# Patient Record
Sex: MALE | Race: WHITE | Employment: OTHER | URBAN - METROPOLITAN AREA
[De-identification: names, ages, dates, MRNs, and addresses within clinical notes are randomized per-mention and may not be internally consistent; named-entity substitution may affect disease eponyms.]

---

## 2017-01-07 ENCOUNTER — HOSPITAL ENCOUNTER (EMERGENCY)
Age: 82
Discharge: HOME | End: 2017-01-07
Attending: EMERGENCY MEDICINE
Payer: MEDICARE

## 2017-01-07 ENCOUNTER — APPOINTMENT (OUTPATIENT)
Dept: CT IMAGING | Age: 82
End: 2017-01-07
Attending: EMERGENCY MEDICINE
Payer: MEDICARE

## 2017-01-07 VITALS
RESPIRATION RATE: 19 BRPM | HEART RATE: 60 BPM | SYSTOLIC BLOOD PRESSURE: 143 MMHG | WEIGHT: 180 LBS | HEIGHT: 69 IN | TEMPERATURE: 97.5 F | OXYGEN SATURATION: 94 % | DIASTOLIC BLOOD PRESSURE: 91 MMHG | BODY MASS INDEX: 26.66 KG/M2

## 2017-01-07 DIAGNOSIS — S00.93XA TRAUMATIC HEMATOMA OF HEAD, INITIAL ENCOUNTER: ICD-10-CM
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
## 2017-01-07 LAB
ATRIAL RATE: 67 BPM
CALCULATED P AXIS, ECG09: 83 DEGREES
CALCULATED R AXIS, ECG10: 45 DEGREES
CALCULATED T AXIS, ECG11: 11 DEGREES
DIAGNOSIS, 93000: NORMAL
P-R INTERVAL, ECG05: 196 MS
Q-T INTERVAL, ECG07: 386 MS
QRS DURATION, ECG06: 88 MS
QTC CALCULATION (BEZET), ECG08: 407 MS
VENTRICULAR RATE, ECG03: 67 BPM

## 2017-01-07 PROCEDURE — 93005 ELECTROCARDIOGRAM TRACING: CPT

## 2017-01-07 PROCEDURE — 77030018836 HC SOL IRR NACL ICUM -A

## 2017-01-07 PROCEDURE — 75810000293 HC SIMP/SUPERF WND  RPR

## 2017-01-07 PROCEDURE — 77030010507 HC ADH SKN DERMBND J&J -B

## 2017-01-07 PROCEDURE — 99285 EMERGENCY DEPT VISIT HI MDM: CPT

## 2017-01-07 PROCEDURE — 70450 CT HEAD/BRAIN W/O DYE: CPT

## 2017-01-07 NOTE — ED PROVIDER NOTES
HPI Comments: Heron Liu. is a 80 y.o. male with PMhx significant for HNP, COPD, DM, GERD, CAD, TIA, HTN, asthma, DJD, prostate cancer, and hypercholesterolemia who presents via EMS in a C-spine collar to the ED for further evaluation s/p GLF this evening. Per EMS the pt walked onto his front porch where he slipped and fell hitting his head and sustained a bleeding laceration to the back of his head. EMS reports that upon arrival his blood pressure was elevated at 190/110 mmHg which has come down since. Pt states that he got up s/p GLF with assistance from his wife and was experiencing a headache leading him to the ED for further evaluation. He discloses that he has gait issues at baseline secondary to a double hip replacement. Pt endorses that he has been able to put weight on BL lower extremities without pain. He states that he is on ASA but no other blood thinners. He specifically denies any neck pain, back pain, LOC, or BL hip pain at this time. PCP: Wali Snow MD      There are no other complaints, changes or physical findings at this time. Written by Omid Severino ED Scribe, as dictated by Omid Parra MD     The history is provided by the patient and the EMS personnel. No  was used.         Past Medical History:   Diagnosis Date    Arrhythmia      irregular heart beat    Arthritis      DJD    Asthma     CAD (coronary artery disease)     Cancer ([address] 2003     prostate    Chronic pain      hip     COPD (chronic obstructive pulmonary disease) ([facility] 9/10/2010    Diabetes (HCC)     GERD (gastroesophageal reflux disease)     HNP (herniated nucleus pulposus) 6/2006     lumbar     Hypercholesterolemia     Hypertension     Nausea & vomiting     Reversible ischemic neurologic deficit ([address] 1990     suspected with no residual effects and no recurrance    Stroke (HCC)      TIA- no deficiets       Past Surgical History:   Procedure Laterality Date    Colonoscopy  12/15/2004     Diverticulosis Dr. Alec Briceño repeat 10 years    Hx prostatectomy  2003    Pr total hip arthroplasty Left 2/9/11     Dr. Subha Stein at [facility] Hx hernia repair  9/2008     left inguinal hernia repair by Dr. Jj Cuevas Hx hernia repair Right      inguinal hernai repair    Hx hernia repair       umbilical hernia repair    Hx tonsillectomy      Hx heart catheterization       2  stents     Hx colonoscopy  2005    Hx hip replacement Right 07/06/2016         Family History:   Problem Relation Age of Onset    Cancer Mother      Breast cancer    Cancer Father      Bladder cancer    Lung Disease Father      Emphysema    Hypertension Sister      1       Social History     Social History    Marital status:      Spouse name: N/A    Number of children: N/A    Years of education: N/A     Occupational History    Not on file. Social History Main Topics    Smoking status: Former Smoker     Packs/day: 3.00     Years: 10.00     Types: Cigarettes     Quit date: 1/15/1970    Smokeless tobacco: Never Used    Alcohol use No    Drug use: Yes     Special: Prescription, OTC    Sexual activity: Yes     Partners: Female     Birth control/ protection: None     Other Topics Concern    Not on file     Social History Narrative         ALLERGIES: Review of patient's allergies indicates no known allergies. Review of Systems   Constitutional: Negative for activity change, appetite change, chills, fever and unexpected weight change. HENT: Negative for congestion. Eyes: Negative for pain and visual disturbance. Respiratory: Negative for cough and shortness of breath. Cardiovascular: Negative for chest pain. Gastrointestinal: Negative for abdominal pain, diarrhea, nausea and vomiting. Genitourinary: Negative for dysuria. Musculoskeletal: Positive for gait problem (secondary to hip replacement). Negative for arthralgias (BL hips), back pain and neck pain.    Skin: Positive for wound (laceration to back of head). Negative for rash. Neurological: Positive for headaches. (-) LOC  (+) head trauma     Patient Vitals for the past 12 hrs:   Temp Pulse Resp BP SpO2   01/07/17 0045 - - 19 (!) 143/91 94 %   01/07/17 0023 97.5 °F (36.4 °C) 60 22 152/85 95 %         Physical Exam   Constitutional: He is oriented to person, place, and time. He appears well-developed and well-nourished. Elderly male in mild distress   HENT:   Head: Normocephalic. Mouth/Throat: Oropharynx is clear and moist.   Post occiput hematoma with 3 cm central laceration    Eyes: Conjunctivae and EOM are normal. Pupils are equal, round, and reactive to light. Right eye exhibits no discharge. Left eye exhibits no discharge. Neck: Normal range of motion. Neck supple. No spinous process tenderness and no muscular tenderness present. No rigidity. Normal range of motion present. C-collar removed as Alert and oriented x4 and without neck pain or tenderness. After removal denies neurologic complaints. Cardiovascular: Normal rate and normal heart sounds. No murmur heard. Pulmonary/Chest: Effort normal and breath sounds normal. No respiratory distress. He has no wheezes. He has no rales. Abdominal: Soft. Bowel sounds are normal. He exhibits no distension. There is no tenderness. Musculoskeletal: Normal range of motion. He exhibits no edema or deformity. All joints rangable and without pain   Neurological: He is alert and oriented to person, place, and time. No cranial nerve deficit. He exhibits normal muscle tone. Skin: Skin is warm and dry. No rash noted. He is not diaphoretic. Psychiatric: He has a normal mood and affect. His behavior is normal. Thought content normal.   Nursing note and vitals reviewed. MDM  Number of Diagnoses or Management Options  Fall, initial encounter:   Traumatic hematoma of head, initial encounter:   Diagnosis management comments: Rule out intracranial injury.  No evidence of bony trauma. Amount and/or Complexity of Data Reviewed  Tests in the radiology section of CPT®: ordered and reviewed  Tests in the medicine section of CPT®: ordered and reviewed  Obtain history from someone other than the patient: yes (EMS)  Review and summarize past medical records: yes  Independent visualization of images, tracings, or specimens: yes    Patient Progress  Patient progress: stable    ED Course       Procedures    Procedure Note - C-collar removed:   0016  Performed by: Juan Pizano MD  C-spine cleared clinically with exam.  C-collar removed. Written by Bobby Le ED Scribe, as dictated by Juan Pizano MD.     EKG interpretation: (Preliminary)  7896  Rhythm: normal sinus rhythm; and regular . Rate (approx.): 67; Axis: normal; OH interval: normal; QRS interval: normal ; ST/T wave: normal; Other findings: normal.  Written by Lea Severino ED Scribe as dictated by Juan Pizano MD    Procedure Note - Laceration Repair:  7035  Procedure by Juan Pizano MD.  Complexity: complex  3.5cm irregular laceration to scalp  was irrigated copiously with NS under jet lavage. The wound was explored with the following results: No foreign bodies found. The wound was repaired with Dermabond. The wound was closed with good hemostasis and approximation. Sterile dressing applied. Estimated blood loss: 5 mL   The procedure took 1-15 minutes, and pt tolerated well. Written by Bobby Le ED Scribe, as dictated by Juan Pizano MD.       After repair, gait was tested and patient was able to stand. Noted sx of lightheadedness (slight) immediately upon standing which resolved after 2-3 seconds. Discussed with patient and spouse moving slowly until BP equilibrates before standing. She states he has already been told this in the past but she will continue to remind him.      LABORATORY TESTS:  Recent Results (from the past 12 hour(s))   EKG, 12 LEAD, INITIAL Collection Time: 01/07/17 12:27 AM   Result Value Ref Range    Ventricular Rate 67 BPM    Atrial Rate 67 BPM    P-R Interval 196 ms    QRS Duration 88 ms    Q-T Interval 386 ms    QTC Calculation (Bezet) 407 ms    Calculated P Axis 83 degrees    Calculated R Axis 45 degrees    Calculated T Axis 11 degrees    Diagnosis       Normal sinus rhythm  Normal ECG  When compared with ECG of 08-AUG-2014 02:14,  No significant change was found         IMAGING RESULTS:  CT HEAD WO CONT   Final Result   EXAM: CT HEAD WO CONT     INDICATION: Status post fall with posterior head laceration     COMPARISON: 3/3/2015.     TECHNIQUE: Unenhanced CT of the head was performed using 5 mm images. Brain and  bone windows were generated. CT dose reduction was achieved through use of a  standardized protocol tailored for this examination and automatic exposure  control for dose modulation.      FINDINGS:  The ventricles and sulci are normal in size, shape and configuration and  midline. Focal chronic ischemia in the left parietal deep white matter is stable  compared to the prior exam. There is no intracranial hemorrhage, extra-axial  collection, mass, mass effect or midline shift. The basilar cisterns are open. No acute infarct is identified. Right posterior parietal soft tissue hematoma is  noted. The bone windows demonstrate no abnormalities. The visualized portions of  the paranasal sinuses and mastoid air cells are clear. Vascular calcification is  noted.     IMPRESSION  IMPRESSION: Right posterior parietal soft tissue hematoma. No acute intracranial  abnormality. IMPRESSION:  1. Fall, initial encounter    2. Traumatic hematoma of head, initial encounter        PLAN:  1. Discharge Medication List as of 1/7/2017  1:31 AM      CONTINUE these medications which have NOT CHANGED    Details   metFORMIN ER (GLUCOPHAGE XR) 500 mg tablet Take 3 Tabs by mouth daily (with dinner). , Normal, Disp-270 Tab, R-3      albuterol (PROVENTIL HFA, VENTOLIN HFA, PROAIR HFA) 90 mcg/actuation inhaler Take 2 Puffs by inhalation every four (4) hours as needed for Wheezing., Normal, Disp-1 Inhaler, R-5      lisinopril (PRINIVIL, ZESTRIL) 5 mg tablet Take 1 Tab by mouth daily. Hold dose until blood pressure is greater than 120/70., Historical Med, Disp-90 Tab, R-1      metoprolol succinate (TOPROL-XL) 25 mg XL tablet TAKE 1/2 TABLET BY MOUTH EVERY DAY, Normal, Disp-30 Tab, R-12      glipiZIDE (GLUCOTROL) 5 mg tablet TAKE 1 TABLET BY MOUTH EVERY DAY, Normal, Disp-90 Tab, R-3      MAGNESIUM PO Take  by mouth daily. , Historical Med      simvastatin (ZOCOR) 20 mg tablet TAKE 1 TABLET BY MOUTH NIGHTLY, Normal, Disp-90 Tab, R-3      glucose blood VI test strips (ONE TOUCH ULTRA TEST) strip Check blood sugar 6 - 7 times a week as directed. , Print, Disp-100 strip, R-3      acetaminophen (TYLENOL) 500 mg tablet Take 1 tablet by mouth every eight (8) hours as needed for Pain., Normal, Disp-30 tablet, R-0      CHOLECALCIFEROL, VITAMIN D3, (VITAMIN D3 PO) Take 1,000 Units by mouth daily. , Historical Med      multivitamins-minerals-lutein (CENTRUM SILVER) Tab Take 1 Tab by mouth daily. , Historical Med      fexofenadine (ALLEGRA) 180 mg tablet Take 1 Tab by mouth daily. , Print, Disp-30 Tab, R-11      famotidine (PEPCID AC) 20 mg tablet Take 20 mg by mouth daily. , Historical Med      aspirin (ASPIRIN) 325 mg tablet Take 325 mg by mouth daily. , Historical Med      VITAMIN B COMPLEX (B COMPLEX PO) Take 1 Tab by mouth daily. , Historical Med      tiotropium (SPIRIVA WITH HANDIHALER) 18 mcg inhalation capsule Take 1 Cap by inhalation daily. , Historical Med      fluticasone-salmeterol (ADVAIR DISKUS) 250-50 mcg/Dose diskus inhaler Take 1 Puff by inhalation every twelve (12) hours. , Historical Med      ASCORBIC ACID (VITAMIN C PO) Take 1,000 mg by mouth daily. , Historical Med           2.    Follow-up Information     Follow up With Details Comments Contact Info    Patti Randhawa MD Schedule an appointment as soon as possible for a visit in 2 days exam recheck [address]  [phone]          Return to ED if worse     Discharge Note:  0134  The patient is ready for discharge. The patient's signs, symptoms, diagnosis, and discharge instruction have been discussed and the patient has conveyed their understanding. The patient is to follow up as recommended or return to the ER should their symptoms worsen. Plan has been discussed and the patient is in agreement. Written by Tati Nunes ED Scribe, as dictated by Al Torres MD    Attestation: This note is prepared by Izaiah Severino, acting as Scribe for Al Torres MD.      Al Torres MD: The scribe's documentation has been prepared under my direction and personally reviewed by me in its entirety. I confirm that the note above accurately reflects all work, treatment, procedures, and medical decision making performed by me.

## 2017-01-07 NOTE — DISCHARGE INSTRUCTIONS
Head Injury: Care Instructions  Your Care Instructions  Most injuries to the head are minor. Bumps, cuts, and scrapes on the head and face usually heal well and can be treated the same as injuries to other parts of the body. Although it's rare, once in a while a more serious problem shows up after you are home. So it's good to be on the lookout for symptoms for a day or two. Follow-up care is a key part of your treatment and safety. Be sure to make and go to all appointments, and call your doctor if you are having problems. It's also a good idea to know your test results and keep a list of the medicines you take. How can you care for yourself at home? · Follow your doctor's instructions. He or she will tell you if you need someone to watch you closely for the next 24 hours or longer. · Take it easy for the next few days or more if you are not feeling well. · Ask your doctor when it's okay for you to go back to activities like driving a car, riding a bike, or operating machinery. When should you call for help? Call 911 anytime you think you may need emergency care. For example, call if:  · You have a seizure. · You passed out (lost consciousness). · You are confused or can't stay awake. Call your doctor now or seek immediate medical care if:  · You have new or worse vomiting. · You feel less alert. · You have new weakness or numbness in any part of your body. Watch closely for changes in your health, and be sure to contact your doctor if:  · You do not get better as expected. · You have new symptoms, such as headaches, trouble concentrating, or changes in mood. Where can you learn more? Go to http://jasvir-yaron.info/. Enter T592 in the search box to learn more about \"Head Injury: Care Instructions. \"  Current as of: September 7, 2016  Content Version: 11.1  © 8576-9730 [employer].  Care instructions adapted under license by [employer] (which disclaims liability or warranty for this information). If you have questions about a medical condition or this instruction, always ask your healthcare professional. [address] any warranty or liability for your use of this information. Preventing Falls: Care Instructions  Your Care Instructions  Getting around your home safely can be a challenge if you have injuries or health problems that make it easy for you to fall. Loose rugs and furniture in walkways are among the dangers for many older people who have problems walking or who have poor eyesight. People who have conditions such as arthritis, osteoporosis, or dementia also have to be careful not to fall. You can make your home safer with a few simple measures. Follow-up care is a key part of your treatment and safety. Be sure to make and go to all appointments, and call your doctor if you are having problems. It's also a good idea to know your test results and keep a list of the medicines you take. How can you care for yourself at home? Taking care of yourself  · You may get dizzy if you do not drink enough water. To prevent dehydration, drink plenty of fluids, enough so that your urine is light yellow or clear like water. Choose water and other caffeine-free clear liquids. If you have kidney, heart, or liver disease and have to limit fluids, talk with your doctor before you increase the amount of fluids you drink. · Exercise regularly to improve your strength, muscle tone, and balance. Walk if you can. Swimming may be a good choice if you cannot walk easily. · Have your vision and hearing checked each year or any time you notice a change. If you have trouble seeing and hearing, you might not be able to avoid objects and could lose your balance. · Know the side effects of the medicines you take. Ask your doctor or pharmacist whether the medicines you take can affect your balance.  Sleeping pills or sedatives can affect your balance. · Limit the amount of alcohol you drink. Alcohol can impair your balance and other senses. · Ask your doctor whether calluses or corns on your feet need to be removed. If you wear loose-fitting shoes because of calluses or corns, you can lose your balance and fall. · Talk to your doctor if you have numbness in your feet. Preventing falls at home  · Remove raised doorway thresholds, throw rugs, and clutter. Repair loose carpet or raised areas in the floor. · Move furniture and electrical cords to keep them out of walking paths. · Use nonskid floor wax, and wipe up spills right away, especially on ceramic tile floors. · If you use a walker or cane, put rubber tips on it. If you use crutches, clean the bottoms of them regularly with an abrasive pad, such as steel wool. · Keep your house well lit, especially [address], and outside walkways. Use night-lights in areas such as hallways and bathrooms. Add extra light switches or use remote switches (such as switches that go on or off when you clap your hands) to make it easier to turn lights on if you have to get up during the night. · Install sturdy handrails on stairways. · Move items in your cabinets so that the things you use a lot are on the lower shelves (about waist level). · Keep a cordless phone and a flashlight with new batteries by your bed. If possible, put a phone in each of the main rooms of your house, or carry a cell phone in case you fall and cannot reach a phone. Or, you can wear a device around your neck or wrist. You push a button that sends a signal for help. · Wear low-heeled shoes that fit well and give your feet good support. Use footwear with nonskid soles. Check the heels and soles of your shoes for wear. Repair or replace worn heels or soles. · Do not wear socks without shoes on wood floors. · Walk on the grass when the sidewalks are slippery.  If you live in an area that gets snow and ice in the winter, sprinkle salt on slippery steps and sidewalks. Preventing falls in the bath  · Install grab bars and nonskid mats inside and outside your shower or tub and near the toilet and sinks. · Use shower chairs and bath benches. · Use a hand-held shower head that will allow you to sit while showering. · Get into a tub or shower by putting the weaker leg in first. Get out of a tub or shower with your strong side first.  · Repair loose toilet seats and consider installing a raised toilet seat to make getting on and off the toilet easier. · Keep your bathroom door unlocked while you are in the shower. Where can you learn more? Go to http://jasvir-yaron.info/. Enter 0476 [id number] in the search box to learn more about \"Preventing Falls: Care Instructions. \"  Current as of: August 4, 2016  Content Version: 11.1  © 4904-0551 [employer]. Care instructions adapted under license by [employer] (which disclaims liability or warranty for this information). If you have questions about a medical condition or this instruction, always ask your healthcare professional. [address] any warranty or liability for your use of this information.

## 2017-01-07 NOTE — ED NOTES
Wound irrigation performed. Dr. Diego Sanchez has reviewed discharge instructions with the patient. The patient verbalized understanding. Patient taken to car in wheelchair.

## 2017-01-09 ENCOUNTER — PATIENT OUTREACH (OUTPATIENT)
Dept: INTERNAL MEDICINE CLINIC | Age: 82
End: 2017-01-09

## 2017-01-26 RX ORDER — SIMVASTATIN 20 MG/1
TABLET, FILM COATED ORAL
Qty: 90 TAB | Refills: 2 | Status: SHIPPED | OUTPATIENT
Start: 2017-01-26 | End: 2017-10-22 | Stop reason: SDUPTHER

## 2017-02-08 DIAGNOSIS — E11.9 DIABETES MELLITUS TYPE 2, CONTROLLED, WITHOUT COMPLICATIONS (HCC): ICD-10-CM

## 2017-02-08 DIAGNOSIS — I25.10 CORONARY ARTERY DISEASE INVOLVING NATIVE CORONARY ARTERY OF NATIVE HEART WITHOUT ANGINA PECTORIS: Chronic | ICD-10-CM

## 2017-02-08 DIAGNOSIS — E11.9 CONTROLLED TYPE 2 DIABETES MELLITUS WITHOUT COMPLICATION, UNSPECIFIED LONG TERM INSULIN USE STATUS: ICD-10-CM

## 2017-02-09 DIAGNOSIS — E11.9 DIABETES MELLITUS TYPE 2, CONTROLLED, WITHOUT COMPLICATIONS (HCC): ICD-10-CM

## 2017-02-09 DIAGNOSIS — I25.10 CORONARY ARTERY DISEASE INVOLVING NATIVE CORONARY ARTERY OF NATIVE HEART WITHOUT ANGINA PECTORIS: Chronic | ICD-10-CM

## 2017-02-09 RX ORDER — LISINOPRIL 5 MG/1
TABLET ORAL
Qty: 90 TAB | Refills: 1 | OUTPATIENT
Start: 2017-02-09

## 2017-02-09 RX ORDER — LISINOPRIL 5 MG/1
TABLET ORAL
Qty: 90 TAB | Refills: 1 | Status: SHIPPED | OUTPATIENT
Start: 2017-02-09 | End: 2017-12-13 | Stop reason: SDUPTHER

## 2017-02-09 RX ORDER — LISINOPRIL 5 MG/1
TABLET ORAL
Qty: 90 TAB | Refills: 1 | Status: SHIPPED | OUTPATIENT
Start: 2017-02-09 | End: 2017-02-09 | Stop reason: SDUPTHER

## 2017-02-24 RX ORDER — GLIPIZIDE 5 MG/1
TABLET ORAL
Qty: 90 TAB | Refills: 3 | Status: SHIPPED | OUTPATIENT
Start: 2017-02-24 | End: 2018-02-13 | Stop reason: SDUPTHER

## 2017-05-15 NOTE — TELEPHONE ENCOUNTER
Patient has gone on vacation and forgot his metFORMIN ER (GLUCOPHAGE XR) 500 mg tablets. His local [facility] asked if you would send a rx for 8 tablets to last him for 4 days. Asked if this could be done today.

## 2017-05-16 RX ORDER — METFORMIN HYDROCHLORIDE 500 MG/1
1500 TABLET, EXTENDED RELEASE ORAL
Qty: 8 TAB | Refills: 0 | Status: SHIPPED | OUTPATIENT
Start: 2017-05-16 | End: 2017-07-28 | Stop reason: SDUPTHER

## 2017-05-16 NOTE — TELEPHONE ENCOUNTER
Refill processed as requested. Orders Placed This Encounter    metFORMIN ER (GLUCOPHAGE XR) 500 mg tablet     Sig: Take 3 Tabs by mouth daily (with dinner). Dispense:  8 Tab     Refill:  0     The above medication refills were approved via verbal order by Dr. Angelika Reynolds III.

## 2017-05-31 ENCOUNTER — OFFICE VISIT (OUTPATIENT)
Dept: CARDIOLOGY CLINIC | Age: 82
End: 2017-05-31

## 2017-05-31 VITALS
WEIGHT: 178.7 LBS | DIASTOLIC BLOOD PRESSURE: 70 MMHG | OXYGEN SATURATION: 92 % | SYSTOLIC BLOOD PRESSURE: 130 MMHG | HEART RATE: 64 BPM | RESPIRATION RATE: 18 BRPM | HEIGHT: 69 IN | BODY MASS INDEX: 26.47 KG/M2

## 2017-05-31 DIAGNOSIS — R06.02 SOBOE (SHORTNESS OF BREATH ON EXERTION): ICD-10-CM

## 2017-05-31 DIAGNOSIS — I25.10 CORONARY ARTERY DISEASE INVOLVING NATIVE CORONARY ARTERY OF NATIVE HEART WITHOUT ANGINA PECTORIS: Chronic | ICD-10-CM

## 2017-05-31 DIAGNOSIS — E78.00 PURE HYPERCHOLESTEROLEMIA: Primary | Chronic | ICD-10-CM

## 2017-05-31 NOTE — MR AVS SNAPSHOT
Visit Information Date & Time Provider Department Dept. Phone Encounter #  
 5/31/2017  9:45 AM David Verde, [address] [facility] [phone] Your Appointments 7/31/2017  9:30 AM  
Medicare Physical with Gerhardt Churchman, MD  
[address] [facility] [address] [facility]) Appt Note: [address] [facility] [address]  
[address] [facility] Upcoming Health Maintenance Date Due ZOSTER VACCINE AGE 60> 12/18/1991 MEDICARE YEARLY EXAM 6/10/2016 FOOT EXAM Q1 2/15/2017 HEMOGLOBIN A1C Q6M 5/14/2017 INFLUENZA AGE 9 TO ADULT 8/1/2017 EYE EXAM RETINAL OR DILATED Q1 10/5/2017 MICROALBUMIN Q1 11/14/2017 LIPID PANEL Q1 11/14/2017 GLAUCOMA SCREENING Q2Y 10/5/2018 DTaP/Tdap/Td series (2 - Td) 10/15/2026 Allergies as of 5/31/2017  Review Complete On: 5/31/2017 By: Martine Ulloa LPN No Known Allergies Current Immunizations  Reviewed on 6/28/2016 Name Date Influenza High Dose Vaccine PF 11/2/2016 Influenza Vaccine 10/27/2014, 10/1/2013 Influenza Vaccine (Quad) PF 10/12/2015 Influenza Vaccine Split 10/31/2011, 10/15/2010 Pneumococcal Conjugate (PCV-13) 4/1/2015 Pneumococcal Vaccine (Unspecified Type) 4/1/2004, 10/1/1998 Rabies Immune Globulin 10/15/2016 11:44 PM  
 Rabies Vaccine IM 10/29/2016  3:14 PM, 10/22/2016  3:02 PM, 10/18/2016  1:41 PM, 10/15/2016 11:00 PM  
 TD Vaccine 10/30/2012 Tdap 10/15/2016 10:48 PM  
  
 Not reviewed this visit You Were Diagnosed With   
  
 Codes Comments Pure hypercholesterolemia    -  Primary ICD-10-CM: E78.00 ICD-9-CM: 272.0 Coronary artery disease involving native coronary artery of native heart without angina pectoris     ICD-10-CM: I25.10 ICD-9-CM: 414.01   
 SOBOE (shortness of breath on exertion)     ICD-10-CM: R06.02 
ICD-9-CM: 786.05 Vitals BP Pulse Resp Height(growth percentile) Weight(growth percentile) SpO2  
 130/70 (BP 1 Location: Right arm, BP Patient Position: Sitting) 64 18 5' 9\" (1.753 m) 178 lb 11.2 oz (81.1 kg) 92% BMI Smoking Status 26.39 kg/m2 Former Smoker Vitals History BMI and BSA Data Body Mass Index Body Surface Area  
 26.39 kg/m 2 1.99 m 2 Preferred Pharmacy Pharmacy Name Phone [id number] [address] [facility] AT [address] [place], [place] [phone] Your Updated Medication List  
  
   
This list is accurate as of: 5/31/17 10:42 AM.  Always use your most recent med list.  
  
  
  
  
 acetaminophen 500 mg tablet Commonly known as:  TYLENOL Take 1 tablet by mouth every eight (8) hours as needed for Pain. ADVAIR DISKUS 250-50 mcg/dose diskus inhaler Generic drug:  fluticasone-salmeterol Take 1 Puff by inhalation every twelve (12) hours. albuterol 90 mcg/actuation inhaler Commonly known as:  PROVENTIL HFA, VENTOLIN HFA, PROAIR HFA Take 2 Puffs by inhalation every four (4) hours as needed for Wheezing. aspirin 325 mg tablet Commonly known as:  ASPIRIN Take 325 mg by mouth daily. B COMPLEX PO Take 1 Tab by mouth daily. CENTRUM SILVER Tab tablet Generic drug:  multivitamins-minerals-lutein Take 1 Tab by mouth daily. fexofenadine 180 mg tablet Commonly known as:  Bernestine [place] Take 1 Tab by mouth daily. glipiZIDE 5 mg tablet Commonly known as:  GLUCOTROL  
TAKE 1 TABLET BY MOUTH EVERY DAY  
  
 glucose blood VI test strips strip Commonly known as:  ONETOUCH ULTRA TEST Check blood sugar 6 - 7 times a week as directed. lisinopril 5 mg tablet Commonly known as:  PRINIVIL, ZESTRIL  
TAKE 1 TABLET BY MOUTH EVERY DAY  
  
 MAGNESIUM PO Take  by mouth daily. metFORMIN  mg tablet Commonly known as:  GLUCOPHAGE XR Take 3 Tabs by mouth daily (with dinner). metoprolol succinate 25 mg XL tablet Commonly known as:  TOPROL-XL  
TAKE 1/2 TABLET BY MOUTH EVERY DAY  
  
 PEPCID AC 20 mg tablet Generic drug:  famotidine Take 20 mg by mouth daily. simvastatin 20 mg tablet Commonly known as:  ZOCOR  
TAKE 1 TABLET BY MOUTH NIGHTLY SPIRIVA WITH HANDIHALER 18 mcg inhalation capsule Generic drug:  tiotropium Take 1 Cap by inhalation daily. VITAMIN C PO Take 1,000 mg by mouth daily. VITAMIN D3 PO Take 1,000 Units by mouth daily. We Performed the Following AMB POC EKG ROUTINE W/ 12 LEADS, INTER & REP [84187 CPT(R)] Introducing [facility] [place] [facility] SERVICES! Dear Enrrique Damon: Thank you for requesting a [employer] account. Our records indicate that you already have an active [employer] account. You can access your account anytime at [email or web address] Did you know that you can access your hospital and ER discharge instructions at any time in [employer]? You can also review all of your test results from your hospital stay or ER visit. Additional Information If you have questions, please visit the Frequently Asked Questions section of the [employer] website at [email or web address]/. Remember, [employer] is NOT to be used for urgent needs. For medical emergencies, dial 911. Now available from your iPhone and Android! Please provide this summary of care documentation to your next provider. Your primary care clinician is listed as Luis [phone] If you have any questions after today's visit, please call [phone].

## 2017-05-31 NOTE — PROGRESS NOTES
NAME:  Elinor Tellez. :   1931   MRN:   [id number]   PCP:  Art Acuña MD           Subjective: The patient is a 80y.o. year old male  who returns for a routine follow-up. Since the last visit, patient reports no change in exercise tolerance, chest pain, edema, medication intolerance, palpitations, PND/orthopnea wheezing, sputum, syncope, dizziness or light headedness. Continues to have shortness of breath. Recently completed a course of steroids. Says he feels as well as he did 6 mo ago. Wife feels he is more short of breath. Pt denies feeling much better post PCI to  of RCA in . Wife disagrees. Past Medical History:   Diagnosis Date    Arrhythmia     irregular heart beat    Arthritis     DJD    Asthma     CAD (coronary artery disease)     Cancer ([facility] [address] [address]     prostate    Chronic pain     hip     COPD (chronic obstructive pulmonary disease) ([facility] [address] [facility] 9/10/2010    Diabetes (HCC)     GERD (gastroesophageal reflux disease)     HNP (herniated nucleus pulposus) 2006    lumbar     Hypercholesterolemia     Hypertension     Nausea & vomiting     Reversible ischemic neurologic deficit ([address]     suspected with no residual effects and no recurrance    Stroke (HCC)     TIA- no deficiets       Social History   Substance Use Topics    Smoking status: Former Smoker     Packs/day: 3.00     Years: 10.00     Types: Cigarettes     Quit date: 1/15/1970    Smokeless tobacco: Never Used    Alcohol use No      Family History   Problem Relation Age of Onset    Cancer Mother      Breast cancer    Cancer Father      Bladder cancer    Lung Disease Father      Emphysema    Hypertension Sister      2015        Review of Systems  Constitutional: Negative for fever, chills, and diaphoresis. Respiratory: Negative for cough, hemoptysis, sputum production, reports shortness of breath and wheezing.    Cardiovascular: Negative for chest pain, palpitations, orthopnea, claudication, leg swelling and PND. Gastrointestinal: Negative for heartburn, nausea, vomiting, blood in stool and melena. Genitourinary: Negative for dysuria and flank pain. Musculoskeletal: Negative for joint pain and back pain. Skin: Negative for rash. Neurological: Negative for focal weakness, seizures, loss of consciousness, weakness and headaches. Endo/Heme/Allergies: Does not bruise/bleed easily. Psychiatric/Behavioral: Negative for memory loss. The patient does not have insomnia. Objective:       Vitals:    05/31/17 0945 05/31/17 0956   BP: 124/64 130/70   Pulse: 64    Resp: 18    SpO2: 92%    Weight: 178 lb 11.2 oz (81.1 kg)    Height: 5' 9\" (1.753 m)     Body mass index is 26.39 kg/(m^2). General PE    Gen: NAD     Mental Status - Alert. General Appearance - Not in acute distress. Neck - no JVD     Chest and Lung Exam     Inspection: Accessory muscles - No use of accessory muscles in breathing. Auscultation:   Breath sounds: - diffuse expiratory wheezing. Cardiovascular   Inspection: Jugular vein - Bilateral - Inspection Normal.   Palpation/Percussion:   Apical Impulse: - Normal.   Auscultation: Rhythm - Regular. Heart Sounds - S1 WNL and S2 WNL. No S3 or S4. Murmurs & Other Heart Sounds: Auscultation of the heart reveals - No Murmurs. Peripheral Vascular   Upper Extremity: Inspection - Bilateral - No Cyanotic nailbeds or Digital clubbing. Lower Extremity:   Palpation: Edema - Bilateral - No edema. Abdomen: Soft, non-tender, bowel sounds are active. Neuro: A&O times 3, CN and motor grossly WNL      Data Review:     EKG -  Sinus rhythm. Allergies reviewed  No Known Allergies    Medications reviewed  Current Outpatient Prescriptions   Medication Sig    metFORMIN ER (GLUCOPHAGE XR) 500 mg tablet Take 3 Tabs by mouth daily (with dinner).     glipiZIDE (GLUCOTROL) 5 mg tablet TAKE 1 TABLET BY MOUTH EVERY DAY    simvastatin (ZOCOR) 20 mg tablet TAKE 1 TABLET BY MOUTH NIGHTLY    albuterol (PROVENTIL HFA, VENTOLIN HFA, PROAIR HFA) 90 mcg/actuation inhaler Take 2 Puffs by inhalation every four (4) hours as needed for Wheezing.  metoprolol succinate (TOPROL-XL) 25 mg XL tablet TAKE 1/2 TABLET BY MOUTH EVERY DAY    MAGNESIUM PO Take  by mouth daily.  glucose blood VI test strips (ONE TOUCH ULTRA TEST) strip Check blood sugar 6 - 7 times a week as directed.  acetaminophen (TYLENOL) 500 mg tablet Take 1 tablet by mouth every eight (8) hours as needed for Pain.  CHOLECALCIFEROL, VITAMIN D3, (VITAMIN D3 PO) Take 1,000 Units by mouth daily.  multivitamins-minerals-lutein (CENTRUM SILVER) Tab Take 1 Tab by mouth daily.  fexofenadine (ALLEGRA) 180 mg tablet Take 1 Tab by mouth daily.  aspirin (ASPIRIN) 325 mg tablet Take 325 mg by mouth daily.  VITAMIN B COMPLEX (B COMPLEX PO) Take 1 Tab by mouth daily.  tiotropium (SPIRIVA WITH HANDIHALER) 18 mcg inhalation capsule Take 1 Cap by inhalation daily.  fluticasone-salmeterol (ADVAIR DISKUS) 250-50 mcg/Dose diskus inhaler Take 1 Puff by inhalation every twelve (12) hours.  ASCORBIC ACID (VITAMIN C PO) Take 1,000 mg by mouth daily.  lisinopril (PRINIVIL, ZESTRIL) 5 mg tablet TAKE 1 TABLET BY MOUTH EVERY DAY    famotidine (PEPCID AC) 20 mg tablet Take 20 mg by mouth daily. No current facility-administered medications for this visit. Assessment:       ICD-10-CM ICD-9-CM    1. Pure hypercholesterolemia E78.00 272.0 AMB POC EKG ROUTINE W/ 12 LEADS, INTER & REP   2. Coronary artery disease involving native coronary artery of native heart without angina pectoris I25.10 414.01    3.  SOBOE (shortness of breath on exertion) R06.02 786.05         Orders Placed This Encounter    AMB POC EKG ROUTINE W/ 12 LEADS, INTER & REP     Order Specific Question:   Reason for Exam:     Answer:   routine       Patient Active Problem List   Diagnosis Code    GERD (gastroesophageal reflux disease) K21.9    Low back pain M54.5    Pure hypercholesterolemia E78.00    COPD (chronic obstructive pulmonary disease) ([facility]) J44.9    Osteoarthritis of hip M16.9    Status post hip replacement Z96.649    Personal history of prostate cancer Z85.46    S/P drug eluting coronary stent placement Z95.5    Incidental pulmonary nodule, greater than or equal to 8mm R91.1    Coronary artery disease involving native coronary artery without angina pectoris I25.10    Diabetes mellitus type 2, controlled, without complications ([facility]) U69.2    Retinal hemorrhage H35.60    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 1. ASHD - s/p PCI to  of RCA 8/14. Stable at this time. EKG sinus rhythm. 2. hyperlipidemia - on statin. 3. Limiting MCGUIRE - ? COPD vs ASHD. Will continue to follow. If he becomes more symptomatic, will evaluate for ischemia with lexiscan.      Dennis Goltz, MD

## 2017-05-31 NOTE — PROGRESS NOTES
Chief Complaint   Patient presents with    Other     6 month- soboe, some swelling in ankles-has not been taking for about a month

## 2017-06-14 NOTE — TELEPHONE ENCOUNTER
Orders Placed This Encounter    glucose blood VI test strips (ONETOUCH ULTRA TEST) strip     Sig: by Does Not Apply route Daily (before breakfast). E11.9     Dispense:  100 Strip     Refill:  5     The above medication refills were approved via verbal order by Dr. Gucci Tai III.

## 2017-07-28 RX ORDER — METFORMIN HYDROCHLORIDE 500 MG/1
TABLET, EXTENDED RELEASE ORAL
Qty: 180 TAB | Refills: 3 | OUTPATIENT
Start: 2017-07-28

## 2017-07-28 RX ORDER — METFORMIN HYDROCHLORIDE 500 MG/1
1500 TABLET, EXTENDED RELEASE ORAL
Qty: 270 TAB | Refills: 1 | Status: SHIPPED | OUTPATIENT
Start: 2017-07-28 | End: 2018-01-09 | Stop reason: SDUPTHER

## 2017-07-31 ENCOUNTER — OFFICE VISIT (OUTPATIENT)
Dept: INTERNAL MEDICINE CLINIC | Age: 82
End: 2017-07-31

## 2017-07-31 VITALS
OXYGEN SATURATION: 94 % | RESPIRATION RATE: 16 BRPM | SYSTOLIC BLOOD PRESSURE: 138 MMHG | HEIGHT: 69 IN | WEIGHT: 178 LBS | HEART RATE: 62 BPM | BODY MASS INDEX: 26.36 KG/M2 | TEMPERATURE: 98.2 F | DIASTOLIC BLOOD PRESSURE: 82 MMHG

## 2017-07-31 DIAGNOSIS — Z23 NEED FOR ZOSTAVAX ADMINISTRATION: Primary | ICD-10-CM

## 2017-07-31 DIAGNOSIS — Z85.46 PERSONAL HISTORY OF PROSTATE CANCER: ICD-10-CM

## 2017-07-31 DIAGNOSIS — Z00.00 ROUTINE GENERAL MEDICAL EXAMINATION AT A HEALTH CARE FACILITY: ICD-10-CM

## 2017-07-31 DIAGNOSIS — E11.9 CONTROLLED TYPE 2 DIABETES MELLITUS WITHOUT COMPLICATION, WITHOUT LONG-TERM CURRENT USE OF INSULIN (HCC): Chronic | ICD-10-CM

## 2017-07-31 DIAGNOSIS — J44.9 CHRONIC OBSTRUCTIVE PULMONARY DISEASE, UNSPECIFIED COPD TYPE (HCC): Chronic | ICD-10-CM

## 2017-07-31 DIAGNOSIS — I25.10 CORONARY ARTERY DISEASE INVOLVING NATIVE CORONARY ARTERY OF NATIVE HEART WITHOUT ANGINA PECTORIS: Chronic | ICD-10-CM

## 2017-07-31 DIAGNOSIS — R29.6 FALLS FREQUENTLY: ICD-10-CM

## 2017-07-31 DIAGNOSIS — E78.00 PURE HYPERCHOLESTEROLEMIA: Chronic | ICD-10-CM

## 2017-07-31 DIAGNOSIS — Z13.39 SCREENING FOR ALCOHOLISM: ICD-10-CM

## 2017-07-31 RX ORDER — AA/PROT/LYSINE/METHIO/VIT C/B6 50-12.5 MG
TABLET ORAL DAILY
COMMUNITY
End: 2018-10-23 | Stop reason: SDUPTHER

## 2017-07-31 RX ORDER — MULTIVITAMIN
TABLET ORAL
Status: ON HOLD | COMMUNITY
End: 2018-10-04

## 2017-07-31 NOTE — PATIENT INSTRUCTIONS
Preventing Falls: Care Instructions  Your Care Instructions  Getting around your home safely can be a challenge if you have injuries or health problems that make it easy for you to fall. Loose rugs and furniture in walkways are among the dangers for many older people who have problems walking or who have poor eyesight. People who have conditions such as arthritis, osteoporosis, or dementia also have to be careful not to fall. You can make your home safer with a few simple measures. Follow-up care is a key part of your treatment and safety. Be sure to make and go to all appointments, and call your doctor if you are having problems. It's also a good idea to know your test results and keep a list of the medicines you take. How can you care for yourself at home? Taking care of yourself  · You may get dizzy if you do not drink enough water. To prevent dehydration, drink plenty of fluids, enough so that your urine is light yellow or clear like water. Choose water and other caffeine-free clear liquids. If you have kidney, heart, or liver disease and have to limit fluids, talk with your doctor before you increase the amount of fluids you drink. · Exercise regularly to improve your strength, muscle tone, and balance. Walk if you can. Swimming may be a good choice if you cannot walk easily. · Have your vision and hearing checked each year or any time you notice a change. If you have trouble seeing and hearing, you might not be able to avoid objects and could lose your balance. · Know the side effects of the medicines you take. Ask your doctor or pharmacist whether the medicines you take can affect your balance. Sleeping pills or sedatives can affect your balance. · Limit the amount of alcohol you drink. Alcohol can impair your balance and other senses. · Ask your doctor whether calluses or corns on your feet need to be removed.  If you wear loose-fitting shoes because of calluses or corns, you can lose your balance and fall. · Talk to your doctor if you have numbness in your feet. Preventing falls at home  · Remove raised doorway thresholds, throw rugs, and clutter. Repair loose carpet or raised areas in the floor. · Move furniture and electrical cords to keep them out of walking paths. · Use nonskid floor wax, and wipe up spills right away, especially on ceramic tile floors. · If you use a walker or cane, put rubber tips on it. If you use crutches, clean the bottoms of them regularly with an abrasive pad, such as steel wool. · Keep your house well lit, especially Nora Simpers, and outside walkways. Use night-lights in areas such as hallways and bathrooms. Add extra light switches or use remote switches (such as switches that go on or off when you clap your hands) to make it easier to turn lights on if you have to get up during the night. · Install sturdy handrails on stairways. · Move items in your cabinets so that the things you use a lot are on the lower shelves (about waist level). · Keep a cordless phone and a flashlight with new batteries by your bed. If possible, put a phone in each of the main rooms of your house, or carry a cell phone in case you fall and cannot reach a phone. Or, you can wear a device around your neck or wrist. You push a button that sends a signal for help. · Wear low-heeled shoes that fit well and give your feet good support. Use footwear with nonskid soles. Check the heels and soles of your shoes for wear. Repair or replace worn heels or soles. · Do not wear socks without shoes on wood floors. · Walk on the grass when the sidewalks are slippery. If you live in an area that gets snow and ice in the winter, sprinkle salt on slippery steps and sidewalks. Preventing falls in the bath  · Install grab bars and nonskid mats inside and outside your shower or tub and near the toilet and sinks. · Use shower chairs and bath benches.   · Use a hand-held shower head that will allow you to sit while showering. · Get into a tub or shower by putting the weaker leg in first. Get out of a tub or shower with your strong side first.  · Repair loose toilet seats and consider installing a raised toilet seat to make getting on and off the toilet easier. · Keep your bathroom door unlocked while you are in the shower. Where can you learn more? Go to http://jasvir-yaron.info/. Enter [phone] in the search box to learn more about \"Preventing Falls: Care Instructions. \"  Current as of: August 4, 2016  Content Version: 11.2  © 6278-3741 [employer]. Care instructions adapted under license by [employer] (which disclaims liability or warranty for this information). If you have questions about a medical condition or this instruction, always ask your healthcare professional. [place] [address] any warranty or liability for your use of this information. Medicare Wellness Visit, Male    The best way to live healthy is to have a healthy lifestyle by eating a well-balanced diet, exercising regularly, limiting alcohol and stopping smoking. Regular physical exams and screening tests are another way to keep healthy. Preventive exams provided by your health care provider can find health problems before they become diseases or illnesses. Preventive services including immunizations, screening tests, monitoring and exams can help you take care of your own health. All people over age 72 should have a pneumovax  and and a prevnar shot to prevent pneumonia. These are once in a lifetime unless you and your provider decide differently. All people over 65 should have a yearly flu shot and a tetanus vaccine every 10 years. Screening for diabetes mellitus with a blood sugar test should be done every year.     Glaucoma is a disease of the eye due to increased ocular pressure that can lead to blindness and it should be done every year by an eye professional.    Cardiovascular screening tests that check for elevated lipids (fatty part of blood) which can lead to heart disease and strokes should be done every 5 years. Colorectal screening that evaluates for blood or polyps in your colon should be done yearly as a stool test or every five years as a flexible sigmoidoscope or every 10 years as a colonoscopy up to age 76. Men up to age 76 may need a screening blood test for prostate cancer at certain intervals, depending on their personal and family history. This decision is between the patient and his provider. If you have been a smoker or had family history of abdominal aortic aneurysms, you and your provider may decide to schedule an ultrasound test of your aorta. Hepatitis C screening is also recommended for anyone born between [birth year] through [address]. A shingles vaccine is also recommended once in a lifetime after age 61. Your Medicare Wellness Exam is recommended annually.     Here is a list of your current Health Maintenance items with a due date:  Health Maintenance Due   Topic Date Due    Shingles Vaccine  10/18/1991    Annual Well Visit  06/10/2016    Diabetic Foot Care  02/15/2017    Hemoglobin A1C    05/14/2017

## 2017-07-31 NOTE — PROGRESS NOTES
Chief Complaint   Patient presents with   Paty Cedeno Annual Wellness Visit     Goals that were addressed/or need to be completed after this visit:  Health Maintenance Due   Topic Date Due    ZOSTER VACCINE AGE 60>  10/18/1991    MEDICARE YEARLY EXAM  06/10/2016    FOOT EXAM Q1  02/15/2017    HEMOGLOBIN A1C Q6M  05/14/2017

## 2017-07-31 NOTE — MR AVS SNAPSHOT
Visit Information Date & Time Provider Department Dept. Phone Encounter #  
 7/31/2017  9:30 AM Flaquita Foster MD [facility] Internal Medicine [phone] [id number] Your Appointments 12/13/2017 11:30 AM  
6 MONTH with Tyler Hernandez MD  
[facility] Carlito Kennedy) Appt Note: $CP 5/31/17ksr [address]  
[phone] [address] Upcoming Health Maintenance Date Due ZOSTER VACCINE AGE 60> 10/18/1991 MEDICARE YEARLY EXAM 6/10/2016 FOOT EXAM Q1 2/15/2017 HEMOGLOBIN A1C Q6M 5/14/2017 INFLUENZA AGE 9 TO ADULT 8/1/2017 EYE EXAM RETINAL OR DILATED Q1 10/5/2017 MICROALBUMIN Q1 11/14/2017 LIPID PANEL Q1 11/14/2017 GLAUCOMA SCREENING Q2Y 10/5/2018 DTaP/Tdap/Td series (2 - Td) 10/15/2026 Allergies as of 7/31/2017  Review Complete On: 7/31/2017 By: Justyn Pack LPN No Known Allergies Current Immunizations  Reviewed on 6/28/2016 Name Date Influenza High Dose Vaccine PF 11/2/2016 Influenza Vaccine 10/27/2014, 10/1/2013 Influenza Vaccine (Quad) PF 10/12/2015 Influenza Vaccine Split 10/31/2011, 10/15/2010 Pneumococcal Conjugate (PCV-13) 4/1/2015 Rabies Immune Globulin 10/15/2016 11:44 PM  
 Rabies Vaccine IM 10/29/2016  3:14 PM, 10/22/2016  3:02 PM, 10/18/2016  1:41 PM, 10/15/2016 11:00 PM  
 TD Vaccine 10/30/2012 Tdap 10/15/2016 10:48 PM  
 ZZZ-RETIRED (DO NOT USE) Pneumococcal Vaccine (Unspecified Type) 4/1/2004, 10/1/1998 Not reviewed this visit You Were Diagnosed With   
  
 Codes Comments Need for Zostavax administration    -  Primary ICD-10-CM: P10 ICD-9-CM: V04.89 Routine general medical examination at a health care facility     ICD-10-CM: Z00.00 ICD-9-CM: V70.0 Screening for alcoholism     ICD-10-CM: Z13.89 ICD-9-CM: V79.1 Controlled type 2 diabetes mellitus without complication, without long-term current use of insulin ([facility]     ICD-10-CM: E11.9 ICD-9-CM: 250.00 Chronic obstructive pulmonary disease, unspecified COPD type ([facility]     ICD-10-CM: J44.9 ICD-9-CM: 978 Coronary artery disease involving native coronary artery of native heart without angina pectoris     ICD-10-CM: I25.10 ICD-9-CM: 414.01   
 Pure hypercholesterolemia     ICD-10-CM: E78.00 ICD-9-CM: 272.0 Falls frequently     ICD-10-CM: R29.6 ICD-9-CM: V15.88 Personal history of prostate cancer     ICD-10-CM: Z85.46 
ICD-9-CM: V10.46 Vitals BP Pulse Temp Resp Height(growth percentile) Weight(growth percentile) 138/82 62 98.2 °F (36.8 °C) (Oral) 16 5' 8.5\" (1.74 m) 178 lb (80.7 kg) SpO2 BMI Smoking Status 94% 26.67 kg/m2 Former Smoker Vitals History BMI and BSA Data Body Mass Index Body Surface Area  
 26.67 kg/m 2 1.97 m 2 Preferred Pharmacy Pharmacy Name Phone [id number] [address] [phone] Your Updated Medication List  
  
   
This list is accurate as of: 7/31/17 10:16 AM.  Always use your most recent med list.  
  
  
  
  
 acetaminophen 500 mg tablet Commonly known as:  TYLENOL Take 1 tablet by mouth every eight (8) hours as needed for Pain. ADVAIR DISKUS 250-50 mcg/dose diskus inhaler Generic drug:  fluticasone-salmeterol Take 1 Puff by inhalation every twelve (12) hours. albuterol 90 mcg/actuation inhaler Commonly known as:  PROVENTIL HFA, VENTOLIN HFA, PROAIR HFA Take 2 Puffs by inhalation every four (4) hours as needed for Wheezing. aspirin 325 mg tablet Commonly known as:  ASPIRIN Take 325 mg by mouth daily. B COMPLEX PO Take 1 Tab by mouth daily. CENTRUM SILVER Tab tablet Generic drug:  multivitamins-minerals-lutein Take 1 Tab by mouth daily. CINNAMON 500 mg Cap Generic drug:  cinnamon bark Take  by mouth. CO Q-10 10 mg Cap Generic drug:  coenzyme q10 Take  by mouth. fexofenadine 180 mg tablet Commonly known as:  Bertis Knights Take 1 Tab by mouth daily. glipiZIDE 5 mg tablet Commonly known as:  GLUCOTROL  
TAKE 1 TABLET BY MOUTH EVERY DAY  
  
 glucose blood VI test strips strip Commonly known as:  ONETOUCH ULTRA TEST  
by Does Not Apply route Daily (before breakfast). E11.9  
  
 lisinopril 5 mg tablet Commonly known as:  PRINIVIL, ZESTRIL  
TAKE 1 TABLET BY MOUTH EVERY DAY  
  
 MAGNESIUM PO Take  by mouth daily. metFORMIN  mg tablet Commonly known as:  GLUCOPHAGE XR Take 3 Tabs by mouth daily (with dinner). metoprolol succinate 25 mg XL tablet Commonly known as:  TOPROL-XL  
TAKE 1/2 TABLET BY MOUTH EVERY DAY  
  
 PEPCID AC 20 mg tablet Generic drug:  famotidine Take 20 mg by mouth daily. simvastatin 20 mg tablet Commonly known as:  ZOCOR  
TAKE 1 TABLET BY MOUTH NIGHTLY SPIRIVA RESPIMAT 2.5 mcg/actuation inhaler Generic drug:  tiotropium bromide Take 2 Puffs by inhalation daily. varicella zoster vacine live 19,400 unit/0.65 mL Susr injection Commonly known as:  ZOSTAVAX  
1 Vial by SubCUTAneous route once for 1 dose. Indications: PREVENTION OF HERPES ZOSTER  
  
 VITAMIN C PO Take 1,000 mg by mouth daily. VITAMIN D3 PO Take 1,000 Units by mouth daily. Prescriptions Printed Refills  
 varicella zoster vacine live (ZOSTAVAX) 19,400 unit/0.65 mL susr injection 0 Si Vial by SubCUTAneous route once for 1 dose. Indications: PREVENTION OF HERPES ZOSTER Class: Print Route: SubCUTAneous We Performed the Following CBC WITH AUTOMATED DIFF [39772 CPT(R)] HEMOGLOBIN A1C WITH EAG [33479 CPT(R)]  DIABETES FOOT EXAM [7 Custom] LIPID PANEL [70461 CPT(R)] METABOLIC PANEL, COMPREHENSIVE [58399 CPT(R)] PROSTATE SPECIFIC AG (PSA) [id number] CPT(R)] REFERRAL TO PHYSICAL THERAPY [NHQ10 Custom] Comments:  
 Please evaluate patient for falls and gait issues. . 
 
Please schedule and authorize patient for services as needed TSH RFX ON ABNORMAL TO FREE T4 [[id number] Custom] UA/M W/RFLX CULTURE, ROUTINE [[id number] Custom] Referral Information Referral ID Referred By Referred To  
  
 [id number] Rylee Klein, [address] [place] [address] [address] [place] [address] Phone: [phone] Fax: [phone] Visits Status Start Date End Date 1 New Request 7/31/17 7/31/18 If your referral has a status of pending review or denied, additional information will be sent to support the outcome of this decision. Patient Instructions Preventing Falls: Care Instructions Your Care Instructions Getting around your home safely can be a challenge if you have injuries or health problems that make it easy for you to fall. Loose rugs and furniture in walkways are among the dangers for many older people who have problems walking or who have poor eyesight. People who have conditions such as arthritis, osteoporosis, or dementia also have to be careful not to fall. You can make your home safer with a few simple measures. Follow-up care is a key part of your treatment and safety. Be sure to make and go to all appointments, and call your doctor if you are having problems. It's also a good idea to know your test results and keep a list of the medicines you take. How can you care for yourself at home? Taking care of yourself · You may get dizzy if you do not drink enough water. To prevent dehydration, drink plenty of fluids, enough so that your urine is light yellow or clear like water. Choose water and other caffeine-free clear liquids.  If you have kidney, heart, or liver disease and have to limit fluids, talk with your doctor before you increase the amount of fluids you drink. · Exercise regularly to improve your strength, muscle tone, and balance. Walk if you can. Swimming may be a good choice if you cannot walk easily. · Have your vision and hearing checked each year or any time you notice a change. If you have trouble seeing and hearing, you might not be able to avoid objects and could lose your balance. · Know the side effects of the medicines you take. Ask your doctor or pharmacist whether the medicines you take can affect your balance. Sleeping pills or sedatives can affect your balance. · Limit the amount of alcohol you drink. Alcohol can impair your balance and other senses. · Ask your doctor whether calluses or corns on your feet need to be removed. If you wear loose-fitting shoes because of calluses or corns, you can lose your balance and fall. · Talk to your doctor if you have numbness in your feet. Preventing falls at home · Remove raised doorway thresholds, throw rugs, and clutter. Repair loose carpet or raised areas in the floor. · Move furniture and electrical cords to keep them out of walking paths. · Use nonskid floor wax, and wipe up spills right away, especially on ceramic tile floors. · If you use a walker or cane, put rubber tips on it. If you use crutches, clean the bottoms of them regularly with an abrasive pad, such as steel wool. · Keep your house well lit, especially [place], and outside walkways. Use night-lights in areas such as hallways and bathrooms. Add extra light switches or use remote switches (such as switches that go on or off when you clap your hands) to make it easier to turn lights on if you have to get up during the night. · Install sturdy handrails on stairways. · Move items in your cabinets so that the things you use a lot are on the lower shelves (about waist level). · Keep a cordless phone and a flashlight with new batteries by your bed. If possible, put a phone in each of the main rooms of your house, or carry a cell phone in case you fall and cannot reach a phone. Or, you can wear a device around your neck or wrist. You push a button that sends a signal for help. · Wear low-heeled shoes that fit well and give your feet good support. Use footwear with nonskid soles. Check the heels and soles of your shoes for wear. Repair or replace worn heels or soles. · Do not wear socks without shoes on wood floors. · Walk on the grass when the sidewalks are slippery. If you live in an area that gets snow and ice in the winter, sprinkle salt on slippery steps and sidewalks. Preventing falls in the bath · Install grab bars and nonskid mats inside and outside your shower or tub and near the toilet and sinks. · Use shower chairs and bath benches. · Use a hand-held shower head that will allow you to sit while showering. · Get into a tub or shower by putting the weaker leg in first. Get out of a tub or shower with your strong side first. 
· Repair loose toilet seats and consider installing a raised toilet seat to make getting on and off the toilet easier. · Keep your bathroom door unlocked while you are in the shower. Where can you learn more? Go to http://jasvir-yaron.info/. Enter [phone] in the search box to learn more about \"Preventing Falls: Care Instructions. \" Current as of: August 4, 2016 Content Version: 11.2 © 5726-7337 Healthwise, Incorporated. Care instructions adapted under license by [employer] (which disclaims liability or warranty for this information). If you have questions about a medical condition or this instruction, always ask your healthcare professional. [place] [address] any warranty or liability for your use of this information. Medicare Wellness Visit, Male The best way to live healthy is to have a healthy lifestyle by eating a well-balanced diet, exercising regularly, limiting alcohol and stopping smoking. Regular physical exams and screening tests are another way to keep healthy. Preventive exams provided by your health care provider can find health problems before they become diseases or illnesses. Preventive services including immunizations, screening tests, monitoring and exams can help you take care of your own health. All people over age 72 should have a pneumovax  and and a prevnar shot to prevent pneumonia. These are once in a lifetime unless you and your provider decide differently. All people over 65 should have a yearly flu shot and a tetanus vaccine every 10 years. Screening for diabetes mellitus with a blood sugar test should be done every year. Glaucoma is a disease of the eye due to increased ocular pressure that can lead to blindness and it should be done every year by an eye professional. 
 
Cardiovascular screening tests that check for elevated lipids (fatty part of blood) which can lead to heart disease and strokes should be done every 5 years. Colorectal screening that evaluates for blood or polyps in your colon should be done yearly as a stool test or every five years as a flexible sigmoidoscope or every 10 years as a colonoscopy up to age 76. Men up to age 76 may need a screening blood test for prostate cancer at certain intervals, depending on their personal and family history. This decision is between the patient and his provider. If you have been a smoker or had family history of abdominal aortic aneurysms, you and your provider may decide to schedule an ultrasound test of your aorta. Hepatitis C screening is also recommended for anyone born between [birth year] through [address]. A shingles vaccine is also recommended once in a lifetime after age 61. Your Medicare Wellness Exam is recommended annually. Here is a list of your current Health Maintenance items with a due date: Health Maintenance Due Topic Date Due  Shingles Vaccine  10[birth year]18/1991 Elinaantony Hendrickson Annual Well Visit  06/10/2016 Elina Hendrickson Diabetic Foot Care  02/15/2017  Hemoglobin A1C    05/14/2017 [facility] SERVICES! Dear Kori Amador: Thank you for requesting a [employer] account. Our records indicate that you already have an active [email or web address] account. You can access your account anytime at [email or web address] Did you know that you can access your hospital and ER discharge instructions at any time in [email or web address]? You can also review all of your test results from your hospital stay or ER visit. Additional Information If you have questions, please visit the Frequently Asked Questions section of the [email or web address] website at [email or web address] Remember, [email or web address] is NOT to be used for urgent needs. For medical emergencies, dial 911. Now available from your iPhone and Android! Please provide this summary of care documentation to your next provider. Your primary care clinician is listed as Luis [phone] If you have any questions after today's visit, please call [phone].

## 2017-07-31 NOTE — PROGRESS NOTES
This is a Subsequent Medicare Annual Wellness Visit providing Personalized Prevention Plan Services (PPPS) (Performed 12 months after initial AWV and PPPS )  Also for followup of DM, HTN., COPD, and CAD>   I have reviewed the patient's medical history in detail and updated the computerized patient record. 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 Tushar Hamilton repeat 10 years    HX COLONOSCOPY  2005    HX HEART CATHETERIZATION      2  stents     HX HERNIA REPAIR  9/2008    left inguinal hernia repair by Dr. Monico Echevarria Right     inguinal hernai repair    HX HERNIA REPAIR      umbilical hernia repair    HX HIP REPLACEMENT Right 07/06/2016    HX PROSTATECTOMY  2003    HX TONSILLECTOMY      TOTAL HIP ARTHROPLASTY Left 2/9/11    [facility]     Current Outpatient Prescriptions   Medication Sig Dispense Refill    tiotropium bromide (SPIRIVA RESPIMAT) 2.5 mcg/actuation inhaler Take 2 Puffs by inhalation daily.  coenzyme q10 (CO Q-10) 10 mg cap Take  by mouth.  cinnamon bark (CINNAMON) 500 mg cap Take  by mouth.  metFORMIN ER (GLUCOPHAGE XR) 500 mg tablet Take 3 Tabs by mouth daily (with dinner).  270 Tab 1    glucose blood VI test strips (ONETOUCH ULTRA TEST) strip by Does Not Apply route Daily (before breakfast). E11.9 100 Strip 5    glipiZIDE (GLUCOTROL) 5 mg tablet TAKE 1 TABLET BY MOUTH EVERY DAY 90 Tab 3    simvastatin (ZOCOR) 20 mg tablet TAKE 1 TABLET BY MOUTH NIGHTLY 90 Tab 2    albuterol (PROVENTIL HFA, VENTOLIN HFA, PROAIR HFA) 90 mcg/actuation inhaler Take 2 Puffs by inhalation every four (4) hours as needed for Wheezing. 1 Inhaler 5    metoprolol succinate (TOPROL-XL) 25 mg XL tablet TAKE 1/2 TABLET BY MOUTH EVERY DAY 30 Tab 12    MAGNESIUM PO Take  by mouth daily.  acetaminophen (TYLENOL) 500 mg tablet Take 1 tablet by mouth every eight (8) hours as needed for Pain. 30 tablet 0    CHOLECALCIFEROL, VITAMIN D3, (VITAMIN D3 PO) Take 1,000 Units by mouth daily.  multivitamins-minerals-lutein (CENTRUM SILVER) Tab Take 1 Tab by mouth daily.  fexofenadine (ALLEGRA) 180 mg tablet Take 1 Tab by mouth daily. 30 Tab 11    famotidine (PEPCID AC) 20 mg tablet Take 20 mg by mouth daily.  aspirin (ASPIRIN) 325 mg tablet Take 325 mg by mouth daily.  VITAMIN B COMPLEX (B COMPLEX PO) Take 1 Tab by mouth daily.  fluticasone-salmeterol (ADVAIR DISKUS) 250-50 mcg/Dose diskus inhaler Take 1 Puff by inhalation every twelve (12) hours.  ASCORBIC ACID (VITAMIN C PO) Take 1,000 mg by mouth daily.       lisinopril (PRINIVIL, ZESTRIL) 5 mg tablet TAKE 1 TABLET BY MOUTH EVERY DAY 90 Tab 1     No Known Allergies  Family History   Problem Relation Age of Onset    Cancer Mother      Breast cancer    Cancer Father      Bladder cancer    Lung Disease Father      Emphysema    Hypertension Sister      1     Social History   Substance Use Topics    Smoking status: Former Smoker     Packs/day: 3.00     Years: 10.00     Types: Cigarettes     Quit date: 1/15/1970    Smokeless tobacco: Never Used    Alcohol use No     Patient Active Problem List   Diagnosis Code    GERD (gastroesophageal reflux disease) K21.9    Low back pain M54.5    Pure hypercholesterolemia E78.00    COPD (chronic obstructive pulmonary disease) ([facility]) J44.9    Osteoarthritis of hip M16.9    Status post hip replacement Z96.649    Personal history of prostate cancer Z85.46    S/P drug eluting coronary stent placement Z95.5    Incidental pulmonary nodule, greater than or equal to 8mm R91.1    Coronary artery disease involving native coronary artery without angina pectoris I25.10    Diabetes mellitus type 2, controlled, without complications ([facility]) K31.0    Retinal hemorrhage H35.60    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 No    Do you average more than 14 drinks per week? No        Functional Ability and Level of Safety:     Hearing Loss   normal-to-mild    Activities of Daily Living   Self-care. Requires assistance with: no ADLs    Fall Risk   Fall Risk Assessment, last 12 mths 7/31/2017   Able to walk? Yes   Fall in past 12 months? Yes   Fall with injury? Yes   Number of falls in past 12 months 4   Fall Risk Score 5     Abuse Screen   Patient is not abused    Review of Systems   A comprehensive review of systems was negative except for: Constitutional: positive for weight stable and no low sugars. Respiratory: positive for ongoing issues with cough and some clear sputum. Musculoskeletal: positive for some left hand swelling since a recent fall. unsteadiness and feels vision is not good for focus.      Physical Examination     Evaluation of Cognitive Function:  Mood/affect:  happy  Appearance: age appropriate  Family member/caregiver input: None    Visit Vitals    /82    Pulse 62    Temp 98.2 °F (36.8 °C) (Oral)    Resp 16    Ht 5' 8.5\" (1.74 m)    Wt 178 lb (80.7 kg)    SpO2 94%    BMI 26.67 kg/m2     General appearance: alert, cooperative, no distress, appears stated age  Head: Normocephalic, without obvious abnormality, atraumatic  Eyes: negative  Ears: normal TM's and external ear canals AU  Nose: Nares normal. Septum midline. Mucosa normal. No drainage or sinus tenderness. Throat: Lips, mucosa, and tongue normal. Teeth and gums normal  Neck: supple, symmetrical, trachea midline, no adenopathy, thyroid: not enlarged, symmetric, no tenderness/mass/nodules, no carotid bruit and no JVD  Back: symmetric, no curvature. ROM normal. No CVA tenderness. Lungs: scattered rhonchi. Chest wall: no tenderness  Heart: regular rate and rhythm, S1, S2 normal, no murmur, click, rub or gallop  Abdomen: soft, non-tender.  Bowel sounds normal. No masses,  no organomegaly  Extremities: extremities normal, atraumatic, no cyanosis or edema  Pulses: 2+ and symmetric  Lymph nodes: Cervical, supraclavicular, and axillary nodes normal.  Neurologic: Grossly normal       Diabetic foot exam performed by Joe Duvall MD     Measurement  Response Nurse Comment Physician Comment   Monofilament  R - 6/6  L - 6/6     Pulse DP R - present  L - present     Pulse TP R - present  L - present     Structural deformity R - None  L - None     Skin Integrity / Deformity R - None  L - None        Reviewed by:               Patient Care Team:  Joe Duvall MD as PCP - General (Internal Medicine)  Italo Lion MD (Ophthalmology)  Bhanu Rodgers MD (Pulmonary Disease)  Torres Dean MD (Dermatology)  Pedro Pablo Alston MD (Urology)  Raghu Merino MD (Orthopedic Surgery)  Sunil Kirby RN as [facility] (Internal Medicine)  Giles Mortensen Natanael Alarcon MD (Cardiology)    Advice/Referrals/Counseling   Education and counseling provided:  Unsteadiness and risk for falls. - suggested PT for strengthening. Assessment/Plan   Diagnoses and all orders for this visit:    1. Need for Zostavax administration  -     varicella zoster vacine live (ZOSTAVAX) 19,400 unit/0.65 mL susr injection; 1 Vial by SubCUTAneous route once for 1 dose. Indications: PREVENTION OF HERPES ZOSTER    2. Routine general medical examination at a health care facility    3. Screening for alcoholism    4. Controlled type 2 diabetes mellitus without complication, without long-term current use of insulin (HCC) - Check labs and adjust meds. -     CBC WITH AUTOMATED DIFF  -     METABOLIC PANEL, COMPREHENSIVE  -     LIPID PANEL  -     TSH RFX ON ABNORMAL TO FREE T4  -     UA/M W/RFLX CULTURE, ROUTINE  -     HEMOGLOBIN A1C WITH EAG  -      DIABETES FOOT EXAM  -     REFERRAL TO PHYSICAL THERAPY    5. Chronic obstructive pulmonary disease, unspecified COPD type ([facility] - will use albuterol as needed. -     REFERRAL TO PHYSICAL THERAPY    6. Coronary artery disease involving native coronary artery of native heart without angina pectoris - stable  -     REFERRAL TO PHYSICAL THERAPY    7. Pure hypercholesterolemia - check LDL and goal of 100.     8. Falls frequently  -     REFERRAL TO PHYSICAL THERAPY    9. Personal history of prostate cancer  -     PROSTATE SPECIFIC AG       Follow-up Disposition: Not on File   Advised him to call back or return to office if symptoms worsen/change/persist.  Discussed expected course/resolution/complications of diagnosis in detail with patient. Medication risks/benefits/costs/interactions/alternatives discussed with patient. He was given an after visit summary which includes diagnoses, current medications, & vitals. He expressed understanding with the diagnosis and plan. Reji Chavarria

## 2017-08-07 ENCOUNTER — HOSPITAL ENCOUNTER (OUTPATIENT)
Dept: LAB | Age: 82
Discharge: HOME | End: 2017-08-07
Payer: MEDICARE

## 2017-08-07 PROCEDURE — 84153 ASSAY OF PSA TOTAL: CPT

## 2017-08-07 PROCEDURE — 83036 HEMOGLOBIN GLYCOSYLATED A1C: CPT

## 2017-08-07 PROCEDURE — 80053 COMPREHEN METABOLIC PANEL: CPT

## 2017-08-07 PROCEDURE — 80061 LIPID PANEL: CPT

## 2017-08-07 PROCEDURE — 36415 COLL VENOUS BLD VENIPUNCTURE: CPT

## 2017-08-07 PROCEDURE — 85025 COMPLETE CBC W/AUTO DIFF WBC: CPT

## 2017-08-07 PROCEDURE — 84443 ASSAY THYROID STIM HORMONE: CPT

## 2017-08-07 PROCEDURE — 81001 URINALYSIS AUTO W/SCOPE: CPT

## 2017-08-08 LAB
ALBUMIN SERPL-MCNC: 3.9 G/DL (ref 3.5–4.7)
ALBUMIN/GLOB SERPL: 1.6 {RATIO} (ref 1.2–2.2)
ALP SERPL-CCNC: 71 IU/L (ref 39–117)
ALT SERPL-CCNC: 16 IU/L (ref 0–44)
APPEARANCE UR: CLEAR
AST SERPL-CCNC: 19 IU/L (ref 0–40)
BACTERIA #/AREA URNS HPF: NORMAL /[HPF]
BASOPHILS # BLD AUTO: 0.1 X10E3/UL (ref 0–0.2)
BASOPHILS NFR BLD AUTO: 1 %
BILIRUB SERPL-MCNC: 0.3 MG/DL (ref 0–1.2)
BILIRUB UR QL STRIP: NEGATIVE
BUN SERPL-MCNC: 20 MG/DL (ref 8–27)
BUN/CREAT SERPL: 18 (ref 10–24)
CALCIUM SERPL-MCNC: 9.3 MG/DL (ref 8.6–10.2)
CASTS URNS QL MICRO: NORMAL /LPF
CHLORIDE SERPL-SCNC: 103 MMOL/L (ref 96–106)
CHOLEST SERPL-MCNC: 134 MG/DL (ref 100–199)
CO2 SERPL-SCNC: 23 MMOL/L (ref 18–29)
COLOR UR: YELLOW
CREAT SERPL-MCNC: 1.13 MG/DL (ref 0.76–1.27)
EOSINOPHIL # BLD AUTO: 0.6 X10E3/UL (ref 0–0.4)
EOSINOPHIL NFR BLD AUTO: 9 %
EPI CELLS #/AREA URNS HPF: NORMAL /HPF
ERYTHROCYTE [DISTWIDTH] IN BLOOD BY AUTOMATED COUNT: 14.6 % (ref 12.3–15.4)
EST. AVERAGE GLUCOSE BLD GHB EST-MCNC: 166 MG/DL
GLOBULIN SER CALC-MCNC: 2.4 G/DL (ref 1.5–4.5)
GLUCOSE SERPL-MCNC: 108 MG/DL (ref 65–99)
GLUCOSE UR QL: NEGATIVE
HBA1C MFR BLD: 7.4 % (ref 4.8–5.6)
HCT VFR BLD AUTO: 43.2 % (ref 37.5–51)
HDLC SERPL-MCNC: 46 MG/DL
HGB BLD-MCNC: 14.3 G/DL (ref 12.6–17.7)
HGB UR QL STRIP: NEGATIVE
IMM GRANULOCYTES # BLD: 0 X10E3/UL (ref 0–0.1)
IMM GRANULOCYTES NFR BLD: 0 %
KETONES UR QL STRIP: NEGATIVE
LDLC SERPL CALC-MCNC: 71 MG/DL (ref 0–99)
LEUKOCYTE ESTERASE UR QL STRIP: NEGATIVE
LYMPHOCYTES # BLD AUTO: 2.2 X10E3/UL (ref 0.7–3.1)
LYMPHOCYTES NFR BLD AUTO: 30 %
MCH RBC QN AUTO: 31 PG (ref 26.6–33)
MCHC RBC AUTO-ENTMCNC: 33.1 G/DL (ref 31.5–35.7)
MCV RBC AUTO: 94 FL (ref 79–97)
MICRO URNS: NORMAL
MICRO URNS: NORMAL
MONOCYTES # BLD AUTO: 0.7 X10E3/UL (ref 0.1–0.9)
MONOCYTES NFR BLD AUTO: 10 %
MUCOUS THREADS URNS QL MICRO: PRESENT
NEUTROPHILS # BLD AUTO: 3.7 X10E3/UL (ref 1.4–7)
NEUTROPHILS NFR BLD AUTO: 50 %
NITRITE UR QL STRIP: NEGATIVE
PH UR STRIP: 6 [PH] (ref 5–7.5)
PLATELET # BLD AUTO: 265 X10E3/UL (ref 150–379)
POTASSIUM SERPL-SCNC: 4.8 MMOL/L (ref 3.5–5.2)
PROT SERPL-MCNC: 6.3 G/DL (ref 6–8.5)
PROT UR QL STRIP: NEGATIVE
PSA SERPL-MCNC: <0.1 NG/ML (ref 0–4)
RBC # BLD AUTO: 4.61 X10E6/UL (ref 4.14–5.8)
RBC #/AREA URNS HPF: NORMAL /HPF
SODIUM SERPL-SCNC: 142 MMOL/L (ref 134–144)
SP GR UR: 1.01 (ref 1–1.03)
TRIGL SERPL-MCNC: 83 MG/DL (ref 0–149)
TSH SERPL DL<=0.005 MIU/L-ACNC: 3.79 UIU/ML (ref 0.45–4.5)
URINALYSIS REFLEX, 377202: NORMAL
UROBILINOGEN UR STRIP-MCNC: 0.2 MG/DL (ref 0.2–1)
VLDLC SERPL CALC-MCNC: 17 MG/DL (ref 5–40)
WBC # BLD AUTO: 7.3 X10E3/UL (ref 3.4–10.8)
WBC #/AREA URNS HPF: NORMAL /HPF

## 2017-08-15 ENCOUNTER — TELEPHONE (OUTPATIENT)
Dept: INTERNAL MEDICINE CLINIC | Age: 82
End: 2017-08-15

## 2017-08-21 RX ORDER — METOPROLOL SUCCINATE 25 MG/1
TABLET, EXTENDED RELEASE ORAL
Qty: 30 TAB | Refills: 6 | Status: SHIPPED | OUTPATIENT
Start: 2017-08-21 | End: 2017-12-13 | Stop reason: SDUPTHER

## 2017-10-22 RX ORDER — SIMVASTATIN 20 MG/1
TABLET, FILM COATED ORAL
Qty: 90 TAB | Refills: 2 | Status: SHIPPED | OUTPATIENT
Start: 2017-10-22 | End: 2018-08-06 | Stop reason: SDUPTHER

## 2017-10-24 ENCOUNTER — OFFICE VISIT (OUTPATIENT)
Dept: NEUROLOGY | Age: 82
End: 2017-10-24

## 2017-10-24 VITALS
WEIGHT: 185 LBS | HEIGHT: 69 IN | OXYGEN SATURATION: 96 % | BODY MASS INDEX: 27.4 KG/M2 | SYSTOLIC BLOOD PRESSURE: 120 MMHG | HEART RATE: 65 BPM | DIASTOLIC BLOOD PRESSURE: 68 MMHG

## 2017-10-24 DIAGNOSIS — I65.23 BILATERAL CAROTID ARTERY STENOSIS: ICD-10-CM

## 2017-10-24 DIAGNOSIS — E11.42 DIABETIC PERIPHERAL NEUROPATHY ASSOCIATED WITH TYPE 2 DIABETES MELLITUS (HCC): ICD-10-CM

## 2017-10-24 DIAGNOSIS — G60.8 IDIOPATHIC SMALL AND LARGE FIBER SENSORY NEUROPATHY: ICD-10-CM

## 2017-10-24 DIAGNOSIS — M48.02 DEGENERATIVE CERVICAL SPINAL STENOSIS: ICD-10-CM

## 2017-10-24 DIAGNOSIS — R26.0 SENSORY ATAXIC GAIT: ICD-10-CM

## 2017-10-24 DIAGNOSIS — H49.11 FOURTH NERVE PALSY OF RIGHT EYE: ICD-10-CM

## 2017-10-24 DIAGNOSIS — R27.0 ATAXIA: ICD-10-CM

## 2017-10-24 DIAGNOSIS — E53.8 B12 DEFICIENCY: ICD-10-CM

## 2017-10-24 DIAGNOSIS — M47.12 CERVICAL ARTHRITIS WITH MYELOPATHY: ICD-10-CM

## 2017-10-24 DIAGNOSIS — E55.9 VITAMIN D DEFICIENCY: ICD-10-CM

## 2017-10-24 DIAGNOSIS — I67.89 CEREBRAL MICROVASCULAR DISEASE: Primary | ICD-10-CM

## 2017-10-24 DIAGNOSIS — I67.89 CEREBRAL MICROVASCULAR DISEASE: ICD-10-CM

## 2017-10-24 DIAGNOSIS — E53.8 B12 DEFICIENCY: Primary | ICD-10-CM

## 2017-10-24 NOTE — PATIENT INSTRUCTIONS

## 2017-10-24 NOTE — LETTER
10/24/2017 9:40 PM 
 
Patient:  Mariusz Watson YOB: 1931 Date of Visit: 10/24/2017 Dear No Recipients: Thank you for referring Mr. Sherrell Stuart to me for evaluation/treatment. Below are the relevant portions of my assessment and plan of care. Consult REFERRED BY: 
Patti Randhawa MD 
 
CHIEF COMPLAINT: Frequent falls and unsteady gait Subjective:  
 
Mariusz Watson is a 80 y.o. right handed  male seen as a new patient today for evaluation of a new problem over the last 6 months, of progressive gait difficulty, at the request of Dr. Sony Balbuena. The patient had a CT scan of the head January 7, 2017 after he had a fall, that was normal, except for a small scalp hematoma but nothing intracranially was found. He has gone to physical therapy with some improvement in his gait according to his wife. He wants to come here to be evaluated first and does not like the physical therapy. He just seems to start falling and is not able to stop, and frequently will trip or try to lift something and then lose his balance. He is being a little more careful now and the falls are quite as frequent. He just feels more unsteady and off balance in addition. He does not really complain of that much neck pain or back pain or major increase in headaches. He has no family history of similar disease. He does have some urinary urgency but is able to control his bowels and bladder. He is diabetic for 20 years, but does not complain of much burning or numbness in his feet. He denies any major increase in headache, fever, meningismus, toxin exposure, and other than the fall as mentioned above no unusual head injury. He had a previous cervical MRI scan done years ago that showed moderate spondylosis, but no significant cord compression in 2010.   He had a MRI of his lumbar spine that does show multilevel degenerative changes with moderate spinal stenosis at several levels, but was not thought to be a neurosurgical problem in 2009 when he had that done by his neurosurgeon. He complains that his legs feel a little weak, but no focal weakness, but may be his right leg is a little worse. He seems to have fairly good strength and feeling in his arms. We are asked to evaluate. He also gives an unusual history of his eyes jumping some after his second cataract surgery, and was told by Dr. Blake Campbell that it may well be partly hereditary. But it did not occur until after his second cataract operation, and seems to be worse at nighttime, and the images are more [id number] but sometimes with a skew deviation to. Past Medical History:  
Diagnosis Date  Arrhythmia   
 irregular heart beat  Arthritis DJD  Asthma  CAD (coronary artery disease)  Cancer [facility]) 2003  
 prostate  Chronic pain   
 hip  COPD (chronic obstructive pulmonary disease) ([address] 9/10/2010  Diabetes ([address]  GERD (gastroesophageal reflux disease)  HNP (herniated nucleus pulposus) 6/2006  
 lumbar  Hypercholesterolemia  Hypertension  Nausea & vomiting  Reversible ischemic neurologic deficit ([address] 1990  
 suspected with no residual effects and no recurrance  Stroke ([address] TIA- no deficiets Past Surgical History:  
Procedure Laterality Date  COLONOSCOPY  12/15/2004 Diverticulosis Dr. Azucena Walter repeat 10 years  HX COLONOSCOPY  2005  HX HEART CATHETERIZATION    
 2  stents  HX HERNIA REPAIR  9/2008  
 left inguinal hernia repair by Dr. Yonatan Paniagua  HX HERNIA REPAIR Right   
 inguinal hernai repair  HX HERNIA REPAIR    
 umbilical hernia repair  HX HIP REPLACEMENT Right 07/06/2016  HX PROSTATECTOMY  2003  HX TONSILLECTOMY  TOTAL HIP ARTHROPLASTY Left 2/9/11 Dr. Chandler Zuniga at [facility] Family History Problem Relation Age of Onset  Cancer Mother Breast cancer  Cancer Father Bladder cancer  Lung Disease Father Emphysema  Hypertension Sister 2015 Social History Substance Use Topics  Smoking status: Former Smoker Packs/day: 3.00 Years: 10.00 Types: Cigarettes Quit date: 1/15/1970  Smokeless tobacco: Never Used  Alcohol use No  
   
 
Current Outpatient Prescriptions:  
  simvastatin (ZOCOR) 20 mg tablet, TAKE 1 TABLET BY MOUTH NIGHTLY, Disp: 90 Tab, Rfl: 2 
  metoprolol succinate (TOPROL-XL) 25 mg XL tablet, TAKE 1/2 TABLET BY MOUTH EVERY DAY, Disp: 30 Tab, Rfl: 6 
  tiotropium bromide (SPIRIVA RESPIMAT) 2.5 mcg/actuation inhaler, Take 2 Puffs by inhalation daily. , Disp: , Rfl:  
  coenzyme q10 (CO Q-10) 10 mg cap, Take  by mouth., Disp: , Rfl:  
  cinnamon bark (CINNAMON) 500 mg cap, Take  by mouth., Disp: , Rfl:  
  metFORMIN ER (GLUCOPHAGE XR) 500 mg tablet, Take 3 Tabs by mouth daily (with dinner). (Patient taking differently: Take 500 mg by mouth two (2) times a day.), Disp: 270 Tab, Rfl: 1 
  glucose blood VI test strips (ONETOUCH ULTRA TEST) strip, by Does Not Apply route Daily (before breakfast). E11.9, Disp: 100 Strip, Rfl: 5 
  glipiZIDE (GLUCOTROL) 5 mg tablet, TAKE 1 TABLET BY MOUTH EVERY DAY, Disp: 90 Tab, Rfl: 3 
  lisinopril (PRINIVIL, ZESTRIL) 5 mg tablet, TAKE 1 TABLET BY MOUTH EVERY DAY, Disp: 90 Tab, Rfl: 1 
  albuterol (PROVENTIL HFA, VENTOLIN HFA, PROAIR HFA) 90 mcg/actuation inhaler, Take 2 Puffs by inhalation every four (4) hours as needed for Wheezing., Disp: 1 Inhaler, Rfl: 5 
  MAGNESIUM PO, Take  by mouth daily. , Disp: , Rfl:  
  acetaminophen (TYLENOL) 500 mg tablet, Take 1 tablet by mouth every eight (8) hours as needed for Pain., Disp: 30 tablet, Rfl: 0 
  CHOLECALCIFEROL, VITAMIN D3, (VITAMIN D3 PO), Take 1,000 Units by mouth daily. , Disp: , Rfl:  
  multivitamins-minerals-lutein (CENTRUM SILVER) Tab, Take 1 Tab by mouth daily. , Disp: , Rfl:  
  fexofenadine (ALLEGRA) 180 mg tablet, Take 1 Tab by mouth daily. , Disp: 30 Tab, Rfl: 11 
   famotidine (PEPCID AC) 20 mg tablet, Take 20 mg by mouth daily. , Disp: , Rfl:  
  aspirin (ASPIRIN) 325 mg tablet, Take 325 mg by mouth daily. , Disp: , Rfl:  
  VITAMIN B COMPLEX (B COMPLEX PO), Take 1 Tab by mouth daily. , Disp: , Rfl:  
  fluticasone-salmeterol (ADVAIR DISKUS) 250-50 mcg/Dose diskus inhaler, Take 1 Puff by inhalation every twelve (12) hours. , Disp: , Rfl:   ASCORBIC ACID (VITAMIN C PO), Take 1,000 mg by mouth daily. , Disp: , Rfl:  
 
 
 
No Known Allergies Review of Systems: A comprehensive review of systems was negative except for: Constitutional: positive for fatigue and malaise Ears, nose, mouth, throat, and face: positive for hearing loss Musculoskeletal: positive for myalgias, arthralgias, stiff joints, neck pain, back pain and muscle weakness Neurological: positive for paresthesia, coordination problems, gait problems and weakness Behvioral/Psych: positive for anxiety and depression Vitals:  
 10/24/17 1111 BP: 120/68 Pulse: 65 SpO2: 96% Weight: 185 lb (83.9 kg) Height: 5' 9\" (1.753 m) Objective: I 
 
 
NEUROLOGICAL EXAM: 
 
Appearance: The patient is well developed, well nourished, provides a coherent history and is in no acute distress. Mental Status: Oriented to time, place and person, and the president, cognitive function is relatively slow but normal and speech is fluent and no aphasia or dysarthria. Mood and affect appropriate. Cranial Nerves:   Intact visual fields. Fundi are benign. VINNY, both eyes are status post cataract surgery, EOM's full, but patient has lateral gaze nystagmus in both directions, but may be a little worse to the right side that seems to be some vertical and horizontal component, but no clear diplopia was noted though his right eye does not always track quite as well. Patient has nystagmus as described above, no ptosis. Facial sensation is normal. Corneal reflexes are not tested.  Facial movement is symmetric. Hearing is abnormal bilaterally. Palate is midline with normal sternocleidomastoid and trapezius muscles are normal. Tongue is midline. Neck without meningismus or bruits Temporal arteries are not tender or enlarged Neck has moderate restriction of range of motion in all directions with some palpable muscle spasms noted. Motor:  5/5 strength in upper and lower proximal and distal muscles. Normal bulk and tone. No fasciculations. Reflexes:   Deep tendon reflexes 2+/4 and symmetrical in both upper extremities, and ankle jerks are mildly reduced bilaterally but still present, and knee jerks are about 3+/4 bilaterally, may be right side greater than left. No babinski or clonus present Sensory:   Abnormal to touch, pinprick and vibration and temperature in both feet to ankle level. DSS is intact Gait:  Abnormal gait as patient walks with a slightly stiff legged and myelopathic gait is wide-based and spastic. Tremor:   No tremor noted. Cerebellar:   Abnormal Romberg and tandem cerebellar signs present. There are no sphincter examination is unremarkable Neurovascular:  Normal heart sounds and regular rhythm, peripheral pulses decreased, and no carotid bruits. Assessment: ICD-10-CM ICD-9-CM 1. B12 deficiency E53.8 266.2 MRI CERV SPINE WO CONT  
   MRI BRAIN W WO CONT  
   MARIZOL COMPREHENSIVE PLUS PANEL  
   ANTINEURONAL CELL AB  
   VITAMIN D, 25 HYROXY PANEL  
   VITAMIN B12 & FOLATE  
   SED RATE (ESR) IMMUNOELECTROPHORESIS (IMMUNOFIX.)  
   GM1 ANTIBODY IGG, IGM  
   MYELIN ASSOC GLYCOPROTEIN AB, IGM  
   HEMOGLOBIN A1C WITH EAG  
   CK REFERRAL TO PHYSICAL THERAPY  
   EMG LIMITED  
   EMG NCV MOTOR WO F/WAVE PER NERVE  
   EMG NCV SENSORY PER NERVE DUPLEX CAROTID BILATERAL AMB NEURO 2. Diabetic peripheral neuropathy associated with type 2 diabetes mellitus (HCC) E11.42 250.60 MRI CERV SPINE WO CONT  
  357.2 MRI BRAIN W WO CONT MARIZOL COMPREHENSIVE PLUS PANEL  
   ANTINEURONAL CELL AB  
   VITAMIN D, 25 HYROXY PANEL  
   VITAMIN B12 & FOLATE  
   SED RATE (ESR) IMMUNOELECTROPHORESIS (IMMUNOFIX.)  
   GM1 ANTIBODY IGG, IGM  
   MYELIN ASSOC GLYCOPROTEIN AB, IGM  
   HEMOGLOBIN A1C WITH EAG  
   CK REFERRAL TO PHYSICAL THERAPY  
   EMG LIMITED  
   EMG NCV MOTOR WO F/WAVE PER NERVE  
   EMG NCV SENSORY PER NERVE DUPLEX CAROTID BILATERAL AMB NEURO 3. 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  
   MRI BRAIN W WO CONT  
   MARIZOL COMPREHENSIVE PLUS PANEL  
   ANTINEURONAL CELL AB  
   VITAMIN D, 25 HYROXY PANEL  
   VITAMIN B12 & FOLATE  
   SED RATE (ESR) IMMUNOELECTROPHORESIS (IMMUNOFIX.)  
   GM1 ANTIBODY IGG, IGM  
   MYELIN ASSOC GLYCOPROTEIN AB, IGM  
   HEMOGLOBIN A1C WITH EAG  
   CK REFERRAL TO PHYSICAL THERAPY  
   EMG LIMITED  
   EMG NCV MOTOR WO F/WAVE PER NERVE  
   EMG NCV SENSORY PER NERVE DUPLEX CAROTID BILATERAL AMB NEURO 4. 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  
   MRI BRAIN W WO CONT  
   MARIZOL COMPREHENSIVE PLUS PANEL  
   ANTINEURONAL CELL AB  
   VITAMIN D, 25 HYROXY PANEL  
   VITAMIN B12 & FOLATE  
   SED RATE (ESR) IMMUNOELECTROPHORESIS (IMMUNOFIX.)  
   GM1 ANTIBODY IGG, IGM  
   MYELIN ASSOC GLYCOPROTEIN AB, IGM  
   HEMOGLOBIN A1C WITH EAG  
   CK REFERRAL TO PHYSICAL THERAPY  
   EMG LIMITED  
   EMG NCV MOTOR WO F/WAVE PER NERVE  
   EMG NCV SENSORY PER NERVE DUPLEX CAROTID BILATERAL AMB NEURO 5. Sensory ataxic gait R26.0 781.2 MRI CERV SPINE WO CONT  
   MRI BRAIN W WO CONT  
   MARIZOL COMPREHENSIVE PLUS PANEL  
   ANTINEURONAL CELL AB  
   VITAMIN D, 25 HYROXY PANEL  
   VITAMIN B12 & FOLATE  
   SED RATE (ESR) IMMUNOELECTROPHORESIS (IMMUNOFIX.)  
   GM1 ANTIBODY IGG, IGM  
   MYELIN ASSOC GLYCOPROTEIN AB, IGM  
   HEMOGLOBIN A1C WITH EAG  
   CK REFERRAL TO PHYSICAL THERAPY EMG LIMITED  
   EMG NCV MOTOR WO F/WAVE PER NERVE  
   EMG NCV SENSORY PER NERVE DUPLEX CAROTID BILATERAL AMB NEURO 6. Ataxia R27.0 781.3 MRI CERV SPINE WO CONT  
   MRI BRAIN W WO CONT  
   MARIZOL COMPREHENSIVE PLUS PANEL  
   ANTINEURONAL CELL AB  
   VITAMIN D, 25 HYROXY PANEL  
   VITAMIN B12 & FOLATE  
   SED RATE (ESR) IMMUNOELECTROPHORESIS (IMMUNOFIX.)  
   GM1 ANTIBODY IGG, IGM  
   MYELIN ASSOC GLYCOPROTEIN AB, IGM  
   HEMOGLOBIN A1C WITH EAG  
   CK REFERRAL TO PHYSICAL THERAPY  
   EMG LIMITED  
   EMG NCV MOTOR WO F/WAVE PER NERVE  
   EMG NCV SENSORY PER NERVE DUPLEX CAROTID BILATERAL AMB NEURO 7. Degenerative cervical spinal stenosis M48.02 723.0 MRI CERV SPINE WO CONT  
   MRI BRAIN W WO CONT  
   MARIZOL COMPREHENSIVE PLUS PANEL  
   ANTINEURONAL CELL AB  
   VITAMIN D, 25 HYROXY PANEL  
   VITAMIN B12 & FOLATE  
   SED RATE (ESR) IMMUNOELECTROPHORESIS (IMMUNOFIX.)  
   GM1 ANTIBODY IGG, IGM  
   MYELIN ASSOC GLYCOPROTEIN AB, IGM  
   HEMOGLOBIN A1C WITH EAG  
   CK REFERRAL TO PHYSICAL THERAPY  
   EMG LIMITED  
   EMG NCV MOTOR WO F/WAVE PER NERVE  
   EMG NCV SENSORY PER NERVE DUPLEX CAROTID BILATERAL AMB NEURO 8. Cervical arthritis with myelopathy M47.12 721.1 MRI CERV SPINE WO CONT  
   MRI BRAIN W WO CONT  
   MARIZOL COMPREHENSIVE PLUS PANEL  
   ANTINEURONAL CELL AB  
   VITAMIN D, 25 HYROXY PANEL  
   VITAMIN B12 & FOLATE  
   SED RATE (ESR) IMMUNOELECTROPHORESIS (IMMUNOFIX.)  
   GM1 ANTIBODY IGG, IGM  
   MYELIN ASSOC GLYCOPROTEIN AB, IGM  
   HEMOGLOBIN A1C WITH EAG  
   CK REFERRAL TO PHYSICAL THERAPY  
   EMG LIMITED  
   EMG NCV MOTOR WO F/WAVE PER NERVE  
   EMG NCV SENSORY PER NERVE DUPLEX CAROTID BILATERAL AMB NEURO 9. 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  
   MRI BRAIN W WO CONT  
   MARIZOL COMPREHENSIVE PLUS PANEL  
   ANTINEURONAL CELL AB  
   VITAMIN D, 25 HYROXY PANEL  
   VITAMIN B12 & FOLATE SED RATE (ESR) IMMUNOELECTROPHORESIS (IMMUNOFIX.)  
   GM1 ANTIBODY IGG, IGM  
   MYELIN ASSOC GLYCOPROTEIN AB, IGM  
   HEMOGLOBIN A1C WITH EAG  
   CK REFERRAL TO PHYSICAL THERAPY  
   EMG LIMITED  
   EMG NCV MOTOR WO F/WAVE PER NERVE  
   EMG NCV SENSORY PER NERVE DUPLEX CAROTID BILATERAL AMB NEURO 10. Bilateral carotid artery stenosis I65.23 433.10 MRI CERV SPINE WO CONT  
  433.30 MRI BRAIN W WO CONT  
   MARIZOL COMPREHENSIVE PLUS PANEL  
   ANTINEURONAL CELL AB  
   VITAMIN D, 25 HYROXY PANEL  
   VITAMIN B12 & FOLATE  
   SED RATE (ESR) IMMUNOELECTROPHORESIS (IMMUNOFIX.)  
   GM1 ANTIBODY IGG, IGM  
   MYELIN ASSOC GLYCOPROTEIN AB, IGM  
   HEMOGLOBIN A1C WITH EAG  
   CK REFERRAL TO PHYSICAL THERAPY  
   EMG LIMITED  
   EMG NCV MOTOR WO F/WAVE PER NERVE  
   EMG NCV SENSORY PER NERVE DUPLEX CAROTID BILATERAL AMB NEURO 11. Vitamin D deficiency E55.9 268.9 MRI CERV SPINE WO CONT  
   MRI BRAIN W WO CONT  
   MARIZOL COMPREHENSIVE PLUS PANEL  
   ANTINEURONAL CELL AB  
   VITAMIN D, 25 HYROXY PANEL  
   VITAMIN B12 & FOLATE  
   SED RATE (ESR) IMMUNOELECTROPHORESIS (IMMUNOFIX.)  
   GM1 ANTIBODY IGG, IGM  
   MYELIN ASSOC GLYCOPROTEIN AB, IGM  
   HEMOGLOBIN A1C WITH EAG  
   CK REFERRAL TO PHYSICAL THERAPY  
   EMG LIMITED  
   EMG NCV MOTOR WO F/WAVE PER NERVE  
   EMG NCV SENSORY PER NERVE DUPLEX CAROTID BILATERAL AMB NEURO Active Problems: * No active hospital problems. * 
 
 
Plan:  
 
Patient has a multifactor because of his gait instability including a sensory ataxia from his diabetic neuropathy, probably degenerative arthritis and deconditioning, and probably a component of senile gait apraxia, but the more concerning issue in view of his hyperreflexia would be a myelopathy from his cervical spine disease causing cord compression and myelopathic gait.  
Less likely would be a spinal cerebellar type degeneration in view of his nystagmus and ocular motility problems We also need to rule out an intracranial structural disease or posterior fossa disease like Arnold-Chiari malformation, posterior fossa tumor, hydrocephalus, particular normal pressure hydrocephalus, or other structural brain lesions or multi-vascular infarct lesions. He had a carotid Doppler study done today that showed mild disease only and no significant stenosis. MRI scan of the brain and cervical spine were ordered today to rule out the above-mentioned diseases. He is to continue his physical therapy since this already improving him we encouraged him to get adequate with his exercise. He will also get multiple metabolic parameters checked today, including B12, vitamin D, and other treatable causes for his neuropathy. We may need to x-ray his lower spine if the above workup is negative. He may need an EMG and nerve conduction study in addition They will call for results of tests, and further treatment evaluation will be in 3 months time or earlier if needed. Discussed his condition with the patient and his wife in detail, reviewed your x-rays personally on the PACS system, reviewed the old lab test, and discussed his condition with his wife and the patient in detail, including further diagnostic testing needed, therapy, and other treatment modalities. Signed By: Mary Herrera MD   
 October 24, 2017 CC: Tasneem Kraus MD 
FAX: [phone] This note will not be viewable in [address] If you have questions, please do not hesitate to call me. I look forward to following Mr. Maki Calabrese along with you. Sincerely, Mary Herrera MD

## 2017-10-25 ENCOUNTER — TELEPHONE (OUTPATIENT)
Dept: NEUROLOGY | Age: 82
End: 2017-10-25

## 2017-10-25 NOTE — PROCEDURES
This study consisted of pulsed wave Doppler examination, Color-flow imaging, and Duplex imaging of both the right and left carotid systems, and both vertebral arteries.        Imaging of both right and left carotid systems showed mild mixed plaquing at the bifurcations and proximal and distal internal and external carotid arteries bilaterally, with stenosis in the range of 16-49% only and with no flow abnormalities identified.        Both vertebral arteries showed normal antegrade flow.         Clinical correlation recommended

## 2017-10-25 NOTE — LETTER
10/25/2017 9:56 AM 
 
RE:    [address] [place] [id number] Thank you for agreeing to see [facility] I am referring my patient to you for evaluation of PT for gait training and strengthening and balance therapy. To see Mando Bonilla if possible. Please see his pertinent patient information below. Progress Notes Encounter Date: 10/24/2017 Shashank Herron MD  
Neurology Hide copied text Hover for attribution information Consult REFERRED BY: 
Angela Manuel MD 
  
CHIEF COMPLAINT: Frequent falls and unsteady gait 
  
  
Subjective:  
  
Adrian Aguilar is a 80 y.o. right handed  male seen as a new patient today for evaluation of a new problem over the last 6 months, of progressive gait difficulty, at the request of Dr. Chrissie Hernandes. The patient had a CT scan of the head January 7, 2017 after he had a fall, that was normal, except for a small scalp hematoma but nothing intracranially was found. He has gone to physical therapy with some improvement in his gait according to his wife. He wants to come here to be evaluated first and does not like the physical therapy. He just seems to start falling and is not able to stop, and frequently will trip or try to lift something and then lose his balance. He is being a little more careful now and the falls are quite as frequent. He just feels more unsteady and off balance in addition. He does not really complain of that much neck pain or back pain or major increase in headaches. He has no family history of similar disease. He does have some urinary urgency but is able to control his bowels and bladder. He is diabetic for 20 years, but does not complain of much burning or numbness in his feet. He denies any major increase in headache, fever, meningismus, toxin exposure, and other than the fall as mentioned above no unusual head injury.   He had a previous cervical MRI scan done years ago that showed moderate spondylosis, but no significant cord compression in 2010. He had a MRI of his lumbar spine that does show multilevel degenerative changes with moderate spinal stenosis at several levels, but was not thought to be a neurosurgical problem in 2009 when he had that done by his neurosurgeon. He complains that his legs feel a little weak, but no focal weakness, but may be his right leg is a little worse. He seems to have fairly good strength and feeling in his arms. We are asked to evaluate. He also gives an unusual history of his eyes jumping some after his second cataract surgery, and was told by Dr. Ling Mckeon that it may well be partly hereditary. But it did not occur until after his second cataract operation, and seems to be worse at nighttime, and the images are more [id number] but sometimes with a skew deviation to. 
  
    
Past Medical History:  
Diagnosis Date  Arrhythmia    
  irregular heart beat  Arthritis    
  DJD  Asthma    
 CAD (coronary artery disease)    
 Cancer ([address] 2003  
  prostate  Chronic pain    
  hip  COPD (chronic obstructive pulmonary disease) ([address] 9/10/2010  Diabetes ([address]    
 GERD (gastroesophageal reflux disease)    
 HNP (herniated nucleus pulposus) 6/2006  
  lumbar  Hypercholesterolemia    
 Hypertension    
 Nausea & vomiting    
 Reversible ischemic neurologic deficit ([address] 1990  
  suspected with no residual effects and no recurrance  Stroke [facility])    
  TIA- no deficiets Past Surgical History:  
Procedure Laterality Date  COLONOSCOPY   12/15/2004  
  Diverticulosis Dr. Gaines Pretty repeat 10 years  HX COLONOSCOPY   2005  HX HEART CATHETERIZATION      
  2  stents  HX HERNIA REPAIR   9/2008  
  left inguinal hernia repair by Dr. Nato Blackwood  HX HERNIA REPAIR Right    
  inguinal hernai repair  HX HERNIA REPAIR      
  umbilical hernia repair  HX HIP REPLACEMENT Right 07/06/2016  HX PROSTATECTOMY   2003  HX TONSILLECTOMY      
 TOTAL HIP ARTHROPLASTY Left 2/9/11  
  Dr. Luz Marina Newman at [address] [place] [address] Problem Relation Age of Onset  Cancer Mother    
    Breast cancer  Cancer Father    
    Bladder cancer  Lung Disease Father    
    Emphysema  Hypertension Sister    
    2015 Social History Substance Use Topics  Smoking status: Former Smoker  
    Packs/day: 3.00  
    Years: 10.00  
    Types: Cigarettes  
    Quit date: 1/15/1970  Smokeless tobacco: Never Used  Alcohol use No  
   
  
Current Outpatient Prescriptions:  
  simvastatin (ZOCOR) 20 mg tablet, TAKE 1 TABLET BY MOUTH NIGHTLY, Disp: 90 Tab, Rfl: 2 
  metoprolol succinate (TOPROL-XL) 25 mg XL tablet, TAKE 1/2 TABLET BY MOUTH EVERY DAY, Disp: 30 Tab, Rfl: 6 
  tiotropium bromide (SPIRIVA RESPIMAT) 2.5 mcg/actuation inhaler, Take 2 Puffs by inhalation daily. , Disp: , Rfl:  
  coenzyme q10 (CO Q-10) 10 mg cap, Take  by mouth., Disp: , Rfl:  
  cinnamon bark (CINNAMON) 500 mg cap, Take  by mouth., Disp: , Rfl:  
  metFORMIN ER (GLUCOPHAGE XR) 500 mg tablet, Take 3 Tabs by mouth daily (with dinner). (Patient taking differently: Take 500 mg by mouth two (2) times a day.), Disp: 270 Tab, Rfl: 1 
  glucose blood VI test strips (ONETOUCH ULTRA TEST) strip, by Does Not Apply route Daily (before breakfast). E11.9, Disp: 100 Strip, Rfl: 5 
  glipiZIDE (GLUCOTROL) 5 mg tablet, TAKE 1 TABLET BY MOUTH EVERY DAY, Disp: 90 Tab, Rfl: 3 
  lisinopril (PRINIVIL, ZESTRIL) 5 mg tablet, TAKE 1 TABLET BY MOUTH EVERY DAY, Disp: 90 Tab, Rfl: 1 
  albuterol (PROVENTIL HFA, VENTOLIN HFA, PROAIR HFA) 90 mcg/actuation inhaler, Take 2 Puffs by inhalation every four (4) hours as needed for Wheezing., Disp: 1 Inhaler, Rfl: 5 
  MAGNESIUM PO, Take  by mouth daily. , Disp: , Rfl:  
  acetaminophen (TYLENOL) 500 mg tablet, Take 1 tablet by mouth every eight (8) hours as needed for Pain., Disp: 30 tablet, Rfl: 0 
  CHOLECALCIFEROL, VITAMIN D3, (VITAMIN D3 PO), Take 1,000 Units by mouth daily. , Disp: , Rfl:  
  multivitamins-minerals-lutein (CENTRUM SILVER) Tab, Take 1 Tab by mouth daily. , Disp: , Rfl:  
  fexofenadine (ALLEGRA) 180 mg tablet, Take 1 Tab by mouth daily. , Disp: 30 Tab, Rfl: 11 
  famotidine (PEPCID AC) 20 mg tablet, Take 20 mg by mouth daily. , Disp: , Rfl:  
  aspirin (ASPIRIN) 325 mg tablet, Take 325 mg by mouth daily. , Disp: , Rfl:  
  VITAMIN B COMPLEX (B COMPLEX PO), Take 1 Tab by mouth daily. , Disp: , Rfl:  
  fluticasone-salmeterol (ADVAIR DISKUS) 250-50 mcg/Dose diskus inhaler, Take 1 Puff by inhalation every twelve (12) hours. , Disp: , Rfl:   ASCORBIC ACID (VITAMIN C PO), Take 1,000 mg by mouth daily. , Disp: , Rfl:  
  
  
  
No Known Allergies  
  
Review of Systems: A comprehensive review of systems was negative except for: Constitutional: positive for fatigue and malaise Ears, nose, mouth, throat, and face: positive for hearing loss Musculoskeletal: positive for myalgias, arthralgias, stiff joints, neck pain, back pain and muscle weakness Neurological: positive for paresthesia, coordination problems, gait problems and weakness Behvioral/Psych: positive for anxiety and depression Vitals:  
  10/24/17 1111 BP: 120/68 Pulse: 65 SpO2: 96% Weight: 185 lb (83.9 kg) Height: 5' 9\" (1.753 m)  
  
Objective:  
  
I 
  
  
NEUROLOGICAL EXAM: 
  
Appearance: The patient is well developed, well nourished, provides a coherent history and is in no acute distress. Mental Status: Oriented to time, place and person, and the president, cognitive function is relatively slow but normal and speech is fluent and no aphasia or dysarthria. Mood and affect appropriate. Cranial Nerves:   Intact visual fields. Fundi are benign.  VINNY, both eyes are status post cataract surgery, EOM's full, but patient has lateral gaze nystagmus in both directions, but may be a little worse to the right side that seems to be some vertical and horizontal component, but no clear diplopia was noted though his right eye does not always track quite as well. Patient has nystagmus as described above, no ptosis. Facial sensation is normal. Corneal reflexes are not tested. Facial movement is symmetric. Hearing is abnormal bilaterally. Palate is midline with normal sternocleidomastoid and trapezius muscles are normal. Tongue is midline. Neck without meningismus or bruits Temporal arteries are not tender or enlarged Neck has moderate restriction of range of motion in all directions with some palpable muscle spasms noted. Motor:  5/5 strength in upper and lower proximal and distal muscles. Normal bulk and tone. No fasciculations. Reflexes:   Deep tendon reflexes 2+/4 and symmetrical in both upper extremities, and ankle jerks are mildly reduced bilaterally but still present, and knee jerks are about 3+/4 bilaterally, may be right side greater than left. No babinski or clonus present Sensory:   Abnormal to touch, pinprick and vibration and temperature in both feet to ankle level. DSS is intact Gait:  Abnormal gait as patient walks with a slightly stiff legged and myelopathic gait is wide-based and spastic. Tremor:   No tremor noted. Cerebellar:   Abnormal Romberg and tandem cerebellar signs present. There are no sphincter examination is unremarkable Neurovascular:  Normal heart sounds and regular rhythm, peripheral pulses decreased, and no carotid bruits.  
  
  
  
  
Assessment:  
  
    ICD-10-CM ICD-9-CM    
1. B12 deficiency E53.8 266.2 MRI CERV SPINE WO CONT  
      MRI BRAIN W WO CONT  
      MARIZOL COMPREHENSIVE PLUS PANEL  
      ANTINEURONAL CELL AB  
      VITAMIN D, 25 HYROXY PANEL  
      VITAMIN B12 & FOLATE  
      SED RATE (ESR)  
      IMMUNOELECTROPHORESIS (IMMUNOFIX.)       GM1 ANTIBODY IGG, IGM  
       MYELIN ASSOC GLYCOPROTEIN AB, IGM  
      HEMOGLOBIN A1C WITH EAG  
      CK       REFERRAL TO PHYSICAL THERAPY  
      EMG LIMITED  
      EMG NCV MOTOR WO F/WAVE PER NERVE  
      EMG NCV SENSORY PER NERVE  
      DUPLEX CAROTID BILATERAL AMB NEURO 2. Diabetic peripheral neuropathy associated with type 2 diabetes mellitus (HCC) E11.42 250.60 MRI CERV SPINE WO CONT  
    357.2 MRI BRAIN W WO CONT  
      MARIZOL COMPREHENSIVE PLUS PANEL  
      ANTINEURONAL CELL AB  
      VITAMIN D, 25 HYROXY PANEL  
      VITAMIN B12 & FOLATE  
      SED RATE (ESR)  
      IMMUNOELECTROPHORESIS (IMMUNOFIX.)       GM1 ANTIBODY IGG, IGM  
      MYELIN ASSOC GLYCOPROTEIN AB, IGM  
      HEMOGLOBIN A1C WITH EAG  
      CK       REFERRAL TO PHYSICAL THERAPY  
      EMG LIMITED  
      EMG NCV MOTOR WO F/WAVE PER NERVE  
      EMG NCV SENSORY PER NERVE  
      DUPLEX CAROTID BILATERAL AMB NEURO 3. 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  
      MRI BRAIN W WO CONT  
      MARIZOL COMPREHENSIVE PLUS PANEL  
      ANTINEURONAL CELL AB  
      VITAMIN D, 25 HYROXY PANEL  
      VITAMIN B12 & FOLATE  
      SED RATE (ESR)  
      IMMUNOELECTROPHORESIS (IMMUNOFIX.)       GM1 ANTIBODY IGG, IGM  
      MYELIN ASSOC GLYCOPROTEIN AB, IGM  
      HEMOGLOBIN A1C WITH EAG  
      CK       REFERRAL TO PHYSICAL THERAPY  
      EMG LIMITED  
      EMG NCV MOTOR WO F/WAVE PER NERVE  
      EMG NCV SENSORY PER NERVE  
      DUPLEX CAROTID BILATERAL AMB NEURO 4. 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  
      MRI BRAIN W WO CONT  
      MARIZOL COMPREHENSIVE PLUS PANEL  
      ANTINEURONAL CELL AB  
      VITAMIN D, 25 HYROXY PANEL  
      VITAMIN B12 & FOLATE  
      SED RATE (ESR)  
      IMMUNOELECTROPHORESIS (IMMUNOFIX.)       GM1 ANTIBODY IGG, IGM  
      MYELIN ASSOC GLYCOPROTEIN AB, IGM  
      HEMOGLOBIN A1C WITH EAG  
      CK  
       REFERRAL TO PHYSICAL THERAPY  
      EMG LIMITED  
      EMG NCV MOTOR WO F/WAVE PER NERVE  
      EMG NCV SENSORY PER NERVE  
      DUPLEX CAROTID BILATERAL AMB NEURO 5. Sensory ataxic gait R26.0 781.2 MRI CERV SPINE WO CONT  
      MRI BRAIN W WO CONT  
      MARIZOL COMPREHENSIVE PLUS PANEL  
      ANTINEURONAL CELL AB  
      VITAMIN D, 25 HYROXY PANEL  
      VITAMIN B12 & FOLATE  
      SED RATE (ESR)  
      IMMUNOELECTROPHORESIS (IMMUNOFIX.)       GM1 ANTIBODY IGG, IGM  
      MYELIN ASSOC GLYCOPROTEIN AB, IGM  
      HEMOGLOBIN A1C WITH EAG  
      CK       REFERRAL TO PHYSICAL THERAPY  
      EMG LIMITED  
      EMG NCV MOTOR WO F/WAVE PER NERVE  
      EMG NCV SENSORY PER NERVE  
      DUPLEX CAROTID BILATERAL AMB NEURO 6. Ataxia R27.0 781.3 MRI CERV SPINE WO CONT  
      MRI BRAIN W WO CONT  
      MARIZOL COMPREHENSIVE PLUS PANEL  
      ANTINEURONAL CELL AB  
      VITAMIN D, 25 HYROXY PANEL  
      VITAMIN B12 & FOLATE  
      SED RATE (ESR)  
      IMMUNOELECTROPHORESIS (IMMUNOFIX.)       GM1 ANTIBODY IGG, IGM  
      MYELIN ASSOC GLYCOPROTEIN AB, IGM  
      HEMOGLOBIN A1C WITH EAG  
      CK       REFERRAL TO PHYSICAL THERAPY  
      EMG LIMITED  
      EMG NCV MOTOR WO F/WAVE PER NERVE  
      EMG NCV SENSORY PER NERVE  
      DUPLEX CAROTID BILATERAL AMB NEURO 7. Degenerative cervical spinal stenosis M48.02 723.0 MRI CERV SPINE WO CONT  
      MRI BRAIN W WO CONT  
      MARIZOL COMPREHENSIVE PLUS PANEL  
      ANTINEURONAL CELL AB  
      VITAMIN D, 25 HYROXY PANEL  
      VITAMIN B12 & FOLATE  
      SED RATE (ESR)  
      IMMUNOELECTROPHORESIS (IMMUNOFIX.)       GM1 ANTIBODY IGG, IGM  
      MYELIN ASSOC GLYCOPROTEIN AB, IGM  
      HEMOGLOBIN A1C WITH EAG  
      CK       REFERRAL TO PHYSICAL THERAPY  
      EMG LIMITED  
      EMG NCV MOTOR WO F/WAVE PER NERVE  
      EMG NCV SENSORY PER NERVE  
      DUPLEX CAROTID BILATERAL AMB NEURO  
 8. Cervical arthritis with myelopathy M47.12 721.1 MRI CERV SPINE WO CONT  
      MRI BRAIN W WO CONT  
      MARIZOL COMPREHENSIVE PLUS PANEL  
      ANTINEURONAL CELL AB  
      VITAMIN D, 25 HYROXY PANEL  
      VITAMIN B12 & FOLATE  
      SED RATE (ESR)  
      IMMUNOELECTROPHORESIS (IMMUNOFIX.)       GM1 ANTIBODY IGG, IGM  
      MYELIN ASSOC GLYCOPROTEIN AB, IGM  
      HEMOGLOBIN A1C WITH EAG  
      CK       REFERRAL TO PHYSICAL THERAPY  
      EMG LIMITED  
      EMG NCV MOTOR WO F/WAVE PER NERVE  
      EMG NCV SENSORY PER NERVE  
      DUPLEX CAROTID BILATERAL AMB NEURO 9. 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  
      MRI BRAIN W WO CONT  
      MARIZOL COMPREHENSIVE PLUS PANEL  
      ANTINEURONAL CELL AB  
      VITAMIN D, 25 HYROXY PANEL  
      VITAMIN B12 & FOLATE  
      SED RATE (ESR)  
      IMMUNOELECTROPHORESIS (IMMUNOFIX.)       GM1 ANTIBODY IGG, IGM  
      MYELIN ASSOC GLYCOPROTEIN AB, IGM  
      HEMOGLOBIN A1C WITH EAG  
      CK       REFERRAL TO PHYSICAL THERAPY  
      EMG LIMITED  
      EMG NCV MOTOR WO F/WAVE PER NERVE  
      EMG NCV SENSORY PER NERVE  
      DUPLEX CAROTID BILATERAL AMB NEURO 10. Bilateral carotid artery stenosis I65.23 433.10 MRI CERV SPINE WO CONT  
    433.30 MRI BRAIN W WO CONT  
      MARIZOL COMPREHENSIVE PLUS PANEL  
      ANTINEURONAL CELL AB  
      VITAMIN D, 25 HYROXY PANEL  
      VITAMIN B12 & FOLATE  
      SED RATE (ESR)  
      IMMUNOELECTROPHORESIS (IMMUNOFIX.)       GM1 ANTIBODY IGG, IGM  
      MYELIN ASSOC GLYCOPROTEIN AB, IGM  
      HEMOGLOBIN A1C WITH EAG  
      CK       REFERRAL TO PHYSICAL THERAPY  
      EMG LIMITED  
      EMG NCV MOTOR WO F/WAVE PER NERVE  
      EMG NCV SENSORY PER NERVE  
      DUPLEX CAROTID BILATERAL AMB NEURO 11.  Vitamin D deficiency E55.9 268.9 MRI CERV SPINE WO CONT  
      MRI BRAIN W WO CONT  
      MARIZOL COMPREHENSIVE PLUS PANEL  
      ANTINEURONAL CELL AB  
       VITAMIN D, 25 HYROXY PANEL  
      VITAMIN B12 & FOLATE  
      SED RATE (ESR)  
      IMMUNOELECTROPHORESIS (IMMUNOFIX.)       GM1 ANTIBODY IGG, IGM  
      MYELIN ASSOC GLYCOPROTEIN AB, IGM  
      HEMOGLOBIN A1C WITH EAG  
      CK       REFERRAL TO PHYSICAL THERAPY  
      EMG LIMITED  
      EMG NCV MOTOR WO F/WAVE PER NERVE  
      EMG NCV SENSORY PER NERVE  
      DUPLEX CAROTID BILATERAL AMB NEURO  
  
Active Problems: * No active hospital problems. * 
  
  
Plan:  
  
Patient has a multifactor because of his gait instability including a sensory ataxia from his diabetic neuropathy, probably degenerative arthritis and deconditioning, and probably a component of senile gait apraxia, but the more concerning issue in view of his hyperreflexia would be a myelopathy from his cervical spine disease causing cord compression and myelopathic gait. Less likely would be a spinal cerebellar type degeneration in view of his nystagmus and ocular motility problems We also need to rule out an intracranial structural disease or posterior fossa disease like Arnold-Chiari malformation, posterior fossa tumor, hydrocephalus, particular normal pressure hydrocephalus, or other structural brain lesions or multi-vascular infarct lesions. He had a carotid Doppler study done today that showed mild disease only and no significant stenosis. MRI scan of the brain and cervical spine were ordered today to rule out the above-mentioned diseases. He is to continue his physical therapy since this already improving him we encouraged him to get adequate with his exercise. He will also get multiple metabolic parameters checked today, including B12, vitamin D, and other treatable causes for his neuropathy. We may need to x-ray his lower spine if the above workup is negative. He may need an EMG and nerve conduction study in addition They will call for results of tests, and further treatment evaluation will be in 3 months time or earlier if needed. Discussed his condition with the patient and his wife in detail, reviewed your x-rays personally on the PACS system, reviewed the old lab test, and discussed his condition with his wife and the patient in detail, including further diagnostic testing needed, therapy, and other treatment modalities. 
  
Signed By: Milena Jeronimo MD   
  October 24, 2017 Print             Continue Patient Registration [address] [facility] Contact Information Patient ID: [id number] Last Name: Ra Treviño Name: Bud Felder First Name: Ignacia Sharmin Phone: [phone] Middle Name: E Employer Information Sex: M YOB: 1931 Name: 
Social Security No.: [id number] Phone: 
Address: [address] [place] [address] (to whom statements are sent) Zip: [address] Name: Sophie Medina City: [address] [place] [address] Street: [place] Address: [address] [place] [address] Phone: [phone] Phone: ( ) _______ - ______________ Work Phone: Other: 
Mobile Phone: [phone] Patient Referred by: ______________________ Marital Status:  Patient PCP: ________________________ Primary Insurance Information Insurance Plan Name: Ady ([address] [place] [address],[address]) [facility] Phone Number: [phone] [address] Policy Information Policy Hunt Patient's relationship to policy hunt: Self Last Ray Dominick 
ID/Certification No.: [id number] First Name:FARAZ Policy/Group No.: Middle Name: PARISH Issue Date: 12/01/1996 Address:[address] [place] [address] Exp Date: City:[place] State:[place] ZYK:[id number] Copay Amount: ___________________ Social Sec Number: [id number] Co-insurance Percent:__________________________________ YOB: 1931 Sex: ______________ Employer: 
 
Secondary Insurance Information Insurance Plan Name: [place] Address to Send Claims: [address] [place] [address], [place] [id number] Insurance Phone Number: [phone] [address] [facility] [address] Policy Information Policy Hunt Patient's relationship to policy hunt: Self Oren Sevilla 
ID/Certification No.: [id number] First Name:FARAZ Policy/Group CD.:[id number] Middle Name: PARISH Issue Date: 01/01/2007 Address:[address] [place] [address] Exp Date: 12/31/9999 City: [facility] [address] State:[facility]:[id number] Copay Amount: ______________________ Social Sec Number: [id number] Co-insurance Percent:__________________________________ YOB: 1931 Sex: ____________ Employer: 
ASSIGNMENT AND RELEASE: 
I hereby assign my insurance benefits to be paid directly to the physician. I understand that I am financially responsible for all non-covered services. I authorize the physician to release any information required to process this claim. Signed_______________________________________________________________________________________________ Date:_________________________ I appreciate your assistance in Mr. Anju Seymour care  and look forward to your findings and recommendations. Sincerely, Jules Aj MD

## 2017-10-25 NOTE — PROGRESS NOTES
Consult  REFERRED BY:  Tasneem Kraus MD    CHIEF COMPLAINT: Frequent falls and unsteady gait      Subjective:     Emily Beebe is a 80 y.o. right handed  male seen as a new patient today for evaluation of a new problem over the last 6 months, of progressive gait difficulty, at the request of Dr. Rodger Rai. The patient had a CT scan of the head January 7, 2017 after he had a fall, that was normal, except for a small scalp hematoma but nothing intracranially was found. He has gone to physical therapy with some improvement in his gait according to his wife. He wants to come here to be evaluated first and does not like the physical therapy. He just seems to start falling and is not able to stop, and frequently will trip or try to lift something and then lose his balance. He is being a little more careful now and the falls are quite as frequent. He just feels more unsteady and off balance in addition. He does not really complain of that much neck pain or back pain or major increase in headaches. He has no family history of similar disease. He does have some urinary urgency but is able to control his bowels and bladder. He is diabetic for 20 years, but does not complain of much burning or numbness in his feet. He denies any major increase in headache, fever, meningismus, toxin exposure, and other than the fall as mentioned above no unusual head injury. He had a previous cervical MRI scan done years ago that showed moderate spondylosis, but no significant cord compression in 2010. He had a MRI of his lumbar spine that does show multilevel degenerative changes with moderate spinal stenosis at several levels, but was not thought to be a neurosurgical problem in 2009 when he had that done by his neurosurgeon. He complains that his legs feel a little weak, but no focal weakness, but may be his right leg is a little worse. He seems to have fairly good strength and feeling in his arms.   We are asked to evaluate. He also gives an unusual history of his eyes jumping some after his second cataract surgery, and was told by Dr. Ross Juan that it may well be partly hereditary. But it did not occur until after his second cataract operation, and seems to be worse at nighttime, and the images are more [id number] but sometimes with a skew deviation to.     Past Medical History:   Diagnosis Date    Arrhythmia     irregular heart beat    Arthritis     DJD    Asthma     CAD (coronary artery disease)     Cancer ([facility] [address] 2003    prostate    Chronic pain     hip     COPD (chronic obstructive pulmonary disease) ([address] 9/10/2010    Diabetes (HCC)     GERD (gastroesophageal reflux disease)     HNP (herniated nucleus pulposus) 6/2006    lumbar     Hypercholesterolemia     Hypertension     Nausea & vomiting     Reversible ischemic neurologic deficit ([facility] [address] 1990    suspected with no residual effects and no recurrance    Stroke (HCC)     TIA- no deficiets      Past Surgical History:   Procedure Laterality Date    COLONOSCOPY  12/15/2004    Diverticulosis Dr. Sybil Garcia repeat 10 years    HX COLONOSCOPY  2005    HX HEART CATHETERIZATION      2  stents     HX HERNIA REPAIR  9/2008    left inguinal hernia repair by Dr. Cristobal Fonseca Right     inguinal hernai repair    HX HERNIA REPAIR      umbilical hernia repair    HX HIP REPLACEMENT Right 07/06/2016    HX PROSTATECTOMY  2003    HX TONSILLECTOMY      TOTAL HIP ARTHROPLASTY Left 2/9/11    Dr. Enma Gomez at [address] [facility]     Family History   Problem Relation Age of Onset    Cancer Mother      Breast cancer    Cancer Father      Bladder cancer    Lung Disease Father      Emphysema    Hypertension Sister      1      Social History   Substance Use Topics    Smoking status: Former Smoker     Packs/day: 3.00     Years: 10.00     Types: Cigarettes     Quit date: 1/15/1970    Smokeless tobacco: Never Used    Alcohol use No         Current Outpatient Prescriptions:     simvastatin (ZOCOR) 20 mg tablet, TAKE 1 TABLET BY MOUTH NIGHTLY, Disp: 90 Tab, Rfl: 2    metoprolol succinate (TOPROL-XL) 25 mg XL tablet, TAKE 1/2 TABLET BY MOUTH EVERY DAY, Disp: 30 Tab, Rfl: 6    tiotropium bromide (SPIRIVA RESPIMAT) 2.5 mcg/actuation inhaler, Take 2 Puffs by inhalation daily. , Disp: , Rfl:     coenzyme q10 (CO Q-10) 10 mg cap, Take  by mouth., Disp: , Rfl:     cinnamon bark (CINNAMON) 500 mg cap, Take  by mouth., Disp: , Rfl:     metFORMIN ER (GLUCOPHAGE XR) 500 mg tablet, Take 3 Tabs by mouth daily (with dinner). (Patient taking differently: Take 500 mg by mouth two (2) times a day.), Disp: 270 Tab, Rfl: 1    glucose blood VI test strips (ONETOUCH ULTRA TEST) strip, by Does Not Apply route Daily (before breakfast). E11.9, Disp: 100 Strip, Rfl: 5    glipiZIDE (GLUCOTROL) 5 mg tablet, TAKE 1 TABLET BY MOUTH EVERY DAY, Disp: 90 Tab, Rfl: 3    lisinopril (PRINIVIL, ZESTRIL) 5 mg tablet, TAKE 1 TABLET BY MOUTH EVERY DAY, Disp: 90 Tab, Rfl: 1    albuterol (PROVENTIL HFA, VENTOLIN HFA, PROAIR HFA) 90 mcg/actuation inhaler, Take 2 Puffs by inhalation every four (4) hours as needed for Wheezing., Disp: 1 Inhaler, Rfl: 5    MAGNESIUM PO, Take  by mouth daily. , Disp: , Rfl:     acetaminophen (TYLENOL) 500 mg tablet, Take 1 tablet by mouth every eight (8) hours as needed for Pain., Disp: 30 tablet, Rfl: 0    CHOLECALCIFEROL, VITAMIN D3, (VITAMIN D3 PO), Take 1,000 Units by mouth daily. , Disp: , Rfl:     multivitamins-minerals-lutein (CENTRUM SILVER) Tab, Take 1 Tab by mouth daily. , Disp: , Rfl:     fexofenadine (ALLEGRA) 180 mg tablet, Take 1 Tab by mouth daily. , Disp: 30 Tab, Rfl: 11    famotidine (PEPCID AC) 20 mg tablet, Take 20 mg by mouth daily. , Disp: , Rfl:     aspirin (ASPIRIN) 325 mg tablet, Take 325 mg by mouth daily. , Disp: , Rfl:     VITAMIN B COMPLEX (B COMPLEX PO), Take 1 Tab by mouth daily. , Disp: , Rfl:     fluticasone-salmeterol (ADVAIR DISKUS) 250-50 mcg/Dose diskus inhaler, Take 1 Puff by inhalation every twelve (12) hours. , Disp: , Rfl:     ASCORBIC ACID (VITAMIN C PO), Take 1,000 mg by mouth daily. , Disp: , Rfl:         No Known Allergies     Review of Systems:  A comprehensive review of systems was negative except for: Constitutional: positive for fatigue and malaise  Ears, nose, mouth, throat, and face: positive for hearing loss  Musculoskeletal: positive for myalgias, arthralgias, stiff joints, neck pain, back pain and muscle weakness  Neurological: positive for paresthesia, coordination problems, gait problems and weakness  Behvioral/Psych: positive for anxiety and depression   Vitals:    10/24/17 1111   BP: 120/68   Pulse: 65   SpO2: 96%   Weight: 185 lb (83.9 kg)   Height: 5' 9\" (1.753 m)     Objective:     I      NEUROLOGICAL EXAM:    Appearance: The patient is well developed, well nourished, provides a coherent history and is in no acute distress. Mental Status: Oriented to time, place and person, and the president, cognitive function is relatively slow but normal and speech is fluent and no aphasia or dysarthria. Mood and affect appropriate. Cranial Nerves:   Intact visual fields. Fundi are benign. VINNY, both eyes are status post cataract surgery, EOM's full, but patient has lateral gaze nystagmus in both directions, but may be a little worse to the right side that seems to be some vertical and horizontal component, but no clear diplopia was noted though his right eye does not always track quite as well. Patient has nystagmus as described above, no ptosis. Facial sensation is normal. Corneal reflexes are not tested. Facial movement is symmetric. Hearing is abnormal bilaterally. Palate is midline with normal sternocleidomastoid and trapezius muscles are normal. Tongue is midline.   Neck without meningismus or bruits  Temporal arteries are not tender or enlarged  Neck has moderate restriction of range of motion in all directions with some palpable muscle spasms noted. Motor:  5/5 strength in upper and lower proximal and distal muscles. Normal bulk and tone. No fasciculations. Reflexes:   Deep tendon reflexes 2+/4 and symmetrical in both upper extremities, and ankle jerks are mildly reduced bilaterally but still present, and knee jerks are about 3+/4 bilaterally, may be right side greater than left. No babinski or clonus present   Sensory:   Abnormal to touch, pinprick and vibration and temperature in both feet to ankle level. DSS is intact   Gait:  Abnormal gait as patient walks with a slightly stiff legged and myelopathic gait is wide-based and spastic. Tremor:   No tremor noted. Cerebellar:   Abnormal Romberg and tandem cerebellar signs present. There are no sphincter examination is unremarkable   Neurovascular:  Normal heart sounds and regular rhythm, peripheral pulses decreased, and no carotid bruits. Assessment:       ICD-10-CM ICD-9-CM    1. B12 deficiency E53.8 266.2 MRI CERV SPINE WO CONT      MRI BRAIN W WO CONT      MARIZOL COMPREHENSIVE PLUS PANEL      ANTINEURONAL CELL AB      VITAMIN D, 25 HYROXY PANEL      VITAMIN B12 & FOLATE      SED RATE (ESR)      IMMUNOELECTROPHORESIS (IMMUNOFIX.)      GM1 ANTIBODY IGG, IGM      MYELIN ASSOC GLYCOPROTEIN AB, IGM      HEMOGLOBIN A1C WITH EAG      CK      REFERRAL TO PHYSICAL THERAPY      EMG LIMITED      EMG NCV MOTOR WO F/WAVE PER NERVE      EMG NCV SENSORY PER NERVE      DUPLEX CAROTID BILATERAL AMB NEURO   2.  Diabetic peripheral neuropathy associated with type 2 diabetes mellitus (HCC) E11.42 250.60 MRI CERV SPINE WO CONT     357.2 MRI BRAIN W WO CONT      MARIZOL COMPREHENSIVE PLUS PANEL      ANTINEURONAL CELL AB      VITAMIN D, 25 HYROXY PANEL      VITAMIN B12 & FOLATE      SED RATE (ESR)      IMMUNOELECTROPHORESIS (IMMUNOFIX.)      GM1 ANTIBODY IGG, IGM      MYELIN ASSOC GLYCOPROTEIN AB, IGM      HEMOGLOBIN A1C WITH EAG      CK      REFERRAL TO PHYSICAL THERAPY      EMG LIMITED EMG NCV MOTOR WO F/WAVE PER NERVE      EMG NCV SENSORY PER NERVE      DUPLEX CAROTID BILATERAL AMB NEURO   3. 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 356.4 MRI CERV SPINE WO CONT      MRI BRAIN W WO CONT      MARIZOL COMPREHENSIVE PLUS PANEL      ANTINEURONAL CELL AB      VITAMIN D, 25 HYROXY PANEL      VITAMIN B12 & FOLATE      SED RATE (ESR)      IMMUNOELECTROPHORESIS (IMMUNOFIX.)      GM1 ANTIBODY IGG, IGM      MYELIN ASSOC GLYCOPROTEIN AB, IGM      HEMOGLOBIN A1C WITH EAG      CK      REFERRAL TO PHYSICAL THERAPY      EMG LIMITED      EMG NCV MOTOR WO F/WAVE PER NERVE      EMG NCV SENSORY PER NERVE      DUPLEX CAROTID BILATERAL AMB NEURO   4. 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 Sensory ataxic gait R26.0 781.2 MRI CERV SPINE WO CONT      MRI BRAIN W WO CONT      MARIZOL COMPREHENSIVE PLUS PANEL      ANTINEURONAL CELL AB      VITAMIN D, 25 HYROXY PANEL      VITAMIN B12 & FOLATE      SED RATE (ESR)      IMMUNOELECTROPHORESIS (IMMUNOFIX.)      GM1 ANTIBODY IGG, IGM      MYELIN ASSOC GLYCOPROTEIN AB, IGM      HEMOGLOBIN A1C WITH EAG      CK      REFERRAL TO PHYSICAL THERAPY      EMG LIMITED      EMG NCV MOTOR WO F/WAVE PER NERVE      EMG NCV SENSORY PER NERVE      DUPLEX CAROTID BILATERAL AMB NEURO   6.  Ataxia R27.0 781.3 MRI CERV SPINE WO CONT      MRI BRAIN W WO CONT      MARIZOL COMPREHENSIVE PLUS PANEL      ANTINEURONAL CELL AB      VITAMIN D, 25 HYROXY PANEL      VITAMIN B12 & FOLATE      SED RATE (ESR) IMMUNOELECTROPHORESIS (IMMUNOFIX.)      GM1 ANTIBODY IGG, IGM      MYELIN ASSOC GLYCOPROTEIN AB, IGM      HEMOGLOBIN A1C WITH EAG      CK      REFERRAL TO PHYSICAL THERAPY      EMG LIMITED      EMG NCV MOTOR WO F/WAVE PER NERVE      EMG NCV SENSORY PER NERVE      DUPLEX CAROTID BILATERAL AMB NEURO   7. Degenerative cervical spinal stenosis M48.02 723.0 MRI CERV SPINE WO CONT      MRI BRAIN W WO CONT      MARIZOL COMPREHENSIVE PLUS PANEL      ANTINEURONAL CELL AB      VITAMIN D, 25 HYROXY PANEL      VITAMIN B12 & FOLATE      SED RATE (ESR)      IMMUNOELECTROPHORESIS (IMMUNOFIX.)      GM1 ANTIBODY IGG, IGM      MYELIN ASSOC GLYCOPROTEIN AB, IGM      HEMOGLOBIN A1C WITH EAG      CK      REFERRAL TO PHYSICAL THERAPY      EMG LIMITED      EMG NCV MOTOR WO F/WAVE PER NERVE      EMG NCV SENSORY PER NERVE      DUPLEX CAROTID BILATERAL AMB NEURO   8. Cervical arthritis with myelopathy M47.12 721.1 MRI CERV SPINE WO CONT      MRI BRAIN W WO CONT      MARIZOL COMPREHENSIVE PLUS PANEL      ANTINEURONAL CELL AB      VITAMIN D, 25 HYROXY PANEL      VITAMIN B12 & FOLATE      SED RATE (ESR)      IMMUNOELECTROPHORESIS (IMMUNOFIX.)      GM1 ANTIBODY IGG, IGM      MYELIN ASSOC GLYCOPROTEIN AB, IGM      HEMOGLOBIN A1C WITH EAG      CK      REFERRAL TO PHYSICAL THERAPY      EMG LIMITED      EMG NCV MOTOR WO F/WAVE PER NERVE      EMG NCV SENSORY PER NERVE      DUPLEX CAROTID BILATERAL AMB NEURO   9. 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 437.9 MRI CERV SPINE WO CONT      MRI BRAIN W WO CONT      MARIZOL COMPREHENSIVE PLUS PANEL      ANTINEURONAL CELL AB      VITAMIN D, 25 HYROXY PANEL      VITAMIN B12 & FOLATE      SED RATE (ESR)      IMMUNOELECTROPHORESIS (IMMUNOFIX.)      GM1 ANTIBODY IGG, IGM      MYELIN ASSOC GLYCOPROTEIN AB, IGM      HEMOGLOBIN A1C WITH EAG      CK      REFERRAL TO PHYSICAL THERAPY      EMG LIMITED      EMG NCV MOTOR WO F/WAVE PER NERVE      EMG NCV SENSORY PER NERVE      DUPLEX CAROTID BILATERAL AMB NEURO   10.  Bilateral carotid artery stenosis I65.23 433.10 MRI CERV SPINE WO CONT     433.30 MRI BRAIN W WO CONT      MARIZOL COMPREHENSIVE PLUS PANEL      ANTINEURONAL CELL AB      VITAMIN D, 25 HYROXY PANEL      VITAMIN B12 & FOLATE      SED RATE (ESR)      IMMUNOELECTROPHORESIS (IMMUNOFIX.)      GM1 ANTIBODY IGG, IGM      MYELIN ASSOC GLYCOPROTEIN AB, IGM      HEMOGLOBIN A1C WITH EAG      CK      REFERRAL TO PHYSICAL THERAPY      EMG LIMITED      EMG NCV MOTOR WO F/WAVE PER NERVE      EMG NCV SENSORY PER NERVE      DUPLEX CAROTID BILATERAL AMB NEURO   11. Vitamin D deficiency E55.9 268.9 MRI CERV SPINE WO CONT      MRI BRAIN W WO CONT      MARIZOL COMPREHENSIVE PLUS PANEL      ANTINEURONAL CELL AB      VITAMIN D, 25 HYROXY PANEL      VITAMIN B12 & FOLATE      SED RATE (ESR)      IMMUNOELECTROPHORESIS (IMMUNOFIX.)      GM1 ANTIBODY IGG, IGM      MYELIN ASSOC GLYCOPROTEIN AB, IGM      HEMOGLOBIN A1C WITH EAG      CK      REFERRAL TO PHYSICAL THERAPY      EMG LIMITED      EMG NCV MOTOR WO F/WAVE PER NERVE      EMG NCV SENSORY PER NERVE      DUPLEX CAROTID BILATERAL AMB NEURO     Active Problems:    * No active hospital problems. *      Plan:     Patient has a multifactor because of his gait instability including a sensory ataxia from his diabetic neuropathy, probably degenerative arthritis and deconditioning, and probably a component of senile gait apraxia, but the more concerning issue in view of his hyperreflexia would be a myelopathy from his cervical spine disease causing cord compression and myelopathic gait. Less likely would be a spinal cerebellar type degeneration in view of his nystagmus and ocular motility problems  We also need to rule out an intracranial structural disease or posterior fossa disease like Arnold-Chiari malformation, posterior fossa tumor, hydrocephalus, particular normal pressure hydrocephalus, or other structural brain lesions or multi-vascular infarct lesions.   He had a carotid Doppler study done today that showed mild disease only and no significant stenosis. MRI scan of the brain and cervical spine were ordered today to rule out the above-mentioned diseases. He is to continue his physical therapy since this already improving him we encouraged him to get adequate with his exercise. He will also get multiple metabolic parameters checked today, including B12, vitamin D, and other treatable causes for his neuropathy. We may need to x-ray his lower spine if the above workup is negative. He may need an EMG and nerve conduction study in addition  They will call for results of tests, and further treatment evaluation will be in 3 months time or earlier if needed. Discussed his condition with the patient and his wife in detail, reviewed your x-rays personally on the PACS system, reviewed the old lab test, and discussed his condition with his wife and the patient in detail, including further diagnostic testing needed, therapy, and other treatment modalities. Signed By: Latasha Haskins MD     October 24, 2017       CC: Tricia Ellington MD  FAX: [phone]    This note will not be viewable in [address]

## 2017-11-01 LAB
25(OH)D2 SERPL-MCNC: <1 NG/ML
25(OH)D3 SERPL-MCNC: 40 NG/ML
25(OH)D3+25(OH)D2 SERPL-MCNC: 41 NG/ML
ANTI NEURONAL CELL AB, 811986: 52 UNITS (ref 0–54)
CENTROMERE B AB SER-ACNC: <0.2 AI (ref 0–0.9)
CHROMATIN AB SERPL-ACNC: <0.2 AI (ref 0–0.9)
CK SERPL-CCNC: 99 U/L (ref 24–204)
DSDNA AB SER-ACNC: 3 IU/ML (ref 0–9)
ENA JO1 AB SER-ACNC: <0.2 AI (ref 0–0.9)
ENA RNP AB SER-ACNC: <0.2 AI (ref 0–0.9)
ENA SCL70 AB SER-ACNC: <0.2 AI (ref 0–0.9)
ENA SM AB SER-ACNC: <0.2 AI (ref 0–0.9)
ENA SM+RNP AB SER-ACNC: <0.2 AI (ref 0–0.9)
ENA SS-A AB SER-ACNC: <0.2 AI (ref 0–0.9)
ENA SS-B AB SER-ACNC: <0.2 AI (ref 0–0.9)
ERYTHROCYTE [SEDIMENTATION RATE] IN BLOOD BY WESTERGREN METHOD: 2 MM/HR (ref 0–30)
EST. AVERAGE GLUCOSE BLD GHB EST-MCNC: 146 MG/DL
FOLATE SERPL-MCNC: >20 NG/ML
GM1 GANGL IGG TITR SER IA: NORMAL TITER
GM1 GANGL IGM TITR SER IA: NORMAL TITER
HBA1C MFR BLD: 6.7 % (ref 4.8–5.6)
IGA SERPL-MCNC: 123 MG/DL (ref 61–437)
IGG SERPL-MCNC: 889 MG/DL (ref 700–1600)
IGM SERPL-MCNC: 65 MG/DL (ref 15–143)
INTERPRETATION, 811987: NORMAL
MAG IGM TITR SER: NORMAL TITER
PROT PATTERN SERPL IFE-IMP: NORMAL
RIBOSOMAL P AB SER-ACNC: <0.2 AI (ref 0–0.9)
SEE BELOW:, 164879: NORMAL
VIT B12 SERPL-MCNC: >2000 PG/ML (ref 211–946)

## 2017-11-04 ENCOUNTER — HOSPITAL ENCOUNTER (OUTPATIENT)
Dept: MRI IMAGING | Age: 82
Discharge: HOME | End: 2017-11-04
Attending: PSYCHIATRY & NEUROLOGY
Payer: MEDICARE

## 2017-11-04 DIAGNOSIS — M48.02 DEGENERATIVE CERVICAL SPINAL STENOSIS: ICD-10-CM

## 2017-11-04 DIAGNOSIS — E53.8 B12 DEFICIENCY: ICD-10-CM

## 2017-11-04 DIAGNOSIS — E11.42 DIABETIC PERIPHERAL NEUROPATHY ASSOCIATED WITH TYPE 2 DIABETES MELLITUS (HCC): ICD-10-CM

## 2017-11-04 DIAGNOSIS — R27.0 ATAXIA: ICD-10-CM

## 2017-11-04 DIAGNOSIS — I65.23 BILATERAL CAROTID ARTERY STENOSIS: ICD-10-CM

## 2017-11-04 DIAGNOSIS — I67.89 CEREBRAL MICROVASCULAR DISEASE: ICD-10-CM

## 2017-11-04 DIAGNOSIS — E55.9 VITAMIN D DEFICIENCY: ICD-10-CM

## 2017-11-04 DIAGNOSIS — G60.8 IDIOPATHIC SMALL AND LARGE FIBER SENSORY NEUROPATHY: ICD-10-CM

## 2017-11-04 DIAGNOSIS — R26.0 SENSORY ATAXIC GAIT: ICD-10-CM

## 2017-11-04 DIAGNOSIS — M47.12 CERVICAL ARTHRITIS WITH MYELOPATHY: ICD-10-CM

## 2017-11-04 DIAGNOSIS — H49.11 FOURTH NERVE PALSY OF RIGHT EYE: ICD-10-CM

## 2017-11-04 PROCEDURE — 74011250636 HC RX REV CODE- 250/636: Performed by: PSYCHIATRY & NEUROLOGY

## 2017-11-04 PROCEDURE — A9577 INJ MULTIHANCE: HCPCS | Performed by: PSYCHIATRY & NEUROLOGY

## 2017-11-04 PROCEDURE — 72141 MRI NECK SPINE W/O DYE: CPT

## 2017-11-04 PROCEDURE — 70553 MRI BRAIN STEM W/O & W/DYE: CPT

## 2017-11-04 RX ORDER — SODIUM CHLORIDE 0.9 % (FLUSH) 0.9 %
10 SYRINGE (ML) INJECTION
Status: COMPLETED | OUTPATIENT
Start: 2017-11-04 | End: 2017-11-04

## 2017-11-04 RX ADMIN — Medication 10 ML: at 14:00

## 2017-11-04 RX ADMIN — GADOBENATE DIMEGLUMINE 18 ML: 529 INJECTION, SOLUTION INTRAVENOUS at 13:06

## 2017-12-06 ENCOUNTER — OFFICE VISIT (OUTPATIENT)
Dept: INTERNAL MEDICINE CLINIC | Age: 82
End: 2017-12-06

## 2017-12-06 VITALS
BODY MASS INDEX: 27.4 KG/M2 | SYSTOLIC BLOOD PRESSURE: 110 MMHG | HEIGHT: 69 IN | RESPIRATION RATE: 16 BRPM | DIASTOLIC BLOOD PRESSURE: 80 MMHG | OXYGEN SATURATION: 95 % | WEIGHT: 185 LBS | TEMPERATURE: 97.5 F | HEART RATE: 84 BPM

## 2017-12-06 DIAGNOSIS — E11.9 CONTROLLED TYPE 2 DIABETES MELLITUS WITHOUT COMPLICATION, WITHOUT LONG-TERM CURRENT USE OF INSULIN (HCC): Chronic | ICD-10-CM

## 2017-12-06 DIAGNOSIS — I25.10 CORONARY ARTERY DISEASE INVOLVING NATIVE CORONARY ARTERY OF NATIVE HEART WITHOUT ANGINA PECTORIS: Chronic | ICD-10-CM

## 2017-12-06 DIAGNOSIS — E78.00 PURE HYPERCHOLESTEROLEMIA: Chronic | ICD-10-CM

## 2017-12-06 DIAGNOSIS — J44.9 CHRONIC OBSTRUCTIVE PULMONARY DISEASE, UNSPECIFIED COPD TYPE (HCC): Chronic | ICD-10-CM

## 2017-12-06 DIAGNOSIS — S00.93XD TRAUMATIC HEMATOMA OF HEAD, SUBSEQUENT ENCOUNTER: Primary | ICD-10-CM

## 2017-12-06 NOTE — MR AVS SNAPSHOT
Visit Information Date & Time Provider Department Dept. Phone Encounter #  
 12/6/2017 11:30 AM Brian Fields MD [facility] Internal Medicine [phone] [id number] Your Appointments 12/13/2017  9:00 AM  
PROCEDURE with Milena Jeronimo MD  
Neurology [facility] at [address]) Appt Note: EMG: both legs, jrb 10/24/17; EMG: both legs, jrb 10/24/17-tlw 10/24/17; EMG: both legs, jrb 10/24/17-tlw 10/24/17  
 [address] [facility], 
[id number], [address] [address]  
[address], [address] [place] [address], [address] [address]  
  
    
 12/13/2017 11:30 AM  
6 MONTH with Amadeo Saucedo MD  
[facility] [address] [place] [address]) Appt Note: $CP 5/31/17ksr [address]  
[phone] [address]  
  
    
 1/23/2018 11:30 AM  
Follow Up with Albino Ayala NP Neurology Clinic at [address]) Appt Note: f/u Ataxia, jrb 10/24/17  
 [facility], 
[address] [place] [address], [address] [address]  
[address] [facility], [address] [place] [address], [address] [address]  
  
    
 1/31/2018 10:00 AM  
ROUTINE CARE with Brian Fields MD  
[facility] Internal Medicine [address] [place] [address]) Appt Note: 6mo f/u  
 [address] [facility] [address]  
Divina Mayorga [address]74 [id number] [address] [address]  
  
    
 8/1/2018 10:30 AM  
Medicare Physical with Brian Fields MD  
[facility] Internal Medicine [address] [place] [address]) Appt Note: medicare wellness [address] [facility] [address]  
Divina Mayorga [address]74 [address] [facility] [address] Upcoming Health Maintenance Date Due ZOSTER VACCINE AGE 60> 10/18/1991 MICROALBUMIN Q1 11/14/2017 HEMOGLOBIN A1C Q6M 4/24/2018 FOOT EXAM Q1 7/31/2018 MEDICARE YEARLY EXAM 8/1/2018 LIPID PANEL Q1 8/7/2018 EYE EXAM RETINAL OR DILATED Q1 10/11/2018 GLAUCOMA SCREENING Q2Y 10/11/2019 DTaP/Tdap/Td series (2 - Td) 10/15/2026 Allergies as of 12/6/2017  Review Complete On: 12/6/2017 By: Carolina Mary LPN No Known Allergies Current Immunizations  Reviewed on 10/9/2017 Name Date Influenza High Dose Vaccine PF 10/6/2017, 11/2/2016 Influenza Vaccine 10/27/2014, 10/1/2013 Influenza Vaccine (Quad) PF 10/12/2015 Influenza Vaccine Split 10/31/2011, 10/15/2010 Pneumococcal Conjugate (PCV-13) 4/1/2015 Rabies Immune Globulin 10/15/2016 11:44 PM  
 Rabies Vaccine IM 10/29/2016  3:14 PM, 10/22/2016  3:02 PM, 10/18/2016  1:41 PM, 10/15/2016 11:00 PM  
 TD Vaccine 10/30/2012 Tdap 10/15/2016 10:48 PM  
 ZZZ-RETIRED (DO NOT USE) Pneumococcal Vaccine (Unspecified Type) 4/1/2004, 10/1/1998 Not reviewed this visit You Were Diagnosed With   
  
 Codes Comments Traumatic hematoma of head, subsequent encounter    -  Primary ICD-10-CM: S00.93XD ICD-9-CM: V58.89, 984 Controlled type 2 diabetes mellitus without complication, without long-term current use of insulin [facility]     ICD-10-CM: E11.9 ICD-9-CM: 250.00 Chronic obstructive pulmonary disease, unspecified COPD type ([facility]     ICD-10-CM: J44.9 ICD-9-CM: 786 Coronary artery disease involving native coronary artery of native heart without angina pectoris     ICD-10-CM: I25.10 ICD-9-CM: 414.01   
 Pure hypercholesterolemia     ICD-10-CM: E78.00 ICD-9-CM: 272.0 Vitals BP Pulse Temp Resp Height(growth percentile) Weight(growth percentile) 110/80 84 97.5 °F (36.4 °C) (Oral) 16 5' 9\" (1.753 m) 185 lb (83.9 kg) SpO2 BMI Smoking Status 95% 27.32 kg/m2 Former Smoker Vitals History BMI and BSA Data Body Mass Index Body Surface Area  
 27.32 kg/m 2 2.02 m 2 Preferred Pharmacy Pharmacy Name Phone [facility]PH[facility]ACY [facility][id number] [facility] KARLY LENTZ [facility] [address] [facility] [phone] Your Updated Medication List  
  
   
This list is accurate as of: 12/6/17 12:06 PM.  Always use your most recent med list.  
  
  
  
  
 acetaminophen 500 mg tablet Commonly known as:  TYLENOL Take 1 tablet by mouth every eight (8) hours as needed for Pain. ADVAIR DISKUS 250-50 mcg/dose diskus inhaler Generic drug:  fluticasone-salmeterol Take 1 Puff by inhalation every twelve (12) hours. albuterol 90 mcg/actuation inhaler Commonly known as:  PROVENTIL HFA, VENTOLIN HFA, PROAIR HFA Take 2 Puffs by inhalation every four (4) hours as needed for Wheezing. aspirin 325 mg tablet Commonly known as:  ASPIRIN Take 325 mg by mouth daily. B COMPLEX PO Take 1 Tab by mouth daily. CENTRUM SILVER Tab tablet Generic drug:  multivitamins-minerals-lutein Take 1 Tab by mouth daily. CINNAMON 500 mg Cap Generic drug:  cinnamon bark Take  by mouth. CO Q-10 10 mg Cap Generic drug:  coenzyme q10 Take  by mouth. fexofenadine 180 mg tablet Commonly known as:  Candance Ny Take 1 Tab by mouth daily. glipiZIDE 5 mg tablet Commonly known as:  GLUCOTROL  
TAKE 1 TABLET BY MOUTH EVERY DAY  
  
 glucose blood VI test strips strip Commonly known as:  ONETOUCH ULTRA TEST  
by Does Not Apply route Daily (before breakfast). E11.9  
  
 lisinopril 5 mg tablet Commonly known as:  PRINIVIL, ZESTRIL  
TAKE 1 TABLET BY MOUTH EVERY DAY  
  
 MAGNESIUM PO Take  by mouth daily. metFORMIN  mg tablet Commonly known as:  GLUCOPHAGE XR Take 3 Tabs by mouth daily (with dinner). metoprolol succinate 25 mg XL tablet Commonly known as:  TOPROL-XL  
TAKE 1/2 TABLET BY MOUTH EVERY DAY  
  
 PEPCID AC 20 mg tablet Generic drug:  famotidine Take 20 mg by mouth daily. simvastatin 20 mg tablet Commonly known as:  ZOCOR  
 TAKE 1 TABLET BY MOUTH NIGHTLY SPIRIVA RESPIMAT 2.5 mcg/actuation inhaler Generic drug:  tiotropium bromide Take 2 Puffs by inhalation daily. VITAMIN C PO Take 1,000 mg by mouth daily. VITAMIN D3 PO Take 1,000 Units by mouth daily. Patient Instructions Preventing Falls: Care Instructions Your Care Instructions Getting around your home safely can be a challenge if you have injuries or health problems that make it easy for you to fall. Loose rugs and furniture in walkways are among the dangers for many older people who have problems walking or who have poor eyesight. People who have conditions such as arthritis, osteoporosis, or dementia also have to be careful not to fall. You can make your home safer with a few simple measures. Follow-up care is a key part of your treatment and safety. Be sure to make and go to all appointments, and call your doctor if you are having problems. It's also a good idea to know your test results and keep a list of the medicines you take. How can you care for yourself at home? Taking care of yourself · You may get dizzy if you do not drink enough water. To prevent dehydration, drink plenty of fluids, enough so that your urine is light yellow or clear like water. Choose water and other caffeine-free clear liquids. If you have kidney, heart, or liver disease and have to limit fluids, talk with your doctor before you increase the amount of fluids you drink. · Exercise regularly to improve your strength, muscle tone, and balance. Walk if you can. Swimming may be a good choice if you cannot walk easily. · Have your vision and hearing checked each year or any time you notice a change. If you have trouble seeing and hearing, you might not be able to avoid objects and could lose your balance. · Know the side effects of the medicines you take. Ask your doctor or pharmacist whether the medicines you take can affect your balance. Sleeping pills or sedatives can affect your balance. · Limit the amount of alcohol you drink. Alcohol can impair your balance and other senses. · Ask your doctor whether calluses or corns on your feet need to be removed. If you wear loose-fitting shoes because of calluses or corns, you can lose your balance and fall. · Talk to your doctor if you have numbness in your feet. Preventing falls at home · Remove raised doorway thresholds, throw rugs, and clutter. Repair loose carpet or raised areas in the floor. · Move furniture and electrical cords to keep them out of walking paths. · Use nonskid floor wax, and wipe up spills right away, especially on ceramic tile floors. · If you use a walker or cane, put rubber tips on it. If you use crutches, clean the bottoms of them regularly with an abrasive pad, such as steel wool. · Keep your house well lit, especially Justin Decant, and outside walkways. Use night-lights in areas such as hallways and bathrooms. Add extra light switches or use remote switches (such as switches that go on or off when you clap your hands) to make it easier to turn lights on if you have to get up during the night. · Install sturdy handrails on stairways. · Move items in your cabinets so that the things you use a lot are on the lower shelves (about waist level). · Keep a cordless phone and a flashlight with new batteries by your bed. If possible, put a phone in each of the main rooms of your house, or carry a cell phone in case you fall and cannot reach a phone. Or, you can wear a device around your neck or wrist. You push a button that sends a signal for help. · Wear low-heeled shoes that fit well and give your feet good support. Use footwear with nonskid soles. Check the heels and soles of your shoes for wear. Repair or replace worn heels or soles. · Do not wear socks without shoes on wood floors. · Walk on the grass when the sidewalks are slippery.  If you live in an area that gets snow and ice in the winter, sprinkle salt on slippery steps and sidewalks. Preventing falls in the bath · Install grab bars and nonskid mats inside and outside your shower or tub and near the toilet and sinks. · Use shower chairs and bath benches. · Use a hand-held shower head that will allow you to sit while showering. · Get into a tub or shower by putting the weaker leg in first. Get out of a tub or shower with your strong side first. 
· Repair loose toilet seats and consider installing a raised toilet seat to make getting on and off the toilet easier. · Keep your bathroom door unlocked while you are in the shower. Where can you learn more? Go to http://jasvir-yaron.info/. Enter [phone] in the search box to learn more about \"Preventing Falls: Care Instructions. \" Current as of: August 4, 2016 Content Version: 11.2 © 9288-5648 [employer]. Care instructions adapted under license by [employer] (which disclaims liability or warranty for this information). If you have questions about a medical condition or this instruction, always ask your healthcare professional. [place] [address] any warranty or liability for your use of this information. Please clean wound with peroxide daily and use small amount of polysporin daily. Use Warm compresses three times per day until resolved. Introducing [facility]! Dear Munir Romero: Thank you for requesting a [facility] account. Our records indicate that you already have an active [facility] account. You can access your account anytime at [email or web address]. ValveXchange/[employer] Did you know that you can access your hospital and ER discharge instructions at any time in [facility]? You can also review all of your test results from your hospital stay or ER visit. Additional Information If you have questions, please visit the Frequently Asked Questions section of the [employer] website at [email or web address] Remember, [facility] is NOT to be used for urgent needs. For medical emergencies, dial 911. Now available from your iPhone and Android! Please provide this summary of care documentation to your next provider. Your primary care clinician is listed as Luis [phone] If you have any questions after today's visit, please call [phone].

## 2017-12-06 NOTE — PROGRESS NOTES
HPI:  Brianna Quiles is a 80y.o. year old male who is here for a follow-up of an urgent care visit from 2 days ago. He apparently was walking up a set of steps and lost his balance 2 days ago and fell backwards on a concrete driveway. He struck the back of his head and was seen in an urgent care center. He was diagnosed with a hematoma and instructed in wound care. He has been doing this for the last couple days. He has had some small amount of bloody drainage from it. He has been otherwise feeling well. Denies headaches or dizziness. Denies any chest pains or shortness of breath. Denies fevers or chills. His blood sugars been in the 80s over the last couple days but he denies a low blood sugar that precipitated this event. He has had a long-standing history of difficulty with his vision and balance. He recently saw neurology and had an MRI of his head and neck done in the last 2 weeks. He has a neurology follow-up in the next 2 weeks. He is scheduled to start physical therapy in January but wanted to postpone it due to the holidays. His shortness of breath is at baseline. He denies any increased cough or sputum.       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 Marliss Siemens repeat 10 years    HX COLONOSCOPY  2005   [facility] CATHETERIZATION      2  stents     HX HERNIA REPAIR  9/2008    left inguinal hernia repair by Dr. Rolanda Ley Right     inguinal hernai repair    HX HERNIA REPAIR      umbilical hernia repair    HX HIP REPLACEMENT Right 07/06/2016    HX PROSTATECTOMY  2003    HX TONSILLECTOMY      TOTAL HIP ARTHROPLASTY Left 2/9/11    Dr. Maribeth Peña at [address]       Prior to Admission medications    Medication Sig Start Date End Date Taking? Authorizing Provider   simvastatin (ZOCOR) 20 mg tablet TAKE 1 TABLET BY MOUTH NIGHTLY 10/22/17  Yes Robi Brown III, MD   metoprolol succinate (TOPROL-XL) 25 mg XL tablet TAKE 1/2 TABLET BY MOUTH EVERY DAY 8/21/17  Yes ALEXSANDER Dumont   tiotropium bromide (SPIRIVA RESPIMAT) 2.5 mcg/actuation inhaler Take 2 Puffs by inhalation daily. Yes Historical Provider   coenzyme q10 (CO Q-10) 10 mg cap Take  by mouth. Yes Historical Provider   cinnamon bark (CINNAMON) 500 mg cap Take  by mouth. Yes Historical Provider   metFORMIN ER (GLUCOPHAGE XR) 500 mg tablet Take 3 Tabs by mouth daily (with dinner). Patient taking differently: Take 500 mg by mouth three (3) times daily. 7/28/17  Yes Robi Brown III, MD   glucose blood VI test strips (ONETOUCH ULTRA TEST) strip by Does Not Apply route Daily (before breakfast). E11.9 6/14/17  Yes Robi Brown III, MD   glipiZIDE (GLUCOTROL) 5 mg tablet TAKE 1 TABLET BY MOUTH EVERY DAY 2/24/17  Yes Robi Brown III, MD   lisinopril (PRINIVIL, ZESTRIL) 5 mg tablet TAKE 1 TABLET BY MOUTH EVERY DAY  Patient taking differently: 2.5 mg. TAKE 1 TABLET BY MOUTH EVERY DAY 2/9/17  Yes Kerry Stoddard MD   albuterol (PROVENTIL HFA, VENTOLIN HFA, PROAIR HFA) 90 mcg/actuation inhaler Take 2 Puffs by inhalation every four (4) hours as needed for Wheezing. 11/2/16  Yes Robi Brown III, MD   MAGNESIUM PO Take  by mouth daily.    Yes Historical Provider   acetaminophen (TYLENOL) 500 mg tablet Take 1 tablet by mouth every eight (8) hours as needed for Pain. 8/28/14  Yes Angel Dickey MD   CHOLECALCIFEROL, VITAMIN D3, (VITAMIN D3 PO) Take 1,000 Units by mouth daily. Yes Historical Provider   multivitamins-minerals-lutein (CENTRUM SILVER) Tab Take 1 Tab by mouth daily. 5/14/10  Yes Historical Provider   fexofenadine (ALLEGRA) 180 mg tablet Take 1 Tab by mouth daily. 1/25/10  Yes Nieves Mcdowell MD   famotidine (PEPCID AC) 20 mg tablet Take 20 mg by mouth daily. 6/27/11  Yes Historical Provider   aspirin (ASPIRIN) 325 mg tablet Take 325 mg by mouth daily. Yes Historical Provider   VITAMIN B COMPLEX (B COMPLEX PO) Take 1 Tab by mouth daily. Yes Historical Provider   fluticasone-salmeterol (ADVAIR DISKUS) 250-50 mcg/Dose diskus inhaler Take 1 Puff by inhalation every twelve (12) hours. Yes Historical Provider   ASCORBIC ACID (VITAMIN C PO) Take 1,000 mg by mouth daily. Yes Historical Provider       Social History     Social History    Marital status:      Spouse name: N/A    Number of children: N/A    Years of education: N/A     Occupational History    Not on file. Social History Main Topics    Smoking status: Former Smoker     Packs/day: 3.00     Years: 10.00     Types: Cigarettes     Quit date: 1/15/1970    Smokeless tobacco: Never Used    Alcohol use No    Drug use: Yes     Special: Prescription, OTC    Sexual activity: Yes     Partners: Female     Birth control/ protection: None     Other Topics Concern    Not on file     Social History Narrative          ROS  Per HPI    Visit Vitals    /80    Pulse 84    Temp 97.5 °F (36.4 °C) (Oral)    Resp 16    Ht 5' 9\" (1.753 m)    Wt 185 lb (83.9 kg)    SpO2 95%    BMI 27.32 kg/m2         Physical Exam   Physical Examination: General appearance - alert, well appearing, and in no distress  Neck - supple, no significant adenopathy  Lymphatics - no palpable lymphadenopathy, no hepatosplenomegaly  Chest -Seferino scant wheeze.   Heart - normal rate and regular rhythm  Neurological - alert, oriented, normal speech, no focal findings or movement disorder noted  Does have a small hematoma on his occiput as per the below photographed. A small amount of bloody drainage. No tenderness and no purulence. Assessment/Plan:  Diagnoses and all orders for this visit:    1. Traumatic hematoma of head, subsequent encounter -appears to be resolving. Will continue wound care at home and gave him instructions. It was cleaned and dressed in the office today. 2. Controlled type 2 diabetes mellitus without complication, without long-term current use of insulin ([address] -appears well controlled. His most recent A1c was in August and excellent. 3. Chronic obstructive pulmonary disease, unspecified COPD type ([address] -stable. 4. Coronary artery disease involving native coronary artery of native heart without angina pectoris    5. Pure hypercholesterolemia -stable with last labs in August.  He will be due in February for repeat. 6.  Unstable gait strongly recommended ongoing physical therapy to improve this. Follow-up Disposition: 2-3 months and as needed   Advised him to call back or return to office if symptoms worsen/change/persist.  Discussed expected course/resolution/complications of diagnosis in detail with patient. Medication risks/benefits/costs/interactions/alternatives discussed with patient. He was given an after visit summary which includes diagnoses, current medications, & vitals. He expressed understanding with the diagnosis and plan. Follow-up Disposition: Not on File       Advised him to call back or return to office if symptoms worsen/change/persist.  Discussed expected course/resolution/complications of diagnosis in detail with patient. Medication risks/benefits/costs/interactions/alternatives discussed with patient. He was given an after visit summary which includes diagnoses, current medications, & vitals.   He expressed understanding with the diagnosis and plan.

## 2017-12-06 NOTE — PATIENT INSTRUCTIONS

## 2017-12-08 ENCOUNTER — TELEPHONE (OUTPATIENT)
Dept: INTERNAL MEDICINE CLINIC | Age: 82
End: 2017-12-08

## 2017-12-08 ENCOUNTER — HOSPITAL ENCOUNTER (OUTPATIENT)
Dept: GENERAL RADIOLOGY | Age: 82
Discharge: HOME | End: 2017-12-08
Attending: INTERNAL MEDICINE
Payer: MEDICARE

## 2017-12-08 ENCOUNTER — OFFICE VISIT (OUTPATIENT)
Dept: INTERNAL MEDICINE CLINIC | Age: 82
End: 2017-12-08

## 2017-12-08 VITALS
BODY MASS INDEX: 27.55 KG/M2 | TEMPERATURE: 98.1 F | HEIGHT: 69 IN | HEART RATE: 78 BPM | OXYGEN SATURATION: 94 % | DIASTOLIC BLOOD PRESSURE: 70 MMHG | WEIGHT: 186 LBS | RESPIRATION RATE: 14 BRPM | SYSTOLIC BLOOD PRESSURE: 130 MMHG

## 2017-12-08 DIAGNOSIS — E11.9 CONTROLLED TYPE 2 DIABETES MELLITUS WITHOUT COMPLICATION, WITHOUT LONG-TERM CURRENT USE OF INSULIN (HCC): Chronic | ICD-10-CM

## 2017-12-08 DIAGNOSIS — R04.2 HEMOPTYSIS: ICD-10-CM

## 2017-12-08 DIAGNOSIS — R04.2 HEMOPTYSIS: Primary | ICD-10-CM

## 2017-12-08 PROCEDURE — 71020 XR CHEST PA LAT: CPT

## 2017-12-08 RX ORDER — GUAIFENESIN 400 MG/1
400 TABLET ORAL
COMMUNITY
Start: 2017-12-08 | End: 2018-01-31 | Stop reason: ALTCHOICE

## 2017-12-08 RX ORDER — AZITHROMYCIN 250 MG/1
250 TABLET, FILM COATED ORAL SEE ADMIN INSTRUCTIONS
Qty: 6 TAB | Refills: 0 | Status: SHIPPED | OUTPATIENT
Start: 2017-12-08 | End: 2017-12-13

## 2017-12-08 RX ORDER — ALBUTEROL SULFATE 90 UG/1
2 AEROSOL, METERED RESPIRATORY (INHALATION)
Qty: 1 INHALER | Refills: 5 | Status: SHIPPED | OUTPATIENT
Start: 2017-12-08 | End: 2019-06-23 | Stop reason: SDUPTHER

## 2017-12-08 NOTE — PROGRESS NOTES
HPI:  David Hodge is a 80y.o. year old male who is here for a episode of hemoptysis. He has had brown sputum production for several weeks now. This morning had some brighter red sputum about the size of a nickel. He denies any increase in her shortness of breath. Denies PND orthopnea. Denies nausea or vomiting. He also did have another fall in the last 24 hours where he lost his balance getting out of a chair and struck his right side. He has had some ache there but no other acute injury. The wound on the top of his head is improving and draining less. 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 Fer Alvarado repeat 10 years    HX COLONOSCOPY  2005    HX HEART CATHETERIZATION      2  stents     HX HERNIA REPAIR  9/2008    left inguinal hernia repair by Dr. Wilber Cameron Right     inguinal hernai repair    HX HERNIA REPAIR      umbilical hernia repair    HX HIP REPLACEMENT Right 07/06/2016    HX PROSTATECTOMY  2003    HX TONSILLECTOMY      TOTAL HIP ARTHROPLASTY Left 2/9/11    Dr. Shakila Beauchamp at [facility]       Prior to Admission medications    Medication Sig Start Date End Date Taking?  Authorizing Provider   guaiFENesin (ORGANIDIN) 400 mg tablet Take 1 Tab by mouth every four (4) hours as needed for Congestion. 12/8/17  Yes Myron Ramos III, MD   albuterol (PROVENTIL HFA, VENTOLIN HFA, PROAIR HFA) 90 mcg/actuation inhaler Take 2 Puffs by inhalation every four (4) hours as needed for Wheezing. 12/8/17  Yes Myron Ramos III, MD   simvastatin (ZOCOR) 20 mg tablet TAKE 1 TABLET BY MOUTH NIGHTLY 10/22/17  Yes Myron Ramos III, MD   metoprolol succinate (TOPROL-XL) 25 mg XL tablet TAKE 1/2 TABLET BY MOUTH EVERY DAY 8/21/17  Yes ALEXSANDER Dumont   tiotropium bromide (SPIRIVA RESPIMAT) 2.5 mcg/actuation inhaler Take 2 Puffs by inhalation daily. Yes Historical Provider   coenzyme q10 (CO Q-10) 10 mg cap Take  by mouth. Yes Historical Provider   cinnamon bark (CINNAMON) 500 mg cap Take  by mouth. Yes Historical Provider   metFORMIN ER (GLUCOPHAGE XR) 500 mg tablet Take 3 Tabs by mouth daily (with dinner). Patient taking differently: Take 500 mg by mouth three (3) times daily. 7/28/17  Yes Myron Ramos III, MD   glucose blood VI test strips (ONETOUCH ULTRA TEST) strip by Does Not Apply route Daily (before breakfast). E11.9 6/14/17  Yes Myron Ramos III, MD   glipiZIDE (GLUCOTROL) 5 mg tablet TAKE 1 TABLET BY MOUTH EVERY DAY 2/24/17  Yes Myron Ramos III, MD   lisinopril (PRINIVIL, ZESTRIL) 5 mg tablet TAKE 1 TABLET BY MOUTH EVERY DAY  Patient taking differently: 2.5 mg. TAKE 1 TABLET BY MOUTH EVERY DAY 2/9/17  Yes Erna Gordillo MD   MAGNESIUM PO Take  by mouth daily. Yes Historical Provider   acetaminophen (TYLENOL) 500 mg tablet Take 1 tablet by mouth every eight (8) hours as needed for Pain. 8/28/14  Yes Angel Dickey MD   CHOLECALCIFEROL, VITAMIN D3, (VITAMIN D3 PO) Take 1,000 Units by mouth daily. Yes Historical Provider   multivitamins-minerals-lutein (CENTRUM SILVER) Tab Take 1 Tab by mouth daily. 5/14/10  Yes Historical Provider   fexofenadine (ALLEGRA) 180 mg tablet Take 1 Tab by mouth daily.  1/25/10  Yes Erna Gordillo MD   famotidine (PEPCID AC) 20 mg tablet Take 20 mg by mouth daily. 6/27/11  Yes Historical Provider   aspirin (ASPIRIN) 325 mg tablet Take 325 mg by mouth daily. Yes Historical Provider   VITAMIN B COMPLEX (B COMPLEX PO) Take 1 Tab by mouth daily. Yes Historical Provider   fluticasone-salmeterol (ADVAIR DISKUS) 250-50 mcg/Dose diskus inhaler Take 1 Puff by inhalation every twelve (12) hours. Yes Historical Provider   ASCORBIC ACID (VITAMIN C PO) Take 1,000 mg by mouth daily. Yes Historical Provider       Social History     Social History    Marital status:      Spouse name: N/A    Number of children: N/A    Years of education: N/A     Occupational History    Not on file. Social History Main Topics    Smoking status: Former Smoker     Packs/day: 3.00     Years: 10.00     Types: Cigarettes     Quit date: 1/15/1970    Smokeless tobacco: Never Used    Alcohol use No    Drug use: Yes     Special: Prescription, OTC    Sexual activity: Yes     Partners: Female     Birth control/ protection: None     Other Topics Concern    Not on file     Social History Narrative          ROS  Per HPI    Visit Vitals    /70    Pulse 78    Temp 98.1 °F (36.7 °C) (Oral)    Resp 14    Ht 5' 9\" (1.753 m)    Wt 186 lb (84.4 kg)    SpO2 94%    BMI 27.47 kg/m2         Physical Exam   Physical Examination: General appearance - alert, well appearing, and in no distress  Mouth - mucous membranes moist, pharynx normal without lesions  Neck - supple, no significant adenopathy  Lymphatics - no palpable lymphadenopathy, no hepatosplenomegaly  Chest -scattered rhonchi and expiratory wheeze. No focal findings. Heart - normal rate, regular rhythm, normal S1, S2, no murmurs, rubs, clicks or gallops  Back exam - full range of motion, no tenderness, palpable spasm or pain on motion  Extremities - peripheral pulses normal, no pedal edema, no clubbing or cyanosis  No point tenderness in the back. No bruising.   The wound on the top of his head is significantly smaller with minimal amount of eschar present. No bloody drainage. Assessment/Plan:  Diagnoses and all orders for this visit:    1. Hemoptysis -? Bronchitis versus pneumonia. Will get a chest x-ray and treat appropriately with antibiotics pending those results. We will also have him use his albuterol inhaler and Mucinex for now. He will let me know if he has not improving next week. -     XR CHEST PA LAT; Future  -     albuterol (PROVENTIL HFA, VENTOLIN HFA, PROAIR HFA) 90 mcg/actuation inhaler; Take 2 Puffs by inhalation every four (4) hours as needed for Wheezing. 2. Controlled type 2 diabetes mellitus without complication, without long-term current use of insulin (HCC)  -     METABOLIC PANEL, COMPREHENSIVE  -     LIPID PANEL  -     MICROALBUMIN, UR, RAND W/ MICROALBUMIN/CREA RATIO  3. Wound on the top of the head continues to improve and they will continue wound care. 4.  Recent fall he has plans for physical therapy coming up in the next few weeks. No apparent injury. Follow-up Disposition as needed       Advised him to call back or return to office if symptoms worsen/change/persist.  Discussed expected course/resolution/complications of diagnosis in detail with patient. Medication risks/benefits/costs/interactions/alternatives discussed with patient. He was given an after visit summary which includes diagnoses, current medications, & vitals. He expressed understanding with the diagnosis and plan.

## 2017-12-08 NOTE — MR AVS SNAPSHOT
Visit Information Date & Time Provider Department Dept. Phone Encounter #  
 12/8/2017  9:30 AM Lisa Jordan MD [facility] Medicine [phone] [id number] Your Appointments 12/13/2017  9:00 AM  
PROCEDURE with Deana Gonzales MD  
[facility] [place]) Appt Note: EMG: both legs, jrb 10/24/17; EMG: both legs, jrb 10/24/17-tlw 10/24/17; EMG: both legs, jrb 10/24/17-tlw 10/24/17  
 [address] [facility], 
[id number], [address] [address]  
[address], [address], [address] [address]  
  
    
 12/13/2017 11:30 AM  
6 MONTH with Halley Saucedo MD  
[facility]) Appt Note: $CP 5/31/17ksr [address]  
[phone] [address]  
  
    
 1/23/2018 11:30 AM  
Follow Up with Dorothy Pan NP [facility] [place]) Appt Note: f/u Ataxia, jrb 10/24/17  
 [address] [place] [address], 
[address] [facility], [address] [address]  
[address] [facility], [address] [place] [address] Avenue, [address] [address]  
  
    
 1/31/2018 10:00 AM  
ROUTINE CARE with Lisa Jordan MD  
[facility]) Appt Note: 6mo f/u  
 [address] [facility] [address]  
[address] [address]  
  
    
 8/1/2018 10:30 AM  
Medicare Physical with Lisa Jordan MD  
[facility]) Appt Note: medicare wellness [address] [facility] [address]  
[address] Upcoming Health Maintenance Date Due ZOSTER VACCINE AGE 60> 10/18/1991 MICROALBUMIN Q1 11/14/2017 HEMOGLOBIN A1C Q6M 4/24/2018 FOOT EXAM Q1 7/31/2018 MEDICARE YEARLY EXAM 8/1/2018 LIPID PANEL Q1 8/7/2018 EYE EXAM RETINAL OR DILATED Q1 10/11/2018 GLAUCOMA SCREENING Q2Y 10/11/2019 DTaP/Tdap/Td series (2 - Td) 10/15/2026 Allergies as of 12/8/2017  Review Complete On: 12/8/2017 By: Porsche Kwan LPN No Known Allergies Current Immunizations  Reviewed on 10/9/2017 Name Date Influenza High Dose Vaccine PF 10/6/2017, 11/2/2016 Influenza Vaccine 10/27/2014, 10/1/2013 Influenza Vaccine (Quad) PF 10/12/2015 Influenza Vaccine Split 10/31/2011, 10/15/2010 Pneumococcal Conjugate (PCV-13) 4/1/2015 Rabies Immune Globulin 10/15/2016 11:44 PM  
 Rabies Vaccine IM 10/29/2016  3:14 PM, 10/22/2016  3:02 PM, 10/18/2016  1:41 PM, 10/15/2016 11:00 PM  
 TD Vaccine 10/30/2012 Tdap 10/15/2016 10:48 PM  
 ZZZ-RETIRED (DO NOT USE) Pneumococcal Vaccine (Unspecified Type) 4/1/2004, 10/1/1998 Not reviewed this visit You Were Diagnosed With   
  
 Codes Comments Hemoptysis    -  Primary ICD-10-CM: R04.2 ICD-9-CM: 786.30 Controlled type 2 diabetes mellitus without complication, without long-term current use of insulin ([facility]     ICD-10-CM: E11.9 ICD-9-CM: 250.00 Vitals BP Pulse Temp Resp Height(growth percentile) Weight(growth percentile) 130/70 78 98.1 °F (36.7 °C) (Oral) 14 5' 9\" (1.753 m) 186 lb (84.4 kg) SpO2 BMI Smoking Status 94% 27.47 kg/m2 Former Smoker Vitals History BMI and BSA Data Body Mass Index Body Surface Area  
 27.47 kg/m 2 2.03 m 2 Preferred Pharmacy Pharmacy Name Phone [id number] [address] [facility] AT [address] [place] [address] [phone] Your Updated Medication List  
  
   
This list is accurate as of: 12/8/17 10:12 AM.  Always use your most recent med list.  
  
  
  
  
 acetaminophen 500 mg tablet Commonly known as:  TYLENOL Take 1 tablet by mouth every eight (8) hours as needed for Pain. ADVAIR DISKUS 250-50 mcg/dose diskus inhaler Generic drug:  fluticasone-salmeterol Take 1 Puff by inhalation every twelve (12) hours. albuterol 90 mcg/actuation inhaler Commonly known as:  PROVENTIL HFA, VENTOLIN HFA, PROAIR HFA Take 2 Puffs by inhalation every four (4) hours as needed for Wheezing. aspirin 325 mg tablet Commonly known as:  ASPIRIN Take 325 mg by mouth daily. B COMPLEX PO Take 1 Tab by mouth daily. CENTRUM SILVER Tab tablet Generic drug:  multivitamins-minerals-lutein Take 1 Tab by mouth daily. CINNAMON 500 mg Cap Generic drug:  cinnamon bark Take  by mouth. CO Q-10 10 mg Cap Generic drug:  coenzyme q10 Take  by mouth. fexofenadine 180 mg tablet Commonly known as:  Andreina Mix Take 1 Tab by mouth daily. glipiZIDE 5 mg tablet Commonly known as:  GLUCOTROL  
TAKE 1 TABLET BY MOUTH EVERY DAY  
  
 glucose blood VI test strips strip Commonly known as:  ONETOUCH ULTRA TEST  
by Does Not Apply route Daily (before breakfast). E11.9  
  
 guaiFENesin 400 mg tablet Commonly known as:  Katherine [place] Take 1 Tab by mouth every four (4) hours as needed for Congestion. lisinopril 5 mg tablet Commonly known as:  PRINIVIL, ZESTRIL  
TAKE 1 TABLET BY MOUTH EVERY DAY  
  
 MAGNESIUM PO Take  by mouth daily. metFORMIN  mg tablet Commonly known as:  GLUCOPHAGE XR Take 3 Tabs by mouth daily (with dinner). metoprolol succinate 25 mg XL tablet Commonly known as:  TOPROL-XL  
TAKE 1/2 TABLET BY MOUTH EVERY DAY  
  
 PEPCID AC 20 mg tablet Generic drug:  famotidine Take 20 mg by mouth daily. simvastatin 20 mg tablet Commonly known as:  ZOCOR  
TAKE 1 TABLET BY MOUTH NIGHTLY SPIRIVA RESPIMAT 2.5 mcg/actuation inhaler Generic drug:  tiotropium bromide Take 2 Puffs by inhalation daily. VITAMIN C PO Take 1,000 mg by mouth daily. VITAMIN D3 PO Take 1,000 Units by mouth daily. Prescriptions Sent to Pharmacy Refills  
 albuterol (PROVENTIL HFA, VENTOLIN HFA, PROAIR HFA) 90 mcg/actuation inhaler 5 Sig: Take 2 Puffs by inhalation every four (4) hours as needed for Wheezing. Class: Normal  
 Pharmacy: [id number] [address] [facility] AT [address] [place] [address] #: [phone] Route: Inhalation We Performed the Following LIPID PANEL [26151 CPT(R)] METABOLIC PANEL, COMPREHENSIVE [77015 CPT(R)] MICROALBUMIN, UR, RAND W/ MICROALBUMIN/CREA RATIO [id number] CPT(R)] To-Do List   
 12/08/2017 Imaging:  XR CHEST PA LAT Patient Instructions Preventing Falls: Care Instructions Your Care Instructions Getting around your home safely can be a challenge if you have injuries or health problems that make it easy for you to fall. Loose rugs and furniture in walkways are among the dangers for many older people who have problems walking or who have poor eyesight. People who have conditions such as arthritis, osteoporosis, or dementia also have to be careful not to fall. You can make your home safer with a few simple measures. Follow-up care is a key part of your treatment and safety. Be sure to make and go to all appointments, and call your doctor if you are having problems. It's also a good idea to know your test results and keep a list of the medicines you take. How can you care for yourself at home? Taking care of yourself · You may get dizzy if you do not drink enough water. To prevent dehydration, drink plenty of fluids, enough so that your urine is light yellow or clear like water. Choose water and other caffeine-free clear liquids. If you have kidney, heart, or liver disease and have to limit fluids, talk with your doctor before you increase the amount of fluids you drink. · Exercise regularly to improve your strength, muscle tone, and balance. Walk if you can. Swimming may be a good choice if you cannot walk easily. · Have your vision and hearing checked each year or any time you notice a change. If you have trouble seeing and hearing, you might not be able to avoid objects and could lose your balance. · Know the side effects of the medicines you take. Ask your doctor or pharmacist whether the medicines you take can affect your balance. Sleeping pills or sedatives can affect your balance. · Limit the amount of alcohol you drink. Alcohol can impair your balance and other senses. · Ask your doctor whether calluses or corns on your feet need to be removed. If you wear loose-fitting shoes because of calluses or corns, you can lose your balance and fall. · Talk to your doctor if you have numbness in your feet. Preventing falls at home · Remove raised doorway thresholds, throw rugs, and clutter. Repair loose carpet or raised areas in the floor. · Move furniture and electrical cords to keep them out of walking paths. · Use nonskid floor wax, and wipe up spills right away, especially on ceramic tile floors. · If you use a walker or cane, put rubber tips on it. If you use crutches, clean the bottoms of them regularly with an abrasive pad, such as steel wool. · Keep your house well lit, especially [address], and outside walkways. Use night-lights in areas such as hallways and bathrooms. Add extra light switches or use remote switches (such as switches that go on or off when you clap your hands) to make it easier to turn lights on if you have to get up during the night. · Install sturdy handrails on stairways. · Move items in your cabinets so that the things you use a lot are on the lower shelves (about waist level). · Keep a cordless phone and a flashlight with new batteries by your bed. If possible, put a phone in each of the main rooms of your house, or carry a cell phone in case you fall and cannot reach a phone. Or, you can wear a device around your neck or wrist. You push a button that sends a signal for help. · Wear low-heeled shoes that fit well and give your feet good support. Use footwear with nonskid soles. Check the heels and soles of your shoes for wear. Repair or replace worn heels or soles. · Do not wear socks without shoes on wood floors. · Walk on the grass when the sidewalks are slippery. If you live in an area that gets snow and ice in the winter, sprinkle salt on slippery steps and sidewalks. Preventing falls in the bath · Install grab bars and nonskid mats inside and outside your shower or tub and near the toilet and sinks. · Use shower chairs and bath benches. · Use a hand-held shower head that will allow you to sit while showering. · Get into a tub or shower by putting the weaker leg in first. Get out of a tub or shower with your strong side first. 
· Repair loose toilet seats and consider installing a raised toilet seat to make getting on and off the toilet easier. · Keep your bathroom door unlocked while you are in the shower. Where can you learn more? Go to http://jasvirallGreenupyaron.info/. Enter [phone] in the search box to learn more about \"Preventing Falls: Care Instructions. \" Current as of: August 4, 2016 Content Version: 11.2 © 1693-0958 [employer]. Care instructions adapted under license by [employer] (which disclaims liability or warranty for this information). If you have questions about a medical condition or this instruction, always ask your healthcare professional. [place] [address] any warranty or liability for your use of this information. Please use the rescue inhaler at least 3 times per day until the cough is resolved. Also use the generic mucinex at 2400 mg TOTAL per day until the cough is resolved. Complete all of the antibiotics. Introducing [facility] HE[facility]! Dear June Connelly: Thank you for requesting a P[employer] account. Our records indicate that you already have an active [employer] account.   You can access your account anytime at [email or web address] Did you know that you can access your hospital and ER discharge instructions at any time in Brainjuicer? You can also review all of your test results from your hospital stay or ER visit. Additional Information If you have questions, please visit the Frequently Asked Questions section of the [employer] website at [email or web address] Remember, Brainjuicer is NOT to be used for urgent needs. For medical emergencies, dial 911. Now available from your iPhone and Android! Please provide this summary of care documentation to your next provider. Your primary care clinician is listed as Luis [phone] If you have any questions after today's visit, please call [phone].

## 2017-12-08 NOTE — PATIENT INSTRUCTIONS

## 2017-12-08 NOTE — TELEPHONE ENCOUNTER
Spoke with pt and spouse in ref to cxr. Per SRJ, look okay. Will still start [address] and other meds as directed at appt today. Red flags reviewed. Orders Placed This Encounter    azithromycin (ZITHROMAX) 250 mg tablet     Sig: Take 1 Tab by mouth See Admin Instructions for 5 days.      Dispense:  6 Tab     Refill:  0

## 2017-12-13 ENCOUNTER — HOSPITAL ENCOUNTER (OUTPATIENT)
Dept: LAB | Age: 82
Discharge: HOME | End: 2017-12-13
Payer: MEDICARE

## 2017-12-13 ENCOUNTER — OFFICE VISIT (OUTPATIENT)
Dept: NEUROLOGY | Age: 82
End: 2017-12-13

## 2017-12-13 ENCOUNTER — OFFICE VISIT (OUTPATIENT)
Dept: CARDIOLOGY CLINIC | Age: 82
End: 2017-12-13

## 2017-12-13 VITALS — OXYGEN SATURATION: 95 % | HEART RATE: 69 BPM | DIASTOLIC BLOOD PRESSURE: 63 MMHG | SYSTOLIC BLOOD PRESSURE: 127 MMHG

## 2017-12-13 VITALS
HEIGHT: 69 IN | BODY MASS INDEX: 27.25 KG/M2 | SYSTOLIC BLOOD PRESSURE: 130 MMHG | HEART RATE: 68 BPM | RESPIRATION RATE: 18 BRPM | DIASTOLIC BLOOD PRESSURE: 70 MMHG | OXYGEN SATURATION: 92 % | WEIGHT: 184 LBS

## 2017-12-13 DIAGNOSIS — M48.02 DEGENERATIVE CERVICAL SPINAL STENOSIS: ICD-10-CM

## 2017-12-13 DIAGNOSIS — R27.0 ATAXIA: ICD-10-CM

## 2017-12-13 DIAGNOSIS — E78.00 PURE HYPERCHOLESTEROLEMIA: Primary | Chronic | ICD-10-CM

## 2017-12-13 DIAGNOSIS — E11.42 DIABETIC PERIPHERAL NEUROPATHY ASSOCIATED WITH TYPE 2 DIABETES MELLITUS (HCC): ICD-10-CM

## 2017-12-13 DIAGNOSIS — M54.16 LUMBAR BACK PAIN WITH RADICULOPATHY AFFECTING LEFT LOWER EXTREMITY: ICD-10-CM

## 2017-12-13 DIAGNOSIS — I67.89 CEREBRAL MICROVASCULAR DISEASE: ICD-10-CM

## 2017-12-13 DIAGNOSIS — G60.8 IDIOPATHIC SMALL AND LARGE FIBER SENSORY NEUROPATHY: ICD-10-CM

## 2017-12-13 DIAGNOSIS — E11.9 CONTROLLED TYPE 2 DIABETES MELLITUS WITHOUT COMPLICATION, UNSPECIFIED LONG TERM INSULIN USE STATUS: ICD-10-CM

## 2017-12-13 DIAGNOSIS — H81.393 PERIPHERAL VERTIGO OF BOTH EARS: Primary | ICD-10-CM

## 2017-12-13 DIAGNOSIS — M54.16 LUMBAR BACK PAIN WITH RADICULOPATHY AFFECTING RIGHT LOWER EXTREMITY: ICD-10-CM

## 2017-12-13 DIAGNOSIS — M47.12 CERVICAL ARTHRITIS WITH MYELOPATHY: ICD-10-CM

## 2017-12-13 DIAGNOSIS — I25.10 CORONARY ARTERY DISEASE INVOLVING NATIVE CORONARY ARTERY OF NATIVE HEART WITHOUT ANGINA PECTORIS: Chronic | ICD-10-CM

## 2017-12-13 DIAGNOSIS — R26.0 SENSORY ATAXIC GAIT: ICD-10-CM

## 2017-12-13 PROBLEM — H81.399 VESTIBULAR VERTIGO: Status: ACTIVE | Noted: 2017-12-13

## 2017-12-13 PROCEDURE — 82043 UR ALBUMIN QUANTITATIVE: CPT

## 2017-12-13 PROCEDURE — 36415 COLL VENOUS BLD VENIPUNCTURE: CPT

## 2017-12-13 PROCEDURE — 80053 COMPREHEN METABOLIC PANEL: CPT

## 2017-12-13 PROCEDURE — 80061 LIPID PANEL: CPT

## 2017-12-13 RX ORDER — METOPROLOL SUCCINATE 25 MG/1
12.5 TABLET, EXTENDED RELEASE ORAL
COMMUNITY
Start: 2017-06-23 | End: 2017-12-13 | Stop reason: SDUPTHER

## 2017-12-13 RX ORDER — MECLIZINE HYDROCHLORIDE 25 MG/1
25 TABLET ORAL
Qty: 100 TAB | Refills: 11 | Status: SHIPPED | OUTPATIENT
Start: 2017-12-13 | End: 2017-12-23

## 2017-12-13 RX ORDER — SIMVASTATIN 20 MG/1
20 TABLET, FILM COATED ORAL
COMMUNITY
Start: 2017-04-26 | End: 2017-12-13 | Stop reason: SDUPTHER

## 2017-12-13 RX ORDER — GLIPIZIDE 5 MG/1
5 TABLET ORAL
COMMUNITY
Start: 2017-05-23 | End: 2017-12-13 | Stop reason: SDUPTHER

## 2017-12-13 RX ORDER — CEPHALEXIN 500 MG/1
CAPSULE ORAL
COMMUNITY
Start: 2017-10-02 | End: 2018-07-20 | Stop reason: ALTCHOICE

## 2017-12-13 RX ORDER — METFORMIN HYDROCHLORIDE 500 MG/1
TABLET, EXTENDED RELEASE ORAL
COMMUNITY
Start: 2017-05-19 | End: 2017-12-13 | Stop reason: SDUPTHER

## 2017-12-13 RX ORDER — PREDNISONE 10 MG/1
TABLET ORAL
COMMUNITY
Start: 2017-05-18 | End: 2018-01-31 | Stop reason: ALTCHOICE

## 2017-12-13 RX ORDER — LISINOPRIL 2.5 MG/1
2.5 TABLET ORAL DAILY
Qty: 90 TAB | Refills: 3 | Status: SHIPPED | OUTPATIENT
Start: 2017-12-13 | End: 2018-08-16 | Stop reason: SDUPTHER

## 2017-12-13 RX ORDER — METOPROLOL SUCCINATE 25 MG/1
TABLET, EXTENDED RELEASE ORAL
Qty: 45 TAB | Refills: 3 | Status: SHIPPED | OUTPATIENT
Start: 2017-12-13 | End: 2018-10-12 | Stop reason: SDUPTHER

## 2017-12-13 NOTE — PROCEDURES
Demetriojean mariene [facility] at [address] [facility] [address] [facility], [address] [facility]  Tel [phone]     Fax [phone]  Electrodiagnostic Study Report  Test Date:  2017    Patient: Areli Arteaga : 1931 Physician: Linnette Benjamin MD   Sex: Male  < Ref Phys: Tiffany Arteaga M.D. Technician: Nataly Joel:    Clinical Indication: Patient is an 80-year-old  male with a history of weakness in his legs, loss of balance, history of diabetes and unsteady gait with probable diabetic neuropathy, with back pain and lumbar radiculopathy for EMG study to rule out radiculopathy, neuropathy, or other neuromuscular disease. Patient on exam has fairly good strength in his legs and arms, hypoactive reflexes, no Babinski, and decreased sensation to midcalf level bilaterally. Cranial nerve exam unremarkable. 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 As manifest by the slow nerve conduction velocities in both lower extremities and the unobtainable sensory conductions in both arms and legs. There was no clear evidence of motor radiculopathy, or other entrapment neuropathy, or myopathy or neuromuscular junction disease. Clinical correlation recommended, and follow-up studies may be of value following this patient in 6-12 months time if clinically indicated. EMG & NCV Findings:  Evaluation of the Left Fibular motor nerve showed reduced amplitude (1.0 mV), decreased conduction velocity (B Fib-Ankle, 37 m/s), and decreased conduction velocity (Poplt-B Fib, 37 m/s). The Right Fibular motor nerve showed decreased conduction velocity (B Fib-Ankle, 33 m/s) and decreased conduction velocity (Poplt-B Fib, 34 m/s).   The Right median motor nerve showed prolonged distal onset latency (4.7 ms) and decreased conduction velocity (Elbow-Wrist, 45 m/s). The Left tibial motor and the Right tibial motor nerves showed decreased conduction velocity (Knee-Ankle, L38, R36 m/s). The Right ulnar motor nerve showed prolonged distal onset latency (3.3 ms), reduced amplitude (6.4 mV), decreased conduction velocity (B Elbow-Wrist, 49 m/s), and decreased conduction velocity (A Elbow-B Elbow, 40 m/s). The Right median sensory and the Right ulnar sensory nerves showed no response (Wrist). The Right radial sensory nerve showed prolonged distal peak latency (3.1 ms). The Left Sup Fibular sensory and the Right Sup Fibular sensory nerves showed no response (Lower leg). The Left sural sensory and the Right sural sensory nerves showed no response (Calf).         Nerve Conduction Studies  Anti Sensory Summary Table     Site NR Peak (ms) Norm Peak (ms) P-T Amp (µV) Norm O-P Amp Site1 Site2 Dist (cm)   Right Median Anti Sensory (2nd Digit)  31.2°C   Wrist NR  <4  >13 Wrist 2nd Digit 14.0   Right Radial Anti Sensory (Base 1st Digit)  31.4°C   Wrist    3.1 <2.8 34.1 >11 Wrist Base 1st Digit 10.0   Left Sup Fibular Anti Sensory (Lat ankle)  29.5°C   Lower leg NR  <4.6  >4 Lower leg Lat ankle 10.0   Right Sup Fibular Anti Sensory (Lat ankle)  29.2°C   Lower leg NR  <4.6  >4 Lower leg Lat ankle 10.0   Left Sural Anti Sensory (Lat Mall)  28.7°C   Calf NR  <4.5  >4.0 Calf Lat Mall 14.0   Right Sural Anti Sensory (Lat Mall)  28.9°C   Calf NR  <4.5  >4.0 Calf Lat Mall 14.0   Right Ulnar Anti Sensory (5th Digit)  31°C   Wrist NR  <4.0  >9 Wrist 5th Digit 14.0     Motor Summary Table     Site NR Onset (ms) Norm Onset (ms) O-P Amp (mV) Norm O-P Amp P-T Amp (mV) Site1 Site2 Dist (cm) Larry (m/s)   Left Fibular Motor (Ext Dig Brev)  29.4°C   Ankle    6.4 <6.5 1.0 >1.1 1.9 Ankle Ext Dig Brev 8.0    B Fib    14.6  0.9  1.5 B Fib Ankle 30.0 37   Poplt    17.3  0.8  1.6 Poplt B Fib 10.0 37   Right Fibular Motor (Ext Dig Brev)  27.2°C   Ankle    6.1 <6.5 1.5 >1.1 2.3 Ankle Ext Dig Brev 8.0    B Fib    15.3  1.3  2.3 B Fib Ankle 30.5 33   Poplt    18.2  1.1  1.9 Poplt B Fib 10.0 34   Right Median Motor (Abd Poll Brev)  31.1°C   Wrist    4.7 <4.5 7.3 >4.1 11.1 Wrist Abd Poll Brev 8.0    Elbow    10.2  6.6  9.9 Elbow Wrist 25.0 45   Left Tibial Motor (Abd Elder Brev)  31.4°C   Ankle    5.3 <6.1 1.7 >1.1 2.7 Ankle Abd Elder Brev 8.0    Knee    16.5  1.6  2.5 Knee Ankle 42.5 38   Right Tibial Motor (Abd Elder Brev)  29.7°C   Ankle    5.1 <6.1 1.7 >1.1 2.5 Ankle Abd Elder Brev 8.0    Knee    16.0  1.4  2.1 Knee Ankle 39.5 36   Right Ulnar Motor (Abd Dig Minimi)  31.9°C   Wrist    3.3 <3.1 6.4 >7.0 11.6 Wrist Abd Dig Minimi 8.0    B Elbow    7.6  4.6  9.6 B Elbow Wrist 21.0 49   A Elbow    10.1  4.9  9.2 A Elbow B Elbow 10.0 40     EMG     Side Muscle Nerve Root Ins Act Fibs Psw Recrt Duration Amp Poly Comment   Right AntTibialis Dp Br Peron L4-5 Nml Nml Nml Nml Nml Nml Nml    Right Ext Dig Brev Dp Br Peron L5, S1 Nml Nml Nml Reduced Incr Incr 1+    Right AbdHallucis MedPlantar S1-2 Nml Nml Nml Nml Incr Incr 1+    Right MedGastroc Tibial S1-2 Nml Nml Nml Nml Nml Nml Nml    Right VastusLat Femoral L2-4 Nml Nml Nml Nml Nml Nml Nml    Right Lower Lumb Parasp Rami L5,S1 Nml Nml Nml Nml Nml Nml Nml    Left Ext Dig Brev Dp Br Peron L5, S1 Nml Nml Nml Reduced Incr Incr 1+    Left AbdHallucis MedPlantar S1-2 Nml Nml Nml Nml Incr Incr 1+    Left AntTibialis Dp Br Peron L4-5 Nml Nml Nml Nml Nml Nml Nml    Left MedGastroc Tibial S1-2 Nml Nml Nml Nml Nml Nml Nml    Left VastusLat Femoral L2-4 Nml Nml Nml Nml Nml Nml Nml    Left Lower Lumb Parasp Rami L5,S1 Nml Nml Nml Nml Nml Nml Nml        Waveforms:                                       __________________  Leonides Goldberg, M.D.

## 2017-12-13 NOTE — PROGRESS NOTES
Chief Complaint   Patient presents with    Cholesterol Problem     6 month. pt c/o sob with activity. 1. Have you been to the ER, urgent care clinic since your last visit? Hospitalized since your last visit? No    2. Have you seen or consulted any other health care providers outside of the [address] [place] [address] since your last visit? Include any pap smears or colon screening.  No

## 2017-12-13 NOTE — PROGRESS NOTES
Craven Cogan DNP, ANP-BC  Subjective/HPI:     Khoa Sanders is a 80 y.o. male is here for routine f/u. The patient denies chest pain/ resting  shortness of breath, orthopnea, PND, LE edema, palpitations, syncope, presyncope or fatigue. Pt reports episodes of mild MCGUIRE when taking trash to the curb walking up a incline, this is unchanged for > 2 years.         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 Beny Simmons repeat 10 years    HX COLONOSCOPY  2005    HX HEART CATHETERIZATION      2  stents     HX HERNIA REPAIR  9/2008    left inguinal hernia repair by Dr. Danielito Del Valle Right     inguinal hernai repair    HX HERNIA REPAIR      umbilical hernia repair    HX HIP REPLACEMENT Right 07/06/2016    HX PROSTATECTOMY  2003    HX TONSILLECTOMY      TOTAL HIP ARTHROPLASTY Left 2/9/11    Dr. Cathy Villatoro at [address] [place] [address]     No Known Allergies   Family History   Problem Relation Age of Onset    Cancer Mother      Breast cancer    Cancer Father      Bladder cancer    Lung Disease Father      Emphysema    Hypertension Sister      2015      Current Outpatient Prescriptions   Medication Sig    meclizine (ANTIVERT) 25 mg tablet Take 1 Tab by mouth three (3) times daily as needed for up to 10 days.  FLUZONE HIGH-DOSE 2017-18, PF, syrg injection TO BE ADMINISTERED BY PHARMACIST FOR IMMUNIZATION    metoprolol succinate (TOPROL-XL) 25 mg XL tablet TAKE 1/2 TABLET BY MOUTH EVERY DAY    lisinopril (PRINIVIL, ZESTRIL) 2.5 mg tablet Take 1 Tab by mouth daily. TAKE 1 TABLET BY MOUTH EVERY DAY    albuterol (PROVENTIL HFA, VENTOLIN HFA, PROAIR HFA) 90 mcg/actuation inhaler Take 2 Puffs by inhalation every four (4) hours as needed for Wheezing.  simvastatin (ZOCOR) 20 mg tablet TAKE 1 TABLET BY MOUTH NIGHTLY    tiotropium bromide (SPIRIVA RESPIMAT) 2.5 mcg/actuation inhaler Take 2 Puffs by inhalation daily.  coenzyme q10 (CO Q-10) 10 mg cap Take  by mouth.  cinnamon bark (CINNAMON) 500 mg cap Take  by mouth.  metFORMIN ER (GLUCOPHAGE XR) 500 mg tablet Take 3 Tabs by mouth daily (with dinner). (Patient taking differently: Take 500 mg by mouth three (3) times daily.)    glucose blood VI test strips (ONETOUCH ULTRA TEST) strip by Does Not Apply route Daily (before breakfast). E11.9    glipiZIDE (GLUCOTROL) 5 mg tablet TAKE 1 TABLET BY MOUTH EVERY DAY    MAGNESIUM PO Take  by mouth daily.  acetaminophen (TYLENOL) 500 mg tablet Take 1 tablet by mouth every eight (8) hours as needed for Pain.  CHOLECALCIFEROL, VITAMIN D3, (VITAMIN D3 PO) Take 1,000 Units by mouth daily.  multivitamins-minerals-lutein (CENTRUM SILVER) Tab Take 1 Tab by mouth daily.  fexofenadine (ALLEGRA) 180 mg tablet Take 1 Tab by mouth daily.  famotidine (PEPCID AC) 20 mg tablet Take 20 mg by mouth daily.  aspirin (ASPIRIN) 325 mg tablet Take 325 mg by mouth daily.  VITAMIN B COMPLEX (B COMPLEX PO) Take 1 Tab by mouth daily.  fluticasone-salmeterol (ADVAIR DISKUS) 250-50 mcg/Dose diskus inhaler Take 1 Puff by inhalation every twelve (12) hours.  ASCORBIC ACID (VITAMIN C PO) Take 1,000 mg by mouth daily.     cephALEXin (KEFLEX) 500 mg capsule     predniSONE (DELTASONE) 10 mg tablet SEE ATTACHMENT    guaiFENesin (ORGANIDIN) 400 mg tablet Take 1 Tab by mouth every four (4) hours as needed for Congestion. No current facility-administered medications for this visit. Vitals:    12/13/17 1142 12/13/17 1143   BP: 122/70 130/70   Pulse: 68    Resp: 18    SpO2: 92%    Weight: 184 lb (83.5 kg)    Height: 5' 9\" (1.753 m)      Social History     Social History    Marital status:      Spouse name: N/A    Number of children: N/A    Years of education: N/A     Occupational History    Not on file. Social History Main Topics    Smoking status: Former Smoker     Packs/day: 3.00     Years: 10.00     Types: Cigarettes     Quit date: 1/15/1970    Smokeless tobacco: Never Used    Alcohol use No    Drug use: Yes     Special: Prescription, OTC    Sexual activity: Yes     Partners: Female     Birth control/ protection: None     Other Topics Concern    Not on file     Social History Narrative       I have reviewed the nurses notes, vitals, problem list, allergy list, medical history, family, social history and medications. Review of Symptoms:    General: Pt denies excessive weight gain or loss. Pt is able to conduct ADL's  HEENT: Denies blurred vision, headaches, epistaxis and difficulty swallowing. Respiratory: Denies shortness of breath, + mild  MCGUIRE, no  wheezing or stridor. Cardiovascular: Denies precordial pain, palpitations, edema or PND  Gastrointestinal: Denies poor appetite, indigestion, abdominal pain or blood in stool  Musculoskeletal: Denies pain or swelling from muscles or joints  Neurologic: Denies tremor, paresthesias, or sensory motor disturbance  Skin: Denies rash, itching or texture change. Physical Exam:      General: Well developed, in no acute distress, cooperative and alert  HEENT: No carotid bruits, no JVD, trach is midline. Neck Supple, PEERL, EOM intact. Heart:  Normal S1/S2 negative S3 or S4.  Regular, no murmur, gallop or rub.   Respiratory: Diminished bilaterally at the bases   Abdomen:   Soft, non-tender, no masses, bowel sounds are active.   Extremities:  No edema, normal cap refill, no cyanosis, atraumatic. Neuro: A&Ox3, speech clear, gait stable. Skin: Skin color is normal. No rashes or lesions. Non diaphoretic  Vascular: 2+ pulses symmetric in all extremities    Cardiographics    ECG: Sinus   Results for orders placed or performed during the hospital encounter of 01/07/17   EKG, 12 LEAD, INITIAL   Result Value Ref Range    Ventricular Rate 67 BPM    Atrial Rate 67 BPM    P-R Interval 196 ms    QRS Duration 88 ms    Q-T Interval 386 ms    QTC Calculation (Bezet) 407 ms    Calculated P Axis 83 degrees    Calculated R Axis 45 degrees    Calculated T Axis 11 degrees    Diagnosis       Normal sinus rhythm  Normal ECG  When compared with ECG of 08-AUG-2014 02:14,  No significant change was found  Confirmed by Brandon Barroso ([id number]) on 1/7/2017 3:49:25 PM           Cardiology Labs:  Lab Results   Component Value Date/Time    Cholesterol, total 141 12/13/2017 08:22 AM    HDL Cholesterol 44 12/13/2017 08:22 AM    LDL, calculated 72 12/13/2017 08:22 AM    Triglyceride 126 12/13/2017 08:22 AM    CHOL/HDL Ratio 3.0 05/05/2010 09:01 AM       Lab Results   Component Value Date/Time    Sodium 141 12/13/2017 08:22 AM    Potassium 4.5 12/13/2017 08:22 AM    Chloride 100 12/13/2017 08:22 AM    CO2 24 12/13/2017 08:22 AM    Anion gap 10 07/07/2016 05:11 AM    Glucose 124 12/13/2017 08:22 AM    BUN 21 12/13/2017 08:22 AM    Creatinine 1.14 12/13/2017 08:22 AM    BUN/Creatinine ratio 18 12/13/2017 08:22 AM    GFR est AA 67 12/13/2017 08:22 AM    GFR est non-AA 58 12/13/2017 08:22 AM    Calcium 9.3 12/13/2017 08:22 AM    Bilirubin, total 0.4 12/13/2017 08:22 AM    AST (SGOT) 24 12/13/2017 08:22 AM    Alk.  phosphatase 70 12/13/2017 08:22 AM    Protein, total 6.4 12/13/2017 08:22 AM    Albumin 4.3 12/13/2017 08:22 AM    Globulin 3.7 06/21/2016 09:18 AM    A-G Ratio 2.0 12/13/2017 08:22 AM    ALT (SGPT) 23 12/13/2017 08:22 AM           Assessment:     Assessment:     Diagnoses and all orders for this visit:    1. Pure hypercholesterolemia  -     AMB POC EKG ROUTINE W/ 12 LEADS, INTER & REP    2. Controlled type 2 diabetes mellitus without complication, unspecified long term insulin use status (HCC)  -     lisinopril (PRINIVIL, ZESTRIL) 2.5 mg tablet; Take 1 Tab by mouth daily. TAKE 1 TABLET BY MOUTH EVERY DAY    3. Coronary artery disease involving native coronary artery of native heart without angina pectoris  -     lisinopril (PRINIVIL, ZESTRIL) 2.5 mg tablet; Take 1 Tab by mouth daily. TAKE 1 TABLET BY MOUTH EVERY DAY    Other orders  -     metoprolol succinate (TOPROL-XL) 25 mg XL tablet; TAKE 1/2 TABLET BY MOUTH EVERY DAY        ICD-10-CM ICD-9-CM    1. Pure hypercholesterolemia E78.00 272.0 AMB POC EKG ROUTINE W/ 12 LEADS, INTER & REP   2. Controlled type 2 diabetes mellitus without complication, unspecified long term insulin use status (HCC) E11.9 250.00 lisinopril (PRINIVIL, ZESTRIL) 2.5 mg tablet   3. Coronary artery disease involving native coronary artery of native heart without angina pectoris I25.10 414.01 lisinopril (PRINIVIL, ZESTRIL) 2.5 mg tablet     Orders Placed This Encounter    AMB POC EKG ROUTINE W/ 12 LEADS, INTER & REP     Order Specific Question:   Reason for Exam:     Answer:   routine    cephALEXin (KEFLEX) 500 mg capsule    FLUZONE HIGH-DOSE 2017-18, PF, syrg injection     Sig: TO BE ADMINISTERED BY PHARMACIST FOR IMMUNIZATION     Refill:  0    predniSONE (DELTASONE) 10 mg tablet     Sig: SEE ATTACHMENT    DISCONTD: glipiZIDE (GLUCOTROL) 5 mg tablet     Sig: Take 5 mg by mouth.  DISCONTD: metFORMIN ER (GLUCOPHAGE XR) 500 mg tablet     Sig: TAKE 3 TABLETS BY MOUTH EVERY DAY WITH DINNER    DISCONTD: metoprolol succinate (TOPROL-XL) 25 mg XL tablet     Sig: Take 12.5 mg by mouth.  DISCONTD: simvastatin (ZOCOR) 20 mg tablet     Sig: Take 20 mg by mouth.     metoprolol succinate (TOPROL-XL) 25 mg XL tablet     Sig: TAKE 1/2 TABLET BY MOUTH EVERY DAY     Dispense:  45 Tab     Refill:  3    lisinopril (PRINIVIL, ZESTRIL) 2.5 mg tablet     Sig: Take 1 Tab by mouth daily. TAKE 1 TABLET BY MOUTH EVERY DAY     Dispense:  90 Tab     Refill:  3        Plan:     Patient presents with mild dyspnea on exertion unchanged from previous tracings. Hypertension controlled continue current medications hyperlipidemia, lipids per primary care. Follow-up in 6 months    Ozzie Sparks MD    This note was created using voice recognition software. Despite editing, there may be syntax errors.

## 2017-12-13 NOTE — MR AVS SNAPSHOT
Visit Information Date & Time Provider Department Dept. Phone Encounter #  
 12/13/2017 11:30 AM Logan Severino, [address] [facility] [phone] [id number] Your Appointments 1/23/2018 11:30 AM  
Follow Up with Slim Anne NP Neurology Clinic at [facility]) Appt Note: f/u Ataxia, jrb 10/24/17  
 [address] [place] [address], 
[address], [address] [phone]  
[address], [address], [address] [address]  
  
    
 1/31/2018 10:00 AM  
ROUTINE CARE with Ean Dee MD  
[facility]) Appt Note: 6mo f/u  
 [address] [facility] [address]  
[address] [address] [address] [address]  
  
    
 8/1/2018 10:30 AM  
Medicare Physical with Ean Dee MD  
[facility]) Appt Note: medicare wellness [address] [facility] [address]  
[address] [address] [address] Upcoming Health Maintenance Date Due ZOSTER VACCINE AGE 60> 10/18/1991 MICROALBUMIN Q1 11/14/2017 HEMOGLOBIN A1C Q6M 4/24/2018 FOOT EXAM Q1 7/31/2018 MEDICARE YEARLY EXAM 8/1/2018 LIPID PANEL Q1 8/7/2018 EYE EXAM RETINAL OR DILATED Q1 10/11/2018 GLAUCOMA SCREENING Q2Y 10/11/2019 DTaP/Tdap/Td series (2 - Td) 10/15/2026 Allergies as of 12/13/2017  Review Complete On: 12/13/2017 By: Chelsea Couch NP No Known Allergies Current Immunizations  Reviewed on 10/9/2017 Name Date Influenza High Dose Vaccine PF 10/6/2017, 11/2/2016 Influenza Vaccine 10/27/2014, 10/1/2013 Influenza Vaccine (Quad) PF 10/12/2015 Influenza Vaccine Split 10/31/2011, 10/15/2010 Pneumococcal Conjugate (PCV-13) 4/1/2015  Rabies Immune Globulin 10/15/2016 11:44 PM  
 Rabies Vaccine IM 10/29/2016  3:14 PM, 10/22/2016  3:02 PM, 10/18/2016  1:41 PM, 10/15/2016 11:00 PM  
 TD Vaccine 10/30/2012 Tdap 10/15/2016 10:48 PM  
 ZZZ-RETIRED (DO NOT USE) Pneumococcal Vaccine (Unspecified Type) 4/1/2004, 10/1/1998 Not reviewed this visit You Were Diagnosed With   
  
 Codes Comments Pure hypercholesterolemia    -  Primary ICD-10-CM: E78.00 ICD-9-CM: 272.0 Controlled type 2 diabetes mellitus without complication, unspecified long term insulin use status ([facility]     ICD-10-CM: E11.9 ICD-9-CM: 250.00 Coronary artery disease involving native coronary artery of native heart without angina pectoris     ICD-10-CM: I25.10 ICD-9-CM: 414.01 Vitals BP Pulse Resp Height(growth percentile) Weight(growth percentile) SpO2  
 130/70 (BP 1 Location: Right arm, BP Patient Position: Sitting) 68 18 5' 9\" (1.753 m) 184 lb (83.5 kg) 92% BMI Smoking Status 27.17 kg/m2 Former Smoker Vitals History BMI and BSA Data Body Mass Index Body Surface Area  
 27.17 kg/m 2 2.02 m 2 Preferred Pharmacy Pharmacy Name Phone [id number] [address] [facility] AT [address] [place] [phone] Your Updated Medication List  
  
   
This list is accurate as of: 12/13/17 12:21 PM.  Always use your most recent med list.  
  
  
  
  
 acetaminophen 500 mg tablet Commonly known as:  TYLENOL Take 1 tablet by mouth every eight (8) hours as needed for Pain. ADVAIR DISKUS 250-50 mcg/dose diskus inhaler Generic drug:  fluticasone-salmeterol Take 1 Puff by inhalation every twelve (12) hours. albuterol 90 mcg/actuation inhaler Commonly known as:  PROVENTIL HFA, VENTOLIN HFA, PROAIR HFA Take 2 Puffs by inhalation every four (4) hours as needed for Wheezing. aspirin 325 mg tablet Commonly known as:  ASPIRIN Take 325 mg by mouth daily. azithromycin 250 mg tablet Commonly known as:  Bennetta Plum Take 1 Tab by mouth See Admin Instructions for 5 days. B COMPLEX PO Take 1 Tab by mouth daily. CENTRUM SILVER Tab tablet Generic drug:  multivitamins-minerals-lutein Take 1 Tab by mouth daily. cephALEXin 500 mg capsule Commonly known as:  KEFLEX  
  
 CINNAMON 500 mg Cap Generic drug:  cinnamon bark Take  by mouth. CO Q-10 10 mg Cap Generic drug:  coenzyme q10 Take  by mouth. fexofenadine 180 mg tablet Commonly known as:  Worley Holstein Take 1 Tab by mouth daily. FLUZONE HIGH-DOSE 2017-18 (PF) Syrg injection Generic drug:  influenza vaccine 2017-18 (65 yrs+)(PF)  
TO BE ADMINISTERED BY PHARMACIST FOR IMMUNIZATION  
  
 glipiZIDE 5 mg tablet Commonly known as:  GLUCOTROL  
TAKE 1 TABLET BY MOUTH EVERY DAY  
  
 glucose blood VI test strips strip Commonly known as:  ONETOUCH ULTRA TEST  
by Does Not Apply route Daily (before breakfast). E11.9  
  
 guaiFENesin 400 mg tablet Commonly known as:  Silvana Force Take 1 Tab by mouth every four (4) hours as needed for Congestion. lisinopril 2.5 mg tablet Commonly known as:  Alexandra Shames Take 1 Tab by mouth daily. TAKE 1 TABLET BY MOUTH EVERY DAY  
  
 MAGNESIUM PO Take  by mouth daily. meclizine 25 mg tablet Commonly known as:  ANTIVERT Take 1 Tab by mouth three (3) times daily as needed for up to 10 days. metFORMIN  mg tablet Commonly known as:  GLUCOPHAGE XR Take 3 Tabs by mouth daily (with dinner). metoprolol succinate 25 mg XL tablet Commonly known as:  TOPROL-XL  
TAKE 1/2 TABLET BY MOUTH EVERY DAY  
  
 PEPCID AC 20 mg tablet Generic drug:  famotidine Take 20 mg by mouth daily. predniSONE 10 mg tablet Commonly known as:  DELTASONE  
SEE ATTACHMENT  
  
 simvastatin 20 mg tablet Commonly known as:  ZOCOR  
TAKE 1 TABLET BY MOUTH NIGHTLY SPIRIVA RESPIMAT 2.5 mcg/actuation inhaler Generic drug:  tiotropium bromide Take 2 Puffs by inhalation daily. VITAMIN C PO Take 1,000 mg by mouth daily. VITAMIN D3 PO Take 1,000 Units by mouth daily. Prescriptions Printed Refills  
 metoprolol succinate (TOPROL-XL) 25 mg XL tablet 3 Sig: TAKE 1/2 TABLET BY MOUTH EVERY DAY Class: Print  
 lisinopril (PRINIVIL, ZESTRIL) 2.5 mg tablet 3 Sig: Take 1 Tab by mouth daily. TAKE 1 TABLET BY MOUTH EVERY DAY Class: Print Route: Oral  
  
We Performed the Following AMB POC EKG ROUTINE W/ 12 LEADS, INTER & REP [42356 CPT(R)] Introducing hospitals & [facility] SERVICES! Dear Heide Real: Thank you for requesting a [employer] account. Our records indicate that you already have an active [employer] account. You can access your account anytime at [email or web address] Did you know that you can access your hospital and ER discharge instructions at any time in [employer]? You can also review all of your test results from your hospital stay or ER visit. Additional Information If you have questions, please visit the Frequently Asked Questions section of the [employer] website at [email or web address] Remember, [employer] is NOT to be used for urgent needs. For medical emergencies, dial 911. Now available from your iPhone and Android! Please provide this summary of care documentation to your next provider. Your primary care clinician is listed as Luis [phone] If you have any questions after today's visit, please call [phone].

## 2017-12-14 LAB
ALBUMIN SERPL-MCNC: 4.3 G/DL (ref 3.5–4.7)
ALBUMIN/CREAT UR: <3.6 MG/G CREAT (ref 0–30)
ALBUMIN/GLOB SERPL: 2 {RATIO} (ref 1.2–2.2)
ALP SERPL-CCNC: 70 IU/L (ref 39–117)
ALT SERPL-CCNC: 23 IU/L (ref 0–44)
AST SERPL-CCNC: 24 IU/L (ref 0–40)
BILIRUB SERPL-MCNC: 0.4 MG/DL (ref 0–1.2)
BUN SERPL-MCNC: 21 MG/DL (ref 8–27)
BUN/CREAT SERPL: 18 (ref 10–24)
CALCIUM SERPL-MCNC: 9.3 MG/DL (ref 8.6–10.2)
CHLORIDE SERPL-SCNC: 100 MMOL/L (ref 96–106)
CHOLEST SERPL-MCNC: 141 MG/DL (ref 100–199)
CO2 SERPL-SCNC: 24 MMOL/L (ref 18–29)
CREAT SERPL-MCNC: 1.14 MG/DL (ref 0.76–1.27)
CREAT UR-MCNC: 83.8 MG/DL
GFR SERPLBLD CREATININE-BSD FMLA CKD-EPI: 58 ML/MIN/1.73
GFR SERPLBLD CREATININE-BSD FMLA CKD-EPI: 67 ML/MIN/1.73
GLOBULIN SER CALC-MCNC: 2.1 G/DL (ref 1.5–4.5)
GLUCOSE SERPL-MCNC: 124 MG/DL (ref 65–99)
HDLC SERPL-MCNC: 44 MG/DL
LDLC SERPL CALC-MCNC: 72 MG/DL (ref 0–99)
MICROALBUMIN UR-MCNC: <3 UG/ML
POTASSIUM SERPL-SCNC: 4.5 MMOL/L (ref 3.5–5.2)
PROT SERPL-MCNC: 6.4 G/DL (ref 6–8.5)
SODIUM SERPL-SCNC: 141 MMOL/L (ref 134–144)
TRIGL SERPL-MCNC: 126 MG/DL (ref 0–149)
VLDLC SERPL CALC-MCNC: 25 MG/DL (ref 5–40)

## 2017-12-22 PROBLEM — E11.21 TYPE 2 DIABETES MELLITUS WITH NEPHROPATHY (HCC): Status: ACTIVE | Noted: 2017-12-22

## 2018-01-09 RX ORDER — METFORMIN HYDROCHLORIDE 500 MG/1
1500 TABLET, EXTENDED RELEASE ORAL
Qty: 270 TAB | Refills: 1 | Status: SHIPPED | OUTPATIENT
Start: 2018-01-09 | End: 2018-07-03 | Stop reason: SDUPTHER

## 2018-01-09 NOTE — TELEPHONE ENCOUNTER
Orders Placed This Encounter    metFORMIN ER (GLUCOPHAGE XR) 500 mg tablet     Sig: Take 3 Tabs by mouth daily (with dinner).      Dispense:  270 Tab     Refill:  1

## 2018-01-09 NOTE — TELEPHONE ENCOUNTER
From: Vanessa Plummer. To: Vahe Pederson MD  Sent: 1/9/2018 9:31 AM EST  Subject: Medication Renewal Request    Original authorizing provider: MD Vanessa Kathleen. would like a refill of the following medications:  metFORMIN ER (GLUCOPHAGE XR) 500 mg tablet Vahe Pederson MD]    Preferred pharmacy: [facility]/PHARMACY #[id number]  Cindy Smalls, [address] [place] [address] AT CORNER OF [facility] [address]    Comment:

## 2018-01-23 ENCOUNTER — OFFICE VISIT (OUTPATIENT)
Dept: NEUROLOGY | Age: 83
End: 2018-01-23

## 2018-01-23 ENCOUNTER — TELEPHONE (OUTPATIENT)
Dept: NEUROLOGY | Age: 83
End: 2018-01-23

## 2018-01-23 VITALS
BODY MASS INDEX: 27.99 KG/M2 | OXYGEN SATURATION: 94 % | HEART RATE: 57 BPM | SYSTOLIC BLOOD PRESSURE: 102 MMHG | DIASTOLIC BLOOD PRESSURE: 78 MMHG | HEIGHT: 69 IN | WEIGHT: 189 LBS

## 2018-01-23 DIAGNOSIS — E11.42 DIABETIC PERIPHERAL NEUROPATHY ASSOCIATED WITH TYPE 2 DIABETES MELLITUS (HCC): Primary | ICD-10-CM

## 2018-01-23 DIAGNOSIS — R27.8 SENSORY ATAXIA: ICD-10-CM
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
## 2018-01-23 DIAGNOSIS — H49.11 FOURTH NERVE PALSY OF RIGHT EYE: ICD-10-CM

## 2018-01-23 DIAGNOSIS — G25.0 ESSENTIAL TREMOR: ICD-10-CM

## 2018-01-23 DIAGNOSIS — H53.2 DIPLOPIA: ICD-10-CM

## 2018-01-23 DIAGNOSIS — R26.9 GAIT DIFFICULTY: ICD-10-CM

## 2018-01-23 NOTE — TELEPHONE ENCOUNTER
Jose D Pacheco, Ms. Mika Jerome stopped on her way out and said to tell you to fax a copy of todays note to her pcp and also sheltering arms therapist. Thanks.

## 2018-01-23 NOTE — PROGRESS NOTES
Date:             2018    Name:  Dante Chao. :  1931  MRN:  [id number]     PCP:  Jacki uDncan MD    Chief Complaint   Patient presents with    Follow-up    Neurologic Problem         HISTORY OF PRESENT ILLNESS:   Richard Wright is a 80 y.o., male who presents today for follow up for gait difficulty and falls. MRI of cervical spine showed mild to moderate C2-C3 stenosis, no other significant structural issue. MRI of brain showed remote left parietal posterior infarction, he is on aspirin. A1c 6.7 and he has a long history of diabetes, first diagnosed in  after TIA, all other labs normal.  EMG/NCS suggestive of mild to moderate length dependent neuropathy consistent with known diabetes, he denies pain in his feet and does not think that he has numbness. His wife thinks that he is walking a little better with therapy, he does not. He has not fallen since November. He started to fall backwards going up the steps, self corrected and fell backwards hitting his head on the concrete. Another fall was because he bent over to pick something up and fell backwards when he stood up. He cannot focus, when he looks at something it will take him awhile to see it. He has exercises from PT for that. He has seen an opthalmologist and testing was normal, but  may have nerve palsy of oculomotor nerve causing diplopia and nystagmus. He thinks that vision is worse on the left side, but has double vision in both eyes. He wears prisms since cataract surgery, vision is a little better with those. He just started PT again earlier this year. He sleeps well, he does not snore. He notices tremor in his hands when he is using them, never at rest. The room spins when he is changing directions, it takes awhile for his eyes to focus when he moves them horizontally. After a few seconds, his eyes will adjust, but this might cause his to feel unsteady.  He saw an ENT who does not think that he has vertigo. 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 As manifest by the slow nerve conduction velocities in both lower extremities and the unobtainable sensory conductions in both arms and legs. There was no clear evidence of motor radiculopathy, or other entrapment neuropathy, or myopathy or neuromuscular junction disease. Clinical correlation recommended, and follow-up studies may be of value following this patient in 6-12 months time if clinically indicated    10.24.2017 tasha Delgado is a 80 y.o. right handed  male seen as a new patient today for evaluation of a new problem over the last 6 months, of progressive gait difficulty, at the request of Dr. Elsie Leblanc. The patient had a CT scan of the head January 7, 2017 after he had a fall, that was normal, except for a small scalp hematoma but nothing intracranially was found. He has gone to physical therapy with some improvement in his gait according to his wife. He wants to come here to be evaluated first and does not like the physical therapy. He just seems to start falling and is not able to stop, and frequently will trip or try to lift something and then lose his balance. He is being a little more careful now and the falls are quite as frequent. He just feels more unsteady and off balance in addition. He does not really complain of that much neck pain or back pain or major increase in headaches. He has no family history of similar disease. He does have some urinary urgency but is able to control his bowels and bladder. He is diabetic for 20 years, but does not complain of much burning or numbness in his feet.   He denies any major increase in headache, fever, meningismus, toxin exposure, and other than the fall as mentioned above no unusual head injury. He had a previous cervical MRI scan done years ago that showed moderate spondylosis, but no significant cord compression in 2010. He had a MRI of his lumbar spine that does show multilevel degenerative changes with moderate spinal stenosis at several levels, but was not thought to be a neurosurgical problem in 2009 when he had that done by his neurosurgeon. He complains that his legs feel a little weak, but no focal weakness, but may be his right leg is a little worse. He seems to have fairly good strength and feeling in his arms. We are asked to evaluate. He also gives an unusual history of his eyes jumping some after his second cataract surgery, and was told by Dr. Ramon Blair that it may well be partly hereditary. But it did not occur until after his second cataract operation, and seems to be worse at nighttime, and the images are more [id number] but sometimes with a skew deviation to. Current Outpatient Prescriptions   Medication Sig    metFORMIN ER (GLUCOPHAGE XR) 500 mg tablet Take 3 Tabs by mouth daily (with dinner).  cephALEXin (KEFLEX) 500 mg capsule     FLUZONE HIGH-DOSE 2017-18, PF, syrg injection TO BE ADMINISTERED BY PHARMACIST FOR IMMUNIZATION    predniSONE (DELTASONE) 10 mg tablet SEE ATTACHMENT    metoprolol succinate (TOPROL-XL) 25 mg XL tablet TAKE 1/2 TABLET BY MOUTH EVERY DAY    lisinopril (PRINIVIL, ZESTRIL) 2.5 mg tablet Take 1 Tab by mouth daily. TAKE 1 TABLET BY MOUTH EVERY DAY    guaiFENesin (ORGANIDIN) 400 mg tablet Take 1 Tab by mouth every four (4) hours as needed for Congestion.  albuterol (PROVENTIL HFA, VENTOLIN HFA, PROAIR HFA) 90 mcg/actuation inhaler Take 2 Puffs by inhalation every four (4) hours as needed for Wheezing.  simvastatin (ZOCOR) 20 mg tablet TAKE 1 TABLET BY MOUTH NIGHTLY    tiotropium bromide (SPIRIVA RESPIMAT) 2.5 mcg/actuation inhaler Take 2 Puffs by inhalation daily.  coenzyme q10 (CO Q-10) 10 mg cap Take  by mouth.  cinnamon bark (CINNAMON) 500 mg cap Take  by mouth.  glucose blood VI test strips (ONETOUCH ULTRA TEST) strip by Does Not Apply route Daily (before breakfast). E11.9    glipiZIDE (GLUCOTROL) 5 mg tablet TAKE 1 TABLET BY MOUTH EVERY DAY    MAGNESIUM PO Take  by mouth daily.  acetaminophen (TYLENOL) 500 mg tablet Take 1 tablet by mouth every eight (8) hours as needed for Pain.  CHOLECALCIFEROL, VITAMIN D3, (VITAMIN D3 PO) Take 1,000 Units by mouth daily.  multivitamins-minerals-lutein (CENTRUM SILVER) Tab Take 1 Tab by mouth daily.  fexofenadine (ALLEGRA) 180 mg tablet Take 1 Tab by mouth daily.  famotidine (PEPCID AC) 20 mg tablet Take 20 mg by mouth daily.  aspirin (ASPIRIN) 325 mg tablet Take 325 mg by mouth daily.  VITAMIN B COMPLEX (B COMPLEX PO) Take 1 Tab by mouth daily.  fluticasone-salmeterol (ADVAIR DISKUS) 250-50 mcg/Dose diskus inhaler Take 1 Puff by inhalation every twelve (12) hours.  ASCORBIC ACID (VITAMIN C PO) Take 1,000 mg by mouth daily. No current facility-administered medications for this visit.       No Known Allergies  Past Medical History:   Diagnosis Date    Arrhythmia     irregular heart beat    Asthma     CAD (coronary artery disease)     Chronic hip pain     COPD (chronic obstructive pulmonary disease) ([address] 9/10/2010    Diabetes (HCC)     DJD (degenerative joint disease)     GERD (gastroesophageal reflux disease)     HNP (herniated nucleus pulposus) 6/2006    lumbar     Hypercholesterolemia     Hypertension     Nausea & vomiting     Prostate cancer ([address] 2003    Reversible ischemic neurologic deficit ([address] 1990    suspected with no residual effects and no recurrance    TIA (transient ischemic attack)     TIA- no deficiets     Past Surgical History:   Procedure Laterality Date    COLONOSCOPY  12/15/2004    Diverticulosis Dr. Mauro Bran repeat 10 years    HX COLONOSCOPY  2005   [facility] CATHETERIZATION      2  stents     HX HERNIA REPAIR  2008    left inguinal hernia repair by Dr. Geri Dwyer Right     inguinal hernai repair    HX HERNIA REPAIR      umbilical hernia repair    HX HIP REPLACEMENT Right 2016    HX PROSTATECTOMY  2003    HX TONSILLECTOMY      TOTAL HIP ARTHROPLASTY Left 11    Dr. Aguilar  at [facility] Marital status:      Spouse name: N/A    Number of children: N/A    Years of education: N/A     Occupational History    Not on file. Social History Main Topics    Smoking status: Former Smoker     Packs/day: 3.00     Years: 10.00     Types: Cigarettes     Quit date: 1/15/1970    Smokeless tobacco: Never Used    Alcohol use No    Drug use: Yes     Special: Prescription, OTC    Sexual activity: Yes     Partners: Female     Birth control/ protection: None     Other Topics Concern    Not on file     Social History Narrative     Family History   Problem Relation Age of Onset   [facility] Cancer Mother      Breast cancer    Cancer Father      Bladder cancer    Lung Disease Father      Emphysema    Hypertension Sister               PHYSICAL EXAMINATION:    Visit Vitals    /78    Pulse (!) 57    Ht 5' 9\" (1.753 m)    Wt 189 lb (85.7 kg)    SpO2 94%    BMI 27.91 kg/m2     General:  Well defined, nourished, and groomed individual in no acute distress. Neck: Supple, nontender, no bruits, no pain with resistance to active range of motion. Heart: Regular rate and rhythm, no murmurs, rub, or gallop. Normal S1S2. Lungs:  Clear to auscultation bilaterally with equal chest expansion, no cough, no wheeze  Musculoskeletal:  Extremities revealed no edema and had full range of motion of joints. Psych:  Good mood and bright affect    NEUROLOGICAL EXAMINATION:     Mental Status:   Alert and oriented to person, place, and time with recent and remote memory intact.   Attention span and concentration are normal. Speech is fluent with a full fund of knowledge. Cranial Nerves:    II, III, IV, VI:  Visual acuity grossly intact. Pupils are equal, round, and reactive to light. Extra-ocular movements are full and fluid. No ptosis, horizontal nystagmus worse in the right eye  V-XII: Hearing is grossly intact. Facial features are symmetric, with normal sensation and strength. The palate rises symmetrically and the tongue protrudes midline. Sternocleidomastoids 5/5. Motor Examination: Normal tone, bulk, and strength, 5/5 muscle strength throughout. No cogwheel rigidity. Sensory: Diminished to temp in bilateral stocking glove pattern, absent proprioception vibration in both feet  Coordination:  Finger to nose testing was normal.   No resting or intention tremor. No bradykinesia  Gait and Station:  Steady while walking, narrow based gait. Normal arm swing. +Rhomberg. No pronator drift. No muscle wasting or fasciculations noted. ASSESSMENT AND PLAN    ICD-10-CM ICD-9-CM    1. Diabetic peripheral neuropathy associated with type 2 diabetes mellitus (HCC) E11.42 250.60      357.2    2. Sensory ataxia R27.8 781.3    3. 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 Essential tremor G25.0 333.1    5. Diplopia H53.2 368.2    6. Fall, subsequent encounter W19. XXXD V58.89      E888.9    7. Gait difficulty R26.9 80.4      80year-old male seen in follow-up for gait difficulty, falls. This has improved with physical therapy. EMG was suggestive of positive Romberg and absent proprioception in his feet. Diabetic neuropathy, as was his exam today with he also has nystagmus and diplopia consistent with cranial nerve palsy that is likely contributing to gait difficulty. MRI of brain unremarkable aside from remote stroke, MRI cervical spine showed mild to moderate cord stenosis. Also has essential tremor, mild and not terribly bothersome.   No evidence of Parkinson's or PSP on exam.    1.  Continue tight control blood sugar to prevent worsening of neuropathy  2. Will not add medication for tremor at this time  3. Lengthy discussion of multiple factors affecting his gait, but there is really not any additional treatment required. He should continue exercises provided by PT for fall prevention, and may want to use a cane when he is on uneven ground due to sensory ataxia to prevent falls      Follow-up in 12 months at his request, call sooner with concerns  Kacy Listen NP    This note was created using voice recognition software. Despite editing, there may be syntax errors.

## 2018-01-23 NOTE — MR AVS SNAPSHOT
[address] [facility], 
[address], [address] [facility] 
[phone] Patient: Nat Jensen. MRN:  [id number] Visit Information Date & Time Provider Department Dept. Phone Encounter #  
 1/23/2018 11:30 AM Ginette Guerrier NP [facility] [phone] [id number] Your Appointments 1/31/2018 10:00 AM  
ROUTINE CARE with Shannon Coreas MD  
[facility]) Appt Note: 6mo f/u  
 [address] [facility] [address]  
[address] [id number] [facility] [address] [place]  
  
    
 6/26/2018 10:45 AM  
6 MONTH with Haris Benitez MD  
[facility]) Appt Note: Dr. [address] [facility]  
[phone] [address] [facility] [address] [place] [address]  
  
    
 8/1/2018 10:30 AM  
Medicare Physical with Shannon Coreas MD  
[facility]) Appt Note: medicare wellness [address] [facility] [address]  
[phone] [facility] [address] [facility] [place] Upcoming Health Maintenance Date Due ZOSTER VACCINE AGE 60> 10/18/1991 HEMOGLOBIN A1C Q6M 4/24/2018 FOOT EXAM Q1 7/31/2018 MEDICARE YEARLY EXAM 8/1/2018 EYE EXAM RETINAL OR DILATED Q1 10/11/2018 MICROALBUMIN Q1 12/13/2018 LIPID PANEL Q1 12/13/2018 GLAUCOMA SCREENING Q2Y 10/11/2019 DTaP/Tdap/Td series (2 - Td) 10/15/2026 Allergies as of 1/23/2018  Review Complete On: 1/23/2018 By: Vida Bashir LPN No Known Allergies Current Immunizations  Reviewed on 10/9/2017 Name Date Influenza High Dose Vaccine PF 10/6/2017, 11/2/2016 Influenza Vaccine 10/27/2014, 10/1/2013 Influenza Vaccine (Quad) PF 10/12/2015 Influenza Vaccine Split 10/31/2011, 10/15/2010 Pneumococcal Conjugate (PCV-13) 4/1/2015 Rabies Immune Globulin 10/15/2016 11:44 PM  
 Rabies Vaccine IM 10/29/2016  3:14 PM, 10/22/2016  3:02 PM, 10/18/2016  1:41 PM, 10/15/2016 11:00 PM  
 TD Vaccine 10/30/2012 Tdap 10/15/2016 10:48 PM  
 ZZZ-RETIRED (DO NOT USE) Pneumococcal Vaccine (Unspecified Type) 4/1/2004, 10/1/1998 Not reviewed this visit You Were Diagnosed With   
  
 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 
ICD-9-CM: 250.60, 357.2 Sensory ataxia     ICD-10-CM: R27.8 ICD-9-CM: 781.3 Fourth nerve palsy of right eye     ICD-10-CM: H49.11 ICD-9-CM: 378.53 Vitals BP Pulse Height(growth percentile) Weight(growth percentile) SpO2 BMI  
 102/78 (!) 57 5' 9\" (1.753 m) 189 lb (85.7 kg) 94% 27.91 kg/m2 Smoking Status Former Smoker Vitals History BMI and BSA Data Body Mass Index Body Surface Area  
 27.91 kg/m 2 2.04 m 2 Preferred Pharmacy Pharmacy Name Phone [facility]/PHARMACY #[id number] [id number] [facility] AT [address] [place] [phone] Your Updated Medication List  
  
   
This list is accurate as of: 1/23/18 11:38 AM.  Always use your most recent med list.  
  
  
  
  
 acetaminophen 500 mg tablet Commonly known as:  TYLENOL Take 1 tablet by mouth every eight (8) hours as needed for Pain. ADVAIR DISKUS 250-50 mcg/dose diskus inhaler Generic drug:  fluticasone-salmeterol Take 1 Puff by inhalation every twelve (12) hours. albuterol 90 mcg/actuation inhaler Commonly known as:  PROVENTIL HFA, VENTOLIN HFA, PROAIR HFA Take 2 Puffs by inhalation every four (4) hours as needed for Wheezing. aspirin 325 mg tablet Commonly known as:  ASPIRIN Take 325 mg by mouth daily. B COMPLEX PO Take 1 Tab by mouth daily. CENTRUM SILVER Tab tablet Generic drug:  multivitamins-minerals-lutein Take 1 Tab by mouth daily. cephALEXin 500 mg capsule Commonly known as:  KEFLEX  
  
 CINNAMON 500 mg Cap Generic drug:  cinnamon bark Take  by mouth. CO Q-10 10 mg Cap Generic drug:  coenzyme q10 Take  by mouth. fexofenadine 180 mg tablet Commonly known as:  Afbio [place] Take 1 Tab by mouth daily. FLUZONE HIGH-DOSE 2017-18 (PF) Syrg injection Generic drug:  influenza vaccine 2017-18 (65 yrs+)(PF)  
TO BE ADMINISTERED BY PHARMACIST FOR IMMUNIZATION  
  
 glipiZIDE 5 mg tablet Commonly known as:  GLUCOTROL  
TAKE 1 TABLET BY MOUTH EVERY DAY  
  
 glucose blood VI test strips strip Commonly known as:  ONETOUCH ULTRA TEST  
by Does Not Apply route Daily (before breakfast). E11.9  
  
 guaiFENesin 400 mg tablet Commonly known as:  Burlene Honorio Take 1 Tab by mouth every four (4) hours as needed for Congestion. lisinopril 2.5 mg tablet Commonly known as:  Mina Misty Take 1 Tab by mouth daily. TAKE 1 TABLET BY MOUTH EVERY DAY  
  
 MAGNESIUM PO Take  by mouth daily. metFORMIN  mg tablet Commonly known as:  GLUCOPHAGE XR Take 3 Tabs by mouth daily (with dinner). metoprolol succinate 25 mg XL tablet Commonly known as:  TOPROL-XL  
TAKE 1/2 TABLET BY MOUTH EVERY DAY  
  
 PEPCID AC 20 mg tablet Generic drug:  famotidine Take 20 mg by mouth daily. predniSONE 10 mg tablet Commonly known as:  DELTASONE  
SEE ATTACHMENT  
  
 simvastatin 20 mg tablet Commonly known as:  ZOCOR  
TAKE 1 TABLET BY MOUTH NIGHTLY SPIRIVA RESPIMAT 2.5 mcg/actuation inhaler Generic drug:  tiotropium bromide Take 2 Puffs by inhalation daily. VITAMIN C PO Take 1,000 mg by mouth daily. VITAMIN D3 PO Take 1,000 Units by mouth daily. Patient Instructions PRESCRIPTION REFILL POLICY [facility] Statement to Patients April 1, 2014 In an effort to ensure the large volume of patient prescription refills is processed in the most efficient and expeditious manner, we are asking our patients to assist us by calling your Pharmacy for all prescription refills, this will include also your  Mail Order Pharmacy. The pharmacy will contact our office electronically to continue the refill process. Please do not wait until the last minute to call your pharmacy. We need at least 48 hours (2days) to fill prescriptions. We also encourage you to call your pharmacy before going to  your prescription to make sure it is ready. With regard to controlled substance prescription refill requests (narcotic refills) that need to be picked up at our office, we ask your cooperation by providing us with at least 72 hours (3days) notice that you will need a refill. We will not refill narcotic prescription refill requests after 4:00pm on any weekday, Monday through Thursday, or after 2:00pm on Fridays, or on the weekends. We encourage everyone to explore another way of getting your prescription refill request processed using evocatal, our patient web portal through our electronic medical record system. evocatal is an efficient and effective way to communicate your medication request directly to the office and  downloadable as an kae on your smart phone . evocatal also features a review functionality that allows you to view your medication list as well as leave messages for your physician. Are you ready to get connected? If so please review the attatched instructions or speak to any of our staff to get you set up right away! Thank you so much for your cooperation. Should you have any questions please contact our Practice Administrator. The Physicians and Staff,  [facility] Introducing [facility] SERVICES! Dear Zaida Anne: Thank you for requesting a evocatal account.   Our records indicate that you already have an active [employer] account. You can access your account anytime at [email or web address] Did you know that you can access your hospital and ER discharge instructions at any time in Jigs[employer] Meeting? You can also review all of your test results from your hospital stay or ER visit. Additional Information If you have questions, please visit the Frequently Asked Questions section of the J[employer] website at [email or web address] Remember, J[employer] Meeting is NOT to be used for urgent needs. For medical emergencies, dial 911. Now available from your iPhone and Android! Please provide this summary of care documentation to your next provider. Your primary care clinician is listed as Luis [phone] If you have any questions after today's visit, please call [phone].

## 2018-01-23 NOTE — PATIENT INSTRUCTIONS
[facility]   Statement to Patients  April 1, 2014      In an effort to ensure the large volume of patient prescription refills is processed in the most efficient and expeditious manner, we are asking our patients to assist us by calling your Pharmacy for all prescription refills, this will include also your  Mail Order Pharmacy. The pharmacy will contact our office electronically to continue the refill process. Please do not wait until the last minute to call your pharmacy. We need at least 48 hours (2days) to fill prescriptions. We also encourage you to call your pharmacy before going to  your prescription to make sure it is ready. With regard to controlled substance prescription refill requests (narcotic refills) that need to be picked up at our office, we ask your cooperation by providing us with at least 72 hours (3days) notice that you will need a refill. We will not refill narcotic prescription refill requests after 4:00pm on any weekday, Monday through Thursday, or after 2:00pm on Fridays, or on the weekends. We encourage everyone to explore another way of getting your prescription refill request processed using [employer], our patient web portal through our electronic medical record system. [employer] is an efficient and effective way to communicate your medication request directly to the office and  downloadable as an kae on your smart phone . [employer] also features a review functionality that allows you to view your medication list as well as leave messages for your physician. Are you ready to get connected? If so please review the attatched instructions or speak to any of our staff to get you set up right away! Thank you so much for your cooperation. Should you have any questions please contact our Practice Administrator.     The Physicians and Staff,  [facility]

## 2018-01-30 ENCOUNTER — APPOINTMENT (OUTPATIENT)
Dept: GENERAL RADIOLOGY | Age: 83
End: 2018-01-30
Attending: PHYSICIAN ASSISTANT
Payer: MEDICARE

## 2018-01-30 ENCOUNTER — HOSPITAL ENCOUNTER (EMERGENCY)
Age: 83
Discharge: HOME | End: 2018-01-30
Attending: EMERGENCY MEDICINE
Payer: MEDICARE

## 2018-01-30 ENCOUNTER — APPOINTMENT (OUTPATIENT)
Dept: CT IMAGING | Age: 83
End: 2018-01-30
Attending: PHYSICIAN ASSISTANT
Payer: MEDICARE

## 2018-01-30 VITALS
HEART RATE: 68 BPM | HEIGHT: 69 IN | DIASTOLIC BLOOD PRESSURE: 80 MMHG | RESPIRATION RATE: 16 BRPM | TEMPERATURE: 97.5 F | WEIGHT: 190.48 LBS | OXYGEN SATURATION: 95 % | SYSTOLIC BLOOD PRESSURE: 159 MMHG | BODY MASS INDEX: 28.21 KG/M2

## 2018-01-30 DIAGNOSIS — S01.01XA LACERATION OF SCALP WITHOUT FOREIGN BODY, INITIAL ENCOUNTER: Primary | ICD-10-CM

## 2018-01-30 DIAGNOSIS — S60.222A TRAUMATIC HEMATOMA OF LEFT HAND, INITIAL ENCOUNTER: ICD-10-CM

## 2018-01-30 DIAGNOSIS — M79.89 SWELLING OF LEFT HAND: ICD-10-CM

## 2018-01-30 PROCEDURE — 77030018836 HC SOL IRR NACL ICUM -A

## 2018-01-30 PROCEDURE — 75810000293 HC SIMP/SUPERF WND  RPR

## 2018-01-30 PROCEDURE — 73130 X-RAY EXAM OF HAND: CPT

## 2018-01-30 PROCEDURE — 74011000250 HC RX REV CODE- 250: Performed by: PHYSICIAN ASSISTANT

## 2018-01-30 PROCEDURE — 99283 EMERGENCY DEPT VISIT LOW MDM: CPT

## 2018-01-30 PROCEDURE — 70450 CT HEAD/BRAIN W/O DYE: CPT

## 2018-01-30 PROCEDURE — 77030008460 HC STPLR SKN PRECIS 3M -A

## 2018-01-30 RX ORDER — LIDOCAINE HYDROCHLORIDE 20 MG/ML
10 INJECTION, SOLUTION EPIDURAL; INFILTRATION; INTRACAUDAL; PERINEURAL
Status: COMPLETED | OUTPATIENT
Start: 2018-01-30 | End: 2018-01-30

## 2018-01-30 RX ADMIN — LIDOCAINE HYDROCHLORIDE 200 MG: 20 INJECTION, SOLUTION EPIDURAL; INFILTRATION; INTRACAUDAL; PERINEURAL at 15:59

## 2018-01-30 NOTE — ED NOTES
Head wound cleaned of dried blood. Noted to have several small lacerations around hematoma and large abrasion on top of hematoma. Pt tolerated well. No additional bleeding noted after cleaning.

## 2018-01-30 NOTE — ED TRIAGE NOTES
Pt states balance has been off for over a year. States has walker and cane, but does not use. Denies loc with fall today. Hematoma and laceration to back of head noted, with no active bleeding. NS dressing applied.

## 2018-01-30 NOTE — ED NOTES
Dc instructions per PALMA Meraz  All questions and concerns addressed  Ambulated out of ER without any difficulty with wife.

## 2018-01-30 NOTE — DISCHARGE INSTRUCTIONS
Hand Bruises: Care Instructions  Your Care Instructions  Bruises, or contusions, can happen as a result of an impact or fall. Most people think of a bruise as a black-and-blue spot. This happens when small blood vessels get torn and leak blood under the skin. The bruise may turn purplish black, reddish blue, or yellowish green as it heals. But bones and muscles can also get bruised. This may damage the hand but not cause a bruise that you can see. Most bruises aren't serious and will go away on their own in 2 to 4 weeks. But sometimes a more serious hand injury might not heal on its own. Tell your doctor if you have new symptoms or your injury is not getting better over time. You may have tests to see if you have bone or nerve damage. These tests may include X-rays, a CT scan, or an MRI. If you damaged bones or muscles, you may need more treatment. The doctor has checked you carefully, but problems can develop later. If you notice any problems or new symptoms, get medical treatment right away. Follow-up care is a key part of your treatment and safety. Be sure to make and go to all appointments, and call your doctor if you are having problems. It's also a good idea to know your test results and keep a list of the medicines you take. How can you care for yourself at home? · Put ice or a cold pack on the hand for 10 to 20 minutes at a time. Put a thin cloth between the ice and your skin. · Prop up your hand on a pillow when you ice it or anytime you sit or lie down during the next 3 days. Try to keep your hand above the level of your heart. This will help reduce swelling. · Be safe with medicines. Read and follow all instructions on the label. ¨ If the doctor gave you a prescription medicine for pain, take it as prescribed. ¨ If you are not taking a prescription pain medicine, ask your doctor if you can take an over-the-counter medicine.   · Be sure to follow your doctor's advice about moving and exercising your injured hand. When should you call for help? Call your doctor now or seek immediate medical care if:  ? · Your pain gets worse. ? · You have new or worse swelling. ? · You have tingling, weakness, or numbness in the area near the bruise. ? · The area near the bruise is cold or pale. ? · You have symptoms of infection, such as:  ¨ Increased pain, swelling, warmth, or redness. ¨ Red streaks leading from the area. ¨ Pus draining from the area. ¨ A fever. ? Watch closely for changes in your health, and be sure to contact your doctor if:  ? · You do not get better as expected. Where can you learn more? Go to http://jasvir-yaron.info/. Enter D045 in the search box to learn more about \"Hand Bruises: Care Instructions. \"  Current as of: March 20, 2017  Content Version: 11.4  © 2378-9512 [employer]. Care instructions adapted under license by [employer] (which disclaims liability or warranty for this information). If you have questions about a medical condition or this instruction, always ask your healthcare professional. [place] [address] any warranty or liability for your use of this information. Hand Bruises: Care Instructions  Your Care Instructions  Bruises, or contusions, can happen as a result of an impact or fall. Most people think of a bruise as a black-and-blue spot. This happens when small blood vessels get torn and leak blood under the skin. The bruise may turn purplish black, reddish blue, or yellowish green as it heals. But bones and muscles can also get bruised. This may damage the hand but not cause a bruise that you can see. Most bruises aren't serious and will go away on their own in 2 to 4 weeks. But sometimes a more serious hand injury might not heal on its own. Tell your doctor if you have new symptoms or your injury is not getting better over time.  You may have tests to see if you have bone or nerve damage. These tests may include X-rays, a CT scan, or an MRI. If you damaged bones or muscles, you may need more treatment. The doctor has checked you carefully, but problems can develop later. If you notice any problems or new symptoms, get medical treatment right away. Follow-up care is a key part of your treatment and safety. Be sure to make and go to all appointments, and call your doctor if you are having problems. It's also a good idea to know your test results and keep a list of the medicines you take. How can you care for yourself at home? · Put ice or a cold pack on the hand for 10 to 20 minutes at a time. Put a thin cloth between the ice and your skin. · Prop up your hand on a pillow when you ice it or anytime you sit or lie down during the next 3 days. Try to keep your hand above the level of your heart. This will help reduce swelling. · Be safe with medicines. Read and follow all instructions on the label. ¨ If the doctor gave you a prescription medicine for pain, take it as prescribed. ¨ If you are not taking a prescription pain medicine, ask your doctor if you can take an over-the-counter medicine. · Be sure to follow your doctor's advice about moving and exercising your injured hand. When should you call for help? Call your doctor now or seek immediate medical care if:  ? · Your pain gets worse. ? · You have new or worse swelling. ? · You have tingling, weakness, or numbness in the area near the bruise. ? · The area near the bruise is cold or pale. ? · You have symptoms of infection, such as:  ¨ Increased pain, swelling, warmth, or redness. ¨ Red streaks leading from the area. ¨ Pus draining from the area. ¨ A fever. ? Watch closely for changes in your health, and be sure to contact your doctor if:  ? · You do not get better as expected. Where can you learn more? Go to http://jasvir-yaron.info/.   Enter X303 in the search box to learn more about \"Hand Bruises: Care Instructions. \"  Current as of: March 20, 2017  Content Version: 11.4  © 7853-8203 [employer]. Care instructions adapted under license by [employer] (which disclaims liability or warranty for this information). If you have questions about a medical condition or this instruction, always ask your healthcare professional. [address] any warranty or liability for your use of this information. Cuts: Care Instructions  Your Care Instructions  A cut can happen anywhere on your body. Stitches, staples, skin adhesives, or pieces of tape called Steri-Strips are sometimes used to keep the edges of a cut together and help it heal. Steri-Strips can be used by themselves or with stitches or staples. Sometimes cuts are left open. If the cut went deep and through the skin, the doctor may have closed the cut in two layers. A deeper layer of stitches brings the deep part of the cut together. These stitches will dissolve and don't need to be removed. The upper layer closure, which could be stitches, staples, Steri-Strips, or adhesive, is what you see on the cut. A cut is often covered by a bandage. The doctor has checked you carefully, but problems can develop later. If you notice any problems or new symptoms, get medical treatment right away. Follow-up care is a key part of your treatment and safety. Be sure to make and go to all appointments, and call your doctor if you are having problems. It's also a good idea to know your test results and keep a list of the medicines you take. How can you care for yourself at home? If a cut is open or closed  · Prop up the sore area on a pillow anytime you sit or lie down during the next 3 days. Try to keep it above the level of your heart. This will help reduce swelling. · Keep the cut dry for the first 24 to 48 hours. After this, you can shower if your doctor okays it. Pat the cut dry.   · Don't soak the cut, such as in a bathtub. Your doctor will tell you when it's safe to get the cut wet. · After the first 24 to 48 hours, clean the cut with soap and water 2 times a day unless your doctor gives you different instructions. ¨ Don't use hydrogen peroxide or alcohol, which can slow healing. ¨ You may cover the cut with a thin layer of petroleum jelly and a nonstick bandage. ¨ If the doctor put a bandage over the cut, put on a new bandage after cleaning the cut or if the bandage gets wet or dirty. · Avoid any activity that could cause your cut to reopen. · Be safe with medicines. Read and follow all instructions on the label. ¨ If the doctor gave you a prescription medicine for pain, take it as prescribed. ¨ If you are not taking a prescription pain medicine, ask your doctor if you can take an over-the-counter medicine. If the cut is closed with stitches, staples, or Steri-Strips  · Follow the above instructions for open or closed cuts. · Do not remove the stitches or staples on your own. Your doctor will tell you when to come back to have the stitches or staples removed. · Leave Steri-Strips on until they fall off. If the cut is closed with a skin adhesive  · Follow the above instructions for open or closed cuts. · Leave the skin adhesive on your skin until it falls off on its own. This may take 5 to 10 days. · Do not scratch, rub, or pick at the adhesive. · Do not put the sticky part of a bandage directly on the adhesive. · Do not put any kind of ointment, cream, or lotion over the area. This can make the adhesive fall off too soon. Do not use hydrogen peroxide or alcohol, which can slow healing. When should you call for help? Call your doctor now or seek immediate medical care if:  ? · You have new pain, or your pain gets worse. ? · The skin near the cut is cold or pale or changes color.    ? · You have tingling, weakness, or numbness near the cut.   ? · The cut starts to bleed, and blood soaks through the bandage. Oozing small amounts of blood is normal.   ? · You have trouble moving the area near the cut.   ? · You have symptoms of infection, such as:  ¨ Increased pain, swelling, warmth, or redness around the cut. ¨ Red streaks leading from the cut. ¨ Pus draining from the cut. ¨ A fever. ? Watch closely for changes in your health, and be sure to contact your doctor if:  ? · The cut reopens. ? · You do not get better as expected. Where can you learn more? Go to http://jasivr-yaron.info/. Enter M735 in the search box to learn more about \"Cuts: Care Instructions. \"  Current as of: March 20, 2017  Content Version: 11.4  © 3304-3817 [employer]. Care instructions adapted under license by [employer] (which disclaims liability or warranty for this information). If you have questions about a medical condition or this instruction, always ask your healthcare professional. [place] [address] any warranty or liability for your use of this information.

## 2018-01-30 NOTE — ED PROVIDER NOTES
EMERGENCY DEPARTMENT HISTORY AND PHYSICAL EXAM      Date: 1/30/2018  Patient Name: David Seals. History of Presenting Illness     Chief Complaint   Patient presents with   Rose Perez     pt reports he fell carrying groceries up steps, laceration to scalp, pain and swelling to left hand    Laceration    Hand Pain       History Provided By: Patient and Patient's Wife    HPI: David Soliman, 80 y.o. male with PMHx significant for HTN, HLD, DM, CAD, and arrhythmia, presents ambulatory to the ED with cc of a laceration to the posterior aspect of his head and left hand swelling s/p a mechanical fall that occurred PTA. The patient states that he was ascending the stairs with groceries when he lost his balance and fell backwards and hit his head on the ground. Per wife, the patient has generalized weakness in his BL legs at baseline, and he is currently undergoing physical therapy at [address] [place] [address]. He states that he is prescribed  mg daily, but he denies taking any anticoagulants. He specifically denies dizziness, LOC, chest pain, SOB, neck pain, vision changes, headaches, or other complaints at this time. There are no other complaints, changes, or physical findings at this time. PCP: Reagan Means MD    Current Outpatient Prescriptions   Medication Sig Dispense Refill    metFORMIN ER (GLUCOPHAGE XR) 500 mg tablet Take 3 Tabs by mouth daily (with dinner). 270 Tab 1    cephALEXin (KEFLEX) 500 mg capsule       FLUZONE HIGH-DOSE 2017-18, PF, syrg injection TO BE ADMINISTERED BY PHARMACIST FOR IMMUNIZATION  0    predniSONE (DELTASONE) 10 mg tablet SEE ATTACHMENT      metoprolol succinate (TOPROL-XL) 25 mg XL tablet TAKE 1/2 TABLET BY MOUTH EVERY DAY 45 Tab 3    lisinopril (PRINIVIL, ZESTRIL) 2.5 mg tablet Take 1 Tab by mouth daily.  TAKE 1 TABLET BY MOUTH EVERY DAY 90 Tab 3    guaiFENesin (ORGANIDIN) 400 mg tablet Take 1 Tab by mouth every four (4) hours as needed for Congestion.  albuterol (PROVENTIL HFA, VENTOLIN HFA, PROAIR HFA) 90 mcg/actuation inhaler Take 2 Puffs by inhalation every four (4) hours as needed for Wheezing. 1 Inhaler 5    simvastatin (ZOCOR) 20 mg tablet TAKE 1 TABLET BY MOUTH NIGHTLY 90 Tab 2    tiotropium bromide (SPIRIVA RESPIMAT) 2.5 mcg/actuation inhaler Take 2 Puffs by inhalation daily.  coenzyme q10 (CO Q-10) 10 mg cap Take  by mouth.  cinnamon bark (CINNAMON) 500 mg cap Take  by mouth.  glucose blood VI test strips (ONETOUCH ULTRA TEST) strip by Does Not Apply route Daily (before breakfast). E11.9 100 Strip 5    glipiZIDE (GLUCOTROL) 5 mg tablet TAKE 1 TABLET BY MOUTH EVERY DAY 90 Tab 3    MAGNESIUM PO Take  by mouth daily.  acetaminophen (TYLENOL) 500 mg tablet Take 1 tablet by mouth every eight (8) hours as needed for Pain. 30 tablet 0    CHOLECALCIFEROL, VITAMIN D3, (VITAMIN D3 PO) Take 1,000 Units by mouth daily.  multivitamins-minerals-lutein (CENTRUM SILVER) Tab Take 1 Tab by mouth daily.  fexofenadine (ALLEGRA) 180 mg tablet Take 1 Tab by mouth daily. 30 Tab 11    famotidine (PEPCID AC) 20 mg tablet Take 20 mg by mouth daily.  aspirin (ASPIRIN) 325 mg tablet Take 325 mg by mouth daily.  VITAMIN B COMPLEX (B COMPLEX PO) Take 1 Tab by mouth daily.  fluticasone-salmeterol (ADVAIR DISKUS) 250-50 mcg/Dose diskus inhaler Take 1 Puff by inhalation every twelve (12) hours.  ASCORBIC ACID (VITAMIN C PO) Take 1,000 mg by mouth daily.          Past History     Past Medical History:  Past Medical History:   Diagnosis Date    Arrhythmia     irregular heart beat    Asthma     CAD (coronary artery disease)     Chronic hip pain     COPD [facility]) ([facility] 9/10/2010    Diabetes ([facility])     DJD (degenerative joint disease)     GERD (gastroesophageal reflux disease)     HNP (herniated nucleus pulposus) 6/2006    lumbar     Hypercholesterolemia     Hypertension     Nausea & vomiting     Prostate cancer ([facility] 2003    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 Oral Barton repeat 10 years    HX COLONOSCOPY  2005    HX HEART CATHETERIZATION      2  stents     HX HERNIA REPAIR  9/2008    left inguinal hernia repair by Dr. Cherri Penn Right     inguinal hernai repair    HX HERNIA REPAIR      umbilical hernia repair    HX HIP REPLACEMENT Right 07/06/2016    HX PROSTATECTOMY  2003    HX TONSILLECTOMY      TOTAL HIP ARTHROPLASTY Left 2/9/11    Dr. Nanette Hernandez at [facility] History:  Family History   Problem Relation Age of Onset    Cancer Mother      Breast cancer    Cancer Father      Bladder cancer    Lung Disease Father      Emphysema    Hypertension Sister      1       Social History:  Social History   Substance Use Topics    Smoking status: Former Smoker     Packs/day: 3.00     Years: 10.00     Types: Cigarettes     Quit date: 1/15/1970    Smokeless tobacco: Never Used    Alcohol use No       Allergies:  No Known Allergies    Review of Systems   Review of Systems   Constitutional: Negative. Negative for activity change, appetite change, chills, diaphoresis, fever and unexpected weight change. HENT: Negative for congestion, hearing loss, rhinorrhea, sinus pressure, sneezing, sore throat and trouble swallowing. Eyes: Negative for pain, redness, itching and visual disturbance. Respiratory: Negative for cough, shortness of breath and wheezing. Cardiovascular: Negative for chest pain, palpitations and leg swelling. Gastrointestinal: Negative for abdominal pain, constipation, diarrhea, nausea and vomiting. Genitourinary: Negative for dysuria.    Musculoskeletal: Positive for joint swelling (Left hand). Negative for arthralgias, gait problem, myalgias and neck pain. Skin: Positive for wound (Laceration). Negative for color change, pallor and rash. Neurological: Negative for dizziness, tremors, syncope, weakness, light-headedness, numbness and headaches. All other systems reviewed and are negative. Physical Exam   Physical Exam   Constitutional: He is oriented to person, place, and time. Vital signs are normal. He appears well-developed and well-nourished. No distress. 80 y.o.  male in NAD  Communicates appropriately and in full sentences  Normal vital signs   HENT:   Head: Normocephalic. Head: 1 cm superficial flap, no active bleeding, no foreign body. Eyes: Conjunctivae are normal. Pupils are equal, round, and reactive to light. Right eye exhibits no discharge. Left eye exhibits no discharge. Neck: Normal range of motion. Neck supple. No nuchal rigidity   Cardiovascular: Normal rate, regular rhythm and intact distal pulses. Pulses:       Radial pulses are 2+ on the right side, and 2+ on the left side. Pulmonary/Chest: Effort normal and breath sounds normal. No respiratory distress. He has no wheezes. Abdominal: Soft. Bowel sounds are normal. He exhibits no distension. There is no tenderness. Musculoskeletal: Normal range of motion. He exhibits no edema, tenderness or deformity. No neurologic, motor, vascular, or compartment embarrassment observed on exam. No focal neurologic deficits. BACK: Normal spinal curvatures. No step off or deformity. NT to palpation along midline. Negative seated SLR bilaterally. Flexion/extension movement's at pt's baseline. Ambulatory without difficulty. Left hand: There is a hematoma overlying the dorsal aspect with associated swelling. No tenderness to the wrist of MCP joints. No tenderness elicited over the hematoma. Neurological: He is alert and oriented to person, place, and time.  Coordination normal.   No focal neuro deficits. NVI. Neurologically intact of UE and LE B/L  Sensation intact and symmetrical of UE and LE B/L. Strength 5/5 of UE B/L, Strength 5/5 of LE B/L. Symmetric bulk and tone of LE muscle groups. Skin: Skin is warm and dry. No rash noted. He is not diaphoretic. No erythema. No pallor. Capillary refill < 2 seconds. Psychiatric: He has a normal mood and affect. Nursing note and vitals reviewed. Diagnostic Study Results     Radiologic Studies -    EXAM:  XR HAND LT MIN 3 V     INDICATION:  swelling s/p GLF.     COMPARISON: None.     FINDINGS: Three views of the left hand demonstrate no fracture, dislocation or  other acute osseous or articular abnormality. There is proximal soft tissue  swelling. Extensive vascular calcification is noted. There are degenerative  changes predominantly at the PIP joints as well as at the second and third DIP  joints and the third MCP joint. .     IMPRESSION  IMPRESSION:  No fracture or dislocation. .               CT Results  (Last 48 hours)               01/30/18 1632  CT HEAD WO CONT Final result    Impression:  IMPRESSION:  No acute findings and no change since the previous study of   1/7/2017. Narrative:  CT HEAD:       CLINICAL INFORMATION:  Head trauma, closed, mild, GCS >= 13, no risk factors,   neuro exam normal   COMPARISON:  CT of 1/7/2017    TECHNIQUE: Routine noncontrast axial head CT was performed. Sagittal and   coronal reconstructions were generated. CT dose reduction was achieved through use of a standardized protocol tailored   for this examination and automatic exposure control for dose modulation. FINDINGS:   EXTRA-AXIAL SPACES:  Normal.       INTRACRANIAL HEMORRHAGE:  None. VENTRICULAR SYSTEM:  Normal for age. BASAL CISTERNS:  Normal.   CEREBRAL PARENCHYMA:     1. Chronic left parietal infarct. No evidence of acute infarction. MIDLINE SHIFT:  None.    CEREBELLUM:  Normal.   BRAINSTEM: Normal.   CALVARIUM: Normal. VASCULAR SYSTEM:  Cavernous carotid calcification. PARANASAL SINUSES AND MASTOID AIR CELLS: No significant paranasal sinus disease. VISUALIZED ORBITS: Normal.   VISUALIZED UPPER CERVICAL SPINE:  Normal.   SELLA: Normal.   SKULL BASE: Normal.                 Medical Decision Making   I am the first provider for this patient. I reviewed the vital signs, available nursing notes, past medical history, past surgical history, family history and social history. Vital Signs-Reviewed the patient's vital signs. Patient Vitals for the past 12 hrs:   Temp Pulse Resp BP SpO2   01/30/18 1535 97.5 °F (36.4 °C) 68 16 159/80 95 %       Records Reviewed: Nursing Notes, Old Medical Records and Previous Radiology Studies    Provider Notes (Medical Decision Making):   DDx: CHI, ICH, laceration, fracture, contusion, soft tissue swelling. ED Course:   Initial assessment performed. The patients presenting problems have been discussed, and they are in agreement with the care plan formulated and outlined with them. I have encouraged them to ask questions as they arise throughout their visit. Progress Note:  3:51 PM  The patient was offered pain medication, but he declines at this time. Written by Keyonna Couch ED Scribe, as dictated by Grant Interiano PA-C. Progress Note:  5:30 PM  The patient's head wound was re-assessed after nursing staff cleansed the area. Pt has a 1 cm flap that can be repaired with staples. Pt was offered local anesthesia, but he declines at this time. Will proceed with laceration repair. Written by KAVEH Luuibe, as dictated by Grant Interiano PA-C. Procedure Note - Laceration Repair:  5:31 PM  Procedure by Grant Interiano PA-C. Complexity: Simple  1 cm flap laceration to posterior scalp was irrigated copiously with NS under jet lavage, prepped with normal saline, soap, and H2O2, and draped in a sterile fashion. The area was not anesthetized.   The wound was explored with the following results: No foreign bodies found. The wound was repaired with 3 staples. The wound was closed with good hemostasis and approximation. Sterile dressing applied. Estimated blood loss: < 5 cc  The procedure took 1-15 minutes, and pt tolerated well. Progress Note:  5:48 PM  Received confirmation from Radiologist, Dr. Matt Bell, that the patient's head CT is negative for any acute findings or changes from prior head CT. The patient was informed of his negative xray and CT results, and he conveys his understanding of these results. Pt was given care instructions for his staples, and he is in understanding. Pt is stable for discharge. Written by KAVEH Patterson, as dictated by Gianna Cordova PA-C. Critical Care Time: 0 minutes    Discharge Note:  5:50 PM  The pt is ready for discharge. The pt's signs, symptoms, diagnosis, and discharge instructions have been discussed and pt has conveyed their understanding. The pt is to follow up as recommended or return to ER should their symptoms worsen. Plan has been discussed and pt is in agreement. PLAN:  1. Current Discharge Medication List        2. Follow-up Information     Follow up With Details Comments Contact Info    Shannon Coreas MD Schedule an appointment as soon as possible for a visit in 2 days As needed, If symptoms worsen, Possible further evaluation and treatment [address]   [address] [facility] [address]  [phone]      [facility] EMERGENCY DEPT Go to As needed, If symptoms worsen [address] [facility] [address] III, MD Go in 5 days For suture removal [address]   [address] [place] [address]  [phone]          Return to ED if worse     Diagnosis     Clinical Impression:   1. Laceration of scalp without foreign body, initial encounter    2. Swelling of left hand    3. Traumatic hematoma of left hand, initial encounter        Attestations:   This note is prepared by Nelly Yo, acting as a Scribe for Yanique Hudson PA-C. Yanique Hudson PA-C: The scribe's documentation has been prepared under my direction and personally reviewed by me in its entirety. I confirm that the notes above accurately reflects all work, treatment, procedures, and medical decision making performed by me. This note will not be viewable in [address]

## 2018-01-31 ENCOUNTER — OFFICE VISIT (OUTPATIENT)
Dept: INTERNAL MEDICINE CLINIC | Age: 83
End: 2018-01-31

## 2018-01-31 VITALS
WEIGHT: 189 LBS | SYSTOLIC BLOOD PRESSURE: 122 MMHG | DIASTOLIC BLOOD PRESSURE: 80 MMHG | HEIGHT: 69 IN | TEMPERATURE: 98.4 F | OXYGEN SATURATION: 94 % | BODY MASS INDEX: 27.99 KG/M2 | RESPIRATION RATE: 12 BRPM | HEART RATE: 62 BPM

## 2018-01-31 DIAGNOSIS — Z23 NEED FOR SHINGLES VACCINE: Primary | ICD-10-CM

## 2018-01-31 DIAGNOSIS — J44.9 CHRONIC OBSTRUCTIVE PULMONARY DISEASE, UNSPECIFIED COPD TYPE (HCC): Chronic | ICD-10-CM

## 2018-01-31 DIAGNOSIS — E11.9 CONTROLLED TYPE 2 DIABETES MELLITUS WITHOUT COMPLICATION, WITHOUT LONG-TERM CURRENT USE OF INSULIN (HCC): Chronic | ICD-10-CM

## 2018-01-31 NOTE — PATIENT INSTRUCTIONS

## 2018-01-31 NOTE — PROGRESS NOTES
HPI:  Jaun Fang is a 80y.o. year old male who is here for a routine visit:    Here for a six-month follow-up visit. Unfortunately he did have a fall yesterday when he was trying to carry 2 bags of groceries up the steps and lost his balance falling backwards and striking his head. He had some pain in his left wrist as well as a laceration on the back of his head. He was seen in the emergency room and had 3 staples placed in the back of his head last evening. His x-ray of the wrist was normal without fracture. He continues to do physical therapy and does feel that it may be helping somewhat with balance. His blood sugars have been under good control. He has a rare low blood sugar of less than 1 every 6 months. He feels that he continues to have some cough and some dyspnea on exertion but it stable. Denies any fevers or chills. Denies PND orthopnea. Denies any chest pains.       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 Shera Mcardle repeat 10 years    HX COLONOSCOPY  2005    HX HEART CATHETERIZATION      2  stents     HX HERNIA REPAIR  9/2008    left inguinal hernia repair by Dr. Whit Mak Right     inguinal hernai repair    HX HERNIA REPAIR      umbilical hernia repair    HX HIP REPLACEMENT Right 07/06/2016    HX PROSTATECTOMY  2003    HX TONSILLECTOMY      TOTAL HIP ARTHROPLASTY Left 2/9/11    Dr. Ky Hernandez at [facility]       Prior to Admission medications    Medication Sig Start Date End Date Taking? Authorizing Provider   varicella-zoster recombinant, PF, (SHINGRIX, PF,) 50 mcg/0.5 mL susr injection 0.5 mL by IntraMUSCular route once for 1 dose. 1/31/18 1/31/18 Yes Negin Sharif III, MD   metFORMIN ER (GLUCOPHAGE XR) 500 mg tablet Take 3 Tabs by mouth daily (with dinner). 1/9/18  Yes Negin Sharif III, MD   metoprolol succinate (TOPROL-XL) 25 mg XL tablet TAKE 1/2 TABLET BY MOUTH EVERY DAY 12/13/17  Yes Robi Ma NP   lisinopril (PRINIVIL, ZESTRIL) 2.5 mg tablet Take 1 Tab by mouth daily. TAKE 1 TABLET BY MOUTH EVERY DAY 12/13/17  Yes Robi Ma NP   albuterol (PROVENTIL HFA, VENTOLIN HFA, PROAIR HFA) 90 mcg/actuation inhaler Take 2 Puffs by inhalation every four (4) hours as needed for Wheezing. 12/8/17  Yes Negin Sharif III, MD   simvastatin (ZOCOR) 20 mg tablet TAKE 1 TABLET BY MOUTH NIGHTLY 10/22/17  Yes Negin Sharif III, MD   tiotropium bromide (SPIRIVA RESPIMAT) 2.5 mcg/actuation inhaler Take 2 Puffs by inhalation daily. Yes Historical Provider   coenzyme q10 (CO Q-10) 10 mg cap Take  by mouth. Yes Historical Provider   cinnamon bark (CINNAMON) 500 mg cap Take  by mouth. Yes Historical Provider   glucose blood VI test strips (ONETOUCH ULTRA TEST) strip by Does Not Apply route Daily (before breakfast). E11.9 6/14/17  Yes Negin Sharif III, MD   glipiZIDE (GLUCOTROL) 5 mg tablet TAKE 1 TABLET BY MOUTH EVERY DAY 2/24/17  Yes Negin Sharif III, MD   MAGNESIUM PO Take  by mouth daily. Yes Historical Provider   CHOLECALCIFEROL, VITAMIN D3, (VITAMIN D3 PO) Take 1,000 Units by mouth daily. Yes Historical Provider   multivitamins-minerals-lutein (CENTRUM SILVER) Tab Take 1 Tab by mouth daily.  5/14/10  Yes Historical Provider   fexofenadine (ALLEGRA) 180 mg tablet Take 1 Tab by mouth daily. 1/25/10  Yes Yolanda Morgan MD   famotidine (PEPCID AC) 20 mg tablet Take 20 mg by mouth daily. 6/27/11  Yes Historical Provider   aspirin (ASPIRIN) 325 mg tablet Take 325 mg by mouth daily. Yes Historical Provider   VITAMIN B COMPLEX (B COMPLEX PO) Take 1 Tab by mouth daily. Yes Historical Provider   fluticasone-salmeterol (ADVAIR DISKUS) 250-50 mcg/Dose diskus inhaler Take 1 Puff by inhalation every twelve (12) hours. Yes Historical Provider   ASCORBIC ACID (VITAMIN C PO) Take 1,000 mg by mouth daily. Yes Historical Provider   cephALEXin (KEFLEX) 500 mg capsule  10/2/17   Historical Provider   acetaminophen (TYLENOL) 500 mg tablet Take 1 tablet by mouth every eight (8) hours as needed for Pain. 8/28/14   Brandee Cam MD       Social History     Social History    Marital status:      Spouse name: N/A    Number of children: N/A    Years of education: N/A     Occupational History    Not on file. Social History Main Topics    Smoking status: Former Smoker     Packs/day: 3.00     Years: 10.00     Types: Cigarettes     Quit date: 1/15/1970    Smokeless tobacco: Never Used    Alcohol use No    Drug use: Yes     Special: Prescription, OTC    Sexual activity: Yes     Partners: Female     Birth control/ protection: None     Other Topics Concern    Not on file     Social History Narrative          ROS  No LOC on the fall.      Visit Vitals    /80    Pulse 62    Temp 98.4 °F (36.9 °C) (Oral)    Resp 12    Ht 5' 9\" (1.753 m)    Wt 189 lb (85.7 kg)    SpO2 94%    BMI 27.91 kg/m2         Physical Exam   Physical Examination: General appearance - alert, well appearing, and in no distress  Mouth - mucous membranes moist, pharynx normal without lesions  Neck - supple, no significant adenopathy  Lymphatics - no palpable lymphadenopathy, no hepatosplenomegaly  Chest - clear to auscultation, no wheezes, rales or rhonchi, symmetric air entry  Heart - normal rate and regular rhythm  Abdomen - soft, nontender, nondistended, no masses or organomegaly  Musculoskeletal - abnormal exam of left hand with some mild swelling over the dorsal aspect. No tenderness. Normal range of motion without any joint effusions or bony abnormality. Extremities - peripheral pulses normal, no pedal edema, no clubbing or cyanosis  Does have a laceration on the posterior aspect of his scalp with 3 staples in place and some surrounding bruising and scab. Assessment/Plan:  Diagnoses and all orders for this visit:    1. Need for shingles vaccine    2. Chronic obstructive pulmonary disease, unspecified COPD type ([facility] -appears stable. He will continue to use his Advair and albuterol as a supplement. 3. Controlled type 2 diabetes mellitus without complication, without long-term current use of insulin ([facility] -well-controlled. Continue current meds for now. 4.  Recurrent fallswe had a long discussion today about doing things in a safe way. He will also continue physical therapy to avoid additional falls  5. Soft tissue injury to the handwe will treat with ice, elevation, and compression. He will follow-up here on Monday to have his sutures removed. Other orders  -     varicella-zoster recombinant, PF, (SHINGRIX, PF,) 50 mcg/0.5 mL susr injection; 0.5 mL by IntraMUSCular route once for 1 dose. Follow-up Disposition:  Return in about 6 months (around 7/31/2018). Advised him to call back or return to office if symptoms worsen/change/persist.  Discussed expected course/resolution/complications of diagnosis in detail with patient. Medication risks/benefits/costs/interactions/alternatives discussed with patient. He was given an after visit summary which includes diagnoses, current medications, & vitals. He expressed understanding with the diagnosis and plan.

## 2018-02-05 ENCOUNTER — OFFICE VISIT (OUTPATIENT)
Dept: INTERNAL MEDICINE CLINIC | Age: 83
End: 2018-02-05

## 2018-02-05 VITALS
HEIGHT: 69 IN | HEART RATE: 87 BPM | RESPIRATION RATE: 12 BRPM | TEMPERATURE: 97.2 F | BODY MASS INDEX: 27.99 KG/M2 | OXYGEN SATURATION: 92 % | DIASTOLIC BLOOD PRESSURE: 78 MMHG | SYSTOLIC BLOOD PRESSURE: 148 MMHG | WEIGHT: 189 LBS
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
## 2018-02-05 DIAGNOSIS — S01.01XD LACERATION OF SCALP, SUBSEQUENT ENCOUNTER: ICD-10-CM

## 2018-02-05 NOTE — PROGRESS NOTES
HPI:  Jing Doan is a 80y.o. year old male who is here for a routine visit:    Presents for follow-up of last week's visit. He had fallen last week and had a laceration to his scalp. Staples were placed and he is here to have those removed. He has not had any more falls. He is trying to follow the rules and not put himself in jeopardy. He denies headaches. No dizziness. No nosebleeds. His wife is caring for this with peroxide and Neosporin. He is to have some dyspnea on exertion but unchanged. He has not been using much of his as needed inhaler. Past Medical History:   Diagnosis Date    Arrhythmia     irregular heart beat    Asthma     CAD (coronary artery disease)     Chronic hip pain     COPD (chronic obstructive pulmonary disease) ([address] 9/10/2010    Diabetes (HCC)     DJD (degenerative joint disease)     GERD (gastroesophageal reflux disease)     HNP (herniated nucleus pulposus) 2006    lumbar     Hypercholesterolemia     Hypertension     Nausea & vomiting     Prostate cancer ([address]     Reversible ischemic neurologic deficit ([address]     suspected with no residual effects and no recurrance    TIA (transient ischemic attack)     TIA- no deficiets       Past Surgical History:   Procedure Laterality Date    COLONOSCOPY  12/15/2004    Diverticulosis Dr. Bayron Valera repeat 10 years    HX COLONOSCOPY      HX HEART CATHETERIZATION      2  stents     HX HERNIA REPAIR  2008    left inguinal hernia repair by Dr. Geri Dwyer Right     inguinal hernai repair    HX HERNIA REPAIR      umbilical hernia repair    HX HIP REPLACEMENT Right 2016    HX PROSTATECTOMY      HX TONSILLECTOMY      TOTAL HIP ARTHROPLASTY Left 11    Dr. Aguilar [place] at [address] [place] [address]       Prior to Admission medications    Medication Sig Start Date End Date Taking? Authorizing Provider   metFORMIN ER (GLUCOPHAGE XR) 500 mg tablet Take 3 Tabs by mouth daily (with dinner).  18 Hui Lopez III, MD   cephALEXin (KEFLEX) 500 mg capsule  10/2/17   Historical Provider   metoprolol succinate (TOPROL-XL) 25 mg XL tablet TAKE 1/2 TABLET BY MOUTH EVERY DAY 12/13/17   Monserrat Hardwick NP   lisinopril (PRINIVIL, ZESTRIL) 2.5 mg tablet Take 1 Tab by mouth daily. TAKE 1 TABLET BY MOUTH EVERY DAY 12/13/17   Monserrat Hardwick NP   albuterol (PROVENTIL HFA, VENTOLIN HFA, PROAIR HFA) 90 mcg/actuation inhaler Take 2 Puffs by inhalation every four (4) hours as needed for Wheezing. 12/8/17   Hui Lopez III, MD   simvastatin (ZOCOR) 20 mg tablet TAKE 1 TABLET BY MOUTH NIGHTLY 10/22/17   Hui Lopez III, MD   tiotropium bromide (SPIRIVA RESPIMAT) 2.5 mcg/actuation inhaler Take 2 Puffs by inhalation daily. Historical Provider   coenzyme q10 (CO Q-10) 10 mg cap Take  by mouth. Historical Provider   cinnamon bark (CINNAMON) 500 mg cap Take  by mouth. Historical Provider   glucose blood VI test strips (ONETOUCH ULTRA TEST) strip by Does Not Apply route Daily (before breakfast). E11.9 6/14/17   Hui Lopez III, MD   glipiZIDE (GLUCOTROL) 5 mg tablet TAKE 1 TABLET BY MOUTH EVERY DAY 2/24/17   Hui Lopez III, MD   MAGNESIUM PO Take  by mouth daily. Historical Provider   acetaminophen (TYLENOL) 500 mg tablet Take 1 tablet by mouth every eight (8) hours as needed for Pain. 8/28/14   Angel Dickey MD   CHOLECALCIFEROL, VITAMIN D3, (VITAMIN D3 PO) Take 1,000 Units by mouth daily. Historical Provider   multivitamins-minerals-lutein (CENTRUM SILVER) Tab Take 1 Tab by mouth daily. 5/14/10   Historical Provider   fexofenadine (ALLEGRA) 180 mg tablet Take 1 Tab by mouth daily. 1/25/10   Reyna Ortega MD   famotidine (PEPCID AC) 20 mg tablet Take 20 mg by mouth daily. 6/27/11   Historical Provider   aspirin (ASPIRIN) 325 mg tablet Take 325 mg by mouth daily. Historical Provider   VITAMIN B COMPLEX (B COMPLEX PO) Take 1 Tab by mouth daily.     Historical Provider   fluticasone-salmeterol (ADVAIR DISKUS) 250-50 mcg/Dose diskus inhaler Take 1 Puff by inhalation every twelve (12) hours. Historical Provider   ASCORBIC ACID (VITAMIN C PO) Take 1,000 mg by mouth daily. Historical Provider       Social History     Social History    Marital status:      Spouse name: N/A    Number of children: N/A    Years of education: N/A     Occupational History    Not on file. Social History Main Topics    Smoking status: Former Smoker     Packs/day: 3.00     Years: 10.00     Types: Cigarettes     Quit date: 1/15/1970    Smokeless tobacco: Never Used    Alcohol use No    Drug use: Yes     Special: Prescription, OTC    Sexual activity: Yes     Partners: Female     Birth control/ protection: None     Other Topics Concern    Not on file     Social History Narrative          ROS  Per HPI    Visit Vitals    /78    Pulse 87    Temp 97.2 °F (36.2 °C) (Oral)    Resp 12    Ht 5' 9\" (1.753 m)    Wt 189 lb (85.7 kg)    SpO2 92%    BMI 27.91 kg/m2         Physical Exam   Physical Examination: General appearance - alert, well appearing, and in no distress  Chest - wheezing noted in the bases  Heart - normal rate and regular rhythm  A large scabbed area on the occiput. 3 staples were easily removed without any difficulty. No active bleeding. Assessment/Plan:  Diagnoses and all orders for this visit:    1. Fall (on) (from) other stairs and steps, subsequent encounter    2. Laceration of scalp, subsequent encounter -will do wound care with peroxide and Neosporin for now. His wife let me know if he has any complications. We had a nice alert discussion with him today about maintaining safety to prevent recurrent falls. Follow-up Disposition: as needed       Advised him to call back or return to office if symptoms worsen/change/persist.  Discussed expected course/resolution/complications of diagnosis in detail with patient.     Medication risks/benefits/costs/interactions/alternatives discussed with patient. He was given an after visit summary which includes diagnoses, current medications, & vitals. He expressed understanding with the diagnosis and plan.

## 2018-02-13 RX ORDER — GLIPIZIDE 5 MG/1
TABLET ORAL
Qty: 90 TAB | Refills: 3 | Status: SHIPPED | OUTPATIENT
Start: 2018-02-13 | End: 2018-10-31 | Stop reason: SDUPTHER

## 2018-07-03 RX ORDER — METFORMIN HYDROCHLORIDE 500 MG/1
TABLET, EXTENDED RELEASE ORAL
Qty: 270 TAB | Refills: 1 | Status: ON HOLD | OUTPATIENT
Start: 2018-07-03 | End: 2018-10-05

## 2018-07-05 NOTE — TELEPHONE ENCOUNTER
Orders Placed This Encounter    glucose blood VI test strips (ONETOUCH ULTRA BLUE TEST STRIP) strip     Sig: by Does Not Apply route Daily (before breakfast). E11.9     Dispense:  100 Strip     Refill:  3     The above medication refills were approved via verbal order by Dr. Cony Moralez III.

## 2018-07-20 ENCOUNTER — OFFICE VISIT (OUTPATIENT)
Dept: CARDIOLOGY CLINIC | Age: 83
End: 2018-07-20

## 2018-07-20 VITALS
DIASTOLIC BLOOD PRESSURE: 68 MMHG | SYSTOLIC BLOOD PRESSURE: 122 MMHG | HEIGHT: 69 IN | WEIGHT: 185.9 LBS | OXYGEN SATURATION: 92 % | HEART RATE: 65 BPM | BODY MASS INDEX: 27.53 KG/M2 | RESPIRATION RATE: 16 BRPM

## 2018-07-20 DIAGNOSIS — I25.118 CORONARY ARTERY DISEASE OF NATIVE ARTERY OF NATIVE HEART WITH STABLE ANGINA PECTORIS (HCC): Primary | ICD-10-CM

## 2018-07-20 DIAGNOSIS — R06.09 DOE (DYSPNEA ON EXERTION): ICD-10-CM

## 2018-07-20 DIAGNOSIS — Z95.5 S/P CORONARY ARTERY STENT PLACEMENT: ICD-10-CM

## 2018-07-20 DIAGNOSIS — E78.00 PURE HYPERCHOLESTEROLEMIA: Chronic | ICD-10-CM

## 2018-07-20 NOTE — MR AVS SNAPSHOT
[address] [facility] 
[phone] Patient: Ortiz Oropeza MRN:  [id number] Visit Information Date & Time Provider Department Dept. Phone Encounter #  
 7/20/2018 11:00 AM Cele Parker, [address] [facility] [id number] Follow-up Instructions Routing History Follow-up and Disposition History Your Appointments 8/1/2018 10:30 AM  
Medicare Physical with Zackery Galeano MD  
[facility] [address] [place] [address]) Appt Note: medicare wellness [address] [facility] [address] [facility] [address]  
[address] [facility] [address]  
  
    
 8/2/2018  8:40 AM  
Follow Up with Warren Boast, MD  
Neurology Clinic at [address]) Appt Note: follow up neuropathy. ..tlw 1/23/18  
 [address], [address] [address]  
[address], [address] [place] [address], [address] [facility] [address] [address]  
  
    
 8/2/2018  9:30 AM  
NUCLEAR MEDICINE with NUCLEAR, [facility] [address]) Appt Note: STRESS TEST LEXISCAN/CARDIOLITE [[id number]] (Order [id number]) 5'9 185lbs [address] [facility]  
[phone] [facility]  
  
    
 1/28/2019 11:40 AM  
Follow Up with Warren Boast, MD  
Neurology Clinic at [address]) Appt Note: Follow up $0CP tdb 1/23/18  
 [address], [address] [facility]  
[phone] Upcoming Health Maintenance Date Due ZOSTER VACCINE AGE 60> 10/18/1991 HEMOGLOBIN A1C Q6M 4/24/2018 FOOT EXAM Q1 7/31/2018 MEDICARE YEARLY EXAM 8/1/2018 Influenza Age 5 to Adult 8/1/2018 EYE EXAM RETINAL OR DILATED Q1 10/11/2018 MICROALBUMIN Q1 12/13/2018 LIPID PANEL Q1 12/13/2018 GLAUCOMA SCREENING Q2Y 10/11/2019 DTaP/Tdap/Td series (2 - Td) 10/15/2026 Allergies as of 7/20/2018  Review Complete On: 7/20/2018 By: Juliet Dill MD  
 No Known Allergies Current Immunizations  Reviewed on 10/9/2017 Name Date Influenza High Dose Vaccine PF 10/6/2017, 11/2/2016 Influenza Vaccine 10/27/2014, 10/1/2013 Influenza Vaccine (Quad) PF 10/12/2015 Influenza Vaccine Split 10/31/2011, 10/15/2010 Pneumococcal Conjugate (PCV-13) 4/1/2015 Rabies Immune Globulin 10/15/2016 11:44 PM  
 Rabies Vaccine IM 10/29/2016  3:14 PM, 10/22/2016  3:02 PM, 10/18/2016  1:41 PM, 10/15/2016 11:00 PM  
 TD Vaccine 10/30/2012 Tdap 10/15/2016 10:48 PM  
 ZZZ-RETIRED (DO NOT USE) Pneumococcal Vaccine (Unspecified Type) 4/1/2004, 10/1/1998 Not reviewed this visit You Were Diagnosed With   
  
 Codes Comments Coronary artery disease of native artery of native heart with stable angina pectoris ([facility]    -  Primary ICD-10-CM: I25.118 
ICD-9-CM: 414.01, 413.9 Pure hypercholesterolemia     ICD-10-CM: E78.00 ICD-9-CM: 272.0   
 MCGUIRE (dyspnea on exertion)     ICD-10-CM: R06.09 
ICD-9-CM: 786.09 S/P coronary artery stent placement     ICD-10-CM: Z95.5 ICD-9-CM: V45.82 Vitals BP Pulse Resp Height(growth percentile) Weight(growth percentile) SpO2  
 122/68 (BP 1 Location: Right arm, BP Patient Position: Sitting) 65 16 5' 9\" (1.753 m) 185 lb 14.4 oz (84.3 kg) 92% BMI Smoking Status 27.45 kg/m2 Former Smoker Vitals History BMI and BSA Data Body Mass Index Body Surface Area  
 27.45 kg/m 2 2.03 m 2 Preferred Pharmacy Pharmacy Name Phone [id number]- [place], [place] - [address] [phone] Your Updated Medication List  
  
   
This list is accurate as of 7/20/18 11:52 AM.  Always use your most recent med list.  
  
  
  
  
 acetaminophen 500 mg tablet Commonly known as:  TYLENOL Take 1 tablet by mouth every eight (8) hours as needed for Pain. ADVAIR DISKUS 250-50 mcg/dose diskus inhaler Generic drug:  fluticasone-salmeterol Take 1 Puff by inhalation every twelve (12) hours. albuterol 90 mcg/actuation inhaler Commonly known as:  PROVENTIL HFA, VENTOLIN HFA, PROAIR HFA Take 2 Puffs by inhalation every four (4) hours as needed for Wheezing. aspirin 325 mg tablet Commonly known as:  ASPIRIN Take 325 mg by mouth daily. B COMPLEX PO Take 1 Tab by mouth daily. CENTRUM SILVER Tab tablet Generic drug:  multivitamins-minerals-lutein Take 1 Tab by mouth daily. CINNAMON 500 mg Cap Generic drug:  cinnamon bark Take  by mouth. CO Q-10 10 mg Cap Generic drug:  coenzyme q10 Take  by mouth. fexofenadine 180 mg tablet Commonly known as:  Rella Soulier Take 1 Tab by mouth daily. glipiZIDE 5 mg tablet Commonly known as:  GLUCOTROL  
TAKE 1 TABLET BY MOUTH EVERY DAY  
  
 glucose blood VI test strips strip Commonly known as:  ONETOUCH ULTRA BLUE TEST STRIP  
by Does Not Apply route Daily (before breakfast). E11.9  
  
 lisinopril 2.5 mg tablet Commonly known as:  Zhane [place]en Take 1 Tab by mouth daily. TAKE 1 TABLET BY MOUTH EVERY DAY  
  
 MAGNESIUM PO Take  by mouth daily. metFORMIN  mg tablet Commonly known as:  GLUCOPHAGE XR  
TAKE 3 TABLETS BY MOUTH EVERY DAY WITH DINNER  
  
 metoprolol succinate 25 mg XL tablet Commonly known as:  TOPROL-XL  
TAKE 1/2 TABLET BY MOUTH EVERY DAY  
  
 PEPCID AC 20 mg tablet Generic drug:  famotidine Take 20 mg by mouth daily. simvastatin 20 mg tablet Commonly known as:  ZOCOR  
TAKE 1 TABLET BY MOUTH NIGHTLY SPIRIVA RESPIMAT 2.5 mcg/actuation inhaler Generic drug:  tiotropium bromide Take 2 Puffs by inhalation daily. VITAMIN C PO Take 1,000 mg by mouth daily. VITAMIN D3 PO Take 1,000 Units by mouth daily. We Performed the Following AMB POC EKG ROUTINE W/ 12 LEADS, INTER & REP [81720 CPT(R)] To-Do List   
 07/20/2018 ECG:  STRESS TEST LEXISCAN/CARDIOLITE Introducing [facility]! Dear Urszula Mcdonald: Thank you for requesting a Sunfire account. Our records indicate that you already have an active Sunfire account. You can access your account anytime at [email or web address] Did you know that you can access your hospital and ER discharge instructions at any time in Sunfire? You can also review all of your test results from your hospital stay or ER visit. Additional Information If you have questions, please visit the Frequently Asked Questions section of the Sunfire website at [email or web address] Remember, Sunfire is NOT to be used for urgent needs. For medical emergencies, dial 911. Now available from your iPhone and Android! Please provide this summary of care documentation to your next provider. Your primary care clinician is listed as Luis [phone] If you have any questions after today's visit, please call [phone].

## 2018-07-20 NOTE — PROGRESS NOTES
Chief Complaint   Patient presents with    Cholesterol Problem     6 month follow up     1. Have you been to the ER, urgent care clinic since your last visit? Hospitalized since your last visit? No    2. Have you seen or consulted any other health care providers outside of the [address] [place] [address] since your last visit? Include any pap smears or colon screening.  No

## 2018-07-20 NOTE — PROGRESS NOTES
Aleida Reyes MD          NAME:  Ibis Muñoz   :   1931   MRN:   [id number]   PCP:  Shawna Christiansen MD           Subjective: The patient is a 80y.o. year old male  who returns for a routine follow-up. Since the last visit, patient reports no change in exercise tolerance, chest pain, edema, medication intolerance, palpitations, shortness of breath, PND/orthopnea wheezing, sputum, syncope, dizziness or light headedness. Doing well. Past Medical History:   Diagnosis Date    Arrhythmia     irregular heart beat    Asthma     CAD (coronary artery disease)     Chronic hip pain     COPD (chronic obstructive pulmonary disease) ([facility] 9/10/2010    Diabetes ([facility]     DJD (degenerative joint disease)     GERD (gastroesophageal reflux disease)     HNP (herniated nucleus pulposus) 2006    lumbar     Hypercholesterolemia     Hypertension     Nausea & vomiting     Prostate cancer ([facility]     Reversible ischemic neurologic deficit ([facility]     suspected with no residual effects and no recurrance    TIA (transient ischemic attack)     TIA- no deficiets        ICD-10-CM ICD-9-CM    1. Coronary artery disease of native artery of native heart with stable angina pectoris ([facility] I25.118 414.01 STRESS TEST LEXISCAN/CARDIOLITE     413.9    2. Pure hypercholesterolemia E78.00 272.0 AMB POC EKG ROUTINE W/ 12 LEADS, INTER & REP   3. MCGUIRE (dyspnea on exertion) R06.09 786.09 STRESS TEST LEXISCAN/CARDIOLITE   4.  S/P coronary artery stent placement Z95.5 V45.82       Social History   Substance Use Topics    Smoking status: Former Smoker     Packs/day: 3.00     Years: 10.00     Types: Cigarettes     Quit date: 1/15/1970    Smokeless tobacco: Never Used    Alcohol use No      Family History   Problem Relation Age of Onset    Cancer Mother      Breast cancer    Lung Disease Mother      Emphysema    Cancer Father      Bladder cancer    Lung Disease Father      Emphysema    Hypertension Sister 2015        Review of Systems  Cardiovascular: Negative except as noted in HPI      Objective:       Vitals:    07/20/18 1100 07/20/18 1108   BP: 118/68 122/68   Pulse: 65    Resp: 16    SpO2: 92%    Weight: 185 lb 14.4 oz (84.3 kg)    Height: 5' 9\" (1.753 m)     Body mass index is 27.45 kg/(m^2). General PE  Mental Status - Alert. General Appearance - Not in acute distress. Chest and Lung Exam   Inspection: Accessory muscles - No use of accessory muscles in breathing. Auscultation:   Breath sounds: - Normal.    Cardiovascular   Inspection: Jugular vein - Bilateral - Inspection Normal.  Palpation/Percussion:   Apical Impulse: - Normal.  Auscultation: Rhythm - Regular. Heart Sounds - S1 WNL and S2 WNL. No S3 or S4. Murmurs & Other Heart Sounds: Auscultation of the heart reveals - No Murmurs. Peripheral Vascular   Upper Extremity: Inspection - Bilateral - No Cyanotic nailbeds or Digital clubbing. Lower Extremity:   Palpation: Edema - Bilateral - No edema. Data Review:     EKG -  EKG: normal EKG, normal sinus rhythm, unchanged from previous tracings, PAC's noted. LABS- @brieflabs@      Allergies reviewed  No Known Allergies    Medications reviewed  Current Outpatient Prescriptions   Medication Sig    glucose blood VI test strips (ONETOUCH ULTRA BLUE TEST STRIP) strip by Does Not Apply route Daily (before breakfast). E11.9    metFORMIN ER (GLUCOPHAGE XR) 500 mg tablet TAKE 3 TABLETS BY MOUTH EVERY DAY WITH DINNER    glipiZIDE (GLUCOTROL) 5 mg tablet TAKE 1 TABLET BY MOUTH EVERY DAY    metoprolol succinate (TOPROL-XL) 25 mg XL tablet TAKE 1/2 TABLET BY MOUTH EVERY DAY    lisinopril (PRINIVIL, ZESTRIL) 2.5 mg tablet Take 1 Tab by mouth daily. TAKE 1 TABLET BY MOUTH EVERY DAY    albuterol (PROVENTIL HFA, VENTOLIN HFA, PROAIR HFA) 90 mcg/actuation inhaler Take 2 Puffs by inhalation every four (4) hours as needed for Wheezing.     simvastatin (ZOCOR) 20 mg tablet TAKE 1 TABLET BY MOUTH NIGHTLY  tiotropium bromide (SPIRIVA RESPIMAT) 2.5 mcg/actuation inhaler Take 2 Puffs by inhalation daily.  coenzyme q10 (CO Q-10) 10 mg cap Take  by mouth.  cinnamon bark (CINNAMON) 500 mg cap Take  by mouth.  MAGNESIUM PO Take  by mouth daily.  acetaminophen (TYLENOL) 500 mg tablet Take 1 tablet by mouth every eight (8) hours as needed for Pain.  CHOLECALCIFEROL, VITAMIN D3, (VITAMIN D3 PO) Take 1,000 Units by mouth daily.  multivitamins-minerals-lutein (CENTRUM SILVER) Tab Take 1 Tab by mouth daily.  fexofenadine (ALLEGRA) 180 mg tablet Take 1 Tab by mouth daily.  famotidine (PEPCID AC) 20 mg tablet Take 20 mg by mouth daily.  aspirin (ASPIRIN) 325 mg tablet Take 325 mg by mouth daily.  VITAMIN B COMPLEX (B COMPLEX PO) Take 1 Tab by mouth daily.  fluticasone-salmeterol (ADVAIR DISKUS) 250-50 mcg/Dose diskus inhaler Take 1 Puff by inhalation every twelve (12) hours.  ASCORBIC ACID (VITAMIN C PO) Take 1,000 mg by mouth daily. No current facility-administered medications for this visit. Assessment:       ICD-10-CM ICD-9-CM    1. Coronary artery disease of native artery of native heart with stable angina pectoris ([facility] [address] [facility] I25.118 414.01 STRESS TEST LEXISCAN/CARDIOLITE     413.9    2. Pure hypercholesterolemia E78.00 272.0 AMB POC EKG ROUTINE W/ 12 LEADS, INTER & REP   3. MCGUIRE (dyspnea on exertion) R06.09 786.09 STRESS TEST LEXISCAN/CARDIOLITE   4. S/P coronary artery stent placement Z95.5 V45.82         Orders Placed This Encounter    AMB POC EKG ROUTINE W/ 12 LEADS, INTER & REP     Order Specific Question:   Reason for Exam:     Answer:   routine    LEXISCAN/CARDIOLITE, Clinic Performed     Standing Status:   Future     Standing Expiration Date:   1/20/2019     Order Specific Question:   Reason for Exam:     Answer:   mcguire, cad       Plan:     Worsening MCGUIRE as suspected anginal equivalent. Had  RCA opened 4 yr ago.   Evaluate with Evan Fields MD

## 2018-07-23 ENCOUNTER — TELEPHONE (OUTPATIENT)
Dept: NEUROLOGY | Age: 83
End: 2018-07-23

## 2018-07-23 NOTE — TELEPHONE ENCOUNTER
Received call from patient's wife, she called to reschedule the patient's appt with Dr. Dorothy Britton, as the patient has a stress test scheduled for that day as well and cannot be rescheduled. I informed her that Dr. Dorothy Britton does not have any available appts until March 2019.  Please add patient to cancellation list.      [phone]

## 2018-08-01 ENCOUNTER — OFFICE VISIT (OUTPATIENT)
Dept: INTERNAL MEDICINE CLINIC | Age: 83
End: 2018-08-01

## 2018-08-01 VITALS
WEIGHT: 187.6 LBS | SYSTOLIC BLOOD PRESSURE: 150 MMHG | BODY MASS INDEX: 27.78 KG/M2 | HEART RATE: 67 BPM | DIASTOLIC BLOOD PRESSURE: 72 MMHG | RESPIRATION RATE: 10 BRPM | HEIGHT: 69 IN | TEMPERATURE: 98.2 F | OXYGEN SATURATION: 92 %

## 2018-08-01 DIAGNOSIS — E53.8 B12 DEFICIENCY: ICD-10-CM

## 2018-08-01 DIAGNOSIS — J44.9 CHRONIC OBSTRUCTIVE PULMONARY DISEASE, UNSPECIFIED COPD TYPE (HCC): Chronic | ICD-10-CM

## 2018-08-01 DIAGNOSIS — E11.42 DIABETIC PERIPHERAL NEUROPATHY ASSOCIATED WITH TYPE 2 DIABETES MELLITUS (HCC): ICD-10-CM

## 2018-08-01 DIAGNOSIS — Z00.00 MEDICARE ANNUAL WELLNESS VISIT, SUBSEQUENT: Primary | ICD-10-CM

## 2018-08-01 DIAGNOSIS — R27.0 ATAXIA: ICD-10-CM

## 2018-08-01 DIAGNOSIS — I25.10 CORONARY ARTERY DISEASE INVOLVING NATIVE CORONARY ARTERY OF NATIVE HEART WITHOUT ANGINA PECTORIS: Chronic | ICD-10-CM

## 2018-08-01 DIAGNOSIS — Z85.46 PERSONAL HISTORY OF PROSTATE CANCER: ICD-10-CM

## 2018-08-01 DIAGNOSIS — E11.21 TYPE 2 DIABETES MELLITUS WITH NEPHROPATHY (HCC): ICD-10-CM

## 2018-08-01 DIAGNOSIS — E78.00 PURE HYPERCHOLESTEROLEMIA: Chronic | ICD-10-CM

## 2018-08-01 NOTE — PATIENT INSTRUCTIONS
Preventing Falls: Care Instructions Your Care Instructions Getting around your home safely can be a challenge if you have injuries or health problems that make it easy for you to fall. Loose rugs and furniture in walkways are among the dangers for many older people who have problems walking or who have poor eyesight. People who have conditions such as arthritis, osteoporosis, or dementia also have to be careful not to fall. You can make your home safer with a few simple measures. Follow-up care is a key part of your treatment and safety. Be sure to make and go to all appointments, and call your doctor if you are having problems. It's also a good idea to know your test results and keep a list of the medicines you take. How can you care for yourself at home? Taking care of yourself · You may get dizzy if you do not drink enough water. To prevent dehydration, drink plenty of fluids, enough so that your urine is light yellow or clear like water. Choose water and other caffeine-free clear liquids. If you have kidney, heart, or liver disease and have to limit fluids, talk with your doctor before you increase the amount of fluids you drink. · Exercise regularly to improve your strength, muscle tone, and balance. Walk if you can. Swimming may be a good choice if you cannot walk easily. · Have your vision and hearing checked each year or any time you notice a change. If you have trouble seeing and hearing, you might not be able to avoid objects and could lose your balance. · Know the side effects of the medicines you take. Ask your doctor or pharmacist whether the medicines you take can affect your balance. Sleeping pills or sedatives can affect your balance. · Limit the amount of alcohol you drink. Alcohol can impair your balance and other senses. · Ask your doctor whether calluses or corns on your feet need to be removed.  If you wear loose-fitting shoes because of calluses or corns, you can lose your balance and fall. · Talk to your doctor if you have numbness in your feet. Preventing falls at home · Remove raised doorway thresholds, throw rugs, and clutter. Repair loose carpet or raised areas in the floor. · Move furniture and electrical cords to keep them out of walking paths. · Use nonskid floor wax, and wipe up spills right away, especially on ceramic tile floors. · If you use a walker or cane, put rubber tips on it. If you use crutches, clean the bottoms of them regularly with an abrasive pad, such as steel wool. · Keep your house well lit, especially Cloretta [place], and outside walkways. Use night-lights in areas such as hallways and bathrooms. Add extra light switches or use remote switches (such as switches that go on or off when you clap your hands) to make it easier to turn lights on if you have to get up during the night. · Install sturdy handrails on stairways. · Move items in your cabinets so that the things you use a lot are on the lower shelves (about waist level). · Keep a cordless phone and a flashlight with new batteries by your bed. If possible, put a phone in each of the main rooms of your house, or carry a cell phone in case you fall and cannot reach a phone. Or, you can wear a device around your neck or wrist. You push a button that sends a signal for help. · Wear low-heeled shoes that fit well and give your feet good support. Use footwear with nonskid soles. Check the heels and soles of your shoes for wear. Repair or replace worn heels or soles. · Do not wear socks without shoes on wood floors. · Walk on the grass when the sidewalks are slippery. If you live in an area that gets snow and ice in the winter, sprinkle salt on slippery steps and sidewalks. Preventing falls in the bath · Install grab bars and nonskid mats inside and outside your shower or tub and near the toilet and sinks. · Use shower chairs and bath benches.  
· Use a hand-held shower head that will allow you to sit while showering. · Get into a tub or shower by putting the weaker leg in first. Get out of a tub or shower with your strong side first. 
· Repair loose toilet seats and consider installing a raised toilet seat to make getting on and off the toilet easier. · Keep your bathroom door unlocked while you are in the shower. Where can you learn more? Go to http://jasvir-yaron.info/. Enter [phone] [id number] [phone] in the search box to learn more about \"Preventing Falls: Care Instructions. \" Current as of: August 4, 2016 Content Version: 11.2 © 4729-0638 [employer]. Care instructions adapted under license by [employer] (which disclaims liability or warranty for this information). If you have questions about a medical condition or this instruction, always ask your healthcare professional. [place] [address] any warranty or liability for your use of this information. Medicare Wellness Visit, Male The best way to live healthy is to have a lifestyle where you eat a well-balanced diet, exercise regularly, limit alcohol use, and quit all forms of tobacco/nicotine, if applicable. Regular preventive services are another way to keep healthy. Preventive services (vaccines, screening tests, monitoring & exams) can help personalize your care plan, which helps you manage your own care. Screening tests can find health problems at the earliest stages, when they are easiest to treat. [facility] follows the current, evidence-based guidelines published by the [facility] [place] (USPSTF) when recommending preventive services for our patients. Because we follow these guidelines, sometimes recommendations change over time as research supports it. (For example, a prostate screening blood test is no longer routinely recommended for men with no symptoms.) Of course, you and your provider may decide to screen more often for some diseases, based on your risk and co-morbidities (chronic disease you are already diagnosed with). Preventive services for you include: - Medicare offers their members a free annual wellness visit, which is time for you and your primary care provider to discuss and plan for your preventive service needs. Take advantage of this benefit every year! 
 
-All people over age 72 should receive the recommended pneumonia vaccines. Current USPSTF guidelines recommend a series of two vaccines for the best pneumonia protection.  
 
-All adults should have a yearly flu vaccine and a tetanus vaccine every 10 years. All adults age 61 years should receive a shingles vaccine once in their lifetime.   
 
-All adults age 38-68 years who are overweight should have a diabetes screening test once every three years.  
 
-Other screening tests & preventive services for persons with diabetes include: an eye exam to screen for diabetic retinopathy, a kidney function test, a foot exam, and stricter control over your cholesterol.  
 
-Cardiovascular screening for adults with routine risk involves an electrocardiogram (ECG) at intervals determined by the provider.  
 
-Colorectal cancer screenings should be done for adults age 54-65 years with normal risk. There are a number of acceptable methods of screening for this type of cancer. Each test has its own benefits and drawbacks. Discuss with your provider what is most appropriate for you during your annual wellness visit. The different tests include: colonoscopy (considered the best screening method), a fecal occult blood test, a fecal DNA test, and sigmoidoscopy. 
 
-All adults born [facility] should be screened once for Hepatitis C. 
 
-An Abdominal Aortic Aneurysm (AAA) Screening is recommended for men age 73-68 who has ever smoked in their lifetime.   
 
Here is a list of your current Health Maintenance items (your personalized list of preventive services) with a due date: 
Health Maintenance Due Topic Date Due  Shingles Vaccine  10/18/1991  Hemoglobin A1C    04/24/2018 [facility] Diabetic Foot Care  07/31/2018 [facility] Annual Well Visit  08/01/2018  Flu Vaccine  08/01/2018

## 2018-08-01 NOTE — PROGRESS NOTES
This is the Subsequent Medicare Annual Wellness Exam, performed 12 months or more after the Initial AWV or the last Subsequent AWV As well as for hot follow-up of his health issues. He has been doing reasonably well. He continues to have dyspnea on exertion as well as cough associated with sputum that is thick and tan to gray in color. Denies any hemoptysis. Denies fevers or chills. Denies any nausea vomiting. Denies any change in bowel or bladder habits. He is up-to-date seen the cardiologist and will have a stress test this week. Denies any PND or orthopnea. Blood sugars have been running between 90 and 215. I have reviewed the patient's medical history in detail and updated the computerized patient record. History Past Medical History:  
Diagnosis Date  Arrhythmia   
 irregular heart beat  Asthma  CAD (coronary artery disease)  Chronic hip pain  COPD (chronic obstructive pulmonary disease) ([facility] 9/10/2010  Diabetes ([facility]  DJD (degenerative joint disease)  GERD (gastroesophageal reflux disease)  HNP (herniated nucleus pulposus) 6/2006  
 lumbar  Hypercholesterolemia  Hypertension  Nausea & vomiting  Prostate cancer [facility]) 2003  Reversible ischemic neurologic deficit ([facility] [address] 1990  
 suspected with no residual effects and no recurrance  TIA (transient ischemic attack) TIA- no deficiets Past Surgical History:  
Procedure Laterality Date  COLONOSCOPY  12/15/2004 Diverticulosis Dr. Liana Cummings repeat 10 years  HX COLONOSCOPY  2005  HX HEART CATHETERIZATION    
 2  stents  HX HERNIA REPAIR  9/2008  
 left inguinal hernia repair by Dr. Nato Blackwood  HX HERNIA REPAIR Right   
 inguinal hernai repair  HX HERNIA REPAIR    
 umbilical hernia repair  HX HIP REPLACEMENT Right 07/06/2016  HX PROSTATECTOMY  2003  HX TONSILLECTOMY  TOTAL HIP ARTHROPLASTY Left 2/9/11 Dr. Mireya Holly at [facility] Current Outpatient Prescriptions Medication Sig Dispense Refill  glucose blood VI test strips (ONETOUCH ULTRA BLUE TEST STRIP) strip by Does Not Apply route Daily (before breakfast). E11.9 100 Strip 3  
 metFORMIN ER (GLUCOPHAGE XR) 500 mg tablet TAKE 3 TABLETS BY MOUTH EVERY DAY WITH DINNER 270 Tab 1  
 glipiZIDE (GLUCOTROL) 5 mg tablet TAKE 1 TABLET BY MOUTH EVERY DAY 90 Tab 3  
 metoprolol succinate (TOPROL-XL) 25 mg XL tablet TAKE 1/2 TABLET BY MOUTH EVERY DAY 45 Tab 3  
 lisinopril (PRINIVIL, ZESTRIL) 2.5 mg tablet Take 1 Tab by mouth daily. TAKE 1 TABLET BY MOUTH EVERY DAY 90 Tab 3  
 albuterol (PROVENTIL HFA, VENTOLIN HFA, PROAIR HFA) 90 mcg/actuation inhaler Take 2 Puffs by inhalation every four (4) hours as needed for Wheezing. 1 Inhaler 5  
 simvastatin (ZOCOR) 20 mg tablet TAKE 1 TABLET BY MOUTH NIGHTLY 90 Tab 2  
 tiotropium bromide (SPIRIVA RESPIMAT) 2.5 mcg/actuation inhaler Take 2 Puffs by inhalation daily.  coenzyme q10 (CO Q-10) 10 mg cap Take  by mouth.  cinnamon bark (CINNAMON) 500 mg cap Take  by mouth.  MAGNESIUM PO Take  by mouth daily.  acetaminophen (TYLENOL) 500 mg tablet Take 1 tablet by mouth every eight (8) hours as needed for Pain. 30 tablet 0  
 CHOLECALCIFEROL, VITAMIN D3, (VITAMIN D3 PO) Take 1,000 Units by mouth daily.  multivitamins-minerals-lutein (CENTRUM SILVER) Tab Take 1 Tab by mouth daily.  fexofenadine (ALLEGRA) 180 mg tablet Take 1 Tab by mouth daily. 30 Tab 11  
 famotidine (PEPCID AC) 20 mg tablet Take 20 mg by mouth daily.  aspirin (ASPIRIN) 325 mg tablet Take 325 mg by mouth daily.  VITAMIN B COMPLEX (B COMPLEX PO) Take 1 Tab by mouth daily.  fluticasone-salmeterol (ADVAIR DISKUS) 250-50 mcg/Dose diskus inhaler Take 1 Puff by inhalation every twelve (12) hours.  ASCORBIC ACID (VITAMIN C PO) Take 1,000 mg by mouth daily. No Known Allergies Family History Problem Relation Age of Onset  Cancer Mother Breast cancer  Lung Disease Mother Emphysema  Cancer Father Bladder cancer  Lung Disease Father Emphysema  Hypertension Sister 2015 Social History Substance Use Topics  Smoking status: Former Smoker Packs/day: 3.00 Years: 10.00 Types: Cigarettes Quit date: 1/15/1970  Smokeless tobacco: Never Used  Alcohol use No  
 
Patient Active Problem List  
Diagnosis Code  GERD (gastroesophageal reflux disease) K21.9  Low back pain M54.5  Pure hypercholesterolemia E78.00  
 COPD (chronic obstructive pulmonary disease) ([facility] [address] J44.9  Osteoarthritis of hip M16.9  Status post hip replacement F43.983  Personal history of prostate cancer Z85.46  
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  
 SOBOE (shortness of breath on exertion) R06.02  
 Advance directive discussed with patient Z71.89  Degenerative joint disease of right hip M16.11  
 Primary localized osteoarthrosis of right hip M16.11  
 Diabetic peripheral neuropathy associated with type 2 diabetes mellitus (HCC) E11.42  
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  
 Bilateral carotid artery stenosis I65.23  
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  
 Lumbar back pain with radiculopathy affecting right lower extremity M54.17  Type 2 diabetes mellitus with nephropathy ([address] E11.21  
ROS - Per HPI Physical Examination: General appearance - alert, well appearing, and in no distress Eyes - pupils equal and reactive, extraocular eye movements intact Ears - bilateral TM's and external ear canals normal 
Nose - normal and patent, no erythema, discharge or polyps Mouth - mucous membranes moist, pharynx normal without lesions Neck - supple, no significant adenopathy Lymphatics - no palpable lymphadenopathy, no hepatosplenomegaly Chest -use rhonchi. No obvious wheeze. No rales. Heart - normal rate, regular rhythm, normal S1, S2, no murmurs, rubs, clicks or gallops Abdomen - soft, nontender, nondistended, no masses or organomegaly Back exam - full range of motion, no tenderness, palpable spasm or pain on motion Neurological - alert, oriented, normal speech, no focal findings or movement disorder noted Musculoskeletal - no joint tenderness, deformity or swelling Extremities - peripheral pulses normal, no pedal edema, no clubbing or cyanosis Diabetic foot exam performed by Polo Randhawa MD  
 
Measurement  Response Nurse Comment Physician Comment Monofilament  R - 6/6 L - 6/6 Pulse DP R - present L - present Pulse TP R - present L - present Structural deformity R - None L - None Skin Integrity / Deformity R - None L - None Reviewed by:   
 
  
 
 
 
 
Depression Risk Factor Screening: PHQ over the last two weeks 8/1/2018 Little interest or pleasure in doing things Not at all Feeling down, depressed, irritable, or hopeless Not at all Total Score PHQ 2 0 Alcohol Risk Factor Screening: You do not drink alcohol or very rarely. Functional Ability and Level of Safety:  
Hearing Loss Hearing is good. Activities of Daily Living The home contains: no safety equipment. Patient does total self care Fall Risk Fall Risk Assessment, last 12 mths 8/1/2018 Able to walk? Yes Fall in past 12 months?  Yes Fall with injury? Yes  
Number of falls in past 12 months 4 Fall Risk Score 5 Abuse Screen Patient is not abused Cognitive Screening Evaluation of Cognitive Function: 
Has your family/caregiver stated any concerns about your memory: no 
 
 
Patient Care Team  
Patient Care Team: 
Patti Randhawa MD as PCP - General (Internal Medicine) Gerardo Watson MD (Ophthalmology) Tamy Conteh MD (Pulmonary Disease) Camilo Loja MD (Dermatology) Junior Machado MD (Urology) Joan Dillard MD (Orthopedic Surgery) Delvin Kruger, KARI as Ambulatory Care Navigator (Internal Medicine) Ester Khan MD (Cardiology) Assessment/Plan Education and counseling provided: 
Are appropriate based on today's review and evaluation End-of-Life planning (with patient's consent) Diagnoses and all orders for this visit: 
 
1. Medicare annual wellness visit, subsequent 
-     varicella-zoster recombinant, PF, (SHINGRIX, PF,) 50 mcg/0.5 mL susr injection; 0.5 mL by IntraMUSCular route once for 1 dose. 2. Chronic obstructive pulmonary disease, unspecified COPD type ([facility] -? With chronic bronchitis. At this point we will culture his sputum and treated as needed. -     CULTURE, FUNGUS 
-     ACID FAST SMEAR+CULTURE 
-     AEROBIC BACTERIAL CULTURE 3. Coronary artery disease involving native coronary artery of native heart without angina pectoris -per cardiology. 4. Diabetic peripheral neuropathy associated with type 2 diabetes mellitus ([facility] 
-     CBC WITH AUTOMATED DIFF 
-     METABOLIC PANEL, COMPREHENSIVE 
-     TSH RFX ON ABNORMAL TO FREE T4 
-     UA/M W/RFLX CULTURE, ROUTINE 
-     HEMOGLOBIN A1C WITH EAG 
-      DIABETES FOOT EXAM 
-     REFERRAL TO PODIATRY 5. Type 2 diabetes mellitus with nephropathy ([facility] -appears controlled. Will check labs and adjust meds as needed. -     REFERRAL TO PODIATRY 6. Pure hypercholesterolemia -LDL goal of 70.   Will check to be sure this is met. Tolerating statins well. -     LIPID PANEL 7. B12 deficiency -repleted on previous labs. 8. Ataxia 9. Personal history of prostate cancer -     PSA TOTAL (REFLEX TO FREE) Follow-up Disposition: Not on File Advised him to call back or return to office if symptoms worsen/change/persist. 
Discussed expected course/resolution/complications of diagnosis in detail with patient. Medication risks/benefits/costs/interactions/alternatives discussed with patient. He was given an after visit summary which includes diagnoses, current medications, & vitals. He expressed understanding with the diagnosis and plan. Health Maintenance Due Topic Date Due  
 ZOSTER VACCINE AGE 60>  10/18/1991  
 HEMOGLOBIN A1C Q6M  04/24/2018  
 FOOT EXAM Q1  07/31/2018  MEDICARE YEARLY EXAM  08/01/2018  Influenza Age 5 to Adult  08/01/2018

## 2018-08-02 ENCOUNTER — CLINICAL SUPPORT (OUTPATIENT)
Dept: CARDIOLOGY CLINIC | Age: 83
End: 2018-08-02

## 2018-08-02 DIAGNOSIS — R93.1 ABNORMAL NUCLEAR CARDIAC IMAGING TEST: Primary | ICD-10-CM

## 2018-08-02 DIAGNOSIS — R06.09 DOE (DYSPNEA ON EXERTION): ICD-10-CM

## 2018-08-02 DIAGNOSIS — I25.118 CORONARY ARTERY DISEASE OF NATIVE ARTERY OF NATIVE HEART WITH STABLE ANGINA PECTORIS (HCC): ICD-10-CM

## 2018-08-03 NOTE — PROGRESS NOTES
[facility] abnormal; f/u to discuss;;  Ignore previous message, it looks similar to how it did prior to fixing

## 2018-08-03 NOTE — PROGRESS NOTES
Verified patient with two identifiers. Spoke with Polo Hunt on HIPAA regarding test results. Adilia, please call patient to schedule a f/u with Dr. Joseph Treadwell in the next week or so.   Thanks

## 2018-08-06 RX ORDER — SIMVASTATIN 20 MG/1
TABLET, FILM COATED ORAL
Qty: 90 TAB | Refills: 1 | Status: SHIPPED | OUTPATIENT
Start: 2018-08-06 | End: 2018-11-07 | Stop reason: SDUPTHER

## 2018-08-06 NOTE — TELEPHONE ENCOUNTER
Orders Placed This Encounter    simvastatin (ZOCOR) 20 mg tablet     Sig: TAKE 1 TABLET BY MOUTH NIGHTLY     Dispense:  90 Tab     Refill:  1     The above medication refills were approved via verbal order by Dr. Laya Gilbert III.

## 2018-08-07 ENCOUNTER — HOSPITAL ENCOUNTER (OUTPATIENT)
Dept: LAB | Age: 83
Discharge: HOME | End: 2018-08-07
Payer: MEDICARE

## 2018-08-07 PROCEDURE — 84443 ASSAY THYROID STIM HORMONE: CPT

## 2018-08-07 PROCEDURE — 36415 COLL VENOUS BLD VENIPUNCTURE: CPT

## 2018-08-07 PROCEDURE — 81001 URINALYSIS AUTO W/SCOPE: CPT

## 2018-08-07 PROCEDURE — 83036 HEMOGLOBIN GLYCOSYLATED A1C: CPT

## 2018-08-07 PROCEDURE — 80053 COMPREHEN METABOLIC PANEL: CPT

## 2018-08-07 PROCEDURE — 84154 ASSAY OF PSA FREE: CPT

## 2018-08-07 PROCEDURE — 80061 LIPID PANEL: CPT

## 2018-08-07 PROCEDURE — 85025 COMPLETE CBC W/AUTO DIFF WBC: CPT

## 2018-08-09 LAB
ALBUMIN SERPL-MCNC: 4.1 G/DL (ref 3.5–4.7)
ALBUMIN/GLOB SERPL: 2 {RATIO} (ref 1.2–2.2)
ALP SERPL-CCNC: 63 IU/L (ref 39–117)
ALT SERPL-CCNC: 14 IU/L (ref 0–44)
APPEARANCE UR: CLEAR
AST SERPL-CCNC: 22 IU/L (ref 0–40)
BACTERIA #/AREA URNS HPF: NORMAL /[HPF]
BASOPHILS # BLD AUTO: 0.1 X10E3/UL (ref 0–0.2)
BASOPHILS NFR BLD AUTO: 1 %
BILIRUB SERPL-MCNC: 0.3 MG/DL (ref 0–1.2)
BILIRUB UR QL STRIP: NEGATIVE
BUN SERPL-MCNC: 19 MG/DL (ref 8–27)
BUN/CREAT SERPL: 17 (ref 10–24)
CALCIUM SERPL-MCNC: 9.2 MG/DL (ref 8.6–10.2)
CASTS URNS QL MICRO: NORMAL /LPF
CHLORIDE SERPL-SCNC: 105 MMOL/L (ref 96–106)
CHOLEST SERPL-MCNC: 127 MG/DL (ref 100–199)
CO2 SERPL-SCNC: 21 MMOL/L (ref 20–29)
COLOR UR: YELLOW
CREAT SERPL-MCNC: 1.14 MG/DL (ref 0.76–1.27)
EOSINOPHIL # BLD AUTO: 1.1 X10E3/UL (ref 0–0.4)
EOSINOPHIL NFR BLD AUTO: 15 %
EPI CELLS #/AREA URNS HPF: NORMAL /HPF
ERYTHROCYTE [DISTWIDTH] IN BLOOD BY AUTOMATED COUNT: 14.5 % (ref 12.3–15.4)
EST. AVERAGE GLUCOSE BLD GHB EST-MCNC: 143 MG/DL
GLOBULIN SER CALC-MCNC: 2.1 G/DL (ref 1.5–4.5)
GLUCOSE SERPL-MCNC: 123 MG/DL (ref 65–99)
GLUCOSE UR QL: NEGATIVE
HBA1C MFR BLD: 6.6 % (ref 4.8–5.6)
HCT VFR BLD AUTO: 43.3 % (ref 37.5–51)
HDLC SERPL-MCNC: 40 MG/DL
HGB BLD-MCNC: 14.3 G/DL (ref 13–17.7)
HGB UR QL STRIP: NEGATIVE
IMM GRANULOCYTES # BLD: 0 X10E3/UL (ref 0–0.1)
IMM GRANULOCYTES NFR BLD: 0 %
KETONES UR QL STRIP: NEGATIVE
LDLC SERPL CALC-MCNC: 70 MG/DL (ref 0–99)
LEUKOCYTE ESTERASE UR QL STRIP: NEGATIVE
LYMPHOCYTES # BLD AUTO: 1.9 X10E3/UL (ref 0.7–3.1)
LYMPHOCYTES NFR BLD AUTO: 28 %
MCH RBC QN AUTO: 31.2 PG (ref 26.6–33)
MCHC RBC AUTO-ENTMCNC: 33 G/DL (ref 31.5–35.7)
MCV RBC AUTO: 94 FL (ref 79–97)
MICRO URNS: NORMAL
MICRO URNS: NORMAL
MONOCYTES # BLD AUTO: 0.8 X10E3/UL (ref 0.1–0.9)
MONOCYTES NFR BLD AUTO: 11 %
NEUTROPHILS # BLD AUTO: 3 X10E3/UL (ref 1.4–7)
NEUTROPHILS NFR BLD AUTO: 45 %
NITRITE UR QL STRIP: NEGATIVE
PH UR STRIP: 5.5 [PH] (ref 5–7.5)
PLATELET # BLD AUTO: 262 X10E3/UL (ref 150–379)
POTASSIUM SERPL-SCNC: 5.1 MMOL/L (ref 3.5–5.2)
PROT SERPL-MCNC: 6.2 G/DL (ref 6–8.5)
PROT UR QL STRIP: NEGATIVE
PSA SERPL-MCNC: <0.1 NG/ML (ref 0–4)
RBC # BLD AUTO: 4.59 X10E6/UL (ref 4.14–5.8)
RBC #/AREA URNS HPF: NORMAL /HPF
REFLEX CRITERIA: NORMAL
SODIUM SERPL-SCNC: 140 MMOL/L (ref 134–144)
SP GR UR: 1.01 (ref 1–1.03)
TRIGL SERPL-MCNC: 84 MG/DL (ref 0–149)
TSH SERPL DL<=0.005 MIU/L-ACNC: 3.23 UIU/ML (ref 0.45–4.5)
URINALYSIS REFLEX, 377202: NORMAL
UROBILINOGEN UR STRIP-MCNC: 0.2 MG/DL (ref 0.2–1)
VLDLC SERPL CALC-MCNC: 17 MG/DL (ref 5–40)
WBC # BLD AUTO: 6.8 X10E3/UL (ref 3.4–10.8)
WBC #/AREA URNS HPF: NORMAL /HPF

## 2018-08-10 ENCOUNTER — OFFICE VISIT (OUTPATIENT)
Dept: CARDIOLOGY CLINIC | Age: 83
End: 2018-08-10

## 2018-08-10 VITALS
HEART RATE: 75 BPM | HEIGHT: 69 IN | DIASTOLIC BLOOD PRESSURE: 62 MMHG | BODY MASS INDEX: 28.11 KG/M2 | OXYGEN SATURATION: 93 % | WEIGHT: 189.8 LBS | RESPIRATION RATE: 16 BRPM | SYSTOLIC BLOOD PRESSURE: 114 MMHG

## 2018-08-10 DIAGNOSIS — I25.10 ASHD (ARTERIOSCLEROTIC HEART DISEASE): ICD-10-CM

## 2018-08-10 DIAGNOSIS — Z95.5 S/P DRUG ELUTING CORONARY STENT PLACEMENT: ICD-10-CM

## 2018-08-10 DIAGNOSIS — E11.42 DIABETIC PERIPHERAL NEUROPATHY ASSOCIATED WITH TYPE 2 DIABETES MELLITUS (HCC): ICD-10-CM

## 2018-08-10 DIAGNOSIS — R06.02 SOBOE (SHORTNESS OF BREATH ON EXERTION): Primary | ICD-10-CM

## 2018-08-10 RX ORDER — ISOSORBIDE MONONITRATE 30 MG/1
15 TABLET, EXTENDED RELEASE ORAL DAILY
Qty: 30 TAB | Refills: 0 | Status: SHIPPED | OUTPATIENT
Start: 2018-08-10 | End: 2018-10-02 | Stop reason: SDUPTHER

## 2018-08-10 RX ORDER — NITROGLYCERIN 0.4 MG/1
0.4 TABLET SUBLINGUAL
Qty: 1 BOTTLE | Refills: 0 | Status: SHIPPED | OUTPATIENT
Start: 2018-08-10

## 2018-08-10 RX ORDER — LANOLIN ALCOHOL/MO/W.PET/CERES
400 CREAM (GRAM) TOPICAL DAILY
COMMUNITY

## 2018-08-10 NOTE — PROGRESS NOTES
8/10/2018: Reviewed nuclear images with Dr Brayan Brown on study date in 2014 as well as most recent NST. Comparison study date on NST report corrected to 7/2014. The images look identical and there is a small degree of inferior reversibility on the most recent scan, pt required PTCA stenting of the RCA in 2014, stress images look identical, proceed with cath as planned.

## 2018-08-10 NOTE — PROGRESS NOTES
1. Have you been to the ER, urgent care clinic since your last visit? Hospitalized since your last visit? NO    2. Have you seen or consulted any other health care providers outside of the [address] [place] [address] since your last visit? Include any pap smears or colon screening.  NO    Chief Complaint   Patient presents with    Results     STRESS TEST

## 2018-08-10 NOTE — PROGRESS NOTES
Chelsea Couch DNP, ANP-BC  Subjective/HPI:     Edy Post is a 80 y.o. male is here for test results follow-up. Abnormal nuclear stress test showing large inferior wall fixed defect consistent with myocardial infarction. In reviewing patient's stress test from 2014 receives PTCA stenting in the RCA secondary to a large reversible defect. Symptom wise he reports continued limiting dyspnea on exertion and episodes of fatigue. This has been gradual in the last 6 months, patient's wife endorses she does notice him becoming dyspneic with minimal activities. Patient's presenting symptoms in 2014 he does recall was dyspnea.       PCP Provider  Ean Dee MD  Past Medical History:   Diagnosis Date    Arrhythmia     irregular heart beat    Asthma     CAD (coronary artery disease)     Chronic hip pain     COPD (chronic obstructive pulmonary disease) ([address] 9/10/2010    Diabetes ([address]     DJD (degenerative joint disease)     GERD (gastroesophageal reflux disease)     HNP (herniated nucleus pulposus) 6/2006    lumbar     Hypercholesterolemia     Hypertension     Nausea & vomiting     Prostate cancer ([address] 2003    Reversible ischemic neurologic deficit ([address] 1990    suspected with no residual effects and no recurrance    TIA (transient ischemic attack)     TIA- no deficiets      Past Surgical History:   Procedure Laterality Date    COLONOSCOPY  12/15/2004    Diverticulosis Dr. Azucena Walter repeat 10 years    HX COLONOSCOPY  2005    HX HEART CATHETERIZATION      2  stents     HX HERNIA REPAIR  9/2008    left inguinal hernia repair by Dr. Mariah Padilla Right     inguinal hernai repair    HX HERNIA REPAIR      umbilical hernia repair    HX HIP REPLACEMENT Right 07/06/2016    HX PROSTATECTOMY  2003    HX TONSILLECTOMY      TOTAL HIP ARTHROPLASTY Left 2/9/11    Dr. Chandler Zuniga at [facility]     No Known Allergies   Family History   Problem Relation Age of Onset    Cancer Mother Breast cancer    Lung Disease Mother      Emphysema    Cancer Father      Bladder cancer    Lung Disease Father      Emphysema    Hypertension Sister      2015      Current Outpatient Prescriptions   Medication Sig    folic acid 318 mcg tablet Take 400 mcg by mouth daily.  isosorbide mononitrate ER (IMDUR) 30 mg tablet Take 0.5 Tabs by mouth daily.  nitroglycerin (NITROSTAT) 0.4 mg SL tablet 1 Tab by SubLINGual route every five (5) minutes as needed for Chest Pain.  simvastatin (ZOCOR) 20 mg tablet TAKE 1 TABLET BY MOUTH NIGHTLY    glucose blood VI test strips (ONETOUCH ULTRA BLUE TEST STRIP) strip by Does Not Apply route Daily (before breakfast). E11.9    metFORMIN ER (GLUCOPHAGE XR) 500 mg tablet TAKE 3 TABLETS BY MOUTH EVERY DAY WITH DINNER    glipiZIDE (GLUCOTROL) 5 mg tablet TAKE 1 TABLET BY MOUTH EVERY DAY    metoprolol succinate (TOPROL-XL) 25 mg XL tablet TAKE 1/2 TABLET BY MOUTH EVERY DAY    lisinopril (PRINIVIL, ZESTRIL) 2.5 mg tablet Take 1 Tab by mouth daily. TAKE 1 TABLET BY MOUTH EVERY DAY    albuterol (PROVENTIL HFA, VENTOLIN HFA, PROAIR HFA) 90 mcg/actuation inhaler Take 2 Puffs by inhalation every four (4) hours as needed for Wheezing.  tiotropium bromide (SPIRIVA RESPIMAT) 2.5 mcg/actuation inhaler Take 2 Puffs by inhalation daily.  coenzyme q10 (CO Q-10) 10 mg cap Take  by mouth.  MAGNESIUM PO Take  by mouth daily.  acetaminophen (TYLENOL) 500 mg tablet Take 1 tablet by mouth every eight (8) hours as needed for Pain.  CHOLECALCIFEROL, VITAMIN D3, (VITAMIN D3 PO) Take 1,000 Units by mouth daily.  multivitamins-minerals-lutein (CENTRUM SILVER) Tab Take 1 Tab by mouth daily.  fexofenadine (ALLEGRA) 180 mg tablet Take 1 Tab by mouth daily.  famotidine (PEPCID AC) 20 mg tablet Take 20 mg by mouth daily.  aspirin (ASPIRIN) 325 mg tablet Take 325 mg by mouth daily.  VITAMIN B COMPLEX (B COMPLEX PO) Take 1 Tab by mouth daily.     fluticasone-salmeterol (ADVAIR DISKUS) 250-50 mcg/Dose diskus inhaler Take 1 Puff by inhalation every twelve (12) hours.  ASCORBIC ACID (VITAMIN C PO) Take 1,000 mg by mouth daily.  cinnamon bark (CINNAMON) 500 mg cap Take  by mouth. No current facility-administered medications for this visit. Vitals:    08/10/18 1423 08/10/18 1433   BP: 112/68 114/62   Pulse: 75    Resp: 16    SpO2: 93%    Weight: 189 lb 12.8 oz (86.1 kg)    Height: 5' 9\" (1.753 m)      Social History     Social History    Marital status:      Spouse name: N/A    Number of children: N/A    Years of education: N/A     Occupational History    Not on file. Social History Main Topics    Smoking status: Former Smoker     Packs/day: 3.00     Years: 10.00     Types: Cigarettes     Quit date: 1/15/1970    Smokeless tobacco: Never Used    Alcohol use No    Drug use: Yes     Special: Prescription, OTC    Sexual activity: Yes     Partners: Female     Birth control/ protection: None     Other Topics Concern    Not on file     Social History Narrative       I have reviewed the nurses notes, vitals, problem list, allergy list, medical history, family, social history and medications. Review of Symptoms:    General: Pt denies excessive weight gain or loss. Pt is able to conduct ADL's  HEENT: Denies blurred vision, headaches, epistaxis and difficulty swallowing. Respiratory: Denies resting shortness of breath, + MCGUIRE, no wheezing or stridor. Cardiovascular: Denies precordial pain, palpitations, edema or PND  Gastrointestinal: Denies poor appetite, indigestion, abdominal pain or blood in stool  Musculoskeletal: Denies pain or swelling from muscles or joints  Neurologic: Denies tremor, paresthesias, or sensory motor disturbance  Skin: Denies rash, itching or texture change. Physical Exam:      General: Well developed, in no acute distress, cooperative and alert  HEENT: No carotid bruits, no JVD, trach is midline.  Neck Supple, PEERL, EOM intact. Heart:  Normal S1/S2 negative S3 or S4. Regular, no murmur, gallop or rub.   Respiratory: Diminished bilaterally at the bases with scant expiratory rales no rhonchi  Abdomen:   Soft, non-tender, no masses, bowel sounds are active.   Extremities:  No edema, normal cap refill, no cyanosis, atraumatic. Neuro: A&Ox3, speech clear, gait stable. Skin: Skin color is normal. No rashes or lesions. Non diaphoretic  Vascular: 2+ pulses symmetric in all extremities    Cardiographics    ECG  Results for orders placed or performed during the hospital encounter of 01/07/17   EKG, 12 LEAD, INITIAL   Result Value Ref Range    Ventricular Rate 67 BPM    Atrial Rate 67 BPM    P-R Interval 196 ms    QRS Duration 88 ms    Q-T Interval 386 ms    QTC Calculation (Bezet) 407 ms    Calculated P Axis 83 degrees    Calculated R Axis 45 degrees    Calculated T Axis 11 degrees    Diagnosis       Normal sinus rhythm  Normal ECG  When compared with ECG of 08-AUG-2014 02:14,  No significant change was found  Confirmed by Cathleen Class ([id number]) on 1/7/2017 3:49:25 PM           Cardiology Labs:  Lab Results   Component Value Date/Time    Cholesterol, total 127 08/07/2018 08:55 AM    HDL Cholesterol 40 08/07/2018 08:55 AM    LDL, calculated 70 08/07/2018 08:55 AM    Triglyceride 84 08/07/2018 08:55 AM    CHOL/HDL Ratio 3.0 05/05/2010 09:01 AM       Lab Results   Component Value Date/Time    Sodium 140 08/07/2018 08:55 AM    Potassium 5.1 08/07/2018 08:55 AM    Chloride 105 08/07/2018 08:55 AM    CO2 21 08/07/2018 08:55 AM    Anion gap 10 07/07/2016 05:11 AM    Glucose 123 (H) 08/07/2018 08:55 AM    BUN 19 08/07/2018 08:55 AM    Creatinine 1.14 08/07/2018 08:55 AM    BUN/Creatinine ratio 17 08/07/2018 08:55 AM    GFR est AA 67 08/07/2018 08:55 AM    GFR est non-AA 58 (L) 08/07/2018 08:55 AM    Calcium 9.2 08/07/2018 08:55 AM    Bilirubin, total 0.3 08/07/2018 08:55 AM    AST (SGOT) 22 08/07/2018 08:55 AM    Alk.  phosphatase 63 08/07/2018 08:55 AM    Protein, total 6.2 08/07/2018 08:55 AM    Albumin 4.1 08/07/2018 08:55 AM    Globulin 3.7 06/21/2016 09:18 AM    A-G Ratio 2.0 08/07/2018 08:55 AM    ALT (SGPT) 14 08/07/2018 08:55 AM           Assessment:     Assessment:     Diagnoses and all orders for this visit:    1. SOBOE (shortness of breath on exertion)  -     CARDIAC CATHETERIZATION; Future  -     METABOLIC PANEL, COMPREHENSIVE  -     LIPID PANEL  -     CK  -     PROTHROMBIN TIME + INR  -     CBC WITH AUTOMATED DIFF    2. Diabetic peripheral neuropathy associated with type 2 diabetes mellitus ([facility]  -     CARDIAC CATHETERIZATION; Future  -     METABOLIC PANEL, COMPREHENSIVE  -     LIPID PANEL  -     CK  -     PROTHROMBIN TIME + INR  -     CBC WITH AUTOMATED DIFF    3. ASHD (arteriosclerotic heart disease)  -     CARDIAC CATHETERIZATION; Future  -     METABOLIC PANEL, COMPREHENSIVE  -     LIPID PANEL  -     CK  -     PROTHROMBIN TIME + INR  -     CBC WITH AUTOMATED DIFF    4. S/P drug eluting coronary stent placement  -     CARDIAC CATHETERIZATION; Future  -     METABOLIC PANEL, COMPREHENSIVE  -     LIPID PANEL  -     CK  -     PROTHROMBIN TIME + INR  -     CBC WITH AUTOMATED DIFF    Other orders  -     isosorbide mononitrate ER (IMDUR) 30 mg tablet; Take 0.5 Tabs by mouth daily. -     nitroglycerin (NITROSTAT) 0.4 mg SL tablet; 1 Tab by SubLINGual route every five (5) minutes as needed for Chest Pain. ICD-10-CM ICD-9-CM    1. SOBOE (shortness of breath on exertion) R06.02 786.05 CARDIAC CATHETERIZATION      METABOLIC PANEL, COMPREHENSIVE      LIPID PANEL      CK      PROTHROMBIN TIME + INR      CBC WITH AUTOMATED DIFF   2. Diabetic peripheral neuropathy associated with type 2 diabetes mellitus (HCC) E11.42 250.60 CARDIAC CATHETERIZATION     773.7 METABOLIC PANEL, COMPREHENSIVE      LIPID PANEL      CK      PROTHROMBIN TIME + INR      CBC WITH AUTOMATED DIFF   3.  ASHD (arteriosclerotic heart disease) I25.10 414.00 CARDIAC CATHETERIZATION METABOLIC PANEL, COMPREHENSIVE      LIPID PANEL      CK      PROTHROMBIN TIME + INR      CBC WITH AUTOMATED DIFF   4. S/P drug eluting coronary stent placement Z95.5 V45.82 CARDIAC CATHETERIZATION      METABOLIC PANEL, COMPREHENSIVE      LIPID PANEL      CK      PROTHROMBIN TIME + INR      CBC WITH AUTOMATED DIFF     Orders Placed This Encounter    METABOLIC PANEL, COMPREHENSIVE    LIPID PANEL    CK    PROTHROMBIN TIME + INR    CBC WITH AUTOMATED DIFF    CARDIAC CATHETERIZATION     Standing Status:   Future     Standing Expiration Date:   2/10/2019     Order Specific Question:   Reason for Exam:     Answer:   rca?     folic acid 405 mcg tablet     Sig: Take 400 mcg by mouth daily.  isosorbide mononitrate ER (IMDUR) 30 mg tablet     Sig: Take 0.5 Tabs by mouth daily. Dispense:  30 Tab     Refill:  0    nitroglycerin (NITROSTAT) 0.4 mg SL tablet     Si Tab by SubLINGual route every five (5) minutes as needed for Chest Pain. Dispense:  1 Bottle     Refill:  0        Plan:     Patient is a 31-year-old male presenting with increasing episodes of dyspnea on exertion which is limiting activities of daily life, nuclear stress test showing large inferior wall infarct which is new compared to 2014 nuclear stress test.  He has previously had PTCA stenting of the RCA as well as a known  in the region. Will add second antianginal therapy Imdur, discussed risks and benefits of coronary angiography with PTCA and stenting as his symptoms are consistent with angina functional class III equivalent. Patient willing to proceed. Kaycee Bateman NP    This note was created using voice recognition software. Despite editing, there may be syntax errors.

## 2018-08-10 NOTE — MR AVS SNAPSHOT
[address] 
[phone] Patient: Sanjana Gonzalez MRN:  [id number] Visit Information Date & Time Provider Department Dept. Phone Encounter #  
 8/10/2018  2:15 PM Yordan Lal, [address] [facility] [phone] Follow-up Instructions Return in about 4 weeks (around 9/7/2018). Routing History Follow-up and Disposition History Your Appointments 1/28/2019 11:40 AM  
Follow Up with Jefry Calderon MD  
[facility] at [facility]) Appt Note: Follow up $0CP tdb 1/23/18  
 [address] [facility], 
[address], [address] [address]  
[address] [facility], [address] [facility], [address] [address]  
  
    
 2/4/2019 11:00 AM  
ROUTINE CARE with Tiburcio Dejesus MD  
[facility]) Appt Note: f/u 6m  
 [address] [facility] [address] [facility] [address]  
[address] [place] Upcoming Health Maintenance Date Due ZOSTER VACCINE AGE 60> 10/18/1991 Influenza Age 5 to Adult 8/1/2018 EYE EXAM RETINAL OR DILATED Q1 10/11/2018 MICROALBUMIN Q1 12/13/2018 HEMOGLOBIN A1C Q6M 2/7/2019 FOOT EXAM Q1 8/1/2019 MEDICARE YEARLY EXAM 8/2/2019 LIPID PANEL Q1 8/7/2019 GLAUCOMA SCREENING Q2Y 10/11/2019 DTaP/Tdap/Td series (2 - Td) 10/15/2026 Allergies as of 8/10/2018  Review Complete On: 8/10/2018 By: Gary Mayorga NP No Known Allergies Current Immunizations  Reviewed on 10/9/2017 Name Date Influenza High Dose Vaccine PF 10/6/2017, 11/2/2016 Influenza Vaccine 10/27/2014, 10/1/2013 Influenza Vaccine (Quad) PF 10/12/2015 Influenza Vaccine Split 10/31/2011, 10/15/2010 Pneumococcal Conjugate (PCV-13) 4/1/2015  Rabies Immune Globulin 10/15/2016 11:44 PM  
 Rabies Vaccine IM 10/29/2016  3:14 PM, 10/22/2016  3:02 PM, 10/18/2016  1:41 PM, 10/15/2016 11:00 PM  
 TD Vaccine 10/30/2012 Tdap 10/15/2016 10:48 PM  
 ZZZ-RETIRED (DO NOT USE) Pneumococcal Vaccine (Unspecified Type) 4/1/2004, 10/1/1998 Not reviewed this visit You Were Diagnosed With   
  
 Codes Comments SOBOE (shortness of breath on exertion)    -  Primary ICD-10-CM: R06.02 
ICD-9-CM: 786.05 Diabetic peripheral neuropathy associated with type 2 diabetes mellitus (HCC)     ICD-10-CM: E11.42 
ICD-9-CM: 250.60, 357.2 ASHD (arteriosclerotic heart disease)     ICD-10-CM: I25.10 ICD-9-CM: 414.00 S/P drug eluting coronary stent placement     ICD-10-CM: Z95.5 ICD-9-CM: V45.82 Vitals BP Pulse Resp Height(growth percentile) Weight(growth percentile) SpO2  
 114/62 (BP 1 Location: Right arm, BP Patient Position: Sitting) 75 16 5' 9\" (1.753 m) 189 lb 12.8 oz (86.1 kg) 93% BMI Smoking Status 28.03 kg/m2 Former Smoker Vitals History BMI and BSA Data Body Mass Index Body Surface Area 28.03 kg/m 2 2.05 m 2 Preferred Pharmacy Pharmacy Name Phone [facility]PHARMACY #[id number]- [facility] [address] [phone] Your Updated Medication List  
  
   
This list is accurate as of 8/10/18  3:27 PM.  Always use your most recent med list.  
  
  
  
  
 acetaminophen 500 mg tablet Commonly known as:  TYLENOL Take 1 tablet by mouth every eight (8) hours as needed for Pain. ADVAIR DISKUS 250-50 mcg/dose diskus inhaler Generic drug:  fluticasone-salmeterol Take 1 Puff by inhalation every twelve (12) hours. albuterol 90 mcg/actuation inhaler Commonly known as:  PROVENTIL HFA, VENTOLIN HFA, PROAIR HFA Take 2 Puffs by inhalation every four (4) hours as needed for Wheezing. aspirin 325 mg tablet Commonly known as:  ASPIRIN Take 325 mg by mouth daily.   
  
 B COMPLEX PO  
 Take 1 Tab by mouth daily. CENTRUM SILVER Tab tablet Generic drug:  multivitamins-minerals-lutein Take 1 Tab by mouth daily. CINNAMON 500 mg Cap Generic drug:  cinnamon bark Take  by mouth. CO Q-10 10 mg Cap Generic drug:  coenzyme q10 Take  by mouth. fexofenadine 180 mg tablet Commonly known as:  John Fredrick Take 1 Tab by mouth daily. folic acid 003 mcg tablet Take 400 mcg by mouth daily. glipiZIDE 5 mg tablet Commonly known as:  GLUCOTROL  
TAKE 1 TABLET BY MOUTH EVERY DAY  
  
 glucose blood VI test strips strip Commonly known as:  ONETOUCH ULTRA BLUE TEST STRIP  
by Does Not Apply route Daily (before breakfast). E11.9  
  
 isosorbide mononitrate ER 30 mg tablet Commonly known as:  IMDUR Take 0.5 Tabs by mouth daily. lisinopril 2.5 mg tablet Commonly known as:  Kayleen Primmer Take 1 Tab by mouth daily. TAKE 1 TABLET BY MOUTH EVERY DAY  
  
 MAGNESIUM PO Take  by mouth daily. metFORMIN  mg tablet Commonly known as:  GLUCOPHAGE XR  
TAKE 3 TABLETS BY MOUTH EVERY DAY WITH DINNER  
  
 metoprolol succinate 25 mg XL tablet Commonly known as:  TOPROL-XL  
TAKE 1/2 TABLET BY MOUTH EVERY DAY  
  
 nitroglycerin 0.4 mg SL tablet Commonly known as:  NITROSTAT  
1 Tab by SubLINGual route every five (5) minutes as needed for Chest Pain. PEPCID AC 20 mg tablet Generic drug:  famotidine Take 20 mg by mouth daily. simvastatin 20 mg tablet Commonly known as:  ZOCOR  
TAKE 1 TABLET BY MOUTH NIGHTLY SPIRIVA RESPIMAT 2.5 mcg/actuation inhaler Generic drug:  tiotropium bromide Take 2 Puffs by inhalation daily. VITAMIN C PO Take 1,000 mg by mouth daily. VITAMIN D3 PO Take 1,000 Units by mouth daily. Prescriptions Sent to Pharmacy Refills  
 isosorbide mononitrate ER (IMDUR) 30 mg tablet 0 Sig: Take 0.5 Tabs by mouth daily.   
 Class: Normal  
 Pharmacy: [facility]/pharmacy #[id number]  [place], [address] [facility] AT [address] [place] [address] Ph #: [phone] Route: Oral  
 nitroglycerin (NITROSTAT) 0.4 mg SL tablet 0 Si Tab by SubLINGual route every five (5) minutes as needed for Chest Pain. Class: Normal  
 Pharmacy: [facility]/pharmacy #[id number] [place]  [address] [facility] AT [address] [place] [address] Ph #: [phone] Route: SubLINGual  
  
We Performed the Following CBC WITH AUTOMATED DIFF [95213 CPT(R)] CK [id number] CPT(R)] LIPID PANEL [03590 CPT(R)] METABOLIC PANEL, COMPREHENSIVE [82997 CPT(R)] PROTHROMBIN TIME + INR [30621 CPT(R)] Follow-up Instructions Return in about 4 weeks (around 2018). To-Do List   
 2018 Cardiac Services:  CARDIAC CATHETERIZATION Introducing [facility] SERVICES! Dear Cathy Armstrongers: Thank you for requesting a wiMAN account. Our records indicate that you already have an active wiMAN account. You can access your account anytime at [email or web address] Did you know that you can access your hospital and ER discharge instructions at any time in wiMAN? You can also review all of your test results from your hospital stay or ER visit. Additional Information If you have questions, please visit the Frequently Asked Questions section of the wiMAN website at [email or web address] Remember, wiMAN is NOT to be used for urgent needs. For medical emergencies, dial 911. Now available from your iPhone and Android! Please provide this summary of care documentation to your next provider. Your primary care clinician is listed as Luis [phone] If you have any questions after today's visit, please call [phone].

## 2018-08-14 ENCOUNTER — TELEPHONE (OUTPATIENT)
Dept: CARDIOLOGY CLINIC | Age: 83
End: 2018-08-14

## 2018-08-14 NOTE — TELEPHONE ENCOUNTER
Patient said medicine Ivelisse Guajardo prescribed isosorbide mononitrate, 1/2 pill and he is having dizzy spells, bp is running 96/50 at rest. Please call.

## 2018-08-15 LAB
ALBUMIN SERPL-MCNC: 3.9 G/DL (ref 3.5–4.7)
ALBUMIN/GLOB SERPL: 1.6 {RATIO} (ref 1.2–2.2)
ALP SERPL-CCNC: 58 IU/L (ref 39–117)
ALT SERPL-CCNC: 16 IU/L (ref 0–44)
AST SERPL-CCNC: 23 IU/L (ref 0–40)
BASOPHILS # BLD AUTO: 0.1 X10E3/UL (ref 0–0.2)
BASOPHILS NFR BLD AUTO: 1 %
BILIRUB SERPL-MCNC: 0.5 MG/DL (ref 0–1.2)
BUN SERPL-MCNC: 20 MG/DL (ref 8–27)
BUN/CREAT SERPL: 19 (ref 10–24)
CALCIUM SERPL-MCNC: 9.2 MG/DL (ref 8.6–10.2)
CHLORIDE SERPL-SCNC: 106 MMOL/L (ref 96–106)
CHOLEST SERPL-MCNC: 132 MG/DL (ref 100–199)
CK SERPL-CCNC: 109 U/L (ref 24–204)
CO2 SERPL-SCNC: 23 MMOL/L (ref 20–29)
CREAT SERPL-MCNC: 1.08 MG/DL (ref 0.76–1.27)
EOSINOPHIL # BLD AUTO: 0.9 X10E3/UL (ref 0–0.4)
EOSINOPHIL NFR BLD AUTO: 12 %
ERYTHROCYTE [DISTWIDTH] IN BLOOD BY AUTOMATED COUNT: 14.5 % (ref 12.3–15.4)
GLOBULIN SER CALC-MCNC: 2.5 G/DL (ref 1.5–4.5)
GLUCOSE SERPL-MCNC: 119 MG/DL (ref 65–99)
HCT VFR BLD AUTO: 42.9 % (ref 37.5–51)
HDLC SERPL-MCNC: 43 MG/DL
HGB BLD-MCNC: 14.3 G/DL (ref 13–17.7)
IMM GRANULOCYTES # BLD: 0 X10E3/UL (ref 0–0.1)
IMM GRANULOCYTES NFR BLD: 0 %
INR PPP: 1.1 (ref 0.8–1.2)
INTERPRETATION, 910389: NORMAL
LDLC SERPL CALC-MCNC: 69 MG/DL (ref 0–99)
LYMPHOCYTES # BLD AUTO: 1.9 X10E3/UL (ref 0.7–3.1)
LYMPHOCYTES NFR BLD AUTO: 26 %
Lab: NORMAL
MCH RBC QN AUTO: 30.8 PG (ref 26.6–33)
MCHC RBC AUTO-ENTMCNC: 33.3 G/DL (ref 31.5–35.7)
MCV RBC AUTO: 92 FL (ref 79–97)
MONOCYTES # BLD AUTO: 0.7 X10E3/UL (ref 0.1–0.9)
MONOCYTES NFR BLD AUTO: 9 %
NEUTROPHILS # BLD AUTO: 3.9 X10E3/UL (ref 1.4–7)
NEUTROPHILS NFR BLD AUTO: 52 %
PLATELET # BLD AUTO: 250 X10E3/UL (ref 150–379)
POTASSIUM SERPL-SCNC: 4.9 MMOL/L (ref 3.5–5.2)
PROT SERPL-MCNC: 6.4 G/DL (ref 6–8.5)
PROTHROMBIN TIME: 11.3 SEC (ref 9.1–12)
RBC # BLD AUTO: 4.65 X10E6/UL (ref 4.14–5.8)
SODIUM SERPL-SCNC: 143 MMOL/L (ref 134–144)
TRIGL SERPL-MCNC: 101 MG/DL (ref 0–149)
VLDLC SERPL CALC-MCNC: 20 MG/DL (ref 5–40)
WBC # BLD AUTO: 7.4 X10E3/UL (ref 3.4–10.8)

## 2018-08-15 NOTE — TELEPHONE ENCOUNTER
Spoke to patient's wife, Alesha Lopez on HIPAA using 2 identifiers. She stated her 's BP this morning at 0830 was 146/75 and 124/74 at 1100. He took his isosorbide after breakfast today and the dizziness did not last as long. Prior to today, she stated  has been taking it before breakfast and would get dizzy from breakfast until lunch. Reported BP yesterday was 96/50. She denied  having any other cardiac symptoms. Please advise.

## 2018-08-16 DIAGNOSIS — I25.10 CORONARY ARTERY DISEASE INVOLVING NATIVE CORONARY ARTERY OF NATIVE HEART WITHOUT ANGINA PECTORIS: Chronic | ICD-10-CM

## 2018-08-16 NOTE — TELEPHONE ENCOUNTER
Spoke to patient's wife Polo Hunt, on HIPAA using 2 identifiers. Per Isidro Youssef NP, she was made aware that it looks like his BP has improved. If he's feeling well, continue current dose and timing. She verbalized understanding.

## 2018-08-17 RX ORDER — LISINOPRIL 2.5 MG/1
2.5 TABLET ORAL DAILY
Qty: 90 TAB | Refills: 3 | Status: SHIPPED | OUTPATIENT
Start: 2018-08-17 | End: 2018-10-31 | Stop reason: DRUGHIGH

## 2018-08-17 NOTE — TELEPHONE ENCOUNTER
From: Adrian Nuno  To: Hussain Pollard NP  Sent: 8/16/2018 6:01 PM EDT  Subject: Medication Renewal Request    Original authorizing provider: BECKY Reyes would like a refill of the following medications:  lisinopril (PRINIVIL, ZESTRIL) 2.5 mg tablet Hussain Pollard NP]    Preferred pharmacy: [facility]/PHARMACY #[id number] [facility], [address] [facility] AT [address]    Comment:

## 2018-08-20 ENCOUNTER — HOSPITAL ENCOUNTER (OUTPATIENT)
Dept: CARDIAC CATH/INVASIVE PROCEDURES | Age: 83
Discharge: HOME | End: 2018-08-20
Attending: INTERNAL MEDICINE | Admitting: INTERNAL MEDICINE
Payer: MEDICARE

## 2018-08-20 VITALS
RESPIRATION RATE: 14 BRPM | WEIGHT: 185 LBS | OXYGEN SATURATION: 96 % | TEMPERATURE: 97.6 F | SYSTOLIC BLOOD PRESSURE: 102 MMHG | DIASTOLIC BLOOD PRESSURE: 51 MMHG | BODY MASS INDEX: 27.32 KG/M2 | HEART RATE: 57 BPM

## 2018-08-20 DIAGNOSIS — R06.02 SOBOE (SHORTNESS OF BREATH ON EXERTION): ICD-10-CM

## 2018-08-20 DIAGNOSIS — Z95.5 S/P DRUG ELUTING CORONARY STENT PLACEMENT: ICD-10-CM

## 2018-08-20 DIAGNOSIS — E11.42 DIABETIC PERIPHERAL NEUROPATHY ASSOCIATED WITH TYPE 2 DIABETES MELLITUS (HCC): ICD-10-CM

## 2018-08-20 DIAGNOSIS — I25.10 ASHD (ARTERIOSCLEROTIC HEART DISEASE): ICD-10-CM

## 2018-08-20 PROBLEM — Z98.890 S/P CARDIAC CATH: Status: ACTIVE | Noted: 2018-08-20

## 2018-08-20 LAB
ACT BLD: 411 SECS (ref 79–138)
GLUCOSE BLD STRIP.AUTO-MCNC: 134 MG/DL (ref 65–100)
GLUCOSE BLD STRIP.AUTO-MCNC: 159 MG/DL (ref 65–100)
GLUCOSE BLD STRIP.AUTO-MCNC: 239 MG/DL (ref 65–100)
SERVICE CMNT-IMP: ABNORMAL

## 2018-08-20 PROCEDURE — C1887 CATHETER, GUIDING: HCPCS

## 2018-08-20 PROCEDURE — 77030010221 HC SPLNT WR POS TELE -B

## 2018-08-20 PROCEDURE — 93458 L HRT ARTERY/VENTRICLE ANGIO: CPT

## 2018-08-20 PROCEDURE — 74011636320 HC RX REV CODE- 636/320

## 2018-08-20 PROCEDURE — C1769 GUIDE WIRE: HCPCS

## 2018-08-20 PROCEDURE — C1725 CATH, TRANSLUMIN NON-LASER: HCPCS

## 2018-08-20 PROCEDURE — 74011250637 HC RX REV CODE- 250/637: Performed by: NURSE PRACTITIONER

## 2018-08-20 PROCEDURE — 82962 GLUCOSE BLOOD TEST: CPT

## 2018-08-20 PROCEDURE — 77030019569 HC BND COMPR RAD TERU -B

## 2018-08-20 PROCEDURE — 77030008543 HC TBNG MON PRSS MRTM -A

## 2018-08-20 PROCEDURE — 85347 COAGULATION TIME ACTIVATED: CPT

## 2018-08-20 PROCEDURE — 74011250636 HC RX REV CODE- 250/636

## 2018-08-20 PROCEDURE — 77030015766

## 2018-08-20 PROCEDURE — 74011250636 HC RX REV CODE- 250/636: Performed by: INTERNAL MEDICINE

## 2018-08-20 PROCEDURE — 77030012468 HC VLV BLEEDBK CNTRL ABBT -B

## 2018-08-20 PROCEDURE — C1894 INTRO/SHEATH, NON-LASER: HCPCS

## 2018-08-20 PROCEDURE — 77030019698 HC SYR ANGI MDLON MRTM -A

## 2018-08-20 PROCEDURE — 77030019697 HC SYR ANGI INFL MRTM -B

## 2018-08-20 PROCEDURE — 74011000250 HC RX REV CODE- 250

## 2018-08-20 PROCEDURE — 77030004549 HC CATH ANGI DX PRF MRTM -A

## 2018-08-20 RX ORDER — LIDOCAINE HYDROCHLORIDE 10 MG/ML
1-30 INJECTION, SOLUTION EPIDURAL; INFILTRATION; INTRACAUDAL; PERINEURAL
Status: DISCONTINUED | OUTPATIENT
Start: 2018-08-20 | End: 2018-08-20

## 2018-08-20 RX ORDER — LIDOCAINE HYDROCHLORIDE 10 MG/ML
INJECTION, SOLUTION EPIDURAL; INFILTRATION; INTRACAUDAL; PERINEURAL
Status: COMPLETED
Start: 2018-08-20 | End: 2018-08-20

## 2018-08-20 RX ORDER — VERAPAMIL HYDROCHLORIDE 2.5 MG/ML
INJECTION, SOLUTION INTRAVENOUS
Status: COMPLETED
Start: 2018-08-20 | End: 2018-08-20

## 2018-08-20 RX ORDER — HEPARIN SODIUM 200 [USP'U]/100ML
500 INJECTION, SOLUTION INTRAVENOUS ONCE
Status: COMPLETED | OUTPATIENT
Start: 2018-08-20 | End: 2018-08-20

## 2018-08-20 RX ORDER — LANOLIN ALCOHOL/MO/W.PET/CERES
500 CREAM (GRAM) TOPICAL DAILY
COMMUNITY

## 2018-08-20 RX ORDER — ALBUTEROL SULFATE 90 UG/1
2 AEROSOL, METERED RESPIRATORY (INHALATION)
Status: DISCONTINUED | OUTPATIENT
Start: 2018-08-20 | End: 2018-08-20

## 2018-08-20 RX ORDER — HEPARIN SODIUM 1000 [USP'U]/ML
INJECTION, SOLUTION INTRAVENOUS; SUBCUTANEOUS
Status: COMPLETED
Start: 2018-08-20 | End: 2018-08-20

## 2018-08-20 RX ORDER — METOPROLOL SUCCINATE 25 MG/1
12.5 TABLET, EXTENDED RELEASE ORAL DAILY
Status: DISCONTINUED | OUTPATIENT
Start: 2018-08-20 | End: 2018-08-20 | Stop reason: HOSPADM

## 2018-08-20 RX ORDER — ACETAMINOPHEN 325 MG/1
650 TABLET ORAL
Status: DISCONTINUED | OUTPATIENT
Start: 2018-08-20 | End: 2018-08-20 | Stop reason: HOSPADM

## 2018-08-20 RX ORDER — HEPARIN SODIUM 1000 [USP'U]/ML
2500 INJECTION, SOLUTION INTRAVENOUS; SUBCUTANEOUS ONCE
Status: COMPLETED | OUTPATIENT
Start: 2018-08-20 | End: 2018-08-20

## 2018-08-20 RX ORDER — FENTANYL CITRATE 50 UG/ML
25-50 INJECTION, SOLUTION INTRAMUSCULAR; INTRAVENOUS
Status: DISCONTINUED | OUTPATIENT
Start: 2018-08-20 | End: 2018-08-20

## 2018-08-20 RX ORDER — FENTANYL CITRATE 50 UG/ML
INJECTION, SOLUTION INTRAMUSCULAR; INTRAVENOUS
Status: COMPLETED
Start: 2018-08-20 | End: 2018-08-20

## 2018-08-20 RX ORDER — SODIUM CHLORIDE 0.9 % (FLUSH) 0.9 %
5-10 SYRINGE (ML) INJECTION AS NEEDED
Status: DISCONTINUED | OUTPATIENT
Start: 2018-08-20 | End: 2018-08-20 | Stop reason: HOSPADM

## 2018-08-20 RX ORDER — ASPIRIN 81 MG/1
81 TABLET ORAL DAILY
Qty: 30 TAB | Refills: 11 | Status: SHIPPED | OUTPATIENT
Start: 2018-08-20 | End: 2018-10-09 | Stop reason: SDUPTHER

## 2018-08-20 RX ORDER — LISINOPRIL 5 MG/1
2.5 TABLET ORAL DAILY
Status: DISCONTINUED | OUTPATIENT
Start: 2018-08-21 | End: 2018-08-20

## 2018-08-20 RX ORDER — CLOPIDOGREL BISULFATE 75 MG/1
75 TABLET ORAL DAILY
Qty: 30 TAB | Refills: 11 | Status: SHIPPED | OUTPATIENT
Start: 2018-08-21 | End: 2018-10-09 | Stop reason: SDUPTHER

## 2018-08-20 RX ORDER — VERAPAMIL HYDROCHLORIDE 2.5 MG/ML
2.5 INJECTION, SOLUTION INTRAVENOUS ONCE
Status: COMPLETED | OUTPATIENT
Start: 2018-08-20 | End: 2018-08-20

## 2018-08-20 RX ORDER — SODIUM CHLORIDE 0.9 % (FLUSH) 0.9 %
5-10 SYRINGE (ML) INJECTION EVERY 8 HOURS
Status: DISCONTINUED | OUTPATIENT
Start: 2018-08-20 | End: 2018-08-20 | Stop reason: HOSPADM

## 2018-08-20 RX ORDER — CLOPIDOGREL BISULFATE 75 MG/1
75 TABLET ORAL DAILY
Status: DISCONTINUED | OUTPATIENT
Start: 2018-08-20 | End: 2018-08-20 | Stop reason: HOSPADM

## 2018-08-20 RX ORDER — ISOSORBIDE MONONITRATE 30 MG/1
15 TABLET, EXTENDED RELEASE ORAL DAILY
Status: DISCONTINUED | OUTPATIENT
Start: 2018-08-20 | End: 2018-08-20 | Stop reason: HOSPADM

## 2018-08-20 RX ORDER — HEPARIN SODIUM 1000 [USP'U]/ML
1000-10000 INJECTION, SOLUTION INTRAVENOUS; SUBCUTANEOUS
Status: DISCONTINUED | OUTPATIENT
Start: 2018-08-20 | End: 2018-08-20

## 2018-08-20 RX ORDER — FAMOTIDINE 20 MG/1
20 TABLET, FILM COATED ORAL DAILY
Status: DISCONTINUED | OUTPATIENT
Start: 2018-08-20 | End: 2018-08-20 | Stop reason: HOSPADM

## 2018-08-20 RX ORDER — MIDAZOLAM HYDROCHLORIDE 1 MG/ML
INJECTION, SOLUTION INTRAMUSCULAR; INTRAVENOUS
Status: COMPLETED
Start: 2018-08-20 | End: 2018-08-20

## 2018-08-20 RX ORDER — MIDAZOLAM HYDROCHLORIDE 1 MG/ML
.5-2 INJECTION, SOLUTION INTRAMUSCULAR; INTRAVENOUS
Status: DISCONTINUED | OUTPATIENT
Start: 2018-08-20 | End: 2018-08-20

## 2018-08-20 RX ORDER — GLIPIZIDE 5 MG/1
5 TABLET ORAL
Status: DISCONTINUED | OUTPATIENT
Start: 2018-08-21 | End: 2018-08-20

## 2018-08-20 RX ORDER — HEPARIN SODIUM 200 [USP'U]/100ML
INJECTION, SOLUTION INTRAVENOUS
Status: COMPLETED
Start: 2018-08-20 | End: 2018-08-20

## 2018-08-20 RX ADMIN — HEPARIN SODIUM 2500 UNITS: 1000 INJECTION, SOLUTION INTRAVENOUS; SUBCUTANEOUS at 08:45

## 2018-08-20 RX ADMIN — LIDOCAINE HYDROCHLORIDE 1 ML: 10 INJECTION, SOLUTION EPIDURAL; INFILTRATION; INTRACAUDAL; PERINEURAL at 08:40

## 2018-08-20 RX ADMIN — HEPARIN SODIUM IN SODIUM CHLORIDE 1000 UNITS: 200 INJECTION INTRAVENOUS at 08:30

## 2018-08-20 RX ADMIN — CLOPIDOGREL BISULFATE 75 MG: 75 TABLET ORAL at 11:52

## 2018-08-20 RX ADMIN — HEPARIN SODIUM 5000 UNITS: 1000 INJECTION INTRAVENOUS; SUBCUTANEOUS at 09:42

## 2018-08-20 RX ADMIN — VERAPAMIL HYDROCHLORIDE 2.5 MG: 2.5 INJECTION, SOLUTION INTRAVENOUS at 08:45

## 2018-08-20 RX ADMIN — MIDAZOLAM HYDROCHLORIDE 2 MG: 1 INJECTION, SOLUTION INTRAMUSCULAR; INTRAVENOUS at 08:14

## 2018-08-20 RX ADMIN — METOPROLOL SUCCINATE 12.5 MG: 25 TABLET, EXTENDED RELEASE ORAL at 11:52

## 2018-08-20 RX ADMIN — FAMOTIDINE 20 MG: 20 TABLET, FILM COATED ORAL at 11:52

## 2018-08-20 RX ADMIN — HEPARIN SODIUM 7500 UNITS: 1000 INJECTION INTRAVENOUS; SUBCUTANEOUS at 08:51

## 2018-08-20 RX ADMIN — VERAPAMIL HYDROCHLORIDE 2.5 MG: 2.5 INJECTION INTRAVENOUS at 08:45

## 2018-08-20 RX ADMIN — HEPARIN SODIUM IN SODIUM CHLORIDE 1000 UNITS: 200 INJECTION INTRAVENOUS at 08:29

## 2018-08-20 RX ADMIN — MIDAZOLAM HYDROCHLORIDE 1 MG: 1 INJECTION, SOLUTION INTRAMUSCULAR; INTRAVENOUS at 09:11

## 2018-08-20 RX ADMIN — FENTANYL CITRATE 25 MCG: 50 INJECTION, SOLUTION INTRAMUSCULAR; INTRAVENOUS at 08:14

## 2018-08-20 RX ADMIN — ISOSORBIDE MONONITRATE 15 MG: 30 TABLET, EXTENDED RELEASE ORAL at 11:51

## 2018-08-20 RX ADMIN — IOPAMIDOL 110 ML: 755 INJECTION, SOLUTION INTRAVENOUS at 10:37

## 2018-08-20 RX ADMIN — HEPARIN SODIUM 1000 UNITS: 200 INJECTION, SOLUTION INTRAVENOUS at 08:29

## 2018-08-20 RX ADMIN — MIDAZOLAM 2 MG: 1 INJECTION INTRAMUSCULAR; INTRAVENOUS at 08:14

## 2018-08-20 RX ADMIN — HEPARIN SODIUM 2500 UNITS: 1000 INJECTION INTRAVENOUS; SUBCUTANEOUS at 08:45

## 2018-08-20 RX ADMIN — NITROGLYCERIN 200 MCG: 5 INJECTION, SOLUTION INTRAVENOUS at 08:46

## 2018-08-20 NOTE — IP AVS SNAPSHOT
[address] 
[phone] Patient: Ortiz Oropeza MRN: [id number] SIN:74/52/3137 A check simon indicates which time of day the medication should be taken. My Medications START taking these medications Instructions Each Dose to Equal  
 Morning Noon Evening Bedtime  
 aspirin delayed-release 81 mg tablet Replaces:  aspirin 325 mg tablet Your last dose was: Your next dose is: Take 1 Tab by mouth daily. 81 mg  
    
   
   
   
  
 clopidogrel 75 mg Tab Commonly known as:  PLAVIX Start taking on:  8/21/2018 Your last dose was: Your next dose is: Take 1 Tab by mouth daily. 75 mg  
    
   
   
   
  
 OTHER Start taking on:  8/22/2018 Your last dose was: Your next dose is:    
   
   
 Hold metformin for 48 hours after cath. Restart 8/22. CONTINUE taking these medications Instructions Each Dose to Equal  
 Morning Noon Evening Bedtime  
 acetaminophen 500 mg tablet Commonly known as:  TYLENOL Your last dose was: Your next dose is: Take 1 tablet by mouth every eight (8) hours as needed for Pain. 500 mg ADVAIR DISKUS 250-50 mcg/dose diskus inhaler Generic drug:  fluticasone-salmeterol Your last dose was: Your next dose is: Take 1 Puff by inhalation every twelve (12) hours. 1 Puff  
    
   
   
   
  
 albuterol 90 mcg/actuation inhaler Commonly known as:  PROVENTIL HFA, VENTOLIN HFA, PROAIR HFA Your last dose was: Your next dose is: Take 2 Puffs by inhalation every four (4) hours as needed for Wheezing. 2 Puff B COMPLEX PO Your last dose was: Your next dose is: Take 1 Tab by mouth daily. 1 Tab CENTRUM SILVER Tab tablet Generic drug:  multivitamins-minerals-lutein Your last dose was: Your next dose is: Take 1 Tab by mouth daily. 1 Tab CINNAMON 500 mg Cap Generic drug:  cinnamon bark Your last dose was: Your next dose is: Take  by mouth. CO Q-10 10 mg Cap Generic drug:  coenzyme q10 Your last dose was: Your next dose is: Take  by mouth. fexofenadine 180 mg tablet Commonly known as:  Elva Cyphers Your last dose was: Your next dose is: Take 1 Tab by mouth daily. 180 mg  
    
   
   
   
  
 folic acid 910 mcg tablet Your last dose was: Your next dose is: Take 400 mcg by mouth daily. 400 mcg  
    
   
   
   
  
 glipiZIDE 5 mg tablet Commonly known as:  Nereida Ramos Your last dose was: Your next dose is: TAKE 1 TABLET BY MOUTH EVERY DAY  
     
   
   
   
  
 glucose blood VI test strips strip Commonly known as:  ONETOUCH ULTRA BLUE TEST STRIP Your last dose was: Your next dose is:    
   
   
 by Does Not Apply route Daily (before breakfast). E11.9  
     
   
   
   
  
 isosorbide mononitrate ER 30 mg tablet Commonly known as:  IMDUR Your last dose was: Your next dose is: Take 0.5 Tabs by mouth daily. 15 mg  
    
   
   
   
  
 lisinopril 2.5 mg tablet Commonly known as:  Catrachito Pedraza Your last dose was: Your next dose is: Take 1 Tab by mouth daily. 2.5 mg  
    
   
   
   
  
 MAGNESIUM PO Your last dose was: Your next dose is: Take  by mouth daily. metoprolol succinate 25 mg XL tablet Commonly known as:  TOPROL-XL Your last dose was: Your next dose is:  TAKE 1/2 TABLET BY MOUTH EVERY DAY  
     
 nitroglycerin 0.4 mg SL tablet Commonly known as:  NITROSTAT Your last dose was: Your next dose is:    
   
   
 1 Tab by SubLINGual route every five (5) minutes as needed for Chest Pain. 0.4 mg PEPCID AC 20 mg tablet Generic drug:  famotidine Your last dose was: Your next dose is: Take 20 mg by mouth daily. 20 mg  
    
   
   
   
  
 simvastatin 20 mg tablet Commonly known as:  ZOCOR Your last dose was: Your next dose is: TAKE 1 TABLET BY MOUTH NIGHTLY SPIRIVA RESPIMAT 2.5 mcg/actuation inhaler Generic drug:  tiotropium bromide Your last dose was: Your next dose is: Take 2 Puffs by inhalation daily. 2 Puff VITAMIN B-12 500 mcg tablet Generic drug:  cyanocobalamin Your last dose was: Your next dose is: Take 500 mcg by mouth daily. 500 mcg VITAMIN C PO Your last dose was: Your next dose is: Take 1,000 mg by mouth daily. 1000 mg  
    
   
   
   
  
 VITAMIN D3 PO Your last dose was: Your next dose is: Take 1,000 Units by mouth daily. 1000 Units STOP taking these medications   
 aspirin 325 mg tablet Commonly known as:  ASPIRIN Replaced by:  aspirin delayed-release 81 mg tablet ASK your doctor about these medications Instructions Each Dose to Equal  
 Morning Noon Evening Bedtime  
 metFORMIN  mg tablet Commonly known as:  GLUCOPHAGE XR Your last dose was: Your next dose is: TAKE 3 TABLETS BY MOUTH EVERY DAY WITH DINNER Where to Get Your Medications These medications were sent to [id number]- [place], [address] [facility] AT CORNER OF [address] [place] [address]  [address] [facility], [address] [address] Phone:  [phone]  
  aspirin delayed-release 81 mg tablet  
 clopidogrel 75 mg Tab Information on where to get these meds will be given to you by the nurse or doctor. ! Ask your nurse or doctor about these medications   OTHER

## 2018-08-20 NOTE — DISCHARGE INSTRUCTIONS
[address] [place] [address]  [phone]        Patient ID:  Emanuel Rodriguez  [id number]  50 y.o.  12/18/1931    Admit Date: 8/20/2018    Discharge Date: 8/20/2018     Admitting Physician: Halley Saucedo MD     Discharge Physician: Elisha Nation NP    Admission Diagnoses:   SOBOE (shortness of breath on exertion) [R06.02]  Diabetic peripheral neuropathy associated with type 2 diabetes mellitus ([address] [E11.42]  ASHD (arteriosclerotic heart disease) [I25.10]  S/P drug eluting coronary stent placement [Z95.5]    Discharge Diagnoses: Active Problems:    S/P cardiac cath (8/20/2018)      Overview: 8/20/18:  in RCA identified. Not able to be opened. Discharge Condition: Good    Cardiology Procedures this Admission:  Diagnostic left heart catheterization    Disposition: home    Reference discharge instructions provided by nursing for diet and activity. Signed:  Elisha Nation NP  8/20/2018  2:18 PM      Radial Cardiac Catheterization/Angiography Discharge Instructions    It is normal to feel tired the first couple days. Take it easy and follow the physicians instructions. CHECK THE CATHETER INSERTION SITE DAILY:    Remove the wrist dressing 24 hours after the procedure. You may shower 24 hours after the procedure. Wash with soap and water and pat dry. Gentle cleaning of the site with soap and water is sufficient, cover with a dry clean dressing or bandage. Do not apply creams or powders to the area. No soaking the wrist for 3 days. Leave the puncture site open to air after 24 hours post-procedure. CALL THE PHYSICIANS:     If the site becomes red, swollen or feels warm to the touch  If there is bleeding or drainage or if there is unusual pain at the radial site. If there is any minor oozing, you may apply a band-aid and remove after 12 hours.    If the bleeding continues, hold pressure with the middle finger against the puncture site and the thumb against the back of the wrist,call 911 to be transported to the hospital.  DO NOT DRIVE YOURSELF, Merna Ingram. ACTIVITY:   For the first 24 hours do not manipulate the wrist.  No lifting, pushing or pulling over 3-5 pounds with the affected wrist for 7 daysand no straining the insertion site. Do not life grocery bags or the garbage can, do not run the vacuum  or  for 7 days. Start with short walks as in the hospital and gradually increase as tolerated each day. It is recommended to walk 30 minutes 5-7 days per week. Follow your physicians instructions on activity. Avoid walking outside in extremes of heat or cold. Walk inside when it is cold and windy or hot and humid. Things to keep in mind:  No driving for at least 24 hours, or as designated by your physician. Limit the number of times you go up and down the stairs  Take rests and pace yourself with activity. Be careful and do not strain with bowel movements. MEDICATIONS:    Take all medications as prescribed  Call your physician if you have any questions  Keep an updated list of your medications with you at all times and give a list to your physician and pharmacist    SIGNS AND SYMPTOMS:   Be cautious of symptoms of angina or recurrent symptoms such as chest discomfort, unusual shortness of breath or fatigue. These could be symptoms of restenosis, a new blockage or a heart attack. If your symptoms are relieved with rest it is still recommended that you notify your physician of recurrent chest pain or discomfort. For CHEST PAIN or symptoms of angina not relieved with rest:  If the discomfort is not relieved with rest, and you have been prescribed Nitroglycerin, take as directed (taken under the tongue, one at a time 5 minutes apart for a total of 3 doses). If the discomfort is not relieved after the 3rd nitroglycerin, call 911.   If you have not been prescribed Nitroglycerin  and your chest discomfort is not relieved with rest, call 911. AFTER CARE:   Follow up with your physician as instructed. Follow a heart healthy diet with proper portion control, daily stress management, daily exercise, blood pressure and cholesterol control , and smoking cessation.

## 2018-08-20 NOTE — PROGRESS NOTES
I have reviewed discharge instructions with the patient. Provided education. All questions were answered. The patient verbalized understanding.

## 2018-08-20 NOTE — H&P (VIEW-ONLY)
Titus Scheuermann DNP, ANP-BC  Subjective/HPI:     Juana Menezes is a 80 y.o. male is here for test results follow-up. Abnormal nuclear stress test showing large inferior wall fixed defect consistent with myocardial infarction. In reviewing patient's stress test from 2014 receives PTCA stenting in the RCA secondary to a large reversible defect. Symptom wise he reports continued limiting dyspnea on exertion and episodes of fatigue. This has been gradual in the last 6 months, patient's wife endorses she does notice him becoming dyspneic with minimal activities. Patient's presenting symptoms in 2014 he does recall was dyspnea.       PCP Provider  Destini Cuevas MD  Past Medical History:   Diagnosis Date    Arrhythmia     irregular heart beat    Asthma     CAD (coronary artery disease)     Chronic hip pain     COPD (chronic obstructive pulmonary disease) ([address] 9/10/2010    Diabetes ([address]     DJD (degenerative joint disease)     GERD (gastroesophageal reflux disease)     HNP (herniated nucleus pulposus) 6/2006    lumbar     Hypercholesterolemia     Hypertension     Nausea & vomiting     Prostate cancer ([address] 2003    Reversible ischemic neurologic deficit ([address] 1990    suspected with no residual effects and no recurrance    TIA (transient ischemic attack)     TIA- no deficiets      Past Surgical History:   Procedure Laterality Date    COLONOSCOPY  12/15/2004    Diverticulosis Dr. Paige Montoya repeat 10 years    HX COLONOSCOPY  2005    HX HEART CATHETERIZATION      2  stents     HX HERNIA REPAIR  9/2008    left inguinal hernia repair by Dr. Nikita Almonte Right     inguinal hernai repair    HX HERNIA REPAIR      umbilical hernia repair    HX HIP REPLACEMENT Right 07/06/2016    HX PROSTATECTOMY  2003    HX TONSILLECTOMY      TOTAL HIP ARTHROPLASTY Left 2/9/11    Dr. Willis [facility] at [facility]     No Known Allergies   Family History   Problem Relation Age of Onset    Cancer Mother Breast cancer    Lung Disease Mother      Emphysema    Cancer Father      Bladder cancer    Lung Disease Father      Emphysema    Hypertension Sister      2015      Current Outpatient Prescriptions   Medication Sig    folic acid 138 mcg tablet Take 400 mcg by mouth daily.  isosorbide mononitrate ER (IMDUR) 30 mg tablet Take 0.5 Tabs by mouth daily.  nitroglycerin (NITROSTAT) 0.4 mg SL tablet 1 Tab by SubLINGual route every five (5) minutes as needed for Chest Pain.  simvastatin (ZOCOR) 20 mg tablet TAKE 1 TABLET BY MOUTH NIGHTLY    glucose blood VI test strips (ONETOUCH ULTRA BLUE TEST STRIP) strip by Does Not Apply route Daily (before breakfast). E11.9    metFORMIN ER (GLUCOPHAGE XR) 500 mg tablet TAKE 3 TABLETS BY MOUTH EVERY DAY WITH DINNER    glipiZIDE (GLUCOTROL) 5 mg tablet TAKE 1 TABLET BY MOUTH EVERY DAY    metoprolol succinate (TOPROL-XL) 25 mg XL tablet TAKE 1/2 TABLET BY MOUTH EVERY DAY    lisinopril (PRINIVIL, ZESTRIL) 2.5 mg tablet Take 1 Tab by mouth daily. TAKE 1 TABLET BY MOUTH EVERY DAY    albuterol (PROVENTIL HFA, VENTOLIN HFA, PROAIR HFA) 90 mcg/actuation inhaler Take 2 Puffs by inhalation every four (4) hours as needed for Wheezing.  tiotropium bromide (SPIRIVA RESPIMAT) 2.5 mcg/actuation inhaler Take 2 Puffs by inhalation daily.  coenzyme q10 (CO Q-10) 10 mg cap Take  by mouth.  MAGNESIUM PO Take  by mouth daily.  acetaminophen (TYLENOL) 500 mg tablet Take 1 tablet by mouth every eight (8) hours as needed for Pain.  CHOLECALCIFEROL, VITAMIN D3, (VITAMIN D3 PO) Take 1,000 Units by mouth daily.  multivitamins-minerals-lutein (CENTRUM SILVER) Tab Take 1 Tab by mouth daily.  fexofenadine (ALLEGRA) 180 mg tablet Take 1 Tab by mouth daily.  famotidine (PEPCID AC) 20 mg tablet Take 20 mg by mouth daily.  aspirin (ASPIRIN) 325 mg tablet Take 325 mg by mouth daily.  VITAMIN B COMPLEX (B COMPLEX PO) Take 1 Tab by mouth daily.     fluticasone-salmeterol (ADVAIR DISKUS) 250-50 mcg/Dose diskus inhaler Take 1 Puff by inhalation every twelve (12) hours.  ASCORBIC ACID (VITAMIN C PO) Take 1,000 mg by mouth daily.  cinnamon bark (CINNAMON) 500 mg cap Take  by mouth. No current facility-administered medications for this visit. Vitals:    08/10/18 1423 08/10/18 1433   BP: 112/68 114/62   Pulse: 75    Resp: 16    SpO2: 93%    Weight: 189 lb 12.8 oz (86.1 kg)    Height: 5' 9\" (1.753 m)      Social History     Social History    Marital status:      Spouse name: N/A    Number of children: N/A    Years of education: N/A     Occupational History    Not on file. Social History Main Topics    Smoking status: Former Smoker     Packs/day: 3.00     Years: 10.00     Types: Cigarettes     Quit date: 1/15/1970    Smokeless tobacco: Never Used    Alcohol use No    Drug use: Yes     Special: Prescription, OTC    Sexual activity: Yes     Partners: Female     Birth control/ protection: None     Other Topics Concern    Not on file     Social History Narrative       I have reviewed the nurses notes, vitals, problem list, allergy list, medical history, family, social history and medications. Review of Symptoms:    General: Pt denies excessive weight gain or loss. Pt is able to conduct ADL's  HEENT: Denies blurred vision, headaches, epistaxis and difficulty swallowing. Respiratory: Denies resting shortness of breath, + MCGUIRE, no wheezing or stridor. Cardiovascular: Denies precordial pain, palpitations, edema or PND  Gastrointestinal: Denies poor appetite, indigestion, abdominal pain or blood in stool  Musculoskeletal: Denies pain or swelling from muscles or joints  Neurologic: Denies tremor, paresthesias, or sensory motor disturbance  Skin: Denies rash, itching or texture change. Physical Exam:      General: Well developed, in no acute distress, cooperative and alert  HEENT: No carotid bruits, no JVD, trach is midline.  Neck Supple, PEERL, EOM intact. Heart:  Normal S1/S2 negative S3 or S4. Regular, no murmur, gallop or rub.   Respiratory: Diminished bilaterally at the bases with scant expiratory rales no rhonchi  Abdomen:   Soft, non-tender, no masses, bowel sounds are active.   Extremities:  No edema, normal cap refill, no cyanosis, atraumatic. Neuro: A&Ox3, speech clear, gait stable. Skin: Skin color is normal. No rashes or lesions. Non diaphoretic  Vascular: 2+ pulses symmetric in all extremities    Cardiographics    ECG  Results for orders placed or performed during the hospital encounter of 01/07/17   EKG, 12 LEAD, INITIAL   Result Value Ref Range    Ventricular Rate 67 BPM    Atrial Rate 67 BPM    P-R Interval 196 ms    QRS Duration 88 ms    Q-T Interval 386 ms    QTC Calculation (Bezet) 407 ms    Calculated P Axis 83 degrees    Calculated R Axis 45 degrees    Calculated T Axis 11 degrees    Diagnosis       Normal sinus rhythm  Normal ECG  When compared with ECG of 08-AUG-2014 02:14,  No significant change was found  Confirmed by Giovanna Redmond ([id number]) on 1/7/2017 3:49:25 PM           Cardiology Labs:  Lab Results   Component Value Date/Time    Cholesterol, total 127 08/07/2018 08:55 AM    HDL Cholesterol 40 08/07/2018 08:55 AM    LDL, calculated 70 08/07/2018 08:55 AM    Triglyceride 84 08/07/2018 08:55 AM    CHOL/HDL Ratio 3.0 05/05/2010 09:01 AM       Lab Results   Component Value Date/Time    Sodium 140 08/07/2018 08:55 AM    Potassium 5.1 08/07/2018 08:55 AM    Chloride 105 08/07/2018 08:55 AM    CO2 21 08/07/2018 08:55 AM    Anion gap 10 07/07/2016 05:11 AM    Glucose 123 (H) 08/07/2018 08:55 AM    BUN 19 08/07/2018 08:55 AM    Creatinine 1.14 08/07/2018 08:55 AM    BUN/Creatinine ratio 17 08/07/2018 08:55 AM    GFR est AA 67 08/07/2018 08:55 AM    GFR est non-AA 58 (L) 08/07/2018 08:55 AM    Calcium 9.2 08/07/2018 08:55 AM    Bilirubin, total 0.3 08/07/2018 08:55 AM    AST (SGOT) 22 08/07/2018 08:55 AM    Alk.  phosphatase 63 08/07/2018 08:55 AM    Protein, total 6.2 08/07/2018 08:55 AM    Albumin 4.1 08/07/2018 08:55 AM    Globulin 3.7 06/21/2016 09:18 AM    A-G Ratio 2.0 08/07/2018 08:55 AM    ALT (SGPT) 14 08/07/2018 08:55 AM           Assessment:     Assessment:     Diagnoses and all orders for this visit:    1. SOBOE (shortness of breath on exertion)  -     CARDIAC CATHETERIZATION; Future  -     METABOLIC PANEL, COMPREHENSIVE  -     LIPID PANEL  -     CK  -     PROTHROMBIN TIME + INR  -     CBC WITH AUTOMATED DIFF    2. Diabetic peripheral neuropathy associated with type 2 diabetes mellitus ([facility]  -     CARDIAC CATHETERIZATION; Future  -     METABOLIC PANEL, COMPREHENSIVE  -     LIPID PANEL  -     CK  -     PROTHROMBIN TIME + INR  -     CBC WITH AUTOMATED DIFF    3. ASHD (arteriosclerotic heart disease)  -     CARDIAC CATHETERIZATION; Future  -     METABOLIC PANEL, COMPREHENSIVE  -     LIPID PANEL  -     CK  -     PROTHROMBIN TIME + INR  -     CBC WITH AUTOMATED DIFF    4. S/P drug eluting coronary stent placement  -     CARDIAC CATHETERIZATION; Future  -     METABOLIC PANEL, COMPREHENSIVE  -     LIPID PANEL  -     CK  -     PROTHROMBIN TIME + INR  -     CBC WITH AUTOMATED DIFF    Other orders  -     isosorbide mononitrate ER (IMDUR) 30 mg tablet; Take 0.5 Tabs by mouth daily. -     nitroglycerin (NITROSTAT) 0.4 mg SL tablet; 1 Tab by SubLINGual route every five (5) minutes as needed for Chest Pain. ICD-10-CM ICD-9-CM    1. SOBOE (shortness of breath on exertion) R06.02 786.05 CARDIAC CATHETERIZATION      METABOLIC PANEL, COMPREHENSIVE      LIPID PANEL      CK      PROTHROMBIN TIME + INR      CBC WITH AUTOMATED DIFF   2. Diabetic peripheral neuropathy associated with type 2 diabetes mellitus ([facility]) E11.42 250.60 CARDIAC CATHETERIZATION     099.4 METABOLIC PANEL, COMPREHENSIVE      LIPID PANEL      CK      PROTHROMBIN TIME + INR      CBC WITH AUTOMATED DIFF   3.  ASHD (arteriosclerotic heart disease) I25.10 414.00 CARDIAC CATHETERIZATION METABOLIC PANEL, COMPREHENSIVE      LIPID PANEL      CK      PROTHROMBIN TIME + INR      CBC WITH AUTOMATED DIFF   4. S/P drug eluting coronary stent placement Z95.5 V45.82 CARDIAC CATHETERIZATION      METABOLIC PANEL, COMPREHENSIVE      LIPID PANEL      CK      PROTHROMBIN TIME + INR      CBC WITH AUTOMATED DIFF     Orders Placed This Encounter    METABOLIC PANEL, COMPREHENSIVE    LIPID PANEL    CK    PROTHROMBIN TIME + INR    CBC WITH AUTOMATED DIFF    CARDIAC CATHETERIZATION     Standing Status:   Future     Standing Expiration Date:   2/10/2019     Order Specific Question:   Reason for Exam:     Answer:   rca?     folic acid 836 mcg tablet     Sig: Take 400 mcg by mouth daily.  isosorbide mononitrate ER (IMDUR) 30 mg tablet     Sig: Take 0.5 Tabs by mouth daily. Dispense:  30 Tab     Refill:  0    nitroglycerin (NITROSTAT) 0.4 mg SL tablet     Si Tab by SubLINGual route every five (5) minutes as needed for Chest Pain. Dispense:  1 Bottle     Refill:  0        Plan:     Patient is a 70-year-old male presenting with increasing episodes of dyspnea on exertion which is limiting activities of daily life, nuclear stress test showing large inferior wall infarct which is new compared to 2014 nuclear stress test.  He has previously had PTCA stenting of the RCA as well as a known  in the region. Will add second antianginal therapy Imdur, discussed risks and benefits of coronary angiography with PTCA and stenting as his symptoms are consistent with angina functional class III equivalent. Patient willing to proceed. Isidro Youssef NP    This note was created using voice recognition software. Despite editing, there may be syntax errors.

## 2018-08-20 NOTE — PROGRESS NOTES
Adrian Nuno is recovering post-procedure. R radial site dressing is CDI without swelling or bleeding with TR band in place. VSS. Rhythm sinus. Adrian Nuno denies complaints at this time. If recovery continues to progress without complication, discharge is planned for later today.           Sarai Gasca NP  DNP, RN, AGACNP-BC

## 2018-08-20 NOTE — PROGRESS NOTES
08/20/18 1549   Vital Signs   Pulse (Heart Rate) 72   O2 Sat (%) 96 %   /71   MAP (Monitor) 85   BP 1 Method Automatic   BP 1 Location Left arm   BP Patient Position Post activity       Pt ambulated in hallway, no complaints of dizziness/SOB, right radial site remains benign, no bleeding/hematoma  1700-pt has been resting in chair, no complaints, right radial site remains benign, no bleeding/hematoma

## 2018-08-20 NOTE — IP AVS SNAPSHOT
[address] [facility] 
[phone] Patient: Richard Mabry MRN: [id number]:[id number] About your hospitalization You were admitted on:  August 20, 2018 You last received care in the:  [facility] 2 [facility] CARDIO You were discharged on:  August 20, 2018 Why you were hospitalized Your primary diagnosis was:  Not on File Your diagnoses also included:  S/P Cardiac Cath Follow-up Information Follow up With Details Comments Contact Info Evert Lopez MD   [address] [place] [address] [address] 
[phone] Wilver Olivares MD Go on 9/19/2018 at11:30 AM for post-cath follow up and further considerations. [address] [facility] 
[phone] Your Scheduled Appointments Wednesday September 19, 2018 11:30 AM EDT  
ESTABLISHED PATIENT with Wilver Olivares MD  
[facility]) [address] [facility]  
[phone] Discharge Orders None A check simon indicates which time of day the medication should be taken. My Medications START taking these medications Instructions Each Dose to Equal  
 Morning Noon Evening Bedtime  
 aspirin delayed-release 81 mg tablet Replaces:  aspirin 325 mg tablet Your last dose was: Your next dose is: Take 1 Tab by mouth daily. 81 mg  
    
   
   
   
  
 clopidogrel 75 mg Tab Commonly known as:  PLAVIX Start taking on:  8/21/2018 Your last dose was: Your next dose is: Take 1 Tab by mouth daily. 75 mg  
    
   
   
   
  
 OTHER Start taking on:  8/22/2018 Your last dose was: Your next dose is:    
   
   
 Hold metformin for 48 hours after cath. Restart 8/22. CONTINUE taking these medications Instructions Each Dose to Equal  
 Morning Noon Evening Bedtime  
 acetaminophen 500 mg tablet Commonly known as:  TYLENOL Your last dose was: Your next dose is: Take 1 tablet by mouth every eight (8) hours as needed for Pain. 500 mg ADVAIR DISKUS 250-50 mcg/dose diskus inhaler Generic drug:  fluticasone-salmeterol Your last dose was: Your next dose is: Take 1 Puff by inhalation every twelve (12) hours. 1 Puff  
    
   
   
   
  
 albuterol 90 mcg/actuation inhaler Commonly known as:  PROVENTIL HFA, VENTOLIN HFA, PROAIR HFA Your last dose was: Your next dose is: Take 2 Puffs by inhalation every four (4) hours as needed for Wheezing. 2 Puff B COMPLEX PO Your last dose was: Your next dose is: Take 1 Tab by mouth daily. 1 Tab CENTRUM SILVER Tab tablet Generic drug:  multivitamins-minerals-lutein Your last dose was: Your next dose is: Take 1 Tab by mouth daily. 1 Tab CINNAMON 500 mg Cap Generic drug:  cinnamon bark Your last dose was: Your next dose is: Take  by mouth. CO Q-10 10 mg Cap Generic drug:  coenzyme q10 Your last dose was: Your next dose is: Take  by mouth. fexofenadine 180 mg tablet Commonly known as:  Maria Fernanda Ivelisse Your last dose was: Your next dose is: Take 1 Tab by mouth daily. 180 mg  
    
   
   
   
  
 folic acid 392 mcg tablet Your last dose was: Your next dose is: Take 400 mcg by mouth daily. 400 mcg  
    
   
   
   
  
 glipiZIDE 5 mg tablet Commonly known as:  Maxcine Mccray Your last dose was: Your next dose is:  TAKE 1 TABLET BY MOUTH EVERY DAY  
     
   
   
 glucose blood VI test strips strip Commonly known as:  ONETOUCH ULTRA BLUE TEST STRIP Your last dose was: Your next dose is:    
   
   
 by Does Not Apply route Daily (before breakfast). E11.9  
     
   
   
   
  
 isosorbide mononitrate ER 30 mg tablet Commonly known as:  IMDUR Your last dose was: Your next dose is: Take 0.5 Tabs by mouth daily. 15 mg  
    
   
   
   
  
 lisinopril 2.5 mg tablet Commonly known as:  Ayde Keith Your last dose was: Your next dose is: Take 1 Tab by mouth daily. 2.5 mg  
    
   
   
   
  
 MAGNESIUM PO Your last dose was: Your next dose is: Take  by mouth daily. metoprolol succinate 25 mg XL tablet Commonly known as:  TOPROL-XL Your last dose was: Your next dose is: TAKE 1/2 TABLET BY MOUTH EVERY DAY  
     
   
   
   
  
 nitroglycerin 0.4 mg SL tablet Commonly known as:  NITROSTAT Your last dose was: Your next dose is:    
   
   
 1 Tab by SubLINGual route every five (5) minutes as needed for Chest Pain. 0.4 mg PEPCID AC 20 mg tablet Generic drug:  famotidine Your last dose was: Your next dose is: Take 20 mg by mouth daily. 20 mg  
    
   
   
   
  
 simvastatin 20 mg tablet Commonly known as:  ZOCOR Your last dose was: Your next dose is: TAKE 1 TABLET BY MOUTH NIGHTLY SPIRIVA RESPIMAT 2.5 mcg/actuation inhaler Generic drug:  tiotropium bromide Your last dose was: Your next dose is: Take 2 Puffs by inhalation daily. 2 Puff VITAMIN B-12 500 mcg tablet Generic drug:  cyanocobalamin Your last dose was: Your next dose is: Take 500 mcg by mouth daily. 500 mcg  VITAMIN C PO  
 Your last dose was: Your next dose is: Take 1,000 mg by mouth daily. 1000 mg  
    
   
   
   
  
 VITAMIN D3 PO Your last dose was: Your next dose is: Take 1,000 Units by mouth daily. 1000 Units STOP taking these medications   
 aspirin 325 mg tablet Commonly known as:  ASPIRIN Replaced by:  aspirin delayed-release 81 mg tablet ASK your doctor about these medications Instructions Each Dose to Equal  
 Morning Noon Evening Bedtime  
 metFORMIN  mg tablet Commonly known as:  GLUCOPHAGE XR Your last dose was: Your next dose is: TAKE 3 TABLETS BY MOUTH EVERY DAY WITH DINNER Where to Get Your Medications These medications were sent to [facility]/pharmacy #[id number]- [place], [address] [facility] AT CORNER OF [address] [place] [address]  [facility], [address] [place] [address] Phone:  [phone]  
  aspirin delayed-release 81 mg tablet  
 clopidogrel 75 mg Tab Information on where to get these meds will be given to you by the nurse or doctor. ! Ask your nurse or doctor about these medications OTHER Discharge Instructions [address], [address]  [phone] Patient ID: 
Ibis Muñoz 
[id number] 
39 y.o. 
12/18/1931 Admit Date: 8/20/2018 Discharge Date: 8/20/2018 Admitting Physician: Tio Grossman MD  
 
Discharge Physician: Heather Smith NP Admission Diagnoses: SOBOE (shortness of breath on exertion) [R06.02] Diabetic peripheral neuropathy associated with type 2 diabetes mellitus ([facility] [address] [E11.42] ASHD (arteriosclerotic heart disease) [I25.10] S/P drug eluting coronary stent placement [Z95.5] Discharge Diagnoses: Active Problems: S/P cardiac cath (8/20/2018) Overview: 8/20/18:  in RCA identified. Not able to be opened. Discharge Condition: Good Cardiology Procedures this Admission:  Diagnostic left heart catheterization Disposition: home Reference discharge instructions provided by nursing for diet and activity. Signed: 
Heather Smith NP 
8/20/2018 
2:18 PM 
 
 
Radial Cardiac Catheterization/Angiography Discharge Instructions It is normal to feel tired the first couple days. Take it easy and follow the physicians instructions. CHECK THE CATHETER INSERTION SITE DAILY: 
 
Remove the wrist dressing 24 hours after the procedure. You may shower 24 hours after the procedure. Wash with soap and water and pat dry. Gentle cleaning of the site with soap and water is sufficient, cover with a dry clean dressing or bandage. Do not apply creams or powders to the area. No soaking the wrist for 3 days. Leave the puncture site open to air after 24 hours post-procedure. CALL THE PHYSICIANS:  
 
If the site becomes red, swollen or feels warm to the touch If there is bleeding or drainage or if there is unusual pain at the radial site. If there is any minor oozing, you may apply a band-aid and remove after 12 hours. If the bleeding continues, hold pressure with the middle finger against the puncture site and the thumb against the back of the wrist,call 911 to be transported to the hospital. 
DO NOT DRIVE YOURSELF, [address] ACTIVITY:  
For the first 24 hours do not manipulate the wrist. 
No lifting, pushing or pulling over 3-5 pounds with the affected wrist for 7 daysand no straining the insertion site. Do not life grocery bags or the garbage can, do not run the vacuum  or  for 7 days. Start with short walks as in the hospital and gradually increase as tolerated each day. It is recommended to walk 30 minutes 5-7 days per week. Follow your physicians instructions on activity.   Avoid walking outside in extremes of heat or cold. Walk inside when it is cold and windy or hot and humid. Things to keep in mind: 
No driving for at least 24 hours, or as designated by your physician. Limit the number of times you go up and down the stairs Take rests and pace yourself with activity. Be careful and do not strain with bowel movements. MEDICATIONS: 
 
Take all medications as prescribed Call your physician if you have any questions Keep an updated list of your medications with you at all times and give a list to your physician and pharmacist 
 
SIGNS AND SYMPTOMS:  
Be cautious of symptoms of angina or recurrent symptoms such as chest discomfort, unusual shortness of breath or fatigue. These could be symptoms of restenosis, a new blockage or a heart attack. If your symptoms are relieved with rest it is still recommended that you notify your physician of recurrent chest pain or discomfort. For CHEST PAIN or symptoms of angina not relieved with rest:  If the discomfort is not relieved with rest, and you have been prescribed Nitroglycerin, take as directed (taken under the tongue, one at a time 5 minutes apart for a total of 3 doses). If the discomfort is not relieved after the 3rd nitroglycerin, call 911. If you have not been prescribed Nitroglycerin  and your chest discomfort is not relieved with rest, call 911. AFTER CARE:  
Follow up with your physician as instructed. Follow a heart healthy diet with proper portion control, daily stress management, daily exercise, blood pressure and cholesterol control , and smoking cessation. ACO Transitions of Care Introducing [employer] [address]y offers a voluntary care coordination program to provide high quality service and care to [facility] fee-for-service beneficiaries. Lynda Figueroa was designed to help you enhance your health and well-being through the following services: ? Transitions of Care  support for individuals who are transitioning from one care setting to another (example: Hospital to home). ? Chronic and Complex Care Coordination  support for individuals and caregivers of those with serious or chronic illnesses or with more than one chronic (ongoing) condition and those who take a number of different medications. If you meet specific medical criteria, a [facility] may call you directly to coordinate your care with your primary care physician and your other care providers. For questions about the [facility] programs, please, contact your physicians office. For general questions or additional information about Accountable Care Organizations: 
Please visit www.medicare.gov/acos. html or call 1-800-MEDICARE (4-582.823.1845) TTY users should call 5-561.233.8289. Introducing [facility] & HEALTH SERVICES! Dear Tamara Denny: Thank you for requesting a Breezy account. Our records indicate that you already have an active Breezy account. You can access your account anytime at [email or web address] Did you know that you can access your hospital and ER discharge instructions at any time in Breezy? You can also review all of your test results from your hospital stay or ER visit. Additional Information If you have questions, please visit the Frequently Asked Questions section of the Breezy website at [email or web address] Remember, Breezy is NOT to be used for urgent needs. For medical emergencies, dial 911. Now available from your iPhone and Android! Introducing Remigio Arambula As a [facility] patient, I wanted to make you aware of our electronic visit tool called Remigio Arambula. [facility] 24/7 allows you to connect within minutes with a medical provider 24 hours a day, seven days a week via a mobile device or tablet or logging into a secure website from your computer. You can access [employer] from anywhere in the [place]. A virtual visit might be right for you when you have a simple condition and feel like you just dont want to get out of bed, or cant get away from work for an appointment, when your regular [facility] provider is not available (evenings, weekends or holidays), or when youre out of town and need minor care. Electronic visits cost only $49 and if the [facility] 24/7 provider determines a prescription is needed to treat your condition, one can be electronically transmitted to a nearby pharmacy*. Please take a moment to enroll today if you have not already done so. The enrollment process is free and takes just a few minutes. To enroll, please download the Archive 24/7 kae to your tablet or phone, or visit [email or web address] to enroll on your computer. And, as an [address] [place] [address] patient with a [employer] account, the results of your visits will be scanned into your electronic medical record and your primary care provider will be able to view the scanned results. We urge you to continue to see your regular [facility] provider for your ongoing medical care. And while your primary care provider may not be the one available when you seek a Remigio Huangfin virtual visit, the peace of mind you get from getting a real diagnosis real time can be priceless. For more information on eLifestylesmagnoliafin, view our Frequently Asked Questions (FAQs) at [email or web address]. Sincerely, 
 
Pallavi Miller MD 
Chief Medical Officer Chris[address] Sophie Bundy *:  certain medications cannot be prescribed via eLifestylesmagnoliafin Providers Seen During Your Hospitalization Provider Specialty Primary office phone Diony Blevins MD Cardiology [phone] Your Primary Care Physician (PCP) Primary Care Physician Office Phone Office Fax [facility] [phone] You are allergic to the following No active allergies Recent Documentation Weight BMI Smoking Status 83.9 kg 27.32 kg/m2 Former Smoker Emergency Contacts Name Discharge Info Relation Home Work Mobile Marbin Faustin 2035 CAREGIVER [3] Spouse [3] [phone] Patient Belongings The following personal items are in your possession at time of discharge: 
  Dental Appliances: None  Visual Aid: Glasses      Home Medications: None   Jewelry: Watch, Necklace, Other (comment) (with wife)  Clothing: At bedside, Pants, Shirt    Other Valuables: None Please provide this summary of care documentation to your next provider. Signatures-by signing, you are acknowledging that this After Visit Summary has been reviewed with you and you have received a copy. Patient Signature:  ____________________________________________________________ Date:  ____________________________________________________________  
  
Eleanor Moulding Provider Signature:  ____________________________________________________________ Date:  ____________________________________________________________

## 2018-08-20 NOTE — INTERVAL H&P NOTE
H&P Update:  Edy Post was seen and examined. History and physical has been reviewed. The patient has been examined.  There have been no significant clinical changes since the completion of the originally dated History and Physical.    Signed By: Logan Severino MD     August 20, 2018 8:26 AM

## 2018-08-22 LAB
ACT BLD: 224 SECS (ref 79–138)
ACT BLD: 246 SECS (ref 79–138)

## 2018-08-24 LAB
ACID FAST STN SPEC: NEGATIVE
M AVIUM CMPLX RRNA SPEC QL PROBE: NEGATIVE
M GORDONAE RRNA SPEC QL PROBE: NORMAL
M KANSASII RRNA SPEC QL PROBE: NORMAL
M TB CMPLX RRNA SPEC QL PROBE: NEGATIVE
MYCOBACTERIUM RRNA SPEC QL PROBE: NORMAL
MYCOBACTERIUM SPEC QL CULT: ABNORMAL
OTHER MICROORG DNA SPEC NAA+PROBE: ABNORMAL
SPECIMEN PREPARATION: ABNORMAL

## 2018-08-29 ENCOUNTER — OFFICE VISIT (OUTPATIENT)
Dept: INTERNAL MEDICINE CLINIC | Age: 83
End: 2018-08-29

## 2018-08-29 VITALS
BODY MASS INDEX: 27.98 KG/M2 | OXYGEN SATURATION: 93 % | HEART RATE: 73 BPM | WEIGHT: 188.9 LBS | DIASTOLIC BLOOD PRESSURE: 68 MMHG | TEMPERATURE: 98.3 F | HEIGHT: 69 IN | SYSTOLIC BLOOD PRESSURE: 112 MMHG | RESPIRATION RATE: 10 BRPM

## 2018-08-29 DIAGNOSIS — I25.118 CORONARY ARTERY DISEASE OF NATIVE ARTERY OF NATIVE HEART WITH STABLE ANGINA PECTORIS (HCC): ICD-10-CM

## 2018-08-29 DIAGNOSIS — J44.9 CHRONIC OBSTRUCTIVE PULMONARY DISEASE, UNSPECIFIED COPD TYPE (HCC): Primary | Chronic | ICD-10-CM

## 2018-08-30 ENCOUNTER — HOSPITAL ENCOUNTER (EMERGENCY)
Age: 83
Discharge: HOME | End: 2018-08-30
Attending: EMERGENCY MEDICINE
Payer: MEDICARE

## 2018-08-30 ENCOUNTER — APPOINTMENT (OUTPATIENT)
Dept: CT IMAGING | Age: 83
End: 2018-08-30
Attending: PHYSICIAN ASSISTANT
Payer: MEDICARE

## 2018-08-30 VITALS
RESPIRATION RATE: 16 BRPM | WEIGHT: 187.39 LBS | BODY MASS INDEX: 27.76 KG/M2 | DIASTOLIC BLOOD PRESSURE: 96 MMHG | HEIGHT: 69 IN | HEART RATE: 101 BPM | SYSTOLIC BLOOD PRESSURE: 158 MMHG | OXYGEN SATURATION: 95 % | TEMPERATURE: 98.5 F

## 2018-08-30 DIAGNOSIS — S01.311A: Primary | ICD-10-CM
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
## 2018-08-30 PROCEDURE — 77030031132 HC SUT NYL COVD -A

## 2018-08-30 PROCEDURE — 99282 EMERGENCY DEPT VISIT SF MDM: CPT

## 2018-08-30 PROCEDURE — 75810000293 HC SIMP/SUPERF WND  RPR

## 2018-08-30 PROCEDURE — 77030018836 HC SOL IRR NACL ICUM -A

## 2018-08-30 PROCEDURE — 70450 CT HEAD/BRAIN W/O DYE: CPT

## 2018-08-30 RX ORDER — CEPHALEXIN 500 MG/1
500 CAPSULE ORAL 3 TIMES DAILY
Qty: 30 CAP | Refills: 0 | Status: SHIPPED | OUTPATIENT
Start: 2018-08-30 | End: 2018-09-09

## 2018-08-30 NOTE — PROGRESS NOTES
Physical Therapy Rounded with PALMA Wilcox re: this pt who comes to the ED s/p fall at home. Pt was in his basement and turned off the light prior to going upstairs and subsequently fell. Pt has long hx of balance/unsteady gait issues. Met with pt for any needs. He is accompanied by his wife. They note the hx of gait issues. Pt also with neuropathy and vision decrease. Discussed the need for light and importance of clear paths due to deficits. Pt has been to [facility] for OPPT per their report in last year or so. Pt states he has no desire to return to OPPT. Wife in favor of it but pt not open to it at this time. They are aware of the ability to access OPPT through referral from PCP or Neurology. Also talked about walker use, which pt owns, but pt not very open to this option and states it does not fit well in his home and that he only uses the cane out of the home. Pt and wife voiced no other questions. Given pt's reticence to any of the above, no current PT needs in the ED at this time.  
 
Vida Martínez, PT

## 2018-08-30 NOTE — ED NOTES
Verbal shift change report given to Meeta Blake RN (oncoming nurse) by Wilfredo Turner RN (offgoing nurse). Report included the following information SBAR, Kardex, ED Summary, MAR, Accordion and Recent Results.

## 2018-08-30 NOTE — ED TRIAGE NOTES
Assumed care of this patient. He is alert and oriented x4. Patient ambulatory to ED referred by [facility] s/p fall. He has a laceration to the right anithelix of the ear.

## 2018-08-30 NOTE — PROGRESS NOTES
HPI: 
Brittany Du is a 80y.o. year old male who is here for a routine visit: 
 
Present for follow-up visit after recent cardiac cath. He had undergone a stress test revealing inferior ischemia. His cardiac cath revealed recurrent 100% stenosis in the right coronary artery. They were unable to angioplasty this and have him scheduled for a repeat catheterization. He is not sure if he wants to have the catheterization done or not. He continues to have dyspnea on exertion. He does feel that the addition of long-acting nitroglycerin has been helpful. Denies PND or orthopnea. Denies any fevers or chills. He does continue to have cough productive of little sputum associated with his COPD. Past Medical History:  
Diagnosis Date  Arrhythmia   
 irregular heart beat  Asthma  CAD (coronary artery disease)  Chronic hip pain  COPD (chronic obstructive pulmonary disease) ([address] 9/10/2010  Diabetes ([address]  DJD (degenerative joint disease)  GERD (gastroesophageal reflux disease)  HNP (herniated nucleus pulposus) 6/2006  
 lumbar  Hypercholesterolemia  Hypertension  Nausea & vomiting  Prostate cancer [facility]) 2003  Reversible ischemic neurologic deficit ([address] 1990  
 suspected with no residual effects and no recurrance  TIA (transient ischemic attack) TIA- no deficiets Past Surgical History:  
Procedure Laterality Date  COLONOSCOPY  12/15/2004 Diverticulosis Dr. AVILES fl3ur repeat 10 years  HX COLONOSCOPY  2005  HX HEART CATHETERIZATION    
 2  stents  HX HEART CATHETERIZATION  08/20/2018  
 failed 100% block @ RCA will plan to do laser next.  HX HERNIA REPAIR  9/2008  
 left inguinal hernia repair by Dr. Jerson Cruz  HX HERNIA REPAIR Right   
 inguinal hernai repair  HX HERNIA REPAIR    
 umbilical hernia repair  HX HIP REPLACEMENT Right 07/06/2016  HX PROSTATECTOMY  2003  HX TONSILLECTOMY  TOTAL HIP ARTHROPLASTY Left 2/9/11 Dr. Unique Bray at [facility] Prior to Admission medications Medication Sig Start Date End Date Taking? Authorizing Provider  
cyanocobalamin (VITAMIN B-12) 500 mcg tablet Take 500 mcg by mouth daily. Yes Historical Provider  
clopidogrel (PLAVIX) 75 mg tab Take 1 Tab by mouth daily. 8/21/18  Yes Breana Renee, BECKY  
aspirin delayed-release 81 mg tablet Take 1 Tab by mouth daily. 8/20/18  Yes Breana Renee NP  
lisinopril (PRINIVIL, ZESTRIL) 2.5 mg tablet Take 1 Tab by mouth daily. 8/17/18  Yes Gary Mayorga NP  
folic acid 331 mcg tablet Take 400 mcg by mouth daily. Yes Historical Provider  
isosorbide mononitrate ER (IMDUR) 30 mg tablet Take 0.5 Tabs by mouth daily. 8/10/18  Yes Gary Mayorga NP  
nitroglycerin (NITROSTAT) 0.4 mg SL tablet 1 Tab by SubLINGual route every five (5) minutes as needed for Chest Pain. 8/10/18  Yes Gary Mayorga NP  
simvastatin (ZOCOR) 20 mg tablet TAKE 1 TABLET BY MOUTH NIGHTLY 8/6/18  Yes Adin Boland III, MD  
glucose blood VI test strips (ONETOUCH ULTRA BLUE TEST STRIP) strip by Does Not Apply route Daily (before breakfast). E11.9 7/5/18  Yes Adin Boland III, MD  
metFORMIN ER (GLUCOPHAGE XR) 500 mg tablet TAKE 3 TABLETS BY MOUTH EVERY DAY WITH DINNER 7/3/18  Yes Adin Boland III, MD  
glipiZIDE (GLUCOTROL) 5 mg tablet TAKE 1 TABLET BY MOUTH EVERY DAY 2/13/18  Yes Adin Boland III, MD  
metoprolol succinate (TOPROL-XL) 25 mg XL tablet TAKE 1/2 TABLET BY MOUTH EVERY DAY 12/13/17  Yes Gary Mayorga NP  
albuterol (PROVENTIL HFA, VENTOLIN HFA, PROAIR HFA) 90 mcg/actuation inhaler Take 2 Puffs by inhalation every four (4) hours as needed for Wheezing. 12/8/17  Yes Adin Boland III, MD  
tiotropium bromide (SPIRIVA RESPIMAT) 2.5 mcg/actuation inhaler Take 2 Puffs by inhalation daily. Yes Historical Provider  
coenzyme q10 (CO Q-10) 10 mg cap Take  by mouth. Yes Historical Provider MAGNESIUM PO Take  by mouth daily. Yes Historical Provider CHOLECALCIFEROL, VITAMIN D3, (VITAMIN D3 PO) Take 1,000 Units by mouth daily. Yes Historical Provider  
multivitamins-minerals-lutein (CENTRUM SILVER) Tab Take 1 Tab by mouth daily. 5/14/10  Yes Historical Provider  
fexofenadine (ALLEGRA) 180 mg tablet Take 1 Tab by mouth daily. 1/25/10  Yes Jovanny Rosado MD  
famotidine (PEPCID AC) 20 mg tablet Take 20 mg by mouth daily. 6/27/11  Yes Historical Provider VITAMIN B COMPLEX (B COMPLEX PO) Take 1 Tab by mouth daily. Yes Historical Provider  
fluticasone-salmeterol (ADVAIR DISKUS) 250-50 mcg/Dose diskus inhaler Take 1 Puff by inhalation every twelve (12) hours. Yes Historical Provider ASCORBIC ACID (VITAMIN C PO) Take 1,000 mg by mouth daily. Yes Historical Provider  
cinnamon bark (CINNAMON) 500 mg cap Take  by mouth. Historical Provider  
acetaminophen (TYLENOL) 500 mg tablet Take 1 tablet by mouth every eight (8) hours as needed for Pain. 8/28/14   Brad Jarvis MD  
 
 
Social History Social History  Marital status:  Spouse name: N/A  
 Number of children: N/A  
 Years of education: N/A Occupational History  Not on file. Social History Main Topics  Smoking status: Former Smoker Packs/day: 3.00 Years: 10.00 Types: Cigarettes Quit date: 1/15/1970  Smokeless tobacco: Never Used  Alcohol use No  
 Drug use: Yes Special: Prescription, OTC  Sexual activity: Yes  
  Partners: Female Birth control/ protection: None Other Topics Concern  Not on file Social History Narrative ROS Per HPI Visit Vitals  /68  Pulse 73  Temp 98.3 °F (36.8 °C) (Oral)  Resp 10  
 Ht 5' 9\" (1.753 m)  Wt 188 lb 14.4 oz (85.7 kg)  SpO2 93%  BMI 27.9 kg/m2 Physical Exam  
Physical Examination: General appearance - alert, well appearing, and in no distress Chest -scattered rhonchi and expiratory wheeze in the bases. Heart - normal rate and regular rhythm Extremities - peripheral pulses normal, no pedal edema, no clubbing or cyanosis Assessment/Plan: 
CAD - Agrees to repeat cath and will use nitroglycerine as needed for now. COPD - stable and will use albuterol as needed. Followup here as needed. Advised him to call back or return to office if symptoms worsen/change/persist. 
Discussed expected course/resolution/complications of diagnosis in detail with patient. Medication risks/benefits/costs/interactions/alternatives discussed with patient. He was given an after visit summary which includes diagnoses, current medications, & vitals. He expressed understanding with the diagnosis and plan.

## 2018-08-30 NOTE — ED PROVIDER NOTES
EMERGENCY DEPARTMENT HISTORY AND PHYSICAL EXAM 
 
 
Date: 8/30/2018 Patient Name: Timothy Mooney History of Presenting Illness Chief Complaint Patient presents with  Fall  
  pt reports he fell due to being in dark and being unstable on feet (not new), hit right ear, laceration to ear, sent by [facility], pt denies LOC  Laceration History Provided By: Patient HPI: Timothy Mooney, 80 y.o. male presents ambulatory with his wife  to the ED with cc of 1 hour of 2 out of 10 constant aching right ear pain associated with a laceration to the ear that happened when he stumbled in the dark while coming up from the basement. He did fall and strike the side of his head / ear. No LOC. No complaint of headache or neck pain. Chief Complaint: fall Duration: 1 Hours Timing:  Acute and Constant Location: right ear Quality: Aching Severity: 2 out of 10 Modifying Factors: worse with palpation Associated Symptoms: denies any other associated signs or symptoms There are no other complaints, changes, or physical findings at this time. PCP: Stas Lockett MD 
 
Current Outpatient Prescriptions Medication Sig Dispense Refill  cephALEXin (KEFLEX) 500 mg capsule Take 1 Cap by mouth three (3) times daily for 10 days. 30 Cap 0  
 cyanocobalamin (VITAMIN B-12) 500 mcg tablet Take 500 mcg by mouth daily.  clopidogrel (PLAVIX) 75 mg tab Take 1 Tab by mouth daily. 30 Tab 11  
 aspirin delayed-release 81 mg tablet Take 1 Tab by mouth daily. 30 Tab 11  
 lisinopril (PRINIVIL, ZESTRIL) 2.5 mg tablet Take 1 Tab by mouth daily. 90 Tab 3  
 folic acid 754 mcg tablet Take 400 mcg by mouth daily.  isosorbide mononitrate ER (IMDUR) 30 mg tablet Take 0.5 Tabs by mouth daily. 30 Tab 0  
 nitroglycerin (NITROSTAT) 0.4 mg SL tablet 1 Tab by SubLINGual route every five (5) minutes as needed for Chest Pain.  1 Bottle 0  
  simvastatin (ZOCOR) 20 mg tablet TAKE 1 TABLET BY MOUTH NIGHTLY 90 Tab 1  
 glucose blood VI test strips (ONETOUCH ULTRA BLUE TEST STRIP) strip by Does Not Apply route Daily (before breakfast). E11.9 100 Strip 3  
 metFORMIN ER (GLUCOPHAGE XR) 500 mg tablet TAKE 3 TABLETS BY MOUTH EVERY DAY WITH DINNER 270 Tab 1  
 glipiZIDE (GLUCOTROL) 5 mg tablet TAKE 1 TABLET BY MOUTH EVERY DAY 90 Tab 3  
 metoprolol succinate (TOPROL-XL) 25 mg XL tablet TAKE 1/2 TABLET BY MOUTH EVERY DAY 45 Tab 3  
 albuterol (PROVENTIL HFA, VENTOLIN HFA, PROAIR HFA) 90 mcg/actuation inhaler Take 2 Puffs by inhalation every four (4) hours as needed for Wheezing. 1 Inhaler 5  
 tiotropium bromide (SPIRIVA RESPIMAT) 2.5 mcg/actuation inhaler Take 2 Puffs by inhalation daily.  coenzyme q10 (CO Q-10) 10 mg cap Take  by mouth.  cinnamon bark (CINNAMON) 500 mg cap Take  by mouth.  MAGNESIUM PO Take  by mouth daily.  acetaminophen (TYLENOL) 500 mg tablet Take 1 tablet by mouth every eight (8) hours as needed for Pain. 30 tablet 0  
 CHOLECALCIFEROL, VITAMIN D3, (VITAMIN D3 PO) Take 1,000 Units by mouth daily.  multivitamins-minerals-lutein (CENTRUM SILVER) Tab Take 1 Tab by mouth daily.  fexofenadine (ALLEGRA) 180 mg tablet Take 1 Tab by mouth daily. 30 Tab 11  
 famotidine (PEPCID AC) 20 mg tablet Take 20 mg by mouth daily.  VITAMIN B COMPLEX (B COMPLEX PO) Take 1 Tab by mouth daily.  fluticasone-salmeterol (ADVAIR DISKUS) 250-50 mcg/Dose diskus inhaler Take 1 Puff by inhalation every twelve (12) hours.  ASCORBIC ACID (VITAMIN C PO) Take 1,000 mg by mouth daily. Past History Past Medical History: 
Past Medical History:  
Diagnosis Date  Arrhythmia   
 irregular heart beat  Asthma  CAD (coronary artery disease)  Chronic hip pain  COPD (chronic obstructive pulmonary disease) ([facility] [address] 9/10/2010  [facility]  DJD (degenerative joint disease)  GERD (gastroesophageal reflux disease)  HNP (herniated nucleus pulposus) 6/2006  
 lumbar  Hypercholesterolemia  Hypertension  Nausea & vomiting  Prostate cancer [facility]) 2003  Reversible ischemic neurologic deficit ([address] 1990  
 suspected with no residual effects and no recurrance  TIA (transient ischemic attack) TIA- no deficiets Past Surgical History: 
Past Surgical History:  
Procedure Laterality Date  COLONOSCOPY  12/15/2004 Diverticulosis Dr. Rebecca Amaya repeat 10 years  HX COLONOSCOPY  2005  HX HEART CATHETERIZATION    
 2  stents  HX HEART CATHETERIZATION  08/20/2018  
 failed 100% block @ [facility] will plan to do laser next.  HX HERNIA REPAIR  9/2008  
 left inguinal hernia repair by Dr. Nishant Matthews  HX HERNIA REPAIR Right   
 inguinal hernai repair  HX HERNIA REPAIR    
 umbilical hernia repair  HX HIP REPLACEMENT Right 07/06/2016  HX PROSTATECTOMY  2003  HX TONSILLECTOMY  TOTAL HIP ARTHROPLASTY Left 2/9/11 Dr. Wolfgang Jordan at [facility] Family History: 
Family History Problem Relation Age of Onset  Cancer Mother Breast cancer  Lung Disease Mother Emphysema  Cancer Father Bladder cancer  Lung Disease Father Emphysema  Hypertension Sister 2015 Social History: 
Social History Substance Use Topics  Smoking status: Former Smoker Packs/day: 3.00 Years: 10.00 Types: Cigarettes Quit date: 1/15/1970  Smokeless tobacco: Never Used  Alcohol use No  
 
 
Allergies: 
No Known Allergies Review of Systems Review of Systems Constitutional: Negative for fatigue and fever. HENT: Negative for congestion, ear pain and rhinorrhea. Eyes: Negative for pain and redness. Respiratory: Negative for cough and wheezing. Cardiovascular: Negative for chest pain and palpitations. Gastrointestinal: Negative for abdominal pain, nausea and vomiting. Genitourinary: Negative for dysuria, frequency and urgency. Musculoskeletal: Negative for back pain, neck pain and neck stiffness. Skin: Positive for wound (right ear laceration). Negative for rash. Neurological: Negative for weakness, light-headedness, numbness and headaches. Physical Exam  
Physical Exam  
Constitutional: He is oriented to person, place, and time. He appears well-developed and well-nourished. Non-toxic appearance. No distress. HENT:  
Head: Normocephalic and atraumatic. Head is without right periorbital erythema and without left periorbital erythema. Right Ear: Hearing, tympanic membrane, external ear and ear canal normal. Right ear exhibits lacerations. Left Ear: External ear normal.  
Ears: 
 
Nose: Nose normal.  
Mouth/Throat: Uvula is midline. No trismus in the jaw. RIGHT EAR: 
2.5 cm curvilinear laceration involving the skin of the helix and extending to the antihelix exposing the cartilage without definite cartilage laceration. Another smaller, 6mm laceration just inferior without cartilage exposure. Canal has some cerumen but the visualized portion of the TM is without redness or perforation. There is no blood in the canal.   
Eyes: Conjunctivae and EOM are normal. Pupils are equal, round, and reactive to light. No scleral icterus. Neck: Normal range of motion and full passive range of motion without pain. Supple; no midline or paracervical tenderness Cardiovascular: Normal rate, regular rhythm and normal heart sounds. Pulmonary/Chest: Effort normal and breath sounds normal. No accessory muscle usage. No tachypnea. No respiratory distress. He has no decreased breath sounds. He has no wheezes. Abdominal: Soft. There is no tenderness. There is no rigidity and no guarding. Musculoskeletal: Normal range of motion. Neurological: He is alert and oriented to person, place, and time. He is not disoriented. No cranial nerve deficit or sensory deficit.  GCS eye subscore is 4. GCS verbal subscore is 5. GCS motor subscore is 6. Skin: Skin is intact. No rash noted. Psychiatric: He has a normal mood and affect. His speech is normal.  
Nursing note and vitals reviewed. Diagnostic Study Results Labs - No results found for this or any previous visit (from the past 12 hour(s)). Radiologic Studies -  
CT HEAD WO CONT Final Result CT Results  (Last 48 hours) 08/30/18 1837  CT HEAD WO CONT Final result Impression:  IMPRESSION:  
1. No evidence of acute intracranial abnormality. 2. Unchanged chronic left parietal infarct. Narrative:  EXAM:  CT HEAD WO CONT INDICATION:   Head injury mild or moderate acute, no neurological deficit COMPARISON: CT head 1/30/2018. TECHNIQUE: Unenhanced CT of the head was performed using 5 mm images. Brain and  
bone windows were generated. CT dose reduction was achieved through use of a  
standardized protocol tailored for this examination and automatic exposure  
control for dose modulation. FINDINGS:  
The ventricles are normal in size and position. Unchanged chronic infarct in the  
left parietal lobe. Basilar cisterns are patent. No midline shift. There is no  
evidence of acute infarct, hemorrhage, or extraaxial fluid collection. Mucosal thickening in the right maxillary sinus and bilateral ethmoidal air  
cells. The mastoid air cells and middle ears are clear. The orbital contents are  
within normal limits with bilateral lens implants. There are no significant  
osseous or extracranial soft tissue lesions. CXR Results  (Last 48 hours) None Medical Decision Making I am the first provider for this patient. I reviewed the vital signs, available nursing notes, past medical history, past surgical history, family history and social history. Vital Signs-Reviewed the patient's vital signs. Patient Vitals for the past 12 hrs: Temp Pulse Resp BP SpO2  
08/30/18 1656 98.5 °F (36.9 °C) (!) 101 16 (!) 158/96 95 % Records Reviewed: Nursing Notes, Old Medical Records, Previous Radiology Studies and Previous Laboratory Studies Provider Notes (Medical Decision Making): DDx: laceration, ear cartilage injury, ICH, skull fracture Procedure Note - Laceration Repair: 
8:05 PM 
Procedure by Phuong Anderson PA-C. Complexity: complex 2.5cm curved laceration to right external ear  was irrigated copiously with NS under jet lavage, prepped with Hibiclens and draped in a sterile fashion. The area was anesthetized with 2 mLs of  Lidocaine 2% without epinephrine via local infiltration. The wound was explored with the following results: No foreign bodies found. The wound was repaired with One layer suture closure: Skin Layer:  4 sutures placed, stitch type:simple interrupted, suture: 4-0 nylon. .  The wound was closed with good hemostasis and LOOSE approximation. Sterile dressing applied. Estimated blood loss: < 2mL The procedure took 16-30 minutes, and pt tolerated well. Procedure Note - Laceration Repair: 
8:26 PM 
Procedure by Phuong Anderson PA-C. Complexity: complex 0.6cm linear laceration to right external ear  was irrigated copiously with NS under jet lavage, prepped with Hibiclens and draped in a sterile fashion. The area was anesthetized with 1 mLs of  Lidocaine 2% without epinephrine via local infiltration. The wound was explored with the following results: No foreign bodies found. The wound was repaired with One layer suture closure: Skin Layer:  2 
 sutures placed, stitch type:simple interrupted, suture: 4-0 nylon. .  The wound was closed with good hemostasis and approximation. Sterile dressing applied. Estimated blood loss: < 1mL The procedure took 1-15 minutes, and pt tolerated well. ED Course:  
Initial assessment performed.  The patients presenting problems have been discussed, and they are in agreement with the care plan formulated and outlined with them. I have encouraged them to ask questions as they arise throughout their visit. Disposition: 
Discharge PLAN: 
1. Discharge Medication List as of 8/30/2018  8:25 PM  
  
START taking these medications Details  
cephALEXin (KEFLEX) 500 mg capsule Take 1 Cap by mouth three (3) times daily for 10 days. , Normal, Disp-30 Cap, R-0  
  
  
CONTINUE these medications which have NOT CHANGED Details  
cyanocobalamin (VITAMIN B-12) 500 mcg tablet Take 500 mcg by mouth daily. , Historical Med  
  
clopidogrel (PLAVIX) 75 mg tab Take 1 Tab by mouth daily. , Normal, Disp-30 Tab, R-11  
  
aspirin delayed-release 81 mg tablet Take 1 Tab by mouth daily. , Normal, Disp-30 Tab, R-11  
  
lisinopril (PRINIVIL, ZESTRIL) 2.5 mg tablet Take 1 Tab by mouth daily. , Normal, Disp-90 Tab, R-3  
  
folic acid 089 mcg tablet Take 400 mcg by mouth daily. , Historical Med  
  
isosorbide mononitrate ER (IMDUR) 30 mg tablet Take 0.5 Tabs by mouth daily. , Normal, Disp-30 Tab, R-0  
  
nitroglycerin (NITROSTAT) 0.4 mg SL tablet 1 Tab by SubLINGual route every five (5) minutes as needed for Chest Pain., Normal, Disp-1 Bottle, R-0  
  
simvastatin (ZOCOR) 20 mg tablet TAKE 1 TABLET BY MOUTH NIGHTLY, Normal, Disp-90 Tab, R-1  
  
glucose blood VI test strips (ONETOUCH ULTRA BLUE TEST STRIP) strip by Does Not Apply route Daily (before breakfast).  E11.9, Normal, Disp-100 Strip, R-3  
  
metFORMIN ER (GLUCOPHAGE XR) 500 mg tablet TAKE 3 TABLETS BY MOUTH EVERY DAY WITH DINNER, Normal, Disp-270 Tab, R-1  
  
glipiZIDE (GLUCOTROL) 5 mg tablet TAKE 1 TABLET BY MOUTH EVERY DAY, Normal, Disp-90 Tab, R-3  
  
metoprolol succinate (TOPROL-XL) 25 mg XL tablet TAKE 1/2 TABLET BY MOUTH EVERY DAY, Print, Disp-45 Tab, R-3  
  
albuterol (PROVENTIL HFA, VENTOLIN HFA, PROAIR HFA) 90 mcg/actuation inhaler Take 2 Puffs by inhalation every four (4) hours as needed for Wheezing., Normal, Disp-1 Inhaler, R-5  
  
tiotropium bromide (SPIRIVA RESPIMAT) 2.5 mcg/actuation inhaler Take 2 Puffs by inhalation daily. , Historical Med  
  
coenzyme q10 (CO Q-10) 10 mg cap Take  by mouth., Historical Med  
  
cinnamon bark (CINNAMON) 500 mg cap Take  by mouth., Historical Med MAGNESIUM PO Take  by mouth daily. , Historical Med  
  
acetaminophen (TYLENOL) 500 mg tablet Take 1 tablet by mouth every eight (8) hours as needed for Pain., Normal, Disp-30 tablet, R-0  
  
CHOLECALCIFEROL, VITAMIN D3, (VITAMIN D3 PO) Take 1,000 Units by mouth daily. , Historical Med  
  
multivitamins-minerals-lutein (CENTRUM SILVER) Tab Take 1 Tab by mouth daily. , Historical Med  
  
fexofenadine (ALLEGRA) 180 mg tablet Take 1 Tab by mouth daily. , Print, Disp-30 Tab, R-11  
  
famotidine (PEPCID AC) 20 mg tablet Take 20 mg by mouth daily. , Historical Med  
  
VITAMIN B COMPLEX (B COMPLEX PO) Take 1 Tab by mouth daily. , Historical Med  
  
fluticasone-salmeterol (ADVAIR DISKUS) 250-50 mcg/Dose diskus inhaler Take 1 Puff by inhalation every twelve (12) hours. , Historical Med ASCORBIC ACID (VITAMIN C PO) Take 1,000 mg by mouth daily. , Historical Med 2. Follow-up Information Follow up With Details Comments Contact Info Giovani Serrano MD Schedule an appointment as soon as possible for a visit in 1 week PRIMARY CARE: have stitches removed in 7 days [address] [facility] [address] [place] [address] 
[phone] Return to ED if worse Diagnosis Clinical Impression: 1. Laceration of ear, external, complicated, right, initial encounter 2. Fall on same level from slipping, tripping or stumbling, initial encounter

## 2018-08-31 NOTE — ED NOTES
Pt discharged by PALMA Preston. Pt provided with discharge instructions Rx and instructions on follow up care. Pt out of ED in stable condition accompanied by wife.

## 2018-08-31 NOTE — DISCHARGE INSTRUCTIONS
Thank you! Thank you for allowing us to provide you with excellent care today. We hope we addressed all of your concerns and needs. We strive to provide excellent quality care in the Emergency Department. You will receive a survey after your visit to evaluate the care you were provided. If you feel that you have not received excellent quality care or timely care, please ask to speak to the nurse manager. Please choose us in the future for your continued health care needs. ------------------------------------------------------------------------------------------------------------  The exam and treatment you received in the Emergency Department were for an urgent problem and are not intended as complete care. It is important that you follow up with a doctor, nurse practitioner, or physician assistant for ongoing care. If your symptoms become worse or you do not improve as expected and you are unable to reach your usual health care provider, you should return to the Emergency Department. We are available 24 hours a day. Please take your discharge instructions with you when you go to your follow-up appointment. If you have any problem arranging a follow-up appointment, contact the Emergency Department immediately. If a prescription has been provided, please have it filled as soon as possible to prevent a delay in treatment. Read the entire medication instruction sheet provided to you by the pharmacy. If you have any questions or reservations about taking the medication due to side effects or interactions with other medications, please call your primary care physician or contact the ER to speak with the charge nurse. Make an appointment with your family doctor or the physician you were referred to for follow-up of this visit as instructed on your discharge paperwork, as this is mandatory follow-up.  Return to the ER if you are unable to be seen or if you are unable to be seen in a timely manner. If you have any problem arranging the follow-up visit, contact the Emergency Department immediately.      d

## 2018-09-06 ENCOUNTER — HOSPITAL ENCOUNTER (EMERGENCY)
Age: 83
Discharge: HOME | End: 2018-09-06
Attending: EMERGENCY MEDICINE
Payer: MEDICARE

## 2018-09-06 VITALS
RESPIRATION RATE: 17 BRPM | HEART RATE: 66 BPM | TEMPERATURE: 98.4 F | HEIGHT: 69 IN | WEIGHT: 186.29 LBS | OXYGEN SATURATION: 100 % | SYSTOLIC BLOOD PRESSURE: 124 MMHG | DIASTOLIC BLOOD PRESSURE: 65 MMHG | BODY MASS INDEX: 27.59 KG/M2

## 2018-09-06 DIAGNOSIS — S01.311D: ICD-10-CM

## 2018-09-06 DIAGNOSIS — Z48.02 ENCOUNTER FOR REMOVAL OF SUTURES: Primary | ICD-10-CM

## 2018-09-06 PROCEDURE — 75810000275 HC EMERGENCY DEPT VISIT NO LEVEL OF CARE

## 2018-09-06 NOTE — ED PROVIDER NOTES
EMERGENCY DEPARTMENT HISTORY AND PHYSICAL EXAM 
 
 
Date: 9/6/2018 Patient Name: Richard Mabry History of Presenting Illness Chief Complaint Patient presents with  Suture Removal  
  Ambulatory w/ cane w/ c/o R ear suture removal  
 
 
History Provided By: Patient HPI: Richard Mabry, 80 y.o. male presents ambulatory to the ED with cc of 7 days of 0 out of 10 improving, non painful laceration to his right external ear that is much better after his laceration repair performed here a week ago after he had a fall. He tells me he is doing well and has been taking his antibiotics as prescribed. He is requesting to have his sutures removed. Chief Complaint: ear laceration Duration: 7 Days Timing:  Improving Location: right external ear Quality: non painful Severity: 0 out of 10 Modifying Factors: much better following laceration repair with antibiotics Associated Symptoms: denies any other associated signs or symptoms There are no other complaints, changes, or physical findings at this time. PCP: Evert Lopez MD 
 
Current Outpatient Prescriptions Medication Sig Dispense Refill  cephALEXin (KEFLEX) 500 mg capsule Take 1 Cap by mouth three (3) times daily for 10 days. 30 Cap 0  
 cyanocobalamin (VITAMIN B-12) 500 mcg tablet Take 500 mcg by mouth daily.  clopidogrel (PLAVIX) 75 mg tab Take 1 Tab by mouth daily. 30 Tab 11  
 aspirin delayed-release 81 mg tablet Take 1 Tab by mouth daily. 30 Tab 11  
 lisinopril (PRINIVIL, ZESTRIL) 2.5 mg tablet Take 1 Tab by mouth daily. 90 Tab 3  
 folic acid 262 mcg tablet Take 400 mcg by mouth daily.  isosorbide mononitrate ER (IMDUR) 30 mg tablet Take 0.5 Tabs by mouth daily. 30 Tab 0  
 nitroglycerin (NITROSTAT) 0.4 mg SL tablet 1 Tab by SubLINGual route every five (5) minutes as needed for Chest Pain.  1 Bottle 0  
 simvastatin (ZOCOR) 20 mg tablet TAKE 1 TABLET BY MOUTH NIGHTLY 90 Tab 1  
  glucose blood VI test strips (ONETOUCH ULTRA BLUE TEST STRIP) strip by Does Not Apply route Daily (before breakfast). E11.9 100 Strip 3  
 metFORMIN ER (GLUCOPHAGE XR) 500 mg tablet TAKE 3 TABLETS BY MOUTH EVERY DAY WITH DINNER 270 Tab 1  
 glipiZIDE (GLUCOTROL) 5 mg tablet TAKE 1 TABLET BY MOUTH EVERY DAY 90 Tab 3  
 metoprolol succinate (TOPROL-XL) 25 mg XL tablet TAKE 1/2 TABLET BY MOUTH EVERY DAY 45 Tab 3  
 albuterol (PROVENTIL HFA, VENTOLIN HFA, PROAIR HFA) 90 mcg/actuation inhaler Take 2 Puffs by inhalation every four (4) hours as needed for Wheezing. 1 Inhaler 5  
 tiotropium bromide (SPIRIVA RESPIMAT) 2.5 mcg/actuation inhaler Take 2 Puffs by inhalation daily.  coenzyme q10 (CO Q-10) 10 mg cap Take  by mouth.  cinnamon bark (CINNAMON) 500 mg cap Take  by mouth.  MAGNESIUM PO Take  by mouth daily.  acetaminophen (TYLENOL) 500 mg tablet Take 1 tablet by mouth every eight (8) hours as needed for Pain. 30 tablet 0  
 CHOLECALCIFEROL, VITAMIN D3, (VITAMIN D3 PO) Take 1,000 Units by mouth daily.  multivitamins-minerals-lutein (CENTRUM SILVER) Tab Take 1 Tab by mouth daily.  fexofenadine (ALLEGRA) 180 mg tablet Take 1 Tab by mouth daily. 30 Tab 11  
 famotidine (PEPCID AC) 20 mg tablet Take 20 mg by mouth daily.  VITAMIN B COMPLEX (B COMPLEX PO) Take 1 Tab by mouth daily.  fluticasone-salmeterol (ADVAIR DISKUS) 250-50 mcg/Dose diskus inhaler Take 1 Puff by inhalation every twelve (12) hours.  ASCORBIC ACID (VITAMIN C PO) Take 1,000 mg by mouth daily. Past History Past Medical History: 
Past Medical History:  
Diagnosis Date  Arrhythmia   
 irregular heart beat  Asthma  CAD (coronary artery disease)  Chronic hip pain  COPD (chronic obstructive pulmonary disease) ([facility] [address] 9/10/2010  Diabetes ([facility] [address]  DJD (degenerative joint disease)  GERD (gastroesophageal reflux disease)  HNP (herniated nucleus pulposus) 6/2006  
 lumbar  Hypercholesterolemia  Hypertension  Nausea & vomiting  Prostate cancer [facility]) 2003  Reversible ischemic neurologic deficit ([address] 1990  
 suspected with no residual effects and no recurrance  TIA (transient ischemic attack) TIA- no deficiets Past Surgical History: 
Past Surgical History:  
Procedure Laterality Date  COLONOSCOPY  12/15/2004 Diverticulosis Dr. Kami Younger repeat 10 years  HX COLONOSCOPY  2005  HX HEART CATHETERIZATION    
 2  stents  HX HEART CATHETERIZATION  08/20/2018  
 failed 100% block @ [facility] will plan to do laser next.  HX HERNIA REPAIR  9/2008  
 left inguinal hernia repair by Dr. Michelle Kirby  HX HERNIA REPAIR Right   
 inguinal hernai repair  HX HERNIA REPAIR    
 umbilical hernia repair  HX HIP REPLACEMENT Right 07/06/2016  HX PROSTATECTOMY  2003  HX TONSILLECTOMY  TOTAL HIP ARTHROPLASTY Left 2/9/11 Dr. Kumar Solares at [facility] Family History: 
Family History Problem Relation Age of Onset  Cancer Mother Breast cancer  Lung Disease Mother Emphysema  Cancer Father Bladder cancer  Lung Disease Father Emphysema  Hypertension Sister 2015 Social History: 
Social History Substance Use Topics  Smoking status: Former Smoker Packs/day: 3.00 Years: 10.00 Types: Cigarettes Quit date: 1/15/1970  Smokeless tobacco: Never Used  Alcohol use No  
 
 
Allergies: 
No Known Allergies Review of Systems Review of Systems Constitutional: Negative for fatigue and fever. HENT: Negative for congestion, ear pain and rhinorrhea. Eyes: Negative for pain and redness. Respiratory: Negative for cough and wheezing. Cardiovascular: Negative for chest pain and palpitations. Gastrointestinal: Negative for abdominal pain, nausea and vomiting. Genitourinary: Negative for dysuria, frequency and urgency. Musculoskeletal: Negative for back pain, neck pain and neck stiffness. Skin: Positive for wound. Negative for rash. Neurological: Negative for weakness, light-headedness, numbness and headaches. Physical Exam  
Physical Exam  
Constitutional: He is oriented to person, place, and time. He appears well-developed and well-nourished. Non-toxic appearance. No distress. HENT:  
Head: Normocephalic and atraumatic. Head is without right periorbital erythema and without left periorbital erythema. Right Ear: External ear normal.  
Left Ear: External ear normal.  
Ears: 
 
Nose: Nose normal.  
Mouth/Throat: Uvula is midline. No trismus in the jaw. #6 sutures overly a well healed laceration of the right external ear. No redness or tenderness. Eyes: Conjunctivae and EOM are normal. Pupils are equal, round, and reactive to light. No scleral icterus. Neck: Normal range of motion and full passive range of motion without pain. Cardiovascular: Normal rate, regular rhythm and normal heart sounds. Pulmonary/Chest: Effort normal and breath sounds normal. No accessory muscle usage. No tachypnea. No respiratory distress. He has no decreased breath sounds. He has no wheezes. Abdominal: Soft. There is no tenderness. There is no rigidity and no guarding. Musculoskeletal: Normal range of motion. Neurological: He is alert and oriented to person, place, and time. He is not disoriented. No cranial nerve deficit or sensory deficit. GCS eye subscore is 4. GCS verbal subscore is 5. GCS motor subscore is 6. Skin: Skin is intact. No rash noted. Psychiatric: He has a normal mood and affect. His speech is normal.  
Nursing note and vitals reviewed. Diagnostic Study Results Labs - No results found for this or any previous visit (from the past 12 hour(s)). Radiologic Studies - No orders to display CT Results  (Last 48 hours) None CXR Results  (Last 48 hours) None Medical Decision Making I am the first provider for this patient. I reviewed the vital signs, available nursing notes, past medical history, past surgical history, family history and social history. Vital Signs-Reviewed the patient's vital signs. Patient Vitals for the past 12 hrs: 
 Temp Pulse Resp BP SpO2  
09/06/18 0930 98.4 °F (36.9 °C) 66 17 124/65 100 % Pulse Oximetry Analysis - 100% on RA Records Reviewed: Nursing Notes and Old Medical Records Provider Notes (Medical Decision Making): Wound appears well healed without redness or tenderness. Will remove sutures. Procedure Note - Suture Removal 
10:23 AM  
Performed by: Satya Batista PA-C 
6 suture (s) were removed from the right external ear. No signs/sxs of infection noted. Wound healing well. Sutures removed without incident or complications. The procedure took 1-15 minutes, and pt tolerated well. ED Course:  
Initial assessment performed. The patients presenting problems have been discussed, and they are in agreement with the care plan formulated and outlined with them. I have encouraged them to ask questions as they arise throughout their visit. Disposition: 
Discharge PLAN: 
1. Discharge Medication List as of 9/6/2018 10:16 AM  
  
 
2. Follow-up Information Follow up With Details Comments Contact Info Marilu Herrera MD Schedule an appointment as soon as possible for a visit As needed [address] [facility] [address] 
[phone] Return to ED if worse Diagnosis Clinical Impression: 1. Encounter for removal of sutures 2. Laceration of ear, external, right, complicated, subsequent encounter

## 2018-09-10 ENCOUNTER — OFFICE VISIT (OUTPATIENT)
Dept: INTERNAL MEDICINE CLINIC | Age: 83
End: 2018-09-10

## 2018-09-10 VITALS
RESPIRATION RATE: 16 BRPM | OXYGEN SATURATION: 90 % | HEART RATE: 62 BPM | SYSTOLIC BLOOD PRESSURE: 132 MMHG | WEIGHT: 189 LBS | TEMPERATURE: 97.7 F | HEIGHT: 69 IN | DIASTOLIC BLOOD PRESSURE: 66 MMHG | BODY MASS INDEX: 27.99 KG/M2

## 2018-09-10 DIAGNOSIS — H81.393 PERIPHERAL VERTIGO OF BOTH EARS: Primary | ICD-10-CM

## 2018-09-10 DIAGNOSIS — R27.0 ATAXIA: ICD-10-CM

## 2018-09-10 DIAGNOSIS — J44.9 CHRONIC OBSTRUCTIVE PULMONARY DISEASE, UNSPECIFIED COPD TYPE (HCC): Chronic | ICD-10-CM

## 2018-09-10 DIAGNOSIS — R29.6 FALLS FREQUENTLY: ICD-10-CM

## 2018-09-10 DIAGNOSIS — E78.00 PURE HYPERCHOLESTEROLEMIA: Chronic | ICD-10-CM

## 2018-09-10 DIAGNOSIS — I25.10 CORONARY ARTERY DISEASE INVOLVING NATIVE CORONARY ARTERY OF NATIVE HEART WITHOUT ANGINA PECTORIS: Chronic | ICD-10-CM

## 2018-09-10 DIAGNOSIS — E11.21 TYPE 2 DIABETES MELLITUS WITH NEPHROPATHY (HCC): ICD-10-CM

## 2018-09-10 NOTE — PATIENT INSTRUCTIONS

## 2018-09-10 NOTE — PROGRESS NOTES
HPI: 
David Conner is a 80y.o. year old male who is here for an emergency room follow-up and follow-up of a fall. Apparently he was in his basement a week and a half ago and looked up to turn off of light and developed acute onset of dizziness and fell to the ground striking his right ear. He suffered a laceration to that area and was seen at urgent care but referred to the emergency room because of concerns about the repair. He has had sutures placed and removed last week by the emergency room. He has had no further falls. He continues to be somewhat unsteady on his feet. He notes most of his issues associated with movement of his head. He is previously seen the neurologist, ENT, cardiologist, and pulmonologist for follow-up. He continues to have cough and some scant sputum production. Some dyspnea on exertion but better with using his albuterol recently. No numbness, tingling, or weakness. Past Medical History:  
Diagnosis Date  Arrhythmia   
 irregular heart beat  Asthma  CAD (coronary artery disease)  Chronic hip pain  COPD (chronic obstructive pulmonary disease) ([address] 9/10/2010  Diabetes ([address]  DJD (degenerative joint disease)  GERD (gastroesophageal reflux disease)  HNP (herniated nucleus pulposus) 6/2006  
 lumbar  Hypercholesterolemia  Hypertension  Nausea & vomiting  Prostate cancer [facility]) 2003  Reversible ischemic neurologic deficit ([address] 1990  
 suspected with no residual effects and no recurrance  TIA (transient ischemic attack) TIA- no deficiets Past Surgical History:  
Procedure Laterality Date  COLONOSCOPY  12/15/2004 Diverticulosis Dr. Fer Alvarado repeat 10 years  HX COLONOSCOPY  2005  HX HEART CATHETERIZATION    
 2  stents  HX HEART CATHETERIZATION  08/20/2018  
 failed 100% block @ [facility] will plan to do laser next.  HX HERNIA REPAIR  9/2008  
 left inguinal hernia repair by Dr. Arslan Peralta  HX HERNIA REPAIR Right   
 inguinal hernai repair  HX HERNIA REPAIR    
 umbilical hernia repair  HX HIP REPLACEMENT Right 07/06/2016  HX PROSTATECTOMY  2003  HX TONSILLECTOMY  TOTAL HIP ARTHROPLASTY Left 2/9/11 Dr. Gill Yu at [facility] Prior to Admission medications Medication Sig Start Date End Date Taking? Authorizing Provider  
cyanocobalamin (VITAMIN B-12) 500 mcg tablet Take 500 mcg by mouth daily. Yes Historical Provider  
clopidogrel (PLAVIX) 75 mg tab Take 1 Tab by mouth daily. 8/21/18  Yes Elisha Nation NP  
aspirin delayed-release 81 mg tablet Take 1 Tab by mouth daily. 8/20/18  Yes Elisha Nation NP  
lisinopril (PRINIVIL, ZESTRIL) 2.5 mg tablet Take 1 Tab by mouth daily. 8/17/18  Yes Olena Apodaca NP  
folic acid 549 mcg tablet Take 400 mcg by mouth daily. Yes Historical Provider  
isosorbide mononitrate ER (IMDUR) 30 mg tablet Take 0.5 Tabs by mouth daily. 8/10/18  Yes Olena Apodaca NP  
nitroglycerin (NITROSTAT) 0.4 mg SL tablet 1 Tab by SubLINGual route every five (5) minutes as needed for Chest Pain. 8/10/18  Yes Olena Apodaca NP  
simvastatin (ZOCOR) 20 mg tablet TAKE 1 TABLET BY MOUTH NIGHTLY 8/6/18  Yes Georgi Holland III, MD  
glucose blood VI test strips (ONETOUCH ULTRA BLUE TEST STRIP) strip by Does Not Apply route Daily (before breakfast). E11.9 7/5/18  Yes Georgi Holland III, MD  
metFORMIN ER (GLUCOPHAGE XR) 500 mg tablet TAKE 3 TABLETS BY MOUTH EVERY DAY WITH DINNER 7/3/18  Yes Georgi Holland III, MD  
glipiZIDE (GLUCOTROL) 5 mg tablet TAKE 1 TABLET BY MOUTH EVERY DAY 2/13/18  Yes Georgi Holland III, MD  
metoprolol succinate (TOPROL-XL) 25 mg XL tablet TAKE 1/2 TABLET BY MOUTH EVERY DAY 12/13/17  Yes Olena Apodaca NP  
albuterol (PROVENTIL HFA, VENTOLIN HFA, PROAIR HFA) 90 mcg/actuation inhaler Take 2 Puffs by inhalation every four (4) hours as needed for Wheezing.  12/8/17  Yes Lisa Jordan MD  
 tiotropium bromide (SPIRIVA RESPIMAT) 2.5 mcg/actuation inhaler Take 2 Puffs by inhalation daily. Yes Historical Provider  
coenzyme q10 (CO Q-10) 10 mg cap Take  by mouth. Yes Historical Provider  
cinnamon bark (CINNAMON) 500 mg cap Take  by mouth. Yes Historical Provider MAGNESIUM PO Take  by mouth daily. Yes Historical Provider  
acetaminophen (TYLENOL) 500 mg tablet Take 1 tablet by mouth every eight (8) hours as needed for Pain. 8/28/14  Yes Angel Dickey MD  
CHOLECALCIFEROL, VITAMIN D3, (VITAMIN D3 PO) Take 1,000 Units by mouth daily. Yes Historical Provider  
multivitamins-minerals-lutein (CENTRUM SILVER) Tab Take 1 Tab by mouth daily. 5/14/10  Yes Historical Provider  
fexofenadine (ALLEGRA) 180 mg tablet Take 1 Tab by mouth daily. 1/25/10  Yes Karen Oliveira MD  
famotidine (PEPCID AC) 20 mg tablet Take 20 mg by mouth daily. 6/27/11  Yes Historical Provider VITAMIN B COMPLEX (B COMPLEX PO) Take 1 Tab by mouth daily. Yes Historical Provider  
fluticasone-salmeterol (ADVAIR DISKUS) 250-50 mcg/Dose diskus inhaler Take 1 Puff by inhalation every twelve (12) hours. Yes Historical Provider ASCORBIC ACID (VITAMIN C PO) Take 1,000 mg by mouth daily. Yes Historical Provider  
cephALEXin (KEFLEX) 500 mg capsule Take 1 Cap by mouth three (3) times daily for 10 days. 8/30/18 9/9/18  PALMA Villafana Social History Social History  Marital status:  Spouse name: N/A  
 Number of children: N/A  
 Years of education: N/A Occupational History  Not on file. Social History Main Topics  Smoking status: Former Smoker Packs/day: 3.00 Years: 10.00 Types: Cigarettes Quit date: 1/15/1970  Smokeless tobacco: Never Used  Alcohol use No  
 Drug use: Yes Special: Prescription, OTC  Sexual activity: Yes  
  Partners: Female Birth control/ protection: None Other Topics Concern  Not on file Social History Narrative ROS Per HPI Visit Vitals  /66  Pulse 62  Temp 97.7 °F (36.5 °C) (Oral)  Resp 16  
 Ht 5' 9\" (1.753 m)  Wt 189 lb (85.7 kg)  SpO2 90%  BMI 27.91 kg/m2 Physical Exam  
Physical Examination: General appearance - alert, well appearing, and in no distress Ears - bilateral TM's and external ear canals normal, right ear does have a well-healed laceration with no erythema or drainage. Mouth - mucous membranes moist, pharynx normal without lesions Neck - supple, no significant adenopathy Chest -moderate expiratory wheeze in the bases. Heart - normal rate, regular rhythm, normal S1, S2, no murmurs, rubs, clicks or gallops Abdomen - soft, nontender, nondistended, no masses or organomegaly Neurological - alert, oriented, normal speech, no focal findings or movement disorder noted Extremities - peripheral pulses normal, no pedal edema, no clubbing or cyanosis Assessment/Plan: 
Diagnoses and all orders for this visit: 1. Peripheral vertigo of both ears -with persistent falls. At this point will refer to physical therapy for vestibular therapy and see if this is helpful. He was reluctant but I explained to him that he is now have multiple falls and needs to work on prevention. 
-     REFERRAL TO PHYSICAL THERAPY 2. Falls frequently 
-     REFERRAL TO PHYSICAL THERAPY 3. Chronic obstructive pulmonary disease, unspecified COPD type ([address] -appears stable. Will continue his albuterol. 4. Type 2 diabetes mellitus with nephropathy (HCC) -no low sugar reactions. Continue the current meds. 5. Coronary artery disease involving native coronary artery of native heart without angina pectoris -per cardiology. 6. Pure hypercholesterolemia 7. Ataxia Follow-up Disposition: 6 months and as needed Advised him to call back or return to office if symptoms worsen/change/persist. 
Discussed expected course/resolution/complications of diagnosis in detail with patient. Medication risks/benefits/costs/interactions/alternatives discussed with patient. He was given an after visit summary which includes diagnoses, current medications, & vitals. He expressed understanding with the diagnosis and plan.

## 2018-09-10 NOTE — MR AVS SNAPSHOT
[address] [facility] 57 
[phone] Patient: Moni Miller MRN:  [id number] Visit Information Date & Time Provider Department Dept. Phone Encounter #  
 9/10/2018 11:30 AM Polo Randhawa MD [facility] Internal Medicine [phone] [id number] Your Appointments 2018 11:30 AM  
ESTABLISHED PATIENT with Ozzie Sparks MD  
[facility]) Appt Note: Per George Valdes f/u with Dr. Moni Miller, [facility]  
 [id number] [facility]  
[phone] [address] [facility]  
  
    
 2019 11:40 AM  
Follow Up with Erick Hayden MD  
[facility]) Appt Note: Follow up $0CP tdb 18  
 [address] [place] [address], [address] [address]  
[address] [facility], [address] [place] [address], [address] [address]  
  
    
 2019 11:00 AM  
ROUTINE CARE with Polo Randhawa MD  
[facility]) Appt Note: f/u 6m  
 [address] [facility] [address] [facility] [address]  
[address] [address] [address] [address] [facility] Upcoming Health Maintenance Date Due ZOSTER VACCINE AGE 60> 10/18/1991 Influenza Age 5 to Adult 2018 EYE EXAM RETINAL OR DILATED Q1 10/11/2018 MICROALBUMIN Q1 2018 HEMOGLOBIN A1C Q6M 2019 FOOT EXAM Q1 2019 MEDICARE YEARLY EXAM 2019 LIPID PANEL Q1 2019 GLAUCOMA SCREENING Q2Y 10/11/2019 DTaP/Tdap/Td series (2 - Td) 10/15/2026 Allergies as of 9/10/2018  Review Complete On: 9/10/2018 By: Hazel Barba LPN No Known Allergies Current Immunizations  Reviewed on 10/9/2017 Name Date Influenza High Dose Vaccine PF 10/6/2017, 2016 Influenza Vaccine 10/27/2014, 10/1/2013 Influenza Vaccine (Quad) PF 10/12/2015 Influenza Vaccine Split 10/31/2011, 10/15/2010 Pneumococcal Conjugate (PCV-13) 4/1/2015 Rabies Immune Globulin 10/15/2016 11:44 PM  
 Rabies Vaccine IM 10/29/2016  3:14 PM, 10/22/2016  3:02 PM, 10/18/2016  1:41 PM, 10/15/2016 11:00 PM  
 TD Vaccine 10/30/2012 Tdap 10/15/2016 10:48 PM  
 ZZZ-RETIRED (DO NOT USE) Pneumococcal Vaccine (Unspecified Type) 4/1/2004, 10/1/1998 Not reviewed this visit You Were Diagnosed With   
  
 Codes Comments Peripheral vertigo of both ears    -  Primary ICD-10-CM: H81.393 ICD-9-CM: 386.10 Falls frequently     ICD-10-CM: R29.6 ICD-9-CM: V15.88 Chronic obstructive pulmonary disease, unspecified COPD type ([address] 75.)     ICD-10-CM: J44.9 ICD-9-CM: 728 Type 2 diabetes mellitus with nephropathy (HCC)     ICD-10-CM: E11.21 
ICD-9-CM: 250.40, 583.81 Coronary artery disease involving native coronary artery of native heart without angina pectoris     ICD-10-CM: I25.10 ICD-9-CM: 414.01   
 Pure hypercholesterolemia     ICD-10-CM: E78.00 ICD-9-CM: 272.0 Ataxia     ICD-10-CM: R27.0 ICD-9-CM: 050. 3 Vitals BP Pulse Temp Resp Height(growth percentile) Weight(growth percentile) 132/66 62 97.7 °F (36.5 °C) (Oral) 16 5' 9\" (1.753 m) 189 lb (85.7 kg) SpO2 BMI Smoking Status 90% 27.91 kg/m2 Former Smoker Vitals History BMI and BSA Data Body Mass Index Body Surface Area  
 27.91 kg/m 2 2.04 m 2 Preferred Pharmacy Pharmacy Name Phone [id number] [place], [place] - [address] [place] [address] AT [address] [place] [address] [phone] Your Updated Medication List  
  
   
This list is accurate as of 9/10/18 12:20 PM.  Always use your most recent med list.  
  
  
  
  
 acetaminophen 500 mg tablet Commonly known as:  TYLENOL Take 1 tablet by mouth every eight (8) hours as needed for Pain. ADVAIR DISKUS 250-50 mcg/dose diskus inhaler Generic drug:  fluticasone-salmeterol Take 1 Puff by inhalation every twelve (12) hours. albuterol 90 mcg/actuation inhaler Commonly known as:  PROVENTIL HFA, VENTOLIN HFA, PROAIR HFA Take 2 Puffs by inhalation every four (4) hours as needed for Wheezing. aspirin delayed-release 81 mg tablet Take 1 Tab by mouth daily. B COMPLEX PO Take 1 Tab by mouth daily. CENTRUM SILVER Tab tablet Generic drug:  multivitamins-minerals-lutein Take 1 Tab by mouth daily. CINNAMON 500 mg Cap Generic drug:  cinnamon bark Take  by mouth. clopidogrel 75 mg Tab Commonly known as:  PLAVIX Take 1 Tab by mouth daily. CO Q-10 10 mg Cap Generic drug:  coenzyme q10 Take  by mouth. fexofenadine 180 mg tablet Commonly known as:  Isabel Camp Take 1 Tab by mouth daily. folic acid 345 mcg tablet Take 400 mcg by mouth daily. glipiZIDE 5 mg tablet Commonly known as:  GLUCOTROL  
TAKE 1 TABLET BY MOUTH EVERY DAY  
  
 glucose blood VI test strips strip Commonly known as:  ONETOUCH ULTRA BLUE TEST STRIP  
by Does Not Apply route Daily (before breakfast). E11.9  
  
 isosorbide mononitrate ER 30 mg tablet Commonly known as:  IMDUR Take 0.5 Tabs by mouth daily. lisinopril 2.5 mg tablet Commonly known as:  Saman Laura Take 1 Tab by mouth daily. MAGNESIUM PO Take  by mouth daily. metFORMIN  mg tablet Commonly known as:  GLUCOPHAGE XR  
TAKE 3 TABLETS BY MOUTH EVERY DAY WITH DINNER  
  
 metoprolol succinate 25 mg XL tablet Commonly known as:  TOPROL-XL  
TAKE 1/2 TABLET BY MOUTH EVERY DAY  
  
 nitroglycerin 0.4 mg SL tablet Commonly known as:  NITROSTAT  
1 Tab by SubLINGual route every five (5) minutes as needed for Chest Pain. PEPCID AC 20 mg tablet Generic drug:  famotidine Take 20 mg by mouth daily. simvastatin 20 mg tablet Commonly known as:  ZOCOR  
 TAKE 1 TABLET BY MOUTH NIGHTLY SPIRIVA RESPIMAT 2.5 mcg/actuation inhaler Generic drug:  tiotropium bromide Take 2 Puffs by inhalation daily. VITAMIN B-12 500 mcg tablet Generic drug:  cyanocobalamin Take 500 mcg by mouth daily. VITAMIN C PO Take 1,000 mg by mouth daily. VITAMIN D3 PO Take 1,000 Units by mouth daily. We Performed the Following REFERRAL TO PHYSICAL THERAPY [JVR18 Custom] Referral Information Referral ID Referred By Referred To  
  
 [id number] [facility], [address], [address] Phone: [phone] Fax: [phone] Visits Status Start Date End Date 1 New Request 9/10/18 9/10/19 If your referral has a status of pending review or denied, additional information will be sent to support the outcome of this decision. Patient Instructions Preventing Falls: Care Instructions Your Care Instructions Getting around your home safely can be a challenge if you have injuries or health problems that make it easy for you to fall. Loose rugs and furniture in walkways are among the dangers for many older people who have problems walking or who have poor eyesight. People who have conditions such as arthritis, osteoporosis, or dementia also have to be careful not to fall. You can make your home safer with a few simple measures. Follow-up care is a key part of your treatment and safety. Be sure to make and go to all appointments, and call your doctor if you are having problems. It's also a good idea to know your test results and keep a list of the medicines you take. How can you care for yourself at home? Taking care of yourself · You may get dizzy if you do not drink enough water. To prevent dehydration, drink plenty of fluids, enough so that your urine is light yellow or clear like water.  Choose water and other caffeine-free clear liquids. If you have kidney, heart, or liver disease and have to limit fluids, talk with your doctor before you increase the amount of fluids you drink. · Exercise regularly to improve your strength, muscle tone, and balance. Walk if you can. Swimming may be a good choice if you cannot walk easily. · Have your vision and hearing checked each year or any time you notice a change. If you have trouble seeing and hearing, you might not be able to avoid objects and could lose your balance. · Know the side effects of the medicines you take. Ask your doctor or pharmacist whether the medicines you take can affect your balance. Sleeping pills or sedatives can affect your balance. · Limit the amount of alcohol you drink. Alcohol can impair your balance and other senses. · Ask your doctor whether calluses or corns on your feet need to be removed. If you wear loose-fitting shoes because of calluses or corns, you can lose your balance and fall. · Talk to your doctor if you have numbness in your feet. Preventing falls at home · Remove raised doorway thresholds, throw rugs, and clutter. Repair loose carpet or raised areas in the floor. · Move furniture and electrical cords to keep them out of walking paths. · Use nonskid floor wax, and wipe up spills right away, especially on ceramic tile floors. · If you use a walker or cane, put rubber tips on it. If you use crutches, clean the bottoms of them regularly with an abrasive pad, such as steel wool. · Keep your house well lit, especially [place], and outside walkways. Use night-lights in areas such as hallways and bathrooms. Add extra light switches or use remote switches (such as switches that go on or off when you clap your hands) to make it easier to turn lights on if you have to get up during the night. · Install sturdy handrails on stairways. · Move items in your cabinets so that the things you use a lot are on the lower shelves (about waist level). · Keep a cordless phone and a flashlight with new batteries by your bed. If possible, put a phone in each of the main rooms of your house, or carry a cell phone in case you fall and cannot reach a phone. Or, you can wear a device around your neck or wrist. You push a button that sends a signal for help. · Wear low-heeled shoes that fit well and give your feet good support. Use footwear with nonskid soles. Check the heels and soles of your shoes for wear. Repair or replace worn heels or soles. · Do not wear socks without shoes on wood floors. · Walk on the grass when the sidewalks are slippery. If you live in an area that gets snow and ice in the winter, sprinkle salt on slippery steps and sidewalks. Preventing falls in the bath · Install grab bars and nonskid mats inside and outside your shower or tub and near the toilet and sinks. · Use shower chairs and bath benches. · Use a hand-held shower head that will allow you to sit while showering. · Get into a tub or shower by putting the weaker leg in first. Get out of a tub or shower with your strong side first. 
· Repair loose toilet seats and consider installing a raised toilet seat to make getting on and off the toilet easier. · Keep your bathroom door unlocked while you are in the shower. Where can you learn more? Go to http://jasvir-yaron.info/. Enter [phone] in the search box to learn more about \"Preventing Falls: Care Instructions. \" Current as of: August 4, 2016 Content Version: 11.2 © 9355-1804 [employer]. Care instructions adapted under license by [employer] (which disclaims liability or warranty for this information). If you have questions about a medical condition or this instruction, always ask your healthcare professional. [address] any warranty or liability for your use of this information. Introducing [facility]! Dear Urszula Mcdonald: Thank you for requesting a [facility] account. Our records indicate that you already have an active [facility] account. You can access your account anytime at [email or web address] Did you know that you can access your hospital and ER discharge instructions at any time in [facility]? You can also review all of your test results from your hospital stay or ER visit. Additional Information If you have questions, please visit the Frequently Asked Questions section of the [facility] website at [email or web address] Remember, [facility] is NOT to be used for urgent needs. For medical emergencies, dial 911. Now available from your iPhone and Android! Please provide this summary of care documentation to your next provider. Your primary care clinician is listed as Luis [phone] If you have any questions after today's visit, please call [phone].

## 2018-09-19 ENCOUNTER — OFFICE VISIT (OUTPATIENT)
Dept: CARDIOLOGY CLINIC | Age: 83
End: 2018-09-19

## 2018-09-19 VITALS
WEIGHT: 188 LBS | HEART RATE: 60 BPM | RESPIRATION RATE: 18 BRPM | OXYGEN SATURATION: 94 % | SYSTOLIC BLOOD PRESSURE: 126 MMHG | DIASTOLIC BLOOD PRESSURE: 70 MMHG | HEIGHT: 69 IN | BODY MASS INDEX: 27.85 KG/M2

## 2018-09-19 DIAGNOSIS — E78.2 MIXED HYPERLIPIDEMIA: ICD-10-CM

## 2018-09-19 DIAGNOSIS — I25.82 CHRONIC TOTAL OCCLUSION OF CORONARY ARTERY: ICD-10-CM

## 2018-09-19 DIAGNOSIS — Z95.5 S/P DRUG ELUTING CORONARY STENT PLACEMENT: ICD-10-CM

## 2018-09-19 DIAGNOSIS — I25.118 CORONARY ARTERY DISEASE OF NATIVE ARTERY OF NATIVE HEART WITH STABLE ANGINA PECTORIS (HCC): Primary | ICD-10-CM

## 2018-09-19 DIAGNOSIS — R06.02 SOBOE (SHORTNESS OF BREATH ON EXERTION): ICD-10-CM

## 2018-09-19 NOTE — PROGRESS NOTES
1. Have you been to the ER, urgent care clinic since your last visit? Hospitalized since your last visit?  9-6-18 ED [facility] ER suture removal, 8-30-18, fall. 2. Have you seen or consulted any other health care providers outside of the [address] [place] [address] since your last visit? Include any pap smears or colon screening. No      Chief Complaint   Patient presents with    Shortness of Breath     1 mo appt. EKG done manually. Denied cardiac symptoms.

## 2018-09-19 NOTE — PROGRESS NOTES
Soto Hoover MD          NAME:  Soledad Lopez   :   1931   MRN:   [id number]   PCP:  Brian Fields MD           Subjective: The patient is a 80y.o. year old male  who returns for a routine follow-up. Since the last visit, patient reports persistent MCGUIRE with normal daily exertion. Past Medical History:   Diagnosis Date    Arrhythmia     irregular heart beat    Asthma     CAD (coronary artery disease)     Chronic hip pain     COPD (chronic obstructive pulmonary disease) ([address] 9/10/2010    Diabetes ([facility] [address] [facility]     DJD (degenerative joint disease)     GERD (gastroesophageal reflux disease)     HNP (herniated nucleus pulposus) 2006    lumbar     Hypercholesterolemia     Hypertension     Nausea & vomiting     Prostate cancer ([facility] [address] [facility]     Reversible ischemic neurologic deficit ([facility] [address] [facility]     suspected with no residual effects and no recurrance    TIA (transient ischemic attack)     TIA- no deficiets        ICD-10-CM ICD-9-CM    1. Coronary artery disease of native artery of native heart with stable angina pectoris (HCC) I25.118 414.01 CBC WITH AUTOMATED DIFF     938.7 METABOLIC PANEL, COMPREHENSIVE      PROTHROMBIN TIME + INR   2. Chronic total occlusion of coronary artery I25.82 414.00 CBC WITH AUTOMATED DIFF     126.6 METABOLIC PANEL, COMPREHENSIVE      PROTHROMBIN TIME + INR   3. S/P drug eluting coronary stent placement Z95.5 V45.82 CBC WITH AUTOMATED DIFF      METABOLIC PANEL, COMPREHENSIVE      PROTHROMBIN TIME + INR   4.  Mixed hyperlipidemia E78.2 272.2 CBC WITH AUTOMATED DIFF      METABOLIC PANEL, COMPREHENSIVE      PROTHROMBIN TIME + INR   5. SOBOE (shortness of breath on exertion) R06.02 786.05 CBC WITH AUTOMATED DIFF      METABOLIC PANEL, COMPREHENSIVE      PROTHROMBIN TIME + INR      Social History   Substance Use Topics    Smoking status: Former Smoker     Packs/day: 3.00     Years: 10.00     Types: Cigarettes     Quit date: 1/15/1970    Smokeless tobacco: Never Used  Alcohol use No      Family History   Problem Relation Age of Onset   Zaida Schwartz Cancer Mother      Breast cancer    Lung Disease Mother      Emphysema    Cancer Father      Bladder cancer    Lung Disease Father      Emphysema    Hypertension Sister      2015        Review of Systems  Cardiovascular: Negative except as noted in HPI      Objective:       Vitals:    09/19/18 1100 09/19/18 1115   BP: 122/64 126/70   Pulse: 60    Resp: 18    SpO2: 94%    Weight: 188 lb (85.3 kg)    Height: 5' 9\" (1.753 m)     Body mass index is 27.76 kg/(m^2). General PE  Mental Status - Alert. General Appearance - Not in acute distress. Chest and Lung Exam   Inspection: Accessory muscles - No use of accessory muscles in breathing. Auscultation:   Breath sounds: - Normal.    Cardiovascular   Inspection: Jugular vein - Bilateral - Inspection Normal.  Palpation/Percussion:   Apical Impulse: - Normal.  Auscultation: Rhythm - Regular. Heart Sounds - S1 WNL and S2 WNL. No S3 or S4. Murmurs & Other Heart Sounds: Auscultation of the heart reveals - No Murmurs. Peripheral Vascular   Upper Extremity: Inspection - Bilateral - No Cyanotic nailbeds or Digital clubbing. Lower Extremity:   Palpation: Edema - Bilateral - No edema. Data Review:     EKG -  EKG: normal EKG, normal sinus rhythm, unchanged from previous tracings. LABS- @brieflabs@      Allergies reviewed  No Known Allergies    Medications reviewed  Current Outpatient Prescriptions   Medication Sig    cyanocobalamin (VITAMIN B-12) 500 mcg tablet Take 500 mcg by mouth daily.  clopidogrel (PLAVIX) 75 mg tab Take 1 Tab by mouth daily.  aspirin delayed-release 81 mg tablet Take 1 Tab by mouth daily.  lisinopril (PRINIVIL, ZESTRIL) 2.5 mg tablet Take 1 Tab by mouth daily.  folic acid 749 mcg tablet Take 400 mcg by mouth daily.  isosorbide mononitrate ER (IMDUR) 30 mg tablet Take 0.5 Tabs by mouth daily.     nitroglycerin (NITROSTAT) 0.4 mg SL tablet 1 Tab by SubLINGual route every five (5) minutes as needed for Chest Pain.  simvastatin (ZOCOR) 20 mg tablet TAKE 1 TABLET BY MOUTH NIGHTLY    glucose blood VI test strips (ONETOUCH ULTRA BLUE TEST STRIP) strip by Does Not Apply route Daily (before breakfast). E11.9    metFORMIN ER (GLUCOPHAGE XR) 500 mg tablet TAKE 3 TABLETS BY MOUTH EVERY DAY WITH DINNER    glipiZIDE (GLUCOTROL) 5 mg tablet TAKE 1 TABLET BY MOUTH EVERY DAY    metoprolol succinate (TOPROL-XL) 25 mg XL tablet TAKE 1/2 TABLET BY MOUTH EVERY DAY    albuterol (PROVENTIL HFA, VENTOLIN HFA, PROAIR HFA) 90 mcg/actuation inhaler Take 2 Puffs by inhalation every four (4) hours as needed for Wheezing.  tiotropium bromide (SPIRIVA RESPIMAT) 2.5 mcg/actuation inhaler Take 2 Puffs by inhalation daily.  coenzyme q10 (CO Q-10) 10 mg cap Take  by mouth daily.  MAGNESIUM PO Take  by mouth daily.  acetaminophen (TYLENOL) 500 mg tablet Take 1 tablet by mouth every eight (8) hours as needed for Pain.  CHOLECALCIFEROL, VITAMIN D3, (VITAMIN D3 PO) Take 1,000 Units by mouth daily.  multivitamins-minerals-lutein (CENTRUM SILVER) Tab Take 1 Tab by mouth daily.  fexofenadine (ALLEGRA) 180 mg tablet Take 1 Tab by mouth daily.  famotidine (PEPCID AC) 20 mg tablet Take 20 mg by mouth daily.  VITAMIN B COMPLEX (B COMPLEX PO) Take 1 Tab by mouth daily.  fluticasone-salmeterol (ADVAIR DISKUS) 250-50 mcg/Dose diskus inhaler Take 1 Puff by inhalation every twelve (12) hours.  ASCORBIC ACID (VITAMIN C PO) Take 1,000 mg by mouth daily.  cinnamon bark (CINNAMON) 500 mg cap Take  by mouth. No current facility-administered medications for this visit. Assessment:       ICD-10-CM ICD-9-CM    1. Coronary artery disease of native artery of native heart with stable angina pectoris (HCC) I25.118 414.01 CBC WITH AUTOMATED DIFF     816.3 METABOLIC PANEL, COMPREHENSIVE      PROTHROMBIN TIME + INR   2.  Chronic total occlusion of coronary artery I25.82 414.00 CBC WITH AUTOMATED DIFF     113.5 METABOLIC PANEL, COMPREHENSIVE      PROTHROMBIN TIME + INR   3. S/P drug eluting coronary stent placement Z95.5 V45.82 CBC WITH AUTOMATED DIFF      METABOLIC PANEL, COMPREHENSIVE      PROTHROMBIN TIME + INR   4. Mixed hyperlipidemia E78.2 272.2 CBC WITH AUTOMATED DIFF      METABOLIC PANEL, COMPREHENSIVE      PROTHROMBIN TIME + INR   5. SOBOE (shortness of breath on exertion) R06.02 786.05 CBC WITH AUTOMATED DIFF      METABOLIC PANEL, COMPREHENSIVE      PROTHROMBIN TIME + INR        Orders Placed This Encounter    CBC WITH AUTOMATED DIFF    METABOLIC PANEL, COMPREHENSIVE    PROTHROMBIN TIME + INR       Plan:     Recent onset severe MCGUIRE as suspected anginal equivalent. First  attempt RCA failed due to inability to advance a microcather over the wire into the lesion. He remains symptomatic. Plan to re-attempt with adjunct laser. Inferior wall appears to viable with severe perfusion defect on myoview.     Hugo Bermudez MD

## 2018-09-26 ENCOUNTER — HOSPITAL ENCOUNTER (OUTPATIENT)
Dept: LAB | Age: 83
Discharge: HOME | End: 2018-09-26
Payer: MEDICARE

## 2018-09-26 PROCEDURE — 85610 PROTHROMBIN TIME: CPT

## 2018-09-26 PROCEDURE — 36415 COLL VENOUS BLD VENIPUNCTURE: CPT

## 2018-09-26 PROCEDURE — 80053 COMPREHEN METABOLIC PANEL: CPT

## 2018-09-26 PROCEDURE — 85025 COMPLETE CBC W/AUTO DIFF WBC: CPT

## 2018-09-27 LAB
ALBUMIN SERPL-MCNC: 4.2 G/DL (ref 3.5–4.7)
ALBUMIN/GLOB SERPL: 1.8 {RATIO} (ref 1.2–2.2)
ALP SERPL-CCNC: 59 IU/L (ref 39–117)
ALT SERPL-CCNC: 15 IU/L (ref 0–44)
AST SERPL-CCNC: 21 IU/L (ref 0–40)
BASOPHILS # BLD AUTO: 0.1 X10E3/UL (ref 0–0.2)
BASOPHILS NFR BLD AUTO: 1 %
BILIRUB SERPL-MCNC: 0.4 MG/DL (ref 0–1.2)
BUN SERPL-MCNC: 21 MG/DL (ref 8–27)
BUN/CREAT SERPL: 19 (ref 10–24)
CALCIUM SERPL-MCNC: 9.5 MG/DL (ref 8.6–10.2)
CHLORIDE SERPL-SCNC: 103 MMOL/L (ref 96–106)
CO2 SERPL-SCNC: 22 MMOL/L (ref 20–29)
CREAT SERPL-MCNC: 1.09 MG/DL (ref 0.76–1.27)
EOSINOPHIL # BLD AUTO: 1 X10E3/UL (ref 0–0.4)
EOSINOPHIL NFR BLD AUTO: 11 %
ERYTHROCYTE [DISTWIDTH] IN BLOOD BY AUTOMATED COUNT: 14.3 % (ref 12.3–15.4)
GLOBULIN SER CALC-MCNC: 2.3 G/DL (ref 1.5–4.5)
GLUCOSE SERPL-MCNC: 104 MG/DL (ref 65–99)
HCT VFR BLD AUTO: 40.4 % (ref 37.5–51)
HGB BLD-MCNC: 13.8 G/DL (ref 13–17.7)
IMM GRANULOCYTES # BLD: 0 X10E3/UL (ref 0–0.1)
IMM GRANULOCYTES NFR BLD: 0 %
INR PPP: 1.1 (ref 0.8–1.2)
LYMPHOCYTES # BLD AUTO: 2.7 X10E3/UL (ref 0.7–3.1)
LYMPHOCYTES NFR BLD AUTO: 28 %
MCH RBC QN AUTO: 30.8 PG (ref 26.6–33)
MCHC RBC AUTO-ENTMCNC: 34.2 G/DL (ref 31.5–35.7)
MCV RBC AUTO: 90 FL (ref 79–97)
MONOCYTES # BLD AUTO: 0.7 X10E3/UL (ref 0.1–0.9)
MONOCYTES NFR BLD AUTO: 8 %
NEUTROPHILS # BLD AUTO: 4.8 X10E3/UL (ref 1.4–7)
NEUTROPHILS NFR BLD AUTO: 52 %
PLATELET # BLD AUTO: 257 X10E3/UL (ref 150–379)
POTASSIUM SERPL-SCNC: 4.9 MMOL/L (ref 3.5–5.2)
PROT SERPL-MCNC: 6.5 G/DL (ref 6–8.5)
PROTHROMBIN TIME: 11.2 SEC (ref 9.1–12)
RBC # BLD AUTO: 4.48 X10E6/UL (ref 4.14–5.8)
SODIUM SERPL-SCNC: 138 MMOL/L (ref 134–144)
WBC # BLD AUTO: 9.4 X10E3/UL (ref 3.4–10.8)

## 2018-10-03 RX ORDER — ISOSORBIDE MONONITRATE 30 MG/1
TABLET, EXTENDED RELEASE ORAL
Qty: 30 TAB | Refills: 0 | Status: SHIPPED | OUTPATIENT
Start: 2018-10-03 | End: 2018-10-05

## 2018-10-04 ENCOUNTER — HOSPITAL ENCOUNTER (OUTPATIENT)
Dept: CARDIAC CATH/INVASIVE PROCEDURES | Age: 83
Discharge: HOME | End: 2018-10-05
Attending: INTERNAL MEDICINE | Admitting: INTERNAL MEDICINE
Payer: MEDICARE

## 2018-10-04 PROBLEM — I20.9 ANGINA, CLASS III (HCC): Status: ACTIVE | Noted: 2018-10-04

## 2018-10-04 LAB
GLUCOSE BLD STRIP.AUTO-MCNC: 116 MG/DL (ref 65–100)
GLUCOSE BLD STRIP.AUTO-MCNC: 143 MG/DL (ref 65–100)
SERVICE CMNT-IMP: ABNORMAL
SERVICE CMNT-IMP: ABNORMAL

## 2018-10-04 PROCEDURE — 77030008543 HC TBNG MON PRSS MRTM -A

## 2018-10-04 PROCEDURE — C1725 CATH, TRANSLUMIN NON-LASER: HCPCS

## 2018-10-04 PROCEDURE — C1769 GUIDE WIRE: HCPCS

## 2018-10-04 PROCEDURE — 93005 ELECTROCARDIOGRAM TRACING: CPT

## 2018-10-04 PROCEDURE — 74011000250 HC RX REV CODE- 250

## 2018-10-04 PROCEDURE — C1887 CATHETER, GUIDING: HCPCS

## 2018-10-04 PROCEDURE — 77030019697 HC SYR ANGI INFL MRTM -B

## 2018-10-04 PROCEDURE — 74011250636 HC RX REV CODE- 250/636: Performed by: INTERNAL MEDICINE

## 2018-10-04 PROCEDURE — 77030010221 HC SPLNT WR POS TELE -B

## 2018-10-04 PROCEDURE — C1885 CATH, TRANSLUMIN ANGIO LASER: HCPCS

## 2018-10-04 PROCEDURE — 74011250636 HC RX REV CODE- 250/636

## 2018-10-04 PROCEDURE — C1894 INTRO/SHEATH, NON-LASER: HCPCS

## 2018-10-04 PROCEDURE — 74011636320 HC RX REV CODE- 636/320

## 2018-10-04 PROCEDURE — 77030012468 HC VLV BLEEDBK CNTRL ABBT -B

## 2018-10-04 PROCEDURE — 92920 PRQ TRLUML C ANGIOP 1ART&/BR: CPT

## 2018-10-04 PROCEDURE — 77030029065 HC DRSG HEMO QCLOT ZMED -B

## 2018-10-04 PROCEDURE — 77030019698 HC SYR ANGI MDLON MRTM -A

## 2018-10-04 PROCEDURE — C1874 STENT, COATED/COV W/DEL SYS: HCPCS

## 2018-10-04 PROCEDURE — 82962 GLUCOSE BLOOD TEST: CPT

## 2018-10-04 PROCEDURE — 77030019569 HC BND COMPR RAD TERU -B

## 2018-10-04 PROCEDURE — 77030004543 HC CATH ANGI DX MRTM -A

## 2018-10-04 PROCEDURE — C1753 CATH, INTRAVAS ULTRASOUND: HCPCS

## 2018-10-04 PROCEDURE — 74011250637 HC RX REV CODE- 250/637: Performed by: NURSE PRACTITIONER

## 2018-10-04 RX ORDER — HEPARIN SODIUM 200 [USP'U]/100ML
500 INJECTION, SOLUTION INTRAVENOUS ONCE
Status: COMPLETED | OUTPATIENT
Start: 2018-10-04 | End: 2018-10-04

## 2018-10-04 RX ORDER — LIDOCAINE HYDROCHLORIDE 10 MG/ML
INJECTION, SOLUTION EPIDURAL; INFILTRATION; INTRACAUDAL; PERINEURAL
Status: COMPLETED
Start: 2018-10-04 | End: 2018-10-04

## 2018-10-04 RX ORDER — METOPROLOL SUCCINATE 25 MG/1
25 TABLET, EXTENDED RELEASE ORAL DAILY
Status: DISCONTINUED | OUTPATIENT
Start: 2018-10-05 | End: 2018-10-05 | Stop reason: HOSPADM

## 2018-10-04 RX ORDER — CLOPIDOGREL BISULFATE 75 MG/1
75 TABLET ORAL DAILY
Status: DISCONTINUED | OUTPATIENT
Start: 2018-10-05 | End: 2018-10-05 | Stop reason: HOSPADM

## 2018-10-04 RX ORDER — SODIUM CHLORIDE 0.9 % (FLUSH) 0.9 %
5-10 SYRINGE (ML) INJECTION EVERY 8 HOURS
Status: DISCONTINUED | OUTPATIENT
Start: 2018-10-04 | End: 2018-10-05 | Stop reason: HOSPADM

## 2018-10-04 RX ORDER — ALBUTEROL SULFATE 90 UG/1
2 AEROSOL, METERED RESPIRATORY (INHALATION)
Status: DISCONTINUED | OUTPATIENT
Start: 2018-10-04 | End: 2018-10-05 | Stop reason: HOSPADM

## 2018-10-04 RX ORDER — PRAVASTATIN SODIUM 40 MG/1
40 TABLET ORAL
Status: DISCONTINUED | OUTPATIENT
Start: 2018-10-04 | End: 2018-10-05 | Stop reason: HOSPADM

## 2018-10-04 RX ORDER — VERAPAMIL HYDROCHLORIDE 2.5 MG/ML
2.5 INJECTION, SOLUTION INTRAVENOUS ONCE
Status: COMPLETED | OUTPATIENT
Start: 2018-10-04 | End: 2018-10-04

## 2018-10-04 RX ORDER — HEPARIN SODIUM 200 [USP'U]/100ML
INJECTION, SOLUTION INTRAVENOUS
Status: COMPLETED
Start: 2018-10-04 | End: 2018-10-04

## 2018-10-04 RX ORDER — CLOPIDOGREL BISULFATE 75 MG/1
75 TABLET ORAL DAILY
Status: DISCONTINUED | OUTPATIENT
Start: 2018-10-04 | End: 2018-10-05 | Stop reason: HOSPADM

## 2018-10-04 RX ORDER — SODIUM CHLORIDE 0.9 % (FLUSH) 0.9 %
5-10 SYRINGE (ML) INJECTION AS NEEDED
Status: DISCONTINUED | OUTPATIENT
Start: 2018-10-04 | End: 2018-10-05 | Stop reason: HOSPADM

## 2018-10-04 RX ORDER — MIDAZOLAM HYDROCHLORIDE 1 MG/ML
INJECTION, SOLUTION INTRAMUSCULAR; INTRAVENOUS
Status: COMPLETED
Start: 2018-10-04 | End: 2018-10-04

## 2018-10-04 RX ORDER — HEPARIN SODIUM 200 [USP'U]/100ML
INJECTION, SOLUTION INTRAVENOUS
Status: DISCONTINUED
Start: 2018-10-04 | End: 2018-10-05 | Stop reason: HOSPADM

## 2018-10-04 RX ORDER — HEPARIN SODIUM 1000 [USP'U]/ML
1000-10000 INJECTION, SOLUTION INTRAVENOUS; SUBCUTANEOUS
Status: DISCONTINUED | OUTPATIENT
Start: 2018-10-04 | End: 2018-10-04 | Stop reason: HOSPADM

## 2018-10-04 RX ORDER — ACETAMINOPHEN 325 MG/1
650 TABLET ORAL
Status: DISCONTINUED | OUTPATIENT
Start: 2018-10-04 | End: 2018-10-05 | Stop reason: HOSPADM

## 2018-10-04 RX ORDER — HEPARIN SODIUM 1000 [USP'U]/ML
INJECTION, SOLUTION INTRAVENOUS; SUBCUTANEOUS
Status: COMPLETED
Start: 2018-10-04 | End: 2018-10-04

## 2018-10-04 RX ORDER — ASPIRIN 81 MG/1
81 TABLET ORAL DAILY
Status: DISCONTINUED | OUTPATIENT
Start: 2018-10-05 | End: 2018-10-05 | Stop reason: HOSPADM

## 2018-10-04 RX ORDER — ISOSORBIDE MONONITRATE 30 MG/1
30 TABLET, EXTENDED RELEASE ORAL DAILY
Status: DISCONTINUED | OUTPATIENT
Start: 2018-10-05 | End: 2018-10-05 | Stop reason: HOSPADM

## 2018-10-04 RX ORDER — MIDAZOLAM HYDROCHLORIDE 1 MG/ML
.5-2 INJECTION, SOLUTION INTRAMUSCULAR; INTRAVENOUS
Status: DISCONTINUED | OUTPATIENT
Start: 2018-10-04 | End: 2018-10-04 | Stop reason: HOSPADM

## 2018-10-04 RX ORDER — LIDOCAINE HYDROCHLORIDE 10 MG/ML
1-30 INJECTION, SOLUTION EPIDURAL; INFILTRATION; INTRACAUDAL; PERINEURAL
Status: DISCONTINUED | OUTPATIENT
Start: 2018-10-04 | End: 2018-10-04 | Stop reason: HOSPADM

## 2018-10-04 RX ORDER — PROTAMINE SULFATE 10 MG/ML
25 INJECTION, SOLUTION INTRAVENOUS
Status: COMPLETED | OUTPATIENT
Start: 2018-10-04 | End: 2018-10-04

## 2018-10-04 RX ORDER — HEPARIN SODIUM 1000 [USP'U]/ML
2500 INJECTION, SOLUTION INTRAVENOUS; SUBCUTANEOUS ONCE
Status: COMPLETED | OUTPATIENT
Start: 2018-10-04 | End: 2018-10-04

## 2018-10-04 RX ORDER — GUAIFENESIN 100 MG/5ML
81 LIQUID (ML) ORAL
Status: COMPLETED | OUTPATIENT
Start: 2018-10-04 | End: 2018-10-04

## 2018-10-04 RX ORDER — HEPARIN SODIUM 1000 [USP'U]/ML
7500 INJECTION, SOLUTION INTRAVENOUS; SUBCUTANEOUS ONCE
Status: COMPLETED | OUTPATIENT
Start: 2018-10-04 | End: 2018-10-04

## 2018-10-04 RX ORDER — VERAPAMIL HYDROCHLORIDE 2.5 MG/ML
INJECTION, SOLUTION INTRAVENOUS
Status: COMPLETED
Start: 2018-10-04 | End: 2018-10-04

## 2018-10-04 RX ORDER — NALOXONE HYDROCHLORIDE 0.4 MG/ML
0.4 INJECTION, SOLUTION INTRAMUSCULAR; INTRAVENOUS; SUBCUTANEOUS AS NEEDED
Status: DISCONTINUED | OUTPATIENT
Start: 2018-10-04 | End: 2018-10-05 | Stop reason: HOSPADM

## 2018-10-04 RX ORDER — PROTAMINE SULFATE 10 MG/ML
INJECTION, SOLUTION INTRAVENOUS
Status: COMPLETED
Start: 2018-10-04 | End: 2018-10-04

## 2018-10-04 RX ORDER — LISINOPRIL 5 MG/1
2.5 TABLET ORAL DAILY
Status: DISCONTINUED | OUTPATIENT
Start: 2018-10-05 | End: 2018-10-05 | Stop reason: HOSPADM

## 2018-10-04 RX ORDER — FENTANYL CITRATE 50 UG/ML
INJECTION, SOLUTION INTRAMUSCULAR; INTRAVENOUS
Status: COMPLETED
Start: 2018-10-04 | End: 2018-10-04

## 2018-10-04 RX ORDER — FENTANYL CITRATE 50 UG/ML
25-50 INJECTION, SOLUTION INTRAMUSCULAR; INTRAVENOUS
Status: DISCONTINUED | OUTPATIENT
Start: 2018-10-04 | End: 2018-10-04 | Stop reason: HOSPADM

## 2018-10-04 RX ORDER — FAMOTIDINE 20 MG/1
20 TABLET, FILM COATED ORAL DAILY
Status: DISCONTINUED | OUTPATIENT
Start: 2018-10-05 | End: 2018-10-05 | Stop reason: HOSPADM

## 2018-10-04 RX ADMIN — HEPARIN SODIUM 2500 UNITS: 1000 INJECTION, SOLUTION INTRAVENOUS; SUBCUTANEOUS at 11:04

## 2018-10-04 RX ADMIN — HEPARIN SODIUM 2500 UNITS: 1000 INJECTION, SOLUTION INTRAVENOUS; SUBCUTANEOUS at 10:38

## 2018-10-04 RX ADMIN — HEPARIN SODIUM IN SODIUM CHLORIDE 1000 UNITS: 200 INJECTION INTRAVENOUS at 10:50

## 2018-10-04 RX ADMIN — PROTAMINE SULFATE 25 MG: 10 INJECTION, SOLUTION INTRAVENOUS at 12:39

## 2018-10-04 RX ADMIN — MIDAZOLAM 2 MG: 1 INJECTION INTRAMUSCULAR; INTRAVENOUS at 10:17

## 2018-10-04 RX ADMIN — ASPIRIN 81 MG 81 MG: 81 TABLET ORAL at 08:33

## 2018-10-04 RX ADMIN — VERAPAMIL HYDROCHLORIDE 2.5 MG: 2.5 INJECTION INTRAVENOUS at 10:38

## 2018-10-04 RX ADMIN — VERAPAMIL HYDROCHLORIDE 2.5 MG: 2.5 INJECTION, SOLUTION INTRAVENOUS at 10:38

## 2018-10-04 RX ADMIN — HEPARIN SODIUM 1000 UNITS: 200 INJECTION, SOLUTION INTRAVENOUS at 10:09

## 2018-10-04 RX ADMIN — HEPARIN SODIUM 3000 UNITS: 1000 INJECTION, SOLUTION INTRAVENOUS; SUBCUTANEOUS at 11:52

## 2018-10-04 RX ADMIN — IOPAMIDOL 120 ML: 755 INJECTION, SOLUTION INTRAVENOUS at 12:40

## 2018-10-04 RX ADMIN — PRAVASTATIN SODIUM 40 MG: 40 TABLET ORAL at 20:31

## 2018-10-04 RX ADMIN — HEPARIN SODIUM IN SODIUM CHLORIDE 1000 UNITS: 200 INJECTION INTRAVENOUS at 10:09

## 2018-10-04 RX ADMIN — FENTANYL CITRATE 25 MCG: 50 INJECTION, SOLUTION INTRAMUSCULAR; INTRAVENOUS at 10:17

## 2018-10-04 RX ADMIN — SODIUM CHLORIDE 250 ML: 900 INJECTION, SOLUTION INTRAVENOUS at 11:00

## 2018-10-04 RX ADMIN — HEPARIN SODIUM 7500 UNITS: 1000 INJECTION, SOLUTION INTRAVENOUS; SUBCUTANEOUS at 10:50

## 2018-10-04 RX ADMIN — Medication 10 ML: at 20:32

## 2018-10-04 RX ADMIN — MIDAZOLAM HYDROCHLORIDE 1 MG: 1 INJECTION, SOLUTION INTRAMUSCULAR; INTRAVENOUS at 11:47

## 2018-10-04 RX ADMIN — CLOPIDOGREL BISULFATE 75 MG: 75 TABLET ORAL at 08:33

## 2018-10-04 RX ADMIN — LIDOCAINE HYDROCHLORIDE 2 ML: 10 INJECTION, SOLUTION EPIDURAL; INFILTRATION; INTRACAUDAL; PERINEURAL at 10:37

## 2018-10-04 RX ADMIN — MIDAZOLAM HYDROCHLORIDE 2 MG: 1 INJECTION, SOLUTION INTRAMUSCULAR; INTRAVENOUS at 10:17

## 2018-10-04 RX ADMIN — IOPAMIDOL 120 ML: 755 INJECTION, SOLUTION INTRAVENOUS at 12:41

## 2018-10-04 RX ADMIN — NITROGLYCERIN 200 MCG: 5 INJECTION, SOLUTION INTRAVENOUS at 10:38

## 2018-10-04 NOTE — INTERVAL H&P NOTE
H&P Update: 
Stephanie Daniels was seen and examined. History and physical has been reviewed. The patient has been examined.  There have been no significant clinical changes since the completion of the originally dated History and Physical. 
 
Signed By: Humberto Marques MD   
 October 4, 2018 10:27 AM

## 2018-10-04 NOTE — PROGRESS NOTES
1315-pt arrived to room, VSS, right groin site has no bleeding/hematoma, right radial site has TR band @ 13cc, no complaints, wife at bedside 1630-TR band off, no bleeding/hematoma.

## 2018-10-04 NOTE — H&P (VIEW-ONLY)
Yefri Bray MD      NAME:  Stephanie Daniels :   1931 MRN:   [id number] PCP:  Lissett Diaz MD 
 
    
 
Subjective: The patient is a 80y.o. year old male  who returns for a routine follow-up. Since the last visit, patient reports persistent MCGUIRE with normal daily exertion. Past Medical History:  
Diagnosis Date  Arrhythmia   
 irregular heart beat  Asthma  CAD (coronary artery disease)  Chronic hip pain  COPD (chronic obstructive pulmonary disease) ([facility] [address] 9/10/2010  Diabetes ([facility] [address]  DJD (degenerative joint disease)  GERD (gastroesophageal reflux disease)  HNP (herniated nucleus pulposus) 2006  
 lumbar  Hypercholesterolemia  Hypertension  Nausea & vomiting  Prostate cancer [facility])   Reversible ischemic neurologic deficit ([facility] [address]   
 suspected with no residual effects and no recurrance  TIA (transient ischemic attack) TIA- no deficiets ICD-10-CM ICD-9-CM 1. Coronary artery disease of native artery of native heart with stable angina pectoris ([facility] [address] I25.118 414.01 CBC WITH AUTOMATED DIFF  
  786.4 METABOLIC PANEL, COMPREHENSIVE PROTHROMBIN TIME + INR  
2. Chronic total occlusion of coronary artery I25.82 414.00 CBC WITH AUTOMATED DIFF  
  196.1 METABOLIC PANEL, COMPREHENSIVE PROTHROMBIN TIME + INR  
3. S/P drug eluting coronary stent placement Z95.5 V45.82 CBC WITH AUTOMATED DIFF  
   METABOLIC PANEL, COMPREHENSIVE PROTHROMBIN TIME + INR 4. Mixed hyperlipidemia E78.2 272.2 CBC WITH AUTOMATED DIFF  
   METABOLIC PANEL, COMPREHENSIVE PROTHROMBIN TIME + INR  
5. SOBOE (shortness of breath on exertion) R06.02 786.05 CBC WITH AUTOMATED DIFF  
   METABOLIC PANEL, COMPREHENSIVE PROTHROMBIN TIME + INR Social History Substance Use Topics  Smoking status: Former Smoker Packs/day: 3.00 Years: 10.00 Types: Cigarettes Quit date: 1/15/1970  Smokeless tobacco: Never Used  Alcohol use No  
  
Family History Problem Relation Age of Onset  Cancer Mother Breast cancer  Lung Disease Mother Emphysema  Cancer Father Bladder cancer  Lung Disease Father Emphysema  Hypertension Sister 2015 Review of SystemsCardiovascular: Negative except as noted in HPI Objective:  
 
 
Vitals:  
 09/19/18 1100 09/19/18 1115 BP: 122/64 126/70 Pulse: 60 Resp: 18 SpO2: 94% Weight: 188 lb (85.3 kg) Height: 5' 9\" (1.753 m) Body mass index is 27.76 kg/(m^2). Maegan Ordonez Mental Status - Alert. General Appearance - Not in acute distress. Chest and Lung Exam  Inspection: Accessory muscles - No use of accessory muscles in breathing. Auscultation:  
Breath sounds: - Normal. 
 
Cardiovascular  
Inspection: Jugular vein - Bilateral - Inspection Normal. 
Palpation/Percussion:  
Apical Impulse: - Normal. 
Auscultation: Rhythm - Regular. Heart Sounds - S1 WNL and S2 WNL. No S3 or S4. Murmurs & Other Heart Sounds: Auscultation of the heart reveals - No Murmurs. Peripheral Vascular  
Upper Extremity: Inspection - Bilateral - No Cyanotic nailbeds or Digital clubbing. Lower Extremity:  
Palpation: Edema - Bilateral - No edema. Data Review:  
 
EKG -  EKG: normal EKG, normal sinus rhythm, unchanged from previous tracings. LABS- @brieflabs@ Allergies reviewed No Known Allergies Medications reviewed Current Outpatient Prescriptions Medication Sig  cyanocobalamin (VITAMIN B-12) 500 mcg tablet Take 500 mcg by mouth daily.  clopidogrel (PLAVIX) 75 mg tab Take 1 Tab by mouth daily.  aspirin delayed-release 81 mg tablet Take 1 Tab by mouth daily.  lisinopril (PRINIVIL, ZESTRIL) 2.5 mg tablet Take 1 Tab by mouth daily.  folic acid 430 mcg tablet Take 400 mcg by mouth daily.  isosorbide mononitrate ER (IMDUR) 30 mg tablet Take 0.5 Tabs by mouth daily.  nitroglycerin (NITROSTAT) 0.4 mg SL tablet 1 Tab by SubLINGual route every five (5) minutes as needed for Chest Pain.  simvastatin (ZOCOR) 20 mg tablet TAKE 1 TABLET BY MOUTH NIGHTLY  glucose blood VI test strips (ONETOUCH ULTRA BLUE TEST STRIP) strip by Does Not Apply route Daily (before breakfast). E11.9  
 metFORMIN ER (GLUCOPHAGE XR) 500 mg tablet TAKE 3 TABLETS BY MOUTH EVERY DAY WITH DINNER  
 glipiZIDE (GLUCOTROL) 5 mg tablet TAKE 1 TABLET BY MOUTH EVERY DAY  metoprolol succinate (TOPROL-XL) 25 mg XL tablet TAKE 1/2 TABLET BY MOUTH EVERY DAY  albuterol (PROVENTIL HFA, VENTOLIN HFA, PROAIR HFA) 90 mcg/actuation inhaler Take 2 Puffs by inhalation every four (4) hours as needed for Wheezing.  tiotropium bromide (SPIRIVA RESPIMAT) 2.5 mcg/actuation inhaler Take 2 Puffs by inhalation daily.  coenzyme q10 (CO Q-10) 10 mg cap Take  by mouth daily.  MAGNESIUM PO Take  by mouth daily.  acetaminophen (TYLENOL) 500 mg tablet Take 1 tablet by mouth every eight (8) hours as needed for Pain.  CHOLECALCIFEROL, VITAMIN D3, (VITAMIN D3 PO) Take 1,000 Units by mouth daily.  multivitamins-minerals-lutein (CENTRUM SILVER) Tab Take 1 Tab by mouth daily.  fexofenadine (ALLEGRA) 180 mg tablet Take 1 Tab by mouth daily.  famotidine (PEPCID AC) 20 mg tablet Take 20 mg by mouth daily.  VITAMIN B COMPLEX (B COMPLEX PO) Take 1 Tab by mouth daily.  fluticasone-salmeterol (ADVAIR DISKUS) 250-50 mcg/Dose diskus inhaler Take 1 Puff by inhalation every twelve (12) hours.  ASCORBIC ACID (VITAMIN C PO) Take 1,000 mg by mouth daily.  cinnamon bark (CINNAMON) 500 mg cap Take  by mouth. No current facility-administered medications for this visit. Assessment: ICD-10-CM ICD-9-CM 1.  Coronary artery disease of native artery of native heart with stable angina pectoris ([facility] [address] I25.118 414.01 CBC WITH AUTOMATED DIFF  
  183.3 METABOLIC PANEL, COMPREHENSIVE  
 PROTHROMBIN TIME + INR  
2. Chronic total occlusion of coronary artery I25.82 414.00 CBC WITH AUTOMATED DIFF  
  917.8 METABOLIC PANEL, COMPREHENSIVE PROTHROMBIN TIME + INR  
3. S/P drug eluting coronary stent placement Z95.5 V45.82 CBC WITH AUTOMATED DIFF  
   METABOLIC PANEL, COMPREHENSIVE PROTHROMBIN TIME + INR 4. Mixed hyperlipidemia E78.2 272.2 CBC WITH AUTOMATED DIFF  
   METABOLIC PANEL, COMPREHENSIVE PROTHROMBIN TIME + INR  
5. SOBOE (shortness of breath on exertion) R06.02 786.05 CBC WITH AUTOMATED DIFF  
   METABOLIC PANEL, COMPREHENSIVE PROTHROMBIN TIME + INR Orders Placed This Encounter  CBC WITH AUTOMATED DIFF  
 METABOLIC PANEL, COMPREHENSIVE  
 PROTHROMBIN TIME + INR Plan:  
 
Recent onset severe MCGUIRE as suspected anginal equivalent. First  attempt RCA failed due to inability to advance a microcather over the wire into the lesion. He remains symptomatic. Plan to re-attempt with adjunct laser. Inferior wall appears to viable with severe perfusion defect on myoview.  
 
David Bravo MD

## 2018-10-04 NOTE — IP AVS SNAPSHOT
[address] [facility] 
[phone] Patient: Boo Fraser MRN: [id number] GXY:[id number] About your hospitalization You were admitted on:  October 4, 2018 You last received care in the:  [facility] You were discharged on:  October 5, 2018 Why you were hospitalized Your primary diagnosis was: Angina, Class Iii (Hcc) Your diagnoses also included:  S/P Cardiac Cath, Coronary Artery Disease Involving Native Coronary Artery Without Angina Pectoris Follow-up Information Follow up With Details Comments Contact Info Meghna Montague MD On 10/15/2018 2:30PM [address] [facility] 
[phone] Lacey Thornton MD   [address] [place] [address] [address] [place] [address] 
[phone] Your Scheduled Appointments Tuesday October 23, 2018  1:15 PM EDT  
ESTABLISHED PATIENT with Dian Tan NP [facility] [address] [facility]  
[phone] Discharge Orders None A check simon indicates which time of day the medication should be taken. My Medications CHANGE how you take these medications Instructions Each Dose to Equal  
 Morning Noon Evening Bedtime  
 isosorbide mononitrate ER 30 mg tablet Commonly known as:  IMDUR What changed:  Another medication with the same name was removed. Continue taking this medication, and follow the directions you see here. Your last dose was: Your next dose is: TAKE 0.5 TABS BY MOUTH DAILY. metFORMIN  mg tablet Commonly known as:  GLUCOPHAGE XR What changed:  See the new instructions. Your last dose was: Your next dose is: Please restart Metformin on Sat 10/6/18 CONTINUE taking these medications Instructions Each Dose to Equal  
 Morning Noon Evening Bedtime  
 acetaminophen 500 mg tablet Commonly known as:  TYLENOL Your last dose was: Your next dose is: Take 1 tablet by mouth every eight (8) hours as needed for Pain. 500 mg ADVAIR DISKUS 250-50 mcg/dose diskus inhaler Generic drug:  fluticasone-salmeterol Your last dose was: Your next dose is: Take 1 Puff by inhalation every twelve (12) hours. 1 Puff  
    
   
   
   
  
 albuterol 90 mcg/actuation inhaler Commonly known as:  PROVENTIL HFA, VENTOLIN HFA, PROAIR HFA Your last dose was: Your next dose is: Take 2 Puffs by inhalation every four (4) hours as needed for Wheezing. 2 Puff  
    
   
   
   
  
 aspirin delayed-release 81 mg tablet Your last dose was: Your next dose is: Take 1 Tab by mouth daily. 81 mg  
    
   
   
   
  
 B COMPLEX PO Your last dose was: Your next dose is: Take 1 Tab by mouth daily. 1 Tab CENTRUM SILVER Tab tablet Generic drug:  multivitamins-minerals-lutein Your last dose was: Your next dose is: Take 1 Tab by mouth daily. 1 Tab  
    
   
   
   
  
 clopidogrel 75 mg Tab Commonly known as:  PLAVIX Your last dose was: Your next dose is: Take 1 Tab by mouth daily. 75 mg  
    
   
   
   
  
 CO Q-10 10 mg Cap Generic drug:  coenzyme q10 Your last dose was: Your next dose is: Take  by mouth daily. fexofenadine 180 mg tablet Commonly known as:  Sally Mood Your last dose was: Your next dose is: Take 1 Tab by mouth daily. 180 mg  
    
   
   
   
  
 folic acid 563 mcg tablet Your last dose was: Your next dose is: Take 400 mcg by mouth daily. 400 mcg glipiZIDE 5 mg tablet Commonly known as:  Bill Cheema Your last dose was: Your next dose is: TAKE 1 TABLET BY MOUTH EVERY DAY  
     
   
   
   
  
 glucose blood VI test strips strip Commonly known as:  ONETOUCH ULTRA BLUE TEST STRIP Your last dose was: Your next dose is:    
   
   
 by Does Not Apply route Daily (before breakfast). E11.9  
     
   
   
   
  
 lisinopril 2.5 mg tablet Commonly known as:  Alvebetty Rmoan Your last dose was: Your next dose is: Take 1 Tab by mouth daily. 2.5 mg  
    
   
   
   
  
 MAGNESIUM PO Your last dose was: Your next dose is: Take  by mouth daily. metoprolol succinate 25 mg XL tablet Commonly known as:  TOPROL-XL Your last dose was: Your next dose is: TAKE 1/2 TABLET BY MOUTH EVERY DAY  
     
   
   
   
  
 nitroglycerin 0.4 mg SL tablet Commonly known as:  NITROSTAT Your last dose was: Your next dose is:    
   
   
 1 Tab by SubLINGual route every five (5) minutes as needed for Chest Pain. 0.4 mg PEPCID AC 20 mg tablet Generic drug:  famotidine Your last dose was: Your next dose is: Take 20 mg by mouth daily. 20 mg  
    
   
   
   
  
 simvastatin 20 mg tablet Commonly known as:  ZOCOR Your last dose was: Your next dose is: TAKE 1 TABLET BY MOUTH NIGHTLY SPIRIVA RESPIMAT 2.5 mcg/actuation inhaler Generic drug:  tiotropium bromide Your last dose was: Your next dose is: Take 2 Puffs by inhalation daily. 2 Puff VITAMIN B-12 500 mcg tablet Generic drug:  cyanocobalamin Your last dose was: Your next dose is: Take 500 mcg by mouth daily. 500 mcg  VITAMIN C PO  
   
 Your last dose was: Your next dose is: Take 1,000 mg by mouth daily. 1000 mg  
    
   
   
   
  
 VITAMIN D3 PO Your last dose was: Your next dose is: Take 1,000 Units by mouth daily. 1000 Units Where to Get Your Medications Information on where to get these meds will be given to you by the nurse or doctor. ! Ask your nurse or doctor about these medications  
  metFORMIN  mg tablet Discharge Instructions [address]  [phone] Patient ID: 
Smith Cade 
[id number] 
34 y.o. 
12/18/1931 Admit Date: 10/4/2018 Discharge Date: 10/5/2018 Admitting Physician: Diamante Curry MD  
 
Discharge Physician: MD Dante Perez NP Admission Diagnoses:  
Cath Angina, class III ([address] Discharge Diagnoses: Active Problems: 
  Angina, class III ([address] (10/4/2018) Discharge Condition: {condition:64626175} Cardiology Procedures this Admission:  {CARDIOLOGY PROCEDURES:08453594} Disposition: {disposition:32520} Reference discharge instructions provided by nursing for diet and activity. Signed: 
Dante Wing NP 
10/5/2018 
7:43 AM 
 
 
Radial Cardiac Catheterization/Angiography Discharge Instructions It is normal to feel tired the first couple days. Take it easy and follow the physicians instructions. CHECK THE CATHETER INSERTION SITE DAILY: 
 
Remove the wrist dressing 24 hours after the procedure. You may shower 24 hours after the procedure. Wash with soap and water and pat dry. Gentle cleaning of the site with soap and water is sufficient, cover with a dry clean dressing or bandage. Do not apply creams or powders to the area. No soaking the wrist for 3 days. Leave the puncture site open to air after 24 hours post-procedure.  
 
CALL THE PHYSICIANS:  
 
 If the site becomes red, swollen or feels warm to the touch If there is bleeding or drainage or if there is unusual pain at the radial site. If there is any minor oozing, you may apply a band-aid and remove after 12 hours. If the bleeding continues, hold pressure with the middle finger against the puncture site and the thumb against the back of the wrist,call 911 to be transported to the hospital. 
DO NOT DRIVE YOURSELF, [address] ACTIVITY:  
For the first 24 hours do not manipulate the wrist. 
No lifting, pushing or pulling over 3-5 pounds with the affected wrist for 7 daysand no straining the insertion site. Do not life grocery bags or the garbage can, do not run the vacuum  or  for 7 days. Start with short walks as in the hospital and gradually increase as tolerated each day. It is recommended to walk 30 minutes 5-7 days per week. Follow your physicians instructions on activity. Avoid walking outside in extremes of heat or cold. Walk inside when it is cold and windy or hot and humid. Things to keep in mind: 
No driving for at least 24 hours, or as designated by your physician. Limit the number of times you go up and down the stairs Take rests and pace yourself with activity. Be careful and do not strain with bowel movements. MEDICATIONS: 
 
Take all medications as prescribed Call your physician if you have any questions Keep an updated list of your medications with you at all times and give a list to your physician and pharmacist 
 
SIGNS AND SYMPTOMS:  
Be cautious of symptoms of angina or recurrent symptoms such as chest discomfort, unusual shortness of breath or fatigue. These could be symptoms of restenosis, a new blockage or a heart attack. If your symptoms are relieved with rest it is still recommended that you notify your physician of recurrent chest pain or discomfort. For CHEST PAIN or symptoms of angina not relieved with rest:  If the discomfort is not relieved with rest, and you have been prescribed Nitroglycerin, take as directed (taken under the tongue, one at a time 5 minutes apart for a total of 3 doses). If the discomfort is not relieved after the 3rd nitroglycerin, call 911. If you have not been prescribed Nitroglycerin  and your chest discomfort is not relieved with rest, call 911. AFTER CARE:  
Follow up with your physician as instructed. Follow a heart healthy diet with proper portion control, daily stress management, daily exercise, blood pressure and cholesterol control , and smoking cessation. CARDIAC CATHETERIZATION/PCI DISCHARGE INSTRUCTIONS It is normal to feel tired the first couple days. Take it easy and follow the physicians instructions. CHECK THE CATHETER INSERTION SITE DAILY: 
 
You may shower 24 hours after the procedure, remove the bandage during showering. Wash with soap and water and pat dry. Gentle cleaning of the site with soap and water is sufficient, cover with a dry clean dressing or bandage. Do not apply creams or powders to the area. Do not sit in a bathtub or pool of water for 7 days or until wound has completely healed. Temporary bruising and discomfort is normal and may last a few weeks. You may have a  formation of a small lump at the site which may last up to 6 weeks. CALL THE PHYSICIANS: 
 
If the site becomes red, swollen or feels warm to the touch If there is bleeding or drainage or if there is unusual pain at the groin or down the leg. If there is any bleeding, lie down, apply pressure or have someone apply pressure with a clean cloth until the bleeding stops. If the bleeding continues, call 911 to be transported to the hospital. 
DO  [facility] [address]. ACTIVITY: 
 
For the first 24-48 hours or as instructed by the physician: No lifting, pushing or pulling over 10 pounds and no straining the insertion site. Do not life grocery bags or the garbage can, do not run the vacuum  or  for 7 days. Start with short walks as in the hospital and gradually increase as tolerated each day. It is recommended to walk 30 minutes 5-7 days per week. Follow your physicians instructions on activity. Avoid walking outside in extremes of heat or cold. Walk inside when it is cold and windy or hot and humid. Things to keep in mind: 
No driving for at least 24 hours, or as designated by your physician. Limit the number of times you go up and down the stairs Take rests and pace yourself with activity. Be careful and do not strain with bowel movements. MEDICATIONS: 
 
Take all medications as prescribed Call your physician if you have any questions Keep an updated list of your medications with you at all times and give a list to your physician and pharmacist 
 
 
 
SIGNS AND SYMPTOMS: 
 
Be cautious of symptoms of angina or recurrent symptoms such as chest discomfort, unusual shortness of breath or fatigue. These could be symptoms of restenosis, a new blockage or a heart attack. If your symptoms are relieved with rest it is still recommended that you notify your physician of recurrent chest pain or discomfort. FOR CHEST PAIN or symptoms of angina not relieved with rest:  If the discomfort is not relieved with rest, and you have been prescribed Nitroglycerin, take as directed (taken under the tongue, one at a time 5 minutes apart for a total of 3 doses). If the discomfort is not relieved after the 3rd nitroglycerin, call 911. If you have not been prescribed Nitroglycerin  and your chest discomfort is not relieved with rest, call 911. AFTER CARE: 
 
Follow up with your physician as instructed.  
Follow a heart healthy diet with proper portion control, daily stress management, daily exercise, blood pressure and cholesterol control , and smoking cessation. ACO Transitions of Care Introducing [employer] [address] offers a voluntary care coordination program to provide high quality service and care to [facility] fee-for-service beneficiaries. Jhonatan Cam was designed to help you enhance your health and well-being through the following services: ? Transitions of Care  support for individuals who are transitioning from one care setting to another (example: Hospital to home). ? Chronic and Complex Care Coordination  support for individuals and caregivers of those with serious or chronic illnesses or with more than one chronic (ongoing) condition and those who take a number of different medications. If you meet specific medical criteria, a [facility] [address] may call you directly to coordinate your care with your primary care physician and your other care providers. For questions about the [facility] programs, please, contact your physicians office. For general questions or additional information about Accountable Care Organizations: 
Please visit www.medicare.gov/acos. html or call 1-800-MEDICARE (1-243.561.6929) TTY users should call 6-199.111.4457. Introducing [facility] & HEALTH SERVICES! Dear Urban Francois: Thank you for requesting a [employer] account. Our records indicate that you already have an active [employer] account. You can access your account anytime at [email or web address] Exchange Did you know that you can access your hospital and ER discharge instructions at any time in [employer]? You can also review all of your test results from your hospital stay or ER visit. Additional Information If you have questions, please visit the Frequently Asked Questions section of the [employer] website at [email or web address] Remember, MyChart is NOT to be used for urgent needs. For medical emergencies, dial 911. Now available from your iPhone and Android! Introducing Remigio Arambula As a [employer] [facility] patient, I wanted to make you aware of our electronic visit tool called Remigio Arambula. [employer] allows you to connect within minutes with a medical provider 24 hours a day, seven days a week via a mobile device or tablet or logging into a secure website from your computer. You can access Remigio Arambula from anywhere in the [place]. A virtual visit might be right for you when you have a simple condition and feel like you just dont want to get out of bed, or cant get away from work for an appointment, when your regular [facility] provider is not available (evenings, weekends or holidays), or when youre out of town and need minor care. Electronic visits cost only $49 and if the [employer] provider determines a prescription is needed to treat your condition, one can be electronically transmitted to a nearby pharmacy*. Please take a moment to enroll today if you have not already done so. The enrollment process is free and takes just a few minutes. To enroll, please download the [employer] kae to your tablet or phone, or visit [email or web address] to enroll on your computer. And, as an [address] [place] [address] patient with a [employer] account, the results of your visits will be scanned into your electronic medical record and your primary care provider will be able to view the scanned results. We urge you to continue to see your regular [facility] provider for your ongoing medical care. And while your primary care provider may not be the one available when you seek a Remigio Arambula virtual visit, the peace of mind you get from getting a real diagnosis real time can be priceless.    
 
For more information on Remigio Arambula, view our Frequently Asked Questions (FAQs) at [email or web address]. Sincerely, 
 
Tj Schumacher MD 
Chief Medical Officer [address] *:  certain medications cannot be prescribed via Remigio Arambula Unresulted Labs-Please follow up with your PCP about these lab tests Order Current Status EKG, 12 LEAD, INITIAL Preliminary result EKG, 12 LEAD, SUBSEQUENT Preliminary result Providers Seen During Your Hospitalization Provider Specialty Primary office phone Haven Vasquez MD Cardiology [phone] Immunizations Administered for This Admission Name Date Influenza Vaccine (Quad) PF 10/5/2018 Your Primary Care Physician (PCP) Primary Care Physician Office Phone Office Fax Juan Gentile [address] [phone] You are allergic to the following No active allergies Recent Documentation Height Weight BMI Smoking Status 1.753 m 83.9 kg 27.32 kg/m2 Former Smoker Emergency Contacts Name Discharge Info Relation Home Work [facility] [address] [facility] [3] Spouse [3] [phone] Patient Belongings The following personal items are in your possession at time of discharge: 
  Dental Appliances: None         Home Medications: None   Jewelry: None  Clothing: At bedside    Other Valuables: None Please provide this summary of care documentation to your next provider. Signatures-by signing, you are acknowledging that this After Visit Summary has been reviewed with you and you have received a copy. Patient Signature:  ____________________________________________________________ Date:  ____________________________________________________________  
  
Julio Logan Provider Signature:  ____________________________________________________________ Date:  ____________________________________________________________

## 2018-10-05 VITALS
HEIGHT: 69 IN | WEIGHT: 185 LBS | OXYGEN SATURATION: 92 % | HEART RATE: 76 BPM | TEMPERATURE: 98 F | DIASTOLIC BLOOD PRESSURE: 74 MMHG | RESPIRATION RATE: 16 BRPM | BODY MASS INDEX: 27.4 KG/M2 | SYSTOLIC BLOOD PRESSURE: 133 MMHG

## 2018-10-05 LAB
ANION GAP SERPL CALC-SCNC: 7 MMOL/L (ref 5–15)
ATRIAL RATE: 59 BPM
BUN SERPL-MCNC: 18 MG/DL (ref 6–20)
BUN/CREAT SERPL: 19 (ref 12–20)
CALCIUM SERPL-MCNC: 8 MG/DL (ref 8.5–10.1)
CALCULATED P AXIS, ECG09: 73 DEGREES
CALCULATED R AXIS, ECG10: 0 DEGREES
CALCULATED T AXIS, ECG11: -10 DEGREES
CHLORIDE SERPL-SCNC: 110 MMOL/L (ref 97–108)
CO2 SERPL-SCNC: 24 MMOL/L (ref 21–32)
CREAT SERPL-MCNC: 0.93 MG/DL (ref 0.7–1.3)
DIAGNOSIS, 93000: NORMAL
GLUCOSE SERPL-MCNC: 100 MG/DL (ref 65–100)
P-R INTERVAL, ECG05: 220 MS
POTASSIUM SERPL-SCNC: 4.1 MMOL/L (ref 3.5–5.1)
Q-T INTERVAL, ECG07: 402 MS
QRS DURATION, ECG06: 90 MS
QTC CALCULATION (BEZET), ECG08: 397 MS
SODIUM SERPL-SCNC: 141 MMOL/L (ref 136–145)
VENTRICULAR RATE, ECG03: 59 BPM

## 2018-10-05 PROCEDURE — 93005 ELECTROCARDIOGRAM TRACING: CPT

## 2018-10-05 PROCEDURE — 90686 IIV4 VACC NO PRSV 0.5 ML IM: CPT | Performed by: INTERNAL MEDICINE

## 2018-10-05 PROCEDURE — 90471 IMMUNIZATION ADMIN: CPT

## 2018-10-05 PROCEDURE — 80048 BASIC METABOLIC PNL TOTAL CA: CPT | Performed by: NURSE PRACTITIONER

## 2018-10-05 PROCEDURE — 36415 COLL VENOUS BLD VENIPUNCTURE: CPT | Performed by: NURSE PRACTITIONER

## 2018-10-05 PROCEDURE — 74011250636 HC RX REV CODE- 250/636: Performed by: INTERNAL MEDICINE

## 2018-10-05 PROCEDURE — 74011250637 HC RX REV CODE- 250/637: Performed by: NURSE PRACTITIONER

## 2018-10-05 RX ORDER — METFORMIN HYDROCHLORIDE 500 MG/1
TABLET, EXTENDED RELEASE ORAL
Qty: 270 TAB | Refills: 1 | Status: SHIPPED
Start: 2018-10-05 | End: 2019-02-08 | Stop reason: SDUPTHER

## 2018-10-05 RX ADMIN — INFLUENZA VIRUS VACCINE 0.5 ML: 15; 15; 15; 15 SUSPENSION INTRAMUSCULAR at 09:30

## 2018-10-05 RX ADMIN — LISINOPRIL 2.5 MG: 5 TABLET ORAL at 08:12

## 2018-10-05 RX ADMIN — FAMOTIDINE 20 MG: 20 TABLET ORAL at 08:12

## 2018-10-05 RX ADMIN — METOPROLOL SUCCINATE 25 MG: 25 TABLET, EXTENDED RELEASE ORAL at 08:12

## 2018-10-05 RX ADMIN — ISOSORBIDE MONONITRATE 30 MG: 30 TABLET, EXTENDED RELEASE ORAL at 08:12

## 2018-10-05 RX ADMIN — CLOPIDOGREL BISULFATE 75 MG: 75 TABLET ORAL at 08:12

## 2018-10-05 RX ADMIN — Medication 10 ML: at 04:24

## 2018-10-05 RX ADMIN — ASPIRIN 81 MG: 81 TABLET, COATED ORAL at 08:12

## 2018-10-05 NOTE — PROGRESS NOTES
Discharge instructions reviewed with Patient. Patient verbalized understanding of instructions. Opportunities for questions were provided and explained. Patient discharged Home. Discharge medications reviewed with patient and appropriate educational materials and side effects teaching were provided.

## 2018-10-05 NOTE — CARDIO/PULMONARY
Cardiopulmonary Rehab Nursing Entry: 
 
Chart reviewed and pt visited for cardiac teaching. Pt is a 81 yo admitted for staged PCI, pt s/p attempted cath 8/20/18 returned for PCI/YARIEL  RCA on 10/4/18. Pt with PMHx of CAD, hypercholesterolemia, HTN, DM, GERD, COPD, former tobacco. 
 
Cardiac Rehab: CAD education folder to bedside of Wellington Jose Angelluis miguel. Met with pt sitting up in chair, CAD and wife at bedside. Educated using teach back method. Reviewed purpose of cath and CAD diagnosis for understanding. Discussed risk factors for CAD to include: family history, elevated BMI, hypercholesterolemia, hypertension, diabetes, stress, and smoking. Encouraged consideration of lifestyle changes. Offered Cardiac Rehab Program for support in making lifestyle changes. Wellington Alcala verbalized understanding with questions answered. Pt scheduled for intake appt at Cardiac Rehab on 10/25/18 @1:00pm.  Orientation packet provided for pt to complete prior to appt. Contact numbers provided. Pt cleared for discharge today.

## 2018-10-05 NOTE — PROGRESS NOTES
[address] [place] [address]  [phone] Cardiology Progress Note 
 
 
10/5/2018 11:59 AM 
 
Admit Date: 10/4/2018 Admit Diagnosis:  
Cath Angina, class III ([address] Subjective:  
 
Boo Fraser is 80year old male s/p PCI/YARIEL  RCA on 10/4 with Dr. Reji Serna. Patient is feeling well, he has no concerns or complaints at this time. Wife is at the bedside and we discussed the importance of taking his plavix, ASA, BB, and statin. Patient is compliant with medications. Denies chest pain and shortness of breath. Patient has been up and walking in the halls with no difficulty. Visit Vitals  /74 (BP 1 Location: Left arm, BP Patient Position: At rest)  Pulse 76  Temp 98 °F (36.7 °C)  Resp 16  
 Ht 5' 9\" (1.753 m)  Wt 185 lb (83.9 kg)  SpO2 92%  BMI 27.32 kg/m2 Current Facility-Administered Medications Medication Dose Route Frequency  clopidogrel (PLAVIX) tablet 75 mg  75 mg Oral DAILY  iopamidol (ISOVUE-370) 76 % injection  heparinized saline 2 units/mL 1,000 unit/500 mL infusion  albuterol (PROVENTIL HFA, VENTOLIN HFA, PROAIR HFA) inhaler 2 Puff  2 Puff Inhalation Q4H PRN  
 aspirin delayed-release tablet 81 mg  81 mg Oral DAILY  clopidogrel (PLAVIX) tablet 75 mg  75 mg Oral DAILY  famotidine (PEPCID) tablet 20 mg  20 mg Oral DAILY  isosorbide mononitrate ER (IMDUR) tablet 30 mg  30 mg Oral DAILY  lisinopril (PRINIVIL, ZESTRIL) tablet 2.5 mg  2.5 mg Oral DAILY  metoprolol succinate (TOPROL-XL) XL tablet 25 mg  25 mg Oral DAILY  pravastatin (PRAVACHOL) tablet 40 mg  40 mg Oral QHS  umeclidinium (INCRUSE ELLIPTA) 62.5 mcg/actuation  1 Puff Inhalation DAILY  sodium chloride (NS) flush 5-10 mL  5-10 mL IntraVENous Q8H  
 sodium chloride (NS) flush 5-10 mL  5-10 mL IntraVENous PRN  
 acetaminophen (TYLENOL) tablet 650 mg  650 mg Oral Q4H PRN  
  naloxone (NARCAN) injection 0.4 mg  0.4 mg IntraVENous PRN Current Outpatient Prescriptions Medication Sig  
 metFORMIN ER (GLUCOPHAGE XR) 500 mg tablet Please restart Metformin on Sat 10/6/18  cyanocobalamin (VITAMIN B-12) 500 mcg tablet Take 500 mcg by mouth daily.  clopidogrel (PLAVIX) 75 mg tab Take 1 Tab by mouth daily.  aspirin delayed-release 81 mg tablet Take 1 Tab by mouth daily.  lisinopril (PRINIVIL, ZESTRIL) 2.5 mg tablet Take 1 Tab by mouth daily.  folic acid 178 mcg tablet Take 400 mcg by mouth daily.  nitroglycerin (NITROSTAT) 0.4 mg SL tablet 1 Tab by SubLINGual route every five (5) minutes as needed for Chest Pain.  simvastatin (ZOCOR) 20 mg tablet TAKE 1 TABLET BY MOUTH NIGHTLY  glucose blood VI test strips (ONETOUCH ULTRA BLUE TEST STRIP) strip by Does Not Apply route Daily (before breakfast). E11.9  
 glipiZIDE (GLUCOTROL) 5 mg tablet TAKE 1 TABLET BY MOUTH EVERY DAY  metoprolol succinate (TOPROL-XL) 25 mg XL tablet TAKE 1/2 TABLET BY MOUTH EVERY DAY  albuterol (PROVENTIL HFA, VENTOLIN HFA, PROAIR HFA) 90 mcg/actuation inhaler Take 2 Puffs by inhalation every four (4) hours as needed for Wheezing.  tiotropium bromide (SPIRIVA RESPIMAT) 2.5 mcg/actuation inhaler Take 2 Puffs by inhalation daily.  coenzyme q10 (CO Q-10) 10 mg cap Take  by mouth daily.  MAGNESIUM PO Take  by mouth daily.  acetaminophen (TYLENOL) 500 mg tablet Take 1 tablet by mouth every eight (8) hours as needed for Pain.  CHOLECALCIFEROL, VITAMIN D3, (VITAMIN D3 PO) Take 1,000 Units by mouth daily.  multivitamins-minerals-lutein (CENTRUM SILVER) Tab Take 1 Tab by mouth daily.  fexofenadine (ALLEGRA) 180 mg tablet Take 1 Tab by mouth daily.  famotidine (PEPCID AC) 20 mg tablet Take 20 mg by mouth daily.  VITAMIN B COMPLEX (B COMPLEX PO) Take 1 Tab by mouth daily.   
 fluticasone-salmeterol (ADVAIR DISKUS) 250-50 mcg/Dose diskus inhaler Take 1 Puff by inhalation every twelve (12) hours.  ASCORBIC ACID (VITAMIN C PO) Take 1,000 mg by mouth daily.  isosorbide mononitrate ER (IMDUR) 30 mg tablet TAKE 0.5 TABS BY MOUTH DAILY. Objective:  
  
Physical Exam: 
General Appearance:  Elderly  male in no acute distress. Chest:   Clear to auscultation bilaterally. Cardiovascular:  Regular rate and rhythm, no murmur.  
Abdomen:   Soft, non-tender, bowel sounds are active.  
Extremities: Warm, well perfused. Right radial cath site is clean, dry, and intact. No ecchymosis, erythema, or hematoma noted. Right femoral cath site is clean, dry, and intact. No ecchymosis, erythema, or hematoma noted. No bruit. Pulses are all palpable. Capillary refill <2 seconds. Skin:  Warm and dry.  
 
Data Review: No results for input(s): WBC, HGB, HCT, PLT, HGBEXT, HCTEXT, PLTEXT in the last 72 hours. Recent Labs 10/05/18 
 7534 NA  141  
K  4.1 CL  110* CO2  24 GLU  100 BUN  18 CREA  0.93  
CA  8.0* No results for input(s): TROIQ, CPK, CKMB in the last 72 hours. Intake/Output Summary (Last 24 hours) at 10/05/18 1159 Last data filed at 10/05/18 2352 Gross per 24 hour Intake              920 ml Output             1400 ml Net             -480 ml Telemetry: Sinus rhythm EKG: Sinus rhythm with 1st degree AV block Assessment:  
 
Principal Problem: 
  Angina, class III ([address] (10/4/2018) Active Problems: 
  Coronary artery disease involving native coronary artery without angina pectoris (6/10/2015) S/P cardiac cath (8/20/2018) Overview: 10/4/18 PCI/YARIEL  RCA 
    8/20/18:  in RCA identified. Not able to be opened. Plan:  
 
CAD: 
PCI/YARIEL to  of RCA on 10/4/2018 Continue plavix, ASA, metoprolol, and statin. Discussed the importance of plavix and ASA to prevent restenosis of stent. Discharge home today. Follow up with Dr. Maico Navarro in 1-2 weeks.   
 
Mixed hyperlipidemia: 
 Continue statin and ASA. Follow up with PCP outpatient. HTN: 
Continue home medications. Follow up with PCP outpatient. Agree with NP student assessment and plan Jose Sarmiento ACNP Cardiology

## 2018-10-05 NOTE — PROGRESS NOTES
1900 - Bedside report from henry Petty student and Serenity PIERCE.  NSR. No complaints. Up in chair. BP soft but pt feels ok. 90's/ 50's. +PPP, both cath sites benign. 0000 - Sats 93% on RA but pt says he feels just a little short of breath. O2 2l NC applied for HS comfort. 0100 - states he is no longer feeling short of breath once he turned on his side to rest.  
 
0430 - VSS, feels good, sites benign. Bedside report to RN. NSR.

## 2018-10-05 NOTE — PROGRESS NOTES
Pt. Notified of lab results and apologized for the delay. Pt. Has an appt. With Dr Maddie uJan 10/23/18 and will review sputum Cx. With him.

## 2018-10-05 NOTE — DISCHARGE INSTRUCTIONS
[address] [place] [address]  [phone]        Patient ID:  Cyn Thomas  [id number]  29 y.o.  12/18/1931    Admit Date: 10/4/2018    Discharge Date: 10/5/2018     Admitting Physician: Irina Lopes MD     Discharge Physician: MD Bird Aldrich NP    Admission Diagnoses:   Cath  Angina, class III [facility])    Discharge Diagnoses: Active Problems:    Angina, class III ([address] (10/4/2018)        Discharge Condition: {condition:68677108}    Cardiology Procedures this Admission:  {CARDIOLOGY PROCEDURES:93470710}    Disposition: {disposition:51842}    Reference discharge instructions provided by nursing for diet and activity. Signed:  Bird Gordon NP  10/5/2018  7:43 AM      Radial Cardiac Catheterization/Angiography Discharge Instructions    It is normal to feel tired the first couple days. Take it easy and follow the physicians instructions. CHECK THE CATHETER INSERTION SITE DAILY:    Remove the wrist dressing 24 hours after the procedure. You may shower 24 hours after the procedure. Wash with soap and water and pat dry. Gentle cleaning of the site with soap and water is sufficient, cover with a dry clean dressing or bandage. Do not apply creams or powders to the area. No soaking the wrist for 3 days. Leave the puncture site open to air after 24 hours post-procedure. CALL THE PHYSICIANS:     If the site becomes red, swollen or feels warm to the touch  If there is bleeding or drainage or if there is unusual pain at the radial site. If there is any minor oozing, you may apply a band-aid and remove after 12 hours. If the bleeding continues, hold pressure with the middle finger against the puncture site and the thumb against the back of the wrist,call 911 to be transported to the hospital.  DO NOT DRIVE YOURSELF, [place] [address].     ACTIVITY:   For the first 24 hours do not manipulate the wrist.  No lifting, pushing or pulling over 3-5 pounds with the affected wrist for 7 daysand no straining the insertion site. Do not life grocery bags or the garbage can, do not run the vacuum  or  for 7 days. Start with short walks as in the hospital and gradually increase as tolerated each day. It is recommended to walk 30 minutes 5-7 days per week. Follow your physicians instructions on activity. Avoid walking outside in extremes of heat or cold. Walk inside when it is cold and windy or hot and humid. Things to keep in mind:  No driving for at least 24 hours, or as designated by your physician. Limit the number of times you go up and down the stairs  Take rests and pace yourself with activity. Be careful and do not strain with bowel movements. MEDICATIONS:    Take all medications as prescribed  Call your physician if you have any questions  Keep an updated list of your medications with you at all times and give a list to your physician and pharmacist    SIGNS AND SYMPTOMS:   Be cautious of symptoms of angina or recurrent symptoms such as chest discomfort, unusual shortness of breath or fatigue. These could be symptoms of restenosis, a new blockage or a heart attack. If your symptoms are relieved with rest it is still recommended that you notify your physician of recurrent chest pain or discomfort. For CHEST PAIN or symptoms of angina not relieved with rest:  If the discomfort is not relieved with rest, and you have been prescribed Nitroglycerin, take as directed (taken under the tongue, one at a time 5 minutes apart for a total of 3 doses). If the discomfort is not relieved after the 3rd nitroglycerin, call 911. If you have not been prescribed Nitroglycerin  and your chest discomfort is not relieved with rest, call 911. AFTER CARE:   Follow up with your physician as instructed.   Follow a heart healthy diet with proper portion control, daily stress management, daily exercise, blood pressure and cholesterol control , and smoking cessation. CARDIAC CATHETERIZATION/PCI DISCHARGE INSTRUCTIONS    It is normal to feel tired the first couple days. Take it easy and follow the physicians instructions. CHECK THE CATHETER INSERTION SITE DAILY:    You may shower 24 hours after the procedure, remove the bandage during showering. Wash with soap and water and pat dry. Gentle cleaning of the site with soap and water is sufficient, cover with a dry clean dressing or bandage. Do not apply creams or powders to the area. Do not sit in a bathtub or pool of water for 7 days or until wound has completely healed. Temporary bruising and discomfort is normal and may last a few weeks. You may have a  formation of a small lump at the site which may last up to 6 weeks. CALL THE PHYSICIANS:    If the site becomes red, swollen or feels warm to the touch  If there is bleeding or drainage or if there is unusual pain at the groin or down the leg. If there is any bleeding, lie down, apply pressure or have someone apply pressure with a clean cloth until the bleeding stops. If the bleeding continues, call 911 to be transported to the hospital.  DO NOT DRIVE YOURSELF, [facility] 082. ACTIVITY:    For the first 24-48 hours or as instructed by the physician:  No lifting, pushing or pulling over 10 pounds and no straining the insertion site. Do not life grocery bags or the garbage can, do not run the vacuum  or  for 7 days. Start with short walks as in the hospital and gradually increase as tolerated each day. It is recommended to walk 30 minutes 5-7 days per week. Follow your physicians instructions on activity. Avoid walking outside in extremes of heat or cold. Walk inside when it is cold and windy or hot and humid. Things to keep in mind:  No driving for at least 24 hours, or as designated by your physician.   Limit the number of times you go up and down the stairs  Take rests and pace yourself with activity. Be careful and do not strain with bowel movements. MEDICATIONS:    Take all medications as prescribed  Call your physician if you have any questions  Keep an updated list of your medications with you at all times and give a list to your physician and pharmacist        SIGNS AND SYMPTOMS:    Be cautious of symptoms of angina or recurrent symptoms such as chest discomfort, unusual shortness of breath or fatigue. These could be symptoms of restenosis, a new blockage or a heart attack. If your symptoms are relieved with rest it is still recommended that you notify your physician of recurrent chest pain or discomfort. FOR CHEST PAIN or symptoms of angina not relieved with rest:  If the discomfort is not relieved with rest, and you have been prescribed Nitroglycerin, take as directed (taken under the tongue, one at a time 5 minutes apart for a total of 3 doses). If the discomfort is not relieved after the 3rd nitroglycerin, call 911. If you have not been prescribed Nitroglycerin  and your chest discomfort is not relieved with rest, call 911. AFTER CARE:    Follow up with your physician as instructed. Follow a heart healthy diet with proper portion control, daily stress management, daily exercise, blood pressure and cholesterol control , and smoking cessation.

## 2018-10-06 LAB
ATRIAL RATE: 65 BPM
CALCULATED P AXIS, ECG09: 81 DEGREES
CALCULATED R AXIS, ECG10: 14 DEGREES
CALCULATED T AXIS, ECG11: 19 DEGREES
DIAGNOSIS, 93000: NORMAL
P-R INTERVAL, ECG05: 206 MS
Q-T INTERVAL, ECG07: 400 MS
QRS DURATION, ECG06: 86 MS
QTC CALCULATION (BEZET), ECG08: 416 MS
VENTRICULAR RATE, ECG03: 65 BPM

## 2018-10-09 RX ORDER — CLOPIDOGREL BISULFATE 75 MG/1
75 TABLET ORAL DAILY
Qty: 30 TAB | Refills: 6 | Status: SHIPPED | OUTPATIENT
Start: 2018-10-09 | End: 2018-10-10 | Stop reason: SDUPTHER

## 2018-10-09 RX ORDER — ASPIRIN 81 MG/1
81 TABLET ORAL DAILY
Qty: 30 TAB | Refills: 6 | Status: SHIPPED | OUTPATIENT
Start: 2018-10-09 | End: 2018-10-10 | Stop reason: SDUPTHER

## 2018-10-10 ENCOUNTER — HOSPITAL ENCOUNTER (OUTPATIENT)
Dept: GENERAL RADIOLOGY | Age: 83
Discharge: HOME | End: 2018-10-10
Payer: MEDICARE

## 2018-10-10 DIAGNOSIS — R93.89 ABNORMAL CHEST X-RAY: ICD-10-CM

## 2018-10-10 PROCEDURE — 71046 X-RAY EXAM CHEST 2 VIEWS: CPT

## 2018-10-10 RX ORDER — CLOPIDOGREL BISULFATE 75 MG/1
75 TABLET ORAL DAILY
Qty: 90 TAB | Refills: 1 | Status: SHIPPED | OUTPATIENT
Start: 2018-10-10 | End: 2019-04-10 | Stop reason: SDUPTHER

## 2018-10-10 RX ORDER — ASPIRIN 81 MG/1
81 TABLET ORAL DAILY
Qty: 90 TAB | Refills: 1 | Status: SHIPPED | OUTPATIENT
Start: 2018-10-10 | End: 2020-02-28

## 2018-10-15 RX ORDER — METOPROLOL SUCCINATE 25 MG/1
TABLET, EXTENDED RELEASE ORAL
Qty: 30 TAB | Refills: 6 | Status: SHIPPED | OUTPATIENT
Start: 2018-10-15 | End: 2019-12-08 | Stop reason: SDUPTHER

## 2018-10-18 ENCOUNTER — OFFICE VISIT (OUTPATIENT)
Dept: INTERNAL MEDICINE CLINIC | Age: 83
End: 2018-10-18

## 2018-10-18 VITALS
BODY MASS INDEX: 27.99 KG/M2 | HEART RATE: 65 BPM | TEMPERATURE: 98.4 F | WEIGHT: 189 LBS | SYSTOLIC BLOOD PRESSURE: 144 MMHG | DIASTOLIC BLOOD PRESSURE: 72 MMHG | OXYGEN SATURATION: 96 % | HEIGHT: 69 IN

## 2018-10-18 DIAGNOSIS — R05.9 COUGH: Primary | ICD-10-CM

## 2018-10-18 DIAGNOSIS — I25.10 CORONARY ARTERY DISEASE INVOLVING NATIVE CORONARY ARTERY OF NATIVE HEART WITHOUT ANGINA PECTORIS: ICD-10-CM

## 2018-10-18 DIAGNOSIS — J41.1 MUCOPURULENT CHRONIC BRONCHITIS (HCC): ICD-10-CM

## 2018-10-18 RX ORDER — GUAIFENESIN 600 MG/1
600 TABLET, EXTENDED RELEASE ORAL 2 TIMES DAILY
COMMUNITY
End: 2019-01-28 | Stop reason: ALTCHOICE

## 2018-10-18 NOTE — PROGRESS NOTES
HPI:  Danie Dalton is a 80y.o. year old male who is here for a routine visit:    Here for a follow-up visit. He is recently undergone another cardiac catheterization and was able to have his right coronary opened from 100% to improved flow with stent and laser. He does feel that his shortness of breath may be slightly better. He continues to have cough with sputum production particularly in the morning time. I had referred him to pulmonary to discuss his recent mycobacterial infection. They did not feel this needed to be treated at the time but were unable to access the culture results when he was there. He was given Mucinex and has a follow-up appointment next week to discuss this. Denies fevers or chills. Denies nausea or vomiting. Denies any night sweats. Denies any change in bowel or bladder habits. 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 Gavino South repeat 10 years    HX COLONOSCOPY  2005    HX HEART CATHETERIZATION      2  stents     HX HEART CATHETERIZATION  08/20/2018    failed 100% block @ RCA will plan to do laser next.       HX HERNIA REPAIR  9/2008    left inguinal hernia repair by Dr. Vonnie Massey Right     inguinal hernai repair    HX HERNIA REPAIR      umbilical hernia repair    HX HIP REPLACEMENT Right 07/06/2016    HX PROSTATECTOMY  2003    HX TONSILLECTOMY      TOTAL HIP ARTHROPLASTY Left 2/9/11    Dr. Naresh Graf at [facility]       Prior to Admission medications    Medication Sig Start Date End Date Taking? Authorizing Provider   guaiFENesin ER (MUCINEX) 600 mg ER tablet Take 600 mg by mouth two (2) times a day. Yes Provider, Historical   metoprolol succinate (TOPROL-XL) 25 mg XL tablet TAKE 1/2 TABLET BY MOUTH EVERY DAY 10/15/18  Yes Esdras Jones NP   clopidogrel (PLAVIX) 75 mg tab Take 1 Tab by mouth daily. 10/10/18  Yes Zhane Casey MD   aspirin delayed-release 81 mg tablet Take 1 Tab by mouth daily. 10/10/18  Yes Zhane Casey MD   metFORMIN ER (GLUCOPHAGE XR) 500 mg tablet Please restart Metformin on Sat 10/6/18  Patient taking differently: Take 500 mg by mouth three (3) times daily. Please restart Metformin on Sat 10/6/18 10/5/18  Yes Larissa Cartwright NP   isosorbide mononitrate ER (IMDUR) 30 mg tablet TAKE 0.5 TABS BY MOUTH DAILY. 10/2/18  Yes Esdras Jones NP   cyanocobalamin (VITAMIN B-12) 500 mcg tablet Take 500 mcg by mouth daily. Yes Provider, Historical   lisinopril (PRINIVIL, ZESTRIL) 2.5 mg tablet Take 1 Tab by mouth daily. 8/17/18  Yes Esdras Jones NP   folic acid 458 mcg tablet Take 400 mcg by mouth daily. Yes Provider, Historical   nitroglycerin (NITROSTAT) 0.4 mg SL tablet 1 Tab by SubLINGual route every five (5) minutes as needed for Chest Pain. 8/10/18  Yes Esdras Jones NP   simvastatin (ZOCOR) 20 mg tablet TAKE 1 TABLET BY MOUTH NIGHTLY 8/6/18  Yes Justin Winston MD   glucose blood VI test strips (ONETOUCH ULTRA BLUE TEST STRIP) strip by Does Not Apply route Daily (before breakfast).  E11.9 7/5/18  Yes Justin Winston MD   glipiZIDE (GLUCOTROL) 5 mg tablet TAKE 1 TABLET BY MOUTH EVERY DAY 2/13/18  Yes Nick Espinal III, MD   albuterol (PROVENTIL HFA, VENTOLIN HFA, PROAIR HFA) 90 mcg/actuation inhaler Take 2 Puffs by inhalation every four (4) hours as needed for Wheezing. 17  Yes Samson Crum MD   tiotropium bromide (SPIRIVA RESPIMAT) 2.5 mcg/actuation inhaler Take 2 Puffs by inhalation daily. Yes Provider, Historical   coenzyme q10 (CO Q-10) 10 mg cap Take  by mouth daily. Yes Provider, Historical   MAGNESIUM PO Take  by mouth daily. Yes Provider, Historical   acetaminophen (TYLENOL) 500 mg tablet Take 1 tablet by mouth every eight (8) hours as needed for Pain. 14  Yes Angel Dickey MD   CHOLECALCIFEROL, VITAMIN D3, (VITAMIN D3 PO) Take 1,000 Units by mouth daily. Yes Provider, Historical   multivitamins-minerals-lutein (CENTRUM SILVER) Tab Take 1 Tab by mouth daily. 5/14/10  Yes Provider, Historical   fexofenadine (ALLEGRA) 180 mg tablet Take 1 Tab by mouth daily. 1/25/10  Yes Bill Mcghee MD   famotidine (PEPCID AC) 20 mg tablet Take 20 mg by mouth daily. 11  Yes Provider, Historical   VITAMIN B COMPLEX (B COMPLEX PO) Take 1 Tab by mouth daily. Yes Provider, Historical   fluticasone-salmeterol (ADVAIR DISKUS) 250-50 mcg/Dose diskus inhaler Take 1 Puff by inhalation every twelve (12) hours. Yes Provider, Historical   ASCORBIC ACID (VITAMIN C PO) Take 1,000 mg by mouth daily.    Yes Provider, Historical       Social History     Socioeconomic History    Marital status:      Spouse name: Not on file    Number of children: Not on file    Years of education: Not on file    Highest education level: Not on file   Social Needs    Financial resource strain: Not on file    Food insecurity - worry: Not on file    Food insecurity - inability: Not on file    Transportation needs - medical: Not on file   farmaciamarket needs - non-medical: Not on file   Occupational History    Not on file   Tobacco Use    Smoking status: Former Smoker     Packs/day: 3.00     Years: 10.00     Pack years: 30.00     Types: Cigarettes     Last attempt to quit: 1/15/1970     Years since quittin.7    Smokeless tobacco: Never Used   Substance and Sexual Activity    Alcohol use: No    Drug use: Yes     Types: Prescription, OTC    Sexual activity: Yes     Partners: Female     Birth control/protection: None   Other Topics Concern    Not on file   Social History Narrative    Not on file          ROS  Per HPI    Visit Vitals  /72   Pulse 65   Temp 98.4 °F (36.9 °C) (Oral)   Ht 5' 9\" (1.753 m)   Wt 189 lb (85.7 kg)   SpO2 96%   BMI 27.91 kg/m²         Physical Exam   Physical Examination: General appearance - alert, well appearing, and in no distress  Mouth - mucous membranes moist, pharynx normal without lesions  Neck - supple, no significant adenopathy  Chest -there are scattered rhonchi and very scant expiratory wheeze throughout. Heart - normal rate and regular rhythm  Abdomen - soft, nontender, nondistended, no masses or organomegaly      Assessment/Plan:  1. Coronary artery disease-appears to be improved after stent placement. He will continue to follow-up with cardiology. 2.  COPD-appears to still be problematic. I did discuss with him the possibility of using a nebulizer and he will discuss that with his pulmonologist.  He is using his inhalers consistently now. 3.  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 He will discuss it with him next week but would consider the possibility of bronchoscopy if his symptoms persist.  Recent repeat chest x-ray revealed scarring only. Follow-up Disposition: 6 months and as needed       Advised him to call back or return to office if symptoms worsen/change/persist.  Discussed expected course/resolution/complications of diagnosis in detail with patient. Medication risks/benefits/costs/interactions/alternatives discussed with patient. He was given an after visit summary which includes diagnoses, current medications, & vitals. He expressed understanding with the diagnosis and plan.

## 2018-10-18 NOTE — PROGRESS NOTES
Chief Complaint   Patient presents with    Post OP Follow Up     f/u from seeing cardio and s/p cath    Abnormal Lab Results     discuss abnormal sputum cx and f/u w/ Pulmonary - they stated they had not RCVD the report results.  Results     discuss cxr results.

## 2018-10-18 NOTE — Clinical Note
KIETI - Please see the sputum cultures. I gave them a copy AND previously routed them to you prior to his last appt.  Reji Gallegos

## 2018-10-23 ENCOUNTER — OFFICE VISIT (OUTPATIENT)
Dept: CARDIOLOGY CLINIC | Age: 83
End: 2018-10-23

## 2018-10-23 VITALS
HEART RATE: 65 BPM | RESPIRATION RATE: 18 BRPM | HEIGHT: 69 IN | DIASTOLIC BLOOD PRESSURE: 76 MMHG | WEIGHT: 187.7 LBS | OXYGEN SATURATION: 93 % | SYSTOLIC BLOOD PRESSURE: 136 MMHG | BODY MASS INDEX: 27.8 KG/M2

## 2018-10-23 DIAGNOSIS — Z95.5 S/P DRUG ELUTING CORONARY STENT PLACEMENT: ICD-10-CM

## 2018-10-23 DIAGNOSIS — E78.2 MIXED HYPERLIPIDEMIA: ICD-10-CM

## 2018-10-23 DIAGNOSIS — I25.10 ASHD (ARTERIOSCLEROTIC HEART DISEASE): Primary | ICD-10-CM

## 2018-10-23 DIAGNOSIS — I25.82 CHRONIC TOTAL OCCLUSION OF CORONARY ARTERY: ICD-10-CM

## 2018-10-23 RX ORDER — AA/PROT/LYSINE/METHIO/VIT C/B6 50-12.5 MG
10 TABLET ORAL DAILY
COMMUNITY

## 2018-10-23 RX ORDER — LISINOPRIL 2.5 MG/1
2.5 TABLET ORAL
COMMUNITY
Start: 2017-10-11 | End: 2018-10-23 | Stop reason: SDUPTHER

## 2018-10-23 RX ORDER — ALBUTEROL SULFATE 90 UG/1
2 AEROSOL, METERED RESPIRATORY (INHALATION)
COMMUNITY
Start: 2017-12-08 | End: 2018-10-23 | Stop reason: SDUPTHER

## 2018-10-23 RX ORDER — LANOLIN ALCOHOL/MO/W.PET/CERES
400 CREAM (GRAM) TOPICAL
COMMUNITY
End: 2018-10-23 | Stop reason: SDUPTHER

## 2018-10-23 RX ORDER — GLIPIZIDE 5 MG/1
TABLET ORAL
COMMUNITY
Start: 2017-05-23 | End: 2018-10-23 | Stop reason: SDUPTHER

## 2018-10-23 RX ORDER — FLUTICASONE PROPIONATE AND SALMETEROL 250; 50 UG/1; UG/1
1 POWDER RESPIRATORY (INHALATION)
COMMUNITY
Start: 2017-08-21 | End: 2018-10-23 | Stop reason: SDUPTHER

## 2018-10-23 RX ORDER — METOPROLOL SUCCINATE 25 MG/1
TABLET, EXTENDED RELEASE ORAL
COMMUNITY
Start: 2017-06-23 | End: 2018-10-23 | Stop reason: SDUPTHER

## 2018-10-23 RX ORDER — MINERAL OIL
180 ENEMA (ML) RECTAL
COMMUNITY
Start: 2018-09-04 | End: 2018-10-23 | Stop reason: SDUPTHER

## 2018-10-23 RX ORDER — SIMVASTATIN 20 MG/1
TABLET, FILM COATED ORAL
COMMUNITY
Start: 2017-04-26 | End: 2018-10-23 | Stop reason: SDUPTHER

## 2018-10-23 RX ORDER — METFORMIN HYDROCHLORIDE 500 MG/1
TABLET ORAL
COMMUNITY
Start: 2018-09-04 | End: 2018-10-31 | Stop reason: SDUPTHER

## 2018-10-23 NOTE — PROGRESS NOTES
Radha Kennedy DNP, ANP-BC  Subjective/HPI:     Bren Grijalva is a 80 y.o. male is here for Hospital follow-up after successful PTCA stenting . Patient reports he is feeling well, has resolution of significant shortness of breath, is back to his baseline dyspnea secondary to COPD. 1ST LESION INTERVENTIONS:  --  A successful drug-eluting stent with balloon angioplasty and laser  atherectomy was performed on the 100 % lesion in the proximal RCA. Following intervention there was an excellent angiographic appearance with  a 0 % residual stenosis. --  A Synergy RX [id number] everolimus-eluting  stent was placed across the lesion and successfully deployed at a maximum  inflation pressure of 16 randal. PCP Provider  Robina Ochoa MD  Past Medical History:   Diagnosis Date    Arrhythmia     irregular heart beat    Asthma     CAD (coronary artery disease)     Chronic hip pain     COPD (chronic obstructive pulmonary disease) ([address] 9/10/2010    Diabetes ([address]     DJD (degenerative joint disease)     GERD (gastroesophageal reflux disease)     HNP (herniated nucleus pulposus) 6/2006    lumbar     Hypercholesterolemia     Hypertension     Nausea & vomiting     Prostate cancer ([address] 2003    Reversible ischemic neurologic deficit ([address] 1990    suspected with no residual effects and no recurrance    TIA (transient ischemic attack)     TIA- no deficiets      Past Surgical History:   Procedure Laterality Date    COLONOSCOPY  12/15/2004    Diverticulosis Dr. Rosa Morales repeat 10 years    HX COLONOSCOPY  2005    HX HEART CATHETERIZATION      2  stents     HX HEART CATHETERIZATION  08/20/2018    failed 100% block @ RCA will plan to do laser next.       HX HERNIA REPAIR  9/2008    left inguinal hernia repair by Dr. Emmy Sims Right     inguinal hernai repair    HX HERNIA REPAIR      umbilical hernia repair    HX HIP REPLACEMENT Right 07/06/2016    HX PROSTATECTOMY  2003    HX TONSILLECTOMY      TOTAL HIP ARTHROPLASTY Left 2/9/11    [facility] Lopez [facility]     No Known Allergies   Family History   Problem Relation Age of Onset   [facility] Cancer Mother         Breast cancer    Lung Disease Mother         Emphysema    Cancer Father         Bladder cancer    Lung Disease Father         Emphysema    Hypertension Sister         2015      Current Outpatient Medications   Medication Sig    coenzyme q10 10 mg cap Take 10 mg by mouth.  guaiFENesin ER (MUCINEX) 600 mg ER tablet Take 600 mg by mouth two (2) times a day.  metoprolol succinate (TOPROL-XL) 25 mg XL tablet TAKE 1/2 TABLET BY MOUTH EVERY DAY    clopidogrel (PLAVIX) 75 mg tab Take 1 Tab by mouth daily.  aspirin delayed-release 81 mg tablet Take 1 Tab by mouth daily.  metFORMIN ER (GLUCOPHAGE XR) 500 mg tablet Please restart Metformin on Sat 10/6/18 (Patient taking differently: Take 500 mg by mouth three (3) times daily. Please restart Metformin on Sat 10/6/18)    isosorbide mononitrate ER (IMDUR) 30 mg tablet TAKE 0.5 TABS BY MOUTH DAILY.  cyanocobalamin (VITAMIN B-12) 500 mcg tablet Take 500 mcg by mouth daily.  lisinopril (PRINIVIL, ZESTRIL) 2.5 mg tablet Take 1 Tab by mouth daily.  folic acid 937 mcg tablet Take 400 mcg by mouth daily.  simvastatin (ZOCOR) 20 mg tablet TAKE 1 TABLET BY MOUTH NIGHTLY    glucose blood VI test strips (ONETOUCH ULTRA BLUE TEST STRIP) strip by Does Not Apply route Daily (before breakfast). E11.9    glipiZIDE (GLUCOTROL) 5 mg tablet TAKE 1 TABLET BY MOUTH EVERY DAY    albuterol (PROVENTIL HFA, VENTOLIN HFA, PROAIR HFA) 90 mcg/actuation inhaler Take 2 Puffs by inhalation every four (4) hours as needed for Wheezing.  tiotropium bromide (SPIRIVA RESPIMAT) 2.5 mcg/actuation inhaler Take 2 Puffs by inhalation daily.  MAGNESIUM PO Take  by mouth daily.  acetaminophen (TYLENOL) 500 mg tablet Take 1 tablet by mouth every eight (8) hours as needed for Pain.     CHOLECALCIFEROL, VITAMIN D3, (VITAMIN D3 PO) Take 1,000 Units by mouth daily.  multivitamins-minerals-lutein (CENTRUM SILVER) Tab Take 1 Tab by mouth daily.  fexofenadine (ALLEGRA) 180 mg tablet Take 1 Tab by mouth daily.  famotidine (PEPCID AC) 20 mg tablet Take 20 mg by mouth daily.  VITAMIN B COMPLEX (B COMPLEX PO) Take 1 Tab by mouth daily.  fluticasone-salmeterol (ADVAIR DISKUS) 250-50 mcg/Dose diskus inhaler Take 1 Puff by inhalation every twelve (12) hours.  ASCORBIC ACID (VITAMIN C PO) Take 1,000 mg by mouth daily.  tiotropium (SPIRIVA WITH HANDIHALER) 18 mcg inhalation capsule     metFORMIN (GLUCOPHAGE) 500 mg tablet     aspirin-calcium carbonate 81 mg-300 mg calcium(777 mg) tab Take 81 mg by mouth.  nitroglycerin (NITROSTAT) 0.4 mg SL tablet 1 Tab by SubLINGual route every five (5) minutes as needed for Chest Pain. No current facility-administered medications for this visit.        Vitals:    10/23/18 1303   Weight: 187 lb 11.2 oz (85.1 kg)   Height: 5' 9\" (1.753 m)     Social History     Socioeconomic History    Marital status:      Spouse name: Not on file    Number of children: Not on file    Years of education: Not on file    Highest education level: Not on file   Social Needs    Financial resource strain: Not on file    Food insecurity - worry: Not on file    Food insecurity - inability: Not on file   [employer] needs - medical: Not on file   [employer] needs - non-medical: Not on file   Occupational History    Not on file   Tobacco Use    Smoking status: Former Smoker     Packs/day: 3.00     Years: 10.00     Pack years: 30.00     Types: Cigarettes     Last attempt to quit: 1/15/1970     Years since quittin.8    Smokeless tobacco: Never Used   Substance and Sexual Activity    Alcohol use: No    Drug use: Yes     Types: Prescription, OTC    Sexual activity: Yes     Partners: Female     Birth control/protection: None   Other Topics Concern    Not on file Social History Narrative    Not on file       I have reviewed the nurses notes, vitals, problem list, allergy list, medical history, family, social history and medications. Review of Symptoms:    General: Pt denies excessive weight gain or loss. Pt is able to conduct ADL's  HEENT: Denies blurred vision, headaches, epistaxis and difficulty swallowing. Respiratory: Denies shortness of breath, MCGUIRE, wheezing or stridor. Cardiovascular: Denies precordial pain, palpitations, edema or PND  Gastrointestinal: Denies poor appetite, indigestion, abdominal pain or blood in stool  Musculoskeletal: Denies pain or swelling from muscles or joints  Neurologic: Denies tremor, paresthesias, or sensory motor disturbance  Skin: Denies rash, itching or texture change. Physical Exam:      General: Well developed, in no acute distress, cooperative and alert  HEENT: No carotid bruits, no JVD, trach is midline. Neck Supple, PEERL, EOM intact. Heart:  Normal S1/S2 negative S3 or S4. Regular, no murmur, gallop or rub.   Respiratory: Clear bilaterally x 4, no wheezing or rales  Abdomen:   Soft, non-tender, no masses, bowel sounds are active.   Extremities:  No edema, normal cap refill, no cyanosis, atraumatic. Neuro: A&Ox3, speech clear, gait stable. Skin: Skin color is normal. No rashes or lesions. Non diaphoretic  Vascular: 2+ pulses symmetric in all extremities right radial access site has a small pea-sized pulsatile mass. Patient reports this has been getting smaller since his procedure. Nonpainful.   Cardiographics    ECG:   Results for orders placed or performed during the hospital encounter of 10/04/18   EKG, 12 LEAD, INITIAL   Result Value Ref Range    Ventricular Rate 65 BPM    Atrial Rate 65 BPM    P-R Interval 206 ms    QRS Duration 86 ms    Q-T Interval 400 ms    QTC Calculation (Bezet) 416 ms    Calculated P Axis 81 degrees    Calculated R Axis 14 degrees    Calculated T Axis 19 degrees    Diagnosis       Sinus rhythm with premature atrial complexes    Confirmed by Gustavo Campbell MD, Larissa Reed ([id number]) on 10/6/2018 2:28:34 PM           Cardiology Labs:  Lab Results   Component Value Date/Time    Cholesterol, total 132 08/14/2018 08:22 AM    HDL Cholesterol 43 08/14/2018 08:22 AM    LDL, calculated 69 08/14/2018 08:22 AM    Triglyceride 101 08/14/2018 08:22 AM    CHOL/HDL Ratio 3.0 05/05/2010 09:01 AM       Lab Results   Component Value Date/Time    Sodium 141 10/05/2018 04:28 AM    Potassium 4.1 10/05/2018 04:28 AM    Chloride 110 (H) 10/05/2018 04:28 AM    CO2 24 10/05/2018 04:28 AM    Anion gap 7 10/05/2018 04:28 AM    Glucose 100 10/05/2018 04:28 AM    BUN 18 10/05/2018 04:28 AM    Creatinine 0.93 10/05/2018 04:28 AM    BUN/Creatinine ratio 19 10/05/2018 04:28 AM    GFR est AA >60 10/05/2018 04:28 AM    GFR est non-AA >60 10/05/2018 04:28 AM    Calcium 8.0 (L) 10/05/2018 04:28 AM    Bilirubin, total 0.4 09/26/2018 12:11 PM    AST (SGOT) 21 09/26/2018 12:11 PM    Alk. phosphatase 59 09/26/2018 12:11 PM    Protein, total 6.5 09/26/2018 12:11 PM    Albumin 4.2 09/26/2018 12:11 PM    Globulin 3.7 06/21/2016 09:18 AM    A-G Ratio 1.8 09/26/2018 12:11 PM    ALT (SGPT) 15 09/26/2018 12:11 PM           Assessment:     Assessment:     Diagnoses and all orders for this visit:    1. ASHD (arteriosclerotic heart disease)    2. Chronic total occlusion of coronary artery    3. S/P drug eluting coronary stent placement    4. Mixed hyperlipidemia        ICD-10-CM ICD-9-CM    1. ASHD (arteriosclerotic heart disease) I25.10 414.00    2. Chronic total occlusion of coronary artery I25.82 414.00      414.2    3. S/P drug eluting coronary stent placement Z95.5 V45.82    4. Mixed hyperlipidemia E78.2 272.2      Orders Placed This Encounter    tiotropium (SPIRIVA WITH HANDIHALER) 18 mcg inhalation capsule    DISCONTD: fexofenadine (ALLEGRA) 180 mg tablet     Sig: Take 180 mg by mouth.     metFORMIN (GLUCOPHAGE) 500 mg tablet    DISCONTD: glipiZIDE (GLUCOTROL) 5 mg tablet    DISCONTD: simvastatin (ZOCOR) 20 mg tablet    DISCONTD: lisinopril (PRINIVIL, ZESTRIL) 2.5 mg tablet     Sig: Take 2.5 mg by mouth.  DISCONTD: albuterol (PROVENTIL HFA, VENTOLIN HFA, PROAIR HFA) 90 mcg/actuation inhaler     Sig: Take 2 Puffs by inhalation.  DISCONTD: fluticasone-salmeterol (ADVAIR DISKUS) 250-50 mcg/dose diskus inhaler     Sig: Take 1 Puff by inhalation.  DISCONTD: metoprolol succinate (TOPROL-XL) 25 mg XL tablet    coenzyme q10 10 mg cap     Sig: Take 10 mg by mouth.  DISCONTD: folic acid 161 mcg tablet     Sig: Take 400 mcg by mouth.  aspirin-calcium carbonate 81 mg-300 mg calcium(777 mg) tab     Sig: Take 81 mg by mouth. Plan:     1. Atherosclerotic heart disease: Status post successful PTCA stenting of  of RCA continue dual antiplatelet therapy times 1 year. 2.  Hypertension: Controlled continue current medications  3. Hyperlipidemia on statin therapy LDL 69  Follow-up in 6 months. Asked patient to call me if in 2 weeks there is incomplete resolution in the right radial access site will then send for ultrasound. Greg Bray NP    This note was created using voice recognition software. Despite editing, there may be syntax errors.

## 2018-10-23 NOTE — PROGRESS NOTES
1. Have you been to the ER, urgent care clinic since your last visit? Hospitalized since your last visit? Yes, at [facility] on 10/4/18 for angina      2. Have you seen or consulted any other health care providers outside of the [address] [place] [address] since your last visit? Include any pap smears or colon screening.    No    Chief Complaint   Patient presents with   [facility] Follow Up     for angina    Shortness of Breath     pt has hx of COPD

## 2018-10-25 ENCOUNTER — HOSPITAL ENCOUNTER (OUTPATIENT)
Dept: CARDIAC REHAB | Age: 83
Discharge: HOME | End: 2018-10-25
Payer: COMMERCIAL

## 2018-10-25 VITALS — BODY MASS INDEX: 28.07 KG/M2 | HEIGHT: 69 IN | WEIGHT: 189.5 LBS

## 2018-10-25 DIAGNOSIS — Z95.5 STENTED CORONARY ARTERY: ICD-10-CM

## 2018-10-25 PROCEDURE — 93798 PHYS/QHP OP CAR RHAB W/ECG: CPT

## 2018-10-25 NOTE — CARDIO/PULMONARY
Cardiopulmonary Rehab Intake Note: 
Met with Mr. Anny Ochoa for the initial session. Mr. Camila Mcnair is an 80year old patient of Dr. July Martino and Dr. Kelsie Díaz who presents to rehab for cardiac strengthening and conditioning, S/P PCI/YARIEL of  of RCA on 10/4/18. LVEF 49% on Lexiscan on 8/2/18. PMH significant for CAD, COPD, DM type II, diabetic neuropathy, DJD of hips with bilateral replacements, prostate cancer, hypercholesterolemia, strabismus amblyopia and sensory ataxic gait.   
   
Lungs revealed rhonchi at left base and coarse at right base. Pt has productive cough with thick tan sputum. No BLE edema. Pt is under care of Dr. Josh Pantoja and Mucinex was recently added to his COPD regimen. Jorge Geoff 
Limitations to exercise are primarily related to deconditioning, unsteady gait - using cane, age, bilateral hip replacements and vision problems. He had a fall on 9/29/18 and had to have stitches in his ear.    
 
He has not been exercising at home but does have a treadmill he can use and feels safe on as he can hold on while walking.   
   
Pt completed the 6 min walk on the treadmill without stopping but with slight shortness of breath (RPD 1), walking a total of 250 meters, RPD 13, mets 2.2.  
  
Psychosocial: Pt scored 0 on PHQ9. He is retired. He is a former participant in outpatient cardiac rehab in 2014. He has a supportive wife. He still drives his car. 
   
Patient was given an overview of cardiac rehab program, placement of electrode/leads, and rationale for program. Patient and his wife viewed the cardiac rehab DVD and an education notebook was given.  
   
Heart rhythm on the monitor was a normal sinus rhythm with frequent PAC's and occ PVC's. Oxygen saturation was 96% on room air. BMI 28. Patient's identified goals are 1. To complete cardiac rehab including classes. 2.  To walk on his treadmill for exercise 3 x weekfor 10-15minutes. 3.  Better BP control. 4. To try not to eat the fried poultry skin! 3. Better BP control with meds, low salt diet, stress reduction and weight loss.

## 2018-10-26 ENCOUNTER — HOSPITAL ENCOUNTER (OUTPATIENT)
Dept: CARDIAC REHAB | Age: 83
Discharge: HOME | End: 2018-10-26
Payer: COMMERCIAL

## 2018-10-26 VITALS — WEIGHT: 189 LBS | BODY MASS INDEX: 27.91 KG/M2

## 2018-10-26 PROCEDURE — 93798 PHYS/QHP OP CAR RHAB W/ECG: CPT

## 2018-10-29 ENCOUNTER — HOSPITAL ENCOUNTER (OUTPATIENT)
Dept: CARDIAC REHAB | Age: 83
Discharge: HOME | End: 2018-10-29
Payer: COMMERCIAL

## 2018-10-29 VITALS — WEIGHT: 190.6 LBS | BODY MASS INDEX: 28.15 KG/M2

## 2018-10-29 PROCEDURE — 93798 PHYS/QHP OP CAR RHAB W/ECG: CPT

## 2018-10-29 RX ORDER — ISOSORBIDE MONONITRATE 30 MG/1
30 TABLET, EXTENDED RELEASE ORAL
Qty: 90 TAB | Refills: 1 | Status: SHIPPED | OUTPATIENT
Start: 2018-10-29 | End: 2018-11-20 | Stop reason: SDUPTHER

## 2018-10-31 ENCOUNTER — OFFICE VISIT (OUTPATIENT)
Dept: INTERNAL MEDICINE CLINIC | Age: 83
End: 2018-10-31

## 2018-10-31 ENCOUNTER — APPOINTMENT (OUTPATIENT)
Dept: CARDIAC REHAB | Age: 83
End: 2018-10-31

## 2018-10-31 ENCOUNTER — HOSPITAL ENCOUNTER (OUTPATIENT)
Dept: CARDIAC REHAB | Age: 83
Discharge: HOME | End: 2018-10-31
Payer: COMMERCIAL

## 2018-10-31 ENCOUNTER — HOSPITAL ENCOUNTER (OUTPATIENT)
Dept: LAB | Age: 83
Discharge: HOME | End: 2018-10-31
Payer: MEDICARE

## 2018-10-31 VITALS
DIASTOLIC BLOOD PRESSURE: 78 MMHG | OXYGEN SATURATION: 95 % | TEMPERATURE: 97.5 F | HEART RATE: 62 BPM | WEIGHT: 188 LBS | RESPIRATION RATE: 14 BRPM | BODY MASS INDEX: 27.85 KG/M2 | SYSTOLIC BLOOD PRESSURE: 140 MMHG | HEIGHT: 69 IN

## 2018-10-31 VITALS — BODY MASS INDEX: 27.79 KG/M2 | WEIGHT: 188.2 LBS

## 2018-10-31 DIAGNOSIS — J41.1 MUCOPURULENT CHRONIC BRONCHITIS (HCC): ICD-10-CM

## 2018-10-31 DIAGNOSIS — I10 ESSENTIAL HYPERTENSION: Primary | ICD-10-CM

## 2018-10-31 PROCEDURE — 87149 DNA/RNA DIRECT PROBE: CPT

## 2018-10-31 PROCEDURE — 87186 SC STD MICRODIL/AGAR DIL: CPT

## 2018-10-31 PROCEDURE — 87015 SPECIMEN INFECT AGNT CONCNTJ: CPT

## 2018-10-31 PROCEDURE — 93798 PHYS/QHP OP CAR RHAB W/ECG: CPT

## 2018-10-31 PROCEDURE — 87206 SMEAR FLUORESCENT/ACID STAI: CPT

## 2018-10-31 RX ORDER — LISINOPRIL 5 MG/1
5 TABLET ORAL DAILY
Qty: 90 TAB | Refills: 3 | Status: SHIPPED | OUTPATIENT
Start: 2018-10-31 | End: 2018-11-08 | Stop reason: SDUPTHER

## 2018-10-31 RX ORDER — GLIPIZIDE 5 MG/1
5 TABLET ORAL DAILY
Qty: 90 TAB | Refills: 1 | Status: SHIPPED | OUTPATIENT
Start: 2018-10-31 | End: 2019-07-12 | Stop reason: SDUPTHER

## 2018-10-31 NOTE — TELEPHONE ENCOUNTER
Orders Placed This Encounter    glipiZIDE (GLUCOTROL) 5 mg tablet     Sig: Take 1 Tab by mouth daily. Dispense:  90 Tab     Refill:  1     The above medication refills were approved via verbal order by Dr. Renee Torres III.

## 2018-10-31 NOTE — PROGRESS NOTES
HPI:  Mikki Wahl is a 80y.o. year old male who is here for a routine visit:    Presents for follow-up visit. He recently saw pulmonary for follow-up of his COPD and mycobacterial infection. I provided him with the culture results that he gave to them. Apparently they did not feel there was any further evaluation necessary for this. They claim that they did not have any records for me despite the fact that they were routed to Dr. Chi Marquez on his last visit and a copy was given to the patient. He notes that his cough is much improved. He denies any increased sputum. Denies fevers or chills. Does have dyspnea on exertion that is unchanged. His blood pressures have been high recently when they have been checked at rehab. He continues to do pulmonary rehab twice a week. Denies any change in bowel or bladder habits. He has noted a small nodule in his right wrist where he had his catheterization site. He denies any pain there or bruising or bleeding. 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 Dante Dillard repeat 10 years    HX COLONOSCOPY  2005    HX HEART CATHETERIZATION      2  stents     HX HEART CATHETERIZATION  08/20/2018    failed 100% block @ RCA will plan to do laser next.       HX HERNIA REPAIR  9/2008 left inguinal hernia repair by Dr. Stewart Rod Right     inguinal hernai repair    HX HERNIA REPAIR      umbilical hernia repair    HX HIP REPLACEMENT Right 07/06/2016    HX PROSTATECTOMY  2003    HX TONSILLECTOMY      TOTAL HIP ARTHROPLASTY Left 2/9/11    Dr. Mallory Hamilton at [address] [place] [address]       Prior to Admission medications    Medication Sig Start Date End Date Taking? Authorizing Provider   lisinopril (PRINIVIL, ZESTRIL) 5 mg tablet Take 1 Tab by mouth daily. 10/31/18  Yes Bolivar Thompson MD   isosorbide mononitrate ER (IMDUR) 30 mg tablet Take 1 Tab by mouth every morning. 10/29/18  Yes Darlene Bender NP   coenzyme q10 10 mg cap Take 10 mg by mouth. Yes Provider, Historical   guaiFENesin ER (MUCINEX) 600 mg ER tablet Take 600 mg by mouth two (2) times a day. Yes Provider, Historical   metoprolol succinate (TOPROL-XL) 25 mg XL tablet TAKE 1/2 TABLET BY MOUTH EVERY DAY 10/15/18  Yes Darlene Bender NP   clopidogrel (PLAVIX) 75 mg tab Take 1 Tab by mouth daily. 10/10/18  Yes Chad Cunningham MD   aspirin delayed-release 81 mg tablet Take 1 Tab by mouth daily. 10/10/18  Yes Chad Cunningham MD   metFORMIN ER (GLUCOPHAGE XR) 500 mg tablet Please restart Metformin on Sat 10/6/18  Patient taking differently: Take 500 mg by mouth three (3) times daily. Please restart Metformin on Sat 10/6/18 10/5/18  Yes London Grider NP   cyanocobalamin (VITAMIN B-12) 500 mcg tablet Take 500 mcg by mouth daily. Yes Provider, Historical   folic acid 050 mcg tablet Take 400 mcg by mouth daily. Yes Provider, Historical   nitroglycerin (NITROSTAT) 0.4 mg SL tablet 1 Tab by SubLINGual route every five (5) minutes as needed for Chest Pain. 8/10/18  Yes Darlene Bender NP   simvastatin (ZOCOR) 20 mg tablet TAKE 1 TABLET BY MOUTH NIGHTLY 8/6/18  Yes Bolivar Thompson MD   glucose blood VI test strips (ONETOUCH ULTRA BLUE TEST STRIP) strip by Does Not Apply route Daily (before breakfast).  E11.9 7/5/18  Yes Yahaira Russo MD   albuterol (PROVENTIL HFA, VENTOLIN HFA, PROAIR HFA) 90 mcg/actuation inhaler Take 2 Puffs by inhalation every four (4) hours as needed for Wheezing. 12/8/17  Yes Yahaira Russo MD   tiotropium bromide (SPIRIVA RESPIMAT) 2.5 mcg/actuation inhaler Take 2 Puffs by inhalation daily. Yes Provider, Historical   MAGNESIUM PO Take  by mouth daily. Yes Provider, Historical   acetaminophen (TYLENOL) 500 mg tablet Take 1 tablet by mouth every eight (8) hours as needed for Pain. 8/28/14  Yes Angel Dickey MD   CHOLECALCIFEROL, VITAMIN D3, (VITAMIN D3 PO) Take 1,000 Units by mouth daily. Yes Provider, Historical   multivitamins-minerals-lutein (CENTRUM SILVER) Tab Take 1 Tab by mouth daily. 5/14/10  Yes Provider, Historical   fexofenadine (ALLEGRA) 180 mg tablet Take 1 Tab by mouth daily. 1/25/10  Yes Aquilino Stein MD   famotidine (PEPCID AC) 20 mg tablet Take 20 mg by mouth daily. 6/27/11  Yes Provider, Historical   VITAMIN B COMPLEX (B COMPLEX PO) Take 1 Tab by mouth daily. Yes Provider, Historical   fluticasone-salmeterol (ADVAIR DISKUS) 250-50 mcg/Dose diskus inhaler Take 1 Puff by inhalation every twelve (12) hours. Yes Provider, Historical   ASCORBIC ACID (VITAMIN C PO) Take 1,000 mg by mouth daily. Yes Provider, Historical   glipiZIDE (GLUCOTROL) 5 mg tablet Take 1 Tab by mouth daily.  10/31/18   Yahaira Russo MD   tiotropium [facility] WITH HANDIHALER) 18 mcg inhalation capsule  11/26/12   Provider, Historical   aspirin-calcium carbonate 81 mg-300 mg calcium(777 mg) tab Take 81 mg by mouth. 8/20/18   Provider, Historical       Social History     Socioeconomic History    Marital status:      Spouse name: Not on file    Number of children: Not on file    Years of education: Not on file    Highest education level: Not on file   Social Needs    Financial resource strain: Not on file    Food insecurity - worry: Not on file    Food insecurity - inability: Not on file   [employer] needs - medical: Not on file   [employer] needs - non-medical: Not on file   Occupational History    Not on file   Tobacco Use    Smoking status: Former Smoker     Packs/day: 3.00     Years: 10.00     Pack years: 30.00     Types: Cigarettes     Last attempt to quit: 1/15/1970     Years since quittin.8    Smokeless tobacco: Never Used   Substance and Sexual Activity    Alcohol use: No    Drug use: Yes     Types: Prescription, OTC    Sexual activity: Yes     Partners: Female     Birth control/protection: None   Other Topics Concern    Not on file   Social History Narrative    Not on file          ROS  Per HPI    Visit Vitals  /78   Pulse 62   Temp 97.5 °F (36.4 °C) (Oral)   Resp 14   Ht 5' 9\" (1.753 m)   Wt 188 lb (85.3 kg)   SpO2 95%   BMI 27.76 kg/m²         Physical Exam   Physical Examination: General appearance - alert, well appearing, and in no distress  Mouth - mucous membranes moist, pharynx normal without lesions  Neck - supple, no significant adenopathy  Chest -scattered rhonchi throughout. Minimal wheeze. Heart - normal rate, regular rhythm, normal S1, S2, no murmurs, rubs, clicks or gallops  Abdomen - soft, nontender, nondistended, no masses or organomegaly  Extremities - peripheral pulses normal, no pedal edema, no clubbing or cyanosis  Right wrist at the radial artery has a tiny palpable nodule. No tenderness. No bleeding. No bruising. Assessment/Plan:  Diagnoses and all orders for this visit:    1. Essential hypertension -not well controlled. At this point will increase his lisinopril to 5 mg a day and see what his blood pressure does with that. He will continue to monitor that at rehab. -     lisinopril (PRINIVIL, ZESTRIL) 5 mg tablet; Take 1 Tab by mouth daily. 2. 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 At this point will repeat his AFP culture.   If positive would refer to ID.  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 He will see cardiology for follow-up if needed of that. 4.  Coronary artery disease-stable following cath. Follow-up Disposition: 6 months and as needed       Advised him to call back or return to office if symptoms worsen/change/persist.  Discussed expected course/resolution/complications of diagnosis in detail with patient. Medication risks/benefits/costs/interactions/alternatives discussed with patient. He was given an after visit summary which includes diagnoses, current medications, & vitals. He expressed understanding with the diagnosis and plan.

## 2018-11-02 ENCOUNTER — APPOINTMENT (OUTPATIENT)
Dept: CARDIAC REHAB | Age: 83
End: 2018-11-02
Payer: MEDICARE

## 2018-11-05 ENCOUNTER — HOSPITAL ENCOUNTER (OUTPATIENT)
Dept: CARDIAC REHAB | Age: 83
Discharge: HOME | End: 2018-11-05
Payer: MEDICARE

## 2018-11-05 VITALS — WEIGHT: 189.6 LBS | BODY MASS INDEX: 28 KG/M2

## 2018-11-05 PROCEDURE — 93798 PHYS/QHP OP CAR RHAB W/ECG: CPT

## 2018-11-06 DIAGNOSIS — S55.101D INJURY OF RIGHT RADIAL ARTERY, SUBSEQUENT ENCOUNTER: Primary | ICD-10-CM

## 2018-11-07 ENCOUNTER — TELEPHONE (OUTPATIENT)
Dept: CARDIOLOGY CLINIC | Age: 83
End: 2018-11-07

## 2018-11-07 ENCOUNTER — HOSPITAL ENCOUNTER (OUTPATIENT)
Dept: CARDIAC REHAB | Age: 83
Discharge: HOME | End: 2018-11-07
Payer: MEDICARE

## 2018-11-07 VITALS — BODY MASS INDEX: 27.64 KG/M2 | WEIGHT: 187.2 LBS

## 2018-11-07 PROCEDURE — 93798 PHYS/QHP OP CAR RHAB W/ECG: CPT

## 2018-11-07 RX ORDER — SIMVASTATIN 20 MG/1
TABLET, FILM COATED ORAL
Qty: 90 TAB | Refills: 1 | Status: SHIPPED | OUTPATIENT
Start: 2018-11-07 | End: 2019-07-05 | Stop reason: SDUPTHER

## 2018-11-07 NOTE — TELEPHONE ENCOUNTER
Spoke with patient's wife Brentondelon Barbara  Verified patient with 2 patient identifiers    Informed per Lyssa Saba NP he has placed an order for a ultrasound of the right radial artery.  to contact patient to arrange, would like to have done so that he will have results by f/u appointment Iza Reis 13,2018. Chava Jimenez verbalized understanding.

## 2018-11-07 NOTE — TELEPHONE ENCOUNTER
Left message on cell vm re: appointment and test ordered per Lyssa Saba NP. Awaiting return call.     Called home pt not available, unable to leave message

## 2018-11-07 NOTE — TELEPHONE ENCOUNTER
----- Message from Coty Arrington NP sent at 11/6/2018  4:45 PM EST -----  Regarding: Wrist pain  Francisco: Please call patient I see that he is on my schedule for next week. I have placed an order for a ultrasound of the right radial artery. Will have  contact patient to arrange. Hopefully this can be done prior to his next week visit with me.

## 2018-11-08 ENCOUNTER — TELEPHONE (OUTPATIENT)
Dept: CARDIOLOGY CLINIC | Age: 83
End: 2018-11-08

## 2018-11-08 DIAGNOSIS — I10 ESSENTIAL HYPERTENSION: ICD-10-CM

## 2018-11-08 RX ORDER — LISINOPRIL 5 MG/1
5 TABLET ORAL DAILY
Qty: 90 TAB | Refills: 2 | Status: SHIPPED | OUTPATIENT
Start: 2018-11-08 | End: 2019-11-12 | Stop reason: SDUPTHER

## 2018-11-08 NOTE — TELEPHONE ENCOUNTER
Spoke with patient  Verified patient with 2 patient identifiers    Informed per Jessi Hitchcock NP verbal order patient to continue Imdur 15 mg daily. Patient verbalized understanding.

## 2018-11-08 NOTE — TELEPHONE ENCOUNTER
Patient said his isosorbide is now take once a day where he was taking a 1/2. He wants to know if Jessi Hitchcock changed how to take it. Please call.  Thanks

## 2018-11-09 ENCOUNTER — HOSPITAL ENCOUNTER (OUTPATIENT)
Dept: CARDIAC REHAB | Age: 83
Discharge: HOME | End: 2018-11-09
Payer: MEDICARE

## 2018-11-09 ENCOUNTER — HOSPITAL ENCOUNTER (OUTPATIENT)
Dept: VASCULAR SURGERY | Age: 83
Discharge: HOME | End: 2018-11-09
Attending: NURSE PRACTITIONER
Payer: MEDICARE

## 2018-11-09 VITALS — BODY MASS INDEX: 27.85 KG/M2 | WEIGHT: 188.6 LBS

## 2018-11-09 DIAGNOSIS — S55.101D INJURY OF RIGHT RADIAL ARTERY, SUBSEQUENT ENCOUNTER: ICD-10-CM

## 2018-11-09 PROCEDURE — 93798 PHYS/QHP OP CAR RHAB W/ECG: CPT

## 2018-11-09 PROCEDURE — 93931 UPPER EXTREMITY STUDY: CPT

## 2018-11-09 RX ORDER — LISINOPRIL 2.5 MG/1
TABLET ORAL
Qty: 90 TAB | Refills: 2 | Status: SHIPPED | OUTPATIENT
Start: 2018-11-09 | End: 2018-11-13 | Stop reason: ALTCHOICE

## 2018-11-09 RX ORDER — LISINOPRIL 2.5 MG/1
TABLET ORAL
Refills: 3 | COMMUNITY
Start: 2018-08-17 | End: 2018-11-09 | Stop reason: SDUPTHER

## 2018-11-09 NOTE — PROCEDURES
[facility]  *** FINAL REPORT ***    Name: Marshall Barclay  MRN: [id number]    Outpatient  : 18 Dec 1931  HIS Order #: [id number]  [id number] [facility] Visit #: [id number]  Date: 2018    TYPE OF TEST: Extremity Arterial Duplex    REASON FOR TEST  Pseudoaneurysm (right side), Injury of right radila artery  Eval for Pseudoaneurysm                            Right                     Left  Artery               PSV   Finding             PSV   Finding  ------------------  -----  ---------------    -----  ---------------  Subclavian:  Axillary:  Brachial:  Radial:             103.0  Ulnar:    Digit pressures:      R:        L:  Wrist/brachial index: R:        L:      INTERPRETATION/FINDINGS  PROCEDURE:  Arterial duplex examination using B-mode, color flow and  spectral Doppler of the upper extremity arteries. Right arm :  1. No evidence of a pseudoaneurysm or hematoma in the right radial  artery. 2. No significant stenosis noted. ADDITIONAL COMMENTS    I have personally reviewed the data relevant to the interpretation of  this  study. TECHNOLOGIST: Tianna Bronson RVT  Signed: 2018 10:26 AM    PHYSICIAN: Fransico Vasquez.  Mikhail Melo MD  Signed: 2018 05:22 PM

## 2018-11-11 NOTE — PROGRESS NOTES
Good morning Mr Avalos Innocent the ultrasound of the radial artery does not show any aneurism of issues with the blood vessels in the region where the cardiac cath was performed. At this point at would recommend wearing a wrist splint to give the area more time to heal. The wrist splint can be purchased over the counter at any local pharmacy. It would be the same type of splint that would be used for carpel tunnel. Typically they have a few  velcro straps, thumb is kept in a upright position. Ice the area to reduce inflammation and swelling. If you could start this today so I can see how things are progression or not at your follow up this week.  
 
Shopalytic message sent

## 2018-11-12 ENCOUNTER — HOSPITAL ENCOUNTER (OUTPATIENT)
Dept: CARDIAC REHAB | Age: 83
Discharge: HOME | End: 2018-11-12
Payer: MEDICARE

## 2018-11-12 PROCEDURE — 93798 PHYS/QHP OP CAR RHAB W/ECG: CPT

## 2018-11-13 ENCOUNTER — OFFICE VISIT (OUTPATIENT)
Dept: CARDIOLOGY CLINIC | Age: 83
End: 2018-11-13

## 2018-11-13 VITALS
HEART RATE: 80 BPM | DIASTOLIC BLOOD PRESSURE: 82 MMHG | SYSTOLIC BLOOD PRESSURE: 140 MMHG | RESPIRATION RATE: 16 BRPM | OXYGEN SATURATION: 98 % | HEIGHT: 69 IN | WEIGHT: 190.9 LBS | BODY MASS INDEX: 28.27 KG/M2

## 2018-11-13 VITALS — BODY MASS INDEX: 28.03 KG/M2 | WEIGHT: 189.8 LBS

## 2018-11-13 DIAGNOSIS — I10 ESSENTIAL HYPERTENSION: ICD-10-CM

## 2018-11-13 DIAGNOSIS — E78.2 MIXED HYPERLIPIDEMIA: ICD-10-CM

## 2018-11-13 DIAGNOSIS — I25.10 ASHD (ARTERIOSCLEROTIC HEART DISEASE): ICD-10-CM

## 2018-11-13 DIAGNOSIS — Z95.5 S/P DRUG ELUTING CORONARY STENT PLACEMENT: ICD-10-CM

## 2018-11-13 DIAGNOSIS — I25.10 CORONARY ARTERY DISEASE INVOLVING NATIVE CORONARY ARTERY OF NATIVE HEART WITHOUT ANGINA PECTORIS: Primary | ICD-10-CM

## 2018-11-13 RX ORDER — LISINOPRIL 5 MG/1
TABLET ORAL
Qty: 90 TAB | Refills: 2 | COMMUNITY
Start: 2018-11-13 | End: 2018-12-27 | Stop reason: SDUPTHER

## 2018-11-13 NOTE — PROGRESS NOTES
1. Have you been to the ER, urgent care clinic since your last visit? Hospitalized since your last visit? No    2. Have you seen or consulted any other health care providers outside of the [address] [place] [address] since your last visit? Include any pap smears or colon screening. No     Patient here for review of recent wrist ultrasound from his recent cath site. He has questions about activity limitations.

## 2018-11-13 NOTE — PROGRESS NOTES
Yesenia Willis DNP, ANP-BC  Subjective/HPI:     Jo Manuel is a 80 y.o. male is here for routine f/u. The patient denies chest pain/ shortness of breath, orthopnea, PND, LE edema, palpitations, syncope, presyncope or fatigue. Right arm :  1. No evidence of a pseudoaneurysm or hematoma in the right radial  artery. 2. No significant stenosis noted.     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 Merritt Joe repeat 10 years    HX COLONOSCOPY  2005    HX HEART CATHETERIZATION      2  stents     HX HEART CATHETERIZATION  08/20/2018    failed 100% block @ RCA will plan to do laser next.       HX HERNIA REPAIR  9/2008    left inguinal hernia repair by Dr. David Ventura Right     inguinal hernai repair    HX HERNIA REPAIR      umbilical hernia repair    HX HIP REPLACEMENT Right 07/06/2016    HX PROSTATECTOMY  2003    HX TONSILLECTOMY      TOTAL HIP ARTHROPLASTY Left 2/9/11    Dr[facility] Ha[facility]     No Known Allergies   Family History   Problem Relation Age of Onset    Cancer Mother         Breast cancer    Lung Disease Mother         Emphysema    Cancer Father         Bladder cancer    Lung Disease Father         Emphysema    Hypertension Sister 2015      Current Outpatient Medications   Medication Sig    lisinopril (PRINIVIL, ZESTRIL) 2.5 mg tablet TAKE 1 TABLET BY MOUTH EVERY DAY    simvastatin (ZOCOR) 20 mg tablet TAKE 1 TABLET BY MOUTH NIGHTLY    glipiZIDE (GLUCOTROL) 5 mg tablet Take 1 Tab by mouth daily.  isosorbide mononitrate ER (IMDUR) 30 mg tablet Take 1 Tab by mouth every morning. (Patient taking differently: Take 30 mg by mouth every morning.)    coenzyme q10 10 mg cap Take 10 mg by mouth.  guaiFENesin ER (MUCINEX) 600 mg ER tablet Take 600 mg by mouth two (2) times a day.  metoprolol succinate (TOPROL-XL) 25 mg XL tablet TAKE 1/2 TABLET BY MOUTH EVERY DAY    clopidogrel (PLAVIX) 75 mg tab Take 1 Tab by mouth daily.  aspirin delayed-release 81 mg tablet Take 1 Tab by mouth daily.  metFORMIN ER (GLUCOPHAGE XR) 500 mg tablet Please restart Metformin on Sat 10/6/18 (Patient taking differently: Take 500 mg by mouth three (3) times daily. Please restart Metformin on Sat 10/6/18)    cyanocobalamin (VITAMIN B-12) 500 mcg tablet Take 500 mcg by mouth daily.  folic acid 138 mcg tablet Take 400 mcg by mouth daily.  nitroglycerin (NITROSTAT) 0.4 mg SL tablet 1 Tab by SubLINGual route every five (5) minutes as needed for Chest Pain.  glucose blood VI test strips (ONETOUCH ULTRA BLUE TEST STRIP) strip by Does Not Apply route Daily (before breakfast). E11.9    albuterol (PROVENTIL HFA, VENTOLIN HFA, PROAIR HFA) 90 mcg/actuation inhaler Take 2 Puffs by inhalation every four (4) hours as needed for Wheezing.  tiotropium bromide (SPIRIVA RESPIMAT) 2.5 mcg/actuation inhaler Take 2 Puffs by inhalation daily.  MAGNESIUM PO Take  by mouth daily.  acetaminophen (TYLENOL) 500 mg tablet Take 1 tablet by mouth every eight (8) hours as needed for Pain.  CHOLECALCIFEROL, VITAMIN D3, (VITAMIN D3 PO) Take 1,000 Units by mouth daily.  multivitamins-minerals-lutein (CENTRUM SILVER) Tab Take 1 Tab by mouth daily.     fexofenadine (ALLEGRA) 180 mg tablet Take 1 Tab by mouth daily.  famotidine (PEPCID AC) 20 mg tablet Take 20 mg by mouth daily.  VITAMIN B COMPLEX (B COMPLEX PO) Take 1 Tab by mouth daily.  fluticasone-salmeterol (ADVAIR DISKUS) 250-50 mcg/Dose diskus inhaler Take 1 Puff by inhalation every twelve (12) hours.  ASCORBIC ACID (VITAMIN C PO) Take 1,000 mg by mouth daily.  lisinopril (PRINIVIL, ZESTRIL) 5 mg tablet Take 1 Tab by mouth daily. (Patient not taking: Reported on 2018)     No current facility-administered medications for this visit. Vitals:    18 1422 18 1511   BP: 180/90 140/82   Pulse: 80    Resp: 16    SpO2: 98%    Weight: 190 lb 14.4 oz (86.6 kg)    Height: 5' 9\" (1.753 m)      Social History     Socioeconomic History    Marital status:      Spouse name: Not on file    Number of children: Not on file    Years of education: Not on file    Highest education level: Not on file   Social Needs    Financial resource strain: Not on file    Food insecurity - worry: Not on file    Food insecurity - inability: Not on file   [employer] needs - medical: Not on file   [employer] needs - non-medical: Not on file   Occupational History    Not on file   Tobacco Use    Smoking status: Former Smoker     Packs/day: 3.00     Years: 10.00     Pack years: 30.00     Types: Cigarettes     Last attempt to quit: 1/15/1970     Years since quittin.8    Smokeless tobacco: Never Used   Substance and Sexual Activity    Alcohol use: No    Drug use: Yes     Types: Prescription, OTC    Sexual activity: Yes     Partners: Female     Birth control/protection: None   Other Topics Concern    Not on file   Social History Narrative    Not on file       I have reviewed the nurses notes, vitals, problem list, allergy list, medical history, family, social history and medications. Review of Symptoms:    General: Pt denies excessive weight gain or loss.  Pt is able to conduct ADL's  HEENT: Denies blurred vision, headaches, epistaxis and difficulty swallowing. Respiratory: Denies shortness of breath, MCGUIRE, wheezing or stridor. Cardiovascular: Denies precordial pain, palpitations, edema or PND  Gastrointestinal: Denies poor appetite, indigestion, abdominal pain or blood in stool  Musculoskeletal: Denies pain or swelling from muscles or joints  Neurologic: Denies tremor, paresthesias, or sensory motor disturbance  Skin: Denies rash, itching or texture change. Physical Exam:      General: Well developed, in no acute distress, cooperative and alert  HEENT: No carotid bruits, no JVD, trach is midline. Neck Supple, PEERL, EOM intact. Heart:  Normal S1/S2 negative S3 or S4. Regular, no murmur, gallop or rub.   Respiratory: Clear bilaterally x 4, no wheezing or rales  Abdomen:   Soft, non-tender, no masses, bowel sounds are active.   Extremities:  No edema, normal cap refill, no cyanosis, atraumatic. Neuro: A&Ox3, speech clear, gait stable. Skin: Skin color is normal. No rashes or lesions. Non diaphoretic  Vascular: 2+ pulses symmetric in all extremities.   Right radial strong bounding +2 pulse, no swelling or tenderness with palpation    Cardiographics    ECG:   Results for orders placed or performed during the hospital encounter of 10/04/18   EKG, 12 LEAD, INITIAL   Result Value Ref Range    Ventricular Rate 65 BPM    Atrial Rate 65 BPM    P-R Interval 206 ms    QRS Duration 86 ms    Q-T Interval 400 ms    QTC Calculation (Bezet) 416 ms    Calculated P Axis 81 degrees    Calculated R Axis 14 degrees    Calculated T Axis 19 degrees    Diagnosis       Sinus rhythm with premature atrial complexes    Confirmed by Servando Ocasio MD, Samie Lombard ([id number]) on 10/6/2018 2:28:34 PM           Cardiology Labs:  Lab Results   Component Value Date/Time    Cholesterol, total 132 08/14/2018 08:22 AM    HDL Cholesterol 43 08/14/2018 08:22 AM    LDL, calculated 69 08/14/2018 08:22 AM    Triglyceride 101 08/14/2018 08:22 AM    CHOL/HDL Ratio 3.0 05/05/2010 09:01 AM       Lab Results   Component Value Date/Time    Sodium 141 10/05/2018 04:28 AM    Potassium 4.1 10/05/2018 04:28 AM    Chloride 110 (H) 10/05/2018 04:28 AM    CO2 24 10/05/2018 04:28 AM    Anion gap 7 10/05/2018 04:28 AM    Glucose 100 10/05/2018 04:28 AM    BUN 18 10/05/2018 04:28 AM    Creatinine 0.93 10/05/2018 04:28 AM    BUN/Creatinine ratio 19 10/05/2018 04:28 AM    GFR est AA >60 10/05/2018 04:28 AM    GFR est non-AA >60 10/05/2018 04:28 AM    Calcium 8.0 (L) 10/05/2018 04:28 AM    Bilirubin, total 0.4 09/26/2018 12:11 PM    AST (SGOT) 21 09/26/2018 12:11 PM    Alk. phosphatase 59 09/26/2018 12:11 PM    Protein, total 6.5 09/26/2018 12:11 PM    Albumin 4.2 09/26/2018 12:11 PM    Globulin 3.7 06/21/2016 09:18 AM    A-G Ratio 1.8 09/26/2018 12:11 PM    ALT (SGPT) 15 09/26/2018 12:11 PM           Assessment:     Assessment:     Diagnoses and all orders for this visit:    1. Coronary artery disease involving native coronary artery of native heart without angina pectoris    2. Essential hypertension    3. ASHD (arteriosclerotic heart disease)    4. S/P drug eluting coronary stent placement    5. Mixed hyperlipidemia        ICD-10-CM ICD-9-CM    1. Coronary artery disease involving native coronary artery of native heart without angina pectoris I25.10 414.01    2. Essential hypertension I10 401.9    3. ASHD (arteriosclerotic heart disease) I25.10 414.00    4. S/P drug eluting coronary stent placement Z95.5 V45.82    5. Mixed hyperlipidemia E78.2 272.2      No orders of the defined types were placed in this encounter. Plan:     1. Atherosclerotic heart disease: Status post successful PTCA stenting of  of RCA continue dual antiplatelet therapy times 1 year. 2.  Hypertension: Lisinopril increased 5 mg daily. 3.  Hyperlipidemia on statin therapy LDL 69  4.   Right radial tenderness: Improved since last visit, no pseudoaneurysm seen on vascular duplex. Patient may continue with cardiac rehab. Follow-up in 3 months Dr. Constance Amos. Markel Croft NP    This note was created using voice recognition software. Despite editing, there may be syntax errors.

## 2018-11-14 ENCOUNTER — HOSPITAL ENCOUNTER (OUTPATIENT)
Dept: CARDIAC REHAB | Age: 83
Discharge: HOME | End: 2018-11-14
Payer: MEDICARE

## 2018-11-14 VITALS — WEIGHT: 188.4 LBS | BODY MASS INDEX: 27.82 KG/M2

## 2018-11-14 PROCEDURE — 93798 PHYS/QHP OP CAR RHAB W/ECG: CPT

## 2018-11-16 ENCOUNTER — HOSPITAL ENCOUNTER (OUTPATIENT)
Dept: CARDIAC REHAB | Age: 83
Discharge: HOME | End: 2018-11-16
Payer: MEDICARE

## 2018-11-16 VITALS — WEIGHT: 190.6 LBS | BODY MASS INDEX: 28.15 KG/M2

## 2018-11-16 PROCEDURE — 93798 PHYS/QHP OP CAR RHAB W/ECG: CPT

## 2018-11-19 ENCOUNTER — HOSPITAL ENCOUNTER (OUTPATIENT)
Dept: CARDIAC REHAB | Age: 83
Discharge: HOME | End: 2018-11-19
Payer: MEDICARE

## 2018-11-19 VITALS — BODY MASS INDEX: 27.88 KG/M2 | WEIGHT: 188.8 LBS

## 2018-11-19 PROCEDURE — 93798 PHYS/QHP OP CAR RHAB W/ECG: CPT

## 2018-11-20 RX ORDER — ISOSORBIDE MONONITRATE 30 MG/1
30 TABLET, EXTENDED RELEASE ORAL
Qty: 90 TAB | Refills: 0 | Status: SHIPPED | OUTPATIENT
Start: 2018-11-20 | End: 2019-01-16 | Stop reason: SDUPTHER

## 2018-11-21 ENCOUNTER — APPOINTMENT (OUTPATIENT)
Dept: CARDIAC REHAB | Age: 83
End: 2018-11-21
Payer: MEDICARE

## 2018-11-21 ENCOUNTER — APPOINTMENT (OUTPATIENT)
Dept: CARDIAC REHAB | Age: 83
End: 2018-11-21

## 2018-11-26 ENCOUNTER — HOSPITAL ENCOUNTER (OUTPATIENT)
Dept: CARDIAC REHAB | Age: 83
Discharge: HOME | End: 2018-11-26
Payer: MEDICARE

## 2018-11-26 VITALS — BODY MASS INDEX: 28.12 KG/M2 | WEIGHT: 190.4 LBS

## 2018-11-26 PROCEDURE — 93798 PHYS/QHP OP CAR RHAB W/ECG: CPT

## 2018-11-28 ENCOUNTER — HOSPITAL ENCOUNTER (OUTPATIENT)
Dept: CARDIAC REHAB | Age: 83
Discharge: HOME | End: 2018-11-28
Payer: MEDICARE

## 2018-11-28 VITALS — WEIGHT: 189 LBS | BODY MASS INDEX: 27.91 KG/M2

## 2018-11-28 PROCEDURE — 93798 PHYS/QHP OP CAR RHAB W/ECG: CPT

## 2018-11-30 ENCOUNTER — HOSPITAL ENCOUNTER (OUTPATIENT)
Dept: CARDIAC REHAB | Age: 83
Discharge: HOME | End: 2018-11-30
Payer: MEDICARE

## 2018-11-30 VITALS — BODY MASS INDEX: 27.94 KG/M2 | WEIGHT: 189.2 LBS

## 2018-11-30 PROCEDURE — 93798 PHYS/QHP OP CAR RHAB W/ECG: CPT

## 2018-12-03 ENCOUNTER — HOSPITAL ENCOUNTER (OUTPATIENT)
Dept: CARDIAC REHAB | Age: 83
Discharge: HOME | End: 2018-12-03
Payer: MEDICARE

## 2018-12-03 VITALS — WEIGHT: 190.2 LBS | BODY MASS INDEX: 28.09 KG/M2

## 2018-12-03 PROCEDURE — 93798 PHYS/QHP OP CAR RHAB W/ECG: CPT

## 2018-12-05 ENCOUNTER — HOSPITAL ENCOUNTER (OUTPATIENT)
Dept: CARDIAC REHAB | Age: 83
Discharge: HOME | End: 2018-12-05
Payer: MEDICARE

## 2018-12-05 VITALS — BODY MASS INDEX: 27.88 KG/M2 | WEIGHT: 188.8 LBS

## 2018-12-05 PROCEDURE — 93798 PHYS/QHP OP CAR RHAB W/ECG: CPT

## 2018-12-06 ENCOUNTER — TELEPHONE (OUTPATIENT)
Dept: INTERNAL MEDICINE CLINIC | Age: 83
End: 2018-12-06

## 2018-12-06 NOTE — TELEPHONE ENCOUNTER
Marietta from [employer] calling to report a +AFB cx on pt.  She will be faxing the results to fax machine back here with team.

## 2018-12-07 ENCOUNTER — APPOINTMENT (OUTPATIENT)
Dept: CARDIAC REHAB | Age: 83
End: 2018-12-07
Payer: MEDICARE

## 2018-12-07 LAB
ACID FAST STN SPEC: NEGATIVE
Lab: ABNORMAL
Lab: ABNORMAL
M AVIUM CMPLX RRNA SPEC QL PROBE: POSITIVE
M GORDONAE RRNA SPEC QL PROBE: ABNORMAL
M KANSASII RRNA SPEC QL PROBE: ABNORMAL
M TB CMPLX RRNA SPEC QL PROBE: NEGATIVE
MYCOBACTERIUM RRNA SPEC QL PROBE: ABNORMAL
MYCOBACTERIUM SPEC QL CULT: ABNORMAL
MYCOBACTERIUM SPEC: ABNORMAL
SPECIMEN PREPARATION: ABNORMAL

## 2018-12-07 RX ORDER — METFORMIN HYDROCHLORIDE 500 MG/1
TABLET, EXTENDED RELEASE ORAL
Qty: 270 TAB | Refills: 1 | OUTPATIENT
Start: 2018-12-07

## 2018-12-07 NOTE — TELEPHONE ENCOUNTER
[id number]- [place], [place] - [address] AT [address] [facility] (Pharmacy) [phone]     90day supply

## 2018-12-11 RX ORDER — METFORMIN HYDROCHLORIDE 500 MG/1
TABLET, EXTENDED RELEASE ORAL
Qty: 270 TAB | Refills: 1 | Status: SHIPPED | OUTPATIENT
Start: 2018-12-11 | End: 2019-06-07 | Stop reason: SDUPTHER

## 2018-12-12 ENCOUNTER — TELEPHONE (OUTPATIENT)
Dept: CARDIAC REHAB | Age: 83
End: 2018-12-12

## 2018-12-12 ENCOUNTER — APPOINTMENT (OUTPATIENT)
Dept: CARDIAC REHAB | Age: 83
End: 2018-12-12
Payer: MEDICARE

## 2018-12-12 NOTE — TELEPHONE ENCOUNTER
Cardiopulmonary Rehab Nursing Entry:    Phone call with pts wife to report pt fell on ice yesterday and has injured his knees. He will not be in attendance at [facility] today.

## 2018-12-12 NOTE — PROGRESS NOTES
Spoke with pt and spouse. Discussed results and that [facility] wants him to eval by ID. They state that pt has fallen on the ice and injured both knees. He is seeing Ortho tomorrow.  They will call me after that appt to update and then we will set up appt with ID.

## 2018-12-14 ENCOUNTER — HOSPITAL ENCOUNTER (OUTPATIENT)
Dept: CARDIAC REHAB | Age: 83
Discharge: HOME | End: 2018-12-14
Payer: MEDICARE

## 2018-12-17 ENCOUNTER — APPOINTMENT (OUTPATIENT)
Dept: CARDIAC REHAB | Age: 83
End: 2018-12-17
Payer: MEDICARE

## 2018-12-19 ENCOUNTER — HOSPITAL ENCOUNTER (OUTPATIENT)
Dept: CARDIAC REHAB | Age: 83
Discharge: HOME | End: 2018-12-19
Payer: MEDICARE

## 2018-12-19 ENCOUNTER — TELEPHONE (OUTPATIENT)
Dept: INTERNAL MEDICINE CLINIC | Age: 83
End: 2018-12-19

## 2018-12-19 VITALS — WEIGHT: 185.8 LBS | BODY MASS INDEX: 27.44 KG/M2

## 2018-12-19 DIAGNOSIS — R84.5 POSITIVE CULTURE FINDINGS IN SPUTUM: Primary | ICD-10-CM

## 2018-12-19 PROCEDURE — 93797 PHYS/QHP OP CAR RHAB WO ECG: CPT

## 2018-12-19 PROCEDURE — 93798 PHYS/QHP OP CAR RHAB W/ECG: CPT

## 2018-12-19 NOTE — TELEPHONE ENCOUNTER
Spoke with pt and spouse and gave info for appt with Dr. Jak Douglas has been scheduled for 12/27 arrive at 10 am. All information provided.      Orders Placed This Encounter    [facility] ref [facility]     Referral Priority:   Routine     Referral Type:   Consultation     Referral Reason:   Specialty Services Required     Referred to Provider:   Phuc Curiel MD     Requested Specialty:   Infectious Diseases     Number of Visits Requested:   1

## 2018-12-21 ENCOUNTER — HOSPITAL ENCOUNTER (OUTPATIENT)
Dept: CARDIAC REHAB | Age: 83
Discharge: HOME | End: 2018-12-21
Payer: MEDICARE

## 2018-12-21 VITALS — WEIGHT: 186.4 LBS | BODY MASS INDEX: 27.53 KG/M2

## 2018-12-21 PROCEDURE — 93798 PHYS/QHP OP CAR RHAB W/ECG: CPT

## 2018-12-26 ENCOUNTER — APPOINTMENT (OUTPATIENT)
Dept: CARDIAC REHAB | Age: 83
End: 2018-12-26
Payer: MEDICARE

## 2018-12-27 ENCOUNTER — HOSPITAL ENCOUNTER (OUTPATIENT)
Dept: LAB | Age: 83
Discharge: HOME | End: 2018-12-27
Payer: MEDICARE

## 2018-12-27 ENCOUNTER — OFFICE VISIT (OUTPATIENT)
Dept: INTERNAL MEDICINE CLINIC | Age: 83
End: 2018-12-27

## 2018-12-27 VITALS
OXYGEN SATURATION: 95 % | TEMPERATURE: 96.2 F | WEIGHT: 185.2 LBS | HEIGHT: 69 IN | SYSTOLIC BLOOD PRESSURE: 148 MMHG | DIASTOLIC BLOOD PRESSURE: 72 MMHG | HEART RATE: 62 BPM | RESPIRATION RATE: 18 BRPM | BODY MASS INDEX: 27.43 KG/M2

## 2018-12-27 DIAGNOSIS — A43.0 NOCARDIAL PNEUMONIA (HCC): Primary | ICD-10-CM

## 2018-12-27 PROCEDURE — 87186 SC STD MICRODIL/AGAR DIL: CPT

## 2018-12-27 PROCEDURE — 87015 SPECIMEN INFECT AGNT CONCNTJ: CPT

## 2018-12-27 PROCEDURE — 87206 SMEAR FLUORESCENT/ACID STAI: CPT

## 2018-12-27 PROCEDURE — 87149 DNA/RNA DIRECT PROBE: CPT

## 2018-12-27 RX ORDER — METHYLPREDNISOLONE 4 MG/1
TABLET ORAL
Refills: 0 | COMMUNITY
Start: 2018-12-20 | End: 2019-01-24 | Stop reason: ALTCHOICE

## 2018-12-27 NOTE — PROGRESS NOTES
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  He saw pulmonary who did not think he needed any treatment   wanted me to see him   Pt has copious sputum every day of varying color  No fever or chills  Recently got steroid injections both knees for arthritis /fall  HAs not taken any steroids specifically for the lungs   HAs a steroid josefina for the needs if needed  No recent antibiotic use  Used to mow hs lawn before  No travel other than to [employer]  2 cats at home        Past Medical History:   Diagnosis Date    Arrhythmia     irregular heart beat    Asthma     CAD (coronary artery disease)     Chronic hip pain     COPD (chronic obstructive pulmonary disease) ([address] 9/10/2010    Diabetes ([address]     DJD (degenerative joint disease)     GERD (gastroesophageal reflux disease)     HNP (herniated nucleus pulposus) 2006    lumbar     Hypercholesterolemia     Hypertension     Nausea & vomiting     Prostate cancer ([address]     Reversible ischemic neurologic deficit ([address]     suspected with no residual effects and no recurrance    TIA (transient ischemic attack)     TIA- no deficiets      Past Surgical History:   Procedure Laterality Date    COLONOSCOPY  12/15/2004    Diverticulosis Dr. Mele Anthony repeat 10 years    HX COLONOSCOPY      HX HEART CATHETERIZATION      2  stents     HX HEART CATHETERIZATION  2018    failed 100% block @ RCA will plan to do laser next.       HX HERNIA REPAIR  2008    left inguinal hernia repair by Dr. Valerie Carrizales Right     inguinal hernai repair    HX HERNIA REPAIR umbilical hernia repair    HX HIP REPLACEMENT Right 2016    HX PROSTATECTOMY  2003    HX TONSILLECTOMY      TOTAL HIP ARTHROPLASTY Left 11    Dr. [facility]en [facility]ing[facility] at [facility] Marital status:      Spouse name: Not on file    Number of children: Not on file    Years of education: Not on file    Highest education level: Not on file   Social Needs    Financial resource strain: Not on file    Food insecurity - worry: Not on file    Food insecurity - inability: Not on file   [employer] needs - medical: Not on file   [employer] needs - non-medical: Not on file   Occupational History    Not on file   Tobacco Use    Smoking status: Former Smoker     Packs/day: 3.00     Years: 10.00     Pack years: 30.00     Types: Cigarettes     Last attempt to quit: 1/15/1970     Years since quittin.9    Smokeless tobacco: Never Used   Substance and Sexual Activity    Alcohol use: No    Drug use: Yes     Types: Prescription, OTC    Sexual activity: Yes     Partners: Female     Birth control/protection: None   Other Topics Concern    Not on file   Social History Narrative    Not on file      Family History   Problem Relation Age of Onset    Cancer Mother         Breast cancer    Lung Disease Mother         Emphysema    Cancer Father         Bladder cancer    Lung Disease Father         Emphysema    Hypertension Sister              No Known Allergies      Current Outpatient Medications   Medication Sig Dispense Refill    metFORMIN ER (GLUCOPHAGE XR) 500 mg tablet TAKE 3 TABLETS BY MOUTH EVERY DAY WITH DINNER 270 Tab 1    isosorbide mononitrate ER (IMDUR) 30 mg tablet Take 1 Tab by mouth every morning. 90 Tab 0    lisinopril (PRINIVIL, ZESTRIL) 5 mg tablet TAKE 1 TABLET BY MOUTH EVERY DAY 90 Tab 2    lisinopril (PRINIVIL, ZESTRIL) 5 mg tablet Take 1 Tab by mouth daily.  (Patient not taking: Reported on 2018) 90 Tab 2    simvastatin (ZOCOR) 20 mg tablet TAKE 1 TABLET BY MOUTH NIGHTLY 90 Tab 1    glipiZIDE (GLUCOTROL) 5 mg tablet Take 1 Tab by mouth daily. 90 Tab 1    coenzyme q10 10 mg cap Take 10 mg by mouth.  guaiFENesin ER (MUCINEX) 600 mg ER tablet Take 600 mg by mouth two (2) times a day.  metoprolol succinate (TOPROL-XL) 25 mg XL tablet TAKE 1/2 TABLET BY MOUTH EVERY DAY 30 Tab 6    clopidogrel (PLAVIX) 75 mg tab Take 1 Tab by mouth daily. 90 Tab 1    aspirin delayed-release 81 mg tablet Take 1 Tab by mouth daily. 90 Tab 1    metFORMIN ER (GLUCOPHAGE XR) 500 mg tablet Please restart Metformin on Sat 10/6/18 (Patient taking differently: Take 500 mg by mouth three (3) times daily. Please restart Metformin on Sat 10/6/18) 270 Tab 1    cyanocobalamin (VITAMIN B-12) 500 mcg tablet Take 500 mcg by mouth daily.  folic acid 817 mcg tablet Take 400 mcg by mouth daily.  nitroglycerin (NITROSTAT) 0.4 mg SL tablet 1 Tab by SubLINGual route every five (5) minutes as needed for Chest Pain. 1 Bottle 0    glucose blood VI test strips (ONETOUCH ULTRA BLUE TEST STRIP) strip by Does Not Apply route Daily (before breakfast). E11.9 100 Strip 3    albuterol (PROVENTIL HFA, VENTOLIN HFA, PROAIR HFA) 90 mcg/actuation inhaler Take 2 Puffs by inhalation every four (4) hours as needed for Wheezing. 1 Inhaler 5    tiotropium bromide (SPIRIVA RESPIMAT) 2.5 mcg/actuation inhaler Take 2 Puffs by inhalation daily.  MAGNESIUM PO Take  by mouth daily.  acetaminophen (TYLENOL) 500 mg tablet Take 1 tablet by mouth every eight (8) hours as needed for Pain. 30 tablet 0    CHOLECALCIFEROL, VITAMIN D3, (VITAMIN D3 PO) Take 1,000 Units by mouth daily.  multivitamins-minerals-lutein (CENTRUM SILVER) Tab Take 1 Tab by mouth daily.  fexofenadine (ALLEGRA) 180 mg tablet Take 1 Tab by mouth daily. 30 Tab 11    famotidine (PEPCID AC) 20 mg tablet Take 20 mg by mouth daily.  VITAMIN B COMPLEX (B COMPLEX PO) Take 1 Tab by mouth daily.  fluticasone-salmeterol (ADVAIR DISKUS) 250-50 mcg/Dose diskus inhaler Take 1 Puff by inhalation every twelve (12) hours.  ASCORBIC ACID (VITAMIN C PO) Take 1,000 mg by mouth daily. REVIEW OF SYSTEMS:     Const: negative fever, negative chills, negative weight loss  Eyes:  negative diplopia or visual changes, negative eye pain  ENT:  negative coryza, negative sore throat  Resp:   cough,  dyspnea  Cards: negative for chest pain, palpitations, lower extremity edema  : negative for frequency, dysuria and hematuria  GI: Negative for abdominal apin, diarrhea, bleeding, constipation  Skin:  negative for rash and pruritus  Heme: negative for easy bruising and gum/nose bleeding  MS: B/l knee pain  Neurolo:negative for headaches, dizziness, vertigo, memory problems   Psych: negative for feelings of anxiety, depression   Endocrine: no polyuria or polydipsia  Allergy/Immunology- negative for any medication or food allergies        Objective:   VITALS:    Visit Vitals  /72 (BP 1 Location: Right arm, BP Patient Position: Sitting)   Pulse 62   Temp 96.2 °F (35.7 °C) (Oral)   Resp 18   Ht 5' 9\" (1.753 m)   Wt 185 lb 3.2 oz (84 kg)   SpO2 95%   BMI 27.35 kg/m²       PHYSICAL EXAM:   General:    Alert, cooperative, no distress, appears stated age. Head:   Normocephalic, without obvious abnormality, atraumatic. Eyes:   Conjunctivae clear, anicteric sclerae. Pupils are equal  ENT  Nares normal. No drainage or sinus tenderness. Lips, mucosa, and tongue normal.  No Thrush  Neck:  Supple, symmetrical,  no adenopathy, thyroid: non tender    no carotid bruit and no JVD. Back:    No CVA tenderness. Lungs:   B/l air entry- rhocnhi and rales bases  Heart:   Regular rate and rhythm,  no murmur, rub or gallop. Abdomen:   Soft, non-tender,not distended. Bowel sounds normal. No masses  Extremities:   atraumatic, no cyanosis. No edema. No clubbing  Skin:     No rashes or lesions.   Or bruising  Lymph: Cervical, supraclavicular normal.  Neurologic: Grossly non-focal    Pertinent Labs  10/5 cr 0.93  Sputum Aug Nocardia Nova  10/31 Positive AFB - final culture report  not seen in [facility] care    IMAGING RESULTS:  No acute process. Slight increased linear scarring at the right lung base  with persistent scarring at the left lung base. Impression/Recommendation  80 y.o. 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 He has purulent sputum every day      Nocardia in 2 different sputums - It may be colonizing or could be a true pathogen- it is indolent  Before we make a decision to treat will need CT chest to look for bronchiectasis/ cavity/infitrates  Will need to get the susceptibility test result from [facility]  Will need another sputum     Once the results are available will decide to treat and discuss the risk and benefit of treatment    COPD- on bronchodilators    DM-on metformin, glipizide    HTN/CAD- on lisinopril.  Metoprolol, ISMO    Arthritis knees      ___________________________________________________    Discussed with patient and his wife and Halima Mancia

## 2018-12-27 NOTE — PROGRESS NOTES
Health Maintenance Due   Topic Date Due    Shingrix Vaccine Age 49> (1 of 2) 12/18/1981    MICROALBUMIN Q1  12/13/2018       Chief Complaint   Patient presents with    New Patient    Hemoptysis     Positive sputum culture       1. Have you been to the ER, urgent care clinic since your last visit? Hospitalized since your last visit? No      2. Have you seen or consulted any other health care providers outside of the [address] [place] [address] since your last visit? Include any pap smears or colon screening. No    3) Do you have an Advance Directive on file? yes    4) Are you interested in receiving information on Advance Directives? NO      Patient is accompanied by wife I have received verbal consent from Mariann Martino to discuss any/all medical information while they are present in the room. Exposure to asbestos, COPD.

## 2018-12-27 NOTE — LETTER
12/27/2018 1:13 PM 
 
Patient:  Guillaume Conner YOB: 1931 Date of Visit: 12/27/2018 Dear No Recipients: Thank you for referring Mr. Guillaume Conner to me for evaluation/treatment. Below are the relevant portions of my assessment and plan of care. If you have questions, please do not hesitate to call me. I look forward to following Mr. Thony Peña along with you.  
 
 
 
Sincerely, 
 
 
Marely Mack MD

## 2018-12-27 NOTE — PATIENT INSTRUCTIONS
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

## 2018-12-28 ENCOUNTER — HOSPITAL ENCOUNTER (OUTPATIENT)
Dept: CARDIAC REHAB | Age: 83
Discharge: HOME | End: 2018-12-28
Payer: MEDICARE

## 2018-12-28 VITALS — WEIGHT: 187.8 LBS | BODY MASS INDEX: 27.73 KG/M2

## 2018-12-28 PROCEDURE — 93798 PHYS/QHP OP CAR RHAB W/ECG: CPT

## 2018-12-29 ENCOUNTER — PATIENT MESSAGE (OUTPATIENT)
Dept: INTERNAL MEDICINE CLINIC | Age: 83
End: 2018-12-29

## 2018-12-29 DIAGNOSIS — R84.5 POSITIVE CULTURE FINDINGS IN SPUTUM: ICD-10-CM

## 2018-12-29 DIAGNOSIS — J44.9 CHRONIC OBSTRUCTIVE PULMONARY DISEASE, UNSPECIFIED COPD TYPE (HCC): Primary | Chronic | ICD-10-CM

## 2018-12-31 ENCOUNTER — APPOINTMENT (OUTPATIENT)
Dept: CARDIAC REHAB | Age: 83
End: 2018-12-31
Payer: MEDICARE

## 2018-12-31 NOTE — TELEPHONE ENCOUNTER
From: Miriam Brothers  To: Luis Stanford MD  Sent: 12/29/2018 4:37 PM EST  Subject: Non-Urgent Medical Question    Dr. Gema Harris, I saw Dr. Reyes Come Thursday & she said she texted you & asked you to have a CAT Scan of me. Will you be scheduling the CAT scan & let me know when & where to go? She was very thorough. Please let me know. Happy New Year.   Jorge Luis hO

## 2018-12-31 NOTE — TELEPHONE ENCOUNTER
Orders Placed This Encounter    CT CHEST W WO CONT     Standing Status:   Future     Standing Expiration Date:   1/31/2020     Order Specific Question:   Is Patient Allergic to Contrast Dye?      Answer:   Unknown     Order Specific Question:   STAT Creatinine as indicated     Answer:   Yes    per vo SRJ

## 2019-01-02 ENCOUNTER — APPOINTMENT (OUTPATIENT)
Dept: CT IMAGING | Age: 84
End: 2019-01-02
Attending: EMERGENCY MEDICINE
Payer: MEDICARE

## 2019-01-02 ENCOUNTER — HOSPITAL ENCOUNTER (OUTPATIENT)
Dept: CARDIAC REHAB | Age: 84
Discharge: HOME | End: 2019-01-02
Payer: MEDICARE

## 2019-01-02 ENCOUNTER — HOSPITAL ENCOUNTER (EMERGENCY)
Age: 84
Discharge: HOME | End: 2019-01-02
Attending: EMERGENCY MEDICINE
Payer: MEDICARE

## 2019-01-02 VITALS
BODY MASS INDEX: 27.43 KG/M2 | DIASTOLIC BLOOD PRESSURE: 70 MMHG | WEIGHT: 185.19 LBS | OXYGEN SATURATION: 95 % | HEIGHT: 69 IN | SYSTOLIC BLOOD PRESSURE: 129 MMHG | HEART RATE: 75 BPM | RESPIRATION RATE: 18 BRPM | TEMPERATURE: 98 F

## 2019-01-02 VITALS — WEIGHT: 187.8 LBS | BODY MASS INDEX: 27.73 KG/M2

## 2019-01-02 DIAGNOSIS — S09.90XA CLOSED HEAD INJURY, INITIAL ENCOUNTER: Primary | ICD-10-CM

## 2019-01-02 DIAGNOSIS — S01.01XA LACERATION OF SCALP, INITIAL ENCOUNTER: ICD-10-CM

## 2019-01-02 PROCEDURE — 99281 EMR DPT VST MAYX REQ PHY/QHP: CPT

## 2019-01-02 PROCEDURE — 93798 PHYS/QHP OP CAR RHAB W/ECG: CPT

## 2019-01-02 PROCEDURE — 77030008460 HC STPLR SKN PRECIS 3M -A

## 2019-01-02 PROCEDURE — 70450 CT HEAD/BRAIN W/O DYE: CPT

## 2019-01-02 PROCEDURE — 75810000293 HC SIMP/SUPERF WND  RPR

## 2019-01-02 PROCEDURE — 77030018836 HC SOL IRR NACL ICUM -A

## 2019-01-02 NOTE — ED NOTES
Pt in wheelchair to triage 2 for eval and head lac repair by Dr. Eduin Glasgow, wife accompanied patient

## 2019-01-02 NOTE — ED PROVIDER NOTES
EMERGENCY DEPARTMENT HISTORY AND PHYSICAL EXAM 
 
 
Date: 1/2/2019 Patient Name: Ariana Upton History of Presenting Illness Chief Complaint Patient presents with  
 Head Injury  
  pt fell in parking lot and hit his left side of his head on the assphault leaving therapy today. pt currently on plavix. pt ambulatory to triage. wife accompany pt. A/Ox4.  Fall  
  pt denies pain at this time. denies LOC. History Provided By: Patient and Patient's Wife HPI: Ariana Upton, 80 y.o. male with PMHx significant for DM, hypercholesterolemia, GERD, CAD, HTN, and TIA, presents ambulatory to the ED with cc of s/p GLF 1 hour pta with wound to the L backside of head. Pt states that he fell in a parking lot while he was getting into his car because he felt \"unbalanced\" and he hit his head on the asphalt. He states that he currently has mild dizziness. He notes compliance with his Plavix medication. Pt states that he is UTD on all tetanus shots. He denies any modifying factors. He specifically denies any LOC, vomiting, or neck pain. There are no other complaints, changes, or physical findings at this time. PCP: Mallory Live MD 
 
No current facility-administered medications on file prior to encounter. Current Outpatient Medications on File Prior to Encounter Medication Sig Dispense Refill  methylPREDNISolone (MEDROL DOSEPACK) 4 mg tablet TAKE 6 TABLETS ON DAY 1 AS DIRECTED ON PACKAGE AND DECREASE BY 1 TAB EACH DAY FOR A TOTAL OF 6 DAYS  0  
 metFORMIN ER (GLUCOPHAGE XR) 500 mg tablet TAKE 3 TABLETS BY MOUTH EVERY DAY WITH DINNER 270 Tab 1  
 isosorbide mononitrate ER (IMDUR) 30 mg tablet Take 1 Tab by mouth every morning. 90 Tab 0  
 lisinopril (PRINIVIL, ZESTRIL) 5 mg tablet Take 1 Tab by mouth daily. 90 Tab 2  
 simvastatin (ZOCOR) 20 mg tablet TAKE 1 TABLET BY MOUTH NIGHTLY 90 Tab 1  
 glipiZIDE (GLUCOTROL) 5 mg tablet Take 1 Tab by mouth daily.  90 Tab 1  
  coenzyme q10 10 mg cap Take 10 mg by mouth.  guaiFENesin ER (MUCINEX) 600 mg ER tablet Take 600 mg by mouth two (2) times a day.  metoprolol succinate (TOPROL-XL) 25 mg XL tablet TAKE 1/2 TABLET BY MOUTH EVERY DAY 30 Tab 6  clopidogrel (PLAVIX) 75 mg tab Take 1 Tab by mouth daily. 90 Tab 1  
 aspirin delayed-release 81 mg tablet Take 1 Tab by mouth daily. 90 Tab 1  
 metFORMIN ER (GLUCOPHAGE XR) 500 mg tablet Please restart Metformin on Sat 10/6/18 (Patient taking differently: Take 500 mg by mouth three (3) times daily. Please restart Metformin on Sat 10/6/18) 270 Tab 1  cyanocobalamin (VITAMIN B-12) 500 mcg tablet Take 500 mcg by mouth daily.  folic acid 372 mcg tablet Take 400 mcg by mouth daily.  nitroglycerin (NITROSTAT) 0.4 mg SL tablet 1 Tab by SubLINGual route every five (5) minutes as needed for Chest Pain. 1 Bottle 0  
 glucose blood VI test strips (ONETOUCH ULTRA BLUE TEST STRIP) strip by Does Not Apply route Daily (before breakfast). E11.9 100 Strip 3  
 albuterol (PROVENTIL HFA, VENTOLIN HFA, PROAIR HFA) 90 mcg/actuation inhaler Take 2 Puffs by inhalation every four (4) hours as needed for Wheezing. 1 Inhaler 5  
 tiotropium bromide (SPIRIVA RESPIMAT) 2.5 mcg/actuation inhaler Take 2 Puffs by inhalation daily.  MAGNESIUM PO Take  by mouth daily.  acetaminophen (TYLENOL) 500 mg tablet Take 1 tablet by mouth every eight (8) hours as needed for Pain. 30 tablet 0  
 CHOLECALCIFEROL, VITAMIN D3, (VITAMIN D3 PO) Take 1,000 Units by mouth daily.  multivitamins-minerals-lutein (CENTRUM SILVER) Tab Take 1 Tab by mouth daily.  fexofenadine (ALLEGRA) 180 mg tablet Take 1 Tab by mouth daily. 30 Tab 11  
 famotidine (PEPCID AC) 20 mg tablet Take 20 mg by mouth daily.  VITAMIN B COMPLEX (B COMPLEX PO) Take 1 Tab by mouth daily.  fluticasone-salmeterol (ADVAIR DISKUS) 250-50 mcg/Dose diskus inhaler Take 1 Puff by inhalation every twelve (12) hours.  ASCORBIC ACID (VITAMIN C PO) Take 1,000 mg by mouth daily. Past History Past Medical History: 
Past Medical History:  
Diagnosis Date  Arrhythmia   
 irregular heart beat  Asthma  CAD (coronary artery disease)  Chronic hip pain  COPD (chronic obstructive pulmonary disease) ([facility] [address] 9/10/2010  Diabetes ([facility] [address]  DJD (degenerative joint disease)  GERD (gastroesophageal reflux disease)  HNP (herniated nucleus pulposus) 2006  
 lumbar  Hypercholesterolemia  Hypertension  Nausea & vomiting  Prostate cancer [facility])   Reversible ischemic neurologic deficit ([facility] [address]   
 suspected with no residual effects and no recurrance  TIA (transient ischemic attack) TIA- no deficiets Past Surgical History: 
Past Surgical History:  
Procedure Laterality Date  COLONOSCOPY  12/15/2004 Diverticulosis Dr. Tushar Hamilton repeat 10 years  HX COLONOSCOPY    HX HEART CATHETERIZATION    
 2  stents  HX HEART CATHETERIZATION  2018  
 failed 100% block @ [facility] will plan to do laser next.  HX HERNIA REPAIR  2008  
 left inguinal hernia repair by Dr. Gina Obregon  HX HERNIA REPAIR Right   
 inguinal hernai repair  HX HERNIA REPAIR    
 umbilical hernia repair  HX HIP REPLACEMENT Right 2016  HX PROSTATECTOMY  2003  HX TONSILLECTOMY  TOTAL HIP ARTHROPLASTY Left 11 Dr. Mary Musa [facility] Family History: 
Family History Problem Relation Age of Onset  Cancer Mother Breast cancer  Lung Disease Mother Emphysema  Cancer Father Bladder cancer  Lung Disease Father Emphysema  Hypertension Sister  Social History: 
Social History Tobacco Use  Smoking status: Former Smoker Packs/day: 3.00 Years: 10.00 Pack years: 30.00 Types: Cigarettes Last attempt to quit: 1/15/1970 Years since quittin.9  Smokeless tobacco: Never Used Substance Use Topics  Alcohol use: No  
 Drug use: Yes Types: Prescription, OTC Allergies: 
No Known Allergies Review of Systems Review of Systems Constitutional: Negative for chills and fever. HENT: Negative for congestion and sore throat. Eyes: Negative for visual disturbance. Respiratory: Negative for cough and shortness of breath. Cardiovascular: Negative for chest pain and leg swelling. Gastrointestinal: Negative for abdominal pain, blood in stool, diarrhea, nausea and vomiting. Endocrine: Negative for polyuria. Genitourinary: Negative for dysuria and testicular pain. Musculoskeletal: Negative for arthralgias, joint swelling, myalgias and neck pain. Skin: Positive for wound (Back of head). Negative for rash. Allergic/Immunologic: Negative for immunocompromised state. Neurological: Negative for syncope, weakness and headaches. Hematological: Does not bruise/bleed easily. Psychiatric/Behavioral: Negative for confusion. Physical Exam  
Physical Exam  
Constitutional: He is oriented to person, place, and time. He appears well-developed and well-nourished. HENT:  
Head: Normocephalic and atraumatic. Moist mucous membranes Eyes: Conjunctivae are normal. Pupils are equal, round, and reactive to light. Right eye exhibits no discharge. Left eye exhibits no discharge. Neck: Normal range of motion. Neck supple. No tracheal deviation present. Cardiovascular: Normal rate, regular rhythm and normal heart sounds. No murmur heard. Pulmonary/Chest: Effort normal and breath sounds normal. No respiratory distress. He has no wheezes. He has no rales. Abdominal: Soft. Bowel sounds are normal. There is no tenderness. There is no rebound and no guarding. Musculoskeletal: Normal range of motion. He exhibits no edema, tenderness or deformity. Neurological: He is alert and oriented to person, place, and time. Skin: Skin is warm and dry. No rash noted. No erythema. Posterior scalp hematoma, with 2.5 cm laceration. Psychiatric: His behavior is normal.  
Nursing note and vitals reviewed. Diagnostic Study Results Radiologic Studies -  
CT HEAD WO CONT Final Result IMPRESSION: No acute finding or significant change. CT Results  (Last 48 hours) 01/02/19 1733  CT HEAD WO CONT Final result Impression:  IMPRESSION: No acute finding or significant change. Narrative:  EXAM: CT HEAD WO CONT INDICATION: Head trauma, fxd skull COMPARISON: 8/30/2018. CONTRAST: None. TECHNIQUE: Unenhanced CT of the head was performed using 5 mm images. Brain and  
bone windows were generated. CT dose reduction was achieved through use of a  
standardized protocol tailored for this examination and automatic exposure  
control for dose modulation. FINDINGS:  
Ventricles and cortical sulci are appropriate in size and shape for patient's  
age. . There is no significant white matter disease. 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  
left parietal lobe unchanged. There is no intracranial hemorrhage, extra-axial  
collection, mass, mass effect or midline shift. The basilar cisterns are open. No acute infarct is identified. The bone windows demonstrate no abnormalities. The visualized portions of the paranasal sinuses and mastoid air cells are  
clear. Medical Decision Making I am the first provider for this patient. I reviewed the vital signs, available nursing notes, past medical history, past surgical history, family history and social history. Vital Signs-Reviewed the patient's vital signs. Patient Vitals for the past 12 hrs: 
 Temp Pulse Resp BP SpO2  
01/02/19 1841 98 °F (36.7 °C) 75 18 129/70   
01/02/19 1654 98.1 °F (36.7 °C) 85 17 138/68 95 % Pulse Oximetry Analysis - 95% on RA Cardiac Monitor:  
Rate: 85 bpm 
 Rhythm: Normal Sinus Rhythm Records Reviewed: Nursing Notes, Old Medical Records, Previous Radiology Studies and Previous Laboratory Studies Provider Notes (Medical Decision Making): DDx: Close head injury, intracranial bleed, concussion, laceration, and contusion. ED Course:  
Initial assessment performed. The patients presenting problems have been discussed, and they are in agreement with the care plan formulated and outlined with them. I have encouraged them to ask questions as they arise throughout their visit. Procedure Note - Laceration Repair: 
6:47 PM 
Procedure by Caty Geller DO. Complexity: Complex 2.5cm linear laceration to posterior scalp was irrigated copiously with NS under jet lavage, irrigated with saline and water and draped in a sterile fashion. The wound was explored with the following results: No foreign bodies found. The wound was repaired with 2 staples. The wound was closed with good hemostasis and approximation. Sterile dressing applied. Estimated blood loss: <5 ccs The procedure took 1-15 minutes, and pt tolerated well. Critical Care Time:  
0 minutes. Disposition: 
DISCHARGE NOTE: 
6:59 PM 
The patient is ready for discharge. The patients signs, symptoms, diagnosis, and instructions for discharge have been discussed and the pt has conveyed their understanding. The patient is to follow up as recommended with PCP or return to the ER should their symptoms worsen. Plan has been discussed and patient has conveyed their agreement. PLAN: 
1. Discharge home. 2.  
Follow-up Information Follow up With Specialties Details Why Contact Info Clement Spann MD Internal Medicine  Staples out in 5 days [address] [facility] [address] 
[phone] [facility] [place] EMERGENCY DEPT Emergency Medicine  If symptoms worsen [address] [facility] 
[phone] Return to ED if worse Diagnosis Clinical Impression: 1. Closed head injury, initial encounter 2. Laceration of scalp, initial encounter Attestations: This note is prepared by Trini Ellis, acting as Scribe for Irvine Koyanagi, DOMarcha Slocumb, DO: The scribe's documentation has been prepared under my direction and personally reviewed by me in its entirety. I confirm that the note above accurately reflects all work, treatment, procedures, and medical decision making performed by me.

## 2019-01-04 ENCOUNTER — HOSPITAL ENCOUNTER (OUTPATIENT)
Dept: CARDIAC REHAB | Age: 84
Discharge: HOME | End: 2019-01-04
Payer: MEDICARE

## 2019-01-04 VITALS — BODY MASS INDEX: 28 KG/M2 | WEIGHT: 189.6 LBS

## 2019-01-04 PROCEDURE — 93798 PHYS/QHP OP CAR RHAB W/ECG: CPT

## 2019-01-07 ENCOUNTER — HOSPITAL ENCOUNTER (OUTPATIENT)
Dept: CARDIAC REHAB | Age: 84
Discharge: HOME | End: 2019-01-07
Payer: MEDICARE

## 2019-01-07 VITALS — WEIGHT: 186.8 LBS | BODY MASS INDEX: 27.59 KG/M2

## 2019-01-07 PROCEDURE — 93798 PHYS/QHP OP CAR RHAB W/ECG: CPT

## 2019-01-08 ENCOUNTER — HOSPITAL ENCOUNTER (OUTPATIENT)
Dept: CT IMAGING | Age: 84
Discharge: HOME | End: 2019-01-08
Attending: INTERNAL MEDICINE
Payer: MEDICARE

## 2019-01-08 DIAGNOSIS — R84.5 POSITIVE CULTURE FINDINGS IN SPUTUM: ICD-10-CM

## 2019-01-08 DIAGNOSIS — J44.9 CHRONIC OBSTRUCTIVE PULMONARY DISEASE, UNSPECIFIED COPD TYPE (HCC): Chronic | ICD-10-CM

## 2019-01-08 LAB — CREAT BLD-MCNC: 1.3 MG/DL (ref 0.6–1.3)

## 2019-01-08 PROCEDURE — 82565 ASSAY OF CREATININE: CPT

## 2019-01-08 PROCEDURE — 74011250636 HC RX REV CODE- 250/636: Performed by: INTERNAL MEDICINE

## 2019-01-08 PROCEDURE — 71260 CT THORAX DX C+: CPT

## 2019-01-08 PROCEDURE — 74011636320 HC RX REV CODE- 636/320: Performed by: INTERNAL MEDICINE

## 2019-01-08 RX ORDER — SODIUM CHLORIDE 0.9 % (FLUSH) 0.9 %
10 SYRINGE (ML) INJECTION
Status: COMPLETED | OUTPATIENT
Start: 2019-01-08 | End: 2019-01-08

## 2019-01-08 RX ORDER — SODIUM CHLORIDE 9 MG/ML
50 INJECTION, SOLUTION INTRAVENOUS
Status: COMPLETED | OUTPATIENT
Start: 2019-01-08 | End: 2019-01-08

## 2019-01-08 RX ADMIN — SODIUM CHLORIDE 50 ML/HR: 900 INJECTION, SOLUTION INTRAVENOUS at 08:31

## 2019-01-08 RX ADMIN — IOPAMIDOL 100 ML: 755 INJECTION, SOLUTION INTRAVENOUS at 08:31

## 2019-01-08 RX ADMIN — Medication 10 ML: at 08:31

## 2019-01-09 ENCOUNTER — OFFICE VISIT (OUTPATIENT)
Dept: INTERNAL MEDICINE CLINIC | Age: 84
End: 2019-01-09

## 2019-01-09 ENCOUNTER — HOSPITAL ENCOUNTER (OUTPATIENT)
Dept: CARDIAC REHAB | Age: 84
Discharge: HOME | End: 2019-01-09
Payer: MEDICARE

## 2019-01-09 VITALS
WEIGHT: 187.2 LBS | OXYGEN SATURATION: 93 % | DIASTOLIC BLOOD PRESSURE: 66 MMHG | HEIGHT: 69 IN | RESPIRATION RATE: 19 BRPM | HEART RATE: 57 BPM | TEMPERATURE: 97.7 F | SYSTOLIC BLOOD PRESSURE: 126 MMHG | BODY MASS INDEX: 27.73 KG/M2

## 2019-01-09 VITALS — BODY MASS INDEX: 27.67 KG/M2 | WEIGHT: 187.4 LBS

## 2019-01-09 DIAGNOSIS — E11.21 TYPE 2 DIABETES MELLITUS WITH NEPHROPATHY (HCC): ICD-10-CM

## 2019-01-09 DIAGNOSIS — J44.9 CHRONIC OBSTRUCTIVE PULMONARY DISEASE, UNSPECIFIED COPD TYPE (HCC): ICD-10-CM

## 2019-01-09 DIAGNOSIS — Z48.02 ENCOUNTER FOR STAPLE REMOVAL: Primary | ICD-10-CM

## 2019-01-09 PROCEDURE — 93798 PHYS/QHP OP CAR RHAB W/ECG: CPT

## 2019-01-09 NOTE — PROGRESS NOTES
Spoke with pt in person at [facility] on 1/9. Per Kathleen Perez looks okay. Copy routed to Dr. Arnold Anderson.

## 2019-01-09 NOTE — PROGRESS NOTES
HISTORY OF PRESENT ILLNESS  Melissa Ryan is a 80 y.o. male. HPI  Patient presents for staple removal. He fell in the parking lot after cardiac rehab on 1/2/19. He sustained a head laceration and was evaluated in the ER. Had CT, neg for bleeding and 2 staples placed. He returns for staple removal.  He also is currently being evaluated by Pulmonary for Nocardia. Had chest CT yesterday. He has follow up with Pulmonary and Dr. Mazin Kaur on 2/4/19. He denies chest pain, palpitations, or change in breathing. He has COPD. He is unsteady on his feet, but denies dizziness. Past Medical History:   Diagnosis Date    Arrhythmia     irregular heart beat    Asthma     CAD (coronary artery disease)     Chronic hip pain     COPD (chronic obstructive pulmonary disease) ([address] 9/10/2010    Diabetes (HCC)     DJD (degenerative joint disease)     GERD (gastroesophageal reflux disease)     HNP (herniated nucleus pulposus) 6/2006    lumbar     Hypercholesterolemia     Hypertension     Nausea & vomiting     Prostate cancer ([address] 2003    Reversible ischemic neurologic deficit ([address] 1990    suspected with no residual effects and no recurrance    TIA (transient ischemic attack)     TIA- no deficiets      Past Surgical History:   Procedure Laterality Date    COLONOSCOPY  12/15/2004    Diverticulosis Dr. Ashley Day repeat 10 years    HX COLONOSCOPY  2005    HX HEART CATHETERIZATION      2  stents     HX HEART CATHETERIZATION  08/20/2018    failed 100% block @ [facility] will plan to do laser next.       HX HERNIA REPAIR  9/2008    left inguinal hernia repair by Dr. Abisai Malhotra Right     inguinal hernai repair    HX HERNIA REPAIR      umbilical hernia repair    HX HIP REPLACEMENT Right 07/06/2016    HX PROSTATECTOMY  2003    HX TONSILLECTOMY      TOTAL HIP ARTHROPLASTY Left 2/9/11    [facility]      Current Outpatient Medications on File Prior to Visit   Medication Sig Dispense Refill    methylPREDNISolone (MEDROL DOSEPACK) 4 mg tablet TAKE 6 TABLETS ON DAY 1 AS DIRECTED ON PACKAGE AND DECREASE BY 1 TAB EACH DAY FOR A TOTAL OF 6 DAYS  0    metFORMIN ER (GLUCOPHAGE XR) 500 mg tablet TAKE 3 TABLETS BY MOUTH EVERY DAY WITH DINNER 270 Tab 1    isosorbide mononitrate ER (IMDUR) 30 mg tablet Take 1 Tab by mouth every morning. 90 Tab 0    lisinopril (PRINIVIL, ZESTRIL) 5 mg tablet Take 1 Tab by mouth daily. 90 Tab 2    simvastatin (ZOCOR) 20 mg tablet TAKE 1 TABLET BY MOUTH NIGHTLY 90 Tab 1    glipiZIDE (GLUCOTROL) 5 mg tablet Take 1 Tab by mouth daily. 90 Tab 1    coenzyme q10 10 mg cap Take 10 mg by mouth.  guaiFENesin ER (MUCINEX) 600 mg ER tablet Take 600 mg by mouth two (2) times a day.  metoprolol succinate (TOPROL-XL) 25 mg XL tablet TAKE 1/2 TABLET BY MOUTH EVERY DAY 30 Tab 6    clopidogrel (PLAVIX) 75 mg tab Take 1 Tab by mouth daily. 90 Tab 1    aspirin delayed-release 81 mg tablet Take 1 Tab by mouth daily. 90 Tab 1    metFORMIN ER (GLUCOPHAGE XR) 500 mg tablet Please restart Metformin on Sat 10/6/18 (Patient taking differently: Take 500 mg by mouth three (3) times daily. Please restart Metformin on Sat 10/6/18) 270 Tab 1    cyanocobalamin (VITAMIN B-12) 500 mcg tablet Take 500 mcg by mouth daily.  folic acid 869 mcg tablet Take 400 mcg by mouth daily.  nitroglycerin (NITROSTAT) 0.4 mg SL tablet 1 Tab by SubLINGual route every five (5) minutes as needed for Chest Pain. 1 Bottle 0    glucose blood VI test strips (ONETOUCH ULTRA BLUE TEST STRIP) strip by Does Not Apply route Daily (before breakfast). E11.9 100 Strip 3    albuterol (PROVENTIL HFA, VENTOLIN HFA, PROAIR HFA) 90 mcg/actuation inhaler Take 2 Puffs by inhalation every four (4) hours as needed for Wheezing. 1 Inhaler 5    tiotropium bromide (SPIRIVA RESPIMAT) 2.5 mcg/actuation inhaler Take 2 Puffs by inhalation daily.  MAGNESIUM PO Take  by mouth daily.       acetaminophen (TYLENOL) 500 mg tablet Take 1 tablet by mouth every eight (8) hours as needed for Pain. 30 tablet 0    CHOLECALCIFEROL, VITAMIN D3, (VITAMIN D3 PO) Take 1,000 Units by mouth daily.  multivitamins-minerals-lutein (CENTRUM SILVER) Tab Take 1 Tab by mouth daily.  fexofenadine (ALLEGRA) 180 mg tablet Take 1 Tab by mouth daily. 30 Tab 11    famotidine (PEPCID AC) 20 mg tablet Take 20 mg by mouth daily.  VITAMIN B COMPLEX (B COMPLEX PO) Take 1 Tab by mouth daily.  fluticasone-salmeterol (ADVAIR DISKUS) 250-50 mcg/Dose diskus inhaler Take 1 Puff by inhalation every twelve (12) hours.  ASCORBIC ACID (VITAMIN C PO) Take 1,000 mg by mouth daily. No current facility-administered medications on file prior to visit.        Social History     Socioeconomic History    Marital status:      Spouse name: Not on file    Number of children: Not on file    Years of education: Not on file    Highest education level: Not on file   Social Needs    Financial resource strain: Not on file    Food insecurity - worry: Not on file    Food insecurity - inability: Not on file    Transportation needs - medical: Not on file   [employer] needs - non-medical: Not on file   Occupational History    Not on file   Tobacco Use    Smoking status: Former Smoker     Packs/day: 3.00     Years: 10.00     Pack years: 30.00     Types: Cigarettes     Last attempt to quit: 1/15/1970     Years since quittin.0    Smokeless tobacco: Never Used   Substance and Sexual Activity    Alcohol use: No    Drug use: Yes     Types: Prescription, OTC    Sexual activity: Yes     Partners: Female     Birth control/protection: None   Other Topics Concern    Not on file   Social History Narrative    Not on file      Allergies: reviewed  ROS  Per HPI  Physical Exam  Visit Vitals  /66 (BP 1 Location: Right arm, BP Patient Position: Sitting)   Pulse (!) 57   Temp 97.7 °F (36.5 °C) (Oral)   Resp 19   Ht 5' 9\" (1.753 m)   Wt 187 lb 3.2 oz (84.9 kg)   SpO2 93%   BMI 27.64 kg/m²      Head: 2 staples, left posterior occiput. Some dried crusts, no drainage. Heart[de-identified] normal rate, regular rhythm, normal S1, S2, no murmurs, rubs, clicks or gallops. Chest: bilateral rhonchi, rales left base  Extremities: no edema  ASSESSMENT and PLAN  Occipital laceration: Healing well. Staples removed  Nocardia, COPD: patient to follow up with Pulmonary.    HTN: stable  DM HgB A1C 6.6 8/18  Hyperlipidemia: on Zocor

## 2019-01-11 ENCOUNTER — HOSPITAL ENCOUNTER (OUTPATIENT)
Dept: CARDIAC REHAB | Age: 84
Discharge: HOME | End: 2019-01-11
Payer: MEDICARE

## 2019-01-11 ENCOUNTER — DOCUMENTATION ONLY (OUTPATIENT)
Dept: INTERNAL MEDICINE CLINIC | Age: 84
End: 2019-01-11

## 2019-01-11 VITALS — BODY MASS INDEX: 27.85 KG/M2 | WEIGHT: 188.6 LBS

## 2019-01-11 PROCEDURE — 93798 PHYS/QHP OP CAR RHAB W/ECG: CPT

## 2019-01-14 ENCOUNTER — APPOINTMENT (OUTPATIENT)
Dept: CARDIAC REHAB | Age: 84
End: 2019-01-14
Payer: MEDICARE

## 2019-01-16 ENCOUNTER — HOSPITAL ENCOUNTER (OUTPATIENT)
Dept: CARDIAC REHAB | Age: 84
Discharge: HOME | End: 2019-01-16
Payer: MEDICARE

## 2019-01-16 VITALS — WEIGHT: 190.2 LBS | BODY MASS INDEX: 28.09 KG/M2

## 2019-01-16 PROCEDURE — 93798 PHYS/QHP OP CAR RHAB W/ECG: CPT

## 2019-01-16 RX ORDER — ISOSORBIDE MONONITRATE 30 MG/1
30 TABLET, EXTENDED RELEASE ORAL
Qty: 90 TAB | Refills: 0 | Status: SHIPPED | OUTPATIENT
Start: 2019-01-16 | End: 2019-02-08

## 2019-01-18 ENCOUNTER — HOSPITAL ENCOUNTER (OUTPATIENT)
Dept: CARDIAC REHAB | Age: 84
Discharge: HOME | End: 2019-01-18
Payer: MEDICARE

## 2019-01-18 VITALS — BODY MASS INDEX: 27.82 KG/M2 | WEIGHT: 188.4 LBS

## 2019-01-18 PROCEDURE — 93798 PHYS/QHP OP CAR RHAB W/ECG: CPT

## 2019-01-18 NOTE — CARDIO/PULMONARY
Patient complained of experiencing calf pain after using the treadmill. Bilateral lower extremity assessment performed. No swelling, redness or elevated temperature. Advised patient to make an appointment with his cardiologist Dr. Supriya Syed.

## 2019-01-21 ENCOUNTER — HOSPITAL ENCOUNTER (OUTPATIENT)
Dept: CARDIAC REHAB | Age: 84
Discharge: HOME | End: 2019-01-21
Payer: MEDICARE

## 2019-01-21 VITALS — BODY MASS INDEX: 27.5 KG/M2 | WEIGHT: 186.2 LBS

## 2019-01-21 PROCEDURE — 93798 PHYS/QHP OP CAR RHAB W/ECG: CPT

## 2019-01-23 ENCOUNTER — HOSPITAL ENCOUNTER (OUTPATIENT)
Dept: CARDIAC REHAB | Age: 84
Discharge: HOME | End: 2019-01-23
Payer: MEDICARE

## 2019-01-23 VITALS — BODY MASS INDEX: 28.03 KG/M2 | WEIGHT: 189.8 LBS

## 2019-01-23 LAB
ACID FAST STN SPEC: NEGATIVE
Lab: ABNORMAL
Lab: ABNORMAL
M AVIUM CMPLX RRNA SPEC QL PROBE: POSITIVE
M GORDONAE RRNA SPEC QL PROBE: ABNORMAL
M KANSASII RRNA SPEC QL PROBE: ABNORMAL
M TB CMPLX RRNA SPEC QL PROBE: NEGATIVE
MYCOBACTERIUM RRNA SPEC QL PROBE: ABNORMAL
MYCOBACTERIUM SPEC QL CULT: ABNORMAL
MYCOBACTERIUM SPEC: ABNORMAL
SPECIMEN PREPARATION: ABNORMAL
SPECIMEN STATUS REPORT, ROLRST: NORMAL

## 2019-01-23 PROCEDURE — 93798 PHYS/QHP OP CAR RHAB W/ECG: CPT

## 2019-01-24 ENCOUNTER — OFFICE VISIT (OUTPATIENT)
Dept: CARDIOLOGY CLINIC | Age: 84
End: 2019-01-24

## 2019-01-24 VITALS
WEIGHT: 188.3 LBS | BODY MASS INDEX: 27.89 KG/M2 | RESPIRATION RATE: 16 BRPM | SYSTOLIC BLOOD PRESSURE: 120 MMHG | HEIGHT: 69 IN | DIASTOLIC BLOOD PRESSURE: 70 MMHG | OXYGEN SATURATION: 95 % | HEART RATE: 64 BPM

## 2019-01-24 DIAGNOSIS — R60.0 BILATERAL LEG EDEMA: Primary | ICD-10-CM

## 2019-01-24 DIAGNOSIS — I10 ESSENTIAL HYPERTENSION: ICD-10-CM

## 2019-01-24 DIAGNOSIS — E78.2 MIXED HYPERLIPIDEMIA: ICD-10-CM

## 2019-01-24 DIAGNOSIS — I83.813 VARICOSE VEINS OF BOTH LOWER EXTREMITIES WITH PAIN: ICD-10-CM

## 2019-01-24 DIAGNOSIS — E78.00 PURE HYPERCHOLESTEROLEMIA: Chronic | ICD-10-CM

## 2019-01-24 RX ORDER — DICLOFENAC SODIUM 10 MG/G
GEL TOPICAL
Refills: 3 | COMMUNITY
Start: 2019-01-18 | End: 2019-02-12

## 2019-01-24 RX ORDER — DICLOFENAC SODIUM 10 MG/G
GEL TOPICAL
COMMUNITY
Start: 2019-01-18 | End: 2019-02-12 | Stop reason: SDUPTHER

## 2019-01-24 NOTE — PROGRESS NOTES
1. Have you been to the ER, urgent care clinic since your last visit? Hospitalized since your last visit? Yes, at [facility] ED on 1/2/19 for head injury    2. Have you seen or consulted any other health care providers outside of the [facility] since your last visit? Include any pap smears or colon screening.  No      Chief Complaint   Patient presents with    Leg Pain     pt c/o bilateral leg pain, swelling, and tingling x 3-6 mos; denies calf tenderness

## 2019-01-24 NOTE — PROGRESS NOTES
Alma Santos DNP, ANP-BC  Subjective/HPI:     Wiley Francisco is a 80 y.o. male is here for symptom based appointment reporting bilateral leg discomfort after prolonged standing or walking, patient reports swelling after prolonged standing or prolonged sitting. He has been able to exercise without shortness of breath chest pain lightheadedness or dizziness. Denies excessive sodium intake. PCP Provider  Falguni Grijalva MD  Past Medical History:   Diagnosis Date    Arrhythmia     irregular heart beat    Asthma     CAD (coronary artery disease)     Chronic hip pain     COPD (chronic obstructive pulmonary disease) ([address] 9/10/2010    Diabetes ([address]     DJD (degenerative joint disease)     GERD (gastroesophageal reflux disease)     HNP (herniated nucleus pulposus) 6/2006    lumbar     Hypercholesterolemia     Hypertension     Nausea & vomiting     Prostate cancer ([address] 2003    Reversible ischemic neurologic deficit ([address] 1990    suspected with no residual effects and no recurrance    TIA (transient ischemic attack)     TIA- no deficiets      Past Surgical History:   Procedure Laterality Date    COLONOSCOPY  12/15/2004    Diverticulosis Dr. Hesham Escalona repeat 10 years    HX COLONOSCOPY  2005    HX HEART CATHETERIZATION      2  stents     HX HEART CATHETERIZATION  08/20/2018    failed 100% block @ [facility] will plan to do laser next.       HX HERNIA REPAIR  9/2008    left inguinal hernia repair by Dr. Rin Gonzalez Right     inguinal hernai repair    HX HERNIA REPAIR      umbilical hernia repair    HX HIP REPLACEMENT Right 07/06/2016    HX PROSTATECTOMY  2003    HX TONSILLECTOMY      TOTAL HIP ARTHROPLASTY Left 2/9/11    Dr. Yue Jeronimo at [facility]     No Known Allergies   Family History   Problem Relation Age of Onset    Cancer Mother         Breast cancer    Lung Disease Mother         Emphysema    Cancer Father         Bladder cancer    Lung Disease Father         Emphysema  Hypertension Sister         2015      Current Outpatient Medications   Medication Sig    diclofenac (VOLTAREN) 1 % gel Apply two grams by topical route four times daily to the affected area (s).  isosorbide mononitrate ER (IMDUR) 30 mg tablet Take 1 Tab by mouth every morning.  metFORMIN ER (GLUCOPHAGE XR) 500 mg tablet TAKE 3 TABLETS BY MOUTH EVERY DAY WITH DINNER    lisinopril (PRINIVIL, ZESTRIL) 5 mg tablet Take 1 Tab by mouth daily.  simvastatin (ZOCOR) 20 mg tablet TAKE 1 TABLET BY MOUTH NIGHTLY    glipiZIDE (GLUCOTROL) 5 mg tablet Take 1 Tab by mouth daily.  coenzyme q10 10 mg cap Take 10 mg by mouth.  metoprolol succinate (TOPROL-XL) 25 mg XL tablet TAKE 1/2 TABLET BY MOUTH EVERY DAY    clopidogrel (PLAVIX) 75 mg tab Take 1 Tab by mouth daily.  aspirin delayed-release 81 mg tablet Take 1 Tab by mouth daily.  cyanocobalamin (VITAMIN B-12) 500 mcg tablet Take 500 mcg by mouth daily.  folic acid 402 mcg tablet Take 400 mcg by mouth daily.  nitroglycerin (NITROSTAT) 0.4 mg SL tablet 1 Tab by SubLINGual route every five (5) minutes as needed for Chest Pain.  glucose blood VI test strips (ONETOUCH ULTRA BLUE TEST STRIP) strip by Does Not Apply route Daily (before breakfast). E11.9    albuterol (PROVENTIL HFA, VENTOLIN HFA, PROAIR HFA) 90 mcg/actuation inhaler Take 2 Puffs by inhalation every four (4) hours as needed for Wheezing.  tiotropium bromide (SPIRIVA RESPIMAT) 2.5 mcg/actuation inhaler Take 2 Puffs by inhalation daily.  MAGNESIUM PO Take  by mouth daily.  acetaminophen (TYLENOL) 500 mg tablet Take 1 tablet by mouth every eight (8) hours as needed for Pain.  CHOLECALCIFEROL, VITAMIN D3, (VITAMIN D3 PO) Take 1,000 Units by mouth daily.  multivitamins-minerals-lutein (CENTRUM SILVER) Tab Take 1 Tab by mouth daily.  fexofenadine (ALLEGRA) 180 mg tablet Take 1 Tab by mouth daily.  famotidine (PEPCID AC) 20 mg tablet Take 20 mg by mouth daily.     VITAMIN B COMPLEX (B COMPLEX PO) Take 1 Tab by mouth daily.  fluticasone-salmeterol (ADVAIR DISKUS) 250-50 mcg/Dose diskus inhaler Take 1 Puff by inhalation every twelve (12) hours.  ASCORBIC ACID (VITAMIN C PO) Take 1,000 mg by mouth daily.  diclofenac (VOLTAREN) 1 % gel APPLY TWO GRAMS TO THE AFFECTED AREAS 4 TIMES A DAY    guaiFENesin ER (MUCINEX) 600 mg ER tablet Take 600 mg by mouth two (2) times a day.  metFORMIN ER (GLUCOPHAGE XR) 500 mg tablet Please restart Metformin on Sat 10/6/18 (Patient taking differently: Take 500 mg by mouth three (3) times daily. Please restart Metformin on Sat 10/6/18)     No current facility-administered medications for this visit.        Vitals:    19 [id number] 19 0956   BP: 128/70 120/70   Pulse: 64    Resp: 16    SpO2: 95%    Weight: 188 lb 4.8 oz (85.4 kg)    Height: 5' 9\" (1.753 m)      Social History     Socioeconomic History    Marital status:      Spouse name: Not on file    Number of children: Not on file    Years of education: Not on file    Highest education level: Not on file   Social Needs    Financial resource strain: Not on file    Food insecurity - worry: Not on file    Food insecurity - inability: Not on file   [employer] needs - medical: Not on file   [employer] needs - non-medical: Not on file   Occupational History    Not on file   Tobacco Use    Smoking status: Former Smoker     Packs/day: 3.00     Years: 10.00     Pack years: 30.00     Types: Cigarettes     Last attempt to quit: 1/15/1970     Years since quittin.0    Smokeless tobacco: Never Used   Substance and Sexual Activity    Alcohol use: No    Drug use: Yes     Types: Prescription, OTC    Sexual activity: Yes     Partners: Female     Birth control/protection: None   Other Topics Concern    Not on file   Social History Narrative    Not on file       I have reviewed the nurses notes, vitals, problem list, allergy list, medical history, family, social history and medications. Review of Symptoms:    General: Pt denies excessive weight gain or loss. Pt is able to conduct ADL's  HEENT: Denies blurred vision, headaches, epistaxis and difficulty swallowing. Respiratory: Denies shortness of breath, MCGUIRE, wheezing or stridor. Cardiovascular: Denies precordial pain, palpitations, + bilateral dependent edema denies pND  Gastrointestinal: Denies poor appetite, indigestion, abdominal pain or blood in stool  Musculoskeletal: Intermittent leg pain with prolonged standing or ambulation  Neurologic: Denies tremor, paresthesias, or sensory motor disturbance  Skin: Denies rash, itching or texture change. Physical Exam:      General: Well developed, in no acute distress, cooperative and alert  HEENT: No carotid bruits, no JVD, trach is midline. Neck Supple, PEERL, EOM intact. Heart:  Normal S1/S2 negative S3 or S4. Regular, no murmur, gallop or rub.   Respiratory: Clear bilaterally x 4, no wheezing or rales  Abdomen:   Soft, non-tender, no masses, bowel sounds are active.   Extremities: Trace bilateral nonpitting ankle edema, moderate bilateral lower extremity varicosities, no ulcers. Normal cap refill, no cyanosis, atraumatic. Neuro: A&Ox3, speech clear, gait stable. Skin: Skin color is normal. No rashes or lesions.  Non diaphoretic  Vascular: 2+ pulses symmetric in all extremities    Cardiographics    ECG: Sinus rhythm  Results for orders placed or performed during the hospital encounter of 10/04/18   EKG, 12 LEAD, INITIAL   Result Value Ref Range    Ventricular Rate 65 BPM    Atrial Rate 65 BPM    P-R Interval 206 ms    QRS Duration 86 ms    Q-T Interval 400 ms    QTC Calculation (Bezet) 416 ms    Calculated P Axis 81 degrees    Calculated R Axis 14 degrees    Calculated T Axis 19 degrees    Diagnosis       Sinus rhythm with premature atrial complexes    Confirmed by Deisy Gtz MD, Addi Puente ([id number]) on 10/6/2018 2:28:34 PM           Cardiology Labs:  Lab Results Component Value Date/Time    Cholesterol, total 132 08/14/2018 08:22 AM    HDL Cholesterol 43 08/14/2018 08:22 AM    LDL, calculated 69 08/14/2018 08:22 AM    Triglyceride 101 08/14/2018 08:22 AM    CHOL/HDL Ratio 3.0 05/05/2010 09:01 AM       Lab Results   Component Value Date/Time    Sodium 141 10/05/2018 04:28 AM    Potassium 4.1 10/05/2018 04:28 AM    Chloride 110 (H) 10/05/2018 04:28 AM    CO2 24 10/05/2018 04:28 AM    Anion gap 7 10/05/2018 04:28 AM    Glucose 100 10/05/2018 04:28 AM    BUN 18 10/05/2018 04:28 AM    Creatinine 0.93 10/05/2018 04:28 AM    BUN/Creatinine ratio 19 10/05/2018 04:28 AM    GFR est AA >60 10/05/2018 04:28 AM    GFR est non-AA >60 10/05/2018 04:28 AM    Calcium 8.0 (L) 10/05/2018 04:28 AM    Bilirubin, total 0.4 09/26/2018 12:11 PM    AST (SGOT) 21 09/26/2018 12:11 PM    Alk. phosphatase 59 09/26/2018 12:11 PM    Protein, total 6.5 09/26/2018 12:11 PM    Albumin 4.2 09/26/2018 12:11 PM    Globulin 3.7 06/21/2016 09:18 AM    A-G Ratio 1.8 09/26/2018 12:11 PM    ALT (SGPT) 15 09/26/2018 12:11 PM           Assessment:     Assessment:     Diagnoses and all orders for this visit:    1. Bilateral leg edema  -     ANKLE BRACHIAL INDEX; Future  -     DUPLEX LOWER EXT VENOUS BILAT; Future    2. Pure hypercholesterolemia  -     AMB POC EKG ROUTINE W/ 12 LEADS, INTER & REP  -     ANKLE BRACHIAL INDEX; Future  -     DUPLEX LOWER EXT VENOUS BILAT; Future    3. Essential hypertension    4. Mixed hyperlipidemia    5. Varicose veins of both lower extremities with pain        ICD-10-CM ICD-9-CM    1. Bilateral leg edema R60.0 782. 3 ANKLE BRACHIAL INDEX      DUPLEX LOWER EXT VENOUS BILAT   2. Pure hypercholesterolemia E78.00 272.0 AMB POC EKG ROUTINE W/ 12 LEADS, INTER & REP      ANKLE BRACHIAL INDEX      DUPLEX LOWER EXT VENOUS BILAT   3. Essential hypertension I10 401.9    4. Mixed hyperlipidemia E78.2 272.2    5.  Varicose veins of both lower extremities with pain I83.813 454.8      Orders Placed This Encounter    ANKLE BRACHIAL INDEX     Standing Status:   Future     Standing Expiration Date:   2/24/2020     Order Specific Question:   Reason for Exam     Answer:   leg pain    DUPLEX LOWER EXT VENOUS BILAT     Standing Status:   Future     Standing Expiration Date:   2/24/2020     Scheduling Instructions:      Reflux study    AMB POC EKG ROUTINE W/ 12 LEADS, INTER & REP     Order Specific Question:   Reason for Exam:     Answer:   Routine    diclofenac (VOLTAREN) 1 % gel     Sig: Apply two grams by topical route four times daily to the affected area (s).  diclofenac (VOLTAREN) 1 % gel     Sig: APPLY TWO GRAMS TO THE AFFECTED AREAS 4 TIMES A DAY     Refill:  3        Plan:     1. Atherosclerotic heart disease: Clinically stable asymptomatic continue Plavix and aspirin therapy  2. Dependent edema: Has moderate amount of lower extremity varicosities, clinically no signs of heart failure would appear to be some degree of venous reflux I have ordered thigh-high compression stockings 20-30 mmHg will get venous reflux study as well as ABIs given history of CAD. If workup is negative for no significant venous reflux will then continue compression stockings if highly abnormal will consult patient to Dr. Nubia James. 3.  Hypertension: Controlled 120/70    To note his right wrist pain after cardiac catheterization from last visit has fully resolved. Will follow up with patient once testing complete    Liliana Garcia NP    This note was created using voice recognition software. Despite editing, there may be syntax errors.

## 2019-01-25 ENCOUNTER — HOSPITAL ENCOUNTER (OUTPATIENT)
Dept: CARDIAC REHAB | Age: 84
Discharge: HOME | End: 2019-01-25
Payer: MEDICARE

## 2019-01-25 VITALS — BODY MASS INDEX: 28.03 KG/M2 | WEIGHT: 189.8 LBS

## 2019-01-25 PROCEDURE — 93798 PHYS/QHP OP CAR RHAB W/ECG: CPT

## 2019-01-28 ENCOUNTER — OFFICE VISIT (OUTPATIENT)
Dept: NEUROLOGY | Age: 84
End: 2019-01-28

## 2019-01-28 ENCOUNTER — HOSPITAL ENCOUNTER (OUTPATIENT)
Dept: CARDIAC REHAB | Age: 84
Discharge: HOME | End: 2019-01-28
Payer: MEDICARE

## 2019-01-28 VITALS
WEIGHT: 186 LBS | BODY MASS INDEX: 27.55 KG/M2 | HEART RATE: 62 BPM | DIASTOLIC BLOOD PRESSURE: 72 MMHG | SYSTOLIC BLOOD PRESSURE: 142 MMHG | HEIGHT: 69 IN | RESPIRATION RATE: 16 BRPM | OXYGEN SATURATION: 96 %

## 2019-01-28 VITALS — BODY MASS INDEX: 27.85 KG/M2 | WEIGHT: 188.6 LBS

## 2019-01-28 DIAGNOSIS — M48.02 DEGENERATIVE CERVICAL SPINAL STENOSIS: ICD-10-CM

## 2019-01-28 DIAGNOSIS — G25.0 BENIGN ESSENTIAL TREMOR: ICD-10-CM

## 2019-01-28 DIAGNOSIS — G60.8 IDIOPATHIC SMALL AND LARGE FIBER SENSORY NEUROPATHY: ICD-10-CM

## 2019-01-28 DIAGNOSIS — H49.11 FOURTH NERVE PALSY OF RIGHT EYE: ICD-10-CM

## 2019-01-28 DIAGNOSIS — E11.42 DIABETIC PERIPHERAL NEUROPATHY ASSOCIATED WITH TYPE 2 DIABETES MELLITUS (HCC): ICD-10-CM

## 2019-01-28 DIAGNOSIS — I67.89 CEREBRAL MICROVASCULAR DISEASE: Primary | ICD-10-CM

## 2019-01-28 DIAGNOSIS — M47.12 CERVICAL ARTHRITIS WITH MYELOPATHY: ICD-10-CM

## 2019-01-28 DIAGNOSIS — I65.23 BILATERAL CAROTID ARTERY STENOSIS: ICD-10-CM

## 2019-01-28 PROCEDURE — 93798 PHYS/QHP OP CAR RHAB W/ECG: CPT

## 2019-01-28 NOTE — LETTER
1/28/2019 7:55 PM 
 
Patient:  Venita Feng YOB: 1931 Date of Visit: 1/28/2019 Dear No Recipients: Thank you for referring Mr. Venita Feng to me for evaluation/treatment. Below are the relevant portions of my assessment and plan of care. Consult REFERRED BY: 
Joseph Lesches, MD 
 
CHIEF COMPLAINT: Frequent falls and unsteady gait Subjective:  
 
Venita Feng is a 80 y.o. right handed  male seen for evaluation at the request of Dr. Mills Child of a new problem over the last 24 months, of progressive gait difficulty, with recent increase in falls and unstable gait, even after he went to physical therapy and uses a cane for ambulation. 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 He says the ground just seems to be moving in front of him. His gait seem to be worsening, and he seems to be even more spastic and hyperreflexic he had moderate severe stenosis in his neck before on MRI of the spine done in 2017 and of the brain also,, and after several falls he may have decompensated so we will need another MRI of the cervical spine and 1 of the thoracic spine is to make sure there is no lower lesions causing his spasticity and gait instability, and he also had a abnormal MRI of the brain showing very prominent microvascular disease, need to see if there is some progression of that and could he have some other form of leukodystrophy as a cause of his symptoms. 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 Patient has a very complicated case. He denies any bowel or bladder changes or dysfunction. He denies much weakness in his arms, but has some numbness in his hands. .  He wants to come here to be evaluated first and does not like the physical therapy. He just seems to start falling and is not able to stop, and frequently will trip or try to lift something and then lose his balance. He does not really complain of that much neck pain or back pain or major increase in headaches. He has no family history of similar disease. He does have some urinary urgency but is able to control his bowels and bladder. He is diabetic for 22 years, but does not complain of much burning or numbness in his feet. He denies any major increase in headache, fever, meningismus, toxin exposure, and other than the fall as mentioned above no unusual head injury. He had a previous cervical MRI scan done years ago that showed moderate spondylosis, but no significant cord compression in 2010. He had a MRI of his lumbar spine that does show multilevel degenerative changes with moderate spinal stenosis at several levels, but was not thought to be a neurosurgical problem in 2009 when he had that done by his neurosurgeon. He complains that his legs feel a little weak, but no focal weakness, but may be his right leg is a little worse. He seems to have fairly good strength and feeling in his arms. We are asked to evaluate. He also gives an unusual history of his eyes jumping some after his second cataract surgery, and was told by Dr. Wilmer Bentley that it may well be partly hereditary. But it did not occur until after his second cataract operation, and seems to be worse at nighttime, and the images are more [id number] but sometimes with a skew deviation to. Past Medical History:  
Diagnosis Date  Arrhythmia   
 irregular heart beat  Asthma  CAD (coronary artery disease)  Chronic hip pain  COPD (chronic obstructive pulmonary disease) ([facility] [address] 9/10/2010  Diabetes ([facility] [address]  DJD (degenerative joint disease)  GERD (gastroesophageal reflux disease)  HNP (herniated nucleus pulposus) 6/2006  
 lumbar  Hypercholesterolemia  Hypertension  Nausea & vomiting  Prostate cancer [facility])   Reversible ischemic neurologic deficit ([address]   
 suspected with no residual effects and no recurrance  TIA (transient ischemic attack) TIA- no deficiets Past Surgical History:  
Procedure Laterality Date  COLONOSCOPY  12/15/2004 Diverticulosis Dr. Estuardo Mayer repeat 10 years  HX COLONOSCOPY    HX HEART CATHETERIZATION    
 2  stents  HX HEART CATHETERIZATION  2018  
 failed 100% block @ [facility] will plan to do laser next.  HX HERNIA REPAIR  2008  
 left inguinal hernia repair by Dr. Aishwarya Giles  HX HERNIA REPAIR Right   
 inguinal hernai repair  HX HERNIA REPAIR    
 umbilical hernia repair  HX HIP REPLACEMENT Right 2016  HX PROSTATECTOMY    HX TONSILLECTOMY  TOTAL HIP ARTHROPLASTY Left 11 Dr. Villeda Headings at [place] [facility] Family History Problem Relation Age of Onset  Cancer Mother Breast cancer  Lung Disease Mother Emphysema  Cancer Father Bladder cancer  Lung Disease Father Emphysema  Hypertension Sister  Social History Tobacco Use  Smoking status: Former Smoker Packs/day: 3.00 Years: 10.00 Pack years: 30.00 Types: Cigarettes Last attempt to quit: 1/15/1970 Years since quittin.0  Smokeless tobacco: Never Used Substance Use Topics  Alcohol use: No  
   
 
Current Outpatient Medications:  
  diclofenac (VOLTAREN) 1 % gel, Apply two grams by topical route four times daily to the affected area (s)., Disp: , Rfl:  
  diclofenac (VOLTAREN) 1 % gel, APPLY TWO GRAMS TO THE AFFECTED AREAS 4 TIMES A DAY, Disp: , Rfl: 3 
  isosorbide mononitrate ER (IMDUR) 30 mg tablet, Take 1 Tab by mouth every morning., Disp: 90 Tab, Rfl: 0 
  metFORMIN ER (GLUCOPHAGE XR) 500 mg tablet, TAKE 3 TABLETS BY MOUTH EVERY DAY WITH DINNER, Disp: 270 Tab, Rfl: 1   lisinopril (PRINIVIL, ZESTRIL) 5 mg tablet, Take 1 Tab by mouth daily. , Disp: 90 Tab, Rfl: 2 
  simvastatin (ZOCOR) 20 mg tablet, TAKE 1 TABLET BY MOUTH NIGHTLY, Disp: 90 Tab, Rfl: 1 
  glipiZIDE (GLUCOTROL) 5 mg tablet, Take 1 Tab by mouth daily. , Disp: 90 Tab, Rfl: 1   coenzyme q10 10 mg cap, Take 10 mg by mouth., Disp: , Rfl:  
  metoprolol succinate (TOPROL-XL) 25 mg XL tablet, TAKE 1/2 TABLET BY MOUTH EVERY DAY, Disp: 30 Tab, Rfl: 6 
  clopidogrel (PLAVIX) 75 mg tab, Take 1 Tab by mouth daily. , Disp: 90 Tab, Rfl: 1 
  aspirin delayed-release 81 mg tablet, Take 1 Tab by mouth daily. , Disp: 90 Tab, Rfl: 1 
  metFORMIN ER (GLUCOPHAGE XR) 500 mg tablet, Please restart Metformin on Sat 10/6/18 (Patient taking differently: Take 500 mg by mouth three (3) times daily. Please restart Metformin on Sat 10/6/18), Disp: 270 Tab, Rfl: 1   cyanocobalamin (VITAMIN B-12) 500 mcg tablet, Take 500 mcg by mouth daily. , Disp: , Rfl:  
  folic acid 478 mcg tablet, Take 400 mcg by mouth daily. , Disp: , Rfl:  
  nitroglycerin (NITROSTAT) 0.4 mg SL tablet, 1 Tab by SubLINGual route every five (5) minutes as needed for Chest Pain., Disp: 1 Bottle, Rfl: 0 
  glucose blood VI test strips (ONETOUCH ULTRA BLUE TEST STRIP) strip, by Does Not Apply route Daily (before breakfast). E11.9, Disp: 100 Strip, Rfl: 3 
  albuterol (PROVENTIL HFA, VENTOLIN HFA, PROAIR HFA) 90 mcg/actuation inhaler, Take 2 Puffs by inhalation every four (4) hours as needed for Wheezing., Disp: 1 Inhaler, Rfl: 5 
  tiotropium bromide (SPIRIVA RESPIMAT) 2.5 mcg/actuation inhaler, Take 2 Puffs by inhalation daily. , Disp: , Rfl:  
  MAGNESIUM PO, Take  by mouth daily. , Disp: , Rfl:  
  acetaminophen (TYLENOL) 500 mg tablet, Take 1 tablet by mouth every eight (8) hours as needed for Pain., Disp: 30 tablet, Rfl: 0 
  CHOLECALCIFEROL, VITAMIN D3, (VITAMIN D3 PO), Take 1,000 Units by mouth daily. , Disp: , Rfl:  
   multivitamins-minerals-lutein (CENTRUM SILVER) Tab, Take 1 Tab by mouth daily. , Disp: , Rfl:  
  fexofenadine (ALLEGRA) 180 mg tablet, Take 1 Tab by mouth daily. , Disp: 30 Tab, Rfl: 11 
  famotidine (PEPCID AC) 20 mg tablet, Take 20 mg by mouth daily. , Disp: , Rfl:  
  VITAMIN B COMPLEX (B COMPLEX PO), Take 1 Tab by mouth daily. , Disp: , Rfl:  
  fluticasone-salmeterol (ADVAIR DISKUS) 250-50 mcg/Dose diskus inhaler, Take 1 Puff by inhalation every twelve (12) hours. , Disp: , Rfl:   ASCORBIC ACID (VITAMIN C PO), Take 1,000 mg by mouth daily. , Disp: , Rfl:  
 
 
 
No Known Allergies Review of Systems: A comprehensive review of systems was negative except for: Constitutional: positive for fatigue and malaise Ears, nose, mouth, throat, and face: positive for hearing loss Musculoskeletal: positive for myalgias, arthralgias, stiff joints, neck pain, back pain and muscle weakness Neurological: positive for paresthesia, coordination problems, gait problems and weakness Behvioral/Psych: positive for anxiety and depression Vitals:  
 01/28/19 1205 BP: 142/72 Pulse: 62 Resp: 16 SpO2: 96% Weight: 186 lb (84.4 kg) Height: 5' 9\" (1.753 m) Objective: I 
 
 
NEUROLOGICAL EXAM: 
 
Appearance: The patient is well developed, well nourished, provides a coherent history and is in no acute distress. Mental Status: Oriented to time, place and person, and the president, cognitive function is relatively slow but normal and speech is fluent and no aphasia or dysarthria. Mood and affect appropriate. Cranial Nerves:   Intact visual fields. Fundi are benign.  VINNY, both eyes are status post cataract surgery, EOM's full, but patient has lateral gaze nystagmus in both directions, but may be a little worse to the right side that seems to be some vertical and horizontal component, but no clear diplopia was noted though his right eye does not always track quite as well.  Patient has nystagmus as described above, no ptosis. Facial sensation is normal. Corneal reflexes are not tested. Facial movement is symmetric. Hearing is abnormal bilaterally. Palate is midline with normal sternocleidomastoid and trapezius muscles are normal. Tongue is midline. Neck without meningismus or bruits Temporal arteries are not tender or enlarged Neck has moderate restriction of range of motion in all directions with some palpable muscle spasms noted. Motor:  5/5 strength in upper and lower proximal and distal muscles. Normal bulk and tone. No fasciculations. Reflexes:   Deep tendon reflexes 2+/4 and symmetrical in both upper extremities, and ankle jerks are mildly reduced bilaterally but still present, and knee jerks are about 3+/4 bilaterally, may be right side greater than left. No babinski or clonus present Sensory:   Abnormal to touch, pinprick and vibration and temperature in both feet to mid calf level. DSS is intact Gait:  Abnormal gait as patient walks with a slightly stiff legged and myelopathic gait is wide-based and spastic. Tremor:   No tremor noted. Cerebellar:   Abnormal Romberg and tandem cerebellar signs present. There are no sphincter examination is unremarkable Neurovascular:  Normal heart sounds and regular rhythm, peripheral pulses decreased, and no carotid bruits. Assessment: ICD-10-CM ICD-9-CM 1. 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 437.9 MRI BRAIN W WO CONT  
   MRI CERV SPINE WO CONT  
   MRI [facility] SPINE WO CONT 2. Cervical arthritis with myelopathy M47.12 721.1 MRI BRAIN W WO CONT  
   MRI CERV SPINE WO CONT  
   MRI [facility] SPINE WO CONT 3. Degenerative cervical spinal stenosis M48.02 723.0 MRI BRAIN W WO CONT  
   MRI CERV SPINE WO CONT  
   MRI [facility] SPINE WO CONT 4. Bilateral carotid artery stenosis I65.23 433.10 MRI BRAIN W WO CONT  
  433.30 MRI CERV SPINE WO CONT  
   MRI [facility] SPINE WO CONT 5. Diabetic peripheral neuropathy associated with type 2 diabetes mellitus (HCC) E11.42 250.60 MRI BRAIN W WO CONT  
  357.2 MRI CERV SPINE WO CONT  
   MRI [facility] SPINE WO CONT 6. 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  
   MRI CERV SPINE WO CONT  
   MRI [facility] SPINE WO CONT 7. 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  
   MRI CERV SPINE WO CONT  
   MRI [facility] SPINE WO CONT 8. Benign essential tremor G25.0 333.1 Active Problems: * No active hospital problems. * 
 
 
Plan:  
 
Patient appears to have a spastic myelopathic gait, so we will reimage his spine again both cervical and thoracic to rule out any type of cord compression which I think is causing some problem with his gait, but his hyperreflexia could be due to his prominent white matter disease of the brain so brain MRI is being done also. He had carotid Doppler studies done 2 years ago that were relatively unremarkable we may need to repeat those again also. Patient has a multifactor because of his gait instability including a sensory ataxia from his diabetic neuropathy, probably degenerative arthritis and deconditioning, and probably a component of senile gait apraxia, but the more concerning issue in view of his hyperreflexia would be a myelopathy from his cervical spine disease causing cord compression and myelopathic gait. Less likely would be a spinal cerebellar type degeneration in view of his nystagmus and ocular motility problems We also need to rule out an intracranial structural disease or posterior fossa disease like Arnold-Chiari malformation, posterior fossa tumor, hydrocephalus, particular normal pressure hydrocephalus, or other structural brain lesions or multi-vascular infarct lesions. He previously had normal multiple metabolic parameters checked today, including B12, vitamin D, and other treatable causes for his neuropathy. He had an EMG and nerve conduction study in addition that shows a moderate severe sensory neuropathy consistent with his history of diabetes They will call for results of tests, and further treatment evaluation will be in 3 months time or earlier if needed. Discussed his condition with the patient and his wife in detail, reviewed your x-rays personally on the PACS system, reviewed the old lab test, and discussed his condition with his wife and the patient in detail, including further diagnostic testing needed, therapy, and other treatment modalities. Signed By: Henrique Plata MD   
 January 28, 2019 CC: Ritchie Gastelum MD 
FAX: [phone] This note will not be viewable in [address] If you have questions, please do not hesitate to call me. I look forward to following . Tara Hayden along with you. Sincerely, Henrique Plata MD

## 2019-01-28 NOTE — PATIENT INSTRUCTIONS
A Healthy Lifestyle: Care Instructions Your Care Instructions A healthy lifestyle can help you feel good, stay at a healthy weight, and have plenty of energy for both work and play. A healthy lifestyle is something you can share with your whole family. A healthy lifestyle also can lower your risk for serious health problems, such as high blood pressure, heart disease, and diabetes. You can follow a few steps listed below to improve your health and the health of your family. Follow-up care is a key part of your treatment and safety. Be sure to make and go to all appointments, and call your doctor if you are having problems. It's also a good idea to know your test results and keep a list of the medicines you take. How can you care for yourself at home? · Do not eat too much sugar, fat, or fast foods. You can still have dessert and treats now and then. The goal is moderation. · Start small to improve your eating habits. Pay attention to portion sizes, drink less juice and soda pop, and eat more fruits and vegetables. ? Eat a healthy amount of food. A 3-ounce serving of meat, for example, is about the size of a deck of cards. Fill the rest of your plate with vegetables and whole grains. ? Limit the amount of soda and sports drinks you have every day. Drink more water when you are thirsty. ? Eat at least 5 servings of fruits and vegetables every day. It may seem like a lot, but it is not hard to reach this goal. A serving or helping is 1 piece of fruit, 1 cup of vegetables, or 2 cups of leafy, raw vegetables. Have an apple or some carrot sticks as an afternoon snack instead of a candy bar. Try to have fruits and/or vegetables at every meal. 
· Make exercise part of your daily routine. You may want to start with simple activities, such as walking, bicycling, or slow swimming. Try to be active 30 to 60 minutes every day.  You do not need to do all 30 to 60 minutes all at once. For example, you can exercise 3 times a day for 10 or 20 minutes. Moderate exercise is safe for most people, but it is always a good idea to talk to your doctor before starting an exercise program. 
· Keep moving. Haile Cue the lawn, work in the garden, or [employer]. Take the stairs instead of the elevator at work. · If you smoke, quit. People who smoke have an increased risk for heart attack, stroke, cancer, and other lung illnesses. Quitting is hard, but there are ways to boost your chance of quitting tobacco for good. ? Use nicotine gum, patches, or lozenges. ? Ask your doctor about stop-smoking programs and medicines. ? Keep trying. In addition to reducing your risk of diseases in the future, you will notice some benefits soon after you stop using tobacco. If you have shortness of breath or asthma symptoms, they will likely get better within a few weeks after you quit. · Limit how much alcohol you drink. Moderate amounts of alcohol (up to 2 drinks a day for men, 1 drink a day for women) are okay. But drinking too much can lead to liver problems, high blood pressure, and other health problems. Family health If you have a family, there are many things you can do together to improve your health. · Eat meals together as a family as often as possible. · Eat healthy foods. This includes fruits, vegetables, lean meats and dairy, and whole grains. · Include your family in your fitness plan. Most people think of activities such as jogging or tennis as the way to fitness, but there are many ways you and your family can be more active. Anything that makes you breathe hard and gets your heart pumping is exercise. Here are some tips: 
? Walk to do errands or to take your child to school or the bus. 
? Go for a family bike ride after dinner instead of watching TV. Where can you learn more? Go to http://jasvir-yaron.info/. Enter R072 in the search box to learn more about \"A Healthy Lifestyle: Care Instructions. \" Current as of: September 11, 2018 Content Version: 11.9 © 9517-8205 [employer]. Care instructions adapted under license by [employer] (which disclaims liability or warranty for this information). If you have questions about a medical condition or this instruction, always ask your healthcare professional. [address] any warranty or liability for your use of this information. Office Policieso Phone calls/patient messages: Please allow up to 24 hours for someone in the office to contact you about your call or message. Be mindful your provider may be out of the office or your message may require further review. We encourage you to use [employer] for your messages as this is a faster, more efficient way to communicate with our office 
o Medication Refills: 
Prescription medications require up to 48 business hours to process. We encourage you to use [employer] for your refills. For controlled medications: Please allow up to 72 business hours to process. Certain medications may require you to  a written prescription at our office. NO narcotic/controlled medications will be prescribed after 4pm Monday through Friday or on weekends 
o Form/Paperwork Completion: We ask that you allow 7-14 business days. You may also download your forms to [employer] to have your doctor print off. Benign Essential Tremor: Care Instructions Your Care Instructions Benign essential tremor is a medical term for shaking that you can't control. Your hand or fingers may shake when you lift a cup or point at something. Or your voice may shake when you speak. This type of tremor is not harmful. It is not caused by a stroke or Parkinson's disease. Some things can affect how much you shake.  For example, drinking or eating something with caffeine may make tremors worse for a while. Some medicines also can increase tremors. These include antidepressants and too much thyroid replacement. Talk to your doctor if you think one of your medicines makes your tremors worse. If you are self-conscious about your tremors, there are some things you can do to reduce them or make them less noticeable. This includes taking medicine. Follow-up care is a key part of your treatment and safety. Be sure to make and go to all appointments, and call your doctor if you are having problems. It's also a good idea to know your test results and keep a list of the medicines you take. How can you care for yourself at home? · Take your medicines exactly as prescribed. Call your doctor if you think you are having a problem with your medicine. Some medicines that help control tremors have to be taken every day, even if you are not having tremors. You will get more details on the specific medicines your doctor prescribes. · Get plenty of rest. 
· Eat a balanced, healthy diet. · Try to reduce stress. Regular exercise and massages may help. · Limit alcohol. Heavy drinking can make your tremors worse. · Avoid drinks or foods with caffeine if they make your tremors worse. These include tea, cola, coffee, and chocolate. · Wear a heavy bracelet or watch. This adds a little weight to your hand. The extra weight may reduce tremors. · Drink from cups or glasses that are only half full. You may also want to try drinking with a straw. When should you call for help? Watch closely for changes in your health, and be sure to contact your doctor if: 
  · You notice your tremors are getting worse.  
  · You can't do your everyday activities because of your tremors.  
  · You are sad and embarrassed about your shaking. Where can you learn more? Go to http://jasvir-yaron.info/.  
Enter B746 in the search box to learn more about \"Benign Essential Tremor: Care Instructions. \" Current as of: Lydia 3, 2018 Content Version: 11.9 © 6402-2575 [employer]. Care instructions adapted under license by [employer] (which disclaims liability or warranty for this information). If you have questions about a medical condition or this instruction, always ask your healthcare professional. [place] [address] any warranty or liability for your use of this information.

## 2019-01-29 ENCOUNTER — CLINICAL SUPPORT (OUTPATIENT)
Dept: CARDIOLOGY CLINIC | Age: 84
End: 2019-01-29

## 2019-01-29 ENCOUNTER — TELEPHONE (OUTPATIENT)
Dept: CARDIOLOGY CLINIC | Age: 84
End: 2019-01-29

## 2019-01-29 DIAGNOSIS — E78.00 PURE HYPERCHOLESTEROLEMIA: Chronic | ICD-10-CM

## 2019-01-29 DIAGNOSIS — R60.0 BILATERAL LEG EDEMA: Primary | ICD-10-CM

## 2019-01-29 NOTE — TELEPHONE ENCOUNTER
Verified patient with two identifiers. Pt informed of EDILSON study results. Arterial doppler scheduled per Dr Johnathan Mcmanus.

## 2019-01-29 NOTE — PROGRESS NOTES
Consult REFERRED BY: 
Laura Paulino MD 
 
CHIEF COMPLAINT: Frequent falls and unsteady gait Subjective:  
 
Manuel Rodriguez is a 80 y.o. right handed  male seen for evaluation at the request of Dr. Precious Norman of a new problem over the last 24 months, of progressive gait difficulty, with recent increase in falls and unstable gait, even after he went to physical therapy and uses a cane for ambulation. 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 He says the ground just seems to be moving in front of him. His gait seem to be worsening, and he seems to be even more spastic and hyperreflexic he had moderate severe stenosis in his neck before on MRI of the spine done in 2017 and of the brain also,, and after several falls he may have decompensated so we will need another MRI of the cervical spine and 1 of the thoracic spine is to make sure there is no lower lesions causing his spasticity and gait instability, and he also had a abnormal MRI of the brain showing very prominent microvascular disease, need to see if there is some progression of that and could he have some other form of leukodystrophy as a cause of his symptoms. 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 Patient has a very complicated case. He denies any bowel or bladder changes or dysfunction. He denies much weakness in his arms, but has some numbness in his hands. .  He wants to come here to be evaluated first and does not like the physical therapy. He just seems to start falling and is not able to stop, and frequently will trip or try to lift something and then lose his balance. He does not really complain of that much neck pain or back pain or major increase in headaches.   He has no family history of similar disease. He does have some urinary urgency but is able to control his bowels and bladder. He is diabetic for 22 years, but does not complain of much burning or numbness in his feet. He denies any major increase in headache, fever, meningismus, toxin exposure, and other than the fall as mentioned above no unusual head injury. He had a previous cervical MRI scan done years ago that showed moderate spondylosis, but no significant cord compression in 2010. He had a MRI of his lumbar spine that does show multilevel degenerative changes with moderate spinal stenosis at several levels, but was not thought to be a neurosurgical problem in 2009 when he had that done by his neurosurgeon. He complains that his legs feel a little weak, but no focal weakness, but may be his right leg is a little worse. He seems to have fairly good strength and feeling in his arms. We are asked to evaluate. He also gives an unusual history of his eyes jumping some after his second cataract surgery, and was told by Dr. Marshall Ellis that it may well be partly hereditary. But it did not occur until after his second cataract operation, and seems to be worse at nighttime, and the images are more [id number] but sometimes with a skew deviation to. Past Medical History:  
Diagnosis Date  Arrhythmia   
 irregular heart beat  Asthma  CAD (coronary artery disease)  Chronic hip pain  COPD (chronic obstructive pulmonary disease) ([address] 9/10/2010  Diabetes ([address]  DJD (degenerative joint disease)  GERD (gastroesophageal reflux disease)  HNP (herniated nucleus pulposus) 6/2006  
 lumbar  Hypercholesterolemia  Hypertension  Nausea & vomiting  Prostate cancer [facility]) 2003  Reversible ischemic neurologic deficit ([address] 1990  
 suspected with no residual effects and no recurrance  TIA (transient ischemic attack) TIA- no deficiets Past Surgical History: Procedure Laterality Date  COLONOSCOPY  12/15/2004 Diverticulosis Dr. Nnamdi Alston repeat 10 years  HX COLONOSCOPY    HX HEART CATHETERIZATION    
 2  stents  HX HEART CATHETERIZATION  2018  
 failed 100% block @ [facility] will plan to do laser next.  HX HERNIA REPAIR  2008  
 left inguinal hernia repair by Dr. Samir Ward  HX HERNIA REPAIR Right   
 inguinal hernai repair  HX HERNIA REPAIR    
 umbilical hernia repair  HX HIP REPLACEMENT Right 2016  HX PROSTATECTOMY  2003  HX TONSILLECTOMY  TOTAL HIP ARTHROPLASTY Left 11 Dr. Darcy Peters at [facility] Family History Problem Relation Age of Onset  Cancer Mother Breast cancer  Lung Disease Mother Emphysema  Cancer Father Bladder cancer  Lung Disease Father Emphysema  Hypertension Sister  Social History Tobacco Use  Smoking status: Former Smoker Packs/day: 3.00 Years: 10.00 Pack years: 30.00 Types: Cigarettes Last attempt to quit: 1/15/1970 Years since quittin.0  Smokeless tobacco: Never Used Substance Use Topics  Alcohol use: No  
   
 
Current Outpatient Medications:  
  diclofenac (VOLTAREN) 1 % gel, Apply two grams by topical route four times daily to the affected area (s)., Disp: , Rfl:  
  diclofenac (VOLTAREN) 1 % gel, APPLY TWO GRAMS TO THE AFFECTED AREAS 4 TIMES A DAY, Disp: , Rfl: 3 
  isosorbide mononitrate ER (IMDUR) 30 mg tablet, Take 1 Tab by mouth every morning., Disp: 90 Tab, Rfl: 0 
  metFORMIN ER (GLUCOPHAGE XR) 500 mg tablet, TAKE 3 TABLETS BY MOUTH EVERY DAY WITH DINNER, Disp: 270 Tab, Rfl: 1 
  lisinopril (PRINIVIL, ZESTRIL) 5 mg tablet, Take 1 Tab by mouth daily. , Disp: 90 Tab, Rfl: 2 
  simvastatin (ZOCOR) 20 mg tablet, TAKE 1 TABLET BY MOUTH NIGHTLY, Disp: 90 Tab, Rfl: 1 
  glipiZIDE (GLUCOTROL) 5 mg tablet, Take 1 Tab by mouth daily. , Disp: 90 Tab, Rfl: 1   coenzyme q10 10 mg cap, Take 10 mg by mouth., Disp: , Rfl:  
  metoprolol succinate (TOPROL-XL) 25 mg XL tablet, TAKE 1/2 TABLET BY MOUTH EVERY DAY, Disp: 30 Tab, Rfl: 6 
  clopidogrel (PLAVIX) 75 mg tab, Take 1 Tab by mouth daily. , Disp: 90 Tab, Rfl: 1 
  aspirin delayed-release 81 mg tablet, Take 1 Tab by mouth daily. , Disp: 90 Tab, Rfl: 1 
  metFORMIN ER (GLUCOPHAGE XR) 500 mg tablet, Please restart Metformin on Sat 10/6/18 (Patient taking differently: Take 500 mg by mouth three (3) times daily. Please restart Metformin on Sat 10/6/18), Disp: 270 Tab, Rfl: 1   cyanocobalamin (VITAMIN B-12) 500 mcg tablet, Take 500 mcg by mouth daily. , Disp: , Rfl:  
  folic acid 133 mcg tablet, Take 400 mcg by mouth daily. , Disp: , Rfl:  
  nitroglycerin (NITROSTAT) 0.4 mg SL tablet, 1 Tab by SubLINGual route every five (5) minutes as needed for Chest Pain., Disp: 1 Bottle, Rfl: 0 
  glucose blood VI test strips (ONETOUCH ULTRA BLUE TEST STRIP) strip, by Does Not Apply route Daily (before breakfast). E11.9, Disp: 100 Strip, Rfl: 3 
  albuterol (PROVENTIL HFA, VENTOLIN HFA, PROAIR HFA) 90 mcg/actuation inhaler, Take 2 Puffs by inhalation every four (4) hours as needed for Wheezing., Disp: 1 Inhaler, Rfl: 5 
  tiotropium bromide (SPIRIVA RESPIMAT) 2.5 mcg/actuation inhaler, Take 2 Puffs by inhalation daily. , Disp: , Rfl:  
  MAGNESIUM PO, Take  by mouth daily. , Disp: , Rfl:  
  acetaminophen (TYLENOL) 500 mg tablet, Take 1 tablet by mouth every eight (8) hours as needed for Pain., Disp: 30 tablet, Rfl: 0 
  CHOLECALCIFEROL, VITAMIN D3, (VITAMIN D3 PO), Take 1,000 Units by mouth daily. , Disp: , Rfl:  
  multivitamins-minerals-lutein (CENTRUM SILVER) Tab, Take 1 Tab by mouth daily. , Disp: , Rfl:  
  fexofenadine (ALLEGRA) 180 mg tablet, Take 1 Tab by mouth daily. , Disp: 30 Tab, Rfl: 11 
  famotidine (PEPCID AC) 20 mg tablet, Take 20 mg by mouth daily. , Disp: , Rfl:  
   VITAMIN B COMPLEX (B COMPLEX PO), Take 1 Tab by mouth daily. , Disp: , Rfl:  
  fluticasone-salmeterol (ADVAIR DISKUS) 250-50 mcg/Dose diskus inhaler, Take 1 Puff by inhalation every twelve (12) hours. , Disp: , Rfl:   ASCORBIC ACID (VITAMIN C PO), Take 1,000 mg by mouth daily. , Disp: , Rfl:  
 
 
 
No Known Allergies Review of Systems: A comprehensive review of systems was negative except for: Constitutional: positive for fatigue and malaise Ears, nose, mouth, throat, and face: positive for hearing loss Musculoskeletal: positive for myalgias, arthralgias, stiff joints, neck pain, back pain and muscle weakness Neurological: positive for paresthesia, coordination problems, gait problems and weakness Behvioral/Psych: positive for anxiety and depression Vitals:  
 01/28/19 1205 BP: 142/72 Pulse: 62 Resp: 16 SpO2: 96% Weight: 186 lb (84.4 kg) Height: 5' 9\" (1.753 m) Objective: I 
 
 
NEUROLOGICAL EXAM: 
 
Appearance: The patient is well developed, well nourished, provides a coherent history and is in no acute distress. Mental Status: Oriented to time, place and person, and the president, cognitive function is relatively slow but normal and speech is fluent and no aphasia or dysarthria. Mood and affect appropriate. Cranial Nerves:   Intact visual fields. Fundi are benign. VINNY, both eyes are status post cataract surgery, EOM's full, but patient has lateral gaze nystagmus in both directions, but may be a little worse to the right side that seems to be some vertical and horizontal component, but no clear diplopia was noted though his right eye does not always track quite as well. Patient has nystagmus as described above, no ptosis. Facial sensation is normal. Corneal reflexes are not tested. Facial movement is symmetric. Hearing is abnormal bilaterally. Palate is midline with normal sternocleidomastoid and trapezius muscles are normal. Tongue is midline. Neck without meningismus or bruits Temporal arteries are not tender or enlarged Neck has moderate restriction of range of motion in all directions with some palpable muscle spasms noted. Motor:  5/5 strength in upper and lower proximal and distal muscles. Normal bulk and tone. No fasciculations. Reflexes:   Deep tendon reflexes 2+/4 and symmetrical in both upper extremities, and ankle jerks are mildly reduced bilaterally but still present, and knee jerks are about 3+/4 bilaterally, may be right side greater than left. No babinski or clonus present Sensory:   Abnormal to touch, pinprick and vibration and temperature in both feet to mid calf level. DSS is intact Gait:  Abnormal gait as patient walks with a slightly stiff legged and myelopathic gait is wide-based and spastic. Tremor:   No tremor noted. Cerebellar:   Abnormal Romberg and tandem cerebellar signs present. Finger-nose-finger examination is unremarkable Neurovascular:  Normal heart sounds and regular rhythm, peripheral pulses decreased, and no carotid bruits. Assessment: ICD-10-CM ICD-9-CM 1. 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 437.9 MRI BRAIN W WO CONT  
   MRI CERV SPINE WO CONT  
   MRI [facility] SPINE WO CONT 2. Cervical arthritis with myelopathy M47.12 721.1 MRI BRAIN W WO CONT  
   MRI CERV SPINE WO CONT  
   MRI [facility] SPINE WO CONT 3. Degenerative cervical spinal stenosis M48.02 723.0 MRI BRAIN W WO CONT  
   MRI CERV SPINE WO CONT  
   MRI [facility] SPINE WO CONT 4. Bilateral carotid artery stenosis I65.23 433.10 MRI BRAIN W WO CONT  
  433.30 MRI CERV SPINE WO CONT  
   MRI [facility] SPINE WO CONT 5. Diabetic peripheral neuropathy associated with type 2 diabetes mellitus (HCC) E11.42 250.60 MRI BRAIN W WO CONT  
  357.2 MRI CERV SPINE WO CONT  
   MRI [facility] SPINE WO CONT 6. 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  
   MRI CERV SPINE WO CONT  
   MRI [facility] SPINE WO CONT 7. 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  
   MRI CERV SPINE WO CONT  
   MRI [facility] SPINE WO CONT 8. Benign essential tremor G25.0 333.1 Active Problems: * No active hospital problems. * 
 
 
Plan:  
 
Patient appears to have a spastic myelopathic gait, so we will reimage his spine again both cervical and thoracic to rule out any type of cord compression which I think is causing some problem with his gait, but his hyperreflexia could be due to his prominent white matter disease of the brain so brain MRI is being done also. He had carotid Doppler studies done 2 years ago that were relatively unremarkable we may need to repeat those again also. Patient has a multifactor because of his gait instability including a sensory ataxia from his diabetic neuropathy, probably degenerative arthritis and deconditioning, and probably a component of senile gait apraxia, but the more concerning issue in view of his hyperreflexia would be a myelopathy from his cervical spine disease causing cord compression and myelopathic gait. Less likely would be a spinal cerebellar type degeneration in view of his nystagmus and ocular motility problems We also need to rule out an intracranial structural disease or posterior fossa disease like Arnold-Chiari malformation, posterior fossa tumor, hydrocephalus, particular normal pressure hydrocephalus, or other structural brain lesions or multi-vascular infarct lesions. He previously had normal multiple metabolic parameters checked today, including B12, vitamin D, and other treatable causes for his neuropathy. He had an EMG and nerve conduction study in addition that shows a moderate severe sensory neuropathy consistent with his history of diabetes They will call for results of tests, and further treatment evaluation will be in 3 months time or earlier if needed.  
Discussed his condition with the patient and his wife in detail, reviewed your x-rays personally on the PACS system, reviewed the old lab test, and discussed his condition with his wife and the patient in detail, including further diagnostic testing needed, therapy, and other treatment modalities. Signed By: Álvaro Spence MD   
 January 28, 2019 CC: Luke Santana MD 
FAX: [phone] This note will not be viewable in [address]

## 2019-01-30 ENCOUNTER — HOSPITAL ENCOUNTER (OUTPATIENT)
Dept: CARDIAC REHAB | Age: 84
Discharge: HOME | End: 2019-01-30
Payer: MEDICARE

## 2019-01-30 VITALS — WEIGHT: 185.8 LBS | BODY MASS INDEX: 27.44 KG/M2

## 2019-01-30 PROCEDURE — 93798 PHYS/QHP OP CAR RHAB W/ECG: CPT

## 2019-02-01 ENCOUNTER — HOSPITAL ENCOUNTER (OUTPATIENT)
Dept: CARDIAC REHAB | Age: 84
Discharge: HOME | End: 2019-02-01
Payer: MEDICARE

## 2019-02-01 VITALS — WEIGHT: 188.2 LBS | BODY MASS INDEX: 27.79 KG/M2

## 2019-02-01 PROCEDURE — 93798 PHYS/QHP OP CAR RHAB W/ECG: CPT

## 2019-02-04 ENCOUNTER — HOSPITAL ENCOUNTER (OUTPATIENT)
Dept: CARDIAC REHAB | Age: 84
Discharge: HOME | End: 2019-02-04
Payer: MEDICARE

## 2019-02-04 VITALS — BODY MASS INDEX: 27.91 KG/M2 | WEIGHT: 189 LBS

## 2019-02-04 PROCEDURE — 93798 PHYS/QHP OP CAR RHAB W/ECG: CPT

## 2019-02-06 ENCOUNTER — HOSPITAL ENCOUNTER (OUTPATIENT)
Dept: CARDIAC REHAB | Age: 84
Discharge: HOME | End: 2019-02-06
Payer: MEDICARE

## 2019-02-06 ENCOUNTER — CLINICAL SUPPORT (OUTPATIENT)
Dept: CARDIOLOGY CLINIC | Age: 84
End: 2019-02-06

## 2019-02-06 VITALS — BODY MASS INDEX: 27.7 KG/M2 | WEIGHT: 187.6 LBS

## 2019-02-06 DIAGNOSIS — E78.00 PURE HYPERCHOLESTEROLEMIA: ICD-10-CM

## 2019-02-06 DIAGNOSIS — R60.0 BILATERAL LEG EDEMA: ICD-10-CM

## 2019-02-06 PROCEDURE — 93798 PHYS/QHP OP CAR RHAB W/ECG: CPT

## 2019-02-08 ENCOUNTER — OFFICE VISIT (OUTPATIENT)
Dept: INTERNAL MEDICINE CLINIC | Age: 84
End: 2019-02-08

## 2019-02-08 ENCOUNTER — HOSPITAL ENCOUNTER (OUTPATIENT)
Dept: LAB | Age: 84
Discharge: HOME | End: 2019-02-08
Payer: MEDICARE

## 2019-02-08 ENCOUNTER — HOSPITAL ENCOUNTER (OUTPATIENT)
Dept: CARDIAC REHAB | Age: 84
Discharge: HOME | End: 2019-02-08
Payer: MEDICARE

## 2019-02-08 VITALS
WEIGHT: 185.4 LBS | OXYGEN SATURATION: 91 % | BODY MASS INDEX: 27.38 KG/M2 | TEMPERATURE: 97.8 F | SYSTOLIC BLOOD PRESSURE: 120 MMHG | HEART RATE: 71 BPM | DIASTOLIC BLOOD PRESSURE: 62 MMHG

## 2019-02-08 VITALS — BODY MASS INDEX: 27.76 KG/M2 | WEIGHT: 188 LBS

## 2019-02-08 DIAGNOSIS — I87.2 VENOUS INSUFFICIENCY: ICD-10-CM

## 2019-02-08 DIAGNOSIS — I25.10 CORONARY ARTERY DISEASE INVOLVING NATIVE CORONARY ARTERY OF NATIVE HEART WITHOUT ANGINA PECTORIS: Chronic | ICD-10-CM

## 2019-02-08 DIAGNOSIS — E11.42 DIABETIC PERIPHERAL NEUROPATHY ASSOCIATED WITH TYPE 2 DIABETES MELLITUS (HCC): ICD-10-CM

## 2019-02-08 DIAGNOSIS — M48.02 DEGENERATIVE CERVICAL SPINAL STENOSIS: ICD-10-CM

## 2019-02-08 DIAGNOSIS — R26.0 SENSORY ATAXIC GAIT: ICD-10-CM

## 2019-02-08 DIAGNOSIS — E78.00 PURE HYPERCHOLESTEROLEMIA: Primary | Chronic | ICD-10-CM

## 2019-02-08 DIAGNOSIS — J44.9 CHRONIC OBSTRUCTIVE PULMONARY DISEASE, UNSPECIFIED COPD TYPE (HCC): Chronic | ICD-10-CM

## 2019-02-08 PROCEDURE — 83036 HEMOGLOBIN GLYCOSYLATED A1C: CPT

## 2019-02-08 PROCEDURE — 80061 LIPID PANEL: CPT

## 2019-02-08 PROCEDURE — 93798 PHYS/QHP OP CAR RHAB W/ECG: CPT

## 2019-02-08 PROCEDURE — 80053 COMPREHEN METABOLIC PANEL: CPT

## 2019-02-08 PROCEDURE — 82043 UR ALBUMIN QUANTITATIVE: CPT

## 2019-02-08 PROCEDURE — 36415 COLL VENOUS BLD VENIPUNCTURE: CPT

## 2019-02-08 RX ORDER — ISOSORBIDE MONONITRATE 30 MG/1
15 TABLET, EXTENDED RELEASE ORAL
Qty: 90 TAB | Refills: 0 | COMMUNITY
Start: 2019-02-08 | End: 2019-07-24 | Stop reason: SDUPTHER

## 2019-02-08 NOTE — PROGRESS NOTES
Reason for Visit:  Follow-up · *** 
  *Last Appointment with [facility]:  10/31/2018. * Allergies: 
No Known Allergies Social History Tobacco Use  Smoking status: Former Smoker Packs/day: 3.00 Years: 10.00 Pack years: 30.00 Types: Cigarettes Last attempt to quit: 1/15/1970 Years since quittin.0  Smokeless tobacco: Never Used Substance Use Topics  Alcohol use: No  
 Drug use: Yes Types: Prescription, OTC Pharmacy: [facility]/pharmacy #[id number]- [place], [place] - [address] 
[address] [facility] [address] [address] Phone: [phone] Fax: [phone] Abuse Screening: 
Abuse Screening Questionnaire 2019 2018 6/10/2015 Do you ever feel afraid of your partner? N N N Are you in a relationship with someone who physically or mentally threatens you? N N N Is it safe for you to go home? Aimee Gore Y  
 
:  
 
Fall Risk Assessment: 
Fall Risk Assessment, last 12 mths 2019 Able to walk? Yes Fall in past 12 months? No  
Fall with injury? -  
Number of falls in past 12 months - Fall Risk Score -  
 
:  
 
Learning Assessment: 
Learning Assessment 2019 2018 10/24/2017 2014 PRIMARY LEARNER Patient Patient Patient Patient HIGHEST LEVEL OF EDUCATION - PRIMARY LEARNER  - - - SOME COLLEGE  
BARRIERS PRIMARY LEARNER - - - NONE  
CO-LEARNER CAREGIVER - - - No  
PRIMARY LANGUAGE ENGLISH ENGLISH ENGLISH ENGLISH  
LEARNER PREFERENCE PRIMARY DEMONSTRATION DEMONSTRATION DEMONSTRATION LISTENING  
ANSWERED BY patient patient patient self RELATIONSHIP SELF SELF SELF SELF  
 
:  
 
Depression Screening: PHQ over the last two weeks 2019 Little interest or pleasure in doing things Not at all Feeling down, depressed, irritable, or hopeless Not at all Total Score PHQ 2 0 Trouble falling or staying asleep, or sleeping too much - Feeling tired or having little energy - Poor appetite, weight loss, or overeating -  
 Feeling bad about yourself - or that you are a failure or have let yourself or your family down - Trouble concentrating on things such as school, work, reading, or watching TV - Moving or speaking so slowly that other people could have noticed; or the opposite being so fidgety that others notice - Thoughts of being better off dead, or hurting yourself in some way -  
PHQ 9 Score - How difficult have these problems made it for you to do your work, take care of your home and get along with others -  
 
:  
 
MMSE:No flowsheet data found. 
:  
  
  
Health Maintenance Due Topic Date Due  Shingles Vaccine (1 of 2) 12/18/1981  Albumin Urine Test  12/13/2018  Hemoglobin A1C    02/07/2019  
 
· ***

## 2019-02-08 NOTE — CARDIO/PULMONARY
Priscilla Agarwal Completed phase II cardiac rehab and attended 36 sessions from 10/25/18. Priscilla Agarwal is interested in maintaining optimal health and will work with Ros Garcia MD. Priscilla Agarwal has been able to exercise 3 x week at cardiac rehab. He has been having issues with his legs and his gait/balance. He is being following and will be seeing a neurologist.  Pt was unable to do a six minute walk at exit but mets on admission were 2.2 and today were 2.8 on the NuStep. He does have a stationary bike at home and was encouraged to try to use it for his exercise. His BP is 147/64 today. He continues to be followed for his elevated BP's and was encouraged to watch salt intake and continue with med compliance. Priscilla Agarwal plans to come back to participate in Stage 3 maintenance at cardiac rehab for one month and then exercise at home on his stationary bike as able. Revised goals will include home exercise with adjustment as needed for leg/gait issues and to continue to work on control of blood pressure and diabetes. Ronel Saba RN 
2/8/2019

## 2019-02-08 NOTE — PROGRESS NOTES
HPI: 
Heidy Kaiser is a 80y.o. year old male who is here for a routine visit: 
 
Presents for follow-up visit. He has had multiple things going on recently. He has had some heaviness in his legs with some swelling. He has had a vascular evaluation with venous Dopplers and found to have venous insufficiency. He is scheduled to have arterial Dopplers in the next couple weeks and to see a vascular surgeon. 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 He has not had a follow-up visit with the infectious disease doctor. He denies any chest pains. He does have dyspnea on exertion that is unchanged. He continues to be unsteady on his feet. His blood sugars have been between 100 and 200 and usually high only when he is cheating on his diet. Past Medical History:  
Diagnosis Date  Arrhythmia   
 irregular heart beat  Asthma  CAD (coronary artery disease)  Chronic hip pain  COPD (chronic obstructive pulmonary disease) ([address] 9/10/2010  Diabetes ([address]  DJD (degenerative joint disease)  GERD (gastroesophageal reflux disease)  HNP (herniated nucleus pulposus) 6/2006  
 lumbar  Hypercholesterolemia  Hypertension  Nausea & vomiting  Prostate cancer [facility]) 2003  Reversible ischemic neurologic deficit ([address] 1990  
 suspected with no residual effects and no recurrance  TIA (transient ischemic attack) TIA- no deficiets Past Surgical History:  
Procedure Laterality Date  COLONOSCOPY  12/15/2004 Diverticulosis Dr. Carroll Doe repeat 10 years  HX COLONOSCOPY  2005  HX HEART CATHETERIZATION    
 2  stents  HX HEART CATHETERIZATION  08/20/2018  
 failed 100% block @ [facility] will plan to do laser next.  HX HERNIA REPAIR  9/2008  
 left inguinal hernia repair by Dr. Abilio Khan  HX HERNIA REPAIR Right   
 inguinal hernai repair  HX HERNIA REPAIR    
 umbilical hernia repair  HX HIP REPLACEMENT Right 07/06/2016  HX PROSTATECTOMY  2003  HX TONSILLECTOMY  TOTAL HIP ARTHROPLASTY Left 2/9/11 Dr. Zoie Baumann at [facility] Prior to Admission medications Medication Sig Start Date End Date Taking? Authorizing Provider  
isosorbide mononitrate ER (IMDUR) 30 mg tablet Take 0.5 Tabs by mouth every morning. 2/8/19  Yes Trina Sylvester MD  
diclofenac (VOLTAREN) 1 % gel Apply two grams by topical route four times daily to the affected area (s). 1/18/19  Yes Provider, Historical  
diclofenac (VOLTAREN) 1 % gel APPLY TWO GRAMS TO THE AFFECTED AREAS 4 TIMES A DAY 1/18/19  Yes Provider, Historical  
metFORMIN ER (GLUCOPHAGE XR) 500 mg tablet TAKE 3 TABLETS BY MOUTH EVERY DAY WITH DINNER 12/11/18  Yes Trina Sylvester MD  
lisinopril (PRINIVIL, ZESTRIL) 5 mg tablet Take 1 Tab by mouth daily. 11/8/18  Yes Willo Fothergill, NP  
simvastatin (ZOCOR) 20 mg tablet TAKE 1 TABLET BY MOUTH NIGHTLY 11/7/18  Yes Geovany Horne III, MD  
glipiZIDE (GLUCOTROL) 5 mg tablet Take 1 Tab by mouth daily. 10/31/18  Yes Trina Sylvester MD  
coenzyme q10 10 mg cap Take 10 mg by mouth. Yes Provider, Historical  
metoprolol succinate (TOPROL-XL) 25 mg XL tablet TAKE 1/2 TABLET BY MOUTH EVERY DAY 10/15/18  Yes Willo Fothergill, NP  
clopidogrel (PLAVIX) 75 mg tab Take 1 Tab by mouth daily. 10/10/18  Yes Maxwell Galarza MD  
aspirin delayed-release 81 mg tablet Take 1 Tab by mouth daily. 10/10/18  Yes Maxwell Galarza MD  
cyanocobalamin (VITAMIN B-12) 500 mcg tablet Take 500 mcg by mouth daily. Yes Provider, Historical  
folic acid 402 mcg tablet Take 400 mcg by mouth daily. Yes Provider, Historical  
nitroglycerin (NITROSTAT) 0.4 mg SL tablet 1 Tab by SubLINGual route every five (5) minutes as needed for Chest Pain. 8/10/18  Yes Willo Fothergill, NP  
glucose blood VI test strips (ONETOUCH ULTRA BLUE TEST STRIP) strip by Does Not Apply route Daily (before breakfast).  E11.9 7/5/18  Yes Celeste Caballero, Susana Wiley MD  
albuterol (PROVENTIL HFA, VENTOLIN HFA, PROAIR HFA) 90 mcg/actuation inhaler Take 2 Puffs by inhalation every four (4) hours as needed for Wheezing. 12/8/17  Yes Jelani Peralta MD  
tiotropium bromide (SPIRIVA RESPIMAT) 2.5 mcg/actuation inhaler Take 2 Puffs by inhalation daily. Yes Provider, Historical  
MAGNESIUM PO Take  by mouth daily. Yes Provider, Historical  
acetaminophen (TYLENOL) 500 mg tablet Take 1 tablet by mouth every eight (8) hours as needed for Pain. 8/28/14  Yes Angel Dickey MD  
CHOLECALCIFEROL, VITAMIN D3, (VITAMIN D3 PO) Take 1,000 Units by mouth daily. Yes Provider, Historical  
multivitamins-minerals-lutein (CENTRUM SILVER) Tab Take 1 Tab by mouth daily. 5/14/10  Yes Provider, Historical  
fexofenadine (ALLEGRA) 180 mg tablet Take 1 Tab by mouth daily. 1/25/10  Yes Janeen Stanley MD  
famotidine (PEPCID AC) 20 mg tablet Take 20 mg by mouth daily. 6/27/11  Yes Provider, Historical  
VITAMIN B COMPLEX (B COMPLEX PO) Take 1 Tab by mouth daily. Yes Provider, Historical  
fluticasone-salmeterol (ADVAIR DISKUS) 250-50 mcg/Dose diskus inhaler Take 1 Puff by inhalation every twelve (12) hours. Yes Provider, Historical  
ASCORBIC ACID (VITAMIN C PO) Take 1,000 mg by mouth daily. Yes Provider, Historical  
 
 
Social History Socioeconomic History  Marital status:  Spouse name: Not on file  Number of children: Not on file  Years of education: Not on file  Highest education level: Not on file Social Needs  Financial resource strain: Not on file  Food insecurity - worry: Not on file  Food insecurity - inability: Not on file  Transportation needs - medical: Not on file  Transportation needs - non-medical: Not on file Occupational History  Not on file Tobacco Use  Smoking status: Former Smoker Packs/day: 3.00 Years: 10.00 Pack years: 30.00 Types: Cigarettes Last attempt to quit: 1/15/1970 Years since quittin.0  Smokeless tobacco: Never Used Substance and Sexual Activity  Alcohol use: No  
 Drug use: Yes Types: Prescription, OTC  Sexual activity: Yes  
  Partners: Female Birth control/protection: None Other Topics Concern  Not on file Social History Narrative  Not on file ROS Per HPI Visit Vitals /62 Pulse 71 Temp 97.8 °F (36.6 °C) Wt 185 lb 6.4 oz (84.1 kg) SpO2 91% BMI 27.38 kg/m² Physical Exam  
Physical Examination: General appearance - alert, well appearing, and in no distress Mouth - mucous membranes moist, pharynx normal without lesions Neck - supple, no significant adenopathy Lymphatics - no palpable lymphadenopathy, no hepatosplenomegaly Chest - wheezing noted in the bases Heart - normal rate and regular rhythm Abdomen - soft, nontender, nondistended, no masses or organomegaly Musculoskeletal - no joint tenderness, deformity or swelling Extremities - peripheral pulses normal, no pedal edema, no clubbing or cyanosis Assessment/Plan: 
Diagnoses and all orders for this visit: 1. Pure hypercholesterolemia -LDL goal of 100. Will check labs to be sure this is met. He is tolerating his statin drug well. 2. Chronic obstructive pulmonary disease, unspecified COPD type ([facility] -stable and will see pulmonary for follow-up. 3. Diabetic peripheral neuropathy associated with type 2 diabetes mellitus (HCC) -appears stable. Check labs to be sure her A1c is less than 7.5%. -     HEMOGLOBIN A1C WITH EAG 
-     MICROALBUMIN, UR, RAND W/ MICROALB/CREAT RATIO 
-     METABOLIC PANEL, COMPREHENSIVE 
-     LIPID PANEL 4. Coronary artery disease involving native coronary artery of native heart without angina pectoris-stable. Will continue to see cardiology. 5. Sensory ataxic gait -likely the cause for his leg issues. He has MRIs scheduled for his back with neurology and will proceed with that. 6. Degenerative cervical spinal stenosis 7. Venous insufficiency-discussed the details of this with them. He will continue to use compression stockings for now and see the vascular surgeon as planned. Follow-up Disposition: 
Return in about 6 months (around 8/8/2019) for CPE. Advised him to call back or return to office if symptoms worsen/change/persist. 
Discussed expected course/resolution/complications of diagnosis in detail with patient. Medication risks/benefits/costs/interactions/alternatives discussed with patient. He was given an after visit summary which includes diagnoses, current medications, & vitals. He expressed understanding with the diagnosis and plan.

## 2019-02-09 LAB
ALBUMIN SERPL-MCNC: 4.2 G/DL (ref 3.5–4.7)
ALBUMIN/CREAT UR: <4.3 MG/G CREAT (ref 0–30)
ALBUMIN/GLOB SERPL: 1.5 {RATIO} (ref 1.2–2.2)
ALP SERPL-CCNC: 69 IU/L (ref 39–117)
ALT SERPL-CCNC: 15 IU/L (ref 0–44)
AST SERPL-CCNC: 20 IU/L (ref 0–40)
BILIRUB SERPL-MCNC: 0.5 MG/DL (ref 0–1.2)
BUN SERPL-MCNC: 15 MG/DL (ref 8–27)
BUN/CREAT SERPL: 13 (ref 10–24)
CALCIUM SERPL-MCNC: 9.4 MG/DL (ref 8.6–10.2)
CHLORIDE SERPL-SCNC: 98 MMOL/L (ref 96–106)
CHOLEST SERPL-MCNC: 143 MG/DL (ref 100–199)
CO2 SERPL-SCNC: 24 MMOL/L (ref 20–29)
CREAT SERPL-MCNC: 1.12 MG/DL (ref 0.76–1.27)
CREAT UR-MCNC: 70.5 MG/DL
EST. AVERAGE GLUCOSE BLD GHB EST-MCNC: 146 MG/DL
GLOBULIN SER CALC-MCNC: 2.8 G/DL (ref 1.5–4.5)
GLUCOSE SERPL-MCNC: 117 MG/DL (ref 65–99)
HBA1C MFR BLD: 6.7 % (ref 4.8–5.6)
HDLC SERPL-MCNC: 49 MG/DL
LDLC SERPL CALC-MCNC: 73 MG/DL (ref 0–99)
MICROALBUMIN UR-MCNC: <3 UG/ML
POTASSIUM SERPL-SCNC: 4.9 MMOL/L (ref 3.5–5.2)
PROT SERPL-MCNC: 7 G/DL (ref 6–8.5)
SODIUM SERPL-SCNC: 137 MMOL/L (ref 134–144)
TRIGL SERPL-MCNC: 106 MG/DL (ref 0–149)
VLDLC SERPL CALC-MCNC: 21 MG/DL (ref 5–40)

## 2019-02-11 ENCOUNTER — CLINICAL SUPPORT (OUTPATIENT)
Dept: CARDIOLOGY CLINIC | Age: 84
End: 2019-02-11

## 2019-02-11 ENCOUNTER — APPOINTMENT (OUTPATIENT)
Dept: CARDIAC REHAB | Age: 84
End: 2019-02-11
Payer: MEDICARE

## 2019-02-11 DIAGNOSIS — R60.0 BILATERAL LEG EDEMA: ICD-10-CM

## 2019-02-11 LAB
LEFT CFA AP DIAM: 47 CM
LEFT DIST ATA VELOCITY: 42 CM/S
LEFT DIST PTA PSV: 88 CM/S
LEFT GSV AT KNEE DIAM: 3.5 CM
LEFT GSV AT KNEE RFX: 1375 S
LEFT GSV BK MID DIAM: 2.8 CM
LEFT GSV BK MID RFX: 1419 S
LEFT GSV JUNC DIAM: 7.6 CM
LEFT GSV JUNC RFX: 2018 S
LEFT GSV THIGH PROX DIAM: 5.4 CM
LEFT GSV THIGH PROX RFX: 0 S
LEFT MID ATA VELOCITY: 43 CM/S
LEFT PERONEAL DIST VELOCITY: 0 CM/S
LEFT PERONEAL MID SYS PSV: 32 CM/S
LEFT PERONEAL PROX SYS PSV: 61 CM/S
LEFT POP A PROX VEL RATIO: 1
LEFT POPLITEAL DIST SYS PSV: 62 CM/S
LEFT POPLITEAL PROX SYS PSV: 57 CM/S
LEFT PROX ATA VELOCITY: 39 CM/S
LEFT PROX PFA A PSV: 47 CM/S
LEFT PROX PTA PSV: 97 CM/S
LEFT PTA MID SYS PSV: 107 CM/S
LEFT SFA DIST VEL RATIO: 0.95
LEFT SFA MID VEL RATIO: 1.2
LEFT SSV PROX DIAM: 3.4 CM
LEFT SSV PROX RFX: 1120 S
LEFT SUPER FEMORAL DIST SYS PSV: 57 CM/S
LEFT SUPER FEMORAL MID SYS PSV: 60 CM/S
LEFT SUPER FEMORAL PROX SYS PSV: 50 CM/S
RIGHT CFA AP DIAM: 80 CM
RIGHT DIST ATA VELOCITY: 52 CM/S
RIGHT DIST PTA PSV: 71 CM/S
RIGHT GSV AK RFX: 2118 S
RIGHT GSV AT KNEE DIAM: 3.2 CM
RIGHT GSV BK MID DIAM: 2.8 CM
RIGHT GSV BK MID RFX: 1863 S
RIGHT GSV JUNC DIAM: 6.8 CM
RIGHT GSV JUNC RFX: 2495 S
RIGHT GSV THIGH PROX DIAM: 4.7 CM
RIGHT GSV THIGH PROX RFX: 1475 S
RIGHT MID ATA VELOCITY: 52 CM/S
RIGHT PERONEAL DIST VELOCITY: 22 CM/S
RIGHT PERONEAL MID SYS PSV: 46 CM/S
RIGHT PERONEAL PROX SYS PSV: 54 CM/S
RIGHT POP A PROX VEL RATIO: 1.07
RIGHT POPLITEAL DIST SYS PSV: 59 CM/S
RIGHT POPLITEAL PROX SYS PSV: 46 CM/S
RIGHT PROX ATA VELOCITY: 27 CM/S
RIGHT PROX PFA A PSV: 59 CM/S
RIGHT PROX PTA PSV: 74 CM/S
RIGHT PTA MID SYS PSV: 81 CM/S
RIGHT SFA DIST VEL RATIO: 0.55
RIGHT SFA MID VEL RATIO: 1.3
RIGHT SSV PROX DIAM: 1.5 CM
RIGHT SSV PROX RFX: 2096 S
RIGHT SUPER FEMORAL DIST SYS PSV: 43 CM/S
RIGHT SUPER FEMORAL MID SYS PSV: 78 CM/S
RIGHT SUPER FEMORAL PROX SYS PSV: 60 CM/S

## 2019-02-12 ENCOUNTER — HOSPITAL ENCOUNTER (OUTPATIENT)
Dept: MRI IMAGING | Age: 84
Discharge: HOME | End: 2019-02-12
Attending: PSYCHIATRY & NEUROLOGY
Payer: MEDICARE

## 2019-02-12 ENCOUNTER — OFFICE VISIT (OUTPATIENT)
Dept: CARDIOLOGY CLINIC | Age: 84
End: 2019-02-12

## 2019-02-12 VITALS
HEART RATE: 80 BPM | HEIGHT: 69 IN | WEIGHT: 185.4 LBS | BODY MASS INDEX: 27.46 KG/M2 | OXYGEN SATURATION: 94 % | RESPIRATION RATE: 15 BRPM

## 2019-02-12 DIAGNOSIS — I65.23 BILATERAL CAROTID ARTERY STENOSIS: ICD-10-CM

## 2019-02-12 DIAGNOSIS — G60.8 IDIOPATHIC SMALL AND LARGE FIBER SENSORY NEUROPATHY: ICD-10-CM

## 2019-02-12 DIAGNOSIS — I83.813 VARICOSE VEINS OF BOTH LOWER EXTREMITIES WITH PAIN: Primary | ICD-10-CM

## 2019-02-12 DIAGNOSIS — I73.9 PAD (PERIPHERAL ARTERY DISEASE) (HCC): ICD-10-CM

## 2019-02-12 DIAGNOSIS — I83.893 VARICOSE VEINS OF BOTH LEGS WITH EDEMA: ICD-10-CM

## 2019-02-12 DIAGNOSIS — M48.02 DEGENERATIVE CERVICAL SPINAL STENOSIS: ICD-10-CM

## 2019-02-12 DIAGNOSIS — M47.12 CERVICAL ARTHRITIS WITH MYELOPATHY: ICD-10-CM

## 2019-02-12 DIAGNOSIS — H49.11 FOURTH NERVE PALSY OF RIGHT EYE: ICD-10-CM

## 2019-02-12 DIAGNOSIS — I67.89 CEREBRAL MICROVASCULAR DISEASE: ICD-10-CM

## 2019-02-12 DIAGNOSIS — E11.42 DIABETIC PERIPHERAL NEUROPATHY ASSOCIATED WITH TYPE 2 DIABETES MELLITUS (HCC): ICD-10-CM

## 2019-02-12 DIAGNOSIS — I73.9 PVD (PERIPHERAL VASCULAR DISEASE) (HCC): ICD-10-CM

## 2019-02-12 DIAGNOSIS — E78.00 PURE HYPERCHOLESTEROLEMIA: Chronic | ICD-10-CM

## 2019-02-12 DIAGNOSIS — I25.10 CORONARY ARTERY DISEASE INVOLVING NATIVE CORONARY ARTERY OF NATIVE HEART WITHOUT ANGINA PECTORIS: Chronic | ICD-10-CM

## 2019-02-12 PROCEDURE — A9575 INJ GADOTERATE MEGLUMI 0.1ML: HCPCS | Performed by: PSYCHIATRY & NEUROLOGY

## 2019-02-12 PROCEDURE — 74011250636 HC RX REV CODE- 250/636: Performed by: PSYCHIATRY & NEUROLOGY

## 2019-02-12 PROCEDURE — 72141 MRI NECK SPINE W/O DYE: CPT

## 2019-02-12 PROCEDURE — 70553 MRI BRAIN STEM W/O & W/DYE: CPT

## 2019-02-12 PROCEDURE — 72146 MRI CHEST SPINE W/O DYE: CPT

## 2019-02-12 RX ORDER — GADOTERATE MEGLUMINE 376.9 MG/ML
15 INJECTION INTRAVENOUS
Status: COMPLETED | OUTPATIENT
Start: 2019-02-12 | End: 2019-02-12

## 2019-02-12 RX ADMIN — GADOTERATE MEGLUMINE 15 ML: 376.9 INJECTION INTRAVENOUS at 14:01

## 2019-02-12 NOTE — PROGRESS NOTES
2/12/2019 3:08 PM      Subjective:     Mr. Neetu Jewell is a 80 y.o. male who is here for new patient consultation. He has a hx of CAD, HTN and HLD. He had been complaining of lower extremity heaviness and edema with prolonged standing. He notes the heaviness when walking on the treadmill. Luann Servin obtained venous reflux studies and EDILSON to further assess for venous reflux and PAD. He has stopped walking on the treadmill until he heard back with results of his LE testing. He was prescribed compression stockings and has been wearing them daily. He notes some improvement in symptoms with this. He denies lower extremity swelling with the compression stockings. He still notes heaviness sensations in his legs. Visit Vitals  Pulse 80   Resp 15   Ht 5' 9\" (1.753 m)   Wt 185 lb 6.4 oz (84.1 kg)   SpO2 94%   BMI 27.38 kg/m²       Current Outpatient Medications   Medication Sig    isosorbide mononitrate ER (IMDUR) 30 mg tablet Take 0.5 Tabs by mouth every morning.  metFORMIN ER (GLUCOPHAGE XR) 500 mg tablet TAKE 3 TABLETS BY MOUTH EVERY DAY WITH DINNER    lisinopril (PRINIVIL, ZESTRIL) 5 mg tablet Take 1 Tab by mouth daily.  simvastatin (ZOCOR) 20 mg tablet TAKE 1 TABLET BY MOUTH NIGHTLY    glipiZIDE (GLUCOTROL) 5 mg tablet Take 1 Tab by mouth daily.  coenzyme q10 10 mg cap Take 10 mg by mouth.  metoprolol succinate (TOPROL-XL) 25 mg XL tablet TAKE 1/2 TABLET BY MOUTH EVERY DAY    clopidogrel (PLAVIX) 75 mg tab Take 1 Tab by mouth daily.  aspirin delayed-release 81 mg tablet Take 1 Tab by mouth daily.  cyanocobalamin (VITAMIN B-12) 500 mcg tablet Take 500 mcg by mouth daily.  folic acid 756 mcg tablet Take 400 mcg by mouth daily.  nitroglycerin (NITROSTAT) 0.4 mg SL tablet 1 Tab by SubLINGual route every five (5) minutes as needed for Chest Pain.     glucose blood VI test strips (ONETOUCH ULTRA BLUE TEST STRIP) strip by Does Not Apply route Daily (before breakfast). E11.9    albuterol (PROVENTIL HFA, VENTOLIN HFA, PROAIR HFA) 90 mcg/actuation inhaler Take 2 Puffs by inhalation every four (4) hours as needed for Wheezing.  tiotropium bromide (SPIRIVA RESPIMAT) 2.5 mcg/actuation inhaler Take 2 Puffs by inhalation daily.  MAGNESIUM PO Take 500 mg by mouth daily.  acetaminophen (TYLENOL) 500 mg tablet Take 1 tablet by mouth every eight (8) hours as needed for Pain.  CHOLECALCIFEROL, VITAMIN D3, (VITAMIN D3 PO) Take 1,000 Units by mouth daily.  multivitamins-minerals-lutein (CENTRUM SILVER) Tab Take 1 Tab by mouth daily.  fexofenadine (ALLEGRA) 180 mg tablet Take 1 Tab by mouth daily.  famotidine (PEPCID AC) 20 mg tablet Take 20 mg by mouth daily.  VITAMIN B COMPLEX (B COMPLEX PO) Take 1 Tab by mouth daily.  fluticasone-salmeterol (ADVAIR DISKUS) 250-50 mcg/Dose diskus inhaler Take 1 Puff by inhalation every twelve (12) hours.  ASCORBIC ACID (VITAMIN C PO) Take 1,000 mg by mouth daily. No current facility-administered medications for this visit.           Objective:      Visit Vitals  Pulse 80   Resp 15   Ht 5' 9\" (1.753 m)   Wt 185 lb 6.4 oz (84.1 kg)   SpO2 94%   BMI 27.38 kg/m²       Data Review:   Lab Results   Component Value Date/Time    Cholesterol, total 143 02/08/2019 08:51 AM    HDL Cholesterol 49 02/08/2019 08:51 AM    LDL, calculated 73 02/08/2019 08:51 AM    Triglyceride 106 02/08/2019 08:51 AM    CHOL/HDL Ratio 3.0 05/05/2010 09:01 AM       Lab Results   Component Value Date/Time    Sodium 137 02/08/2019 08:51 AM    Potassium 4.9 02/08/2019 08:51 AM    Chloride 98 02/08/2019 08:51 AM    CO2 24 02/08/2019 08:51 AM    Anion gap 7 10/05/2018 04:28 AM    Glucose 117 (H) 02/08/2019 08:51 AM    BUN 15 02/08/2019 08:51 AM    Creatinine 1.12 02/08/2019 08:51 AM    BUN/Creatinine ratio 13 02/08/2019 08:51 AM    GFR est AA 68 02/08/2019 08:51 AM    GFR est non-AA 59 (L) 02/08/2019 08:51 AM    Calcium 9.4 02/08/2019 08:51 AM Bilirubin, total 0.5 02/08/2019 08:51 AM    AST (SGOT) 20 02/08/2019 08:51 AM    Alk. phosphatase 69 02/08/2019 08:51 AM    Protein, total 7.0 02/08/2019 08:51 AM    Albumin 4.2 02/08/2019 08:51 AM    Globulin 3.7 06/21/2016 09:18 AM    A-G Ratio 1.5 02/08/2019 08:51 AM    ALT (SGPT) 15 02/08/2019 08:51 AM        Past Medical History:   Diagnosis Date    Arrhythmia     Asthma     CAD (coronary artery disease)     Chronic hip pain     COPD (chronic obstructive pulmonary disease) ([facility] 9/10/2010    Diabetes ([facility]     DJD (degenerative joint disease)     GERD (gastroesophageal reflux disease)     HNP (herniated nucleus pulposus) 6/2006    lumbar     Hypercholesterolemia     Hypertension     Nausea & vomiting     Prostate cancer ([facility] 2003    Reversible ischemic neurologic deficit ([facility] 1990    suspected with no residual effects and no recurrance    TIA (transient ischemic attack)     TIA- no deficiets      Past Surgical History:   Procedure Laterality Date    COLONOSCOPY  12/15/2004    Diverticulosis Dr. Hermann Villanueva repeat 10 years    HX COLONOSCOPY  2005    HX HEART CATHETERIZATION      2  stents     HX HEART CATHETERIZATION  08/20/2018    failed 100% block @ [facility] will plan to do laser next.       HX HERNIA REPAIR  9/2008    left inguinal hernia repair by Dr. Win Treadwell Right     inguinal hernai repair    HX HERNIA REPAIR      umbilical hernia repair    HX HIP REPLACEMENT Right 07/06/2016    HX PROSTATECTOMY  2003    HX TONSILLECTOMY      TOTAL HIP ARTHROPLASTY Left 2/9/11    Dr. Priya Pak at [address] [place] [address]     No Known Allergies   Family History   Problem Relation Age of Onset   Demetrius.Butt Cancer Mother         Breast cancer    Lung Disease Mother         Emphysema    Cancer Father         Bladder cancer    Lung Disease Father         Emphysema    Hypertension Sister         1      Social History     Socioeconomic History    Marital status:      Spouse name: Not on file    Number of children: Not on file    Years of education: Not on file    Highest education level: Not on file   Social Needs    Financial resource strain: Not on file    Food insecurity - worry: Not on file    Food insecurity - inability: Not on file    Transportation needs - medical: Not on file   SMS Assist needs - non-medical: Not on file   Occupational History    Not on file   Tobacco Use    Smoking status: Former Smoker     Packs/day: 3.00     Years: 10.00     Pack years: 30.00     Types: Cigarettes     Last attempt to quit: 1/15/1970     Years since quittin.1    Smokeless tobacco: Never Used   Substance and Sexual Activity    Alcohol use: No    Drug use: Yes     Types: Prescription, OTC    Sexual activity: Yes     Partners: Female     Birth control/protection: None   Other Topics Concern    Not on file   Social History Narrative    Not on file       1. Have you ever had vein stripping surgery NO   If yes, when and which leg? 2. Have you ever had vein injections? NO   If yes, which leg and where on the leg? 3. Have you ever had a blood clot? NO   If yes, which leg and when? 4. Have you ever had phlebitis? NO   If yes, which leg and when? Does anyone in your family have (or used to have) varicose veins, spider veins, leg ulcers or swollen legs? Father  NO  Mother NO  Brother(s) NO  Sister(s) NO  Other NO           1. Do you experience any of the following in your legs? Aching/pain? YES  [] One leg [x] Both legs  Heaviness? YES  [] One leg [x] Both legs  Tiredness/fatigue? YES  [] One leg [x] Both legs  Itching/burning? NO  [] One leg [] Both legs  Swollen ankles? YES  [] One leg [x] Both legs  Leg cramps? YES  [] One leg [x] Both legs  Restless legs? NO  [] One leg [] Both legs  Throbbing? NO  [] One leg [x] Both legs  Other? 2.  Have your veins gotten worse in recent months?      YES Describe: More heaviness with walking    3. Do you take any medication for pain (i.e., Advil, Motrin)  NO       If yes, what medication do you take and how many times/mgs per day? 4. Do you elevate your legs to relieve discomfort? YES    If yes, how long per day do you elevate and does it provide relief? 1 hour    5. Do you exercise? YES    If yes, what kind of exercise and how often? Walking on the treadmill    6. Do you wear prescription compression stockings? YES    If yes, what type and gradient? How long have you worn them? 20-30 mmHg thigh high compression stockings prescribed about 2-3 weeks ago by Mike Joseph NP, with some improvement in sypmtoms     If yes, what is the name of the physician who prescribed your compression                         stockings and when were they prescribed? See above       7. Do you wear light support hose (i.e., [employer])? NO              If yes, do they provide relief? N/A      8. Do you have any problem walking? YES              If yes, describe how it interferes with your activities of daily living, which            Activities? Heaviness with walking                   9.   What type of work do you do?retired          How long do you stand (hours per day) at work? At home? 3-4 hours        10. Have you ever had any test(s) done on your veins? YES          11. Were you diagnosed with saphenous vein reflux? YES    Review of Systems   Review of Systems   Constitutional: Negative for chills, fever and weight loss. HENT: Negative for nosebleeds. Eyes: Negative for blurred vision and double vision. Respiratory: Negative for cough, shortness of breath and wheezing. Cardiovascular: Negative for chest pain, palpitations, orthopnea, leg swelling and PND. Gastrointestinal: Negative for abdominal pain, blood in stool, diarrhea, nausea and vomiting. Musculoskeletal: Negative for joint pain. Skin: Negative for rash. Neurological: Negative for dizziness, tingling and loss of consciousness. Endo/Heme/Allergies: Does not bruise/bleed easily. Physical Exam   General: Well developed, in no acute distress, cooperative and alert  HEENT: No carotid bruits, no JVD, trach is midline. Neck Supple, PEERL, EOM intact. Heart:  reg rate and rhythm; normal S1/S2; no murmurs, gallops or rubs.   Respiratory: Clear bilaterally x 4, no wheezing or rales  Abdomen:   Soft, non-tender, no distention, no masses. + BS.   Extremities:  Normal cap refill, no cyanosis, atraumatic. No edema. Neuro: A&Ox3, speech clear, gait stable. Skin: Skin color is normal. No rashes or lesions. Non diaphoretic      Assessment:       ICD-10-CM ICD-9-CM    1. Varicose veins of both lower extremities with pain I83.813 454.8    2. PAD (peripheral artery disease) ([facility]) I73.9 443.9    3. Coronary artery disease involving native coronary artery of native heart without angina pectoris I25.10 414.01        Plan:     1. Varicose veins of both lower extremities with pain  Venous reflux study showed significant venous reflux of the bilateral sapho-femoral junction and GSV. ABIs were non-compressible bilaterally with arterial duplex showing monophasic waveforms in the left AT, PT and peroneal arteries with < 50% stenosis of the RIGHT lower extremity, with biphasic waveforms. He has minimal clinical symptoms of venous reflux. Will address PAD first and assess need for venous intervention post-op. 2. Peripheral artery disease  ABIs were non-compressible bilaterally with arterial duplex showing monophasic waveforms in the left AT, PT and peroneal arteries with < 50% stenosis of the RIGHT lower extremity, with biphasic waveforms. Will plan for peripheral angiogram of lower extremity to further assess. 3.  Coronary artery disease involving native coronary artery of native heart without angina pectoris  Stable and without anginal or anginal equivalent symptoms. S/p  with YARIEL to the RCA in 10/2018. Continue to follow cardiac care with Dr. Rolo Kuo. [facility],   2/12/2019      Patient seen and examined by me with nurse practitioner in vascular clinic. Wayna Oppenheim personally performed all components of the history, physical, and medical decision making and agree with the assessment and plan with minor modifications as noted. - PAD: clinically appears to be main culprit. Left pop and DP are weak, 1+ Lt PT. Rt. Pop and PT normal and DP weak. Symptoms primarily in left leg with skin changes on wearing compression stocking. [place] class 3 limiting symptoms. Also needs to address it prior to compression stockings. Recommend LE angio. I discussed the risks/benefits/alternatives of the procedure with the patient. The patient understands and agrees to proceed. Will address venous insufficiency after addressing PAD.      Sagar You MD

## 2019-02-12 NOTE — PROGRESS NOTES
1. Have you been to the ER, urgent care clinic since your last visit? Hospitalized since your last visit? No    2. Have you seen or consulted any other health care providers outside of the [address] [place] [address] since your last visit? Include any pap smears or colon screening. No    Chief Complaint   Patient presents with    Establish Care     LE Venous doppler results     Pt had 3 MRIs today due to recent fall and unsteady gait.   Pt c/o numbness to calves   Wife states patient's toes were blue after wearing support stockings on Saturday

## 2019-02-13 ENCOUNTER — APPOINTMENT (OUTPATIENT)
Dept: CARDIAC REHAB | Age: 84
End: 2019-02-13
Payer: MEDICARE

## 2019-02-13 ENCOUNTER — TELEPHONE (OUTPATIENT)
Dept: CARDIOLOGY CLINIC | Age: 84
End: 2019-02-13

## 2019-02-15 ENCOUNTER — APPOINTMENT (OUTPATIENT)
Dept: CARDIAC REHAB | Age: 84
End: 2019-02-15
Payer: MEDICARE

## 2019-02-18 ENCOUNTER — APPOINTMENT (OUTPATIENT)
Dept: CARDIAC REHAB | Age: 84
End: 2019-02-18
Payer: MEDICARE

## 2019-02-18 ENCOUNTER — HOSPITAL ENCOUNTER (OUTPATIENT)
Dept: CARDIAC REHAB | Age: 84
Discharge: HOME | End: 2019-02-18
Payer: MEDICARE

## 2019-02-18 VITALS — BODY MASS INDEX: 27.97 KG/M2 | WEIGHT: 189.4 LBS

## 2019-02-20 ENCOUNTER — APPOINTMENT (OUTPATIENT)
Dept: CARDIAC REHAB | Age: 84
End: 2019-02-20
Payer: MEDICARE

## 2019-02-21 ENCOUNTER — HOSPITAL ENCOUNTER (OUTPATIENT)
Dept: CARDIAC REHAB | Age: 84
Discharge: HOME | End: 2019-02-21
Payer: MEDICARE

## 2019-02-21 VITALS — BODY MASS INDEX: 27.76 KG/M2 | WEIGHT: 188 LBS

## 2019-02-22 ENCOUNTER — HOSPITAL ENCOUNTER (OUTPATIENT)
Dept: CARDIAC REHAB | Age: 84
Discharge: HOME | End: 2019-02-22
Payer: MEDICARE

## 2019-02-22 ENCOUNTER — APPOINTMENT (OUTPATIENT)
Dept: CARDIAC REHAB | Age: 84
End: 2019-02-22
Payer: MEDICARE

## 2019-02-22 VITALS — BODY MASS INDEX: 28 KG/M2 | WEIGHT: 189.6 LBS

## 2019-02-25 ENCOUNTER — APPOINTMENT (OUTPATIENT)
Dept: CARDIAC REHAB | Age: 84
End: 2019-02-25
Payer: MEDICARE

## 2019-02-25 ENCOUNTER — HOSPITAL ENCOUNTER (OUTPATIENT)
Dept: CARDIAC REHAB | Age: 84
Discharge: HOME | End: 2019-02-25
Payer: MEDICARE

## 2019-02-25 VITALS — BODY MASS INDEX: 27.7 KG/M2 | WEIGHT: 187.6 LBS

## 2019-02-25 PROCEDURE — 93798 PHYS/QHP OP CAR RHAB W/ECG: CPT

## 2019-02-27 ENCOUNTER — APPOINTMENT (OUTPATIENT)
Dept: CARDIAC REHAB | Age: 84
End: 2019-02-27
Payer: MEDICARE

## 2019-02-27 ENCOUNTER — HOSPITAL ENCOUNTER (OUTPATIENT)
Dept: CARDIAC REHAB | Age: 84
Discharge: HOME | End: 2019-02-27
Payer: MEDICARE

## 2019-02-27 VITALS — WEIGHT: 187 LBS | BODY MASS INDEX: 27.62 KG/M2

## 2019-03-01 ENCOUNTER — APPOINTMENT (OUTPATIENT)
Dept: CARDIAC REHAB | Age: 84
End: 2019-03-01

## 2019-03-01 ENCOUNTER — HOSPITAL ENCOUNTER (OUTPATIENT)
Dept: CARDIAC REHAB | Age: 84
Discharge: HOME | End: 2019-03-01

## 2019-03-01 VITALS — WEIGHT: 188 LBS | BODY MASS INDEX: 27.76 KG/M2

## 2019-03-04 ENCOUNTER — APPOINTMENT (OUTPATIENT)
Dept: CARDIAC REHAB | Age: 84
End: 2019-03-04

## 2019-03-04 ENCOUNTER — HOSPITAL ENCOUNTER (OUTPATIENT)
Dept: CARDIAC REHAB | Age: 84
Discharge: HOME | End: 2019-03-04

## 2019-03-04 VITALS — BODY MASS INDEX: 27.56 KG/M2 | WEIGHT: 186.6 LBS

## 2019-03-06 ENCOUNTER — HOSPITAL ENCOUNTER (OUTPATIENT)
Dept: CARDIAC REHAB | Age: 84
Discharge: HOME | End: 2019-03-06

## 2019-03-06 ENCOUNTER — APPOINTMENT (OUTPATIENT)
Dept: CARDIAC REHAB | Age: 84
End: 2019-03-06

## 2019-03-06 VITALS — WEIGHT: 188.2 LBS | BODY MASS INDEX: 27.79 KG/M2

## 2019-03-08 ENCOUNTER — HOSPITAL ENCOUNTER (OUTPATIENT)
Dept: CARDIAC REHAB | Age: 84
Discharge: HOME | End: 2019-03-08

## 2019-03-08 VITALS — BODY MASS INDEX: 27.76 KG/M2 | WEIGHT: 188 LBS

## 2019-03-11 ENCOUNTER — HOSPITAL ENCOUNTER (OUTPATIENT)
Dept: CARDIAC REHAB | Age: 84
Discharge: HOME | End: 2019-03-11

## 2019-03-11 VITALS — WEIGHT: 186.8 LBS | BODY MASS INDEX: 27.59 KG/M2

## 2019-03-13 ENCOUNTER — HOSPITAL ENCOUNTER (OUTPATIENT)
Dept: CARDIAC REHAB | Age: 84
Discharge: HOME | End: 2019-03-13

## 2019-03-13 VITALS — BODY MASS INDEX: 27.85 KG/M2 | WEIGHT: 188.6 LBS

## 2019-03-15 ENCOUNTER — HOSPITAL ENCOUNTER (OUTPATIENT)
Dept: CARDIAC REHAB | Age: 84
Discharge: HOME | End: 2019-03-15

## 2019-03-15 VITALS — BODY MASS INDEX: 27.82 KG/M2 | WEIGHT: 188.4 LBS

## 2019-03-18 ENCOUNTER — HOSPITAL ENCOUNTER (OUTPATIENT)
Dept: CARDIAC REHAB | Age: 84
Discharge: HOME | End: 2019-03-18

## 2019-03-18 VITALS — BODY MASS INDEX: 27.67 KG/M2 | WEIGHT: 187.4 LBS

## 2019-03-20 ENCOUNTER — APPOINTMENT (OUTPATIENT)
Dept: CARDIAC REHAB | Age: 84
End: 2019-03-20

## 2019-03-22 ENCOUNTER — APPOINTMENT (OUTPATIENT)
Dept: CARDIAC REHAB | Age: 84
End: 2019-03-22

## 2019-03-25 ENCOUNTER — APPOINTMENT (OUTPATIENT)
Dept: CARDIAC REHAB | Age: 84
End: 2019-03-25

## 2019-03-26 RX ORDER — FLUTICASONE PROPIONATE AND SALMETEROL 250; 50 UG/1; UG/1
1 POWDER RESPIRATORY (INHALATION) EVERY 12 HOURS
Qty: 3 INHALER | Refills: 2 | Status: SHIPPED | OUTPATIENT
Start: 2019-03-26 | End: 2020-01-29

## 2019-03-27 ENCOUNTER — APPOINTMENT (OUTPATIENT)
Dept: CARDIAC REHAB | Age: 84
End: 2019-03-27

## 2019-03-29 ENCOUNTER — APPOINTMENT (OUTPATIENT)
Dept: CARDIAC REHAB | Age: 84
End: 2019-03-29

## 2019-04-01 ENCOUNTER — APPOINTMENT (OUTPATIENT)
Dept: CARDIAC REHAB | Age: 84
End: 2019-04-01

## 2019-04-03 ENCOUNTER — APPOINTMENT (OUTPATIENT)
Dept: CARDIAC REHAB | Age: 84
End: 2019-04-03

## 2019-04-05 ENCOUNTER — APPOINTMENT (OUTPATIENT)
Dept: CARDIAC REHAB | Age: 84
End: 2019-04-05

## 2019-04-08 ENCOUNTER — APPOINTMENT (OUTPATIENT)
Dept: CARDIAC REHAB | Age: 84
End: 2019-04-08

## 2019-04-10 ENCOUNTER — APPOINTMENT (OUTPATIENT)
Dept: CARDIAC REHAB | Age: 84
End: 2019-04-10

## 2019-04-10 RX ORDER — CLOPIDOGREL BISULFATE 75 MG/1
75 TABLET ORAL DAILY
Qty: 90 TAB | Refills: 0 | Status: SHIPPED | OUTPATIENT
Start: 2019-04-10 | End: 2019-05-01 | Stop reason: SDUPTHER

## 2019-04-12 ENCOUNTER — APPOINTMENT (OUTPATIENT)
Dept: CARDIAC REHAB | Age: 84
End: 2019-04-12

## 2019-04-15 ENCOUNTER — APPOINTMENT (OUTPATIENT)
Dept: CARDIAC REHAB | Age: 84
End: 2019-04-15

## 2019-04-16 ENCOUNTER — OFFICE VISIT (OUTPATIENT)
Dept: NEUROLOGY | Age: 84
End: 2019-04-16

## 2019-04-16 VITALS
BODY MASS INDEX: 27.85 KG/M2 | SYSTOLIC BLOOD PRESSURE: 140 MMHG | OXYGEN SATURATION: 97 % | HEART RATE: 77 BPM | HEIGHT: 69 IN | DIASTOLIC BLOOD PRESSURE: 70 MMHG | WEIGHT: 188 LBS

## 2019-04-16 DIAGNOSIS — R27.0 ATAXIA, UNSPECIFIED: Primary | ICD-10-CM

## 2019-04-16 NOTE — PROGRESS NOTES
Date:             2018 Name:  Gio Jack 
:  1931 MRN:  [id number] PCP:  Joaquin Sims MD 
 
Chief Complaint Patient presents with  Follow-up HISTORY OF PRESENT ILLNESS:  
 
This patient returns in follow-up of his gait abnormality. He has had MRI scans of his brain and cervical spine and other testing. He has a spastic ataxic gait. His toes appear to be downgoing. Looking at his CT I think he has a little more atrophy than his completely normal for his age however he has a 9years old. 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 10.24.2017 recap Lincoln Stephens. is a 80 y.o. right handed  male seen as a new patient today for evaluation of a new problem over the last 6 months, of progressive gait difficulty, at the request of Dr. Faheem Mustafa. The patient had a CT scan of the head 2017 after he had a fall, that was normal, except for a small scalp hematoma but nothing intracranially was found. He has gone to physical therapy with some improvement in his gait according to his wife. He wants to come here to be evaluated first and does not like the physical therapy. He just seems to start falling and is not able to stop, and frequently will trip or try to lift something and then lose his balance. He is being a little more careful now and the falls are quite as frequent. He just feels more unsteady and off balance in addition. He does not really complain of that much neck pain or back pain or major increase in headaches. He has no family history of similar disease. He does have some urinary urgency but is able to control his bowels and bladder.   He is diabetic for 20 years, but does not complain of much burning or numbness in his feet. He denies any major increase in headache, fever, meningismus, toxin exposure, and other than the fall as mentioned above no unusual head injury. He had a previous cervical MRI scan done years ago that showed moderate spondylosis, but no significant cord compression in 2010. He had a MRI of his lumbar spine that does show multilevel degenerative changes with moderate spinal stenosis at several levels, but was not thought to be a neurosurgical problem in 2009 when he had that done by his neurosurgeon. He complains that his legs feel a little weak, but no focal weakness, but may be his right leg is a little worse. He seems to have fairly good strength and feeling in his arms. We are asked to evaluate. He also gives an unusual history of his eyes jumping some after his second cataract surgery, and was told by Dr. Carlean Krabbe that it may well be partly hereditary. But it did not occur until after his second cataract operation, and seems to be worse at nighttime, and the images are more [id number] but sometimes with a skew deviation to. Current Outpatient Medications Medication Sig  clopidogrel (PLAVIX) 75 mg tab Take 1 Tab by mouth daily.  fluticasone propion-salmeterol (ADVAIR DISKUS) 250-50 mcg/dose diskus inhaler Take 1 Puff by inhalation every twelve (12) hours.  tiotropium bromide (SPIRIVA RESPIMAT) 2.5 mcg/actuation inhaler Take 2 Puffs by inhalation daily.  isosorbide mononitrate ER (IMDUR) 30 mg tablet Take 0.5 Tabs by mouth every morning.  metFORMIN ER (GLUCOPHAGE XR) 500 mg tablet TAKE 3 TABLETS BY MOUTH EVERY DAY WITH DINNER  
 lisinopril (PRINIVIL, ZESTRIL) 5 mg tablet Take 1 Tab by mouth daily.  simvastatin (ZOCOR) 20 mg tablet TAKE 1 TABLET BY MOUTH NIGHTLY  glipiZIDE (GLUCOTROL) 5 mg tablet Take 1 Tab by mouth daily.   
 metoprolol succinate (TOPROL-XL) 25 mg XL tablet TAKE 1/2 TABLET BY MOUTH EVERY DAY  
  aspirin delayed-release 81 mg tablet Take 1 Tab by mouth daily.  cyanocobalamin (VITAMIN B-12) 500 mcg tablet Take 500 mcg by mouth daily.  folic acid 637 mcg tablet Take 400 mcg by mouth daily.  nitroglycerin (NITROSTAT) 0.4 mg SL tablet 1 Tab by SubLINGual route every five (5) minutes as needed for Chest Pain.  glucose blood VI test strips (ONETOUCH ULTRA BLUE TEST STRIP) strip by Does Not Apply route Daily (before breakfast). E11.9  
 albuterol (PROVENTIL HFA, VENTOLIN HFA, PROAIR HFA) 90 mcg/actuation inhaler Take 2 Puffs by inhalation every four (4) hours as needed for Wheezing.  MAGNESIUM PO Take 500 mg by mouth daily.  acetaminophen (TYLENOL) 500 mg tablet Take 1 tablet by mouth every eight (8) hours as needed for Pain.  CHOLECALCIFEROL, VITAMIN D3, (VITAMIN D3 PO) Take 1,000 Units by mouth daily.  multivitamins-minerals-lutein (CENTRUM SILVER) Tab Take 1 Tab by mouth daily.  fexofenadine (ALLEGRA) 180 mg tablet Take 1 Tab by mouth daily.  famotidine (PEPCID AC) 20 mg tablet Take 20 mg by mouth daily.  VITAMIN B COMPLEX (B COMPLEX PO) Take 1 Tab by mouth daily.  ASCORBIC ACID (VITAMIN C PO) Take 1,000 mg by mouth daily.  coenzyme q10 10 mg cap Take 10 mg by mouth. No current facility-administered medications for this visit. No Known Allergies Past Medical History:  
Diagnosis Date  Arrhythmia  Asthma  CAD (coronary artery disease)  Chronic hip pain  COPD (chronic obstructive pulmonary disease) ([facility] [address] 9/10/2010  Diabetes ([facility] [address]  DJD (degenerative joint disease)  GERD (gastroesophageal reflux disease)  HNP (herniated nucleus pulposus) 6/2006  
 lumbar  Hypercholesterolemia  Hypertension  Nausea & vomiting  Prostate cancer [facility]) 2003  Reversible ischemic neurologic deficit ([facility] [address] 1990  
 suspected with no residual effects and no recurrance  TIA (transient ischemic attack) TIA- no deficiets Past Surgical History:  
Procedure Laterality Date  COLONOSCOPY  12/15/2004 Diverticulosis Dr. Azucena Walter repeat 10 years  HX COLONOSCOPY    HX HEART CATHETERIZATION    
 2  stents  HX HEART CATHETERIZATION  2018  
 failed 100% block @ [facility] will plan to do laser next.  HX HERNIA REPAIR  2008  
 left inguinal hernia repair by Dr. Yonatan Paniagua  HX HERNIA REPAIR Right   
 inguinal hernai repair  HX HERNIA REPAIR    
 umbilical hernia repair  HX HIP REPLACEMENT Right 2016  HX PROSTATECTOMY    HX TONSILLECTOMY  TOTAL HIP ARTHROPLASTY Left 11 Dr. Chandler Zuniga at [facility] Social History Socioeconomic History  Marital status:  Spouse name: Not on file  Number of children: Not on file  Years of education: Not on file  Highest education level: Not on file Occupational History  Not on file Social Needs  Financial resource strain: Not on file  Food insecurity:  
  Worry: Not on file Inability: Not on file  Transportation needs:  
  Medical: Not on file Non-medical: Not on file Tobacco Use  Smoking status: Former Smoker Packs/day: 3.00 Years: 10.00 Pack years: 30.00 Types: Cigarettes Last attempt to quit: 1/15/1970 Years since quittin.2  Smokeless tobacco: Never Used Substance and Sexual Activity  Alcohol use: No  
 Drug use: Yes Types: Prescription, OTC  Sexual activity: Yes  
  Partners: Female Birth control/protection: None Lifestyle  Physical activity:  
  Days per week: Not on file Minutes per session: Not on file  Stress: Not on file Relationships  Social connections:  
  Talks on phone: Not on file Gets together: Not on file Attends Congregational service: Not on file Active member of club or organization: Not on file Attends meetings of clubs or organizations: Not on file Relationship status: Not on file  Intimate partner violence:  
  Fear of current or ex partner: Not on file Emotionally abused: Not on file Physically abused: Not on file Forced sexual activity: Not on file Other Topics Concern  Not on file Social History Narrative  Not on file Family History Problem Relation Age of Onset  Cancer Mother Breast cancer  Lung Disease Mother Emphysema  Cancer Father Bladder cancer  Lung Disease Father Emphysema  Hypertension Sister 2015 PHYSICAL EXAMINATION:   
Visit Vitals /70 Pulse 77 Ht 5' 9\" (1.753 m) Wt 188 lb (85.3 kg) SpO2 97% BMI 27.76 kg/m² NEUROLOGICAL EXAMINATION:    
Mental Status:   Alert and oriented to person, place, and time with recent and remote memory intact. Attention span and concentration are normal. Speech is fluent with a full fund of knowledge. Cranial Nerves:   
II, III, IV, VI:  Visual acuity grossly intact. Pupils are equal, round, and reactive to light. Extra-ocular movements are full and fluid. No ptosis, horizontal nystagmus worse in the right eye 
V-XII: Hearing is grossly intact. Facial features are symmetric, with normal sensation and strength. The palate rises symmetrically and the tongue protrudes midline. Sternocleidomastoids 5/5. Motor Examination: Normal tone, bulk, and strength, 5/5 muscle strength throughout. No cogwheel rigidity. Sensory: Diminished to temp in bilateral stocking glove pattern, absent proprioception vibration in both feet Coordination:  Finger to nose testing was normal.   No resting or intention tremor. No bradykinesia Gait and Station:  Steady while walking, narrow based gait. Normal arm swing. +Rhomberg. No pronator drift. No muscle wasting or fasciculations noted. ASSESSMENT AND PLAN Spinal cerebellar degeneration I reinforced that this patient when he walks needs to always have his eyes fixed on the ground several feet in front of him where his eyes can take in any horizontal or vertical cues to his brain as to where the plane of gravity is. If he gets himself where he cannot see a horizontal or vertical Horizon he is likely to fall as his cerebellum is no longer up to the job. I explained to him and his wife that this was not a treatable condition that was a progressive degenerative condition, they understood. I will see him back in 6months I asked them to call me if he, had a sudden worsening and we would decide whether neuroimaging or physical therapy was required. COPD diabetes hypertension cholesterol in the 
 
HYPERTENSION Stroke risk, stable, managed by primary care HYPERLIPIDEMIA Stroke risk, stable, managed by primary care Delfina Smiley

## 2019-04-16 NOTE — PATIENT INSTRUCTIONS

## 2019-04-17 RX ORDER — CLOPIDOGREL BISULFATE 75 MG/1
TABLET ORAL
Qty: 90 TAB | Refills: 1 | Status: SHIPPED | OUTPATIENT
Start: 2019-04-17 | End: 2019-07-24 | Stop reason: SDUPTHER

## 2019-04-18 ENCOUNTER — APPOINTMENT (OUTPATIENT)
Dept: CARDIAC REHAB | Age: 84
End: 2019-04-18

## 2019-04-19 ENCOUNTER — APPOINTMENT (OUTPATIENT)
Dept: CARDIAC REHAB | Age: 84
End: 2019-04-19

## 2019-05-01 ENCOUNTER — OFFICE VISIT (OUTPATIENT)
Dept: INTERNAL MEDICINE CLINIC | Age: 84
End: 2019-05-01

## 2019-05-01 VITALS
WEIGHT: 190 LBS | OXYGEN SATURATION: 91 % | HEART RATE: 71 BPM | BODY MASS INDEX: 28.14 KG/M2 | SYSTOLIC BLOOD PRESSURE: 138 MMHG | DIASTOLIC BLOOD PRESSURE: 82 MMHG | RESPIRATION RATE: 12 BRPM | TEMPERATURE: 98.1 F | HEIGHT: 69 IN

## 2019-05-01 DIAGNOSIS — R27.0 ATAXIA: ICD-10-CM

## 2019-05-01 DIAGNOSIS — H81.393 PERIPHERAL VERTIGO OF BOTH EARS: ICD-10-CM

## 2019-05-01 DIAGNOSIS — J41.1 MUCOPURULENT CHRONIC BRONCHITIS (HCC): Primary | ICD-10-CM

## 2019-05-01 RX ORDER — ACETYLCYSTEINE 100 MG/ML
4 SOLUTION ORAL; RESPIRATORY (INHALATION) 3 TIMES DAILY
Qty: 120 ML | Refills: 5 | Status: ON HOLD | OUTPATIENT
Start: 2019-05-01 | End: 2020-02-26

## 2019-05-01 RX ORDER — ALBUTEROL SULFATE 0.83 MG/ML
2.5 SOLUTION RESPIRATORY (INHALATION)
Qty: 100 EACH | Refills: 3 | Status: SHIPPED | OUTPATIENT
Start: 2019-05-01 | End: 2019-07-17 | Stop reason: SDUPTHER

## 2019-05-01 RX ORDER — AZITHROMYCIN 250 MG/1
250 TABLET, FILM COATED ORAL SEE ADMIN INSTRUCTIONS
Qty: 6 TAB | Refills: 0 | Status: SHIPPED | OUTPATIENT
Start: 2019-05-01 | End: 2020-07-09 | Stop reason: SDUPTHER

## 2019-05-01 RX ORDER — NEBULIZER AND COMPRESSOR
EACH MISCELLANEOUS
Qty: 1 EACH | Refills: 0 | Status: ON HOLD | OUTPATIENT
Start: 2019-05-01 | End: 2020-02-26

## 2019-05-01 NOTE — PROGRESS NOTES
HPI: 
Sanjana Gonzalez is a 80y.o. year old male who is here for a 2-week history of increasing issues with his breathing. He has had cough productive of thick yellow sputum. Some increasing shortness of breath and wheeze. No fevers or chills. He has had some sweats. No nausea or vomiting. No change in bowel or bladder habits. He just saw the neurologist who diagnosed him with ataxia. He also just saw the orthopedist about his back but no additional orders were given. He does feel unsteady on his feet at times and has had at least one fall recently when he fell backwards trying to work on his lawn more. He has had some lower back pain since. Orthopedist suggested physical therapy after 6 weeks of healing. He denies any PND or orthopnea. Past Medical History:  
Diagnosis Date  Arrhythmia  Asthma  CAD (coronary artery disease)  Chronic hip pain  COPD (chronic obstructive pulmonary disease) ([address] 9/10/2010  Diabetes ([address]  DJD (degenerative joint disease)  GERD (gastroesophageal reflux disease)  HNP (herniated nucleus pulposus) 6/2006  
 lumbar  Hypercholesterolemia  Hypertension  Nausea & vomiting  Prostate cancer [facility]) 2003  Reversible ischemic neurologic deficit ([address] 1990  
 suspected with no residual effects and no recurrance  TIA (transient ischemic attack) TIA- no deficiets Past Surgical History:  
Procedure Laterality Date  COLONOSCOPY  12/15/2004 Diverticulosis Dr. Sharma Holes repeat 10 years  HX COLONOSCOPY  2005  HX HEART CATHETERIZATION    
 2  stents  HX HEART CATHETERIZATION  08/20/2018  
 failed 100% block @ [facility] will plan to do laser next.  HX HERNIA REPAIR  9/2008  
 left inguinal hernia repair by Dr. Susan Daley  HX HERNIA REPAIR Right   
 inguinal hernai repair  HX HERNIA REPAIR    
 umbilical hernia repair  HX HIP REPLACEMENT Right 07/06/2016  HX PROSTATECTOMY  2003  HX TONSILLECTOMY  TOTAL HIP ARTHROPLASTY Left 2/9/11 Dr. Katja Bailey at [facility] Prior to Admission medications Medication Sig Start Date End Date Taking? Authorizing Provider Nebulizer & Compressor machine Use 3 times per day as needed. 5/1/19  Yes Anuradha Singh MD  
albuterol (PROVENTIL VENTOLIN) 2.5 mg /3 mL (0.083 %) nebulizer solution 3 mL by Nebulization route three (3) times daily as needed for Wheezing. 5/1/19  Yes Anuradha Singh MD  
acetylcysteine (MUCOMYST) 100 mg/mL (10 %) nebulizer solution Take 4 mL by inhalation three (3) times daily. 5/1/19  Yes Anuradha Singh MD  
azithromycin (ZITHROMAX) 250 mg tablet Take 1 Tab by mouth See Admin Instructions for 5 days. 5/1/19 5/6/19 Yes Anuradha Singh MD  
clopidogrel (PLAVIX) 75 mg tab TAKE 1 TABLET BY MOUTH EVERY DAY 4/17/19  Yes Leanne Guthrie MD  
fluticasone propion-salmeterol (ADVAIR DISKUS) 250-50 mcg/dose diskus inhaler Take 1 Puff by inhalation every twelve (12) hours. 3/26/19  Yes Anuradha Singh MD  
tiotropium bromide (SPIRIVA RESPIMAT) 2.5 mcg/actuation inhaler Take 2 Puffs by inhalation daily. 3/26/19  Yes Anuradha Singh MD  
isosorbide mononitrate ER (IMDUR) 30 mg tablet Take 0.5 Tabs by mouth every morning. 2/8/19  Yes Anuradha Singh MD  
metFORMIN ER (GLUCOPHAGE XR) 500 mg tablet TAKE 3 TABLETS BY MOUTH EVERY DAY WITH DINNER 12/11/18  Yes Anuradha Singh MD  
lisinopril (PRINIVIL, ZESTRIL) 5 mg tablet Take 1 Tab by mouth daily. 11/8/18  Yes Erendira Sumner NP  
simvastatin (ZOCOR) 20 mg tablet TAKE 1 TABLET BY MOUTH NIGHTLY 11/7/18  Yes Sylvain Goetz III, MD  
glipiZIDE (GLUCOTROL) 5 mg tablet Take 1 Tab by mouth daily. 10/31/18  Yes Anuradha Singh MD  
coenzyme q10 10 mg cap Take 10 mg by mouth.    Yes Provider, Historical  
metoprolol succinate (TOPROL-XL) 25 mg XL tablet TAKE 1/2 TABLET BY MOUTH EVERY DAY 10/15/18  Yes Erendira Sumner, NP  
 aspirin delayed-release 81 mg tablet Take 1 Tab by mouth daily. 10/10/18  Yes Brendon Campo MD  
cyanocobalamin (VITAMIN B-12) 500 mcg tablet Take 500 mcg by mouth daily. Yes Provider, Historical  
folic acid 633 mcg tablet Take 400 mcg by mouth daily. Yes Provider, Historical  
nitroglycerin (NITROSTAT) 0.4 mg SL tablet 1 Tab by SubLINGual route every five (5) minutes as needed for Chest Pain. 8/10/18  Yes Kymberly Mcbride NP  
glucose blood VI test strips (ONETOUCH ULTRA BLUE TEST STRIP) strip by Does Not Apply route Daily (before breakfast). E11.9 7/5/18  Yes Munir Murray MD  
albuterol (PROVENTIL HFA, VENTOLIN HFA, PROAIR HFA) 90 mcg/actuation inhaler Take 2 Puffs by inhalation every four (4) hours as needed for Wheezing. 12/8/17  Yes Munir Murray MD  
MAGNESIUM PO Take 500 mg by mouth daily. Yes Provider, Historical  
acetaminophen (TYLENOL) 500 mg tablet Take 1 tablet by mouth every eight (8) hours as needed for Pain. 8/28/14  Yes Angel Dickey MD  
CHOLECALCIFEROL, VITAMIN D3, (VITAMIN D3 PO) Take 1,000 Units by mouth daily. Yes Provider, Historical  
multivitamins-minerals-lutein (CENTRUM SILVER) Tab Take 1 Tab by mouth daily. 5/14/10  Yes Provider, Historical  
fexofenadine (ALLEGRA) 180 mg tablet Take 1 Tab by mouth daily. 1/25/10  Yes Kevin Alberts MD  
famotidine (PEPCID AC) 20 mg tablet Take 20 mg by mouth daily. 6/27/11  Yes Provider, Historical  
VITAMIN B COMPLEX (B COMPLEX PO) Take 1 Tab by mouth daily. Yes Provider, Historical  
ASCORBIC ACID (VITAMIN C PO) Take 1,000 mg by mouth daily. Yes Provider, Historical  
 
 
Social History Socioeconomic History  Marital status:  Spouse name: Not on file  Number of children: Not on file  Years of education: Not on file  Highest education level: Not on file Occupational History  Not on file Social Needs  Financial resource strain: Not on file  Food insecurity: Worry: Not on file Inability: Not on file  Transportation needs:  
  Medical: Not on file Non-medical: Not on file Tobacco Use  Smoking status: Former Smoker Packs/day: 3.00 Years: 10.00 Pack years: 30.00 Types: Cigarettes Last attempt to quit: 1/15/1970 Years since quittin.3  Smokeless tobacco: Never Used Substance and Sexual Activity  Alcohol use: No  
 Drug use: Yes Types: Prescription, OTC  Sexual activity: Yes  
  Partners: Female Birth control/protection: None Lifestyle  Physical activity:  
  Days per week: Not on file Minutes per session: Not on file  Stress: Not on file Relationships  Social connections:  
  Talks on phone: Not on file Gets together: Not on file Attends Shinto service: Not on file Active member of club or organization: Not on file Attends meetings of clubs or organizations: Not on file Relationship status: Not on file  Intimate partner violence:  
  Fear of current or ex partner: Not on file Emotionally abused: Not on file Physically abused: Not on file Forced sexual activity: Not on file Other Topics Concern  Not on file Social History Narrative  Not on file ROS Per HPI Visit Vitals /82 Pulse 71 Temp 98.1 °F (36.7 °C) (Oral) Resp 12 Ht 5' 9\" (1.753 m) Wt 190 lb (86.2 kg) SpO2 91% BMI 28.06 kg/m² Physical Exam  
Physical Examination: General appearance - alert, well appearing, and in no distress Mouth - mucous membranes moist, pharynx normal without lesions Neck - supple, no significant adenopathy Lymphatics - no palpable lymphadenopathy, no hepatosplenomegaly Chest -scattered rhonchi and expiratory wheeze in both bases. Heart - irregularly irregular rhythm with rate 71 Abdomen - soft, nontender, nondistended, no masses or organomegaly Extremities - peripheral pulses normal, no pedal edema, no clubbing or cyanosis Assessment/Plan: 
Diagnoses and all orders for this visit: 
 
1. Mucopurulent chronic bronchitis (HCC)-we will treat with antibiotics for superimposed bacterial infection. We will also add a nebulizer and Mucomyst to see if that helps with his mucus. If he has persistent symptoms would consider steroids. 2. Peripheral vertigo of both ears-stable. 3. 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 Would leave that up to neurology and orthopedics. 4.  Diabetesreasonably controlled. Other orders -     Nebulizer & Compressor machine; Use 3 times per day as needed. -     albuterol (PROVENTIL VENTOLIN) 2.5 mg /3 mL (0.083 %) nebulizer solution; 3 mL by Nebulization route three (3) times daily as needed for Wheezing. 
-     acetylcysteine (MUCOMYST) 100 mg/mL (10 %) nebulizer solution; Take 4 mL by inhalation three (3) times daily. -     azithromycin (ZITHROMAX) 250 mg tablet; Take 1 Tab by mouth See Admin Instructions for 5 days. Follow-up and Dispositions · Return if symptoms worsen or fail to improve. Advised him to call back or return to office if symptoms worsen/change/persist. 
Discussed expected course/resolution/complications of diagnosis in detail with patient. Medication risks/benefits/costs/interactions/alternatives discussed with patient. He was given an after visit summary which includes diagnoses, current medications, & vitals. He expressed understanding with the diagnosis and plan.

## 2019-05-01 NOTE — PATIENT INSTRUCTIONS

## 2019-06-05 DIAGNOSIS — R27.0 ATAXIA: Primary | ICD-10-CM

## 2019-06-07 RX ORDER — METFORMIN HYDROCHLORIDE 500 MG/1
TABLET, EXTENDED RELEASE ORAL
Qty: 270 TAB | Refills: 1 | Status: SHIPPED | OUTPATIENT
Start: 2019-06-07 | End: 2019-11-18 | Stop reason: SDUPTHER

## 2019-06-23 DIAGNOSIS — R04.2 HEMOPTYSIS: ICD-10-CM

## 2019-06-23 RX ORDER — ALBUTEROL SULFATE 90 UG/1
AEROSOL, METERED RESPIRATORY (INHALATION)
Qty: 18 INHALER | Refills: 5 | Status: SHIPPED | OUTPATIENT
Start: 2019-06-23 | End: 2020-06-13 | Stop reason: SDUPTHER

## 2019-06-27 ENCOUNTER — TELEPHONE (OUTPATIENT)
Dept: INTERNAL MEDICINE CLINIC | Age: 84
End: 2019-06-27

## 2019-06-27 NOTE — TELEPHONE ENCOUNTER
Tabatha Owens () [phone] (H)     Pt's wife says Dr. Tasha Dorantes told them Medicare would pay for a walker if an order and recent office notes were faxed to [phone].   Caller couldn't recall the name of the facility they were working with

## 2019-06-28 NOTE — TELEPHONE ENCOUNTER
Returned call to patient, states the company's name is [facility] - [address] [place] [address] - [facility] NIRAJ MCCARTHY, verified fax number [phone]. They need the amb supply order for rollator with brakes and basket and most recent office note to cover for Medicare. Order reprinted and sent to fax with last office note. Pt advised.

## 2019-07-05 RX ORDER — SIMVASTATIN 20 MG/1
TABLET, FILM COATED ORAL
Qty: 90 TAB | Refills: 1 | Status: SHIPPED | OUTPATIENT
Start: 2019-07-05 | End: 2020-01-26

## 2019-07-05 NOTE — TELEPHONE ENCOUNTER
Orders Placed This Encounter    simvastatin (ZOCOR) 20 mg tablet     Sig: TAKE 1 TABLET BY MOUTH EVERY NIGHT     Dispense:  90 Tab     Refill:  1     The above orders were approved via [facility] per Dr. Claudetta Levin, III.

## 2019-07-12 RX ORDER — GLIPIZIDE 5 MG/1
TABLET ORAL
Qty: 90 TAB | Refills: 1 | Status: SHIPPED | OUTPATIENT
Start: 2019-07-12 | End: 2020-01-07

## 2019-07-17 RX ORDER — ALBUTEROL SULFATE 0.83 MG/ML
2.5 SOLUTION RESPIRATORY (INHALATION)
Qty: 100 EACH | Refills: 3 | Status: SHIPPED | OUTPATIENT
Start: 2019-07-17 | End: 2020-08-11

## 2019-07-17 NOTE — TELEPHONE ENCOUNTER
Orders Placed This Encounter    tiotropium bromide (SPIRIVA RESPIMAT) 2.5 mcg/actuation inhaler     Sig: Take 2 Puffs by inhalation daily. Dispense:  3 Inhaler     Refill:  2     The above orders were approved via [facility] per Dr. Lady Henderson, III.

## 2019-07-24 ENCOUNTER — OFFICE VISIT (OUTPATIENT)
Dept: CARDIOLOGY CLINIC | Age: 84
End: 2019-07-24

## 2019-07-24 VITALS
HEART RATE: 60 BPM | BODY MASS INDEX: 27.8 KG/M2 | RESPIRATION RATE: 18 BRPM | SYSTOLIC BLOOD PRESSURE: 128 MMHG | HEIGHT: 69 IN | WEIGHT: 187.7 LBS | DIASTOLIC BLOOD PRESSURE: 74 MMHG | OXYGEN SATURATION: 95 %

## 2019-07-24 DIAGNOSIS — J44.9 CHRONIC OBSTRUCTIVE PULMONARY DISEASE, UNSPECIFIED COPD TYPE (HCC): Chronic | ICD-10-CM

## 2019-07-24 DIAGNOSIS — I67.89 CEREBRAL MICROVASCULAR DISEASE: ICD-10-CM

## 2019-07-24 DIAGNOSIS — I73.9 PVD (PERIPHERAL VASCULAR DISEASE) (HCC): ICD-10-CM

## 2019-07-24 DIAGNOSIS — I25.10 CORONARY ARTERY DISEASE INVOLVING NATIVE CORONARY ARTERY OF NATIVE HEART WITHOUT ANGINA PECTORIS: Primary | Chronic | ICD-10-CM

## 2019-07-24 DIAGNOSIS — I83.893 VARICOSE VEINS OF BOTH LEGS WITH EDEMA: ICD-10-CM

## 2019-07-24 RX ORDER — ISOSORBIDE MONONITRATE 30 MG/1
15 TABLET, EXTENDED RELEASE ORAL
Qty: 45 TAB | Refills: 3 | Status: SHIPPED | OUTPATIENT
Start: 2019-07-24 | End: 2020-07-27

## 2019-07-24 RX ORDER — KETOCONAZOLE 20 MG/G
CREAM TOPICAL
Refills: 7 | Status: ON HOLD | COMMUNITY
Start: 2019-06-25 | End: 2020-02-26

## 2019-07-24 RX ORDER — CLOPIDOGREL BISULFATE 75 MG/1
75 TABLET ORAL DAILY
Qty: 90 TAB | Refills: 3 | Status: SHIPPED | OUTPATIENT
Start: 2019-07-24 | End: 2020-01-29 | Stop reason: ALTCHOICE

## 2019-07-24 RX ORDER — DICLOFENAC SODIUM 10 MG/G
GEL TOPICAL
COMMUNITY
Start: 2019-02-19 | End: 2020-01-29

## 2019-07-24 NOTE — PROGRESS NOTES
Chief Complaint   Patient presents with    Coronary Artery Disease     6m f/u per [facility]. 1. Have you been to the ER, urgent care clinic since your last visit? Hospitalized since your last visit? No    2. Have you seen or consulted any other health care providers outside of the [address] [place] [address] since your last visit? Include any pap smears or colon screening.  No

## 2019-07-24 NOTE — PROGRESS NOTES
Santosh November DNP, ANP-BC  Subjective/HPI:     Cheli Pruitt is a 80 y.o. male is here for routine f/u. Patient reports his legs have improved with using thigh-high compression stockings, has seen vascular was recommended to have peripheral angiogram however patient has opted to hold off as noting improvement in his legs. He denies exertional chest pain and dyspnea on exertion with the exception when his COPD \"acts up\". He is 10 months from  of the RCA and feels definite improvement in his functional capacity post procedure. 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 PCP Provider  Forrest Rosen MD  Past Medical History:   Diagnosis Date    Arrhythmia     Asthma     CAD (coronary artery disease)     Chronic hip pain     COPD (chronic obstructive pulmonary disease) ([address] 9/10/2010    Diabetes ([address]     DJD (degenerative joint disease)     GERD (gastroesophageal reflux disease)     HNP (herniated nucleus pulposus) 6/2006    lumbar     Hypercholesterolemia     Hypertension     Nausea & vomiting     Prostate cancer ([address] 2003    Reversible ischemic neurologic deficit ([address] 1990    suspected with no residual effects and no recurrance    TIA (transient ischemic attack)     TIA- no deficiets      Past Surgical History:   Procedure Laterality Date    COLONOSCOPY  12/15/2004    Diverticulosis Dr. Preethi Brennan repeat 10 years    HX COLONOSCOPY  2005    HX HEART CATHETERIZATION      2  stents     HX HEART CATHETERIZATION  08/20/2018    failed 100% block @ [facility] will plan to do laser next.       HX HERNIA REPAIR  9/2008    left inguinal hernia repair by Dr. Enrique Matson Right     inguinal hernai repair    HX HERNIA REPAIR      umbilical hernia repair    HX HIP REPLACEMENT Right 07/06/2016    HX PROSTATECTOMY  2003    HX TONSILLECTOMY      TOTAL HIP ARTHROPLASTY Left 2/9/11    Dr. Nancy Wilson at [facility]     No Known Allergies   Family History Problem Relation Age of Onset    Cancer Mother         Breast cancer    Lung Disease Mother         Emphysema    Cancer Father         Bladder cancer    Lung Disease Father         Emphysema    Hypertension Sister         2015      Current Outpatient Medications   Medication Sig    diclofenac (VOLTAREN) 1 % gel Apply two grams by topical route four times daily to the affected area (s).  ketoconazole (NIZORAL) 2 % topical cream APPLY TO EYEBROWS TWICE A DAY AS NEEDED    clopidogrel (PLAVIX) 75 mg tab Take 1 Tab by mouth daily. Long term therapy PAD    isosorbide mononitrate ER (IMDUR) 30 mg tablet Take 0.5 Tabs by mouth every morning. Indications: Take 1/2 tab by mouth daily    albuterol (PROVENTIL VENTOLIN) 2.5 mg /3 mL (0.083 %) nebu 3 mL by Nebulization route three (3) times daily as needed (wheeze).  tiotropium bromide (SPIRIVA RESPIMAT) 2.5 mcg/actuation inhaler Take 2 Puffs by inhalation daily.  glipiZIDE (GLUCOTROL) 5 mg tablet TAKE 1 TABLET BY MOUTH EVERY DAY    simvastatin (ZOCOR) 20 mg tablet TAKE 1 TABLET BY MOUTH EVERY NIGHT    VENTOLIN HFA 90 mcg/actuation inhaler INHALE 2 PUFFS BY MOUTH EVERY 4 HOURS AS NEEDED FOR WHEEZING    metFORMIN ER (GLUCOPHAGE XR) 500 mg tablet TAKE 3 TABLETS BY MOUTH EVERY DAY WITH DINNER    Nebulizer & Compressor machine Use 3 times per day as needed.  acetylcysteine (MUCOMYST) 100 mg/mL (10 %) nebulizer solution Take 4 mL by inhalation three (3) times daily.  fluticasone propion-salmeterol (ADVAIR DISKUS) 250-50 mcg/dose diskus inhaler Take 1 Puff by inhalation every twelve (12) hours.  lisinopril (PRINIVIL, ZESTRIL) 5 mg tablet Take 1 Tab by mouth daily.  coenzyme q10 10 mg cap Take 10 mg by mouth.  metoprolol succinate (TOPROL-XL) 25 mg XL tablet TAKE 1/2 TABLET BY MOUTH EVERY DAY    aspirin delayed-release 81 mg tablet Take 1 Tab by mouth daily.  cyanocobalamin (VITAMIN B-12) 500 mcg tablet Take 500 mcg by mouth daily.     folic acid 400 mcg tablet Take 400 mcg by mouth daily.  nitroglycerin (NITROSTAT) 0.4 mg SL tablet 1 Tab by SubLINGual route every five (5) minutes as needed for Chest Pain.  glucose blood VI test strips (ONETOUCH ULTRA BLUE TEST STRIP) strip by Does Not Apply route Daily (before breakfast). E11.9    MAGNESIUM PO Take 500 mg by mouth daily.  acetaminophen (TYLENOL) 500 mg tablet Take 1 tablet by mouth every eight (8) hours as needed for Pain.  CHOLECALCIFEROL, VITAMIN D3, (VITAMIN D3 PO) Take 1,000 Units by mouth daily.  multivitamins-minerals-lutein (CENTRUM SILVER) Tab Take 1 Tab by mouth daily.  fexofenadine (ALLEGRA) 180 mg tablet Take 1 Tab by mouth daily.  famotidine (PEPCID AC) 20 mg tablet Take 20 mg by mouth daily.  VITAMIN B COMPLEX (B COMPLEX PO) Take 1 Tab by mouth daily.  ASCORBIC ACID (VITAMIN C PO) Take 1,000 mg by mouth daily. No current facility-administered medications for this visit.        Vitals:    19 1101 19 1126   BP: 130/78 128/74   Pulse: 60    Resp: 18    SpO2: 95%    Weight: 187 lb 11.2 oz (85.1 kg)    Height: 5' 9\" (1.753 m)      Social History     Socioeconomic History    Marital status:      Spouse name: Not on file    Number of children: Not on file    Years of education: Not on file    Highest education level: Not on file   Occupational History    Not on file   Social Needs    Financial resource strain: Not on file    Food insecurity:     Worry: Not on file     Inability: Not on file    Transportation needs:     Medical: Not on file     Non-medical: Not on file   Tobacco Use    Smoking status: Former Smoker     Packs/day: 3.00     Years: 10.00     Pack years: 30.00     Types: Cigarettes     Last attempt to quit: 1/15/1970     Years since quittin.5    Smokeless tobacco: Never Used   Substance and Sexual Activity    Alcohol use: No    Drug use: Yes     Types: Prescription, OTC    Sexual activity: Yes     Partners: Female Birth control/protection: None   Lifestyle    Physical activity:     Days per week: Not on file     Minutes per session: Not on file    Stress: Not on file   Relationships    Social connections:     Talks on phone: Not on file     Gets together: Not on file     Attends Orthodox service: Not on file     Active member of club or organization: Not on file     Attends meetings of clubs or organizations: Not on file     Relationship status: Not on file    Intimate partner violence:     Fear of current or ex partner: Not on file     Emotionally abused: Not on file     Physically abused: Not on file     Forced sexual activity: Not on file   Other Topics Concern    Not on file   Social History Narrative    Not on file       I have reviewed the nurses notes, vitals, problem list, allergy list, medical history, family, social history and medications. Review of Symptoms:    General: Pt denies excessive weight gain or loss. Has some limitations in activities of daily life secondary to ataxia  HEENT: + Intermittent blurred vision, denies headaches, epistaxis and difficulty swallowing. Respiratory: Denies shortness of breath, MCGUIRE, wheezing or stridor. Cardiovascular: Denies precordial pain, palpitations, edema or PND  Gastrointestinal: Denies poor appetite, indigestion, abdominal pain or blood in stool  Musculoskeletal: Denies pain or swelling from muscles or joints  Neurologic: Denies tremor, paresthesias, or sensory motor disturbance  Skin: Denies rash, itching or texture change. Physical Exam:      General: Well developed, in no acute distress, cooperative and alert  HEENT: No carotid bruits, no JVD, trach is midline. Neck Supple, t. Heart:  Normal S1/S2 negative S3 or S4. Regular, no murmur, gallop or rub.   Respiratory: Diffuse expiratory wheezing/rhonchi, no rales. Abdomen:   Soft, non-tender, no masses, bowel sounds are active.   Extremities:  No edema, normal cap refill, no cyanosis, atraumatic. Neuro: A&Ox3, speech clear, gait stable. Skin: Skin color is normal. No rashes or lesions. Non diaphoretic  Vascular: +2 bilateral radial pulses, wearing compression stockings lower extremities. Cardiographics    ECG: Sinus rhythm with PAC  Results for orders placed or performed during the hospital encounter of 10/04/18   EKG, 12 LEAD, INITIAL   Result Value Ref Range    Ventricular Rate 65 BPM    Atrial Rate 65 BPM    P-R Interval 206 ms    QRS Duration 86 ms    Q-T Interval 400 ms    QTC Calculation (Bezet) 416 ms    Calculated P Axis 81 degrees    Calculated R Axis 14 degrees    Calculated T Axis 19 degrees    Diagnosis       Sinus rhythm with premature atrial complexes    Confirmed by Mindy Samuel MD, Thea Phelan ([id number]) on 10/6/2018 2:28:34 PM           Cardiology Labs:  Lab Results   Component Value Date/Time    Cholesterol, total 143 02/08/2019 08:51 AM    HDL Cholesterol 49 02/08/2019 08:51 AM    LDL, calculated 73 02/08/2019 08:51 AM    Triglyceride 106 02/08/2019 08:51 AM    CHOL/HDL Ratio 3.0 05/05/2010 09:01 AM       Lab Results   Component Value Date/Time    Sodium 137 02/08/2019 08:51 AM    Potassium 4.9 02/08/2019 08:51 AM    Chloride 98 02/08/2019 08:51 AM    CO2 24 02/08/2019 08:51 AM    Anion gap 7 10/05/2018 04:28 AM    Glucose 117 (H) 02/08/2019 08:51 AM    BUN 15 02/08/2019 08:51 AM    Creatinine 1.12 02/08/2019 08:51 AM    BUN/Creatinine ratio 13 02/08/2019 08:51 AM    GFR est AA 68 02/08/2019 08:51 AM    GFR est non-AA 59 (L) 02/08/2019 08:51 AM    Calcium 9.4 02/08/2019 08:51 AM    Bilirubin, total 0.5 02/08/2019 08:51 AM    AST (SGOT) 20 02/08/2019 08:51 AM    Alk. phosphatase 69 02/08/2019 08:51 AM    Protein, total 7.0 02/08/2019 08:51 AM    Albumin 4.2 02/08/2019 08:51 AM    Globulin 3.7 06/21/2016 09:18 AM    A-G Ratio 1.5 02/08/2019 08:51 AM    ALT (SGPT) 15 02/08/2019 08:51 AM           Assessment:     Assessment:     Diagnoses and all orders for this visit:    1.  Coronary artery disease involving native coronary artery of native heart without angina pectoris  -     AMB POC EKG ROUTINE W/ 12 LEADS, INTER & REP    2. Cerebral microvascular disease    3. PVD (peripheral vascular disease) ([facility]    4. Varicose veins of both legs with edema    5. Chronic obstructive pulmonary disease, unspecified COPD type ([facility]    Other orders  -     clopidogrel (PLAVIX) 75 mg tab; Take 1 Tab by mouth daily. Long term therapy PAD  -     isosorbide mononitrate ER (IMDUR) 30 mg tablet; Take 0.5 Tabs by mouth every morning. Indications: Take 1/2 tab by mouth daily        ICD-10-CM ICD-9-CM    1. Coronary artery disease involving native coronary artery of native heart without angina pectoris I25.10 414.01 AMB POC EKG ROUTINE W/ 12 LEADS, INTER & REP   2. 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 437.9    3. PVD (peripheral vascular disease) ([facility]) I73.9 443.9    4. Varicose veins of both legs with edema I83.893 454.8    5. Chronic obstructive pulmonary disease, unspecified COPD type ([facility] J44.9 496      Orders Placed This Encounter    AMB POC EKG ROUTINE W/ 12 LEADS, INTER & REP     Order Specific Question:   Reason for Exam:     Answer:   cad    diclofenac (VOLTAREN) 1 % gel     Sig: Apply two grams by topical route four times daily to the affected area (s).  ketoconazole (NIZORAL) 2 % topical cream     Sig: APPLY TO EYEBROWS TWICE A DAY AS NEEDED     Refill:  7    clopidogrel (PLAVIX) 75 mg tab     Sig: Take 1 Tab by mouth daily. Long term therapy PAD     Dispense:  90 Tab     Refill:  3    isosorbide mononitrate ER (IMDUR) 30 mg tablet     Sig: Take 0.5 Tabs by mouth every morning. Indications: Take 1/2 tab by mouth daily     Dispense:  45 Tab     Refill:  3        Plan:     1. Atherosclerotic heart disease: 10 months post  of the RCA doing well. 2.  Hypertension: Controlled 128/74 continue current therapy  3. Hyperlipidemia: On statin therapy LDL 73 continue simvastatin  4.   Venous reflux: Responding well to compression stockings. 5.  Peripheral arterial disease lower extremities: Patient prefers to hold off on any interventional procedure at this time, denies limiting symptoms. Will continue Plavix long-term for treatment of PAD  Follow-up in 6 months    Cherry Barron MD    This note was created using voice recognition software. Despite editing, there may be syntax errors.

## 2019-07-30 ENCOUNTER — TELEPHONE (OUTPATIENT)
Dept: INTERNAL MEDICINE CLINIC | Age: 84
End: 2019-07-30

## 2019-08-08 ENCOUNTER — HOSPITAL ENCOUNTER (OUTPATIENT)
Dept: LAB | Age: 84
Discharge: HOME | End: 2019-08-08
Payer: MEDICARE

## 2019-08-08 ENCOUNTER — OFFICE VISIT (OUTPATIENT)
Dept: INTERNAL MEDICINE CLINIC | Age: 84
End: 2019-08-08

## 2019-08-08 VITALS
HEIGHT: 69 IN | SYSTOLIC BLOOD PRESSURE: 138 MMHG | BODY MASS INDEX: 27.85 KG/M2 | TEMPERATURE: 97.7 F | OXYGEN SATURATION: 94 % | RESPIRATION RATE: 10 BRPM | HEART RATE: 68 BPM | DIASTOLIC BLOOD PRESSURE: 76 MMHG | WEIGHT: 188 LBS

## 2019-08-08 DIAGNOSIS — G60.8 IDIOPATHIC SMALL AND LARGE FIBER SENSORY NEUROPATHY: ICD-10-CM

## 2019-08-08 DIAGNOSIS — E78.00 PURE HYPERCHOLESTEROLEMIA: Chronic | ICD-10-CM

## 2019-08-08 DIAGNOSIS — J44.9 CHRONIC OBSTRUCTIVE PULMONARY DISEASE, UNSPECIFIED COPD TYPE (HCC): Chronic | ICD-10-CM

## 2019-08-08 DIAGNOSIS — M54.16 LUMBAR BACK PAIN WITH RADICULOPATHY AFFECTING LEFT LOWER EXTREMITY: ICD-10-CM

## 2019-08-08 DIAGNOSIS — K21.9 GASTROESOPHAGEAL REFLUX DISEASE, ESOPHAGITIS PRESENCE NOT SPECIFIED: ICD-10-CM

## 2019-08-08 DIAGNOSIS — I67.89 CEREBRAL MICROVASCULAR DISEASE: ICD-10-CM

## 2019-08-08 DIAGNOSIS — E11.21 TYPE 2 DIABETES MELLITUS WITH NEPHROPATHY (HCC): ICD-10-CM

## 2019-08-08 DIAGNOSIS — I25.10 CORONARY ARTERY DISEASE INVOLVING NATIVE CORONARY ARTERY OF NATIVE HEART WITHOUT ANGINA PECTORIS: Chronic | ICD-10-CM

## 2019-08-08 DIAGNOSIS — R26.0 SENSORY ATAXIC GAIT: ICD-10-CM

## 2019-08-08 DIAGNOSIS — Z00.00 MEDICARE ANNUAL WELLNESS VISIT, SUBSEQUENT: Primary | ICD-10-CM

## 2019-08-08 DIAGNOSIS — I65.23 BILATERAL CAROTID ARTERY STENOSIS: ICD-10-CM

## 2019-08-08 DIAGNOSIS — E53.8 B12 DEFICIENCY: ICD-10-CM

## 2019-08-08 DIAGNOSIS — I73.9 PVD (PERIPHERAL VASCULAR DISEASE) (HCC): ICD-10-CM

## 2019-08-08 DIAGNOSIS — E11.42 DIABETIC PERIPHERAL NEUROPATHY ASSOCIATED WITH TYPE 2 DIABETES MELLITUS (HCC): ICD-10-CM

## 2019-08-08 PROCEDURE — 80053 COMPREHEN METABOLIC PANEL: CPT

## 2019-08-08 PROCEDURE — 83036 HEMOGLOBIN GLYCOSYLATED A1C: CPT

## 2019-08-08 PROCEDURE — 36415 COLL VENOUS BLD VENIPUNCTURE: CPT

## 2019-08-08 PROCEDURE — 80061 LIPID PANEL: CPT

## 2019-08-08 RX ORDER — TOLNAFTATE 10 MG/G
CREAM TOPICAL DAILY
COMMUNITY
End: 2020-01-29

## 2019-08-08 NOTE — PATIENT INSTRUCTIONS
Preventing Falls: Care Instructions Your Care Instructions Getting around your home safely can be a challenge if you have injuries or health problems that make it easy for you to fall. Loose rugs and furniture in walkways are among the dangers for many older people who have problems walking or who have poor eyesight. People who have conditions such as arthritis, osteoporosis, or dementia also have to be careful not to fall. You can make your home safer with a few simple measures. Follow-up care is a key part of your treatment and safety. Be sure to make and go to all appointments, and call your doctor if you are having problems. It's also a good idea to know your test results and keep a list of the medicines you take. How can you care for yourself at home? Taking care of yourself · You may get dizzy if you do not drink enough water. To prevent dehydration, drink plenty of fluids, enough so that your urine is light yellow or clear like water. Choose water and other caffeine-free clear liquids. If you have kidney, heart, or liver disease and have to limit fluids, talk with your doctor before you increase the amount of fluids you drink. · Exercise regularly to improve your strength, muscle tone, and balance. Walk if you can. Swimming may be a good choice if you cannot walk easily. · Have your vision and hearing checked each year or any time you notice a change. If you have trouble seeing and hearing, you might not be able to avoid objects and could lose your balance. · Know the side effects of the medicines you take. Ask your doctor or pharmacist whether the medicines you take can affect your balance. Sleeping pills or sedatives can affect your balance. · Limit the amount of alcohol you drink. Alcohol can impair your balance and other senses. · Ask your doctor whether calluses or corns on your feet need to be removed.  If you wear loose-fitting shoes because of calluses or corns, you can lose your balance and fall. · Talk to your doctor if you have numbness in your feet. Preventing falls at home · Remove raised doorway thresholds, throw rugs, and clutter. Repair loose carpet or raised areas in the floor. · Move furniture and electrical cords to keep them out of walking paths. · Use nonskid floor wax, and wipe up spills right away, especially on ceramic tile floors. · If you use a walker or cane, put rubber tips on it. If you use crutches, clean the bottoms of them regularly with an abrasive pad, such as steel wool. · Keep your house well lit, especially [place] Brocks, and outside walkways. Use night-lights in areas such as hallways and bathrooms. Add extra light switches or use remote switches (such as switches that go on or off when you clap your hands) to make it easier to turn lights on if you have to get up during the night. · Install sturdy handrails on stairways. · Move items in your cabinets so that the things you use a lot are on the lower shelves (about waist level). · Keep a cordless phone and a flashlight with new batteries by your bed. If possible, put a phone in each of the main rooms of your house, or carry a cell phone in case you fall and cannot reach a phone. Or, you can wear a device around your neck or wrist. You push a button that sends a signal for help. · Wear low-heeled shoes that fit well and give your feet good support. Use footwear with nonskid soles. Check the heels and soles of your shoes for wear. Repair or replace worn heels or soles. · Do not wear socks without shoes on wood floors. · Walk on the grass when the sidewalks are slippery. If you live in an area that gets snow and ice in the winter, sprinkle salt on slippery steps and sidewalks. Preventing falls in the bath · Install grab bars and nonskid mats inside and outside your shower or tub and near the toilet and sinks. · Use shower chairs and bath benches. · Use a hand-held shower head that will allow you to sit while showering. · Get into a tub or shower by putting the weaker leg in first. Get out of a tub or shower with your strong side first. 
· Repair loose toilet seats and consider installing a raised toilet seat to make getting on and off the toilet easier. · Keep your bathroom door unlocked while you are in the shower. Where can you learn more? Go to http://jasvir-yaron.info/. Enter [phone] [id number] [phone] in the search box to learn more about \"Preventing Falls: Care Instructions. \" Current as of: November 7, 2018 Content Version: 12.1 © 7578-4611 [employer]. Care instructions adapted under license by [employer] (which disclaims liability or warranty for this information). If you have questions about a medical condition or this instruction, always ask your healthcare professional. [place] [address] any warranty or liability for your use of this information. Medicare Wellness Visit, Male The best way to live healthy is to have a lifestyle where you eat a well-balanced diet, exercise regularly, limit alcohol use, and quit all forms of tobacco/nicotine, if applicable. Regular preventive services are another way to keep healthy. Preventive services (vaccines, screening tests, monitoring & exams) can help personalize your care plan, which helps you manage your own care. Screening tests can find health problems at the earliest stages, when they are easiest to treat. [facility] follows the current, evidence-based guidelines published by the [facility] [place] (USPSTF) when recommending preventive services for our patients. Because we follow these guidelines, sometimes recommendations change over time as research supports it. (For example, a prostate screening blood test is no longer routinely recommended for men with no symptoms.) Of course, you and your doctor may decide to screen more often for some diseases, based on your risk and co-morbidities (chronic disease you are already diagnosed with). Preventive services for you include: - Medicare offers their members a free annual wellness visit, which is time for you and your primary care provider to discuss and plan for your preventive service needs. Take advantage of this benefit every year! 
-All adults over age 72 should receive the recommended pneumonia vaccines. Current USPSTF guidelines recommend a series of two vaccines for the best pneumonia protection.  
-All adults should have a flu vaccine yearly and an ECG. All adults age 61 and older should receive a shingles vaccine once in their lifetime.   
-All adults age 38-68 who are overweight should have a diabetes screening test once every three years.  
-Other screening tests & preventive services for persons with diabetes include: an eye exam to screen for diabetic retinopathy, a kidney function test, a foot exam, and stricter control over your cholesterol.  
-Cardiovascular screening for adults with routine risk involves an electrocardiogram (ECG) at intervals determined by the provider.  
-Colorectal cancer screening should be done for adults age 54-65 with no increased risk factors for colorectal cancer. There are a number of acceptable methods of screening for this type of cancer. Each test has its own benefits and drawbacks. Discuss with your provider what is most appropriate for you during your annual wellness visit. The different tests include: colonoscopy (considered the best screening method), a fecal occult blood test, a fecal DNA test, and sigmoidoscopy. 
-All adults born between [facility] should be screened once for Hepatitis C. 
-An Abdominal Aortic Aneurysm (AAA) Screening is recommended for men age 73-68 who has ever smoked in their lifetime. Here is a list of your current Health Maintenance items (your personalized list of preventive services) with a due date: 
Health Maintenance Due Topic Date Due  Shingles Vaccine (1 of 2) 12/18/1981  Flu Vaccine  08/01/2019  Hemoglobin A1C    08/08/2019 Agnieszka Valera Annual Well Visit  08/02/2019

## 2019-08-08 NOTE — PROGRESS NOTES
Chief Complaint   Patient presents with    Annual Wellness Visit    Leg Pain     given custom boots from Dr. Phuc Gaytan - wore longer than advised and developed redness and pain in RLE - was evaluated at [address] [place] [address] and given abx for ?cellulitis or inflammed ligament - kept elevated and iced and did NOT use abx - resolved has not yet used boots again at this time.

## 2019-08-08 NOTE — PROGRESS NOTES
This is the Subsequent Medicare Annual Wellness Exam, performed 12 months or more after the Initial AWV or the last Subsequent AWV    I have reviewed the patient's medical history in detail and updated the computerized patient record. As well as followup visit. Sugars are fine. Ongoing issues with cough. Minimal sputum. Some redness in the right leg after wearing a brace - now better. History     Past Medical History:   Diagnosis Date    Arrhythmia     Asthma     CAD (coronary artery disease)     Chronic hip pain     COPD (chronic obstructive pulmonary disease) ([address] 9/10/2010    Diabetes ([address]     DJD (degenerative joint disease)     GERD (gastroesophageal reflux disease)     HNP (herniated nucleus pulposus) 6/2006    lumbar     Hypercholesterolemia     Hypertension     Nausea & vomiting     Prostate cancer ([address] 2003    Reversible ischemic neurologic deficit ([address] 1990    suspected with no residual effects and no recurrance    TIA (transient ischemic attack)     TIA- no deficiets      Past Surgical History:   Procedure Laterality Date    COLONOSCOPY  12/15/2004    Diverticulosis Dr. Dias Even repeat 10 years    HX COLONOSCOPY  2005    HX HEART CATHETERIZATION      2  stents     HX HEART CATHETERIZATION  08/20/2018    failed 100% block @ [facility] will plan to do laser next.  HX HERNIA REPAIR  9/2008    left inguinal hernia repair by Dr. Edenilson Mann Right     inguinal hernai repair    HX HERNIA REPAIR      umbilical hernia repair    HX HIP REPLACEMENT Right 07/06/2016    HX PROSTATECTOMY  2003    HX TONSILLECTOMY      TOTAL HIP ARTHROPLASTY Left 2/9/11    Dr. Derrick Reddy at [facility]     Current Outpatient Medications   Medication Sig Dispense Refill    varicella-zoster recombinant, PF, (SHINGRIX, PF,) 50 mcg/0.5 mL susr injection 0.5 mL by IntraMUSCular route once for 1 dose. 0.5 mL 1    tolnaftate (TINACTIN) 1 % topical cream Apply  to affected area daily.       glucose blood VI test strips (ONETOUCH ULTRA BLUE TEST STRIP) strip by Does Not Apply route Daily (before breakfast). E11.42 100 Strip 3    clopidogrel (PLAVIX) 75 mg tab Take 1 Tab by mouth daily. Long term therapy PAD 90 Tab 3    isosorbide mononitrate ER (IMDUR) 30 mg tablet Take 0.5 Tabs by mouth every morning. Indications: Take 1/2 tab by mouth daily 45 Tab 3    albuterol (PROVENTIL VENTOLIN) 2.5 mg /3 mL (0.083 %) nebu 3 mL by Nebulization route three (3) times daily as needed (wheeze). 100 Each 3    tiotropium bromide (SPIRIVA RESPIMAT) 2.5 mcg/actuation inhaler Take 2 Puffs by inhalation daily. 3 Inhaler 2    glipiZIDE (GLUCOTROL) 5 mg tablet TAKE 1 TABLET BY MOUTH EVERY DAY 90 Tab 1    simvastatin (ZOCOR) 20 mg tablet TAKE 1 TABLET BY MOUTH EVERY NIGHT 90 Tab 1    VENTOLIN HFA 90 mcg/actuation inhaler INHALE 2 PUFFS BY MOUTH EVERY 4 HOURS AS NEEDED FOR WHEEZING 18 Inhaler 5    metFORMIN ER (GLUCOPHAGE XR) 500 mg tablet TAKE 3 TABLETS BY MOUTH EVERY DAY WITH DINNER 270 Tab 1    Nebulizer & Compressor machine Use 3 times per day as needed. 1 Each 0    acetylcysteine (MUCOMYST) 100 mg/mL (10 %) nebulizer solution Take 4 mL by inhalation three (3) times daily. 120 mL 5    fluticasone propion-salmeterol (ADVAIR DISKUS) 250-50 mcg/dose diskus inhaler Take 1 Puff by inhalation every twelve (12) hours. 3 Inhaler 2    lisinopril (PRINIVIL, ZESTRIL) 5 mg tablet Take 1 Tab by mouth daily. 90 Tab 2    coenzyme q10 10 mg cap Take 10 mg by mouth.  metoprolol succinate (TOPROL-XL) 25 mg XL tablet TAKE 1/2 TABLET BY MOUTH EVERY DAY 30 Tab 6    aspirin delayed-release 81 mg tablet Take 1 Tab by mouth daily. 90 Tab 1    cyanocobalamin (VITAMIN B-12) 500 mcg tablet Take 500 mcg by mouth daily.  folic acid 889 mcg tablet Take 400 mcg by mouth daily.  nitroglycerin (NITROSTAT) 0.4 mg SL tablet 1 Tab by SubLINGual route every five (5) minutes as needed for Chest Pain.  1 Bottle 0    MAGNESIUM PO Take 500 mg by mouth daily.      acetaminophen (TYLENOL) 500 mg tablet Take 1 tablet by mouth every eight (8) hours as needed for Pain. 30 tablet 0    CHOLECALCIFEROL, VITAMIN D3, (VITAMIN D3 PO) Take 1,000 Units by mouth daily.  multivitamins-minerals-lutein (CENTRUM SILVER) Tab Take 1 Tab by mouth daily.  fexofenadine (ALLEGRA) 180 mg tablet Take 1 Tab by mouth daily. 30 Tab 11    famotidine (PEPCID AC) 20 mg tablet Take 20 mg by mouth daily.  VITAMIN B COMPLEX (B COMPLEX PO) Take 1 Tab by mouth daily.  ASCORBIC ACID (VITAMIN C PO) Take 1,000 mg by mouth daily.  diclofenac (VOLTAREN) 1 % gel Apply two grams by topical route four times daily to the affected area (s).       ketoconazole (NIZORAL) 2 % topical cream APPLY TO EYEBROWS TWICE A DAY AS NEEDED  7     No Known Allergies  Family History   Problem Relation Age of Onset   [place] Cancer Mother         Breast cancer    Lung Disease Mother         Emphysema    Cancer Father         Bladder cancer    Lung Disease Father         Emphysema    Hypertension Sister         1     Social History     Tobacco Use    Smoking status: Former Smoker     Packs/day: 3.00     Years: 10.00     Pack years: 30.00     Types: Cigarettes     Last attempt to quit: 1/15/1970     Years since quittin.5    Smokeless tobacco: Never Used   Substance Use Topics    Alcohol use: No     Patient Active Problem List   Diagnosis Code    GERD (gastroesophageal reflux disease) K21.9    Low back pain M54.5    Pure hypercholesterolemia E78.00    COPD (chronic obstructive pulmonary disease) [facility] J44.9    Osteoarthritis of hip M16.9    Status post hip replacement Z96.649    Personal history of prostate cancer Z85.46    S/P drug eluting coronary stent placement Z95.5    Incidental pulmonary nodule, greater than or equal to 8mm R91.1    Coronary artery disease involving native coronary artery without angina pectoris I25.10    Retinal hemorrhage H35.60    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    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    SOBOE (shortness of breath on exertion) R06.02    Advance directive discussed with patient Z71.89    Degenerative joint disease of right hip M16.11    Primary localized osteoarthrosis of right hip M16.11    Diabetic peripheral neuropathy associated with type 2 diabetes mellitus ([facility]) E11.42    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    B12 deficiency E53.8    Ataxia R27.0    Sensory ataxic gait R26.0    Cervical arthritis with myelopathy M47.12    Degenerative cervical spinal stenosis M48.02    Bilateral carotid artery stenosis I65.23    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    Vitamin D deficiency E55.9    Vestibular vertigo H81.399    Lumbar back pain with radiculopathy affecting left lower extremity M54.16    Lumbar back pain with radiculopathy affecting right lower extremity M54.16    Type 2 diabetes mellitus with nephropathy ([facility]) E11.21    S/P cardiac cath Z98.890    Angina, class III ([facility]) I20.9    Encounter for staple removal Z48.02    PVD (peripheral vascular disease) ([facility]) I73.9    Varicose veins of both legs with edema I83.893   ROS - Per HPI  Physical Examination: General appearance - alert, well appearing, and in no distress  Ears - bilateral TM's and external ear canals normal  Nose - normal and patent, no erythema, discharge or polyps  Mouth - mucous membranes moist, pharynx normal without lesions  Neck - supple, no significant adenopathy  Lymphatics - no palpable lymphadenopathy, no hepatosplenomegaly  Chest -scattered rhonchi throughout. Minimal wheeze. Heart - normal rate and regular rhythm  Abdomen - soft, nontender, nondistended, no masses or organomegaly  Neurological - alert, oriented, normal speech, no focal findings or movement disorder noted  Musculoskeletal - no joint tenderness, deformity or swelling, abnormal exam of right foot with some mild tenderness across the proximal first metatarsal head. Minimal redness. No warmth. Extremities - peripheral pulses normal, no pedal edema, no clubbing or cyanosis      Depression Risk Factor Screening:     3 most recent PHQ Screens 4/16/2019   Little interest or pleasure in doing things Not at all   Feeling down, depressed, irritable, or hopeless Not at all   Total Score PHQ 2 0   Trouble falling or staying asleep, or sleeping too much -   Feeling tired or having little energy -   Poor appetite, weight loss, or overeating -   Feeling bad about yourself - or that you are a failure or have let yourself or your family down -   Trouble concentrating on things such as school, work, reading, or watching TV -   Moving or speaking so slowly that other people could have noticed; or the opposite being so fidgety that others notice -   Thoughts of being better off dead, or hurting yourself in some way -   PHQ 9 Score -   How difficult have these problems made it for you to do your work, take care of your home and get along with others -     Alcohol Risk Factor Screening: You do not drink alcohol or very rarely. Functional Ability and Level of Safety:   Hearing Loss  Hearing is good. Activities of Daily Living  The home contains: no safety equipment. Patient does total self care    Fall Risk  Fall Risk Assessment, last 12 mths 8/8/2019   Able to walk? Yes   Fall in past 12 months? Yes   Fall with injury?  Yes   Number of falls in past 12 months 4   Fall Risk Score 5       Abuse Screen  Patient is not abused    Cognitive Screening   Evaluation of Cognitive Function:  Has your family/caregiver stated any concerns about your memory: no      Patient Care Team   Patient Care Team:  Anju Manuel MD as PCP - General (Internal Medicine)  Leobardo Sheppard MD (Ophthalmology)  Rebeca Singh MD (Pulmonary Disease)  Samson Dominguez MD (Dermatology)  Krysta Haines MD (Urology)  Gail Goldsmith MD (Orthopedic Surgery)  Saran Montague MD (Cardiology)  Kadeem Mendoza NP (Nurse Practitioner)    Assessment/Plan   Education and counseling provided:  Are appropriate based on today's review and evaluation  End-of-Life planning (with patient's consent)  Influenza Vaccine    Diagnoses and all orders for this visit:    1. Coronary artery disease involving native coronary artery of native heart without angina pectoris-stable and followed by cardiology. Will continue that follow-up. 2. Bilateral carotid artery stenosis-followed by cardiology. 3. Cerebral microvascular disease-on maximum therapy. 4. PVD (peripheral vascular disease) ([facility])-stable. 5. Gastroesophageal reflux disease, esophagitis presence not specified-stable on medications. 6. Diabetic peripheral neuropathy associated with type 2 diabetes mellitus ([facility])-appears controlled. Will check labs today and every 6 months. 7. Type 2 diabetes mellitus with nephropathy ([facility])  -     METABOLIC PANEL, COMPREHENSIVE  -     LIPID PANEL  -     HEMOGLOBIN A1C WITH EAG    8. Idiopathic small and large fiber sensory neuropathy    9. Lumbar back pain with radiculopathy affecting left lower extremity -discussed physical therapy again and he will defer for now. He feels that he is stable using his Rollator. 10. Chronic obstructive pulmonary disease, unspecified COPD type ([facility])-per pulmonary. Appears to be stable. 11. Pure hypercholesterolemia-LDL goal less than 100. Repeat labs to be sure that is met. -     LIPID PANEL    12. B12 deficiency-repleted on last lab work. 13. Sensory ataxic gait-continue to work on increased exercise for strengthening. 14. Medicare annual wellness visit, subsequent    Other orders  -     varicella-zoster recombinant, PF, (SHINGRIX, PF,) 50 mcg/0.5 mL susr injection; 0.5 mL by IntraMUSCular route once for 1 dose.         Health Maintenance Due   Topic Date Due    Shingrix Vaccine Age 49> (1 of 2) 12/18/1981    Influenza Age 5 to Adult  08/01/2019   Radha Phillips HEMOGLOBIN A1C Q6M  08/08/2019    MEDICARE YEARLY EXAM  08/02/2019

## 2019-08-09 LAB
ALBUMIN SERPL-MCNC: 4.3 G/DL (ref 3.5–4.7)
ALBUMIN/GLOB SERPL: 1.7 {RATIO} (ref 1.2–2.2)
ALP SERPL-CCNC: 62 IU/L (ref 39–117)
ALT SERPL-CCNC: 19 IU/L (ref 0–44)
AST SERPL-CCNC: 18 IU/L (ref 0–40)
BILIRUB SERPL-MCNC: 0.4 MG/DL (ref 0–1.2)
BUN SERPL-MCNC: 18 MG/DL (ref 8–27)
BUN/CREAT SERPL: 17 (ref 10–24)
CALCIUM SERPL-MCNC: 9.7 MG/DL (ref 8.6–10.2)
CHLORIDE SERPL-SCNC: 101 MMOL/L (ref 96–106)
CHOLEST SERPL-MCNC: 139 MG/DL (ref 100–199)
CO2 SERPL-SCNC: 23 MMOL/L (ref 20–29)
CREAT SERPL-MCNC: 1.08 MG/DL (ref 0.76–1.27)
EST. AVERAGE GLUCOSE BLD GHB EST-MCNC: 160 MG/DL
GLOBULIN SER CALC-MCNC: 2.5 G/DL (ref 1.5–4.5)
GLUCOSE SERPL-MCNC: 153 MG/DL (ref 65–99)
HBA1C MFR BLD: 7.2 % (ref 4.8–5.6)
HDLC SERPL-MCNC: 46 MG/DL
LDLC SERPL CALC-MCNC: 73 MG/DL (ref 0–99)
POTASSIUM SERPL-SCNC: 5 MMOL/L (ref 3.5–5.2)
PROT SERPL-MCNC: 6.8 G/DL (ref 6–8.5)
SODIUM SERPL-SCNC: 140 MMOL/L (ref 134–144)
TRIGL SERPL-MCNC: 99 MG/DL (ref 0–149)
VLDLC SERPL CALC-MCNC: 20 MG/DL (ref 5–40)

## 2019-10-16 ENCOUNTER — OFFICE VISIT (OUTPATIENT)
Dept: INTERNAL MEDICINE CLINIC | Age: 84
End: 2019-10-16

## 2019-10-16 VITALS
TEMPERATURE: 97.9 F | HEIGHT: 69 IN | SYSTOLIC BLOOD PRESSURE: 176 MMHG | RESPIRATION RATE: 12 BRPM | WEIGHT: 189 LBS | OXYGEN SATURATION: 94 % | HEART RATE: 76 BPM | BODY MASS INDEX: 27.99 KG/M2 | DIASTOLIC BLOOD PRESSURE: 93 MMHG

## 2019-10-16 DIAGNOSIS — R35.0 URINE FREQUENCY: ICD-10-CM

## 2019-10-16 DIAGNOSIS — E11.21 TYPE 2 DIABETES MELLITUS WITH NEPHROPATHY (HCC): Primary | ICD-10-CM

## 2019-10-16 RX ORDER — SOLIFENACIN SUCCINATE 5 MG/1
5 TABLET, FILM COATED ORAL DAILY
Qty: 30 TAB | Refills: 1 | Status: SHIPPED | OUTPATIENT
Start: 2019-10-16 | End: 2019-11-07 | Stop reason: SDUPTHER

## 2019-10-16 NOTE — PROGRESS NOTES
HPI:  Cathleen Sims is a 80y.o. year old male who is here for a routine visit:    Presents today for follow-up visit. Over the last month or more he has had some increased urinary frequency. He is status post prostatectomy. He notes some dribbling as well as some urge incontinence. No dysuria or hematuria. No fevers or chills. No nausea or vomiting. He just had blood work done revealing his blood sugar to be under reasonable control. 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 Lori Larson repeat 10 years    HX COLONOSCOPY  2005    HX HEART CATHETERIZATION      2  stents     HX HEART CATHETERIZATION  08/20/2018    failed 100% block @ [facility] will plan to do laser next.  HX HERNIA REPAIR  9/2008    left inguinal hernia repair by Dr. Gloria Nettles Right     inguinal hernai repair    HX HERNIA REPAIR      umbilical hernia repair    HX HIP REPLACEMENT Right 07/06/2016    HX PROSTATECTOMY  2003    HX TONSILLECTOMY      TOTAL HIP ARTHROPLASTY Left 2/9/11    Dr. Snow Peralta at [facility]       Prior to Admission medications    Medication Sig Start Date End Date Taking? Authorizing Provider   fluticasone propion/salmeterol ([facility]) Take  by inhalation.    Yes Provider, Historical   solifenacin (VESICARE) 5 mg tablet Take 1 Tab by mouth daily. 10/16/19  Yes Wai Nino MD   glucose blood VI test strips (ONETOUCH ULTRA BLUE TEST STRIP) strip by Does Not Apply route Daily (before breakfast). E11.42 8/1/19  Yes Wai Nino MD   clopidogrel (PLAVIX) 75 mg tab Take 1 Tab by mouth daily. Long term therapy PAD 7/24/19  Yes Ron Bass NP   isosorbide mononitrate ER (IMDUR) 30 mg tablet Take 0.5 Tabs by mouth every morning. Indications: Take 1/2 tab by mouth daily 7/24/19  Yes Ron Bass NP   albuterol (PROVENTIL VENTOLIN) 2.5 mg /3 mL (0.083 %) nebu 3 mL by Nebulization route three (3) times daily as needed (wheeze). 7/17/19  Yes Wai Nino MD   tiotropium bromide (SPIRIVA RESPIMAT) 2.5 mcg/actuation inhaler Take 2 Puffs by inhalation daily. 7/17/19  Yes Wai Nino MD   glipiZIDE (GLUCOTROL) 5 mg tablet TAKE 1 TABLET BY MOUTH EVERY DAY 7/12/19  Yes Risa Lesch III, MD   simvastatin (ZOCOR) 20 mg tablet TAKE 1 TABLET BY MOUTH EVERY NIGHT 7/5/19  Yes Risa Lesch III, MD   VENTOLIN HFA 90 mcg/actuation inhaler INHALE 2 PUFFS BY MOUTH EVERY 4 HOURS AS NEEDED FOR WHEEZING 6/23/19  Yes Risa Lesch III, MD   metFORMIN ER (GLUCOPHAGE XR) 500 mg tablet TAKE 3 TABLETS BY MOUTH EVERY DAY WITH DINNER 6/7/19  Yes Wai Nino MD   Nebulizer & Compressor machine Use 3 times per day as needed. 5/1/19  Yes Wai Nino MD   acetylcysteine (MUCOMYST) 100 mg/mL (10 %) nebulizer solution Take 4 mL by inhalation three (3) times daily. 5/1/19  Yes Wai Nino MD   lisinopril (PRINIVIL, ZESTRIL) 5 mg tablet Take 1 Tab by mouth daily. 11/8/18  Yes Ron Bass NP   coenzyme q10 10 mg cap Take 10 mg by mouth. Yes Provider, Historical   metoprolol succinate (TOPROL-XL) 25 mg XL tablet TAKE 1/2 TABLET BY MOUTH EVERY DAY 10/15/18  Yes Ron Bass NP   aspirin delayed-release 81 mg tablet Take 1 Tab by mouth daily.  10/10/18  Yes Cristobal Baldwin MD   cyanocobalamin (VITAMIN B-12) 500 mcg tablet Take 500 mcg by mouth daily. Yes Provider, Historical   folic acid 558 mcg tablet Take 400 mcg by mouth daily. Yes Provider, Historical   nitroglycerin (NITROSTAT) 0.4 mg SL tablet 1 Tab by SubLINGual route every five (5) minutes as needed for Chest Pain. 8/10/18  Yes Uche Chew NP   MAGNESIUM PO Take 500 mg by mouth daily. Yes Provider, Historical   acetaminophen (TYLENOL) 500 mg tablet Take 1 tablet by mouth every eight (8) hours as needed for Pain. 8/28/14  Yes Angel Dickey MD   CHOLECALCIFEROL, VITAMIN D3, (VITAMIN D3 PO) Take 1,000 Units by mouth daily. Yes Provider, Historical   multivitamins-minerals-lutein (CENTRUM SILVER) Tab Take 1 Tab by mouth daily. 5/14/10  Yes Provider, Historical   fexofenadine (ALLEGRA) 180 mg tablet Take 1 Tab by mouth daily. 1/25/10  Yes Sam Langley MD   famotidine (PEPCID AC) 20 mg tablet Take 20 mg by mouth daily. 6/27/11  Yes Provider, Historical   VITAMIN B COMPLEX (B COMPLEX PO) Take 1 Tab by mouth daily. Yes Provider, Historical   ASCORBIC ACID (VITAMIN C PO) Take 1,000 mg by mouth daily. Yes Provider, Historical   tolnaftate (TINACTIN) 1 % topical cream Apply  to affected area daily. Provider, Historical   diclofenac (VOLTAREN) 1 % gel Apply two grams by topical route four times daily to the affected area (s). 2/19/19   Provider, Historical   ketoconazole (NIZORAL) 2 % topical cream APPLY TO EYEBROWS TWICE A DAY AS NEEDED 6/25/19   Provider, Historical   fluticasone propion-salmeterol (ADVAIR DISKUS) 250-50 mcg/dose diskus inhaler Take 1 Puff by inhalation every twelve (12) hours.  3/26/19   Trav Dye MD       Social History     Socioeconomic History    Marital status:      Spouse name: Not on file    Number of children: Not on file    Years of education: Not on file    Highest education level: Not on file   Occupational History    Not on file   Social Needs    Financial resource strain: Not on file   airpim-Ak[employer] insecurity:     Worry: Not on file     Inability: Not on file    Transportation needs:     Medical: Not on file     Non-medical: Not on file   Tobacco Use    Smoking status: Former Smoker     Packs/day: 3.00     Years: 10.00     Pack years: 30.00     Types: Cigarettes     Last attempt to quit: 1/15/1970     Years since quittin.7    Smokeless tobacco: Never Used   Substance and Sexual Activity    Alcohol use: No    Drug use: Yes     Types: Prescription, OTC    Sexual activity: Yes     Partners: Female     Birth control/protection: None   Lifestyle    Physical activity:     Days per week: Not on file     Minutes per session: Not on file    Stress: Not on file   Relationships    Social connections:     Talks on phone: Not on file     Gets together: Not on file     Attends Orthodoxy service: Not on file     Active member of club or organization: Not on file     Attends meetings of clubs or organizations: Not on file     Relationship status: Not on file    Intimate partner violence:     Fear of current or ex partner: Not on file     Emotionally abused: Not on file     Physically abused: Not on file     Forced sexual activity: Not on file   Other Topics Concern    Not on file   Social History Narrative    Not on file          ROS  Per HPI    Visit Vitals  BP (!) 176/93   Pulse 76   Temp 97.9 °F (36.6 °C) (Oral)   Resp 12   Ht 5' 9\" (1.753 m)   Wt 189 lb (85.7 kg)   SpO2 94%   BMI 27.91 kg/m²         Physical Exam   Physical Examination: General appearance - alert, well appearing, and in no distress  Chest -scattered expiratory wheeze. Heart - normal rate and regular rhythm  Abdomen - soft, nontender, nondistended, no masses or organomegaly  Extremities - peripheral pulses normal, no pedal edema, no clubbing or cyanosis      Assessment/Plan:  Diagnoses and all orders for this visit:    1. Type 2 diabetes mellitus with nephropathy (HCC) -stable control by recent labs.   No evidence of hypoglycemia or hyperglycemia. 2. Urine frequency -check urine for infection. Likely overactive bladder symptoms. Will try low-dose Vesicare and see if that is helpful.  -     UA/M W/RFLX CULTURE, COMP    Other orders  -     solifenacin (VESICARE) 5 mg tablet; Take 1 Tab by mouth daily. Advised him to call back or return to office if symptoms worsen/change/persist.  Discussed expected course/resolution/complications of diagnosis in detail with patient. Medication risks/benefits/costs/interactions/alternatives discussed with patient. He was given an after visit summary which includes diagnoses, current medications, & vitals. He expressed understanding with the diagnosis and plan.

## 2019-10-17 LAB
APPEARANCE UR: CLEAR
BACTERIA #/AREA URNS HPF: NORMAL /[HPF]
BILIRUB UR QL STRIP: NEGATIVE
CASTS URNS QL MICRO: NORMAL /LPF
COLOR UR: YELLOW
EPI CELLS #/AREA URNS HPF: NORMAL /HPF (ref 0–10)
GLUCOSE UR QL: NEGATIVE
HGB UR QL STRIP: NEGATIVE
KETONES UR QL STRIP: NEGATIVE
LEUKOCYTE ESTERASE UR QL STRIP: NEGATIVE
MICRO URNS: NORMAL
MICRO URNS: NORMAL
NITRITE UR QL STRIP: NEGATIVE
PH UR STRIP: 6.5 [PH] (ref 5–7.5)
PROT UR QL STRIP: NEGATIVE
RBC #/AREA URNS HPF: NORMAL /HPF (ref 0–2)
SP GR UR: 1.01 (ref 1–1.03)
URINALYSIS REFLEX , 377201: NORMAL
UROBILINOGEN UR STRIP-MCNC: 0.2 MG/DL (ref 0.2–1)
WBC #/AREA URNS HPF: NORMAL /HPF (ref 0–5)

## 2019-10-31 ENCOUNTER — TELEPHONE (OUTPATIENT)
Dept: NEUROLOGY | Age: 84
End: 2019-10-31

## 2019-10-31 ENCOUNTER — TELEPHONE (OUTPATIENT)
Dept: INTERNAL MEDICINE CLINIC | Age: 84
End: 2019-10-31

## 2019-10-31 DIAGNOSIS — H53.9 VISUAL CHANGES: Primary | ICD-10-CM

## 2019-10-31 NOTE — TELEPHONE ENCOUNTER
Pt wife dana called sound confused about some procedure that is being done by the neurologist  On Monday said that [facility] would be calling her    [phone]

## 2019-11-01 NOTE — TELEPHONE ENCOUNTER
Per [facility], pt was seen by Dr. Aye Fuller for visual changes and he is questioning a possible stroke and called [facility] and recommended a MRI brain w/wo. Spoke with pt and spouse. Order placed and  will contact to schedule. Orders Placed This Encounter    MRI BRAIN W WO CONT     Standing Status:   Future     Standing Expiration Date:   11/30/2020     Order Specific Question:   Is Patient Allergic to Contrast Dye? Answer:   Unknown     Order Specific Question:   STAT Creatinine as indicated     Answer:   Yes     The above orders were approved via [facility] per Dr. Jn Echeverria, III.

## 2019-11-04 ENCOUNTER — OFFICE VISIT (OUTPATIENT)
Dept: NEUROLOGY | Age: 84
End: 2019-11-04

## 2019-11-04 VITALS
BODY MASS INDEX: 28.73 KG/M2 | HEIGHT: 69 IN | HEART RATE: 82 BPM | OXYGEN SATURATION: 97 % | DIASTOLIC BLOOD PRESSURE: 70 MMHG | WEIGHT: 194 LBS | SYSTOLIC BLOOD PRESSURE: 130 MMHG

## 2019-11-04 DIAGNOSIS — R27.0 ATAXIA: ICD-10-CM

## 2019-11-04 DIAGNOSIS — R27.0 ATAXIA, UNSPECIFIED: Primary | ICD-10-CM

## 2019-11-04 DIAGNOSIS — E11.42 DIABETIC PERIPHERAL NEUROPATHY ASSOCIATED WITH TYPE 2 DIABETES MELLITUS (HCC): ICD-10-CM

## 2019-11-04 NOTE — PROGRESS NOTES
Neurology Note    Patient ID:  Ting Bang  [id number]  36 y.o.  12/18/1931      Date of Consultation:  November 4, 2019    Referring Physician: Dr. Magdalene Barron    Reason for Consultation:  Gait difficulty    Subjective: I have trouble with my walking. History of Present Illness:   Ting Bang is a 80 y.o. male who returns to the neurology clinic at [facility] for an evaluation. He was last seen by a former neurologist at [facility], Dr. Madalyn Michele in 4/2019. He was being followed for a progressive gait difficulty over many years. After an extensive diagnostic work-up that had been performed, he had been given the diagnosis of a spinocerebellar degeneration and this diagnosis was discussed with the patient and his wife at his last visit. Since he was last here, he has noticed some more difficulties with his vision. He has trouble refocusing when he looks at one object to another. When he is looking straight ahead such as watching television he does not have any difficulty. It is really with changing viewpoints that has become challenging for him    He was recently told by his eye doctor that he should not be driving any longer. He has noticed that his balance continues to get worse. He does use a cane all of the time now he is much more cognizant of his balance difficulties than he did in the past.  He does have a rolling walker but does not use that. He typically uses the cane in one arm and holds onto his wife in the other. He does notice that his speech is a slight bit change from previously as it is more challenging for him to pregnancy 8 words occasionally. He states that his swallowing is okay with solids but he occasionally does choke on liquids. No difficulty with his hearing. He is, nor his wife, feel that there is any difficulties with his cognition. He is able to perform all of the bills without any difficulties.     Does wear compression hose daily up to the thigh. Does have a shower sign. Does use a shopping cart in store. Sleep is ok. No hospitalizations. Past Medical History:   Diagnosis Date    Arrhythmia     Asthma     CAD (coronary artery disease)     Chronic hip pain     COPD [facility] obstructive [facility] disease) [facility] 9/10/2010    Diabetes ([facility]     DJD (degenerative joint disease)     GERD (gastroesophageal reflux disease)     HNP (herniated nucleus pulposus) 2006    lumbar     Hypercholesterolemia     Hypertension     Nausea & vomiting     Prostate cancer ([facility]     Reversible ischemic neurologic deficit ([facility]     suspected with no residual effects and no recurrance    TIA (transient ischemic attack)     TIA- no deficiets        Past Surgical History:   Procedure Laterality Date    COLONOSCOPY  12/15/2004    Diverticulosis Dr. Rl Gomez repeat 10 years    HX COLONOSCOPY      HX HEART CATHETERIZATION      2  stents     HX HEART CATHETERIZATION  2018    failed 100% block @ [facility] will plan to do laser next.  HX HERNIA REPAIR  2008    left inguinal hernia repair by Dr. Jake Tse Right     inguinal hernai repair    HX HERNIA REPAIR      umbilical hernia repair    HX HIP REPLACEMENT Right 2016    HX PROSTATECTOMY  2003    HX TONSILLECTOMY      TOTAL HIP ARTHROPLASTY Left 11    Dr[facility] Eric [facility]        Family History   Problem Relation Age of Onset    Cancer Mother         Breast cancer    Lung Disease Mother         Emphysema    Cancer Father         Bladder cancer    Lung Disease Father         Emphysema    Hypertension Sister         1        Social History     Tobacco Use    Smoking status: Former Smoker     Packs/day: 3.00     Years: 10.00     Pack years: 30.00     Types: Cigarettes     Last attempt to quit: 1/15/1970     Years since quittin.8    Smokeless tobacco: Never Used   Substance Use Topics    Alcohol use:  No No Known Allergies     Prior to Admission medications    Medication Sig Start Date End Date Taking? Authorizing Provider   fluticasone propion/salmeterol ([facility]) Take  by inhalation. Yes Provider, Historical   glucose blood VI test strips (ONETOUCH ULTRA BLUE TEST STRIP) strip by Does Not Apply route Daily (before breakfast). E11.42 8/1/19  Yes Ramon Pope MD   diclofenac (VOLTAREN) 1 % gel Apply two grams by topical route four times daily to the affected area (s). 2/19/19  Yes Provider, Historical   clopidogrel (PLAVIX) 75 mg tab Take 1 Tab by mouth daily. Long term therapy PAD 7/24/19  Yes Matthew Tavarez NP   isosorbide mononitrate ER (IMDUR) 30 mg tablet Take 0.5 Tabs by mouth every morning. Indications: Take 1/2 tab by mouth daily 7/24/19  Yes Matthew Tavarez NP   albuterol (PROVENTIL VENTOLIN) 2.5 mg /3 mL (0.083 %) nebu 3 mL by Nebulization route three (3) times daily as needed (wheeze). 7/17/19  Yes Ramon Pope MD   tiotropium bromide (SPIRIVA RESPIMAT) 2.5 mcg/actuation inhaler Take 2 Puffs by inhalation daily. 7/17/19  Yes Ramon Pope MD   glipiZIDE (GLUCOTROL) 5 mg tablet TAKE 1 TABLET BY MOUTH EVERY DAY 7/12/19  Yes Fabian Casey III, MD   simvastatin (ZOCOR) 20 mg tablet TAKE 1 TABLET BY MOUTH EVERY NIGHT 7/5/19  Yes Fabian Casey III, MD   VENTOLIN HFA 90 mcg/actuation inhaler INHALE 2 PUFFS BY MOUTH EVERY 4 HOURS AS NEEDED FOR WHEEZING 6/23/19  Yes Fabian Casey III, MD   metFORMIN ER (GLUCOPHAGE XR) 500 mg tablet TAKE 3 TABLETS BY MOUTH EVERY DAY WITH DINNER 6/7/19  Yes Ramon Pope MD   Nebulizer & Compressor machine Use 3 times per day as needed. 5/1/19  Yes Ramon Pope MD   acetylcysteine (MUCOMYST) 100 mg/mL (10 %) nebulizer solution Take 4 mL by inhalation three (3) times daily.  5/1/19  Yes Ramon Pope MD   fluticasone propion-salmeterol (ADVAIR DISKUS) 250-50 mcg/dose diskus inhaler Take 1 Puff by inhalation every twelve (12) hours. 3/26/19  Yes Marvel Dougherty MD   lisinopril (PRINIVIL, ZESTRIL) 5 mg tablet Take 1 Tab by mouth daily. 11/8/18  Yes Ac Quinn NP   coenzyme q10 10 mg cap Take 10 mg by mouth. Yes Provider, Historical   metoprolol succinate (TOPROL-XL) 25 mg XL tablet TAKE 1/2 TABLET BY MOUTH EVERY DAY 10/15/18  Yes cA Quinn NP   aspirin delayed-release 81 mg tablet Take 1 Tab by mouth daily. 10/10/18  Yes Ana Albright MD   cyanocobalamin (VITAMIN B-12) 500 mcg tablet Take 500 mcg by mouth daily. Yes Provider, Historical   folic acid 154 mcg tablet Take 400 mcg by mouth daily. Yes Provider, Historical   nitroglycerin (NITROSTAT) 0.4 mg SL tablet 1 Tab by SubLINGual route every five (5) minutes as needed for Chest Pain. 8/10/18  Yes Ac Quinn NP   MAGNESIUM PO Take 500 mg by mouth daily. Yes Provider, Historical   acetaminophen (TYLENOL) 500 mg tablet Take 1 tablet by mouth every eight (8) hours as needed for Pain. 8/28/14  Yes Angel Dickey MD   CHOLECALCIFEROL, VITAMIN D3, (VITAMIN D3 PO) Take 1,000 Units by mouth daily. Yes Provider, Historical   multivitamins-minerals-lutein (CENTRUM SILVER) Tab Take 1 Tab by mouth daily. 5/14/10  Yes Provider, Historical   fexofenadine (ALLEGRA) 180 mg tablet Take 1 Tab by mouth daily. 1/25/10  Yes Samreen Loco MD   famotidine (PEPCID AC) 20 mg tablet Take 20 mg by mouth daily. 6/27/11  Yes Provider, Historical   VITAMIN B COMPLEX (B COMPLEX PO) Take 1 Tab by mouth daily. Yes Provider, Historical   ASCORBIC ACID (VITAMIN C PO) Take 1,000 mg by mouth daily. Yes Provider, Historical   solifenacin (VESICARE) 5 mg tablet Take 1 Tab by mouth daily. 10/16/19   Jason Birch III, MD   tolnaftate (TINACTIN) 1 % topical cream Apply  to affected area daily.     Provider, Historical   ketoconazole (NIZORAL) 2 % topical cream APPLY TO EYEBROWS TWICE A DAY AS NEEDED 6/25/19   Provider, Historical       Review of Systems:    General, constitutional: balance difficulties  Eyes, vision: negative  Ears, nose, throat: negative  Cardiovascular, heart: negative  Respiratory: negative  Gastrointestinal: negative  Genitourinary: negative  Musculoskeletal: hip pain  Skin and integumentary: compression stockings. Psychiatric: negative  Endocrine: negative  Neurological: negative, except for HPI  Hematologic/lymphatic: negative  Allergy/immunology: negative      Objective:     Visit Vitals  /70   Pulse 82   Ht 5' 9\" (1.753 m)   Wt 194 lb (88 kg)   SpO2 97%   BMI 28.65 kg/m²       Physical Exam:      General:  appears well nourished in no acute distress  Neck: no carotid bruits  Lungs: clear to auscultation  Heart:  no murmurs, regular rate  Lower extremity: peripheral pulses palpable. Has compression stockings in place. Skin: intact    Neurological exam:    Awake, alert, oriented to person, place and time. Slow to respond, but appropriate. Recent and remote memory were normal  Attention and concentration were intact  Language was intact. There was no aphasia  Speech: no dysarthria. No true scanning property. Fund of knowledge was preserved    Cranial nerves:   II-XII were tested    Perrrla  Fundoscopic examination revealed venous pulsations and no clear abnormalities  Visual fields were full  Eomi, he does have notable horizontal nystagmus with endgaze (R >L). Also with vertical nystagmus to vertical gaze. Facial sensation:  normal and symmetric  Facial motor: normal and symmetric  Hearing intact  SCM strength intact  Tongue: midline without fasciculations    Motor: Tone normal    No evidence of fasciculations    Strength testing:   deltoid triceps biceps Wrist ext. Wrist flex. intrinsics Hip flex. Hip ext. Knee ext. Knee flex Dorsi flex Plantar flex   Right 5 5 5 5 5 5 5 5 5 5 5 5   Left 5 5 5 5 5 5 5 5 5 5 5 5         Sensory:  Upper extremity: intact to pp, light touch, and vibration > 10 seconds  Lower extremity: decrease to pp up to mid shin. Vibration was absent in his toes, 4 seconds in ankles, 8 seconds at the knees. Reflexes:    Right Left  Biceps  2 2  Triceps 3 3  Brachiorad. 3 3  Patella  3 3  Achilles 1 1    Cerebellar testing:  no tremor apparent, finger/nose and adebayo were intact. Slow, but imtact. Romberg: present    Gait: unsteady. Needs can and additional support. Slow, unsteady. Labs:     Lab Results   Component Value Date/Time    Hemoglobin A1c 7.2 (H) 08/08/2019 08:49 AM    Sodium 140 08/08/2019 08:49 AM    Potassium 5.0 08/08/2019 08:49 AM    Chloride 101 08/08/2019 08:49 AM    Glucose 153 (H) 08/08/2019 08:49 AM    BUN 18 08/08/2019 08:49 AM    Creatinine 1.08 08/08/2019 08:49 AM    Calcium 9.7 08/08/2019 08:49 AM    WBC 9.4 09/26/2018 12:11 PM    HCT 40.4 09/26/2018 12:11 PM    HGB 13.8 09/26/2018 12:11 PM    PLATELET 787 07/60/6286 12:11 PM       Imaging:    Results from Hospital Encounter encounter on 02/12/19   MRI [facility] SPINE WO CONT    Narrative EXAM: MRI [facility] SPINE WO CONT    INDICATION: myelopathy. Cerebrovascular disease, unspecified    COMPARISON: 3/3/2015    TECHNIQUE: MR imaging of the thoracic spine was performed using the following  sequences: sagittal T1, T2, stir; axial T1, T2.     CONTRAST: None. FINDINGS:    There is normal alignment of the thoracic spine. Vertebral body heights are  maintained. Marrow signal is normal. Severe disc space narrowing is noted at  T9-T10. Moderate/severe disc space narrowing is present at T8-T9. Moderate disc  space narrowing is present at T6-T7. Osteophytic endplate changes are noted at  multiple levels. The course, caliber, and signal intensity of the spinal cord are normal.     Patchy, indeterminant signal changes are noted in the lungs. Pulmonary nodules  are not excluded. .     Minimal disc bulges, protrusions, or disc osteophyte complexes are noted at  multiple levels in the and thoracic spine.  The largest is a right paracentral  disc protrusion at T7-T8 partially effacing the right lateral recess. Moderate/severe left neuroforaminal narrowing at T9-T10. Multilevel mild to  mild/moderate neuroforaminal narrowing is seen at multiple other levels  bilaterally. No spinal canal stenosis. Impression IMPRESSION:  1. Multilevel degenerative disc disease and degenerative changes. Moderate/severe neuroforaminal narrowing at T9-T10. No spinal canal stenosis. Please see above report. 2. Nonspecific patchy signal changes in the lower lobes. Pulmonary nodules are  not excluded. CT chest could be obtained for further assessment, as clinically  indicated. Results from [place] encounter on 01/08/19   CT CHEST W CONT    Narrative INDICATION: R84.5  right lung base lesion, shortness of breath, COPD    COMPARISON: August 2015    TECHNIQUE:  Following the uneventful intravenous administration of 80 cc  Isovue-300, 5 mm axial images were obtained through the chest. Coronal and  sagittal reconstructions were generated. CT dose reduction was achieved through  use of a standardized protocol tailored for this examination and automatic  exposure control for dose modulation. FINDINGS:    THYROID: No nodule. MEDIASTINUM: No mass or lymphadenopathy. MIROSLAVA: No mass or lymphadenopathy. THORACIC AORTA: No dissection or aneurysm. MAIN PULMONARY ARTERY: Normal in caliber. TRACHEA/BRONCHI: Patent. ESOPHAGUS: No wall thickening or dilatation. HEART: Normal in size. PLEURA: No effusion or pneumothorax. LUNGS: COPD with emphysema. Bibasilar area of atelectasis and scarring show some  progression. This may be the results of progression of chronic lung disease or  superimposed infiltrate mainly on the right. INCIDENTALLY IMAGED UPPER ABDOMEN: No focal abnormality. BONES: No destructive bone lesion. Impression IMPRESSION:  Chronic lung changes with hyperinflation. Increase interstitial disease at both lung bases as above.          I did independently review the MRI of his brain from February 2019. I do think there is notable atrophy and this is quite prominent in the bilateral cerebellum. Assessment and Plan: This is a pleasant 80-year-old gentleman with multiple medical problems as listed below which impact his overall neurological health who returns to clinic today with worsening vision and balance difficulties. His examination is notable for an gaze nystagmus in multiple spheres, a length dependent sensory neuropathy, and hyperreflexia throughout. He has been given the diagnosis of a spinocerebellar degeneration. 1. 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 I spent a significant amount of time with the patient and his wife today reviewing the results of the imaging and the impact this has. Balance and safety are the most important aspects of preventing harm. We talked that using a cane is probably not enough and that I would recommend that he use his rolling walker. I also did tell him that I would recommend that he not drive due to concerns of visual changes. He acknowledged this and his wife has been driving. 2. Peripheral neuropathy:  This is most likely due to his long-standing history of diabetes. I stressed to him the importance of tight glycemic control. Neuropathy:  we reviewed the causes contributing to the neuropathy. We discussed the importance of exercise and activity. I also reviewed the importance of safety with ambulation and ways to prevents falls. 3. Vascular disease: We did talk about his increased risk of stroke due to his history of hypertension, diabetes, high cholesterol. He will continue with Plavix for antiplatelet and primary stroke prevention. He will continue with aggressive blood pressure, glucose, and cholesterol control.   He did state that he had carotid Doppler studies performed within the last year and those were okay. I do not have a copy of those or see that they have been performed. He will follow-up with Dr. Js Olmos his primary care doctor and if they have not been performed within the last year he will work on making sure they get performed. 4. Brain health: We did talk about the importance of continuing with cognitively stimulating activities daily. He is currently doing sudoku's daily. We also talked about the importance of exercise and how this can help brain health as well.       Patient Active Problem List   Diagnosis Code    GERD (gastroesophageal reflux disease) K21.9    Low back pain M54.5    Pure hypercholesterolemia E78.00    COPD (chronic obstructive pulmonary disease) ([facility]) J44.9    Osteoarthritis of hip M16.9    Status post hip replacement Z96.649    Personal history of prostate cancer Z85.46    S/P drug eluting coronary stent placement Z95.5    Incidental pulmonary nodule, greater than or equal to 8mm R91.1    Coronary artery disease involving native coronary artery without angina pectoris I25.10    Retinal hemorrhage H35.60    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    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    SOBOE (shortness of breath on exertion) R06.02    Advance directive discussed with patient Z71.89    Degenerative joint disease of right hip M16.11    Primary localized osteoarthrosis of right hip M16.11    Diabetic peripheral neuropathy associated with type 2 diabetes mellitus ([facility]) E11.42    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    B12 deficiency E53.8    Ataxia R27.0    Sensory ataxic gait R26.0    Cervical arthritis with myelopathy M47.12    Degenerative cervical spinal stenosis M48.02    Bilateral carotid artery stenosis I65.23    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    Vitamin D deficiency E55.9    Vestibular vertigo H81.399    Lumbar back pain with radiculopathy affecting left lower extremity M54.16    Lumbar back pain with radiculopathy affecting right lower extremity M54.16    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 The patient had the ability to ask questions and all questions were answered.          Signed By:  Iain Duff DO FAAN    November 4, 2019

## 2019-11-04 NOTE — PATIENT INSTRUCTIONS
A Healthy Lifestyle: Care Instructions  Your Care Instructions    A healthy lifestyle can help you feel good, stay at a healthy weight, and have plenty of energy for both work and play. A healthy lifestyle is something you can share with your whole family. A healthy lifestyle also can lower your risk for serious health problems, such as high blood pressure, heart disease, and diabetes. You can follow a few steps listed below to improve your health and the health of your family. Follow-up care is a key part of your treatment and safety. Be sure to make and go to all appointments, and call your doctor if you are having problems. It's also a good idea to know your test results and keep a list of the medicines you take. How can you care for yourself at home? · Do not eat too much sugar, fat, or fast foods. You can still have dessert and treats now and then. The goal is moderation. · Start small to improve your eating habits. Pay attention to portion sizes, drink less juice and soda pop, and eat more fruits and vegetables. ? Eat a healthy amount of food. A 3-ounce serving of meat, for example, is about the size of a deck of cards. Fill the rest of your plate with vegetables and whole grains. ? Limit the amount of soda and sports drinks you have every day. Drink more water when you are thirsty. ? Eat at least 5 servings of fruits and vegetables every day. It may seem like a lot, but it is not hard to reach this goal. A serving or helping is 1 piece of fruit, 1 cup of vegetables, or 2 cups of leafy, raw vegetables. Have an apple or some carrot sticks as an afternoon snack instead of a candy bar. Try to have fruits and/or vegetables at every meal.  · Make exercise part of your daily routine. You may want to start with simple activities, such as walking, bicycling, or slow swimming. Try to be active 30 to 60 minutes every day. You do not need to do all 30 to 60 minutes all at once.  For example, you can exercise 3 times a day for 10 or 20 minutes. Moderate exercise is safe for most people, but it is always a good idea to talk to your doctor before starting an exercise program.  · Keep moving. Russell Mcintosh the lawn, work in the garden, or [place]. Take the stairs instead of the elevator at work. · If you smoke, quit. People who smoke have an increased risk for heart attack, stroke, cancer, and other lung illnesses. Quitting is hard, but there are ways to boost your chance of quitting tobacco for good. ? Use nicotine gum, patches, or lozenges. ? Ask your doctor about stop-smoking programs and medicines. ? Keep trying. In addition to reducing your risk of diseases in the future, you will notice some benefits soon after you stop using tobacco. If you have shortness of breath or asthma symptoms, they will likely get better within a few weeks after you quit. · Limit how much alcohol you drink. Moderate amounts of alcohol (up to 2 drinks a day for men, 1 drink a day for women) are okay. But drinking too much can lead to liver problems, high blood pressure, and other health problems. Family health  If you have a family, there are many things you can do together to improve your health. · Eat meals together as a family as often as possible. · Eat healthy foods. This includes fruits, vegetables, lean meats and dairy, and whole grains. · Include your family in your fitness plan. Most people think of activities such as jogging or tennis as the way to fitness, but there are many ways you and your family can be more active. Anything that makes you breathe hard and gets your heart pumping is exercise. Here are some tips:  ? Walk to do errands or to take your child to school or the bus.  ? Go for a family bike ride after dinner instead of watching TV. Where can you learn more? Go to http://jasvir-yaron.info/. Enter I762 in the search box to learn more about \"A Healthy Lifestyle: Care Instructions. \"  Current as of: May 28, 2019  Content Version: 12.2  © 0790-1967 [employer]. Care instructions adapted under license by [employer] (which disclaims liability or warranty for this information). If you have questions about a medical condition or this instruction, always ask your healthcare professional. [address] any warranty or liability for your use of this information.

## 2019-11-05 ENCOUNTER — TELEPHONE (OUTPATIENT)
Dept: INTERNAL MEDICINE CLINIC | Age: 84
End: 2019-11-05

## 2019-11-05 DIAGNOSIS — H53.9 VISUAL DISTURBANCES: Primary | ICD-10-CM

## 2019-11-07 RX ORDER — SOLIFENACIN SUCCINATE 5 MG/1
TABLET, FILM COATED ORAL
Qty: 30 TAB | Refills: 1 | Status: SHIPPED | OUTPATIENT
Start: 2019-11-07 | End: 2020-01-02

## 2019-11-08 NOTE — TELEPHONE ENCOUNTER
Spoke with pt spouse Aldair Brooks and explained that [address] said that we can do the carotids first and then MRI. Order placed and  will call to help schedule. Verbalized understanding. The above orders were approved via [facility] per Dr. Nichole Bonilla III.

## 2019-11-11 ENCOUNTER — TELEPHONE (OUTPATIENT)
Dept: INTERNAL MEDICINE CLINIC | Age: 84
End: 2019-11-11

## 2019-11-12 DIAGNOSIS — I10 ESSENTIAL HYPERTENSION: ICD-10-CM

## 2019-11-12 RX ORDER — LISINOPRIL 5 MG/1
TABLET ORAL
Qty: 90 TAB | Refills: 2 | Status: SHIPPED | OUTPATIENT
Start: 2019-11-12 | End: 2020-02-28

## 2019-11-12 NOTE — TELEPHONE ENCOUNTER
Spoke with pt spouse and gave information for the  to call and schedule the carotids. To let me know if there is any problems.

## 2019-11-15 ENCOUNTER — HOSPITAL ENCOUNTER (OUTPATIENT)
Dept: VASCULAR SURGERY | Age: 84
Discharge: HOME | End: 2019-11-15
Attending: INTERNAL MEDICINE
Payer: MEDICARE

## 2019-11-15 DIAGNOSIS — H53.9 VISUAL DISTURBANCES: ICD-10-CM

## 2019-11-15 PROCEDURE — 93880 EXTRACRANIAL BILAT STUDY: CPT

## 2019-11-17 LAB
LEFT CCA DIST DIAS: 13.6 CM/S
LEFT CCA DIST SYS: 63.9 CM/S
LEFT CCA PROX DIAS: 21.1 CM/S
LEFT CCA PROX SYS: 89.3 CM/S
LEFT ECA DIAS: 9.44 CM/S
LEFT ECA SYS: 103.9 CM/S
LEFT ICA DIST DIAS: 10.4 CM/S
LEFT ICA DIST SYS: 40.9 CM/S
LEFT ICA MID DIAS: 14.3 CM/S
LEFT ICA MID SYS: 43.1 CM/S
LEFT ICA PROX DIAS: 13.2 CM/S
LEFT ICA PROX SYS: 62.1 CM/S
LEFT ICA/CCA SYS: 0.97
LEFT SUBCLAVIAN DIAS: 0 CM/S
LEFT SUBCLAVIAN SYS: 62.9 CM/S
LEFT VERTEBRAL DIAS: 14.14 CM/S
LEFT VERTEBRAL SYS: 42.7 CM/S
RIGHT CCA DIST DIAS: 17.9 CM/S
RIGHT CCA DIST SYS: 66.7 CM/S
RIGHT CCA PROX DIAS: 17 CM/S
RIGHT CCA PROX SYS: 68.6 CM/S
RIGHT ECA DIAS: 0 CM/S
RIGHT ECA SYS: 100.6 CM/S
RIGHT ICA DIST DIAS: 17.9 CM/S
RIGHT ICA DIST SYS: 59.5 CM/S
RIGHT ICA MID DIAS: 13 CM/S
RIGHT ICA MID SYS: 50.8 CM/S
RIGHT ICA PROX DIAS: 13.7 CM/S
RIGHT ICA PROX SYS: 76.2 CM/S
RIGHT ICA/CCA SYS: 1.1
RIGHT SUBCLAVIAN DIAS: 0 CM/S
RIGHT SUBCLAVIAN SYS: 71.1 CM/S
RIGHT VERTEBRAL DIAS: 12.24 CM/S
RIGHT VERTEBRAL SYS: 39.2 CM/S

## 2019-11-18 RX ORDER — METFORMIN HYDROCHLORIDE 500 MG/1
TABLET, EXTENDED RELEASE ORAL
Qty: 270 TAB | Refills: 1 | Status: SHIPPED | OUTPATIENT
Start: 2019-11-18 | End: 2020-08-05

## 2019-12-09 RX ORDER — METOPROLOL SUCCINATE 25 MG/1
TABLET, EXTENDED RELEASE ORAL
Qty: 30 TAB | Refills: 6 | Status: SHIPPED | OUTPATIENT
Start: 2019-12-09 | End: 2021-01-07

## 2020-01-02 RX ORDER — SOLIFENACIN SUCCINATE 5 MG/1
TABLET, FILM COATED ORAL
Qty: 90 TAB | Refills: 1 | Status: SHIPPED | OUTPATIENT
Start: 2020-01-02 | End: 2020-01-29

## 2020-01-02 NOTE — TELEPHONE ENCOUNTER
Orders Placed This Encounter    solifenacin (VESICARE) 5 mg tablet     Sig: TAKE 1 TABLET BY MOUTH EVERY DAY     Dispense:  90 Tab     Refill:  1     The above orders were approved via [facility] per Dr. Alf Iyer, III.

## 2020-01-07 RX ORDER — GLIPIZIDE 5 MG/1
TABLET ORAL
Qty: 90 TAB | Refills: 1 | Status: SHIPPED | OUTPATIENT
Start: 2020-01-07 | End: 2020-07-17

## 2020-01-26 RX ORDER — SIMVASTATIN 20 MG/1
TABLET, FILM COATED ORAL
Qty: 90 TAB | Refills: 1 | Status: SHIPPED | OUTPATIENT
Start: 2020-01-26 | End: 2020-07-23

## 2020-01-29 ENCOUNTER — OFFICE VISIT (OUTPATIENT)
Dept: CARDIOLOGY CLINIC | Age: 85
End: 2020-01-29

## 2020-01-29 VITALS
RESPIRATION RATE: 16 BRPM | HEIGHT: 69 IN | BODY MASS INDEX: 28.53 KG/M2 | OXYGEN SATURATION: 95 % | DIASTOLIC BLOOD PRESSURE: 82 MMHG | SYSTOLIC BLOOD PRESSURE: 146 MMHG | WEIGHT: 192.6 LBS | HEART RATE: 88 BPM

## 2020-01-29 DIAGNOSIS — I48.3 TYPICAL ATRIAL FLUTTER (HCC): Primary | ICD-10-CM

## 2020-01-29 DIAGNOSIS — I10 ESSENTIAL HYPERTENSION: ICD-10-CM

## 2020-01-29 DIAGNOSIS — I73.9 PVD (PERIPHERAL VASCULAR DISEASE) (HCC): ICD-10-CM

## 2020-01-29 DIAGNOSIS — E78.2 MIXED HYPERLIPIDEMIA: ICD-10-CM

## 2020-01-29 DIAGNOSIS — I25.10 CORONARY ARTERY DISEASE INVOLVING NATIVE CORONARY ARTERY OF NATIVE HEART WITHOUT ANGINA PECTORIS: Chronic | ICD-10-CM

## 2020-01-29 NOTE — PROGRESS NOTES
1. Have you been to the ER, urgent care clinic since your last visit? Hospitalized since your last visit? No    2. Have you seen or consulted any other health care providers outside of the [address] [place] [address] since your last visit? Include any pap smears or colon screening. No    Chief Complaint   Patient presents with    Hypertension     6 mo f/u    Cholesterol Problem     6 mo f/u    Coronary Artery Disease     6 mo f/u    Shortness of Breath     on exertion and at rest; denies chest pressure/tightness    Dizziness       Pt reports he had a fall yesterday due to dizziness. Denies any injury  Denies syncope/headache/blurrd vision    Reported BS last night after he ate was around 186.

## 2020-01-29 NOTE — PROGRESS NOTES
RAFAEL Yu-BC    Subjective/HPI:     Mr. Bruno Vicente is a 80 y.o. male is here for routine f/u. He has a PMHx of CAD, DM2, HTN, HLD, COPD and ataxia. He reports complaints of shortness of breath, which is chronic. His wife states symptoms have been worse in the past week or so. He is not very active. His wife says he has not been moving around since his diagnosis of ataxia, due to concern for falls. He does have a cane and a walker to use, but seldom does so, especially when he is at home. He denies complaints of chest pains, dizziness, orthopnea or PND. He has lower extremity edema, which is controlled using compression stockings. 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 Azucena Walter repeat 10 years    HX COLONOSCOPY  2005    HX HEART CATHETERIZATION      2  stents     HX HEART CATHETERIZATION  08/20/2018    failed 100% block @ [facility] will plan to do laser next.       HX HERNIA REPAIR  9/2008    left inguinal hernia repair by Dr. Mariah Padilla Right     inguinal hernai repair    HX HERNIA REPAIR      umbilical hernia repair    HX HIP REPLACEMENT Right 07/06/2016    HX PROSTATECTOMY  2003    HX TONSILLECTOMY      TOTAL HIP ARTHROPLASTY Left 2/9/11 Dr. Liz Dhaliwal at [facility]     Family History   Problem Relation Age of Onset    Cancer Mother         Breast cancer    Lung Disease Mother         Emphysema    Cancer Father         Bladder cancer    Lung Disease Father         Emphysema    Hypertension Sister         1     Social History     Socioeconomic History    Marital status:      Spouse name: Not on file    Number of children: Not on file    Years of education: Not on file    Highest education level: Not on file   Occupational History    Not on file   Social Needs    Financial resource strain: Not on file    Food insecurity:     Worry: Not on file     Inability: Not on file    Transportation needs:     Medical: Not on file     Non-medical: Not on file   Tobacco Use    Smoking status: Former Smoker     Packs/day: 3.00     Years: 10.00     Pack years: 30.00     Types: Cigarettes     Last attempt to quit: 1/15/1970     Years since quittin.0    Smokeless tobacco: Never Used   Substance and Sexual Activity    Alcohol use: No    Drug use: Yes     Types: Prescription, OTC    Sexual activity: Yes     Partners: Female     Birth control/protection: None   Lifestyle    Physical activity:     Days per week: Not on file     Minutes per session: Not on file    Stress: Not on file   Relationships    Social connections:     Talks on phone: Not on file     Gets together: Not on file     Attends Amish service: Not on file     Active member of club or organization: Not on file     Attends meetings of clubs or organizations: Not on file     Relationship status: Not on file    Intimate partner violence:     Fear of current or ex partner: Not on file     Emotionally abused: Not on file     Physically abused: Not on file     Forced sexual activity: Not on file   Other Topics Concern    Not on file   Social History Narrative    Not on file       No Known Allergies     Current Outpatient Medications   Medication Sig    apixaban (ELIQUIS) 5 mg tablet Take 1 Tab by mouth two (2) times a day.  simvastatin (ZOCOR) 20 mg tablet TAKE 1 TABLET BY MOUTH EVERY DAY AT NIGHT    glipiZIDE (GLUCOTROL) 5 mg tablet TAKE 1 TABLET BY MOUTH EVERY DAY    metoprolol succinate (TOPROL-XL) 25 mg XL tablet TAKE 1/2 TABLET BY MOUTH EVERY DAY    metFORMIN ER (GLUCOPHAGE XR) 500 mg tablet TAKE 3 TABLETS BY MOUTH EVERY DAY WITH DINNER    lisinopril (PRINIVIL, ZESTRIL) 5 mg tablet TAKE 1 TABLET BY MOUTH EVERY DAY    fluticasone propion/salmeterol (WIXELA INHUB IN) Take  by inhalation.  glucose blood VI test strips (ONETOUCH ULTRA BLUE TEST STRIP) strip by Does Not Apply route Daily (before breakfast). E11.42    ketoconazole (NIZORAL) 2 % topical cream APPLY TO EYEBROWS TWICE A DAY AS NEEDED    isosorbide mononitrate ER (IMDUR) 30 mg tablet Take 0.5 Tabs by mouth every morning. Indications: Take 1/2 tab by mouth daily    albuterol (PROVENTIL VENTOLIN) 2.5 mg /3 mL (0.083 %) nebu 3 mL by Nebulization route three (3) times daily as needed (wheeze).  tiotropium bromide (SPIRIVA RESPIMAT) 2.5 mcg/actuation inhaler Take 2 Puffs by inhalation daily.  VENTOLIN HFA 90 mcg/actuation inhaler INHALE 2 PUFFS BY MOUTH EVERY 4 HOURS AS NEEDED FOR WHEEZING    Nebulizer & Compressor machine Use 3 times per day as needed.  acetylcysteine (MUCOMYST) 100 mg/mL (10 %) nebulizer solution Take 4 mL by inhalation three (3) times daily.  coenzyme q10 10 mg cap Take 10 mg by mouth.  aspirin delayed-release 81 mg tablet Take 1 Tab by mouth daily.  cyanocobalamin (VITAMIN B-12) 500 mcg tablet Take 500 mcg by mouth daily.  folic acid 580 mcg tablet Take 400 mcg by mouth daily.  nitroglycerin (NITROSTAT) 0.4 mg SL tablet 1 Tab by SubLINGual route every five (5) minutes as needed for Chest Pain.  MAGNESIUM PO Take 500 mg by mouth daily.  acetaminophen (TYLENOL) 500 mg tablet Take 1 tablet by mouth every eight (8) hours as needed for Pain.     CHOLECALCIFEROL, VITAMIN D3, (VITAMIN D3 PO) Take 1,000 Units by mouth daily.  multivitamins-minerals-lutein (CENTRUM SILVER) Tab Take 1 Tab by mouth daily.  fexofenadine (ALLEGRA) 180 mg tablet Take 1 Tab by mouth daily.  famotidine (PEPCID AC) 20 mg tablet Take 20 mg by mouth daily.  VITAMIN B COMPLEX (B COMPLEX PO) Take 1 Tab by mouth daily.  ASCORBIC ACID (VITAMIN C PO) Take 1,000 mg by mouth daily. No current facility-administered medications for this visit. I have reviewed the problem list, allergy list, medical history, family, social history and medications. Review of Symptoms:    Review of Systems   Constitutional: Negative for chills, fever and weight loss. HENT: Negative for nosebleeds. Eyes: Negative for blurred vision and double vision. Respiratory: Positive for shortness of breath. Negative for cough and wheezing. Cardiovascular: Negative for chest pain, palpitations, orthopnea, leg swelling and PND. Gastrointestinal: Negative for abdominal pain, blood in stool, diarrhea, nausea and vomiting. Musculoskeletal: Negative for joint pain. Skin: Negative for rash. Neurological: Negative for dizziness, tingling and loss of consciousness. Endo/Heme/Allergies: Does not bruise/bleed easily. Physical Exam:      General: Well developed, in no acute distress, cooperative and alert  HEENT: No carotid bruits, no JVD, trach is midline. Neck Supple, PEERL, EOM intact. Heart:  reg rate and rhythm; normal S1/S2; no murmurs, gallops or rubs. Respiratory: Clear bilaterally x 4, no wheezing or rales  Abdomen:   Soft, non-tender, no distention, no masses. + BS. Extremities:  Normal cap refill, no cyanosis, atraumatic. No edema. Neuro: A&Ox3, speech clear, gait stable. Skin: Skin color is normal. No rashes or lesions.  Non diaphoretic  Vascular: 2+ pulses symmetric in all extremities    Vitals:    01/29/20 1043 01/29/20 1106 01/29/20 1107   BP: 140/84 144/82 146/82   Pulse: 88     Resp: 16     SpO2: 95%     Weight: 192 lb 9.6 oz (87.4 kg)     Height: 5' 9\" (1.753 m)         Cardiographics    ECG: atrial flutter; rates controlled  Results for orders placed or performed during the hospital encounter of 10/04/18   EKG, 12 LEAD, INITIAL   Result Value Ref Range    Ventricular Rate 65 BPM    Atrial Rate 65 BPM    P-R Interval 206 ms    QRS Duration 86 ms    Q-T Interval 400 ms    QTC Calculation (Bezet) 416 ms    Calculated P Axis 81 degrees    Calculated R Axis 14 degrees    Calculated T Axis 19 degrees    Diagnosis       Sinus rhythm with premature atrial complexes    Confirmed by Nannette Akhtar MD, Jose Rosado ([id number]) on 10/6/2018 2:28:34 PM         Cardiology Labs:  Lab Results   Component Value Date/Time    Cholesterol, total 139 08/08/2019 08:49 AM    HDL Cholesterol 46 08/08/2019 08:49 AM    LDL, calculated 73 08/08/2019 08:49 AM    Triglyceride 99 08/08/2019 08:49 AM    CHOL/HDL Ratio 3.0 05/05/2010 09:01 AM       Lab Results   Component Value Date/Time    Sodium 140 08/08/2019 08:49 AM    Potassium 5.0 08/08/2019 08:49 AM    Chloride 101 08/08/2019 08:49 AM    CO2 23 08/08/2019 08:49 AM    Anion gap 7 10/05/2018 04:28 AM    Glucose 153 (H) 08/08/2019 08:49 AM    BUN 18 08/08/2019 08:49 AM    Creatinine 1.08 08/08/2019 08:49 AM    BUN/Creatinine ratio 17 08/08/2019 08:49 AM    GFR est AA 71 08/08/2019 08:49 AM    GFR est non-AA 61 08/08/2019 08:49 AM    Calcium 9.7 08/08/2019 08:49 AM    Bilirubin, total 0.4 08/08/2019 08:49 AM    AST (SGOT) 18 08/08/2019 08:49 AM    Alk. phosphatase 62 08/08/2019 08:49 AM    Protein, total 6.8 08/08/2019 08:49 AM    Albumin 4.3 08/08/2019 08:49 AM    Globulin 3.7 06/21/2016 09:18 AM    A-G Ratio 1.7 08/08/2019 08:49 AM    ALT (SGPT) 19 08/08/2019 08:49 AM           Assessment:     Assessment:       ICD-10-CM ICD-9-CM    1. Typical atrial flutter (HCC) I48.3 427.32 REFERRAL TO CARDIOLOGY   2.  Coronary artery disease involving native coronary artery of native heart without angina pectoris I25.10 414.01 AMB POC EKG ROUTINE W/ 12 LEADS, INTER & REP   3. PVD (peripheral vascular disease) ([facility]) I73.9 443.9    4. Mixed hyperlipidemia E78.2 272.2    5. Essential hypertension I10 401.9         Plan:     1. Typical atrial flutter ([facility])  New onset atrial flutter, somewhat symptomatic, rates controlled  Will stop Plavix and start Eliquis 5 mg BID  Refer to Dr. Aminta Florian for A flutter ablation    2. Coronary artery disease involving native coronary artery of native heart without angina pectoris  S/p YARIEL to  [facility] in 10/2018  Doing well, without anginal or anginal equivalent symptoms  Continue Imdur, ASA, Toprol and statin    3. PVD (peripheral vascular disease) ([facility]  Denies claudication symptoms  Has been offered angiogram by Dr. Tosha Tadeo in the past, but continues to defer procedure    4. Mixed hyperlipidemia  Continue statin therapy and low fat, low cholesterol diet  Lipids managed by PCP    5. Essential hypertension  BP controlled. Continue anti-hypertensive therapy and low sodium diet    6. Ataxia  Discussed importance of using cane/walker for ambulation, to prevent falls. Especially important in given use of [facility].     F/u with Dr. Fred Curran in 6 months    Wilver Olivares MD

## 2020-01-30 ENCOUNTER — OFFICE VISIT (OUTPATIENT)
Dept: INTERNAL MEDICINE CLINIC | Age: 85
End: 2020-01-30

## 2020-01-30 ENCOUNTER — TELEPHONE (OUTPATIENT)
Dept: CARDIOLOGY CLINIC | Age: 85
End: 2020-01-30

## 2020-01-30 VITALS
TEMPERATURE: 97.4 F | DIASTOLIC BLOOD PRESSURE: 96 MMHG | HEIGHT: 69 IN | WEIGHT: 193 LBS | RESPIRATION RATE: 16 BRPM | OXYGEN SATURATION: 95 % | BODY MASS INDEX: 28.58 KG/M2 | HEART RATE: 89 BPM | SYSTOLIC BLOOD PRESSURE: 172 MMHG

## 2020-01-30 DIAGNOSIS — I48.3 TYPICAL ATRIAL FLUTTER (HCC): ICD-10-CM

## 2020-01-30 DIAGNOSIS — E11.21 TYPE 2 DIABETES MELLITUS WITH NEPHROPATHY (HCC): ICD-10-CM

## 2020-01-30 DIAGNOSIS — J44.9 CHRONIC OBSTRUCTIVE PULMONARY DISEASE, UNSPECIFIED COPD TYPE (HCC): Primary | ICD-10-CM

## 2020-01-30 RX ORDER — FLUTICASONE PROPIONATE AND SALMETEROL 50; 250 UG/1; UG/1
1 POWDER RESPIRATORY (INHALATION) EVERY 12 HOURS
Qty: 3 INHALER | Refills: 3 | Status: SHIPPED | OUTPATIENT
Start: 2020-01-30 | End: 2020-11-05

## 2020-01-30 RX ORDER — PREDNISONE 10 MG/1
TABLET ORAL
Qty: 30 TAB | Refills: 0 | Status: SHIPPED | OUTPATIENT
Start: 2020-01-30 | End: 2020-02-12 | Stop reason: ALTCHOICE

## 2020-01-30 NOTE — PATIENT INSTRUCTIONS

## 2020-01-30 NOTE — TELEPHONE ENCOUNTER
Spoke to patient's wife, Serena Quiles using 2 identifiers. She stated spouse has dental appt on 2/11/20 for a permanent crown placement. Needs recommendation for Eliquis for ASA prior to dental procedure. Please advise.

## 2020-01-30 NOTE — TELEPHONE ENCOUNTER
Patient wife Navneet Chong would like to speak with the nurse in reference to her  medication new apixaban (ELIQUIS) 5 mg tablet and baby Asprin.           Thanks

## 2020-01-31 ENCOUNTER — TELEPHONE (OUTPATIENT)
Dept: INTERNAL MEDICINE CLINIC | Age: 85
End: 2020-01-31

## 2020-01-31 NOTE — TELEPHONE ENCOUNTER
[facility] [address] (Pharmacy) [phone]     Mail order pharmacy states patient will save approximately $60.00 per prescription if you approve a generic the Advair Diskus sent yesterday.

## 2020-01-31 NOTE — TELEPHONE ENCOUNTER
Spoke to patient's wife, Jany Rodriguez on HIPAA using 2 identifiers. Per Mitali Salas NP, she was made aware of the following:    BECKY Ortega Simona Fickle, LPN   Caller: Unspecified (Yesterday,  8:46 AM)             Still needs ASA.  Can hold that 5-7 days prior to procedure if needed. She verbalized understanding.

## 2020-01-31 NOTE — TELEPHONE ENCOUNTER
Spoke to patient's wife Alex iN on HIPAA using 2 identifiers. She was informed the following per Jolene Azar NP:    BECKY Martin Cecil Bullock, LPN   Caller: Unspecified (Yesterday,  8:46 AM)             Can hold Eliquis 2-3 days prior to procedure if needed; start Eliquis the day afterwards      Patient's wife wanted to know if he still needs to be on ASA. If so, needs recommendation for that as well prior to dental procedure. Please advise.

## 2020-01-31 NOTE — PROGRESS NOTES
HPI:  Yinka Youngblood is a 80y.o. year old male who is here for a routine visit:    Presents for a follow-up visit. Over the last month he has had increasing cough and shortness of breath with some brown sputum. He saw the cardiologist yesterday and was diagnosed with atrial flutter. He was placed on Eliquis and is scheduled to see EP later this month to consider ablation. Denies any fevers or chills. No sweats. No nausea or vomiting. No change in bowel or bladder habits. His blood sugars have been between 101 80. He does not like the new generic Advair as he does not feel the device works very well. He has continued to use his Spiriva on a daily basis. 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 Roche Dye repeat 10 years    HX COLONOSCOPY  2005    HX HEART CATHETERIZATION      2  stents     HX HEART CATHETERIZATION  08/20/2018    failed 100% block @ [facility] will plan to do laser next.       HX HERNIA REPAIR  9/2008    left inguinal hernia repair by Dr. Robles Cook Right     inguinal hernai repair    HX HERNIA REPAIR      umbilical hernia repair    HX HIP REPLACEMENT Right 07/06/2016    HX PROSTATECTOMY  2003    HX TONSILLECTOMY      TOTAL HIP ARTHROPLASTY Left 2/9/11    Dr. Michelle Packer at [facility]       Prior to Admission medications    Medication Sig Start Date End Date Taking? Authorizing Provider   ADVAIR DISKUS 250-50 mcg/dose diskus inhaler Take 1 Puff by inhalation every twelve (12) hours. 1/30/20  Yes Maria Alejandra Clarke MD   predniSONE (DELTASONE) 10 mg tablet 4 for 3 days, 3 for 3 days, 2 for 3 days, and 1 for 3 days. 1/30/20  Yes Maria Alejandra Clarke MD   apixaban (ELIQUIS) 5 mg tablet Take 1 Tab by mouth two (2) times a day. 1/29/20  Yes Aaron Ruano NP   simvastatin (ZOCOR) 20 mg tablet TAKE 1 TABLET BY MOUTH EVERY DAY AT NIGHT 1/26/20  Yes Vaughn Waggoner III, MD   glipiZIDE (GLUCOTROL) 5 mg tablet TAKE 1 TABLET BY MOUTH EVERY DAY 1/7/20  Yes Vaughn Waggoner III, MD   metoprolol succinate (TOPROL-XL) 25 mg XL tablet TAKE 1/2 TABLET BY MOUTH EVERY DAY 12/9/19  Yes Myrna Rider MD   metFORMIN ER (GLUCOPHAGE XR) 500 mg tablet TAKE 3 TABLETS BY MOUTH EVERY DAY WITH DINNER 11/18/19  Yes Maria Alejandra Clarke MD   lisinopril (PRINIVIL, ZESTRIL) 5 mg tablet TAKE 1 TABLET BY MOUTH EVERY DAY 11/12/19  Yes Myrna Rider MD   glucose blood VI test strips (ONETOUCH ULTRA BLUE TEST STRIP) strip by Does Not Apply route Daily (before breakfast). E11.42 8/1/19  Yes Maria Aljeandra Clarke MD   ketoconazole (NIZORAL) 2 % topical cream APPLY TO EYEBROWS TWICE A DAY AS NEEDED 6/25/19  Yes Provider, Historical   isosorbide mononitrate ER (IMDUR) 30 mg tablet Take 0.5 Tabs by mouth every morning. Indications: Take 1/2 tab by mouth daily 7/24/19  Yes Jules Longo NP   albuterol (PROVENTIL VENTOLIN) 2.5 mg /3 mL (0.083 %) nebu 3 mL by Nebulization route three (3) times daily as needed (wheeze). 7/17/19  Yes Maria Alejandra Clarke MD   tiotropium bromide (SPIRIVA RESPIMAT) 2.5 mcg/actuation inhaler Take 2 Puffs by inhalation daily.  7/17/19  Yes Maria Alejandra Clarke MD   VENTOLIN HFA 90 mcg/actuation inhaler INHALE 2 PUFFS BY MOUTH EVERY 4 HOURS AS NEEDED FOR WHEEZING 6/23/19  Yes Maria Alejandra Clarke MD   Nebulizer & Compressor machine Use 3 times per day as needed. 5/1/19  Yes Chase Cheema MD   acetylcysteine (MUCOMYST) 100 mg/mL (10 %) nebulizer solution Take 4 mL by inhalation three (3) times daily. 5/1/19  Yes Chase Cheema MD   coenzyme q10 10 mg cap Take 10 mg by mouth. Yes Provider, Historical   aspirin delayed-release 81 mg tablet Take 1 Tab by mouth daily. 10/10/18  Yes Deanna Navarro MD   cyanocobalamin (VITAMIN B-12) 500 mcg tablet Take 500 mcg by mouth daily. Yes Provider, Historical   folic acid 999 mcg tablet Take 400 mcg by mouth daily. Yes Provider, Historical   nitroglycerin (NITROSTAT) 0.4 mg SL tablet 1 Tab by SubLINGual route every five (5) minutes as needed for Chest Pain. 8/10/18  Yes Quinn Baeza NP   MAGNESIUM PO Take 500 mg by mouth daily. Yes Provider, Historical   acetaminophen (TYLENOL) 500 mg tablet Take 1 tablet by mouth every eight (8) hours as needed for Pain. 8/28/14  Yes Angel Dickey MD   CHOLECALCIFEROL, VITAMIN D3, (VITAMIN D3 PO) Take 1,000 Units by mouth daily. Yes Provider, Historical   multivitamins-minerals-lutein (CENTRUM SILVER) Tab Take 1 Tab by mouth daily. 5/14/10  Yes Provider, Historical   fexofenadine (ALLEGRA) 180 mg tablet Take 1 Tab by mouth daily. 1/25/10  Yes Fadumo Russell MD   famotidine (PEPCID AC) 20 mg tablet Take 20 mg by mouth daily. 6/27/11  Yes Provider, Historical   VITAMIN B COMPLEX (B COMPLEX PO) Take 1 Tab by mouth daily. Yes Provider, Historical   ASCORBIC ACID (VITAMIN C PO) Take 1,000 mg by mouth daily. Yes Provider, Historical   fluticasone propion/salmeterol (WIXELA INHUB IN) Take  by inhalation.   1/30/20  Provider, Historical       Social History     Socioeconomic History    Marital status:      Spouse name: Not on file    Number of children: Not on file    Years of education: Not on file    Highest education level: Not on file   Occupational History    Not on file   Social Needs    Financial resource strain: Not on file    Food insecurity:     Worry: Not on file     Inability: Not on file    Transportation needs:     Medical: Not on file     Non-medical: Not on file   Tobacco Use    Smoking status: Former Smoker     Packs/day: 3.00     Years: 10.00     Pack years: 30.00     Types: Cigarettes     Last attempt to quit: 1/15/1970     Years since quittin.0    Smokeless tobacco: Never Used   Substance and Sexual Activity    Alcohol use: No    Drug use: Yes     Types: Prescription, OTC    Sexual activity: Yes     Partners: Female     Birth control/protection: None   Lifestyle    Physical activity:     Days per week: Not on file     Minutes per session: Not on file    Stress: Not on file   Relationships    Social connections:     Talks on phone: Not on file     Gets together: Not on file     Attends Sikh service: Not on file     Active member of club or organization: Not on file     Attends meetings of clubs or organizations: Not on file     Relationship status: Not on file    Intimate partner violence:     Fear of current or ex partner: Not on file     Emotionally abused: Not on file     Physically abused: Not on file     Forced sexual activity: Not on file   Other Topics Concern    Not on file   Social History Narrative    Not on file          ROS  Per HPI . Denies PND or orthopnea. Visit Vitals  BP (!) 172/96   Pulse 89   Temp 97.4 °F (36.3 °C) (Oral)   Resp 16   Ht 5' 9\" (1.753 m)   Wt 193 lb (87.5 kg)   SpO2 95%   BMI 28.50 kg/m²         Physical Exam   Physical Examination: General appearance - alert, well appearing, and in no distress  Mouth - mucous membranes moist, pharynx normal without lesions  Neck - supple, no significant adenopathy  Lymphatics - No nodes. Chest -diffuse rhonchi. Occasional expiratory wheeze. No rails.   Heart - normal rate, regular rhythm, normal S1, S2, no murmurs, rubs, clicks or gallops  Abdomen - soft, nontender, nondistended, no masses or organomegaly  Neurological - alert, oriented, normal speech, no focal findings or movement disorder noted  Musculoskeletal - no joint tenderness, deformity or swelling  Extremities - peripheral pulses normal, no pedal edema, no clubbing or cyanosis      Assessment/Plan:  Diagnoses and all orders for this visit:    1. Chronic obstructive pulmonary disease, unspecified COPD type ([facility] -appears to be in flare. Does have a history of chronic bronchitis. Will add prednisone and see if that is helpful. We will also go back to branded Advair as it seemed to work better. We will also help to get an appointment with pulmonary.  -     REFERRAL TO PULMONARY DISEASE    2. Typical atrial flutter [facility]) -Per cardiology. Agree with plans for possible ablation. 3. Type 2 diabetes mellitus with nephropathy ([facility] -we will monitor sugar daily and let me know if readings are more than 250. May need to take extra glipizide for control. Other orders  -     ADVAIR DISKUS 250-50 mcg/dose diskus inhaler; Take 1 Puff by inhalation every twelve (12) hours. -     predniSONE (DELTASONE) 10 mg tablet; 4 for 3 days, 3 for 3 days, 2 for 3 days, and 1 for 3 days. Follow-up and Dispositions    · Return if symptoms worsen or fail to improve. Advised him to call back or return to office if symptoms worsen/change/persist.  Discussed expected course/resolution/complications of diagnosis in detail with patient. Medication risks/benefits/costs/interactions/alternatives discussed with patient. He was given an after visit summary which includes diagnoses, current medications, & vitals. He expressed understanding with the diagnosis and plan.

## 2020-02-03 ENCOUNTER — TELEPHONE (OUTPATIENT)
Dept: INTERNAL MEDICINE CLINIC | Age: 85
End: 2020-02-03

## 2020-02-03 NOTE — TELEPHONE ENCOUNTER
Spoke with Nicki and advised to not fill generic Advair. Pt has had this in the past and does not feel it works as well. Rep verbalized understanding.

## 2020-02-03 NOTE — TELEPHONE ENCOUNTER
[facility] [address] (Pharmacy) [phone]     Calling regarding rx for advair, requesting generic.

## 2020-02-12 ENCOUNTER — HOSPITAL ENCOUNTER (OUTPATIENT)
Dept: LAB | Age: 85
Discharge: HOME | End: 2020-02-12
Payer: MEDICARE

## 2020-02-12 ENCOUNTER — OFFICE VISIT (OUTPATIENT)
Dept: INTERNAL MEDICINE CLINIC | Age: 85
End: 2020-02-12

## 2020-02-12 VITALS
HEART RATE: 66 BPM | RESPIRATION RATE: 14 BRPM | TEMPERATURE: 97.8 F | DIASTOLIC BLOOD PRESSURE: 58 MMHG | OXYGEN SATURATION: 93 % | WEIGHT: 185 LBS | BODY MASS INDEX: 27.4 KG/M2 | HEIGHT: 69 IN | SYSTOLIC BLOOD PRESSURE: 115 MMHG

## 2020-02-12 DIAGNOSIS — I25.10 CORONARY ARTERY DISEASE INVOLVING NATIVE CORONARY ARTERY OF NATIVE HEART WITHOUT ANGINA PECTORIS: ICD-10-CM

## 2020-02-12 DIAGNOSIS — E11.42 DIABETIC PERIPHERAL NEUROPATHY ASSOCIATED WITH TYPE 2 DIABETES MELLITUS (HCC): ICD-10-CM

## 2020-02-12 DIAGNOSIS — E11.21 TYPE 2 DIABETES MELLITUS WITH NEPHROPATHY (HCC): ICD-10-CM

## 2020-02-12 DIAGNOSIS — J44.9 CHRONIC OBSTRUCTIVE PULMONARY DISEASE, UNSPECIFIED COPD TYPE (HCC): Primary | ICD-10-CM

## 2020-02-12 DIAGNOSIS — I48.3 TYPICAL ATRIAL FLUTTER (HCC): ICD-10-CM

## 2020-02-12 DIAGNOSIS — I65.23 BILATERAL CAROTID ARTERY STENOSIS: ICD-10-CM

## 2020-02-12 PROCEDURE — 80053 COMPREHEN METABOLIC PANEL: CPT

## 2020-02-12 PROCEDURE — 85025 COMPLETE CBC W/AUTO DIFF WBC: CPT

## 2020-02-12 PROCEDURE — 36415 COLL VENOUS BLD VENIPUNCTURE: CPT

## 2020-02-12 PROCEDURE — 83036 HEMOGLOBIN GLYCOSYLATED A1C: CPT

## 2020-02-12 PROCEDURE — 80061 LIPID PANEL: CPT

## 2020-02-12 PROCEDURE — 82043 UR ALBUMIN QUANTITATIVE: CPT

## 2020-02-12 NOTE — PROGRESS NOTES
HPI:  Brittany Du is a 80y.o. year old male who is here for a routine visit:    Presents for follow-up visit. His breathing is much better after his recent prednisone taper. He continues to have cough with some sputum production of different colors. No fevers or chills. His blood sugar went as high as 319 on steroids. Denies any nausea or vomiting. Denies any change in bowel or bladder habits. He is scheduled to see the cardiologist for atrial flutter in the next few weeks. He is trying to do more exercise with walking on a treadmill about 5 minutes at a time. Past Medical History:   Diagnosis Date    Arrhythmia     Asthma     CAD (coronary artery disease)     Chronic hip pain     COPD (chronic obstructive pulmonary disease) ([address] 9/10/2010    Diabetes ([address]     DJD (degenerative joint disease)     GERD (gastroesophageal reflux disease)     HNP (herniated nucleus pulposus) 6/2006    lumbar     Hypercholesterolemia     Hypertension     Nausea & vomiting     Prostate cancer ([facility] [address] 2003    Reversible ischemic neurologic deficit ([facility] [address] 1990    suspected with no residual effects and no recurrance    TIA (transient ischemic attack)     TIA- no deficiets       Past Surgical History:   Procedure Laterality Date    COLONOSCOPY  12/15/2004    Diverticulosis Dr. Flores Allen repeat 10 years    HX COLONOSCOPY  2005    HX HEART CATHETERIZATION      2  stents     HX HEART CATHETERIZATION  08/20/2018    failed 100% block @ [facility] will plan to do laser next.  HX HERNIA REPAIR  9/2008    left inguinal hernia repair by Dr. Brittany Thompson Right     inguinal hernai repair    HX HERNIA REPAIR      umbilical hernia repair    HX HIP REPLACEMENT Right 07/06/2016    HX PROSTATECTOMY  2003    HX TONSILLECTOMY      TOTAL HIP ARTHROPLASTY Left 2/9/11    Dr. Mercedes Bee at [place]       Prior to Admission medications    Medication Sig Start Date End Date Taking?  Authorizing Provider varicella-zoster recombinant, PF, (SHINGRIX, PF,) 50 mcg/0.5 mL susr injection 0.5 mL by IntraMUSCular route once for 1 dose. 2/12/20 2/12/20 Yes Silvana Berkowitz III, MD   ADVAIR DISKUS 250-50 mcg/dose diskus inhaler Take 1 Puff by inhalation every twelve (12) hours. 1/30/20  Yes Angeles Garvin MD   apixaban (ELIQUIS) 5 mg tablet Take 1 Tab by mouth two (2) times a day. 1/29/20  Yes Sari Ruano NP   simvastatin (ZOCOR) 20 mg tablet TAKE 1 TABLET BY MOUTH EVERY DAY AT NIGHT 1/26/20  Yes Silvana Berkowitz III, MD   glipiZIDE (GLUCOTROL) 5 mg tablet TAKE 1 TABLET BY MOUTH EVERY DAY 1/7/20  Yes Silvana Berkowitz III, MD   metoprolol succinate (TOPROL-XL) 25 mg XL tablet TAKE 1/2 TABLET BY MOUTH EVERY DAY 12/9/19  Yes Barbara Rene MD   metFORMIN ER (GLUCOPHAGE XR) 500 mg tablet TAKE 3 TABLETS BY MOUTH EVERY DAY WITH DINNER 11/18/19  Yes Angeles Garvin MD   lisinopril (PRINIVIL, ZESTRIL) 5 mg tablet TAKE 1 TABLET BY MOUTH EVERY DAY 11/12/19  Yes Barbara Rene MD   glucose blood VI test strips (ONETOUCH ULTRA BLUE TEST STRIP) strip by Does Not Apply route Daily (before breakfast). E11.42 8/1/19  Yes Angeles Garvin MD   ketoconazole (NIZORAL) 2 % topical cream APPLY TO EYEBROWS TWICE A DAY AS NEEDED 6/25/19  Yes Provider, Historical   isosorbide mononitrate ER (IMDUR) 30 mg tablet Take 0.5 Tabs by mouth every morning. Indications: Take 1/2 tab by mouth daily 7/24/19  Yes Haydee Valladares NP   albuterol (PROVENTIL VENTOLIN) 2.5 mg /3 mL (0.083 %) nebu 3 mL by Nebulization route three (3) times daily as needed (wheeze). 7/17/19  Yes Angeles Garvin MD   tiotropium bromide (SPIRIVA RESPIMAT) 2.5 mcg/actuation inhaler Take 2 Puffs by inhalation daily. 7/17/19  Yes Angeles Garvin MD   VENTOLIN HFA 90 mcg/actuation inhaler INHALE 2 PUFFS BY MOUTH EVERY 4 HOURS AS NEEDED FOR WHEEZING 6/23/19  Yes Angeles Garvin MD   Nebulizer & Compressor machine Use 3 times per day as needed. 5/1/19  Yes Elsa Randall MD   acetylcysteine (MUCOMYST) 100 mg/mL (10 %) nebulizer solution Take 4 mL by inhalation three (3) times daily. 5/1/19  Yes Elsa Randall MD   coenzyme q10 10 mg cap Take 10 mg by mouth. Yes Provider, Historical   aspirin delayed-release 81 mg tablet Take 1 Tab by mouth daily. 10/10/18  Yes David Paul MD   cyanocobalamin (VITAMIN B-12) 500 mcg tablet Take 500 mcg by mouth daily. Yes Provider, Historical   folic acid 606 mcg tablet Take 400 mcg by mouth daily. Yes Provider, Historical   nitroglycerin (NITROSTAT) 0.4 mg SL tablet 1 Tab by SubLINGual route every five (5) minutes as needed for Chest Pain. 8/10/18  Yes Kari Alexandra NP   MAGNESIUM PO Take 500 mg by mouth daily. Yes Provider, Historical   acetaminophen (TYLENOL) 500 mg tablet Take 1 tablet by mouth every eight (8) hours as needed for Pain. 8/28/14  Yes Angel Dickey MD   CHOLECALCIFEROL, VITAMIN D3, (VITAMIN D3 PO) Take 1,000 Units by mouth daily. Yes Provider, Historical   multivitamins-minerals-lutein (CENTRUM SILVER) Tab Take 1 Tab by mouth daily. 5/14/10  Yes Provider, Historical   fexofenadine (ALLEGRA) 180 mg tablet Take 1 Tab by mouth daily. 1/25/10  Yes Stewart Salcedo MD   famotidine (PEPCID AC) 20 mg tablet Take 20 mg by mouth daily. 6/27/11  Yes Provider, Historical   VITAMIN B COMPLEX (B COMPLEX PO) Take 1 Tab by mouth daily. Yes Provider, Historical   ASCORBIC ACID (VITAMIN C PO) Take 1,000 mg by mouth daily. Yes Provider, Historical   predniSONE (DELTASONE) 10 mg tablet 4 for 3 days, 3 for 3 days, 2 for 3 days, and 1 for 3 days.  1/30/20 2/12/20  Elsa Randall MD       Social History     Socioeconomic History    Marital status:      Spouse name: Not on file    Number of children: Not on file    Years of education: Not on file    Highest education level: Not on file   Occupational History    Not on file   Social Needs    Financial resource strain: Not on file    Food insecurity:     Worry: Not on file     Inability: Not on file    Transportation needs:     Medical: Not on file     Non-medical: Not on file   Tobacco Use    Smoking status: Former Smoker     Packs/day: 3.00     Years: 10.00     Pack years: 30.00     Types: Cigarettes     Last attempt to quit: 1/15/1970     Years since quittin.1    Smokeless tobacco: Never Used   Substance and Sexual Activity    Alcohol use: No    Drug use: Yes     Types: Prescription, OTC    Sexual activity: Yes     Partners: Female     Birth control/protection: None   Lifestyle    Physical activity:     Days per week: Not on file     Minutes per session: Not on file    Stress: Not on file   Relationships    Social connections:     Talks on phone: Not on file     Gets together: Not on file     Attends Christianity service: Not on file     Active member of club or organization: Not on file     Attends meetings of clubs or organizations: Not on file     Relationship status: Not on file    Intimate partner violence:     Fear of current or ex partner: Not on file     Emotionally abused: Not on file     Physically abused: Not on file     Forced sexual activity: Not on file   Other Topics Concern    Not on file   Social History Narrative    Not on file          ROS  Per HPI    Visit Vitals  /58   Pulse 66   Temp 97.8 °F (36.6 °C) (Oral)   Resp 14   Ht 5' 9\" (1.753 m)   Wt 185 lb (83.9 kg)   SpO2 93%   BMI 27.32 kg/m²         Physical Exam   Physical Examination: General appearance - alert, well appearing, and in no distress  Mouth - mucous membranes moist, pharynx normal without lesions  Neck - supple, no significant adenopathy  Chest - clear to auscultation, no wheezes, rales or rhonchi, symmetric air entry  Heart - normal rate and regular rhythm  Abdomen - soft, nontender, nondistended, no masses or organomegaly  Neurological - alert, oriented, normal speech, no focal findings or movement disorder noted  Extremities - peripheral pulses normal, no pedal edema, no clubbing or cyanosis      Assessment/Plan:  Diagnoses and all orders for this visit:    1. Chronic obstructive pulmonary disease, unspecified COPD type (HCC)-appears improved. Will see pulmonary for follow-up. 2. Type 2 diabetes mellitus with nephropathy (HCC)-Tere sugars are less than 150. Will check labs for an A1c goal of less than 7.5.  -     CBC WITH AUTOMATED DIFF  -     METABOLIC PANEL, COMPREHENSIVE  -     LIPID PANEL  -     MICROALBUMIN, UR, RAND W/ MICROALB/CREAT RATIO  -     HEMOGLOBIN A1C WITH EAG    3. Typical atrial flutter (HCC)-cardiology to see and continue Eliquis. Discussed the importance of not missing doses. 4. Coronary artery disease involving native coronary artery of native heart without angina pectoris-stable. 5. Bilateral carotid artery stenosis -stable. 6. Diabetic peripheral neuropathy associated with type 2 diabetes mellitus (HCC)-strongly encouraged to increase exercise for stamina and to avoid falls. Other orders  -     varicella-zoster recombinant, PF, (SHINGRIX, PF,) 50 mcg/0.5 mL susr injection; 0.5 mL by IntraMUSCular route once for 1 dose. Follow-up and Dispositions    · Return in about 6 months (around 8/12/2020) for CPE. Advised him to call back or return to office if symptoms worsen/change/persist.  Discussed expected course/resolution/complications of diagnosis in detail with patient. Medication risks/benefits/costs/interactions/alternatives discussed with patient. He was given an after visit summary which includes diagnoses, current medications, & vitals. He expressed understanding with the diagnosis and plan.

## 2020-02-13 LAB
ALBUMIN SERPL-MCNC: 4.3 G/DL (ref 3.6–4.6)
ALBUMIN/CREAT UR: 8 MG/G CREAT (ref 0–29)
ALBUMIN/GLOB SERPL: 2 {RATIO} (ref 1.2–2.2)
ALP SERPL-CCNC: 68 IU/L (ref 39–117)
ALT SERPL-CCNC: 28 IU/L (ref 0–44)
AST SERPL-CCNC: 20 IU/L (ref 0–40)
BASOPHILS # BLD AUTO: 0.1 X10E3/UL (ref 0–0.2)
BASOPHILS NFR BLD AUTO: 1 %
BILIRUB SERPL-MCNC: 0.6 MG/DL (ref 0–1.2)
BUN SERPL-MCNC: 23 MG/DL (ref 8–27)
BUN/CREAT SERPL: 18 (ref 10–24)
CALCIUM SERPL-MCNC: 9.3 MG/DL (ref 8.6–10.2)
CHLORIDE SERPL-SCNC: 98 MMOL/L (ref 96–106)
CHOLEST SERPL-MCNC: 137 MG/DL (ref 100–199)
CO2 SERPL-SCNC: 23 MMOL/L (ref 20–29)
CREAT SERPL-MCNC: 1.3 MG/DL (ref 0.76–1.27)
CREAT UR-MCNC: 66.8 MG/DL
EOSINOPHIL # BLD AUTO: 0.6 X10E3/UL (ref 0–0.4)
EOSINOPHIL NFR BLD AUTO: 6 %
ERYTHROCYTE [DISTWIDTH] IN BLOOD BY AUTOMATED COUNT: 12.5 % (ref 11.6–15.4)
EST. AVERAGE GLUCOSE BLD GHB EST-MCNC: 180 MG/DL
GLOBULIN SER CALC-MCNC: 2.2 G/DL (ref 1.5–4.5)
GLUCOSE SERPL-MCNC: 125 MG/DL (ref 65–99)
HBA1C MFR BLD: 7.9 % (ref 4.8–5.6)
HCT VFR BLD AUTO: 44.7 % (ref 37.5–51)
HDLC SERPL-MCNC: 48 MG/DL
HGB BLD-MCNC: 15.3 G/DL (ref 13–17.7)
IMM GRANULOCYTES # BLD AUTO: 0.1 X10E3/UL (ref 0–0.1)
IMM GRANULOCYTES NFR BLD AUTO: 1 %
LDLC SERPL CALC-MCNC: 68 MG/DL (ref 0–99)
LYMPHOCYTES # BLD AUTO: 1.5 X10E3/UL (ref 0.7–3.1)
LYMPHOCYTES NFR BLD AUTO: 15 %
MCH RBC QN AUTO: 31 PG (ref 26.6–33)
MCHC RBC AUTO-ENTMCNC: 34.2 G/DL (ref 31.5–35.7)
MCV RBC AUTO: 91 FL (ref 79–97)
MICROALBUMIN UR-MCNC: 5.1 UG/ML
MONOCYTES # BLD AUTO: 1.1 X10E3/UL (ref 0.1–0.9)
MONOCYTES NFR BLD AUTO: 11 %
NEUTROPHILS # BLD AUTO: 6.9 X10E3/UL (ref 1.4–7)
NEUTROPHILS NFR BLD AUTO: 66 %
PLATELET # BLD AUTO: 324 X10E3/UL (ref 150–450)
POTASSIUM SERPL-SCNC: 5.1 MMOL/L (ref 3.5–5.2)
PROT SERPL-MCNC: 6.5 G/DL (ref 6–8.5)
RBC # BLD AUTO: 4.94 X10E6/UL (ref 4.14–5.8)
SODIUM SERPL-SCNC: 135 MMOL/L (ref 134–144)
TRIGL SERPL-MCNC: 105 MG/DL (ref 0–149)
VLDLC SERPL CALC-MCNC: 21 MG/DL (ref 5–40)
WBC # BLD AUTO: 10.2 X10E3/UL (ref 3.4–10.8)

## 2020-02-25 ENCOUNTER — HOSPITAL ENCOUNTER (INPATIENT)
Age: 85
LOS: 3 days | Discharge: HOME HEALTH | DRG: 086 | End: 2020-02-28
Attending: EMERGENCY MEDICINE | Admitting: INTERNAL MEDICINE
Payer: MEDICARE

## 2020-02-25 ENCOUNTER — OFFICE VISIT (OUTPATIENT)
Dept: CARDIOLOGY CLINIC | Age: 85
End: 2020-02-25

## 2020-02-25 ENCOUNTER — APPOINTMENT (OUTPATIENT)
Dept: CT IMAGING | Age: 85
DRG: 086 | End: 2020-02-25
Attending: PHYSICIAN ASSISTANT
Payer: MEDICARE

## 2020-02-25 VITALS
DIASTOLIC BLOOD PRESSURE: 70 MMHG | HEART RATE: 86 BPM | OXYGEN SATURATION: 95 % | SYSTOLIC BLOOD PRESSURE: 126 MMHG | BODY MASS INDEX: 28.53 KG/M2 | RESPIRATION RATE: 18 BRPM | WEIGHT: 192.6 LBS | HEIGHT: 69 IN

## 2020-02-25 DIAGNOSIS — Z79.01 CHRONIC ANTICOAGULATION: ICD-10-CM

## 2020-02-25 DIAGNOSIS — I48.92 CHRONIC ATRIAL FLUTTER (HCC): ICD-10-CM

## 2020-02-25 DIAGNOSIS — Z95.5 S/P DRUG ELUTING CORONARY STENT PLACEMENT: ICD-10-CM

## 2020-02-25 DIAGNOSIS — I69.993 CVA, OLD, ATAXIA: ICD-10-CM

## 2020-02-25 DIAGNOSIS — E78.2 MIXED HYPERLIPIDEMIA: ICD-10-CM

## 2020-02-25 DIAGNOSIS — I10 ESSENTIAL HYPERTENSION: ICD-10-CM

## 2020-02-25 DIAGNOSIS — Z86.79 HISTORY OF IRREGULAR HEARTBEAT: ICD-10-CM

## 2020-02-25 DIAGNOSIS — S06.5X0A TRAUMATIC SUBDURAL HEMATOMA WITHOUT LOSS OF CONSCIOUSNESS, INITIAL ENCOUNTER (HCC): Primary | ICD-10-CM

## 2020-02-25 DIAGNOSIS — I48.3 TYPICAL ATRIAL FLUTTER (HCC): Primary | ICD-10-CM

## 2020-02-25 DIAGNOSIS — I25.10 CORONARY ARTERY DISEASE INVOLVING NATIVE CORONARY ARTERY OF NATIVE HEART WITHOUT ANGINA PECTORIS: ICD-10-CM

## 2020-02-25 PROBLEM — E11.3599 TYPE 2 DIABETES MELLITUS WITH PROLIFERATIVE RETINOPATHY (HCC): Status: ACTIVE | Noted: 2020-02-25

## 2020-02-25 PROBLEM — S06.5XAA SUBDURAL HEMATOMA (HCC): Status: ACTIVE | Noted: 2020-02-25

## 2020-02-25 LAB
ALBUMIN SERPL-MCNC: 3.2 G/DL (ref 3.5–5)
ALBUMIN/GLOB SERPL: 0.9 {RATIO} (ref 1.1–2.2)
ALP SERPL-CCNC: 75 U/L (ref 45–117)
ALT SERPL-CCNC: 25 U/L (ref 12–78)
ANION GAP SERPL CALC-SCNC: 3 MMOL/L (ref 5–15)
APPEARANCE UR: CLEAR
AST SERPL-CCNC: 16 U/L (ref 15–37)
ATRIAL RATE: 261 BPM
BASOPHILS # BLD: 0.1 K/UL (ref 0–0.1)
BASOPHILS NFR BLD: 1 % (ref 0–1)
BILIRUB SERPL-MCNC: 0.6 MG/DL (ref 0.2–1)
BILIRUB UR QL: NEGATIVE
BUN SERPL-MCNC: 16 MG/DL (ref 6–20)
BUN/CREAT SERPL: 15 (ref 12–20)
CALCIUM SERPL-MCNC: 9 MG/DL (ref 8.5–10.1)
CALCULATED P AXIS, ECG09: 51 DEGREES
CALCULATED R AXIS, ECG10: 43 DEGREES
CALCULATED T AXIS, ECG11: 39 DEGREES
CHLORIDE SERPL-SCNC: 103 MMOL/L (ref 97–108)
CO2 SERPL-SCNC: 28 MMOL/L (ref 21–32)
COLOR UR: NORMAL
CREAT SERPL-MCNC: 1.08 MG/DL (ref 0.7–1.3)
DIAGNOSIS, 93000: NORMAL
DIFFERENTIAL METHOD BLD: ABNORMAL
EOSINOPHIL # BLD: 0.5 K/UL (ref 0–0.4)
EOSINOPHIL NFR BLD: 7 % (ref 0–7)
ERYTHROCYTE [DISTWIDTH] IN BLOOD BY AUTOMATED COUNT: 13.5 % (ref 11.5–14.5)
GLOBULIN SER CALC-MCNC: 3.6 G/DL (ref 2–4)
GLUCOSE SERPL-MCNC: 173 MG/DL (ref 65–100)
GLUCOSE UR STRIP.AUTO-MCNC: NEGATIVE MG/DL
HCT VFR BLD AUTO: 38.4 % (ref 36.6–50.3)
HGB BLD-MCNC: 13.2 G/DL (ref 12.1–17)
HGB UR QL STRIP: NEGATIVE
IMM GRANULOCYTES # BLD AUTO: 0 K/UL (ref 0–0.04)
IMM GRANULOCYTES NFR BLD AUTO: 0 % (ref 0–0.5)
KETONES UR QL STRIP.AUTO: NEGATIVE MG/DL
LEUKOCYTE ESTERASE UR QL STRIP.AUTO: NEGATIVE
LYMPHOCYTES # BLD: 1.3 K/UL (ref 0.8–3.5)
LYMPHOCYTES NFR BLD: 18 % (ref 12–49)
MCH RBC QN AUTO: 31.4 PG (ref 26–34)
MCHC RBC AUTO-ENTMCNC: 34.4 G/DL (ref 30–36.5)
MCV RBC AUTO: 91.4 FL (ref 80–99)
MONOCYTES # BLD: 0.7 K/UL (ref 0–1)
MONOCYTES NFR BLD: 10 % (ref 5–13)
NEUTS SEG # BLD: 4.7 K/UL (ref 1.8–8)
NEUTS SEG NFR BLD: 64 % (ref 32–75)
NITRITE UR QL STRIP.AUTO: NEGATIVE
NRBC # BLD: 0 K/UL (ref 0–0.01)
NRBC BLD-RTO: 0 PER 100 WBC
PH UR STRIP: 7 [PH] (ref 5–8)
PLATELET # BLD AUTO: 242 K/UL (ref 150–400)
PMV BLD AUTO: 9.6 FL (ref 8.9–12.9)
POTASSIUM SERPL-SCNC: 4.3 MMOL/L (ref 3.5–5.1)
PROT SERPL-MCNC: 6.8 G/DL (ref 6.4–8.2)
PROT UR STRIP-MCNC: NEGATIVE MG/DL
Q-T INTERVAL, ECG07: 372 MS
QRS DURATION, ECG06: 88 MS
QTC CALCULATION (BEZET), ECG08: 447 MS
RBC # BLD AUTO: 4.2 M/UL (ref 4.1–5.7)
SODIUM SERPL-SCNC: 134 MMOL/L (ref 136–145)
SP GR UR REFRACTOMETRY: 1.01 (ref 1–1.03)
UROBILINOGEN UR QL STRIP.AUTO: 0.2 EU/DL (ref 0.2–1)
VENTRICULAR RATE, ECG03: 87 BPM
WBC # BLD AUTO: 7.2 K/UL (ref 4.1–11.1)

## 2020-02-25 PROCEDURE — 93005 ELECTROCARDIOGRAM TRACING: CPT

## 2020-02-25 PROCEDURE — 36415 COLL VENOUS BLD VENIPUNCTURE: CPT

## 2020-02-25 PROCEDURE — 74011250637 HC RX REV CODE- 250/637: Performed by: INTERNAL MEDICINE

## 2020-02-25 PROCEDURE — 81003 URINALYSIS AUTO W/O SCOPE: CPT

## 2020-02-25 PROCEDURE — 74011000250 HC RX REV CODE- 250: Performed by: EMERGENCY MEDICINE

## 2020-02-25 PROCEDURE — 70450 CT HEAD/BRAIN W/O DYE: CPT

## 2020-02-25 PROCEDURE — 65660000000 HC RM CCU STEPDOWN

## 2020-02-25 PROCEDURE — 74011000250 HC RX REV CODE- 250: Performed by: INTERNAL MEDICINE

## 2020-02-25 PROCEDURE — 85025 COMPLETE CBC W/AUTO DIFF WBC: CPT

## 2020-02-25 PROCEDURE — 99285 EMERGENCY DEPT VISIT HI MDM: CPT

## 2020-02-25 PROCEDURE — 80053 COMPREHEN METABOLIC PANEL: CPT

## 2020-02-25 PROCEDURE — 94640 AIRWAY INHALATION TREATMENT: CPT

## 2020-02-25 RX ORDER — ACETAMINOPHEN 325 MG/1
650 TABLET ORAL
Status: DISCONTINUED | OUTPATIENT
Start: 2020-02-25 | End: 2020-02-28 | Stop reason: HOSPADM

## 2020-02-25 RX ORDER — ARFORMOTEROL TARTRATE 15 UG/2ML
15 SOLUTION RESPIRATORY (INHALATION)
Status: DISCONTINUED | OUTPATIENT
Start: 2020-02-25 | End: 2020-02-28 | Stop reason: HOSPADM

## 2020-02-25 RX ORDER — BUDESONIDE 0.5 MG/2ML
500 INHALANT ORAL
Status: DISCONTINUED | OUTPATIENT
Start: 2020-02-25 | End: 2020-02-28 | Stop reason: HOSPADM

## 2020-02-25 RX ORDER — ACETAMINOPHEN 500 MG
500 TABLET ORAL
Status: DISCONTINUED | OUTPATIENT
Start: 2020-02-25 | End: 2020-02-28 | Stop reason: HOSPADM

## 2020-02-25 RX ORDER — METOPROLOL SUCCINATE 25 MG/1
12.5 TABLET, EXTENDED RELEASE ORAL DAILY
Status: DISCONTINUED | OUTPATIENT
Start: 2020-02-26 | End: 2020-02-28 | Stop reason: HOSPADM

## 2020-02-25 RX ORDER — ONDANSETRON 2 MG/ML
4 INJECTION INTRAMUSCULAR; INTRAVENOUS
Status: DISCONTINUED | OUTPATIENT
Start: 2020-02-25 | End: 2020-02-25 | Stop reason: SDUPTHER

## 2020-02-25 RX ORDER — SODIUM CHLORIDE 0.9 % (FLUSH) 0.9 %
5-40 SYRINGE (ML) INJECTION EVERY 8 HOURS
Status: DISCONTINUED | OUTPATIENT
Start: 2020-02-25 | End: 2020-02-28 | Stop reason: HOSPADM

## 2020-02-25 RX ORDER — IPRATROPIUM BROMIDE 0.5 MG/2.5ML
0.5 SOLUTION RESPIRATORY (INHALATION)
Status: DISCONTINUED | OUTPATIENT
Start: 2020-02-25 | End: 2020-02-28

## 2020-02-25 RX ORDER — ISOSORBIDE MONONITRATE 30 MG/1
15 TABLET, EXTENDED RELEASE ORAL
Status: DISCONTINUED | OUTPATIENT
Start: 2020-02-26 | End: 2020-02-28 | Stop reason: HOSPADM

## 2020-02-25 RX ORDER — LISINOPRIL 5 MG/1
5 TABLET ORAL DAILY
Status: DISCONTINUED | OUTPATIENT
Start: 2020-02-26 | End: 2020-02-27

## 2020-02-25 RX ORDER — SODIUM CHLORIDE 0.9 % (FLUSH) 0.9 %
5-40 SYRINGE (ML) INJECTION AS NEEDED
Status: DISCONTINUED | OUTPATIENT
Start: 2020-02-25 | End: 2020-02-28 | Stop reason: HOSPADM

## 2020-02-25 RX ORDER — ONDANSETRON 2 MG/ML
4 INJECTION INTRAMUSCULAR; INTRAVENOUS
Status: DISCONTINUED | OUTPATIENT
Start: 2020-02-25 | End: 2020-02-28 | Stop reason: HOSPADM

## 2020-02-25 RX ORDER — ATORVASTATIN CALCIUM 10 MG/1
10 TABLET, FILM COATED ORAL
Status: DISCONTINUED | OUTPATIENT
Start: 2020-02-25 | End: 2020-02-28 | Stop reason: HOSPADM

## 2020-02-25 RX ADMIN — ATORVASTATIN CALCIUM 10 MG: 10 TABLET, FILM COATED ORAL at 21:33

## 2020-02-25 RX ADMIN — BUDESONIDE 500 MCG: 0.5 INHALANT RESPIRATORY (INHALATION) at 22:03

## 2020-02-25 RX ADMIN — Medication 10 ML: at 16:26

## 2020-02-25 RX ADMIN — IPRATROPIUM BROMIDE 0.5 MG: 0.5 SOLUTION RESPIRATORY (INHALATION) at 22:03

## 2020-02-25 RX ADMIN — Medication 10 ML: at 21:33

## 2020-02-25 RX ADMIN — ARFORMOTEROL TARTRATE 15 MCG: 15 SOLUTION RESPIRATORY (INHALATION) at 22:03

## 2020-02-25 NOTE — ED PROVIDER NOTES
EMERGENCY DEPARTMENT HISTORY AND PHYSICAL EXAM      Date: 2/25/2020  Patient Name: Noelle Quinones    History of Presenting Illness     Chief Complaint   Patient presents with   [facility] on 2/21/20 and his the right side of his head. Has had visual disturbance since       History Provided By: Patient and Patient's Wife    HPI: Noelle Quinones, 80 y.o. male with a history of CAD currently on Eliquis, CVA with ataxia, hypertension, diabetes and others presents ambulatory with his wife to the ED with cc of 4 days of dizziness and blurry vision that is worse after stumbling in the bathroom and striking his head up against the wall. There was no loss of consciousness. His wife tells me he did leave a dent in the wall. Patient tells me the dizziness has been going on for some time but after stopping into the wall it worsened. He normally ambulates without assistive devices but since his fall he has been using his walker. There is no acute vision loss. There is no neck pain. He was seen by cardiology today for consideration of EP management for atrial flutter and was referred to the emergency department. There are no other complaints, changes, or physical findings at this time.     PCP: Ramone Gee MD    Current Facility-Administered Medications   Medication Dose Route Frequency Provider Last Rate Last Dose    acetaminophen (TYLENOL) tablet 650 mg  650 mg Oral Q6H PRN Hazel Payne MD        sodium chloride (NS) flush 5-40 mL  5-40 mL IntraVENous Q8H Hazel Payne MD   10 mL at 02/25/20 1626    sodium chloride (NS) flush 5-40 mL  5-40 mL IntraVENous PRN Hazel Payne MD        ondansetron [facility]) injection 4 mg  4 mg IntraVENous Q4H PRN Hazel Payne MD        acetaminophen (TYLENOL) tablet 500 mg  500 mg Oral Q8H PRN Hazel Payne MD        atorvastatin (LIPITOR) tablet 10 mg  10 mg Oral QHS Hazel Payne MD        [START ON 2/26/2020] metoprolol succinate (TOPROL-XL) XL tablet 12.5 mg  12.5 mg Oral DAILY Joan Watson MD        [START ON 2/26/2020] lisinopril (PRINIVIL, ZESTRIL) tablet 5 mg  5 mg Oral DAILY Joan Watson MD        [START ON 2/26/2020] isosorbide mononitrate ER (IMDUR) tablet 15 mg  15 mg Oral 7am Patel, Elisabeth Simmonds, MD        arformoteroL (BROVANA) neb solution 15 mcg  15 mcg Nebulization BID RT Joan Watson MD        And    budesonide (PULMICORT) 500 mcg/2 ml nebulizer suspension  500 mcg Nebulization BID RT Joan Watson MD        ipratropium (ATROVENT) 0.02 % nebulizer solution 0.5 mg  0.5 mg Nebulization Q6H RT Michael Barry MD         Past History     Past Medical History:  Past Medical History:   Diagnosis Date    Arrhythmia     Asthma     CAD (coronary artery disease)     Chronic hip pain     COPD (chronic obstructive pulmonary disease) ([address] 9/10/2010    Diabetes (HCC)     DJD (degenerative joint disease)     GERD (gastroesophageal reflux disease)     HNP (herniated nucleus pulposus) 6/2006    lumbar     Hypercholesterolemia     Hypertension     Nausea & vomiting     Prostate cancer ([address] 2003    Reversible ischemic neurologic deficit ([address] 1990    suspected with no residual effects and no recurrance    TIA (transient ischemic attack)     TIA- no deficiets       Past Surgical History:  Past Surgical History:   Procedure Laterality Date    COLONOSCOPY  12/15/2004    Diverticulosis Dr. Kami Younger repeat 10 years    HX COLONOSCOPY  2005    HX HEART CATHETERIZATION      2  stents     HX HEART CATHETERIZATION  08/20/2018    failed 100% block @ [facility] will plan to do laser next.       HX HERNIA REPAIR  9/2008    left inguinal hernia repair by Dr. Viri Wagoner Right     inguinal hernai repair    HX HERNIA REPAIR      umbilical hernia repair    HX HIP REPLACEMENT Right 07/06/2016    HX PROSTATECTOMY  2003    HX TONSILLECTOMY      TOTAL HIP ARTHROPLASTY Left 2/9/11    Dr. Kumar Solares at [facility] Family History:  Family History   Problem Relation Age of Onset   Alyne Pamela Cancer Mother         Breast cancer    Lung Disease Mother         Emphysema    Cancer Father         Bladder cancer    Lung Disease Father         Emphysema    Hypertension Sister         1       Social History:  Social History     Tobacco Use    Smoking status: Former Smoker     Packs/day: 3.00     Years: 10.00     Pack years: 30.00     Types: Cigarettes     Last attempt to quit: 1/15/1970     Years since quittin.1    Smokeless tobacco: Never Used   Substance Use Topics    Alcohol use: No    Drug use: Yes     Types: Prescription, OTC       Allergies:  No Known Allergies  Review of Systems   Review of Systems   Constitutional: Negative for fatigue and fever. HENT: Negative for congestion, ear pain and rhinorrhea. Eyes: Negative for pain and redness. Respiratory: Negative for cough and wheezing. Cardiovascular: Negative for chest pain and palpitations. Gastrointestinal: Negative for abdominal pain, nausea and vomiting. Genitourinary: Negative for dysuria, frequency and urgency. Musculoskeletal: Negative for back pain, neck pain and neck stiffness. Skin: Negative for rash and wound. Neurological: Positive for dizziness. Negative for weakness, light-headedness, numbness and headaches. Head injury 4 days ago     Physical Exam   Physical Exam  Vitals signs and nursing note reviewed. Constitutional:       General: He is not in acute distress. Appearance: He is well-developed. He is not toxic-appearing. HENT:      Head: Normocephalic and atraumatic. No right periorbital erythema or left periorbital erythema. Jaw: No trismus. Right Ear: External ear normal.      Left Ear: External ear normal.      Nose: Nose normal.      Mouth/Throat:      Pharynx: Uvula midline. Eyes:      General: No scleral icterus.      Conjunctiva/sclera: Conjunctivae normal.      Pupils: Pupils are equal, round, and reactive to light. Neck:      Musculoskeletal: Full passive range of motion without pain and normal range of motion. Cardiovascular:      Rate and Rhythm: Normal rate and regular rhythm. Heart sounds: Normal heart sounds. Pulmonary:      Effort: Pulmonary effort is normal. No tachypnea, accessory muscle usage or respiratory distress. Breath sounds: Normal breath sounds. No decreased breath sounds or wheezing. Abdominal:      Palpations: Abdomen is soft. Abdomen is not rigid. Tenderness: There is no abdominal tenderness. There is no guarding. Musculoskeletal: Normal range of motion. Skin:     Findings: No rash. Neurological:      Mental Status: He is alert and oriented to person, place, and time. He is not disoriented. GCS: GCS eye subscore is 4. GCS verbal subscore is 5. GCS motor subscore is 6. Cranial Nerves: No cranial nerve deficit. Sensory: No sensory deficit.    Psychiatric:         Speech: Speech normal.       Diagnostic Study Results     Labs -     Recent Results (from the past 12 hour(s))   EKG, 12 LEAD, INITIAL    Collection Time: 02/25/20  3:04 PM   Result Value Ref Range    Ventricular Rate 87 BPM    Atrial Rate 261 BPM    QRS Duration 88 ms    Q-T Interval 372 ms    QTC Calculation (Bezet) 447 ms    Calculated P Axis 51 degrees    Calculated R Axis 43 degrees    Calculated T Axis 39 degrees    Diagnosis       Atrial flutter with 3:1 AV conduction  When compared with ECG of 05-OCT-2018 04:26,  Atrial flutter has replaced Sinus rhythm     METABOLIC PANEL, COMPREHENSIVE    Collection Time: 02/25/20  3:26 PM   Result Value Ref Range    Sodium 134 (L) 136 - 145 mmol/L    Potassium 4.3 3.5 - 5.1 mmol/L    Chloride 103 97 - 108 mmol/L    CO2 28 21 - 32 mmol/L    Anion gap 3 (L) 5 - 15 mmol/L    Glucose 173 (H) 65 - 100 mg/dL    BUN 16 6 - 20 MG/DL    Creatinine 1.08 0.70 - 1.30 MG/DL    BUN/Creatinine ratio 15 12 - 20      GFR est AA >60 >60 ml/min/1.73m2    GFR est non-AA >60 >60 ml/min/1.73m2    Calcium 9.0 8.5 - 10.1 MG/DL    Bilirubin, total 0.6 0.2 - 1.0 MG/DL    ALT (SGPT) 25 12 - 78 U/L    AST (SGOT) 16 15 - 37 U/L    Alk. phosphatase 75 45 - 117 U/L    Protein, total 6.8 6.4 - 8.2 g/dL    Albumin 3.2 (L) 3.5 - 5.0 g/dL    Globulin 3.6 2.0 - 4.0 g/dL    A-G Ratio 0.9 (L) 1.1 - 2.2     CBC WITH AUTOMATED DIFF    Collection Time: 02/25/20  3:26 PM   Result Value Ref Range    WBC 7.2 4.1 - 11.1 K/uL    RBC 4.20 4. 10 - 5.70 M/uL    HGB 13.2 12.1 - 17.0 g/dL    HCT 38.4 36.6 - 50.3 %    MCV 91.4 80.0 - 99.0 FL    MCH 31.4 26.0 - 34.0 PG    MCHC 34.4 30.0 - 36.5 g/dL    RDW 13.5 11.5 - 14.5 %    PLATELET 743 483 - 387 K/uL    MPV 9.6 8.9 - 12.9 FL    NRBC 0.0 0  WBC    ABSOLUTE NRBC 0.00 0.00 - 0.01 K/uL    NEUTROPHILS 64 32 - 75 %    LYMPHOCYTES 18 12 - 49 %    MONOCYTES 10 5 - 13 %    EOSINOPHILS 7 0 - 7 %    BASOPHILS 1 0 - 1 %    IMMATURE GRANULOCYTES 0 0.0 - 0.5 %    ABS. NEUTROPHILS 4.7 1.8 - 8.0 K/UL    ABS. LYMPHOCYTES 1.3 0.8 - 3.5 K/UL    ABS. MONOCYTES 0.7 0.0 - 1.0 K/UL    ABS. EOSINOPHILS 0.5 (H) 0.0 - 0.4 K/UL    ABS. BASOPHILS 0.1 0.0 - 0.1 K/UL    ABS. IMM. GRANS. 0.0 0.00 - 0.04 K/UL    DF AUTOMATED     URINALYSIS W/ RFLX MICROSCOPIC    Collection Time: 02/25/20  3:36 PM   Result Value Ref Range    Color YELLOW/STRAW      Appearance CLEAR CLEAR      Specific gravity 1.013 1.003 - 1.030      pH (UA) 7.0 5.0 - 8.0      Protein NEGATIVE  NEG mg/dL    Glucose NEGATIVE  NEG mg/dL    Ketone NEGATIVE  NEG mg/dL    Bilirubin NEGATIVE  NEG      Blood NEGATIVE  NEG      Urobilinogen 0.2 0.2 - 1.0 EU/dL    Nitrites NEGATIVE  NEG      Leukocyte Esterase NEGATIVE  NEG         Radiologic Studies -   CT HEAD WO CONT   Final Result   Impression:    1.  Chronic bilateral subdural hematomas, with a small amount of superimposed   acute hemorrhage in the left subdural. All of these findings are new since   February 2019.   2. Periventricular white matter disease is likely secondary to chronic small   vessel ischemic changes. 3. Chronic left parietal infarction. CT HEAD WO CONT    (Results Pending)     CT Results  (Last 48 hours)               02/25/20 1346  CT HEAD WO CONT Final result    Impression:  Impression:    1. Chronic bilateral subdural hematomas, with a small amount of superimposed   acute hemorrhage in the left subdural. All of these findings are new since   February 2019.   2. Periventricular white matter disease is likely secondary to chronic small   vessel ischemic changes. 3. Chronic left parietal infarction. Narrative: Indication:  fall; pain        Comparison: CT 1/2/2019       Findings: 5 mm axial images were obtained from the skull base through the   vertex. CT dose reduction was achieved through the use of a standardized protocol   tailored for this examination and automatic exposure control for dose   modulation. There are chronic bilateral subdural hematomas, measuring up to 8 mm in greatest   thickness. There is a small amount of superimposed acute hemorrhage in the left   subdural space. There is no midline shift. The ventricles and cortical sulci are prominent, compatible with age related   volume loss. There is no evidence of intracranial mass, mass effect or acute   infarct. There is a chronic left parietal infarction. There is periventricular   white matter disease. There is partial opacification of the right maxillary   sinus. The orbital structures are unremarkable. No osseous abnormalities are   seen. CXR Results  (Last 48 hours)    None        Medical Decision Making   I am the first provider for this patient. I reviewed the vital signs, available nursing notes, past medical history, past surgical history, family history and social history. Vital Signs-Reviewed the patient's vital signs.   Patient Vitals for the past 12 hrs:   Temp Pulse Resp BP SpO2   02/25/20 1944 (P) 98.7 °F (37.1 °C) 87 (P) 18 189/82 93 %   02/25/20 1928    149/87    02/25/20 1645  87 22 151/85 91 %   02/25/20 1630  87 19 154/88 90 %   02/25/20 1615    177/88    02/25/20 1600   21 143/85 90 %   02/25/20 1545    (!) 155/95    02/25/20 1445    (!) 161/98    02/25/20 1438  86  (!) 137/92    02/25/20 1430    (!) 137/92 94 %   02/25/20 1322 98.1 °F (36.7 °C) 87 18 180/89 93 %       Pulse Oximetry Analysis - 93% on RA    Records Reviewed: Nursing Notes, Old Medical Records, Previous Radiology Studies and Previous Laboratory Studies    Provider Notes (Medical Decision Making):   DDx: ICH, chronic anticoagulation, fall    Consult Note:  2:50 PM  Gregg Kirk PA-C spoke with Hector Khan MD  Specialty: Neurosurgery  Discussed pt's hx, disposition, and available diagnostic and imaging results. Reviewed care plans. Consultant agrees with plans as outlined. Dr. Kamala Lowe recommends discontinuing Eliquis and admitting to hospitalist for repeat CT. Consult Note:  3:09 PM  Gregg Kirk PA-C spoke with Dr. BR[facility], MD  Specialty: Hospitalist  Discussed pt's hx, disposition, and available diagnostic and imaging results. Reviewed care plans. Consultant agrees with plans as outlined. Agrees to admit. ED Course:   Initial assessment performed. The patients presenting problems have been discussed, and they are in agreement with the care plan formulated and outlined with them. I have encouraged them to ask questions as they arise throughout their visit. Disposition:  Admit    PLAN:  1. Current Discharge Medication List        2. Follow-up Information    None       Return to ED if worse     Diagnosis     Clinical Impression:   1. Traumatic subdural hematoma without loss of consciousness, initial encounter ([facility] [address]    2. Chronic anticoagulation    3.  Chronic atrial flutter (HCC)

## 2020-02-25 NOTE — H&P
Hospitalist Admission Note    NAME: Clemente Paulino   :  1931   MRN:  [id number]     Date/Time:  2020 4:52 PM    Patient PCP: Flavio Ayoub MD  ______________________________________________________________________  Given the patient's current clinical presentation, I have a high level of concern for decompensation if discharged from the emergency department. Complex decision making was performed, which includes reviewing the patient's available past medical records, laboratory results, and x-ray films. My assessment of this patient's clinical condition and my plan of care is as follows. Assessment / Plan:  Acute left subdural hematoma, small POA  Chronic bilateral subdural hematomas POA  Status post fall on   H/o atrial flutter, rate controlled    -Recently diagnosed with atrial flutter in 2020, placed on Eliquis.  -Had multiple falls, CAT scan here showed chronic subdural hematoma with small acute on left subdural.  -Neurosurgery has seen the patient, recommends repeat CAT scan tomorrow  -Hold Eliquis  Will ask cardiology's recommendation ,?aspirin  -Will need urgent CAT scan of brain if develop any neurological signs while in the hospital    -History of CAD, PCI in 2018  -History of peripheral vascular disease  History of COPD  History of hypertension  History of diabetes mellitus  History of ataxia    Continue home medications        Code Status: Full Code  Surrogate Decision Maker: wife    DVT Prophylaxis: SCD  GI Prophylaxis: not indicated    Baseline: ambulatory      Subjective:   CHIEF COMPLAINT: fall    HISTORY OF PRESENT ILLNESS:     Clemente Paulino is a 80 y.o.  male who presents with past medical history of CAD, COPD, diabetes mellitus, hypertension, recently diagnosed with atrial flutter was placed on Eliquis  1 month ago,  was sent from cardiologist office for CAT scan of head.   He had a fall 4 days ago and hit his head, he did not lose consciousness but he reported that it caused some problems with his vision. He had a headache that day but resolved. He denies any nausea or vomiting no seizure he was seen in the ED today had a CAT scan which showed small bleed was seen by neurosurgery, recommends admission and repeat CAT scan tomorrow. We were asked to admit for work up and evaluation of the above problems. Past Medical History:   Diagnosis Date    Arrhythmia     Asthma     CAD (coronary artery disease)     Chronic hip pain     COPD (chronic obstructive pulmonary disease) ([address] 9/10/2010    Diabetes ([facility] [address]     DJD (degenerative joint disease)     GERD (gastroesophageal reflux disease)     HNP (herniated nucleus pulposus) 2006    lumbar     Hypercholesterolemia     Hypertension     Nausea & vomiting     Prostate cancer ([facility] [address]     Reversible ischemic neurologic deficit ([facility] [address]     suspected with no residual effects and no recurrance    TIA (transient ischemic attack)     TIA- no deficiets        Past Surgical History:   Procedure Laterality Date    COLONOSCOPY  12/15/2004    Diverticulosis Dr. Marivel Dhaliwal repeat 10 years    HX COLONOSCOPY      HX HEART CATHETERIZATION      2  stents     HX HEART CATHETERIZATION  2018    failed 100% block @ [facility] will plan to do laser next.  HX HERNIA REPAIR  2008    left inguinal hernia repair by Dr. Galdamez High Right     inguinal hernai repair    HX HERNIA REPAIR      umbilical hernia repair    HX HIP REPLACEMENT Right 2016    HX PROSTATECTOMY      HX TONSILLECTOMY      TOTAL HIP ARTHROPLASTY Left 11    [facility] Use    Smoking status: Former Smoker     Packs/day: 3.00     Years: 10.00     Pack years: 30.00     Types: Cigarettes     Last attempt to quit: 1/15/1970     Years since quittin.1    Smokeless tobacco: Never Used   Substance Use Topics    Alcohol use:  No Family History   Problem Relation Age of Onset   [facility] Cancer Mother         Breast cancer    Lung Disease Mother         Emphysema    Cancer Father         Bladder cancer    Lung Disease Father         Emphysema    Hypertension Sister         2015     No Known Allergies     Prior to Admission medications    Medication Sig Start Date End Date Taking? Authorizing Provider   ADVAIR DISKUS 250-50 mcg/dose diskus inhaler Take 1 Puff by inhalation every twelve (12) hours. 1/30/20   Elizabeth Cormier III, MD   apixaban (ELIQUIS) 5 mg tablet Take 1 Tab by mouth two (2) times a day. 1/29/20   Tien Ruano NP   simvastatin (ZOCOR) 20 mg tablet TAKE 1 TABLET BY MOUTH EVERY DAY AT NIGHT 1/26/20   Elizabeth Cormier III, MD   glipiZIDE (GLUCOTROL) 5 mg tablet TAKE 1 TABLET BY MOUTH EVERY DAY 1/7/20   Elizabeth Cormier III, MD   metoprolol succinate (TOPROL-XL) 25 mg XL tablet TAKE 1/2 TABLET BY MOUTH EVERY DAY 12/9/19   Bre Jeffries MD   metFORMIN ER (GLUCOPHAGE XR) 500 mg tablet TAKE 3 TABLETS BY MOUTH EVERY DAY WITH DINNER 11/18/19   Elizabeth Cormier III, MD   lisinopril (PRINIVIL, ZESTRIL) 5 mg tablet TAKE 1 TABLET BY MOUTH EVERY DAY 11/12/19   Bre Jeffries MD   glucose blood VI test strips (ONETOUCH ULTRA BLUE TEST STRIP) strip by Does Not Apply route Daily (before breakfast). E11.42 8/1/19   Ferdinand Mejia MD   ketoconazole (NIZORAL) 2 % topical cream APPLY TO EYEBROWS TWICE A DAY AS NEEDED 6/25/19   Provider, Historical   isosorbide mononitrate ER (IMDUR) 30 mg tablet Take 0.5 Tabs by mouth every morning. Indications: Take 1/2 tab by mouth daily 7/24/19   Gila Burger NP   albuterol (PROVENTIL VENTOLIN) 2.5 mg /3 mL (0.083 %) nebu 3 mL by Nebulization route three (3) times daily as needed (wheeze). 7/17/19   Elizabeth Cormier III, MD   tiotropium bromide (SPIRIVA RESPIMAT) 2.5 mcg/actuation inhaler Take 2 Puffs by inhalation daily.  7/17/19   Ferdinand Mejia MD   VENTOLIN HFA 90 mcg/actuation inhaler INHALE 2 PUFFS BY MOUTH EVERY 4 HOURS AS NEEDED FOR WHEEZING 6/23/19   Blaine Shepherd MD   Nebulizer & Compressor machine Use 3 times per day as needed. 5/1/19   Blaine Shepherd MD   acetylcysteine (MUCOMYST) 100 mg/mL (10 %) nebulizer solution Take 4 mL by inhalation three (3) times daily. 5/1/19   Deanne James III, MD   coenzyme q10 10 mg cap Take 10 mg by mouth daily. Provider, Historical   aspirin delayed-release 81 mg tablet Take 1 Tab by mouth daily. 10/10/18   Ravinder Eldridge MD   cyanocobalamin (VITAMIN B-12) 500 mcg tablet Take 500 mcg by mouth daily. Provider, Historical   folic acid 850 mcg tablet Take 400 mcg by mouth daily. Provider, Historical   nitroglycerin (NITROSTAT) 0.4 mg SL tablet 1 Tab by SubLINGual route every five (5) minutes as needed for Chest Pain. 8/10/18   Mala Moreland NP   MAGNESIUM PO Take 500 mg by mouth daily. Provider, Historical   acetaminophen (TYLENOL) 500 mg tablet Take 1 tablet by mouth every eight (8) hours as needed for Pain. 8/28/14   Kassy Dickey MD   CHOLECALCIFEROL, VITAMIN D3, (VITAMIN D3 PO) Take 1,000 Units by mouth daily. Provider, Historical   multivitamins-minerals-lutein (CENTRUM SILVER) Tab Take 1 Tab by mouth daily. 5/14/10   Provider, Historical   fexofenadine (ALLEGRA) 180 mg tablet Take 1 Tab by mouth daily. 1/25/10   Kelin Yusuf MD   famotidine (PEPCID AC) 20 mg tablet Take 20 mg by mouth daily. 6/27/11   Provider, Historical   VITAMIN B COMPLEX (B COMPLEX PO) Take 1 Tab by mouth daily. Provider, Historical   ASCORBIC ACID (VITAMIN C PO) Take 1,000 mg by mouth daily. Provider, Historical       REVIEW OF SYSTEMS:     I am not able to complete the review of systems because:    The patient is intubated and sedated    The patient has altered mental status due to his acute medical problems    The patient has baseline aphasia from prior stroke(s)    The patient has baseline dementia and is not reliable historian    The patient is in acute medical distress and unable to provide information           Total of 12 systems reviewed as follows:       POSITIVE= underlined text  Negative = text not underlined  General:  fever, chills, sweats, generalized weakness, weight loss/gain,      loss of appetite   Eyes:    blurred vision, eye pain, loss of vision, double vision  ENT:    rhinorrhea, pharyngitis   Respiratory:   cough, sputum production, SOB, MCGUIRE, wheezing, pleuritic pain   Cardiology:   chest pain, palpitations, orthopnea, PND, edema, syncope   Gastrointestinal:  abdominal pain , N/V, diarrhea, dysphagia, constipation, bleeding   Genitourinary:  frequency, urgency, dysuria, hematuria, incontinence   Muskuloskeletal :  arthralgia, myalgia, back pain  Hematology:  easy bruising, nose or gum bleeding, lymphadenopathy   Dermatological: rash, ulceration, pruritis, color change / jaundice  Endocrine:   hot flashes or polydipsia   Neurological:  headache, dizziness, confusion, focal weakness, paresthesia,     Speech difficulties, memory loss, gait difficulty  Psychological: Feelings of anxiety, depression, agitation    Objective:   VITALS:    Visit Vitals  /85   Pulse 86   Temp 98.1 °F (36.7 °C)   Resp 21   Ht 5' 10\" (1.778 m)   Wt 87.1 kg (192 lb)   SpO2 90%   BMI 27.55 kg/m²       PHYSICAL EXAM:    General:    Alert, cooperative, no distress, appears stated age. HEENT: Atraumatic, anicteric sclerae, pink conjunctivae  Neck:  Supple, symmetrical,  thyroid: non tender  Lungs:   Clear to auscultation bilaterally. No Wheezing or Rhonchi. No rales. Chest wall:  No tenderness  No Accessory muscle use. Heart:   Regular  rhythm,  No  murmur   No edema  Abdomen:   Soft, non-tender. Not distended. Bowel sounds normal  Extremities: No cyanosis. No clubbing,    Skin:     Not pale. Not Jaundiced  No rashes   Psych:  Good insight. Not depressed. Not anxious or agitated. Neurologic: EOMs intact. No facial asymmetry. No aphasia or slurred speech. Symmetrical strength, Sensation grossly intact. Alert and oriented X 4.     _______________________________________________________________________  Care Plan discussed with:    Comments   Patient x    Family  x    RN x    Care Manager                    Consultant:      _______________________________________________________________________  Expected  Disposition:   Home with Family    HH/PT/OT/RN    SNF/LTC    LEVI    ________________________________________________________________________  TOTAL TIME:  48 Minutes    Critical Care Provided     Minutes non procedure based      Comments     Reviewed previous records   >50% of visit spent in counseling and coordination of care  Discussion with patient and/or family and questions answered       ________________________________________________________________________  Signed: Homero Churchill MD    Procedures: see electronic medical records for all procedures/Xrays and details which were not copied into this note but were reviewed prior to creation of Plan.     LAB DATA REVIEWED:    Recent Results (from the past 24 hour(s))   EKG, 12 LEAD, INITIAL    Collection Time: 02/25/20  3:04 PM   Result Value Ref Range    Ventricular Rate 87 BPM    Atrial Rate 261 BPM    QRS Duration 88 ms    Q-T Interval 372 ms    QTC Calculation (Bezet) 447 ms    Calculated P Axis 51 degrees    Calculated R Axis 43 degrees    Calculated T Axis 39 degrees    Diagnosis       Atrial flutter with 3:1 AV conduction  When compared with ECG of 05-OCT-2018 04:26,  Atrial flutter has replaced Sinus rhythm     METABOLIC PANEL, COMPREHENSIVE    Collection Time: 02/25/20  3:26 PM   Result Value Ref Range    Sodium 134 (L) 136 - 145 mmol/L    Potassium 4.3 3.5 - 5.1 mmol/L    Chloride 103 97 - 108 mmol/L    CO2 28 21 - 32 mmol/L    Anion gap 3 (L) 5 - 15 mmol/L    Glucose 173 (H) 65 - 100 mg/dL    BUN 16 6 - 20 MG/DL    Creatinine 1.08 0.70 - 1.30 MG/DL    BUN/Creatinine ratio 15 12 - 20      GFR est AA >60 >60 ml/min/1.73m2    GFR est non-AA >60 >60 ml/min/1.73m2    Calcium 9.0 8.5 - 10.1 MG/DL    Bilirubin, total 0.6 0.2 - 1.0 MG/DL    ALT (SGPT) 25 12 - 78 U/L    AST (SGOT) 16 15 - 37 U/L    Alk. phosphatase 75 45 - 117 U/L    Protein, total 6.8 6.4 - 8.2 g/dL    Albumin 3.2 (L) 3.5 - 5.0 g/dL    Globulin 3.6 2.0 - 4.0 g/dL    A-G Ratio 0.9 (L) 1.1 - 2.2     CBC WITH AUTOMATED DIFF    Collection Time: 02/25/20  3:26 PM   Result Value Ref Range    WBC 7.2 4.1 - 11.1 K/uL    RBC 4.20 4. 10 - 5.70 M/uL    HGB 13.2 12.1 - 17.0 g/dL    HCT 38.4 36.6 - 50.3 %    MCV 91.4 80.0 - 99.0 FL    MCH 31.4 26.0 - 34.0 PG    MCHC 34.4 30.0 - 36.5 g/dL    RDW 13.5 11.5 - 14.5 %    PLATELET 511 914 - 380 K/uL    MPV 9.6 8.9 - 12.9 FL    NRBC 0.0 0  WBC    ABSOLUTE NRBC 0.00 0.00 - 0.01 K/uL    NEUTROPHILS 64 32 - 75 %    LYMPHOCYTES 18 12 - 49 %    MONOCYTES 10 5 - 13 %    EOSINOPHILS 7 0 - 7 %    BASOPHILS 1 0 - 1 %    IMMATURE GRANULOCYTES 0 0.0 - 0.5 %    ABS. NEUTROPHILS 4.7 1.8 - 8.0 K/UL    ABS. LYMPHOCYTES 1.3 0.8 - 3.5 K/UL    ABS. MONOCYTES 0.7 0.0 - 1.0 K/UL    ABS. EOSINOPHILS 0.5 (H) 0.0 - 0.4 K/UL    ABS. BASOPHILS 0.1 0.0 - 0.1 K/UL    ABS. IMM.  GRANS. 0.0 0.00 - 0.04 K/UL    DF AUTOMATED     URINALYSIS W/ RFLX MICROSCOPIC    Collection Time: 02/25/20  3:36 PM   Result Value Ref Range    Color YELLOW/STRAW      Appearance CLEAR CLEAR      Specific gravity 1.013 1.003 - 1.030      pH (UA) 7.0 5.0 - 8.0      Protein NEGATIVE  NEG mg/dL    Glucose NEGATIVE  NEG mg/dL    Ketone NEGATIVE  NEG mg/dL    Bilirubin NEGATIVE  NEG      Blood NEGATIVE  NEG      Urobilinogen 0.2 0.2 - 1.0 EU/dL    Nitrites NEGATIVE  NEG      Leukocyte Esterase NEGATIVE  NEG

## 2020-02-25 NOTE — PROGRESS NOTES
Subjective:      Heron Liu is a 80 y.o. male is here for EP consult. He has a PMHx of CAD, DM2, HTN, HLD, COPD and ataxia.   The patient reports fatigue and frequent falls. Toshia Chan Friday while in the restroom ,landing on his right shoulder, hitting his head. His wife states that he refused to go to ED. He is having visual changes, HA the day of the fall and bruise that has since healed. No blood in stool or urine.      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 ([address] 02/25/2020    Typical atrial flutter ([address] 01/29/2020    Mixed hyperlipidemia 01/29/2020    Essential hypertension 01/29/2020    PVD (peripheral vascular disease) ([address] 02/12/2019    Varicose veins of both legs with edema 02/12/2019    Encounter for staple removal 01/09/2019    Angina, class III ([address] 10/04/2018    S/P cardiac cath 08/20/2018    Type 2 diabetes mellitus with nephropathy ([address] 12/22/2017    Vestibular vertigo 12/13/2017    Lumbar back pain with radiculopathy affecting left lower extremity 12/13/2017    Lumbar back pain with radiculopathy affecting right lower extremity 12/13/2017    Diabetic peripheral neuropathy associated with type 2 diabetes mellitus ([address] 10/24/2017    Idiopathic small and large fiber sensory neuropathy 10/24/2017    B12 deficiency 10/24/2017    Ataxia 10/24/2017    Sensory ataxic gait 10/24/2017    Cervical arthritis with myelopathy 10/24/2017    Degenerative cervical spinal stenosis 10/24/2017    Bilateral carotid artery stenosis 10/24/2017    Cerebral microvascular disease 10/24/2017    Vitamin D deficiency 10/24/2017    Degenerative joint disease of right hip 07/06/2016    Primary localized osteoarthrosis of right hip 07/06/2016    Advance directive discussed with patient 06/28/2016    SOBOE (shortness of breath on exertion) 05/04/2016    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    Strabismic amblyopia 04/02/2016    Retinal hemorrhage 01/27/2016    Coronary artery disease involving native coronary artery without angina pectoris 06/10/2015    Incidental pulmonary nodule, greater than or equal to 8mm 04/11/2015    S/P drug eluting coronary stent placement 10/27/2014    Status post hip replacement 02/09/2011    Osteoarthritis of hip 10/15/2010    COPD (chronic obstructive pulmonary disease) ([address] 09/10/2010    Pure hypercholesterolemia 05/14/2010    GERD (gastroesophageal reflux disease) 09/24/2009    Low back pain 09/24/2009    Personal history of prostate cancer 08/11/2003      Angeles Garvin MD  Past Medical History:   Diagnosis Date    Arrhythmia     Asthma     CAD (coronary artery disease)     Chronic hip pain     COPD (chronic obstructive pulmonary disease) ([address] 9/10/2010    Diabetes ([address]     DJD (degenerative joint disease)     GERD (gastroesophageal reflux disease)     HNP (herniated nucleus pulposus) 6/2006    lumbar     Hypercholesterolemia     Hypertension     Nausea & vomiting     Prostate cancer ([address] 2003    Reversible ischemic neurologic deficit ([address] 1990    suspected with no residual effects and no recurrance    TIA (transient ischemic attack)     TIA- no deficiets      Past Surgical History:   Procedure Laterality Date    COLONOSCOPY  12/15/2004    Diverticulosis Dr. Mckeon Laughter repeat 10 years    HX COLONOSCOPY  2005    HX HEART CATHETERIZATION      2  stents     HX HEART CATHETERIZATION  08/20/2018    failed 100% block @ [facility] will plan to do laser next.       HX HERNIA REPAIR  9/2008    left inguinal hernia repair by Dr. Bobo Alfaro Right     inguinal hernai repair    HX HERNIA REPAIR      umbilical hernia repair    HX HIP REPLACEMENT Right 07/06/2016    HX PROSTATECTOMY  2003    HX TONSILLECTOMY      TOTAL HIP ARTHROPLASTY Left 2/9/11    Dr. Niurka Louis at [facility]     No Known Allergies   Family History   Problem Relation Age of Onset    Cancer Mother         Breast cancer    Lung Disease Mother         Emphysema    Cancer Father         Bladder cancer    Lung Disease Father         Emphysema    Hypertension Sister         1    negative for cardiac disease  Social History     Socioeconomic History    Marital status:      Spouse name: Not on file    Number of children: Not on file    Years of education: Not on file    Highest education level: Not on file   Tobacco Use    Smoking status: Former Smoker     Packs/day: 3.00     Years: 10.00     Pack years: 30.00     Types: Cigarettes     Last attempt to quit: 1/15/1970     Years since quittin.1    Smokeless tobacco: Never Used   Substance and Sexual Activity    Alcohol use: No    Drug use: Yes     Types: Prescription, OTC    Sexual activity: Yes     Partners: Female     Birth control/protection: None     Current Outpatient Medications   Medication Sig    ADVAIR DISKUS 250-50 mcg/dose diskus inhaler Take 1 Puff by inhalation every twelve (12) hours.  apixaban (ELIQUIS) 5 mg tablet Take 1 Tab by mouth two (2) times a day.  simvastatin (ZOCOR) 20 mg tablet TAKE 1 TABLET BY MOUTH EVERY DAY AT NIGHT    glipiZIDE (GLUCOTROL) 5 mg tablet TAKE 1 TABLET BY MOUTH EVERY DAY    metoprolol succinate (TOPROL-XL) 25 mg XL tablet TAKE 1/2 TABLET BY MOUTH EVERY DAY    metFORMIN ER (GLUCOPHAGE XR) 500 mg tablet TAKE 3 TABLETS BY MOUTH EVERY DAY WITH DINNER    lisinopril (PRINIVIL, ZESTRIL) 5 mg tablet TAKE 1 TABLET BY MOUTH EVERY DAY    glucose blood VI test strips (ONETOUCH ULTRA BLUE TEST STRIP) strip by Does Not Apply route Daily (before breakfast). E11.42    isosorbide mononitrate ER (IMDUR) 30 mg tablet Take 0.5 Tabs by mouth every morning. Indications: Take 1/2 tab by mouth daily    albuterol (PROVENTIL VENTOLIN) 2.5 mg /3 mL (0.083 %) nebu 3 mL by Nebulization route three (3) times daily as needed (wheeze).     tiotropium bromide (SPIRIVA RESPIMAT) 2.5 mcg/actuation inhaler Take 2 Puffs by inhalation daily.  VENTOLIN HFA 90 mcg/actuation inhaler INHALE 2 PUFFS BY MOUTH EVERY 4 HOURS AS NEEDED FOR WHEEZING    Nebulizer & Compressor machine Use 3 times per day as needed.  acetylcysteine (MUCOMYST) 100 mg/mL (10 %) nebulizer solution Take 4 mL by inhalation three (3) times daily.  coenzyme q10 10 mg cap Take 10 mg by mouth daily.  aspirin delayed-release 81 mg tablet Take 1 Tab by mouth daily.  cyanocobalamin (VITAMIN B-12) 500 mcg tablet Take 500 mcg by mouth daily.  folic acid 562 mcg tablet Take 400 mcg by mouth daily.  nitroglycerin (NITROSTAT) 0.4 mg SL tablet 1 Tab by SubLINGual route every five (5) minutes as needed for Chest Pain.  MAGNESIUM PO Take 500 mg by mouth daily.  acetaminophen (TYLENOL) 500 mg tablet Take 1 tablet by mouth every eight (8) hours as needed for Pain.  CHOLECALCIFEROL, VITAMIN D3, (VITAMIN D3 PO) Take 1,000 Units by mouth daily.  multivitamins-minerals-lutein (CENTRUM SILVER) Tab Take 1 Tab by mouth daily.  fexofenadine (ALLEGRA) 180 mg tablet Take 1 Tab by mouth daily.  famotidine (PEPCID AC) 20 mg tablet Take 20 mg by mouth daily.  VITAMIN B COMPLEX (B COMPLEX PO) Take 1 Tab by mouth daily.  ASCORBIC ACID (VITAMIN C PO) Take 1,000 mg by mouth daily.  ketoconazole (NIZORAL) 2 % topical cream APPLY TO EYEBROWS TWICE A DAY AS NEEDED     No current facility-administered medications for this visit. Vitals:    02/25/20 1021   BP: 126/70   Pulse: 86   Resp: 18   SpO2: 95%   Weight: 192 lb 9.6 oz (87.4 kg)   Height: 5' 9\" (1.753 m)       I have reviewed the nurses notes, vitals, problem list, allergy list, medical history, family, social history and medications. Review of Symptoms:    General: Pt denies excessive weight gain or loss. Pt is able to conduct ADL's  HEENT: Denies blurred vision, headaches, epistaxis and difficulty swallowing.   Respiratory: Denies shortness of breath, MCGUIRE, wheezing or stridor. Cardiovascular: Denies precordial pain, palpitations, edema or PND  Gastrointestinal: Denies poor appetite, indigestion, abdominal pain or blood in stool  Urinary: Denies dysuria, pyuria  Musculoskeletal: Denies pain or swelling from muscles or joints  Neurologic: Denies tremor, paresthesias, or sensory motor disturbance  Skin: Denies rash, itching or texture change. Psych: Denies depression    Physical Exam:      General: Well developed, in no acute distress. HEENT: Eyes - PERRL, no jvd  Heart:  Normal S1/S2 negative S3 or S4. Regular, no murmur, gallop or rub. Respiratory: Clear bilaterally x 4, no wheezing or rales  Extremities:  No edema, normal cap refill, no cyanosis. Musculoskeletal: No clubbing  Neuro: A&Ox3, speech clear, gait stable. Skin: Skin color is normal. No rashes or lesions.  Non diaphoretic  Vascular: 2+ pulses symmetric in all extremities    Cardiographics    Ekg: atrial flutter    Results for orders placed or performed during the hospital encounter of 10/04/18   EKG, 12 LEAD, INITIAL   Result Value Ref Range    Ventricular Rate 65 BPM    Atrial Rate 65 BPM    P-R Interval 206 ms    QRS Duration 86 ms    Q-T Interval 400 ms    QTC Calculation (Bezet) 416 ms    Calculated P Axis 81 degrees    Calculated R Axis 14 degrees    Calculated T Axis 19 degrees    Diagnosis       Sinus rhythm with premature atrial complexes    Confirmed by Gema Parsons MD, Bobo Jack ([id number]) on 10/6/2018 2:28:34 PM           Lab Results   Component Value Date/Time    WBC 10.2 02/12/2020 09:13 AM    HGB 15.3 02/12/2020 09:13 AM    HCT 44.7 02/12/2020 09:13 AM    PLATELET 730 29/17/1480 09:13 AM    MCV 91 02/12/2020 09:13 AM      Lab Results   Component Value Date/Time    Sodium 135 02/12/2020 09:13 AM    Potassium 5.1 02/12/2020 09:13 AM    Chloride 98 02/12/2020 09:13 AM    CO2 23 02/12/2020 09:13 AM    Anion gap 7 10/05/2018 04:28 AM    Glucose 125 (H) 02/12/2020 09:13 AM    BUN 23 02/12/2020 09:13 AM    Creatinine 1.30 (H) 02/12/2020 09:13 AM    BUN/Creatinine ratio 18 02/12/2020 09:13 AM    GFR est AA 56 (L) 02/12/2020 09:13 AM    GFR est non-AA 49 (L) 02/12/2020 09:13 AM    Calcium 9.3 02/12/2020 09:13 AM    Bilirubin, total 0.6 02/12/2020 09:13 AM    AST (SGOT) 20 02/12/2020 09:13 AM    Alk. phosphatase 68 02/12/2020 09:13 AM    Protein, total 6.5 02/12/2020 09:13 AM    Albumin 4.3 02/12/2020 09:13 AM    Globulin 3.7 06/21/2016 09:18 AM    A-G Ratio 2.0 02/12/2020 09:13 AM    ALT (SGPT) 28 02/12/2020 09:13 AM      Lab Results   Component Value Date/Time    TSH 3.230 08/07/2018 08:55 AM        Assessment:             ICD-10-CM ICD-9-CM    1. Typical atrial flutter (HCC) I48.3 427.32    2. History of irregular heartbeat Z86.79 V12.59 AMB POC EKG ROUTINE W/ 12 LEADS, INTER & REP   3. Essential hypertension I10 401.9    4. Mixed hyperlipidemia E78.2 272.2    5. Coronary artery disease involving native coronary artery of native heart without angina pectoris I25.10 414.01    6. S/P drug eluting coronary stent placement Z95.5 V45.82    7. CVA, old, ataxia I69.993 438.84      Orders Placed This Encounter    AMB POC EKG ROUTINE W/ 12 LEADS, INTER & REP     Order Specific Question:   Reason for Exam:     Answer:   routine        Plan:     Mr Jelani Snell is here to establish with EP for typical atrial flutter on [facility]. He is a candidate for AFL ablation. I discussed the risks/benefits/alternatives of the procedure with the patient. Risks include (but are not limited to) bleeding, heart block, infection, cva/mi/tamponade/death. The patient understands and agrees to proceed. Given recent fall, hitting his head on [facility], I have advised him to go to ED for CT. Continue medical management for PVD, ASHD and AFL. Thank you for allowing me to participate in [address] [place], [place] [address] 's care. Citlali Steele NP    Patient seen and examined. All pertinent data reviewed. I have reviewed detailed note as outlined by Citlali Steele NP.   Case discussed with Nursing/medical assistant staff and Yari Baltazar NP. Plans as outlined. Pt with afl with variable block. Candidate for sawyer/afl abl. Had a fall with vision changes - will send to ER to r/o ICH. If negative, will proceed with ablation next month. I discussed the risks/benefits/alternatives of the procedure with the patient. Risks include (but are not limited to) bleeding, heart block, infection, cva/mi/tamponade/death. The patient understands and agrees to proceed. Thank you for this interesting consultation.       Donice Osler, [address] [place] [address], [address] [place] [address], [id number]

## 2020-02-25 NOTE — CONSULTS
Neurosurgery  Pt seen and examined, full consult to follow.      Fall on Friday, HA  On Eliquis  CT shows bilateral small hygromas and small amount of superimposed acute hemorrhage in the left    Small hemorrhage, would observe, repeat head CT in am    Would hold Eliquis - given history of multiple falls consideration should be given if he is a candidate for continuing anticoagulation

## 2020-02-25 NOTE — PROGRESS NOTES
1. Have you been to the ER, urgent care clinic since your last visit? Hospitalized since your last visit? No    2. Have you seen or consulted any other health care providers outside of the [address] [place] [address] since your last visit? Include any pap smears or colon screening. No    Chief Complaint   Patient presents with    Irregular Heart Beat     NP, Ref by \"Dr. San Degree. C/O palps.

## 2020-02-25 NOTE — CONSULTS
[address] [facility]     Date of  Admission: 2/25/2020  1:26 PM     Admission type:Emergency    Consult for:  Consult by: Subjective:     Alex Muñoz is a 80 y.o. male admitted for Subdural hematoma ([address] [S06.5X9A]. Patient has been falling. No syncope. Denies CP.       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 ([address] 02/25/2020    Subdural hematoma ([address] 02/25/2020    Atrial flutter ([address] 01/29/2020    Mixed hyperlipidemia 01/29/2020    Essential hypertension 01/29/2020    PVD (peripheral vascular disease) ([address] 02/12/2019    Varicose veins of both legs with edema 02/12/2019    Encounter for staple removal 01/09/2019    Angina, class III ([address] 10/04/2018    S/P cardiac cath 08/20/2018    Type 2 diabetes mellitus with nephropathy ([address] 12/22/2017    Vestibular vertigo 12/13/2017    Lumbar back pain with radiculopathy affecting left lower extremity 12/13/2017    Lumbar back pain with radiculopathy affecting right lower extremity 12/13/2017    Diabetic peripheral neuropathy associated with type 2 diabetes mellitus ([address] 10/24/2017    Idiopathic small and large fiber sensory neuropathy 10/24/2017    B12 deficiency 10/24/2017    Ataxia 10/24/2017    Sensory ataxic gait 10/24/2017    Cervical arthritis with myelopathy 10/24/2017    Degenerative cervical spinal stenosis 10/24/2017    Bilateral carotid artery stenosis 10/24/2017    Cerebral microvascular disease 10/24/2017    Vitamin D deficiency 10/24/2017    Degenerative joint disease of right hip 07/06/2016    Primary localized osteoarthrosis of right hip 07/06/2016    Advance directive discussed with patient 06/28/2016    SOBOE (shortness of breath on exertion) 05/04/2016    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    Strabismic amblyopia 04/02/2016    Retinal hemorrhage 01/27/2016    Coronary artery disease involving native coronary artery without angina pectoris 06/10/2015    Incidental pulmonary nodule, greater than or equal to 8mm 2015    S/P drug eluting coronary stent placement 10/27/2014    Status post hip replacement 2011    Osteoarthritis of hip 10/15/2010    COPD (chronic obstructive pulmonary disease) ([facility] 09/10/2010    Pure hypercholesterolemia 2010    GERD (gastroesophageal reflux disease) 2009    Low back pain 2009    Personal history of prostate cancer 2003      Danielle Andre MD  Past Medical History:   Diagnosis Date    Arrhythmia     Asthma     CAD (coronary artery disease)     Chronic hip pain     COPD (chronic obstructive pulmonary disease) ([facility] 9/10/2010    Diabetes (HCC)     DJD (degenerative joint disease)     GERD (gastroesophageal reflux disease)     HNP (herniated nucleus pulposus) 2006    lumbar     Hypercholesterolemia     Hypertension     Nausea & vomiting     Prostate cancer ([facility]     Reversible ischemic neurologic deficit ([facility]     suspected with no residual effects and no recurrance    TIA (transient ischemic attack)     TIA- no deficiets      Social History     Socioeconomic History    Marital status:      Spouse name: Not on file    Number of children: Not on file    Years of education: Not on file    Highest education level: Not on file   Tobacco Use    Smoking status: Former Smoker     Packs/day: 3.00     Years: 10.00     Pack years: 30.00     Types: Cigarettes     Last attempt to quit: 1/15/1970     Years since quittin.1    Smokeless tobacco: Never Used   Substance and Sexual Activity    Alcohol use: No    Drug use: Yes     Types: Prescription, OTC    Sexual activity: Yes     Partners: Female     Birth control/protection: None     No Known Allergies   Family History   Problem Relation Age of Onset    Cancer Mother         Breast cancer    Lung Disease Mother         Emphysema    Cancer Father Bladder cancer    Lung Disease Father         Emphysema    Hypertension Sister         2015      Current Facility-Administered Medications   Medication Dose Route Frequency    acetaminophen (TYLENOL) tablet 650 mg  650 mg Oral Q6H PRN    sodium chloride (NS) flush 5-40 mL  5-40 mL IntraVENous Q8H    sodium chloride (NS) flush 5-40 mL  5-40 mL IntraVENous PRN    ondansetron (ZOFRAN) injection 4 mg  4 mg IntraVENous Q4H PRN    arformoteroL (BROVANA) neb solution 15 mcg  15 mcg Nebulization BID RT    And    budesonide (PULMICORT) 500 mcg/2 ml nebulizer suspension  500 mcg Nebulization BID RT     Current Outpatient Medications   Medication Sig    ADVAIR DISKUS 250-50 mcg/dose diskus inhaler Take 1 Puff by inhalation every twelve (12) hours.  apixaban (ELIQUIS) 5 mg tablet Take 1 Tab by mouth two (2) times a day.  simvastatin (ZOCOR) 20 mg tablet TAKE 1 TABLET BY MOUTH EVERY DAY AT NIGHT    glipiZIDE (GLUCOTROL) 5 mg tablet TAKE 1 TABLET BY MOUTH EVERY DAY    metoprolol succinate (TOPROL-XL) 25 mg XL tablet TAKE 1/2 TABLET BY MOUTH EVERY DAY    metFORMIN ER (GLUCOPHAGE XR) 500 mg tablet TAKE 3 TABLETS BY MOUTH EVERY DAY WITH DINNER    lisinopril (PRINIVIL, ZESTRIL) 5 mg tablet TAKE 1 TABLET BY MOUTH EVERY DAY    glucose blood VI test strips (ONETOUCH ULTRA BLUE TEST STRIP) strip by Does Not Apply route Daily (before breakfast). E11.42    ketoconazole (NIZORAL) 2 % topical cream APPLY TO EYEBROWS TWICE A DAY AS NEEDED    isosorbide mononitrate ER (IMDUR) 30 mg tablet Take 0.5 Tabs by mouth every morning. Indications: Take 1/2 tab by mouth daily    albuterol (PROVENTIL VENTOLIN) 2.5 mg /3 mL (0.083 %) nebu 3 mL by Nebulization route three (3) times daily as needed (wheeze).  tiotropium bromide (SPIRIVA RESPIMAT) 2.5 mcg/actuation inhaler Take 2 Puffs by inhalation daily.     VENTOLIN HFA 90 mcg/actuation inhaler INHALE 2 PUFFS BY MOUTH EVERY 4 HOURS AS NEEDED FOR WHEEZING    Nebulizer & Compressor machine Use 3 times per day as needed.  acetylcysteine (MUCOMYST) 100 mg/mL (10 %) nebulizer solution Take 4 mL by inhalation three (3) times daily.  coenzyme q10 10 mg cap Take 10 mg by mouth daily.  aspirin delayed-release 81 mg tablet Take 1 Tab by mouth daily.  cyanocobalamin (VITAMIN B-12) 500 mcg tablet Take 500 mcg by mouth daily.  folic acid 223 mcg tablet Take 400 mcg by mouth daily.  nitroglycerin (NITROSTAT) 0.4 mg SL tablet 1 Tab by SubLINGual route every five (5) minutes as needed for Chest Pain.  MAGNESIUM PO Take 500 mg by mouth daily.  acetaminophen (TYLENOL) 500 mg tablet Take 1 tablet by mouth every eight (8) hours as needed for Pain.  CHOLECALCIFEROL, VITAMIN D3, (VITAMIN D3 PO) Take 1,000 Units by mouth daily.  multivitamins-minerals-lutein (CENTRUM SILVER) Tab Take 1 Tab by mouth daily.  fexofenadine (ALLEGRA) 180 mg tablet Take 1 Tab by mouth daily.  famotidine (PEPCID AC) 20 mg tablet Take 20 mg by mouth daily.  VITAMIN B COMPLEX (B COMPLEX PO) Take 1 Tab by mouth daily.  ASCORBIC ACID (VITAMIN C PO) Take 1,000 mg by mouth daily. Review of Symptoms:   11 systems reviewed, negative other than as stated in the HPI        Objective:      Visit Vitals  /85   Pulse 87   Temp 98.1 °F (36.7 °C)   Resp 22   Ht 5' 10\" (1.778 m)   Wt 192 lb (87.1 kg)   SpO2 91%   BMI 27.55 kg/m²       Physical:   General:   Heart: RRR, no m/S3/JVD, no carotid bruits   Lungs: clear   Abdomen: Soft, +BS, NTND   Extremities: LE yessy +DP/PT, no edema   Neurologic: Grossly normal    Data Review:   Recent Labs     02/25/20  1526   WBC 7.2   HGB 13.2   HCT 38.4        Recent Labs     02/25/20  1526   *   K 4.3      CO2 28   *   BUN 16   CREA 1.08   CA 9.0   ALB 3.2*   TBILI 0.6   SGOT 16   ALT 25       No results for input(s): TROIQ, CPK, CKMB in the last 72 hours.     No intake or output data in the 24 hours ending 02/25/20 6561 Cardiographics    Telemetry:   ECG: flutter  Echocardiogram:   CXRAY:       Assessment:       Active Problems:    Coronary artery disease involving native coronary artery without angina pectoris (6/10/2015)      Atrial flutter ([facility] (1/29/2020)      Subdural hematoma ([facility] (2/25/2020)         Plan:     DC eliquis.   Will follow with you    Eliz Taylor MD

## 2020-02-25 NOTE — PROGRESS NOTES
1855: TRANSFER - IN REPORT:    Verbal report received from [facility] (name) on Pravin Mendiola  being received from ED (unit) for routine progression of care      Report consisted of patients Situation, Background, Assessment and   Recommendations(SBAR). Information from the following report(s) SBAR, Kardex, ED Summary, MAR, Recent Results and Cardiac Rhythm Atrial flutter was reviewed with the receiving nurse. Opportunity for questions and clarification was provided.

## 2020-02-26 ENCOUNTER — APPOINTMENT (OUTPATIENT)
Dept: CT IMAGING | Age: 85
DRG: 086 | End: 2020-02-26
Attending: INTERNAL MEDICINE
Payer: MEDICARE

## 2020-02-26 LAB
ANION GAP SERPL CALC-SCNC: 2 MMOL/L (ref 5–15)
BASOPHILS # BLD: 0 K/UL (ref 0–0.1)
BASOPHILS NFR BLD: 1 % (ref 0–1)
BUN SERPL-MCNC: 15 MG/DL (ref 6–20)
BUN/CREAT SERPL: 13 (ref 12–20)
CALCIUM SERPL-MCNC: 8.7 MG/DL (ref 8.5–10.1)
CHLORIDE SERPL-SCNC: 105 MMOL/L (ref 97–108)
CO2 SERPL-SCNC: 27 MMOL/L (ref 21–32)
CREAT SERPL-MCNC: 1.14 MG/DL (ref 0.7–1.3)
DIFFERENTIAL METHOD BLD: ABNORMAL
EOSINOPHIL # BLD: 0.6 K/UL (ref 0–0.4)
EOSINOPHIL NFR BLD: 10 % (ref 0–7)
ERYTHROCYTE [DISTWIDTH] IN BLOOD BY AUTOMATED COUNT: 13.5 % (ref 11.5–14.5)
GLUCOSE SERPL-MCNC: 171 MG/DL (ref 65–100)
HCT VFR BLD AUTO: 37.6 % (ref 36.6–50.3)
HGB BLD-MCNC: 12.9 G/DL (ref 12.1–17)
IMM GRANULOCYTES # BLD AUTO: 0 K/UL (ref 0–0.04)
IMM GRANULOCYTES NFR BLD AUTO: 0 % (ref 0–0.5)
LYMPHOCYTES # BLD: 1.2 K/UL (ref 0.8–3.5)
LYMPHOCYTES NFR BLD: 18 % (ref 12–49)
MCH RBC QN AUTO: 31.2 PG (ref 26–34)
MCHC RBC AUTO-ENTMCNC: 34.3 G/DL (ref 30–36.5)
MCV RBC AUTO: 90.8 FL (ref 80–99)
MONOCYTES # BLD: 0.7 K/UL (ref 0–1)
MONOCYTES NFR BLD: 11 % (ref 5–13)
NEUTS SEG # BLD: 4 K/UL (ref 1.8–8)
NEUTS SEG NFR BLD: 60 % (ref 32–75)
NRBC # BLD: 0 K/UL (ref 0–0.01)
NRBC BLD-RTO: 0 PER 100 WBC
PLATELET # BLD AUTO: 225 K/UL (ref 150–400)
PMV BLD AUTO: 9.5 FL (ref 8.9–12.9)
POTASSIUM SERPL-SCNC: 4.2 MMOL/L (ref 3.5–5.1)
RBC # BLD AUTO: 4.14 M/UL (ref 4.1–5.7)
SODIUM SERPL-SCNC: 134 MMOL/L (ref 136–145)
WBC # BLD AUTO: 6.7 K/UL (ref 4.1–11.1)

## 2020-02-26 PROCEDURE — 70450 CT HEAD/BRAIN W/O DYE: CPT

## 2020-02-26 PROCEDURE — 36415 COLL VENOUS BLD VENIPUNCTURE: CPT

## 2020-02-26 PROCEDURE — 80048 BASIC METABOLIC PNL TOTAL CA: CPT

## 2020-02-26 PROCEDURE — 74011000250 HC RX REV CODE- 250: Performed by: EMERGENCY MEDICINE

## 2020-02-26 PROCEDURE — 74011000250 HC RX REV CODE- 250: Performed by: INTERNAL MEDICINE

## 2020-02-26 PROCEDURE — 94640 AIRWAY INHALATION TREATMENT: CPT

## 2020-02-26 PROCEDURE — 74011250637 HC RX REV CODE- 250/637: Performed by: INTERNAL MEDICINE

## 2020-02-26 PROCEDURE — 97161 PT EVAL LOW COMPLEX 20 MIN: CPT

## 2020-02-26 PROCEDURE — 85025 COMPLETE CBC W/AUTO DIFF WBC: CPT

## 2020-02-26 PROCEDURE — 97116 GAIT TRAINING THERAPY: CPT

## 2020-02-26 PROCEDURE — 65660000000 HC RM CCU STEPDOWN

## 2020-02-26 PROCEDURE — 97530 THERAPEUTIC ACTIVITIES: CPT

## 2020-02-26 RX ADMIN — BUDESONIDE 500 MCG: 0.5 INHALANT RESPIRATORY (INHALATION) at 09:33

## 2020-02-26 RX ADMIN — Medication 10 ML: at 14:51

## 2020-02-26 RX ADMIN — IPRATROPIUM BROMIDE 0.5 MG: 0.5 SOLUTION RESPIRATORY (INHALATION) at 09:33

## 2020-02-26 RX ADMIN — BUDESONIDE 500 MCG: 0.5 INHALANT RESPIRATORY (INHALATION) at 21:38

## 2020-02-26 RX ADMIN — IPRATROPIUM BROMIDE 0.5 MG: 0.5 SOLUTION RESPIRATORY (INHALATION) at 21:38

## 2020-02-26 RX ADMIN — IPRATROPIUM BROMIDE 0.5 MG: 0.5 SOLUTION RESPIRATORY (INHALATION) at 13:30

## 2020-02-26 RX ADMIN — ARFORMOTEROL TARTRATE 15 MCG: 15 SOLUTION RESPIRATORY (INHALATION) at 21:38

## 2020-02-26 RX ADMIN — Medication 5 ML: at 22:00

## 2020-02-26 RX ADMIN — LISINOPRIL 5 MG: 5 TABLET ORAL at 08:14

## 2020-02-26 RX ADMIN — METOPROLOL SUCCINATE 12.5 MG: 25 TABLET, EXTENDED RELEASE ORAL at 08:14

## 2020-02-26 RX ADMIN — Medication 10 ML: at 06:52

## 2020-02-26 RX ADMIN — ATORVASTATIN CALCIUM 10 MG: 10 TABLET, FILM COATED ORAL at 22:00

## 2020-02-26 RX ADMIN — ISOSORBIDE MONONITRATE 15 MG: 30 TABLET, EXTENDED RELEASE ORAL at 08:14

## 2020-02-26 RX ADMIN — ARFORMOTEROL TARTRATE 15 MCG: 15 SOLUTION RESPIRATORY (INHALATION) at 09:33

## 2020-02-26 NOTE — PROGRESS NOTES
Neurosurgery  Repeat head CT stable. Area of hemorrhage has decreased.     No intervention needed    May go from my standpoint  No f/u needed

## 2020-02-26 NOTE — PROGRESS NOTES
1900:Verbal report given on Sherrell Stuart by Meaghan Jones , RN   Awaiting Pt arrival to unit. 1944: Pt arrived Via stretcher, and RN. Care assumed. VSS, Skin WDL, assessment complete , Neuro check WDL. Call bell in reach, family at bedside , no needs expressed at this time. Will continue to monitor. 2015: Family left bedside     2100: Neuro checks WDL times 2 assessments     0650: Neuro checks WDL throughout the night!    Pt rested peacefully and had no complaints     0700: Shift report given to [facility]

## 2020-02-26 NOTE — PROGRESS NOTES
Problem: Mobility Impaired (Adult and Pediatric)  Goal: *Acute Goals and Plan of Care (Insert Text)  Description  FUNCTIONAL STATUS PRIOR TO ADMISSION: Pt lives with his wife in a 2 story home with 16 steps between floors. He has a hx of falls. Last fall occurred in BR. Subsequent finding of new SDH bilat. He had been completing his own basic ADLs but since falls has needed more assist of wife; and difficulty understanding time frame from wife but believe him to have been using the RW most recently. HOME SUPPORT PRIOR TO ADMISSION: The patient lived with wife. Physical Therapy Goals  Initiated 2/26/2020  1. Patient will move from supine to sit and sit to supine , scoot up and down and roll side to side in bed with independence within 7 day(s). 2.  Patient will transfer from bed to chair and chair to bed with modified independence using the least restrictive device within 7 day(s). 3.  Patient will perform sit to stand with modified independence within 7 day(s). 4.  Patient will ambulate with modified independence for 150 feet with the least restrictive device within 7 day(s). 5.  Patient will ascend/descend 13 stairs with one handrail(s) with minimal assistance/contact guard assist within 7 day(s). Outcome: Progressing Towards Goal   PHYSICAL THERAPY EVALUATION  Patient: Yinka Youngblood (76 y.o. male)  Date: 2/26/2020  Primary Diagnosis: Subdural hematoma ([facility] [S06.5X9A]        Precautions: fall         ASSESSMENT  Based on the objective data described below, the patient presents with limitations in balance and vision and is s/p fall with B SDH, resulting in gait and functional mobility decline. Current Level of Function Impacting Discharge (mobility/balance): Pt is S for bed mobility. He is CGA with cues for safe technique for transfers. He amb with RW with CGA with shortened steps, wide base and increased trunk sway.  Pt's standing balance is limited when without UE support exhibiting posterior LOB and moderate trunk sway. Pt requires safety and technique cues in amb as well especially on approach to chair. Pt without c/o pain or dizziness during activity. He does however c/o difficulty focusing. He states when he first looks at something it looks blurry and he has to work to focus. He and wife state this is new since the fall. Pt left in chair with call bell in reach, wife present and nurse aware. BP prior to activity 179/110 HR 88, post activity 175/89 HR 99. Functional Outcome Measure: The patient scored 15/28 on the tinetti outcome measure which is indicative of high fall risk. Other factors to consider for discharge: lives with wife; home has multiple steps w/o rails to enter and 16 steps inside to bedroom level. Patient will benefit from skilled therapy intervention to address the above noted impairments. PLAN :  Recommendations and Planned Interventions: bed mobility training, transfer training, gait training, therapeutic exercises, neuromuscular re-education, patient and family training/education, and therapeutic activities      Frequency/Duration: Patient will be followed by physical therapy:  5 times a week to address goals. Recommendation for discharge: (in order for the patient to meet his/her long term goals)  To be determined: SNF rehab vs HHPT with 24/7 assist of wife. Depends upon progress with stairs and balance and length of hospital stay. At time eval, he would likely benefit more from short SNF rehab stay given high fall risk and need to be able to navigate many stairs safely at home     This discharge recommendation:  Has not yet been discussed the attending provider and/or case management    IF patient discharges home will need the following DME: to be determined (TBD)         SUBJECTIVE:   Patient stated I feel wobbly, I have trouble focusing.     OBJECTIVE DATA SUMMARY:   HISTORY:    Past Medical History:   Diagnosis Date    Arrhythmia     Asthma CAD (coronary artery disease)     Chronic hip pain     COPD (chronic obstructive pulmonary disease) ([facility] 9/10/2010    Diabetes (HCC)     DJD (degenerative joint disease)     GERD (gastroesophageal reflux disease)     HNP (herniated nucleus pulposus) 6/2006    lumbar     Hypercholesterolemia     Hypertension     Nausea & vomiting     Prostate cancer ([facility] 2003    Reversible ischemic neurologic deficit ([facility] 1990    suspected with no residual effects and no recurrance    TIA (transient ischemic attack)     TIA- no deficiets     Past Surgical History:   Procedure Laterality Date    COLONOSCOPY  12/15/2004    Diverticulosis Dr. Sampson Masters repeat 10 years    HX COLONOSCOPY  2005    HX HEART CATHETERIZATION      2  stents     HX HEART CATHETERIZATION  08/20/2018    failed 100% block @ [facility] will plan to do laser next. HX HERNIA REPAIR  9/2008    left inguinal hernia repair by Dr. Ol[facility] Right     inguinal hernai repair    HX HERNIA REPAIR      umbilical hernia repair    HX HIP REPLACEMENT Right 07/06/2016    HX PROSTATECTOMY  2003    HX TONSILLECTOMY      TOTAL HIP ARTHROPLASTY Left 2/9/11    Dr. Lyric Schmitz at [address] [place] [address] Street: Private residence  # Steps to Enter: 7  Rails to Enter: No  One/Two Story Residence: Two story  # of Interior Steps: 16(11+5)  Interior Rails: Left  Living Alone: No  Support Systems: Spouse/Significant Other/Partner  Patient Expects to be Discharged to[de-identified] Private residence  Current DME Used/Available at Home: Eveleen Frames, straight, Crutches, Walker, rollator, Walker, rolling  Tub or Shower Type: Tub/Shower combination    EXAMINATION/PRESENTATION/DECISION MAKING:   Critical Behavior:  Neurologic State: Alert  Orientation Level: Oriented X4  Cognition: Follows commands     Hearing: Auditory  Auditory Impairment: None    Range Of Motion:  AROM: Within functional limits           PROM: Within functional limits           Strength:    Strength:  Within functional limits                    Tone & Sensation:                  Sensation: Intact              Functional Mobility:  Bed Mobility:  Rolling: Supervision  Supine to Sit: Supervision        Transfers:  Sit to Stand: Contact guard assistance  Stand to Sit: Contact guard assistance(cues for safe approach and proximity)                       Balance:   Sitting: Intact  Standing: Impaired  Standing - Static: Fair(needs UE support otherwise poor with post lean)  Standing - Dynamic : Fair; Other (comment)(needs RW for support, otherwise poor)  Ambulation/Gait Training:  Distance (ft): 25 Feet (ft)  Assistive Device: Gait belt;Walker, rolling  Ambulation - Level of Assistance: Contact guard assistance        Gait Abnormalities: Decreased step clearance;Trunk sway increased        Base of Support: Widened     Speed/Clara: Slow;Pace decreased (<100 feet/min)  Step Length: Right shortened;Left shortened               Functional Measure:  Tinetti test:    Sitting Balance: 1  Arises: 1  Attempts to Rise: 2  Immediate Standing Balance: 1  Standing Balance: 1  Nudged: 0  Eyes Closed: 0  Turn 360 Degrees - Continuous/Discontinuous: 0  Turn 360 Degrees - Steady/Unsteady: 0  Sitting Down: 1  Balance Score: 7 Balance total score  Indication of Gait: 1  R Step Length/Height: 1  L Step Length/Height: 1  R Foot Clearance: 1  L Foot Clearance: 1  Step Symmetry: 1  Step Continuity: 1  Path: 1  Trunk: 0  Walking Time: 0  Gait Score: 8 Gait total score  Total Score: 15/28 Overall total score         Tinetti Tool Score Risk of Falls  <19 = High Fall Risk  19-24 = Moderate Fall Risk  25-28 = Low Fall Risk  Tinetti ME. Performance-Oriented Assessment of Mobility Problems in Elderly Patients. Gordon 66; [id number].  (Scoring Description: PT Bulletin Feb. 10, 1993)    Older adults: Eileen Garcia et al, 2009; n = 1000 [facility] elderly evaluated with ABC, CHINMAY, ADL, and IADL)  · Mean CHINMAY score for males aged 69-68 years = 26.21(3.40)  · Mean CHINMAY score for females age 69-68 years = 25.16(4.30)  · Mean CHINMAY score for males over [de-identified] years = 23.29(6.02)  · Mean CHINMAY score for females over [de-identified] years = 17.20(8.32)           Physical Therapy Evaluation Charge Determination   History Examination Presentation Decision-Making   HIGH Complexity :3+ comorbidities / personal factors will impact the outcome/ POC  HIGH Complexity : 4+ Standardized tests and measures addressing body structure, function, activity limitation and / or participation in recreation  MEDIUM Complexity : Evolving with changing characteristics  LOW Complexity : FOTO score of       Based on the above components, the patient evaluation is determined to be of the following complexity level: LOW     Pain Rating:  No c/o pain during session    Activity Tolerance:   Good  Please refer to the flowsheet for vital signs taken during this treatment. After treatment patient left in no apparent distress:   Sitting in chair, Call bell within reach, and Caregiver / family present    COMMUNICATION/EDUCATION:   The patients plan of care was discussed with: Registered Nurse. Fall prevention education was provided and the patient/caregiver indicated understanding., Patient/family have participated as able in goal setting and plan of care. , and Patient/family agree to work toward stated goals and plan of care.     Thank you for this referral.  Mercy Valentino, PT   Time Calculation: 29 mins

## 2020-02-26 NOTE — PROGRESS NOTES
Problem: Falls - Risk of  Goal: *Absence of Falls  Description  Document Elvia Gordon Fall Risk and appropriate interventions in the flowsheet.   Outcome: Progressing Towards Goal  Note: Fall Risk Interventions:  Mobility Interventions: Patient to call before getting OOB                   History of Falls Interventions: Door open when patient unattended, Room close to nurse's station         Problem: Patient Education: Go to Patient Education Activity  Goal: Patient/Family Education  Outcome: Progressing Towards Goal     Problem: Breathing Pattern - Ineffective  Goal: *Absence of hypoxia  Outcome: Progressing Towards Goal

## 2020-02-26 NOTE — PROGRESS NOTES
Pharmacist Retrospective Admission Medication History    The patient was interviewed regarding clarification of the prior to admission medication regimen. Wife present in room and obtained permission from patient to discuss drug regimen with visitor(s) present. Patient was questioned regarding use of any other inhalers, topical products, over the counter medications, herbal medications, vitamin products or ophthalmic/nasal/otic medication use. Patient and wife were both good historians of patient's current medications. Information Obtained From: Patient, Wife, Rx query    Recommendations/Findings: The following amendments were made to the patient's active medication list on file at [facility]:     1) Additions: NONE    2) Removals:   Acetylcysteine (Mucomyst) nebs - Last filled in October 2019 for 15 day supply. Not currently using at this time (and patient did not recognize name of medication)  Ketoconazole cream    3) Changes: NONE    PTA medication list was corrected to the following:     Prior to Admission Medications   Prescriptions Last Dose Informant Patient Reported? Taking? ADVAIR DISKUS 250-50 mcg/dose diskus inhaler 2/24/2020 Self No Yes   Sig: Take 1 Puff by inhalation every twelve (12) hours. ASCORBIC ACID (VITAMIN C PO) 2/24/2020 Self Yes Yes   Sig: Take 1,000 mg by mouth daily. CHOLECALCIFEROL, VITAMIN D3, (VITAMIN D3 PO) 2/24/2020 Self Yes Yes   Sig: Take 1,000 Units by mouth daily. MAGNESIUM PO 2/24/2020 Self Yes Yes   Sig: Take 500 mg by mouth daily. VENTOLIN HFA 90 mcg/actuation inhaler 1/26/2020 Self No Yes   Sig: INHALE 2 PUFFS BY MOUTH EVERY 4 HOURS AS NEEDED FOR WHEEZING   VITAMIN B COMPLEX (B COMPLEX PO) 2/24/2020 Self Yes Yes   Sig: Take 1 Tab by mouth daily. acetaminophen (TYLENOL) 500 mg tablet 2/19/2020 Self No Yes   Sig: Take 1 tablet by mouth every eight (8) hours as needed for Pain.    albuterol (PROVENTIL VENTOLIN) 2.5 mg /3 mL (0.083 %) nebu 2/24/2020 Self No Yes Sig: 3 mL by Nebulization route three (3) times daily as needed (wheeze). apixaban (ELIQUIS) 5 mg tablet 2020 Self No Yes   Sig: Take 1 Tab by mouth two (2) times a day. aspirin delayed-release 81 mg tablet 2020 Self No Yes   Sig: Take 1 Tab by mouth daily. coenzyme q10 10 mg cap 2020 Self Yes Yes   Sig: Take 10 mg by mouth daily. cyanocobalamin (VITAMIN B-12) 500 mcg tablet 2020 Self Yes Yes   Sig: Take 500 mcg by mouth daily. famotidine (PEPCID AC) 20 mg tablet 2020 Self Yes Yes   Sig: Take 20 mg by mouth daily. fexofenadine (ALLEGRA) 180 mg tablet 2020 Self No Yes   Sig: Take 1 Tab by mouth daily. folic acid 926 mcg tablet 2020 Self Yes Yes   Sig: Take 400 mcg by mouth daily. glipiZIDE (GLUCOTROL) 5 mg tablet 2020 Self No Yes   Sig: TAKE 1 TABLET BY MOUTH EVERY DAY   isosorbide mononitrate ER (IMDUR) 30 mg tablet 2020 Self No Yes   Sig: Take 0.5 Tabs by mouth every morning. Indications: Take 1/2 tab by mouth daily   lisinopril (PRINIVIL, ZESTRIL) 5 mg tablet 2020 Self No Yes   Sig: TAKE 1 TABLET BY MOUTH EVERY DAY   metFORMIN ER (GLUCOPHAGE XR) 500 mg tablet 2020 Self No Yes   Sig: TAKE 3 TABLETS BY MOUTH EVERY DAY WITH DINNER   metoprolol succinate (TOPROL-XL) 25 mg XL tablet 2020 Self No Yes   Sig: TAKE 1/2 TABLET BY MOUTH EVERY DAY   multivitamins-minerals-lutein (CENTRUM SILVER) Tab 2020 Self Yes Yes   Sig: Take 1 Tab by mouth daily. nitroglycerin (NITROSTAT) 0.4 mg SL tablet Not Taking Self No No   Si Tab by SubLINGual route every five (5) minutes as needed for Chest Pain.   simvastatin (ZOCOR) 20 mg tablet 2020 Self No Yes   Sig: TAKE 1 TABLET BY MOUTH EVERY DAY AT NIGHT   tiotropium bromide (SPIRIVA RESPIMAT) 2.5 mcg/actuation inhaler 2020 Self No Yes   Sig: Take 2 Puffs by inhalation daily.       Facility-Administered Medications: None       Thank you,  Ash Jewell, PHARMD

## 2020-02-26 NOTE — PROGRESS NOTES
Reason for Admission: Subdural Hematoma                      RUR Score:  11%                  Plan for utilizing home health:  Pt could potentially benefit from home health services, but he states that he is not interested at this time. PCP: First and Last name:  Mehreen Walters    Name of Practice:    Are you a current patient: Yes   Approximate date of last visit: 2/12/2020                    Current Advanced Directive/Advance Care Plan: Pt is a full code and has an ACP on file. Transition of Care Plan:  CM met with pt to discuss d/c planning. CM verified pt demograhic, insurance, and PCP information. Pt resides in a 2 story home with his wife. Pt reports that he had not had difficulty navigating his home until a recent fall. Pt now uses a rolling walker to assist with ambulation. Pt reports that he stopped driving 3 months ago due to issues with his vision. Pt has not had HH or IP rehab in the last 2 years. Pt is active with his PCP and uses [facility] for medications. Pt has supportive wife and niece that live nearby. Pt children reside out of town. Plan at this time is for pt to return home. Pt is not open to receiving [facility] services if recommended at this time. CM will continue to follow patient for discharge planning needs and arrange for services as deemed necessary. Care Management Interventions  PCP Verified by CM: Yes  Mode of Transport at Discharge:  Other (see comment)  Transition of Care Consult (CM Consult): Discharge Planning  Physical Therapy Consult: Yes  Current Support Network: Lives with Spouse  Confirm Follow Up Transport: Family  Discharge Location  Discharge Placement: Home,    Christi Barrios, Care Manager  [phone]

## 2020-02-26 NOTE — PROGRESS NOTES
[address] [place] [address], [address] [facility] [address], [address] [facility]  [phone]      Cardiology Progress Note      2/26/2020 10:18 AM    Admit Date: 2/25/2020    Admit Diagnosis:   Subdural hematoma ([address] [S06.5X9A]    Subjective:     Liam Sparrow has no c/o CP, SOB, back in SR.  CT of head shows chronic yessy SDH  small, acute hemorrhagic elements, remains of Eliquis until Neurology OK to restart. Visit Vitals  BP (!) 151/97 (BP 1 Location: Right arm, BP Patient Position: At rest)   Pulse 87   Temp 98.1 °F (36.7 °C)   Resp 18   Ht 5' 10\" (1.778 m)   Wt 87.1 kg (192 lb)   SpO2 96%   BMI 27.55 kg/m²       Current Facility-Administered Medications   Medication Dose Route Frequency    acetaminophen (TYLENOL) tablet 650 mg  650 mg Oral Q6H PRN    sodium chloride (NS) flush 5-40 mL  5-40 mL IntraVENous Q8H    sodium chloride (NS) flush 5-40 mL  5-40 mL IntraVENous PRN    ondansetron (ZOFRAN) injection 4 mg  4 mg IntraVENous Q4H PRN    acetaminophen (TYLENOL) tablet 500 mg  500 mg Oral Q8H PRN    atorvastatin (LIPITOR) tablet 10 mg  10 mg Oral QHS    metoprolol succinate (TOPROL-XL) XL tablet 12.5 mg  12.5 mg Oral DAILY    lisinopril (PRINIVIL, ZESTRIL) tablet 5 mg  5 mg Oral DAILY    isosorbide mononitrate ER (IMDUR) tablet 15 mg  15 mg Oral 7am    arformoteroL (BROVANA) neb solution 15 mcg  15 mcg Nebulization BID RT    And    budesonide (PULMICORT) 500 mcg/2 ml nebulizer suspension  500 mcg Nebulization BID RT    ipratropium (ATROVENT) 0.02 % nebulizer solution 0.5 mg  0.5 mg Nebulization Q6H RT       Objective:      Physical Exam:  General Appearance:  elderly  male in no acute distress  Chest:   Clear  Cardiovascular:  Regular rate and rhythm, no murmur. Abdomen:   Soft, non-tender, bowel sounds are active. Extremities: no LE edema  Skin:  Warm and dry.      Data Review:   Recent Labs     02/26/20  0533 02/25/20  1526   WBC 6.7 7.2   HGB 12.9 13.2   HCT 37.6 38.4    242     Recent Labs     02/26/20  0533 02/25/20  1526   * 134*   K 4.2 4.3    103   CO2 27 28   * 173*   BUN 15 16   CREA 1.14 1.08   CA 8.7 9.0   ALB  --  3.2*   TBILI  --  0.6   SGOT  --  16   ALT  --  25       No results for input(s): TROIQ, CPK, CKMB in the last 72 hours. Intake/Output Summary (Last 24 hours) at 2/26/2020 1018  Last data filed at 2/26/2020 0910  Gross per 24 hour   Intake 240 ml   Output 1200 ml   Net -960 ml        Telemetry: SR    CT:  2/26/2020  1. Presence of bilateral subdural hematomata. Presence of small, acute  hemorrhagic elements on the left as described above. 2. 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 3. Presence of periventricular low density compatible with white matter disease. 4. Evidence of moderate cerebral atrophy. Assessment:     Active Problems:    Coronary artery disease involving native coronary artery without angina pectoris (6/10/2015)      Atrial flutter ([address] (1/29/2020)      Subdural hematoma ([address] (2/25/2020)        Plan:     Aflutter with RVR:  Converted back to SR  Remains of Eliquis due to SDH and small acute hemorrhagic element on CT of head  Neurology following. Per Neurology note: With respect to his Eliquis, given his frequent falls and ataxia, this should be some consideration given to, if he is a candidate for continued anticoagulation and certainly, he is at risk for more falls with devastating intracranial hemorrhage.       CAD:   Aspirin on hold, continue on BB, statin  Stable    HTN:  Mildly elevated, continue on BB and ACEI.           Rob Thomas [facility]  Cardiology

## 2020-02-26 NOTE — PROGRESS NOTES
ADULT PROTOCOL: JET AEROSOL ASSESSMENT    Patient  Mariusz Nash     80 y.o.   male     2/26/2020  10:32 AM    Breath Sounds Pre Procedure: Right Breath Sounds: Clear                               Left Breath Sounds: Clear    Breath Sounds Post Procedure: Right Breath Sounds: Clear                                 Left Breath Sounds: Clear    Breathing pattern: Pre procedure Breathing Pattern: Regular          Post procedure Breathing Pattern: Regular    Heart Rate: Pre procedure Pulse: 93           Post procedure Pulse: 90    Resp Rate: Pre procedure Respirations: 18           Post procedure Respirations: 18      Cough: Pre procedure Cough: Non-productive               Post procedure Cough: Non-productive      Oxygen: O2 Device: Room air            SpO2: Pre procedure SpO2: 96 %                 Post procedure SpO2: 92 %      Nebulizer Therapy: Current medications Aerosolized Medications: Brovana, Ipratropium bromide, Pulmicort          Smoking History:  Former    Problem List:   Patient Active Problem List   Diagnosis Code    GERD (gastroesophageal reflux disease) K21.9    Low back pain M54.5    Pure hypercholesterolemia E78.00    COPD (chronic obstructive pulmonary disease) ([facility] J44.9    Osteoarthritis of hip M16.9    Status post hip replacement Z96.649    Personal history of prostate cancer Z85.46    S/P drug eluting coronary stent placement Z95.5    Incidental pulmonary nodule, greater than or equal to 8mm R91.1    Coronary artery disease involving native coronary artery without angina pectoris I25.10    Retinal hemorrhage H35.60    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    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    SOBOE (shortness of breath on exertion) R06.02    Advance directive discussed with patient Z71.89    Degenerative joint disease of right hip M16.11    Primary localized osteoarthrosis of right hip M16.11    Diabetic peripheral neuropathy associated with type 2 diabetes mellitus ([facility] E11.42    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    B12 deficiency E53.8    Ataxia R27.0    Sensory ataxic gait R26.0    Cervical arthritis with myelopathy M47.12    Degenerative cervical spinal stenosis M48.02    Bilateral carotid artery stenosis I65.23    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    Vitamin D deficiency E55.9    Vestibular vertigo H81.399    Lumbar back pain with radiculopathy affecting left lower extremity M54.16    Lumbar back pain with radiculopathy affecting right lower extremity M54.16    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) Z90.8F7B       Respiratory Therapist: Tanja Sam V RT

## 2020-02-26 NOTE — CONSULTS
[address]    Name:  Last Thompson  MR#:  [id number]  :  1931  ACCOUNT #:  [de-identified]  DATE OF SERVICE:  2020    REASON FOR CONSULTATION:  Subdural hematoma. HISTORY OF PRESENT ILLNESS:  The patient is an 80-year-old gentleman. He has coronary artery disease, currently on Eliquis, with CVA, ataxia, hypertension, and diabetes. He fell on Friday after stumbling, his head striking on the bathroom wall. Apparently, he has multiple falls. He had some dizziness and some headaches that was going on  especially the first day because of his continued bouts. He went to his cardiologist for consideration of EP management for his atrial flutter and because of fall, he came into the emergency room. A CT scan showed a subdural hematoma. PAST MEDICAL HISTORY:  As listed above. MEDICATIONS:  Include, Eliquis and multiple others, which were reviewed on the charts. SOCIAL HISTORY:  Former smoker. Does not drink. FAMILY HISTORY:  Cancer, lung disease, hypertension. REVIEW OF SYSTEMS:  There is some dizziness. No nausea, vomiting, fever, chills, chest pain, or shortness of breath. The rest of the 10-systems are reviewed and are negative except as above. PHYSICAL EXAMINATION:  GENERAL:  This is a well-developed, well-nourished gentleman sitting in no acute distress. HEENT:  His head is normocephalic, atraumatic. Pupils are equal, round, and reactive to light. Extraocular movements are intact. Face is symmetric. Tongue and uvula midline. NEUROLOGIC:  Shoulder shrug is normal.  His strength is 5/5 in his upper and lower extremities. Sensation is grossly intact to light touch. Deep tendon reflexes are intact. He is awake, alert, and oriented x3. His speech is fluent. He has normal recent and remote memory. Normal fund of knowledge.   Normal cerebellar exam.    CT scan shows hygromas bilaterally as well as a very small amount of acute blood within the subdural space. IMPRESSION:  Small subdural hematoma. RECOMMENDATIONS:  It has probably been four days from the fall. It is difficult with timing given the acute nature of the disease. It is fairly small. We will manage this nonoperatively, but hold his Eliquis and watch him overnight and repeat head CT tomorrow. With respect to his Eliquis, given his frequent falls and ataxia, this should be some consideration given to, if he is a candidate for continued anticoagulation and certainly, he is at risk for more falls with devastating intracranial hemorrhage. I will leave that up to his Primary Care/Cardiology. We will follow along with you.         Rose Marie Ventura MD [id number]  D:  02/25/2020 16:31  T:  02/25/2020 20:12  JOB #:  [id number]

## 2020-02-27 LAB
GLUCOSE BLD STRIP.AUTO-MCNC: 221 MG/DL (ref 65–100)
GLUCOSE BLD STRIP.AUTO-MCNC: 231 MG/DL (ref 65–100)
SERVICE CMNT-IMP: ABNORMAL
SERVICE CMNT-IMP: ABNORMAL

## 2020-02-27 PROCEDURE — 94640 AIRWAY INHALATION TREATMENT: CPT

## 2020-02-27 PROCEDURE — 94760 N-INVAS EAR/PLS OXIMETRY 1: CPT

## 2020-02-27 PROCEDURE — 97530 THERAPEUTIC ACTIVITIES: CPT | Performed by: OCCUPATIONAL THERAPIST

## 2020-02-27 PROCEDURE — 74011250637 HC RX REV CODE- 250/637: Performed by: INTERNAL MEDICINE

## 2020-02-27 PROCEDURE — 97166 OT EVAL MOD COMPLEX 45 MIN: CPT | Performed by: OCCUPATIONAL THERAPIST

## 2020-02-27 PROCEDURE — 97110 THERAPEUTIC EXERCISES: CPT

## 2020-02-27 PROCEDURE — 97116 GAIT TRAINING THERAPY: CPT

## 2020-02-27 PROCEDURE — 74011000250 HC RX REV CODE- 250: Performed by: EMERGENCY MEDICINE

## 2020-02-27 PROCEDURE — 74011000250 HC RX REV CODE- 250: Performed by: INTERNAL MEDICINE

## 2020-02-27 PROCEDURE — 97530 THERAPEUTIC ACTIVITIES: CPT

## 2020-02-27 PROCEDURE — 97535 SELF CARE MNGMENT TRAINING: CPT | Performed by: OCCUPATIONAL THERAPIST

## 2020-02-27 PROCEDURE — 74011636637 HC RX REV CODE- 636/637: Performed by: INTERNAL MEDICINE

## 2020-02-27 PROCEDURE — 82962 GLUCOSE BLOOD TEST: CPT

## 2020-02-27 PROCEDURE — 65270000029 HC RM PRIVATE

## 2020-02-27 RX ORDER — GLIPIZIDE 5 MG/1
5 TABLET ORAL
Status: DISCONTINUED | OUTPATIENT
Start: 2020-02-27 | End: 2020-02-28 | Stop reason: HOSPADM

## 2020-02-27 RX ORDER — MAGNESIUM SULFATE 100 %
4 CRYSTALS MISCELLANEOUS AS NEEDED
Status: DISCONTINUED | OUTPATIENT
Start: 2020-02-27 | End: 2020-02-28 | Stop reason: HOSPADM

## 2020-02-27 RX ORDER — LISINOPRIL 20 MG/1
20 TABLET ORAL DAILY
Status: DISCONTINUED | OUTPATIENT
Start: 2020-02-27 | End: 2020-02-28 | Stop reason: HOSPADM

## 2020-02-27 RX ORDER — ASPIRIN 81 MG/1
81 TABLET ORAL DAILY
Status: DISCONTINUED | OUTPATIENT
Start: 2020-02-28 | End: 2020-02-28

## 2020-02-27 RX ORDER — DEXTROSE 50 % IN WATER (D50W) INTRAVENOUS SYRINGE
25-50 AS NEEDED
Status: DISCONTINUED | OUTPATIENT
Start: 2020-02-27 | End: 2020-02-28 | Stop reason: HOSPADM

## 2020-02-27 RX ORDER — METFORMIN HYDROCHLORIDE 500 MG/1
1500 TABLET, EXTENDED RELEASE ORAL
Status: DISCONTINUED | OUTPATIENT
Start: 2020-02-27 | End: 2020-02-28 | Stop reason: HOSPADM

## 2020-02-27 RX ORDER — INSULIN LISPRO 100 [IU]/ML
INJECTION, SOLUTION INTRAVENOUS; SUBCUTANEOUS
Status: DISCONTINUED | OUTPATIENT
Start: 2020-02-27 | End: 2020-02-28 | Stop reason: HOSPADM

## 2020-02-27 RX ORDER — INSULIN LISPRO 100 [IU]/ML
INJECTION, SOLUTION INTRAVENOUS; SUBCUTANEOUS
Status: DISCONTINUED | OUTPATIENT
Start: 2020-02-27 | End: 2020-02-27

## 2020-02-27 RX ADMIN — ATORVASTATIN CALCIUM 10 MG: 10 TABLET, FILM COATED ORAL at 22:09

## 2020-02-27 RX ADMIN — IPRATROPIUM BROMIDE 0.5 MG: 0.5 SOLUTION RESPIRATORY (INHALATION) at 19:42

## 2020-02-27 RX ADMIN — Medication 10 ML: at 22:09

## 2020-02-27 RX ADMIN — METOPROLOL SUCCINATE 12.5 MG: 25 TABLET, EXTENDED RELEASE ORAL at 08:16

## 2020-02-27 RX ADMIN — BUDESONIDE 500 MCG: 0.5 INHALANT RESPIRATORY (INHALATION) at 10:12

## 2020-02-27 RX ADMIN — ARFORMOTEROL TARTRATE 15 MCG: 15 SOLUTION RESPIRATORY (INHALATION) at 10:12

## 2020-02-27 RX ADMIN — GLIPIZIDE 5 MG: 5 TABLET ORAL at 17:05

## 2020-02-27 RX ADMIN — Medication 10 ML: at 14:08

## 2020-02-27 RX ADMIN — ARFORMOTEROL TARTRATE 15 MCG: 15 SOLUTION RESPIRATORY (INHALATION) at 19:42

## 2020-02-27 RX ADMIN — INSULIN LISPRO 2 UNITS: 100 INJECTION, SOLUTION INTRAVENOUS; SUBCUTANEOUS at 22:09

## 2020-02-27 RX ADMIN — Medication 5 ML: at 06:00

## 2020-02-27 RX ADMIN — BUDESONIDE 500 MCG: 0.5 INHALANT RESPIRATORY (INHALATION) at 19:42

## 2020-02-27 RX ADMIN — ISOSORBIDE MONONITRATE 15 MG: 30 TABLET, EXTENDED RELEASE ORAL at 08:15

## 2020-02-27 RX ADMIN — INSULIN LISPRO 3 UNITS: 100 INJECTION, SOLUTION INTRAVENOUS; SUBCUTANEOUS at 17:05

## 2020-02-27 RX ADMIN — LISINOPRIL 20 MG: 20 TABLET ORAL at 17:05

## 2020-02-27 RX ADMIN — METFORMIN HYDROCHLORIDE 1500 MG: 500 TABLET, EXTENDED RELEASE ORAL at 17:05

## 2020-02-27 RX ADMIN — LISINOPRIL 5 MG: 5 TABLET ORAL at 08:16

## 2020-02-27 RX ADMIN — IPRATROPIUM BROMIDE 0.5 MG: 0.5 SOLUTION RESPIRATORY (INHALATION) at 14:33

## 2020-02-27 RX ADMIN — IPRATROPIUM BROMIDE 0.5 MG: 0.5 SOLUTION RESPIRATORY (INHALATION) at 10:12

## 2020-02-27 NOTE — PROGRESS NOTES
Physical Therapy    PT evaluation completed, patient with significant balance deficits and ataxic gait which place him at high risk for falls. Recommend inpatient rehab before returning home. Full note to follow.     Parth Choi

## 2020-02-27 NOTE — PROGRESS NOTES
BEDSIDE VERBAL shift change report given to KARI Dickinson (oncoming nurse) by Law aponte.  Report included the following information SBAR, Kardex, Recent Results and Quality Measures.     Zone Phone:  [phone]        Significant changes during shift: None           Patient Information     Mariusz Nash  80 y.o.  2/25/2020  1:26 PM by Luis Friedman MD. 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 ([address] 02/25/2020    Subdural hematoma ([address] 02/25/2020    Atrial flutter ([address] 01/29/2020    Mixed hyperlipidemia 01/29/2020    Essential hypertension 01/29/2020    PVD (peripheral vascular disease) ([address] 02/12/2019    Varicose veins of both legs with edema 02/12/2019    Encounter for staple removal 01/09/2019    Angina, class III ([address] 10/04/2018    S/P cardiac cath 08/20/2018    Type 2 diabetes mellitus with nephropathy ([address] 12/22/2017    Vestibular vertigo 12/13/2017    Lumbar back pain with radiculopathy affecting left lower extremity 12/13/2017    Lumbar back pain with radiculopathy affecting right lower extremity 12/13/2017    Diabetic peripheral neuropathy associated with type 2 diabetes mellitus ([address] 10/24/2017    Idiopathic small and large fiber sensory neuropathy 10/24/2017    B12 deficiency 10/24/2017    Ataxia 10/24/2017    Sensory ataxic gait 10/24/2017    Cervical arthritis with myelopathy 10/24/2017    Degenerative cervical spinal stenosis 10/24/2017    Bilateral carotid artery stenosis 10/24/2017    Cerebral microvascular disease 10/24/2017    Vitamin D deficiency 10/24/2017    Degenerative joint disease of right hip 07/06/2016    Primary localized osteoarthrosis of right hip 07/06/2016    Advance directive discussed with patient 06/28/2016    SOBOE (shortness of breath on exertion) 05/04/2016    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    Strabismic amblyopia 04/02/2016    Retinal hemorrhage 01/27/2016    Coronary artery disease involving native coronary artery without angina pectoris 06/10/2015    Incidental pulmonary nodule, greater than or equal to 8mm 04/11/2015    S/P drug eluting coronary stent placement 10/27/2014    Status post hip replacement 02/09/2011    Osteoarthritis of hip 10/15/2010    COPD (chronic obstructive pulmonary disease) ([facility] 09/10/2010    Pure hypercholesterolemia 05/14/2010    GERD (gastroesophageal reflux disease) 09/24/2009    Low back pain 09/24/2009    Personal history of prostate cancer 08/11/2003              Past Medical History:   Diagnosis Date    Arrhythmia      Asthma      CAD (coronary artery disease)      Chronic hip pain      COPD (chronic obstructive pulmonary disease) ([address] 9/10/2010    Diabetes (HCC)      DJD (degenerative joint disease)      GERD (gastroesophageal reflux disease)      HNP (herniated nucleus pulposus) 6/2006     lumbar     Hypercholesterolemia      Hypertension      Nausea & vomiting      Prostate cancer ([facility] 2003    Reversible ischemic neurologic deficit ([facility] 1990     suspected with no residual effects and no recurrance    TIA (transient ischemic attack)       TIA- no deficiets            Core Measures:     CVA: No No  CHF:No No  PNA:No No     Post Op Surgical (If Applicable):      Number times ambulated in hallway past shift:  0  Number of times OOB to chair past shift:   0  NG Tube: No  Incentive Spirometer: No  Drains: No   Volume  0  Dressing Present:  No  Flatus:  Yes     Activity Status:     OOB to Chair No  Ambulated this shift No   Bed Rest No     Supplemental T6: (WG Applicable)     NC No  NRB No  Venti-mask No  On 0 Liters/min        LINES AND DRAINS:     Central Line? No Placement date 0 Reason Medically Necessary 0     PICC LINE? No Placement date [facility] Medically Necessary 0     Urinary Catheter? No Placement Date [employer] Medically Necessary 0     DVT prophylaxis:     DVT prophylaxis Med- No  DVT prophylaxis SCD or NANDO- Yes      Wounds: (If Applicable)     Wounds- No     Location 0     Patient Safety:     Falls Score Total Score: 2  Safety Level_______  Bed Alarm On? Yes  Sitter? No     Plan for upcoming shift: echo           Discharge Plan: Yes TBD     Active Consults:  IP CONSULT TO NEUROSURGERY  IP CONSULT TO HOSPITALIST  IP CONSULT TO CARDIOLOGY

## 2020-02-27 NOTE — PROGRESS NOTES
Problem: Mobility Impaired (Adult and Pediatric)  Goal: *Acute Goals and Plan of Care (Insert Text)  Description  FUNCTIONAL STATUS PRIOR TO ADMISSION: Pt lives with his wife in a 2 story home with 16 steps between floors. He has a hx of falls. Last fall occurred in [place]. Subsequent finding of new SDH bilat. He had been completing his own basic ADLs but since falls has needed more assist of wife; and difficulty understanding time frame from wife but believe him to have been using the RW most recently. HOME SUPPORT PRIOR TO ADMISSION: The patient lived with wife. Physical Therapy Goals  Initiated 2/26/2020  1. Patient will move from supine to sit and sit to supine , scoot up and down and roll side to side in bed with independence within 7 day(s). 2.  Patient will transfer from bed to chair and chair to bed with modified independence using the least restrictive device within 7 day(s). 3.  Patient will perform sit to stand with modified independence within 7 day(s). 4.  Patient will ambulate with modified independence for 150 feet with the least restrictive device within 7 day(s). 5.  Patient will ascend/descend 13 stairs with one handrail(s) with minimal assistance/contact guard assist within 7 day(s). Outcome: Not Met   PHYSICAL THERAPY TREATMENT  Patient: Ibis Muñoz (69 y.o. male)  Date: 2/27/2020  Diagnosis: Subdural hematoma ([facility] [S06.5X9A]   <principal problem not specified>       Precautions:    Chart, physical therapy assessment, plan of care and goals were reviewed. ASSESSMENT  Patient continues with skilled PT services and is not progressing towards goals. Patient received up in chair and agreeable to participate, wife arrived during visit. Patient ambulates with ataxic gait using RW  x approx 60 feet, needs min assist at times to steady and reminders to keep COB forward to prevent LOB posteriorly.   Patient educated extensively on compensatory strategies and falls prevention, performed Cui balance test and patient at high risk for falls. Worked on balance in unsupported position with cuing for core stability, patient unable to tolerate challenges without LOB. Recommend inpatient rehab for balance training, discussed with patient and wife the high probability of a fall if he discharges home. Current Level of Function Impacting Discharge (mobility/balance): CGA to min assist, verbal cuing for safety and sequencing    Other factors to consider for discharge: high falls risk, poor balance and gait ataxia, lives with elderly wife, two story home with interior steps         PLAN :  Patient continues to benefit from skilled intervention to address the above impairments. Continue treatment per established plan of care. to address goals. Recommendation for discharge: (in order for the patient to meet his/her long term goals)  Therapy 3 hours per day 5-7 days per week    This discharge recommendation:  Has been made in collaboration with the attending provider and/or case management    IF patient discharges home will need the following DME: patient owns DME required for discharge       SUBJECTIVE:   Patient stated I always fall backward.     OBJECTIVE DATA SUMMARY:   Critical Behavior:  Neurologic State: Alert  Orientation Level: Oriented X4  Cognition: Appropriate safety awareness, Follows commands  Safety/Judgement: Decreased awareness of environment, Fall prevention, Home safety  Functional Mobility Training:  Bed Mobility:  Rolling: (pt was seated upon arrival)                 Transfers:  Sit to Stand: Contact guard assistance  Stand to Sit: Contact guard assistance                             Balance:  Cui Balance Test:    Sitting to Standin  Standing Unsupported: 0  Sitting with Back Unsupported: 4  Standing to Sittin  Transfers: 1  Standing Unsupported with Eyes Closed: 1  Standing Unsupported with Feet Together: 0  Reach Forward with Outstretched Arm: 1   Object: 0  Turn to Look Over Shoulders: 1  Turn 360 Degrees: 0  Alternate Foot on Step/Stool: 0  Standing Unsupported One Foot in Front: 0  Stand on One Le  Total:          56=Maximum possible score;   0-20=High fall risk  21-40=Moderate fall risk   41-56=Low fall risk          Sitting: Impaired  Sitting - Static: Good (unsupported)  Sitting - Dynamic: (impaired dynamic)  Standing: Impaired  Standing - Static: Constant support; Fair  Standing - Dynamic : Constant support;Fair(using RW, cues for safety/balance)  Ambulation/Gait Training:  Distance (ft): 60 Feet (ft)  Assistive Device: Gait belt;Walker, rolling  Ambulation - Level of Assistance: Minimal assistance        Gait Abnormalities: Ataxic;Path deviations        Base of Support: Widened     Speed/Clara: Pace decreased (<100 feet/min)  Step Length: Left shortened;Right shortened                    Stairs: Therapeutic Exercises:   Neuro: unsupported stance for weight shifts, reaching, head turns, used tactile cues to increase core activation  Pain Rating:      Activity Tolerance:   Fair, mild SOB noted after ambulation, RT in room for breathing treatment  Please refer to the flowsheet for vital signs taken during this treatment.     After treatment patient left in no apparent distress:   Sitting in chair, Call bell within reach, and Caregiver / family present    COMMUNICATION/COLLABORATION:   The patients plan of care was discussed with: Occupational Therapist, Registered Nurse, and     Merlin Grandchild, PT

## 2020-02-27 NOTE — PROGRESS NOTES
Hospitalist Progress Note    NAME: Juana Menezes   :  1931   MRN:  [id number]       80year-old male with past medical history of atrial fibrillation, recently started on Eliquis presents with fall and was noted to have subdural hematoma. His repeat CT scan was stable. He is currently now in normal sinus rhythm and anticoagulation is being held. Cardiology is following. Assessment / Plan:  Acute left subdural hematoma, small POA  Chronic bilateral subdural hematomas POA  Status post fall on   H/o atrial flutter, rate controlled  -Recently diagnosed with atrial flutter in 2020, placed on Eliquis.  -Had multiple falls, CAT scan here showed chronic subdural hematoma with small acute on left subdural.  -Repeat CT scan was done which showed decreasing hemorrhage and neurosurgery recommended observation only at this time  -Hold Eliquis    Paroxysmal atrial fibrillation  -He is currently in normal sinus rhythm. Eliquis is being held. Cardiology is following    Hypertension  -Continue metoprolol and lisinopril    Dyslipidemia  -Continue Lipitor    History of COPD  -Continue home inhalers    Diabetes mellitus type 2  -Holding glipizide and metformin. Start insulin sliding scale with blood sugar checks     -History of CAD, PCI in 2018-holding aspirin due to intracranial bleed  -History of peripheral vascular disease    History of ataxia     Continue home medications           Code Status: Full Code  Surrogate Decision Maker: wife     DVT Prophylaxis: SCD  GI Prophylaxis: not indicated     Baseline: ambulatory      Body mass index is 27.55 kg/m².          Subjective:     Chief Complaint / Reason for Physician Visit  Denies any headache or vision changes  Reports mild right-sided shoulder pain  No chest pain    Review of Systems:  Symptom Y/N Comments  Symptom Y/N Comments   Fever/Chills    Chest Pain     Poor Appetite    Edema     Cough    Abdominal Pain     Sputum    Joint Pain SOB/MCGUIRE    Pruritis/Rash     Nausea/vomit    Tolerating PT/OT     Diarrhea    Tolerating Diet     Constipation    Other       Could NOT obtain due to:      Objective:     VITALS:   Last 24hrs VS reviewed since prior progress note. Most recent are:  Patient Vitals for the past 24 hrs:   Temp Pulse Resp BP SpO2   02/26/20 2140     94 %   02/26/20 1930 97.3 °F (36.3 °C) 88 20 (!) 162/91 94 %   02/26/20 1836 97.9 °F (36.6 °C) 86 20 170/90 97 %   02/26/20 1625  99  175/89    02/26/20 1615  88  (!) 179/110    02/26/20 1600  92      02/26/20 1428 97.6 °F (36.4 °C) 80 18 119/80 95 %   02/26/20 1330     92 %   02/26/20 1200  82      02/26/20 1042 98.8 °F (37.1 °C) 82 18 133/79 92 %   02/26/20 0933     96 %   02/26/20 0800  87      02/26/20 0709 98.1 °F (36.7 °C) 87 18 (!) 151/97 92 %   02/26/20 0326 98.6 °F (37 °C) 88 18 121/66 93 %   02/26/20 0000  87      02/25/20 2253 98.4 °F (36.9 °C) 88 18 151/82 93 %       Intake/Output Summary (Last 24 hours) at 2/26/2020 2215  Last data filed at 2/26/2020 1236  Gross per 24 hour   Intake 480 ml   Output 1400 ml   Net -920 ml        PHYSICAL EXAM:  General: WD, WN. Alert, cooperative, no acute distress    EENT:  EOMI. Anicteric sclerae. MMM  Resp:  CTA bilaterally, no wheezing or rales. No accessory muscle use  CV:  Regular  rhythm,  No edema  GI:  Soft, Non distended, Non tender.  +Bowel sounds  Neurologic:  Alert and oriented X 3, normal speech,   Psych:   Not anxious nor agitated  Skin:  No rashes.   No jaundice    Reviewed most current lab test results and cultures  YES  Reviewed most current radiology test results   YES  Review and summation of old records today    NO  Reviewed patient's current orders and MAR    YES  PMH/SH reviewed - no change compared to H&P          Current Facility-Administered Medications:     acetaminophen (TYLENOL) tablet 650 mg, 650 mg, Oral, Q6H PRN, Bon Farias MD    sodium chloride (NS) flush 5-40 mL, 5-40 mL, IntraVENous, Q8H, Alverto Tejada MD, 10 mL at 02/26/20 1451    sodium chloride (NS) flush 5-40 mL, 5-40 mL, IntraVENous, PRN, Benson Lombardo MD    ondansetron [facility]) injection 4 mg, 4 mg, IntraVENous, Q4H PRN, Benson Lombardo MD    acetaminophen (TYLENOL) tablet 500 mg, 500 mg, Oral, Q8H PRN, Alverto Tejada MD    atorvastatin (LIPITOR) tablet 10 mg, 10 mg, Oral, QHS, Alverto Tejada MD, 10 mg at 02/25/20 2133    metoprolol succinate (TOPROL-XL) XL tablet 12.5 mg, 12.5 mg, Oral, DAILY, Alverto Tejada MD, 12.5 mg at 02/26/20 6542    lisinopril (PRINIVIL, ZESTRIL) tablet 5 mg, 5 mg, Oral, DAILY, Alverto Tejada MD, 5 mg at 02/26/20 5786    isosorbide mononitrate ER (IMDUR) tablet 15 mg, 15 mg, Oral, 7am, Alverto Tejada MD, 15 mg at 02/26/20 8540    arformoteroL (BROVANA) neb solution 15 mcg, 15 mcg, Nebulization, BID RT, 15 mcg at 02/26/20 2138 **AND** budesonide (PULMICORT) 500 mcg/2 ml nebulizer suspension, 500 mcg, Nebulization, BID RT, Alverto Tejada MD, 500 mcg at 02/26/20 2138    ipratropium (ATROVENT) 0.02 % nebulizer solution 0.5 mg, 0.5 mg, Nebulization, Q6H RT, Sindy Houston MD, Stopped at 02/27/20 0200  ________________________________________________________________________  Care Plan discussed with:    Comments   Patient y    Family      RN y    Care Manager     Consultant                        Multidiciplinary team rounds were held today with , nursing, pharmacist and clinical coordinator. Patient's plan of care was discussed; medications were reviewed and discharge planning was addressed.      ________________________________________________________________________  Total NON critical care TIME:  35   Minutes    Total CRITICAL CARE TIME Spent:   Minutes non procedure based      Comments   >50% of visit spent in counseling and coordination of care     ________________________________________________________________________  Remedios Bernstein MD Procedures: see electronic medical records for all procedures/Xrays and details which were not copied into this note but were reviewed prior to creation of Plan. LABS:  I reviewed today's most current labs and imaging studies.   Pertinent labs include:  Recent Labs     02/26/20  0533 02/25/20  1526   WBC 6.7 7.2   HGB 12.9 13.2   HCT 37.6 38.4    242     Recent Labs     02/26/20  0533 02/25/20  1526   * 134*   K 4.2 4.3    103   CO2 27 28   * 173*   BUN 15 16   CREA 1.14 1.08   CA 8.7 9.0   ALB  --  3.2*   TBILI  --  0.6   SGOT  --  16   ALT  --  25       Signed: Joseph Schafer MD

## 2020-02-27 NOTE — PROGRESS NOTES
BEDSIDE VERBAL shift change report given to Yonny Poole RN (oncoming nurse) by Clovia Bamberger RN (offgoing nurse).  Report included the following information SBAR, Kardex, Recent Results and Quality Measures.     Zone Phone:   `[phone]        Significant changes during shift: None           Patient Information     Purnima Lr  80 y.o.  2/25/2020  1:26 PM by Delma Allen MD. 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 ([address] 02/25/2020    Subdural hematoma ([address] 02/25/2020    Atrial flutter ([address] 01/29/2020    Mixed hyperlipidemia 01/29/2020    Essential hypertension 01/29/2020    PVD (peripheral vascular disease) ([address] 02/12/2019    Varicose veins of both legs with edema 02/12/2019    Encounter for staple removal 01/09/2019    Angina, class III ([address] 10/04/2018    S/P cardiac cath 08/20/2018    Type 2 diabetes mellitus with nephropathy ([address] 12/22/2017    Vestibular vertigo 12/13/2017    Lumbar back pain with radiculopathy affecting left lower extremity 12/13/2017    Lumbar back pain with radiculopathy affecting right lower extremity 12/13/2017    Diabetic peripheral neuropathy associated with type 2 diabetes mellitus ([address] 10/24/2017    Idiopathic small and large fiber sensory neuropathy 10/24/2017    B12 deficiency 10/24/2017    Ataxia 10/24/2017    Sensory ataxic gait 10/24/2017    Cervical arthritis with myelopathy 10/24/2017    Degenerative cervical spinal stenosis 10/24/2017    Bilateral carotid artery stenosis 10/24/2017    Cerebral microvascular disease 10/24/2017    Vitamin D deficiency 10/24/2017    Degenerative joint disease of right hip 07/06/2016    Primary localized osteoarthrosis of right hip 07/06/2016    Advance directive discussed with patient 06/28/2016    SOBOE (shortness of breath on exertion) 05/04/2016    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    Strabismic amblyopia 04/02/2016    Retinal hemorrhage 01/27/2016    Coronary artery disease involving native coronary artery without angina pectoris 06/10/2015    Incidental pulmonary nodule, greater than or equal to 8mm 04/11/2015    S/P drug eluting coronary stent placement 10/27/2014    Status post hip replacement 02/09/2011    Osteoarthritis of hip 10/15/2010    COPD (chronic obstructive pulmonary disease) ([facility] 09/10/2010    Pure hypercholesterolemia 05/14/2010    GERD (gastroesophageal reflux disease) 09/24/2009    Low back pain 09/24/2009    Personal history of prostate cancer 08/11/2003           Past Medical History:   Diagnosis Date    Arrhythmia      Asthma      CAD (coronary artery disease)      Chronic hip pain      COPD (chronic obstructive pulmonary disease) ([facility] 9/10/2010    Diabetes (HCC)      DJD (degenerative joint disease)      GERD (gastroesophageal reflux disease)      HNP (herniated nucleus pulposus) 6/2006     lumbar     Hypercholesterolemia      Hypertension      Nausea & vomiting      Prostate cancer ([facility] 2003    Reversible ischemic neurologic deficit ([facility] 1990     suspected with no residual effects and no recurrance    TIA (transient ischemic attack)       TIA- no deficiets            Core Measures:     CVA: No No  CHF:No No  PNA:No No     Post Op Surgical (If Applicable):      Number times ambulated in hallway past shift:  0  Number of times OOB to chair past shift:   0  NG Tube: No  Incentive Spirometer: No  Drains: No   Volume  0  Dressing Present:  No  Flatus: Yes     Activity Status:     OOB to Chair No  Ambulated this shift No   Bed Rest No     Supplemental O2: (If Applicable)     NC No  NRB No  Venti-mask No  On 0 Liters/min        LINES AND DRAINS:     Central Line? No Placement date 0 Reason Medically Necessary 0     PICC LINE? No Placement date 0Reason Medically Necessary 0     Urinary Catheter?  No Placement Date 0 Reason Medically Necessary 0     DVT prophylaxis:     DVT prophylaxis Med- No  DVT prophylaxis SCD or NANDO- Yes      Wounds: (If Applicable)     Wounds- No     Location 0     Patient Safety:     Falls Score Total Score: 2  Safety Level_______  Bed Alarm On? Yes  Sitter?  No     Plan for upcoming shift: neuro check           Discharge Plan: Yes TBD     Active Consults:  IP CONSULT TO NEUROSURGERY  IP CONSULT TO HOSPITALIST  IP CONSULT TO CARDIOLOGY

## 2020-02-27 NOTE — PROGRESS NOTES
[address] [place] [address]  [phone]      Cardiology Progress Note      2/27/2020 10:18 AM    Admit Date: 2/25/2020    Admit Diagnosis:   Subdural hematoma ([address] [S06.5X9A]    Subjective:     Emily Shine has no c/o CP, SOB, remains in SR.  CT of head shows chronic yessy SDH  small, acute hemorrhagic elements, remains off Eliquis until Neurology OK to restart. Visit Vitals  BP (!) 173/98 (BP 1 Location: Right arm, BP Patient Position: Sitting)   Pulse 99   Temp 97.3 °F (36.3 °C)   Resp 20   Ht 5' 10\" (1.778 m)   Wt 87.1 kg (192 lb)   SpO2 93%   BMI 27.55 kg/m²       Current Facility-Administered Medications   Medication Dose Route Frequency    lisinopril (PRINIVIL, ZESTRIL) tablet 20 mg  20 mg Oral DAILY    glipiZIDE (GLUCOTROL) tablet 5 mg  5 mg Oral ACB&D    metFORMIN ER (GLUCOPHAGE XR) tablet 1,500 mg  1,500 mg Oral DAILY WITH DINNER    insulin lispro (HUMALOG) injection   SubCUTAneous AC&HS    acetaminophen (TYLENOL) tablet 650 mg  650 mg Oral Q6H PRN    sodium chloride (NS) flush 5-40 mL  5-40 mL IntraVENous Q8H    sodium chloride (NS) flush 5-40 mL  5-40 mL IntraVENous PRN    ondansetron (ZOFRAN) injection 4 mg  4 mg IntraVENous Q4H PRN    acetaminophen (TYLENOL) tablet 500 mg  500 mg Oral Q8H PRN    atorvastatin (LIPITOR) tablet 10 mg  10 mg Oral QHS    metoprolol succinate (TOPROL-XL) XL tablet 12.5 mg  12.5 mg Oral DAILY    isosorbide mononitrate ER (IMDUR) tablet 15 mg  15 mg Oral 7am    arformoteroL (BROVANA) neb solution 15 mcg  15 mcg Nebulization BID RT    And    budesonide (PULMICORT) 500 mcg/2 ml nebulizer suspension  500 mcg Nebulization BID RT    ipratropium (ATROVENT) 0.02 % nebulizer solution 0.5 mg  0.5 mg Nebulization Q6H RT       Objective:      Physical Exam:  General Appearance:  elderly  male in no acute distress  Chest:   Clear  Cardiovascular:  Regular rate and rhythm, no murmur.    Abdomen:   Soft, non-tender, bowel sounds are active. Extremities: no LE edema  Skin:  Warm and dry. Data Review:   Recent Labs     02/26/20  0533 02/25/20  1526   WBC 6.7 7.2   HGB 12.9 13.2   HCT 37.6 38.4    242     Recent Labs     02/26/20  0533 02/25/20  1526   * 134*   K 4.2 4.3    103   CO2 27 28   * 173*   BUN 15 16   CREA 1.14 1.08   CA 8.7 9.0   ALB  --  3.2*   TBILI  --  0.6   SGOT  --  16   ALT  --  25       No results for input(s): TROIQ, CPK, CKMB in the last 72 hours. Intake/Output Summary (Last 24 hours) at 2/27/2020 1439  Last data filed at 2/27/2020 1207  Gross per 24 hour   Intake    Output 425 ml   Net -425 ml        Telemetry: SR    CT:  2/26/2020  1. Presence of bilateral subdural hematomata. Presence of small, acute  hemorrhagic elements on the left as described above. 2. 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 3. Presence of periventricular low density compatible with white matter disease. 4. Evidence of moderate cerebral atrophy. Assessment:     Active Problems:    Coronary artery disease involving native coronary artery without angina pectoris (6/10/2015)      Atrial flutter ([address] (1/29/2020)      Subdural hematoma ([address] (2/25/2020)        Plan:     Aflutter with RVR:  Remains in SR  Remains off Eliquis due to SDH and small acute hemorrhagic element on CT of head  Neurology had been following. Per Neurology note: With respect to his Eliquis, given his frequent falls and ataxia, this should be some consideration given to, if he is a candidate for continued anticoagulation and certainly, he is at risk for more falls with devastating intracranial hemorrhage. Eliquis or other anticoagulation will not be restarted as he is at high risk for future falls, and as above, ICH.    Continue on ASA 81mg daily    CAD:   Aspirin 81mg daily restarted, continue on BB, statin  Stable    HTN:  Mildly elevated, continue on BB and ACEI.           Casa Novak [facility]  Cardiology

## 2020-02-27 NOTE — PROGRESS NOTES
Spiritual Care Partner Volunteer visited patient in Neuro on February 27, 2020.     Documented by:       NICOLAS Cobb, [facility], Staff [address]    [address] Paging Service  [phone]

## 2020-02-27 NOTE — PROGRESS NOTES
Occupational Therapy  Orders received and medical record reviewed. Pt participated in OT Evaluation. Pt has baseline vision impairment, baseline ataxia/impaired balance, and history of falls. Pt is functioning below his baseline at this time. Pt would benefit from intensive inpatient rehab at discharge. Per pt's wife, pt has had Outpatient PT in the past.  Full OT note to follow.

## 2020-02-27 NOTE — PROGRESS NOTES
Problem: Diabetes Self-Management  Goal: *Disease process and treatment process  Description  Define diabetes and identify own type of diabetes; list 3 options for treating diabetes. Outcome: Progressing Towards Goal  Goal: *Incorporating nutritional management into lifestyle  Description  Describe effect of type, amount and timing of food on blood glucose; list 3 methods for planning meals. Outcome: Progressing Towards Goal  Goal: *Incorporating physical activity into lifestyle  Description  State effect of exercise on blood glucose levels. Outcome: Progressing Towards Goal  Goal: *Developing strategies to promote health/change behavior  Description  Define the ABC's of diabetes; identify appropriate screenings, schedule and personal plan for screenings. Outcome: Progressing Towards Goal  Goal: *Using medications safely  Description  State effect of diabetes medications on diabetes; name diabetes medication taking, action and side effects. Outcome: Progressing Towards Goal  Goal: *Monitoring blood glucose, interpreting and using results  Description  Identify recommended blood glucose targets  and personal targets. Outcome: Progressing Towards Goal  Goal: *Prevention, detection, treatment of acute complications  Description  List symptoms of hyper- and hypoglycemia; describe how to treat low blood sugar and actions for lowering  high blood glucose level. Outcome: Progressing Towards Goal  Goal: *Prevention, detection and treatment of chronic complications  Description  Define the natural course of diabetes and describe the relationship of blood glucose levels to long term complications of diabetes.   Outcome: Not Progressing Towards Goal  Goal: *Developing strategies to address psychosocial issues  Description  Describe feelings about living with diabetes; identify support needed and support network  Outcome: Progressing Towards Goal  Goal: *Insulin pump training  Outcome: Progressing Towards Goal  Goal: *Sick day guidelines  Outcome: Progressing Towards Goal  Goal: *Patient Specific Goal (EDIT GOAL, INSERT TEXT)  Outcome: Progressing Towards Goal     Problem: Patient Education: Go to Patient Education Activity  Goal: Patient/Family Education  Outcome: Progressing Towards Goal     Problem: Falls - Risk of  Goal: *Absence of Falls  Description  Document Andrei Vidales Fall Risk and appropriate interventions in the flowsheet.   Outcome: Progressing Towards Goal  Note: Fall Risk Interventions:  Mobility Interventions: Patient to call before getting OOB         Medication Interventions: Patient to call before getting OOB, Bed/chair exit alarm         History of Falls Interventions: Door open when patient unattended, Bed/chair exit alarm         Problem: Patient Education: Go to Patient Education Activity  Goal: Patient/Family Education  Outcome: Progressing Towards Goal     Problem: Breathing Pattern - Ineffective  Goal: *Absence of hypoxia  Outcome: Progressing Towards Goal     Problem: Patient Education: Go to Patient Education Activity  Goal: Patient/Family Education  Outcome: Progressing Towards Goal

## 2020-02-27 NOTE — PROGRESS NOTES
HIGINIO Plan:    * Home with HH (SN/PT/OT) & 24/7 supervision     > Pt declining SNF & in-pt rehab interventions for d/c  > Pt will need PCP f/u appt secured prior to d/c  > 2nd IM & FOC prior to d/c     Initial note: CM reviewed pt's chart and noted updates prior to moving forward with d/c planning. CM met with pt and his wife Anuj Damon: [phone]) at bedside to make contact, introduce role, and discuss HIGINIO plan for d/c. CM discussed utilizing rehab intervention prior to returning home and explained the difference between SNF vs in-pt. Pt is adamant that he is going home and declined both SNF and in-pt rehab intervention at d/c; pt reported he \"might be open\" to [address] [place] [address] [facility] services but will have to consult with his wife. CM explained that pt's medical team is working to ensure he will remain safe at d/c; pt reported \"I appreciate that, but I will be going home. \"     CM will continue to follow patient for discharge planning needs and arrange for services as deemed necessary.     Suukmar Cedillo, MSW  Care Manager, [facility]

## 2020-02-27 NOTE — PROGRESS NOTES
Hospitalist Progress Note    NAME: Liam Sparrow   :  1931   MRN:  [id number]       Assessment / Plan:    Acute left subdural hematoma, small POA  Chronic bilateral subdural hematomas POA  Status post fall on   H/o atrial flutter, rate controlled  -Recently diagnosed with atrial flutter in 2020, placed on Eliquis.  -Had multiple falls, CAT scan here showed chronic subdural hematoma with small acute on left subdural.  -Repeat CT scan was done which showed decreasing hemorrhage and neurosurgery recommended observation only at this time  -Hold Eliquis     Paroxysmal atrial fibrillation  -He is currently in normal sinus rhythm. Eliquis is being held. Cardiology is following     Hypertension  -Continue metoprolol and lisinopril  -Increase dose of lisinopril to 20mg daily for better control     Dyslipidemia  -Continue Lipitor     History of COPD  -Continue home inhalers     Diabetes mellitus type 2  -resume glipizide and metformin. c/w insulin sliding scale with blood sugar checks     -History of CAD, PCI in -holding aspirin due to intracranial bleed  -History of peripheral vascular disease     History of ataxia     Continue home medications           Code Status: Full Code  Surrogate Decision Maker: wife     DVT Prophylaxis: SCD  GI Prophylaxis: not indicated     Baseline: ambulatory        Body mass index is 27.55 kg/m². Discharge disposition in 1-2 days       Subjective:     Chief Complaint / Reason for Physician Visit  \"denies any active complaints, gait is stable\". Discussed with RN events overnight.      Review of Systems:  Symptom Y/N Comments  Symptom Y/N Comments   Fever/Chills n   Chest Pain n    Poor Appetite n   Edema n    Cough n   Abdominal Pain n    Sputum n   Joint Pain n    SOB/MCGUIRE n   Pruritis/Rash     Nausea/vomit n   Tolerating PT/OT     Diarrhea n   Tolerating Diet     Constipation n   Other       Could NOT obtain due to:      Objective:     VITALS:   Last 24hrs VS reviewed since prior progress note. Most recent are:  Patient Vitals for the past 24 hrs:   Temp Pulse Resp BP SpO2   02/27/20 1207    (!) 173/98    02/27/20 1204 97.3 °F (36.3 °C) 99 20 (!) 173/97 98 %   02/27/20 1012     96 %   02/27/20 0812 97.6 °F (36.4 °C) 87 18 (!) 146/98 96 %   02/27/20 0341 98.9 °F (37.2 °C) 89 18 149/87 95 %   02/26/20 2338 97.8 °F (36.6 °C) 89 18 159/82 94 %   02/26/20 2140     94 %   02/26/20 1930 97.3 °F (36.3 °C) 88 20 (!) 162/91 94 %   02/26/20 1836 97.9 °F (36.6 °C) 86 20 170/90 97 %   02/26/20 1625  99  175/89    02/26/20 1615  88  (!) 179/110    02/26/20 1600  92      02/26/20 1428 97.6 °F (36.4 °C) 80 18 119/80 95 %       Intake/Output Summary (Last 24 hours) at 2/27/2020 1406  Last data filed at 2/27/2020 1207  Gross per 24 hour   Intake    Output 425 ml   Net -425 ml        PHYSICAL EXAM:  General: WD, WN. Alert, cooperative, no acute distress    EENT:  EOMI. Anicteric sclerae. MMM  Resp:  CTA bilaterally, no wheezing or rales. No accessory muscle use  CV:  Regular  rhythm,  No edema  GI:  Soft, Non distended, Non tender.  +Bowel sounds  Neurologic:  Alert and oriented X 3, normal speech,   Psych:   Good insight. Not anxious nor agitated  Skin:  No rashes. No jaundice    Reviewed most current lab test results and cultures  YES  Reviewed most current radiology test results   YES  Review and summation of old records today    NO  Reviewed patient's current orders and MAR    YES  PMH/SH reviewed - no change compared to H&P  ________________________________________________________________________  Care Plan discussed with:    Comments   Patient x    Family  x    RN x    Care Manager     Consultant                        Multidiciplinary team rounds were held today with , nursing, pharmacist and clinical coordinator. Patient's plan of care was discussed; medications were reviewed and discharge planning was addressed. ________________________________________________________________________  Total NON critical care TIME:  30   Minutes    Total CRITICAL CARE TIME Spent:   Minutes non procedure based      Comments   >50% of visit spent in counseling and coordination of care     ________________________________________________________________________  Maksim Porter MD     Procedures: see electronic medical records for all procedures/Xrays and details which were not copied into this note but were reviewed prior to creation of Plan. LABS:  I reviewed today's most current labs and imaging studies.   Pertinent labs include:  Recent Labs     02/26/20  0533 02/25/20  1526   WBC 6.7 7.2   HGB 12.9 13.2   HCT 37.6 38.4    242     Recent Labs     02/26/20  0533 02/25/20  1526   * 134*   K 4.2 4.3    103   CO2 27 28   * 173*   BUN 15 16   CREA 1.14 1.08   CA 8.7 9.0   ALB  --  3.2*   TBILI  --  0.6   SGOT  --  16   ALT  --  25       Signed: Maksim Porter MD

## 2020-02-27 NOTE — PROGRESS NOTES
BEDSIDE VERBAL shift change report given to Micah Valdez RN (oncoming nurse) by Juan David PIERCE (offgoing nurse). Report included the following information SBAR, Kardex, Recent Results and Quality Measures.     Zone Phone:   `[phone]      Significant changes during shift: None        Patient Information    Mike Parker  80 y.o.  2/25/2020  1:26 PM by Kaleb Dorsey MD. 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 ([address] 02/25/2020    Subdural hematoma ([address] 02/25/2020    Atrial flutter ([address] 01/29/2020    Mixed hyperlipidemia 01/29/2020    Essential hypertension 01/29/2020    PVD (peripheral vascular disease) ([address] 02/12/2019    Varicose veins of both legs with edema 02/12/2019    Encounter for staple removal 01/09/2019    Angina, class III ([address] 10/04/2018    S/P cardiac cath 08/20/2018    Type 2 diabetes mellitus with nephropathy ([address] 12/22/2017    Vestibular vertigo 12/13/2017    Lumbar back pain with radiculopathy affecting left lower extremity 12/13/2017    Lumbar back pain with radiculopathy affecting right lower extremity 12/13/2017    Diabetic peripheral neuropathy associated with type 2 diabetes mellitus ([address] 10/24/2017    Idiopathic small and large fiber sensory neuropathy 10/24/2017    B12 deficiency 10/24/2017    Ataxia 10/24/2017    Sensory ataxic gait 10/24/2017    Cervical arthritis with myelopathy 10/24/2017    Degenerative cervical spinal stenosis 10/24/2017    Bilateral carotid artery stenosis 10/24/2017    Cerebral microvascular disease 10/24/2017    Vitamin D deficiency 10/24/2017    Degenerative joint disease of right hip 07/06/2016    Primary localized osteoarthrosis of right hip 07/06/2016    Advance directive discussed with patient 06/28/2016    SOBOE (shortness of breath on exertion) 05/04/2016    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    Strabismic amblyopia 04/02/2016    Retinal hemorrhage 01/27/2016    Coronary artery disease involving native coronary artery without angina pectoris 06/10/2015    Incidental pulmonary nodule, greater than or equal to 8mm 04/11/2015    S/P drug eluting coronary stent placement 10/27/2014    Status post hip replacement 02/09/2011    Osteoarthritis of hip 10/15/2010    COPD (chronic obstructive pulmonary disease) ([facility] 09/10/2010    Pure hypercholesterolemia 05/14/2010    GERD (gastroesophageal reflux disease) 09/24/2009    Low back pain 09/24/2009    Personal history of prostate cancer 08/11/2003     Past Medical History:   Diagnosis Date    Arrhythmia     Asthma     CAD (coronary artery disease)     Chronic hip pain     COPD (chronic obstructive pulmonary disease) ([facility] 9/10/2010    Diabetes (HCC)     DJD (degenerative joint disease)     GERD (gastroesophageal reflux disease)     HNP (herniated nucleus pulposus) 6/2006    lumbar     Hypercholesterolemia     Hypertension     Nausea & vomiting     Prostate cancer ([facility] 2003    Reversible ischemic neurologic deficit ([facility] 1990    suspected with no residual effects and no recurrance    TIA (transient ischemic attack)     TIA- no deficiets         Core Measures:    CVA: No No  CHF:No No  PNA:No No    Post Op Surgical (If Applicable):     Number times ambulated in hallway past shift:  0  Number of times OOB to chair past shift:   0  NG Tube: No  Incentive Spirometer: No  Drains: No   Volume  0  Dressing Present:  No  Flatus:  Yes    Activity Status:    OOB to Chair No  Ambulated this shift No   Bed Rest No    Supplemental O2: (If Applicable)    NC No  NRB No  Venti-mask No  On 0 Liters/min      LINES AND DRAINS:    Central Line? No Placement date 0 Reason Medically Necessary 0    PICC LINE? No Placement date 0Reason Medically Necessary 0    Urinary Catheter?  No Placement Date 0 Reason Medically Necessary 0    DVT prophylaxis:    DVT prophylaxis Med- No  DVT prophylaxis SCD or NANDO- Yes     Wounds: (If Applicable)    Wounds- No    Location 0    Patient Safety:    Falls Score Total Score: 2  Safety Level_______  Bed Alarm On? Yes  Sitter?  No    Plan for upcoming shift: Case management meeting with patient for discharge        Discharge Plan: Yes TBD    Active Consults:  IP CONSULT TO NEUROSURGERY  IP CONSULT TO HOSPITALIST  IP CONSULT TO CARDIOLOGY

## 2020-02-27 NOTE — PROGRESS NOTES
Problem: Falls - Risk of  Goal: *Absence of Falls  Description  Document Shaneka Peraza Fall Risk and appropriate interventions in the flowsheet.   Outcome: Progressing Towards Goal  Note: Fall Risk Interventions:  Mobility Interventions: Bed/chair exit alarm, Patient to call before getting OOB         Medication Interventions: Bed/chair exit alarm, Patient to call before getting OOB, Teach patient to arise slowly         History of Falls Interventions: Door open when patient unattended

## 2020-02-27 NOTE — PROGRESS NOTES
Problem: Self Care Deficits Care Plan (Adult)  Goal: *Acute Goals and Plan of Care (Insert Text)  Description    FUNCTIONAL STATUS PRIOR TO ADMISSION: Patient was modified independent using a cane at times for functional mobility. Pt reports history of falls - 6 in past 3 years (tends to fall posteriorly). Pt's wife is the , she also assists him with lower body adls. Pt has steps to enter his home and a flight of steps to his second level bedroom. He is up and down 1X per day.  (there is a small bed on the first floor). Pt has vision issues at baseline and wears prism glasses--decreased awareness of environment due to impaired vision. HOME SUPPORT: Pt lives with his wife and has her assistance when needed    Occupational Therapy Goals  Initiated 2/27/2020  1. Patient will perform grooming in standing  with supervision/set-up within 7 day(s). 2.  Patient will ambulate to the bathroom to perform adls with supervision/set-up, using most appropriate DME within 7 day(s). 3.  Patient will perform lower body dressing with supervision/set-up, using adaptive aids, prn,  within 7 day(s). 4.  Patient will perform toilet transfers with supervision/set-up, demonstrating safe technique, within 7 day(s). 5.  Patient will perform all aspects of toileting with modified independence within 7 day(s). 6.  Patient will participate in upper extremity therapeutic exercise/activities with supervision/set-up for 5 minutes within 7 day(s). 7.  Patient will perform at least 10 minutes of standing adls  within 7 day(s).          Outcome: Not Met   OCCUPATIONAL THERAPY EVALUATION  Patient: Yinka Youngblood (86 y.o. male)  Date: 2/27/2020  Primary Diagnosis: [facility] ([facility] [S06.5X9A]        Precautions: fall         ASSESSMENT  Based on the objective data described below, the patient presents with baseline vision impairment/low vision, baseline decreased balance (balance impaired from baseline) with history of several falls, generalized weakness, and decreased endurance impairing adls and mobility. Pt is functioning below his baseline and would benefit from intensive rehab program at discharge. .    Current Level of Function Impacting Discharge (ADLs/self-care): supervision to moderate assistance, need assist for mobility due to impaired balance      Functional Outcome Measure: The patient scored Total: 55/100 on the Barthel Index outcome measure which is indicative of 45% impaired ability to care for basic self needs/dependency on others; inferred dependency on others for instrumental ADLs. Other factors to consider for discharge: pt's wife assists him prn     Patient will benefit from skilled therapy intervention to address the above noted impairments. PLAN :  Recommendations and Planned Interventions: self care training, functional mobility training, therapeutic exercise, balance training, visual/perceptual training, therapeutic activities, cognitive retraining, endurance activities, neuromuscular re-education, patient education, home safety training, and family training/education    Frequency/Duration: Patient will be followed by occupational therapy 4 times a week to address goals. Recommendation for discharge: (in order for the patient to meet his/her long term goals)  Therapy 3 hours per day 5-7 days per week    This discharge recommendation:  Has been made in collaboration with the attending provider and/or case management    IF patient discharges home will need the following DME: tbd  would benefit from safety frame/BSC over toilet and tub seat       SUBJECTIVE:   Patient stated I have prisms in these glasses.     OBJECTIVE DATA SUMMARY:   HISTORY:   Past Medical History:   Diagnosis Date    Arrhythmia     Asthma     CAD (coronary artery disease)     Chronic hip pain     COPD (chronic obstructive pulmonary disease) ([facility] 9/10/2010    Diabetes ([facility]     DJD (degenerative joint disease)     GERD (gastroesophageal reflux disease)     HNP (herniated nucleus pulposus) 6/2006    lumbar     Hypercholesterolemia     Hypertension     Nausea & vomiting     Prostate cancer ([facility] 2003    Reversible ischemic neurologic deficit ([facility] 1990    suspected with no residual effects and no recurrance    TIA (transient ischemic attack)     TIA- no deficiets     Past Surgical History:   Procedure Laterality Date    COLONOSCOPY  12/15/2004    Diverticulosis Dr. Josie Quiroga repeat 10 years    HX COLONOSCOPY  2005    HX HEART CATHETERIZATION      2  stents     HX HEART CATHETERIZATION  08/20/2018    failed 100% block @ [facility] will plan to do laser next.  HX HERNIA REPAIR  9/2008    left inguinal hernia repair by Dr. Tasneem Riley Right     inguinal hernai repair    HX HERNIA REPAIR      umbilical hernia repair    HX HIP REPLACEMENT Right 07/06/2016    HX PROSTATECTOMY  2003    HX TONSILLECTOMY      TOTAL HIP ARTHROPLASTY Left 2/9/11    Dr. Irina Fields at [facility]       Expanded or extensive additional review of patient history:     Home Situation  Home Environment: Private residence  # Steps to Enter: 7  Rails to Enter: No  One/Two Story Residence: Two story  # of Interior Steps: 16(11+5)  Interior Rails: Left  Living Alone: No  Support Systems: Spouse/Significant Other/Partner  Patient Expects to be Discharged to[de-identified] Private residence  Current DME Used/Available at Home: Verlyn Alberta, straight, Crutches, Walker, rollator, Walker, rolling  Tub or Shower Type: Tub/Shower combination    Hand dominance: Right    EXAMINATION OF PERFORMANCE DEFICITS:  Cognitive/Behavioral Status:  Neurologic State: Alert  Orientation Level: Oriented X4  Cognition: Appropriate safety awareness; Follows commands  Perception: (baseline vision impairment)  Perseveration: No perseveration noted  Safety/Judgement: Decreased awareness of environment; Fall prevention;Home safety    Skin: generally intact, hx ca    Edema: none observed, but wife reports that pt wears support hose due to  vascular issues (wife assists with don)    Hearing: Auditory  Auditory Impairment: None    Vision/Perceptual:    Tracking: Requires cues, head turns, or add eye shifts to track              Visual Fields: (pt reports baseline nystagmus and wears prism glasses)  Diplopia: No(wears glasses)    Acuity: Impaired near vision; Impaired far vision(desptie wearing glasses)    Corrective Lenses: Glasses(with prisms)    Range of Motion:  BUEs:  Within functional limits                            Strength:  BUE:  Strength: (BUEs intact, generally equal)   Overall, generalized weakness             Coordination:     Fine Motor Skills-Upper: Left Intact; Right Intact    Gross Motor Skills-Upper: Left Intact; Right Intact    Tone & Sensation: Tone is normal.  Pt reports occasional attention tremor     Sensation: (denies numbness / tingling)                      Balance:  Sitting: Impaired  Sitting - Static: Good (unsupported)  Sitting - Dynamic: (impaired dynamic)  Standing: Impaired  Standing - Static: Constant support; Fair  Standing - Dynamic : Constant support;Fair(using RW, cues for safety/balance)    Functional Mobility and Transfers for ADLs:  Bed Mobility:  Rolling: (pt was seated upon arrival)    Transfers:  Sit to Stand: Contact guard assistance  Stand to Sit: Contact guard assistance  Bathroom Mobility: Contact guard assistance  Toilet Transfer : Minimum assistance(recommend toilet frame, bsc over toilet due to decr balance)  Assistive Device : Walker, rolling    ADL Assessment:  Feeding: Independent    Oral Facial Hygiene/Grooming: Supervision;Stand-by assistance;Contact guard assistance    Bathing: Minimum assistance(needs assist for stpping over tub-would benefit from seat)    Upper Body Dressing: Stand-by assistance;Setup    Lower Body Dressing: Minimum assistance; Moderate assistance(needs A - has adaptive aids, does not use, wife assists)    Toileting: Supervision(needs Assist to rise from toilet safely-impaired balance pos)                ADL Intervention and task modifications:     Pt able to ambulate to the bathroom with CGA/Bre using RW for standing grooming and toilet transfer--educ on safe technique. Pt needs assistance to maintain balance during sit to stand as he tends to lean posteriorly when rising from the toilet. Pt ambulated in room demonstrating poor overall vision when he did not see the door when instructed to move to the door. Pt reports that he generally is unable to tolerate standing for long periods of time at baseline. Cognitive Retraining  Safety/Judgement: Decreased awareness of environment; Fall prevention;Home safety    Therapeutic Exercise:  Encouraged seated Exercises   Functional Measure:  Barthel Index:    Bathin  Bladder: 10  Bowels: 10  Groomin  Dressin  Feeding: 10  Mobility: 0  Stairs: 0  Toilet Use: 5  Transfer (Bed to Chair and Back): 10  Total: 55/100        The Barthel ADL Index: Guidelines  1. The index should be used as a record of what a patient does, not as a record of what a patient could do. 2. The main aim is to establish degree of independence from any help, physical or verbal, however minor and for whatever reason. 3. The need for supervision renders the patient not independent. 4. A patient's performance should be established using the best available evidence. Asking the patient, friends/relatives and nurses are the usual sources, but direct observation and common sense are also important. However direct testing is not needed. 5. Usually the patient's performance over the preceding 24-48 hours is important, but occasionally longer periods will be relevant. 6. Middle categories imply that the patient supplies over 50 per cent of the effort. 7. Use of aids to be independent is allowed. Jayce Canada., Barthel, D.W. ([id number]). Functional evaluation: the Barthel Index. [address] [facility] (14)2. DAVID RamosF, Hector Bruner., Lizett Sanchez., Gayatri, [address] (1999). Measuring the change indisability after inpatient rehabilitation; comparison of the responsiveness of the Barthel Index and Functional [place] Measure. Journal of Neurology, Neurosurgery, and Psychiatry, 66(4), 590-964. GRAY Bello, KRISTYN Coronado, & Veronica England M.A. (2004.) Assessment of post-stroke quality of life in cost-effectiveness studies: The usefulness of the Barthel Index and the EuroQoL-5D. Quality of Life Research, 15, 371-88         Occupational Therapy Evaluation Charge Determination   History Examination Decision-Making   MEDIUM Complexity : Expanded review of history including physical, cognitive and psychosocial  history  MEDIUM Complexity : 3-5 performance deficits relating to physical, cognitive , or psychosocial skils that result in activity limitations and / or participation restrictions MEDIUM Complexity : Patient may present with comorbidities that affect occupational performnce. Miniml to moderate modification of tasks or assistance (eg, physical or verbal ) with assesment(s) is necessary to enable patient to complete evaluation       Based on the above components, the patient evaluation is determined to be of the following complexity level: MEDIUM  Pain Rating:  No complaints of pain    Activity Tolerance:   Fair    After treatment patient left in no apparent distress:    Sitting in chair, Call bell within reach, Bed / chair alarm activated, Caregiver / family present and nurse informed    COMMUNICATION/EDUCATION:   The patients plan of care was discussed with: Physical Therapist and Registered Nurse. Home safety education was provided and the patient/caregiver indicated understanding. This patients plan of care is appropriate for delegation to \[facility] [place] [facility]\"".     Thank you for this referral.  Zacarias Samaniego OTR/L  Time Calculation: 51 mins

## 2020-02-28 ENCOUNTER — TELEPHONE (OUTPATIENT)
Dept: INTERNAL MEDICINE CLINIC | Age: 85
End: 2020-02-28

## 2020-02-28 ENCOUNTER — HOME HEALTH ADMISSION (OUTPATIENT)
Dept: HOME HEALTH SERVICES | Facility: HOME HEALTH | Age: 85
End: 2020-02-28
Payer: MEDICARE

## 2020-02-28 ENCOUNTER — APPOINTMENT (OUTPATIENT)
Dept: NON INVASIVE DIAGNOSTICS | Age: 85
DRG: 086 | End: 2020-02-28
Attending: NURSE PRACTITIONER
Payer: MEDICARE

## 2020-02-28 VITALS
BODY MASS INDEX: 27.49 KG/M2 | TEMPERATURE: 97.5 F | HEART RATE: 87 BPM | HEIGHT: 70 IN | RESPIRATION RATE: 18 BRPM | OXYGEN SATURATION: 95 % | SYSTOLIC BLOOD PRESSURE: 150 MMHG | WEIGHT: 192 LBS | DIASTOLIC BLOOD PRESSURE: 83 MMHG

## 2020-02-28 LAB
AV VELOCITY RATIO: 0.6
ECHO AO ROOT DIAM: 3.86 CM
ECHO AV PEAK GRADIENT: 5.9 MMHG
ECHO AV PEAK VELOCITY: 121.89 CM/S
ECHO EST RA PRESSURE: 10 MMHG
ECHO LA AREA 4C: 23.7 CM2
ECHO LA MAJOR AXIS: 3.23 CM
ECHO LA TO AORTIC ROOT RATIO: 0.84
ECHO LA VOL 4C: 71.86 ML (ref 18–58)
ECHO LA VOLUME INDEX A4C: 35.03 ML/M2 (ref 16–28)
ECHO LV E' LATERAL VELOCITY: 8.07 CM/S
ECHO LV E' SEPTAL VELOCITY: 9.83 CM/S
ECHO LV EDV TEICHHOLZ: 0.4 ML
ECHO LV ESV TEICHHOLZ: 0.08 ML
ECHO LV INTERNAL DIMENSION DIASTOLIC: 4.04 CM (ref 4.2–5.9)
ECHO LV INTERNAL DIMENSION SYSTOLIC: 2.12 CM
ECHO LV IVSD: 1.22 CM (ref 0.6–1)
ECHO LV MASS 2D: 182.1 G (ref 88–224)
ECHO LV MASS INDEX 2D: 88.8 G/M2 (ref 49–115)
ECHO LV POSTERIOR WALL DIASTOLIC: 1.07 CM (ref 0.6–1)
ECHO LV POSTERIOR WALL SYSTOLIC: 1.12 CM
ECHO LVOT PEAK GRADIENT: 2.2 MMHG
ECHO LVOT PEAK VELOCITY: 73.53 CM/S
ECHO LVOT VTI: 16.2 CM
ECHO MV REGURGITANT RADIUS PISA: 0.51 CM
ECHO PULMONARY ARTERY SYSTOLIC PRESSURE (PASP): 55.4 MMHG
ECHO RA AREA 4C: 20.71 CM2
ECHO RIGHT VENTRICULAR SYSTOLIC PRESSURE (RVSP): 55.4 MMHG
ECHO TV REGURGITANT MAX VELOCITY: 336.76 CM/S
ECHO TV REGURGITANT PEAK GRADIENT: 45.4 MMHG
GLUCOSE BLD STRIP.AUTO-MCNC: 137 MG/DL (ref 65–100)
GLUCOSE BLD STRIP.AUTO-MCNC: 282 MG/DL (ref 65–100)
LVFS 2D: 47.38 %
LVOT MG: 1.14 MMHG
LVOT MV: 0.5 CM/S
LVSV (TEICH): 27.32 ML
SERVICE CMNT-IMP: ABNORMAL
SERVICE CMNT-IMP: ABNORMAL

## 2020-02-28 PROCEDURE — 74011250637 HC RX REV CODE- 250/637: Performed by: NURSE PRACTITIONER

## 2020-02-28 PROCEDURE — 94760 N-INVAS EAR/PLS OXIMETRY 1: CPT

## 2020-02-28 PROCEDURE — 74011250637 HC RX REV CODE- 250/637: Performed by: INTERNAL MEDICINE

## 2020-02-28 PROCEDURE — 82962 GLUCOSE BLOOD TEST: CPT

## 2020-02-28 PROCEDURE — 74011636637 HC RX REV CODE- 636/637: Performed by: INTERNAL MEDICINE

## 2020-02-28 PROCEDURE — 94640 AIRWAY INHALATION TREATMENT: CPT

## 2020-02-28 PROCEDURE — 93306 TTE W/DOPPLER COMPLETE: CPT

## 2020-02-28 PROCEDURE — 97116 GAIT TRAINING THERAPY: CPT

## 2020-02-28 PROCEDURE — 97530 THERAPEUTIC ACTIVITIES: CPT

## 2020-02-28 PROCEDURE — 74011000250 HC RX REV CODE- 250: Performed by: INTERNAL MEDICINE

## 2020-02-28 RX ORDER — IPRATROPIUM BROMIDE 0.5 MG/2.5ML
0.5 SOLUTION RESPIRATORY (INHALATION)
Status: DISCONTINUED | OUTPATIENT
Start: 2020-02-28 | End: 2020-02-28 | Stop reason: HOSPADM

## 2020-02-28 RX ORDER — LISINOPRIL 20 MG/1
20 TABLET ORAL DAILY
Qty: 30 TAB | Refills: 2 | Status: SHIPPED | OUTPATIENT
Start: 2020-02-29 | End: 2020-07-08 | Stop reason: DRUGHIGH

## 2020-02-28 RX ADMIN — Medication 10 ML: at 05:54

## 2020-02-28 RX ADMIN — ISOSORBIDE MONONITRATE 15 MG: 30 TABLET, EXTENDED RELEASE ORAL at 08:17

## 2020-02-28 RX ADMIN — IPRATROPIUM BROMIDE 0.5 MG: 0.5 SOLUTION RESPIRATORY (INHALATION) at 10:13

## 2020-02-28 RX ADMIN — INSULIN LISPRO 5 UNITS: 100 INJECTION, SOLUTION INTRAVENOUS; SUBCUTANEOUS at 12:40

## 2020-02-28 RX ADMIN — BUDESONIDE 500 MCG: 0.5 INHALANT RESPIRATORY (INHALATION) at 10:13

## 2020-02-28 RX ADMIN — LISINOPRIL 20 MG: 20 TABLET ORAL at 08:26

## 2020-02-28 RX ADMIN — GLIPIZIDE 5 MG: 5 TABLET ORAL at 08:16

## 2020-02-28 RX ADMIN — IPRATROPIUM BROMIDE 0.5 MG: 0.5 SOLUTION RESPIRATORY (INHALATION) at 14:12

## 2020-02-28 RX ADMIN — ARFORMOTEROL TARTRATE 15 MCG: 15 SOLUTION RESPIRATORY (INHALATION) at 10:13

## 2020-02-28 RX ADMIN — ASPIRIN 81 MG: 81 TABLET ORAL at 08:26

## 2020-02-28 RX ADMIN — METOPROLOL SUCCINATE 12.5 MG: 25 TABLET, EXTENDED RELEASE ORAL at 08:26

## 2020-02-28 NOTE — PROGRESS NOTES
BEDSIDE VERBAL shift change report given to KARI Stephens (oncoming nurse) by Aldair Solorzano nurse).  Report included the following information SBAR, Kardex, Recent Results and Quality Measures.     Zone Phone:  [phone]        Significant changes during shift: None           Patient Information     Edy Post  80 y.o.  2/25/2020  1:26 PM by Juan Bolivar MD. 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 ([address] 02/25/2020    Subdural hematoma ([address] 02/25/2020    Atrial flutter ([address] 01/29/2020    Mixed hyperlipidemia 01/29/2020    Essential hypertension 01/29/2020    PVD (peripheral vascular disease) ([address] 02/12/2019    Varicose veins of both legs with edema 02/12/2019    Encounter for staple removal 01/09/2019    Angina, class III ([address] 10/04/2018    S/P cardiac cath 08/20/2018    Type 2 diabetes mellitus with nephropathy ([address] 12/22/2017    Vestibular vertigo 12/13/2017    Lumbar back pain with radiculopathy affecting left lower extremity 12/13/2017    Lumbar back pain with radiculopathy affecting right lower extremity 12/13/2017    Diabetic peripheral neuropathy associated with type 2 diabetes mellitus ([address] 10/24/2017    Idiopathic small and large fiber sensory neuropathy 10/24/2017    B12 deficiency 10/24/2017    Ataxia 10/24/2017    Sensory ataxic gait 10/24/2017    Cervical arthritis with myelopathy 10/24/2017    Degenerative cervical spinal stenosis 10/24/2017    Bilateral carotid artery stenosis 10/24/2017    Cerebral microvascular disease 10/24/2017    Vitamin D deficiency 10/24/2017    Degenerative joint disease of right hip 07/06/2016    Primary localized osteoarthrosis of right hip 07/06/2016    Advance directive discussed with patient 06/28/2016    SOBOE (shortness of breath on exertion) 05/04/2016    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    Strabismic amblyopia 04/02/2016    Retinal hemorrhage 01/27/2016    Coronary artery disease involving native coronary artery without angina pectoris 06/10/2015    Incidental pulmonary nodule, greater than or equal to 8mm 04/11/2015    S/P drug eluting coronary stent placement 10/27/2014    Status post hip replacement 02/09/2011    Osteoarthritis of hip 10/15/2010    COPD (chronic obstructive pulmonary disease) ([facility] 09/10/2010    Pure hypercholesterolemia 05/14/2010    GERD (gastroesophageal reflux disease) 09/24/2009    Low back pain 09/24/2009    Personal history of prostate cancer 08/11/2003              Past Medical History:   Diagnosis Date    Arrhythmia      Asthma      CAD (coronary artery disease)      Chronic hip pain      COPD (chronic obstructive pulmonary disease) ([address] 9/10/2010    Diabetes (HCC)      DJD (degenerative joint disease)      GERD (gastroesophageal reflux disease)      HNP (herniated nucleus pulposus) 6/2006     lumbar     Hypercholesterolemia      Hypertension      Nausea & vomiting      Prostate cancer ([facility] [facility] 2003    Reversible ischemic neurologic deficit ([facility] [facility] 1990     suspected with no residual effects and no recurrance    TIA (transient ischemic attack)       TIA- no deficiets            Core Measures:     CVA: No No  CHF:No No  PNA:No No     Post Op Surgical (If Applicable):      Number times ambulated in hallway past shift:  0  Number of times OOB to chair past shift:   0  NG Tube: No  Incentive Spirometer: No  Drains: No   Volume  0  Dressing Present:  No  Flatus:  Yes     Activity Status:     OOB to Chair No  Ambulated this shift No   Bed Rest No     Supplemental C8: (LESLY Applicable)     NC No  NRB No  Venti-mask No  On 0 Liters/min        LINES AND DRAINS:     Central Line? No Placement date 0 Reason Medically Necessary 0     PICC LINE? No Placement date [facility] Medically Necessary 0     Urinary Catheter? No Placement Date [employer] Medically Necessary 0     DVT prophylaxis:     DVT prophylaxis Med- No  DVT prophylaxis SCD or NANDO- Yes      Wounds: (If Applicable)     Wounds- No     Location 0     Patient Safety:     Falls Score Total Score: 2  Safety Level_______  Bed Alarm On? Yes  Sitter? No     Plan for upcoming shift: echo           Discharge Plan: Yes TBD     Active Consults:  IP CONSULT TO NEUROSURGERY  IP CONSULT TO HOSPITALIST  IP CONSULT TO CARDIOLOGY

## 2020-02-28 NOTE — PROGRESS NOTES
ADULT PROTOCOL: JET AEROSOL  REASSESSMENT    Patient  Emanuel Rodriguez     80 y.o.   male     2/28/2020  12:11 AM    Breath Sounds Pre Procedure: Right Breath Sounds: Clear                               Left Breath Sounds: Clear    Breath Sounds Post Procedure: Right Breath Sounds: Clear                                 Left Breath Sounds: Clear    Breathing pattern: Pre procedure Breathing Pattern: Regular          Post procedure Breathing Pattern: Regular    Heart Rate: Pre procedure Pulse: 98           Post procedure Pulse: 98    Resp Rate: Pre procedure Respirations: 20           Post procedure Respirations: 20    Peak Flow: Pre bronchodilator   n/a          Post bronchodilator   n/a    Incentive Spirometry:   n/a      n/a    Cough: Pre procedure Cough: Non-productive               Post procedure Cough: Non-productive    Sputum: Pre procedure  none                 Post procedure  none    Oxygen: O2 Device: Room air   FiO2 (%) room air   21%     Changed: NO    SpO2: Pre procedure SpO2: 98 %   without oxygen              Post procedure SpO2: 98 %  without oxygen    Nebulizer Therapy: Current medications Aerosolized Medications: Ipratropium bromide, Pulmicort, Brovana      Changed: YES      Problem List:   Patient Active Problem List   Diagnosis Code    GERD (gastroesophageal reflux disease) K21.9    Low back pain M54.5    Pure hypercholesterolemia E78.00    COPD (chronic obstructive pulmonary disease) (HCC) J44.9    Osteoarthritis of hip M16.9    Status post hip replacement Z96.649    Personal history of prostate cancer Z85.46    S/P drug eluting coronary stent placement Z95.5    Incidental pulmonary nodule, greater than or equal to 8mm R91.1    Coronary artery disease involving native coronary artery without angina pectoris I25.10    Retinal hemorrhage H35.60    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    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    SOBOE (shortness of breath on exertion) R06.02    Advance directive discussed with patient Z70.80    Degenerative joint disease of right hip M16.11    Primary localized osteoarthrosis of right hip M16.11    Diabetic peripheral neuropathy associated with type 2 diabetes mellitus ([facility] E11.42    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    B12 deficiency E53.8    Ataxia R27.0    Sensory ataxic gait R26.0    Cervical arthritis with myelopathy M47.12    Degenerative cervical spinal stenosis M48.02    Bilateral carotid artery stenosis I65.23    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    Vitamin D deficiency E55.9    Vestibular vertigo H81.399    Lumbar back pain with radiculopathy affecting left lower extremity M54.16    Lumbar back pain with radiculopathy affecting right lower extremity M54.16    Type 2 diabetes mellitus with nephropathy ([facility]) E11.21    S/P cardiac cath Z98.890    Angina, class III ([facility]) I20.9    Encounter for staple removal Z48.02    PVD (peripheral vascular disease) ([facility]) I73.9    Varicose veins of both legs with edema I83.893    Atrial flutter ([facility]) I48.92    Mixed hyperlipidemia E78.2    Essential hypertension I10    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) I16.8B4C       Respiratory Therapist: Yelena Knox, RT

## 2020-02-28 NOTE — PROGRESS NOTES
[address] [place] [address]  [phone]      Cardiology Progress Note      2/28/2020 10:18 AM    Admit Date: 2/25/2020    Admit Diagnosis:   Subdural hematoma ([address] [S06.5X9A]    Subjective:     Janneth Lopez has no c/o CP, SOB, remains in SR. Discharge to home. CT of head shows chronic yessy SDH  small, acute hemorrhagic elements, remains off Eliquis until Neurology OK to restart.      Visit Vitals  /83   Pulse 87   Temp 97.5 °F (36.4 °C)   Resp 18   Ht 5' 10\" (1.778 m)   Wt 87.1 kg (192 lb)   SpO2 95%   BMI 27.55 kg/m²       Current Facility-Administered Medications   Medication Dose Route Frequency    ipratropium (ATROVENT) 0.02 % nebulizer solution 0.5 mg  0.5 mg Nebulization Q6HWA RT    lisinopril (PRINIVIL, ZESTRIL) tablet 20 mg  20 mg Oral DAILY    glipiZIDE (GLUCOTROL) tablet 5 mg  5 mg Oral ACB&D    metFORMIN ER (GLUCOPHAGE XR) tablet 1,500 mg  1,500 mg Oral DAILY WITH DINNER    aspirin delayed-release tablet 81 mg  81 mg Oral DAILY    glucose chewable tablet 16 g  4 Tab Oral PRN    dextrose (D50W) injection syrg 12.5-25 g  25-50 mL IntraVENous PRN    glucagon (GLUCAGEN) injection 1 mg  1 mg IntraMUSCular PRN    insulin lispro (HUMALOG) injection   SubCUTAneous AC&HS    acetaminophen (TYLENOL) tablet 650 mg  650 mg Oral Q6H PRN    sodium chloride (NS) flush 5-40 mL  5-40 mL IntraVENous Q8H    sodium chloride (NS) flush 5-40 mL  5-40 mL IntraVENous PRN    ondansetron (ZOFRAN) injection 4 mg  4 mg IntraVENous Q4H PRN    acetaminophen (TYLENOL) tablet 500 mg  500 mg Oral Q8H PRN    atorvastatin (LIPITOR) tablet 10 mg  10 mg Oral QHS    metoprolol succinate (TOPROL-XL) XL tablet 12.5 mg  12.5 mg Oral DAILY    isosorbide mononitrate ER (IMDUR) tablet 15 mg  15 mg Oral 7am    arformoteroL (BROVANA) neb solution 15 mcg  15 mcg Nebulization BID RT    And    budesonide (PULMICORT) 500 mcg/2 ml nebulizer suspension  500 mcg Nebulization BID RT Objective:      Physical Exam:  General Appearance:  elderly  male in no acute distress  Chest:   Clear  Cardiovascular:  Regular rate and rhythm, no murmur. Abdomen:   Soft, non-tender, bowel sounds are active. Extremities: no LE edema  Skin:  Warm and dry. Data Review:   Recent Labs     02/26/20  0533 02/25/20  1526   WBC 6.7 7.2   HGB 12.9 13.2   HCT 37.6 38.4    242     Recent Labs     02/26/20  0533 02/25/20  1526   * 134*   K 4.2 4.3    103   CO2 27 28   * 173*   BUN 15 16   CREA 1.14 1.08   CA 8.7 9.0   ALB  --  3.2*   TBILI  --  0.6   SGOT  --  16   ALT  --  25       No results for input(s): TROIQ, CPK, CKMB in the last 72 hours. Intake/Output Summary (Last 24 hours) at 2/28/2020 1419  Last data filed at 2/28/2020 0436  Gross per 24 hour   Intake    Output 540 ml   Net -540 ml        Telemetry: SR    CT:  2/26/2020  1. Presence of bilateral subdural hematomata. Presence of small, acute  hemorrhagic elements on the left as described above. 2. 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 3. Presence of periventricular low density compatible with white matter disease. 4. Evidence of moderate cerebral atrophy. Assessment:     Active Problems:    Coronary artery disease involving native coronary artery without angina pectoris (6/10/2015)      Atrial flutter ([address] (1/29/2020)      Subdural hematoma ([address] (2/25/2020)        Plan:     Aflutter with RVR:  Remains in SR  Remains off Eliquis due to SDH and small acute hemorrhagic element on CT of head  Neurology can hold ASA 2-4 weeks and can restart after seeing Neuro  Per Neurology note: With respect to his Eliquis, given his frequent falls and ataxia, this should be some consideration given to, if he is a candidate for continued anticoagulation and certainly, he is at risk for more falls with devastating intracranial hemorrhage.     Eliquis or other anticoagulation will not be restarted as he is at high risk for future falls, and as above, ICH.     CAD:   ASA on hold per Neuro, continue on BB, statin  Stable    HTN:  Mildly elevated, continue on BB and ACEI.       f/u with Dr. Reba Oconnor within 2-3 weeks.      Kimberley Vail ACNP  Cardiology

## 2020-02-28 NOTE — TELEPHONE ENCOUNTER
Marium Leal with [facility]  [phone]    Please change home care to start of care on March 2 2020 as requested by the patient and his wife

## 2020-02-28 NOTE — PROGRESS NOTES
Problem: Mobility Impaired (Adult and Pediatric)  Goal: *Acute Goals and Plan of Care (Insert Text)  Description  FUNCTIONAL STATUS PRIOR TO ADMISSION: Pt lives with his wife in a 2 story home with 16 steps between floors. He has a hx of falls. Last fall occurred in BR. Subsequent finding of new SDH bilat. He had been completing his own basic ADLs but since falls has needed more assist of wife; and difficulty understanding time frame from wife but believe him to have been using the RW most recently. HOME SUPPORT PRIOR TO ADMISSION: The patient lived with wife. Physical Therapy Goals  Initiated 2/26/2020  1. Patient will move from supine to sit and sit to supine , scoot up and down and roll side to side in bed with independence within 7 day(s). 2.  Patient will transfer from bed to chair and chair to bed with modified independence using the least restrictive device within 7 day(s). 3.  Patient will perform sit to stand with modified independence within 7 day(s). 4.  Patient will ambulate with modified independence for 150 feet with the least restrictive device within 7 day(s). 5.  Patient will ascend/descend 13 stairs with one handrail(s) with minimal assistance/contact guard assist within 7 day(s). Outcome: Progressing Towards Goal   PHYSICAL THERAPY TREATMENT  Patient: Mike Parker (22 y.o. male)  Date: 2/28/2020  Diagnosis: Subdural hematoma ([address] [S06.5X9A]   <principal problem not specified>       Precautions:    Chart, physical therapy assessment, plan of care and goals were reviewed. ASSESSMENT  Patient continues with skilled PT services and is progressing towards goals. Patient received sitting bed and agreeable to participate, wife also present. Patient able to come to stand with CGA to RW and ambulated into bathroom to urinate, held rail with right hand to stabilize in standing position.   Patient then ambulated in hallway x approx 200 feet and up and down 12 steps using left rail and step to pattern. Patient requires CGA for safety and verbal cuing for sequencing, still ataxic but gait improved from yesterday. Patient and wife again educated extensively on falls prevention and safety. Patient has refused recommendation of inpatient rehab and plans to go home, so we also discussed importance of having home health PT to work on balance and gait. Current Level of Function Impacting Discharge (mobility/balance): CGA for gait and stairs    Other factors to consider for discharge: high falls risk, lives in two story home, only has wife to assist         PLAN :  Patient continues to benefit from skilled intervention to address the above impairments. Continue treatment per established plan of care. to address goals. Recommendation for discharge: (in order for the patient to meet his/her long term goals)  Physical therapy at least 2 days/week in the home AND ensure assist and/or supervision for safety with ambulation and stairs (recommended inpatient rehab but patient refused)     This discharge recommendation:  Has been made in collaboration with the attending provider and/or case management    IF patient discharges home will need the following DME: patient owns DME required for discharge       SUBJECTIVE:   Patient stated I think I will be okay.     OBJECTIVE DATA SUMMARY:   Critical Behavior:  Neurologic State: Alert  Orientation Level: Oriented X4  Cognition: Appropriate decision making, Appropriate for age attention/concentration, Appropriate safety awareness  Safety/Judgement: Decreased awareness of environment, Fall prevention, Home safety  Functional Mobility Training:  Bed Mobility:                    Transfers:  Sit to Stand: Contact guard assistance  Stand to Sit: Contact guard assistance                             Balance:  Sitting: Intact  Standing: Impaired  Standing - Static: Constant support;Good  Standing - Dynamic : Constant support; Fair  Ambulation/Gait Training:  Distance (ft): 200 Feet (ft)  Assistive Device: Gait belt;Walker, rolling  Ambulation - Level of Assistance: Contact guard assistance        Gait Abnormalities: Ataxic        Base of Support: Widened     Speed/Clara: Pace decreased (<100 feet/min)  Step Length: Left shortened;Right shortened                    Stairs:  Number of Stairs Trained: 12  Stairs - Level of Assistance: Contact guard assistance; Other (comment)(verbal cuing for safety and sequencing)   Rail Use: Left       Activity Tolerance:   Good  Please refer to the flowsheet for vital signs taken during this treatment.     After treatment patient left in no apparent distress:   Sitting in chair, Call bell within reach, and Caregiver / family present    COMMUNICATION/COLLABORATION:   The patients plan of care was discussed with: Occupational Therapist, Registered Nurse, and     Earnstine Bence, PT   Time Calculation: 31 mins

## 2020-02-28 NOTE — PROGRESS NOTES
Discharge information reviewed with patient and wife. Both verbalize understanding of all information. Home Health liaison in to see patient. They will be coming out on Monday at patient request. Wife driving patient home at this time.

## 2020-02-28 NOTE — DISCHARGE INSTRUCTIONS
HOSPITALIST DISCHARGE INSTRUCTIONS    NAME: Janneth Lopez   :  1931   MRN:  [id number]     Date/Time:  2020 1:08 PM    ADMIT DATE: 2020     DISCHARGE DATE: 2020     DISCHARGE DIAGNOSIS:  Subarachnoid Hemorrhage  Atrial Fibrillation      MEDICATIONS:  · It is important that you take the medication exactly as they are prescribed. · Keep your medication in the bottles provided by the pharmacist and keep a list of the medication names, dosages, and times to be taken in your wallet. · Do not take other medications without consulting your doctor. Pain Management: per above medications    What to do at Home    Recommended diet:  Cardiac Diet    Recommended activity: Activity as tolerated    If you have questions regarding the hospital related prescriptions or hospital related issues please call [facility] at [phone]. If you experience any of the following symptoms then please call your primary care physician or return to the emergency room if you cannot get hold of your doctor:  Fever, chills, nausea, vomiting, diarrhea, change in mentation, falling, bleeding, shortness of breath,     Follow Up:  Dr. Yunior Hazel MD  you are to call and set up an appointment to see them in 7-10 days. Information obtained by :  I understand that if any problems occur once I am at home I am to contact my physician. I understand and acknowledge receipt of the instructions indicated above.                                                                                                                                            Physician's or R.N.'s Signature                                                                  Date/Time                                                                                                                                              Patient or Representative Signature Date/Time

## 2020-02-28 NOTE — DISCHARGE SUMMARY
Hospitalist Discharge Summary     Patient ID:  Yinka Youngblood  [id number]  60 y.o.  12/18/1931 2/25/2020    PCP on record: Roma Alcantara MD    Admit date: 2/25/2020  Discharge date and time: 2/28/2020    DISCHARGE DIAGNOSIS:    Acute left subdural hematoma, small POA  Chronic bilateral subdural hematomas POA  Status post fall on 2/21  H/o atrial flutter, rate controlled  Paroxysmal atrial fibrillation  Hypertension  Dyslipidemia  History of COPD  Diabetes mellitus type 2  -History of CAD, PCI in 2018-holding aspirin due to intracranial bleed  -History of peripheral vascular disease   History of ataxia    CONSULTATIONS:  IP CONSULT TO NEUROSURGERY  IP CONSULT TO HOSPITALIST  IP CONSULT TO CARDIOLOGY    Excerpted HPI from H&P of Juliocesar Arnold MD:    Yinka Youngblood is a 80 y.o.  male who presents with past medical history of CAD, COPD, diabetes mellitus, hypertension, recently diagnosed with atrial flutter was placed on Eliquis  1 month ago,  was sent from cardiologist office for CAT scan of head. He had a fall 4 days ago and hit his head, he did not lose consciousness but he reported that it caused some problems with his vision. He had a headache that day but resolved. He denies any nausea or vomiting no seizure he was seen in the ED today had a CAT scan which showed small bleed was seen by neurosurgery, recommends admission and repeat CAT scan tomorrow. ______________________________________________________________________  DISCHARGE SUMMARY/HOSPITAL COURSE:  for full details see H&P, daily progress notes, labs, consult notes.          Acute left subdural hematoma, small POA  Chronic bilateral subdural hematomas POA  Status post fall on 2/21  H/o atrial flutter, rate controlled  -Recently diagnosed with atrial flutter in January 2020, placed on Eliquis.  -Had multiple falls, CAT scan here showed chronic subdural hematoma with small acute on left subdural.  -Repeat CT scan was done which showed decreasing hemorrhage and neurosurgery recommended observation only at this time  -Discontinue Eliquis     Paroxysmal atrial fibrillation  -He is currently in normal sinus rhythm.  Eliquis is stopped, Pt advised to hold Aspirin for 2-4 weeks and may restart after neurosurgery follow up     Hypertension  -Continue metoprolol and lisinopril  -Increase dose of lisinopril to 20mg daily for better control     Dyslipidemia  -Continue Lipitor     History of COPD  -Continue home inhalers     Diabetes mellitus type 2  -resumed glipizide and metformin. c/w insulin sliding scale with blood sugar checks     -History of CAD, PCI in 2018-holding aspirin due to intracranial bleed  -History of peripheral vascular disease     History of ataxia    _______________________________________________________________________  Patient seen and examined by me on discharge day. Pertinent Findings:  Gen:    Not in distress  Chest: Clear lungs  CVS:   Regular rhythm. No edema  Abd:  Soft, not distended, not tender  Neuro:  Alert, oriented x4_______________________________________________________________________  DISCHARGE MEDICATIONS:   Current Discharge Medication List      CONTINUE these medications which have CHANGED    Details   lisinopril (PRINIVIL, ZESTRIL) 20 mg tablet Take 1 Tab by mouth daily. Qty: 30 Tab, Refills: 2         CONTINUE these medications which have NOT CHANGED    Details   ADVAIR DISKUS 250-50 mcg/dose diskus inhaler Take 1 Puff by inhalation every twelve (12) hours.   Qty: 3 Inhaler, Refills: 3      simvastatin (ZOCOR) 20 mg tablet TAKE 1 TABLET BY MOUTH EVERY DAY AT NIGHT  Qty: 90 Tab, Refills: 1      glipiZIDE (GLUCOTROL) 5 mg tablet TAKE 1 TABLET BY MOUTH EVERY DAY  Qty: 90 Tab, Refills: 1      metoprolol succinate (TOPROL-XL) 25 mg XL tablet TAKE 1/2 TABLET BY MOUTH EVERY DAY  Qty: 30 Tab, Refills: 6      metFORMIN ER (GLUCOPHAGE XR) 500 mg tablet TAKE 3 TABLETS BY MOUTH EVERY DAY WITH DINNER  Qty: 270 Tab, Refills: 1      isosorbide mononitrate ER (IMDUR) 30 mg tablet Take 0.5 Tabs by mouth every morning. Indications: Take 1/2 tab by mouth daily  Qty: 45 Tab, Refills: 3      albuterol (PROVENTIL VENTOLIN) 2.5 mg /3 mL (0.083 %) nebu 3 mL by Nebulization route three (3) times daily as needed (wheeze). Qty: 100 Each, Refills: 3      tiotropium bromide (SPIRIVA RESPIMAT) 2.5 mcg/actuation inhaler Take 2 Puffs by inhalation daily. Qty: 3 Inhaler, Refills: 2      VENTOLIN HFA 90 mcg/actuation inhaler INHALE 2 PUFFS BY MOUTH EVERY 4 HOURS AS NEEDED FOR WHEEZING  Qty: 18 Inhaler, Refills: 5    Associated Diagnoses: Hemoptysis      coenzyme q10 10 mg cap Take 10 mg by mouth daily. cyanocobalamin (VITAMIN B-12) 500 mcg tablet Take 500 mcg by mouth daily. folic acid 719 mcg tablet Take 400 mcg by mouth daily. MAGNESIUM PO Take 500 mg by mouth daily. acetaminophen (TYLENOL) 500 mg tablet Take 1 tablet by mouth every eight (8) hours as needed for Pain. Qty: 30 tablet, Refills: 0    Associated Diagnoses: Arthralgia of hip, right      CHOLECALCIFEROL, VITAMIN D3, (VITAMIN D3 PO) Take 1,000 Units by mouth daily. Associated Diagnoses: Type II or unspecified type diabetes mellitus without mention of complication, not stated as uncontrolled      multivitamins-minerals-lutein (CENTRUM SILVER) Tab Take 1 Tab by mouth daily. fexofenadine (ALLEGRA) 180 mg tablet Take 1 Tab by mouth daily. Qty: 30 Tab, Refills: 11      famotidine (PEPCID AC) 20 mg tablet Take 20 mg by mouth daily. VITAMIN B COMPLEX (B COMPLEX PO) Take 1 Tab by mouth daily. ASCORBIC ACID (VITAMIN C PO) Take 1,000 mg by mouth daily. nitroglycerin (NITROSTAT) 0.4 mg SL tablet 1 Tab by SubLINGual route every five (5) minutes as needed for Chest Pain.   Qty: 1 Bottle, Refills: 0         STOP taking these medications       aspirin delayed-release 81 mg tablet Comments:   Reason for Stopping: Patient Follow Up Instructions:    Activity: Activity as tolerated  Diet: Diabetic Diet  Wound Care: None needed    Follow-up with PCP in one week and Elizabeth in 2 weeks  Follow-up tests/labs None  Follow-up Information     Follow up With Specialties Details Why Contact Info    Farnaz Mckeon MD Internal Medicine In 1 week  [address]      Doyle Couch MD Neurosurgery In 2 weeks  [address] [facility] [address]  [phone]          ________________________________________________________________    Risk of deterioration: Moderate    Condition at Discharge:  Stable  __________________________________________________________________    Disposition  Home with family and home health services    ____________________________________________________________________    Code Status: Full Code  ___________________________________________________________________      Total time in minutes spent coordinating this discharge (includes going over instructions, follow-up, prescriptions, and preparing report for sign off to her PCP) :  35 minutes    Signed:  Tamra Terrazas MD

## 2020-02-28 NOTE — PROGRESS NOTES
HIGINIO Plan:    * Home with [facility] (PT/OT) & f/u appts    > CM confirmed pt has PCP f/u appt secured for d/c; details in AVS  > Pt's wife present to provide transportation at d/c    2:42 PM: CM confirmed [facility] is able to accept pt for requested services at d/c; CM placed [facility] information in AVS for reference. CM relayed update to pt and wife; both confirmed understanding and reported no additional questions or concerns related to d/c. No further CM needs identified at this time; CM will remain available for consult if additional needs arise before pt leaves facility. CM notified pt's nurse of d/c. Initial note: CM acknowledged d/c. CM met with pt and his wife Artis Richard: [phone]) at bedside to check in and discuss HIGINIO plan for d/c. CM inquired if pt was interested in utilizing [facility] (PT/OT) intervention at d/c; pt is agreeable and requested for referral be sent to [facility] for review. CM explained FOC and received verbal consent to send referral. CM placed copy of FOC on bedside chart. Medicare pt has received, reviewed, and signed 2nd IM letter informing them of their right to appeal the discharge. Signed copy has been placed on pt bedside chart. Care Management Interventions  PCP Verified by CM: Yes  Mode of Transport at Discharge:  Other (see comment)(Pt's wife will provide transportation at d/c)  Transition of Care Consult (CM Consult): [address]: Yes  Discharge Durable Medical Equipment: No  Physical Therapy Consult: Yes  Occupational Therapy Consult: Yes  Speech Therapy Consult: Yes  Current Support Network: Lives with Spouse, Own Home  Confirm Follow Up Transport: Family  The Plan for Transition of Care is Related to the Following Treatment Goals : Home Health  The Patient and/or Patient Representative was Provided with a Choice of Provider and Agrees with the Discharge Plan?: Yes  Freedom of Choice List was Provided with Basic Dialogue that Supports the Patient's Individualized Plan of Care/Goals, Treatment Preferences and Shares the Quality Data Associated with the Providers?: Yes  Discharge Location  Discharge Placement: Home with home health([facility] (PT/OT) & f/u appts)      Fernie Hendrix, [address] [place] [address], [facility]  [phone]

## 2020-03-02 ENCOUNTER — HOME CARE VISIT (OUTPATIENT)
Dept: SCHEDULING | Facility: HOME HEALTH | Age: 85
End: 2020-03-02
Payer: MEDICARE

## 2020-03-02 VITALS
WEIGHT: 192.02 LBS | SYSTOLIC BLOOD PRESSURE: 159 MMHG | RESPIRATION RATE: 17 BRPM | OXYGEN SATURATION: 98 % | BODY MASS INDEX: 27.49 KG/M2 | HEART RATE: 65 BPM | TEMPERATURE: 48.2 F | HEIGHT: 70 IN | DIASTOLIC BLOOD PRESSURE: 98 MMHG

## 2020-03-02 PROCEDURE — 3331090001 HH PPS REVENUE CREDIT

## 2020-03-02 PROCEDURE — G0151 HHCP-SERV OF PT,EA 15 MIN: HCPCS

## 2020-03-02 PROCEDURE — 400013 HH SOC

## 2020-03-02 PROCEDURE — 3331090002 HH PPS REVENUE DEBIT

## 2020-03-03 ENCOUNTER — PATIENT OUTREACH (OUTPATIENT)
Dept: INTERNAL MEDICINE CLINIC | Age: 85
End: 2020-03-03

## 2020-03-03 PROCEDURE — 3331090001 HH PPS REVENUE CREDIT

## 2020-03-03 PROCEDURE — 3331090002 HH PPS REVENUE DEBIT

## 2020-03-03 NOTE — PROGRESS NOTES
Hospital Discharge Follow-Up      Date/Time:  3/3/2020 9:41 AM  Reece Goldstein RN  Care Transitions Nurse  [address] [facility] Ambulatory Care Coordination Team  [phone]      Patient was admitted to [facility] on 2020 and discharged on 2020 for GLF. The physician discharge summary was available at the time of outreach. Patient was contacted within 2 business days of discharge. Top Challenges reviewed with the provider   -Wife called neurology for follow up appointment and was told that the physician did not need to follow.  -Clarification on when to restart ASA 81mg  Advance Care Planning:   Does patient have an Advance Directive:  reviewed and current        Method of communication with provider :chart routing    Was this a readmission? no   Patient stated reason for the readmission: n/a    Care Transition Nurse (CTN) contacted the family by telephone to perform post hospital discharge assessment. Verified name and  with family as identifiers. Provided introduction to self, and explanation of the CTN role. Family received hospital discharge instructions. CTN reviewed discharge instructions and red flags with family who verbalized understanding. Family given an opportunity to ask questions and does not have any further questions or concerns at this time. The family agrees to contact the PCP office for questions related to their healthcare. CTN provided contact information for future reference. Disease Specific:   N/A    Patients top risk factors for readmission:  none noted at this time.     Home Health orders at discharge: PT, OT, [address]: VIRAJ HICKS [facility]  Date of initial visit: 3/2/2020    Durable Medical Equipment ordered at discharge: none  [address] received: n/a    Medication(s):   New Medications at Discharge: none  Changed Medications at Discharge:   CONTINUE these medications which have CHANGED     Details   lisinopril (PRINIVIL, ZESTRIL) 20 mg tablet Take 1 Tab by mouth daily. Qty: 30 Tab, Refills: 2           Discontinued Medications at Discharge: Eliquis and ASA    Medication reconciliation was not performed with wife. She states that [facility] reviewed and verified all medications yesterday. Referral to Pharm D needed: no     Current Outpatient Medications   Medication Sig    lisinopril (PRINIVIL, ZESTRIL) 20 mg tablet Take 1 Tab by mouth daily.  ADVAIR DISKUS 250-50 mcg/dose diskus inhaler Take 1 Puff by inhalation every twelve (12) hours.  simvastatin (ZOCOR) 20 mg tablet TAKE 1 TABLET BY MOUTH EVERY DAY AT NIGHT    glipiZIDE (GLUCOTROL) 5 mg tablet TAKE 1 TABLET BY MOUTH EVERY DAY    metoprolol succinate (TOPROL-XL) 25 mg XL tablet TAKE 1/2 TABLET BY MOUTH EVERY DAY    metFORMIN ER (GLUCOPHAGE XR) 500 mg tablet TAKE 3 TABLETS BY MOUTH EVERY DAY WITH DINNER    isosorbide mononitrate ER (IMDUR) 30 mg tablet Take 0.5 Tabs by mouth every morning. Indications: Take 1/2 tab by mouth daily    albuterol (PROVENTIL VENTOLIN) 2.5 mg /3 mL (0.083 %) nebu 3 mL by Nebulization route three (3) times daily as needed (wheeze).  tiotropium bromide (SPIRIVA RESPIMAT) 2.5 mcg/actuation inhaler Take 2 Puffs by inhalation daily.  VENTOLIN HFA 90 mcg/actuation inhaler INHALE 2 PUFFS BY MOUTH EVERY 4 HOURS AS NEEDED FOR WHEEZING    coenzyme q10 10 mg cap Take 10 mg by mouth daily.  cyanocobalamin (VITAMIN B-12) 500 mcg tablet Take 500 mcg by mouth daily.  folic acid 857 mcg tablet Take 400 mcg by mouth daily.  nitroglycerin (NITROSTAT) 0.4 mg SL tablet 1 Tab by SubLINGual route every five (5) minutes as needed for Chest Pain.  MAGNESIUM PO Take 500 mg by mouth daily.  acetaminophen (TYLENOL) 500 mg tablet Take 1 tablet by mouth every eight (8) hours as needed for Pain.  CHOLECALCIFEROL, VITAMIN D3, (VITAMIN D3 PO) Take 1,000 Units by mouth daily.     multivitamins-minerals-lutein (CENTRUM SILVER) Tab Take 1 Tab by mouth daily.  fexofenadine (ALLEGRA) 180 mg tablet Take 1 Tab by mouth daily.  famotidine (PEPCID AC) 20 mg tablet Take 20 mg by mouth daily.  VITAMIN B COMPLEX (B COMPLEX PO) Take 1 Tab by mouth daily.  ASCORBIC ACID (VITAMIN C PO) Take 1,000 mg by mouth daily. No current facility-administered medications for this visit. There are no discontinued medications.     [facility] follow up appointment(s):   Future Appointments   Date Time Provider Kari Jiménez   3/4/2020  2:00 PM Cyndi Archer OT [facility]   3/5/2020 10:00 AM Maria Alejandra Clarke MD [id number] [facility]   3/6/2020 To Be Determined Jenna Cottrell [facility]   3/10/2020 To Be Determined Jenna Cottrell PTA [facility]   3/12/2020 To Be Determined Jenna Cottrell PTA [facility]   3/17/2020 To Be Determined Jenna Cottrell PTA [facility]   3/19/2020 10:30 AM Aaron Ruano, BECKY RCAM[facility] DAVID [facility]   3/20/2020 To Be Determined Jenna Cottrell [facility]   3/24/2020 To Be Determined Jenna Cottrell [facility]   3/27/2020 To Be Determined Gregory Garcia, PT [facility] [address]   3/31/2020 To Be Determined Jenna Cottrell PTA Pending sale to [facility]   4/2/2020 To Be Determined Jenna Cottrell PTA [facility]   4/7/2020 To Be Determined Jenna Cottrell PTA [facility]   4/9/2020 To Be Determined Jenna Cottrell PTA [facility]   4/14/2020 To Be Determined Dayana Childs [facility]   4/16/2020 To Be Determined Gregory Garcia, PT Pending [facility] to [facility]   5/4/2020  1:40 PM Eboni Maguire  [facility]   5/20/2020 11:00 AM Maria Alejandra Clarke MD [facility]   8/3/2020 10:45 AM Myrna Rider MD [address] [facility] [facility]   8/13/2020  7:30 AM Maria Alejandra Clarke MD [id number] [facility]      Non-[facility] follow up appointment(s): none  [facility]:  n/a       Goals      Attends follow-up appointments as directed. 3/3/2020  -Wife called neurology for follow up appointment. She was told that the physician states that he does need to follow  outpatient.   -will attend ROBERTO YATES with PCP, Dr. Cy Gunn on 3/5/2020  -will attend Cardiology follow up on 3/19/2020  -CTN to follow up in 1 week  Estrellita TOMAS       Prevent complications post hospitalization. 3/3/2020  -CTN discussed fall prevention with Wife. She reports that [facility] evaluated the home. Wife interested in Life Alert system information. CTN will mail wife resources.  -Lisinopril increased from 5mg to 20mg during hospitalization  -wife to check BP daily and keep a log to discuss in 1 week. Will call CTN or provider if systolic BP <923, diastolic <32.    -CTN to follow up in 1 week  Estrellita TOMAS

## 2020-03-04 ENCOUNTER — HOME CARE VISIT (OUTPATIENT)
Dept: SCHEDULING | Facility: HOME HEALTH | Age: 85
End: 2020-03-04
Payer: MEDICARE

## 2020-03-04 PROCEDURE — G0152 HHCP-SERV OF OT,EA 15 MIN: HCPCS

## 2020-03-04 PROCEDURE — 3331090002 HH PPS REVENUE DEBIT

## 2020-03-04 PROCEDURE — 3331090001 HH PPS REVENUE CREDIT

## 2020-03-05 ENCOUNTER — OFFICE VISIT (OUTPATIENT)
Dept: INTERNAL MEDICINE CLINIC | Age: 85
End: 2020-03-05

## 2020-03-05 VITALS
HEART RATE: 98 BPM | TEMPERATURE: 98.3 F | DIASTOLIC BLOOD PRESSURE: 78 MMHG | OXYGEN SATURATION: 98 % | SYSTOLIC BLOOD PRESSURE: 138 MMHG

## 2020-03-05 VITALS
OXYGEN SATURATION: 94 % | HEIGHT: 70 IN | TEMPERATURE: 98.6 F | BODY MASS INDEX: 26.34 KG/M2 | WEIGHT: 184 LBS | DIASTOLIC BLOOD PRESSURE: 81 MMHG | RESPIRATION RATE: 14 BRPM | HEART RATE: 91 BPM | SYSTOLIC BLOOD PRESSURE: 132 MMHG

## 2020-03-05 DIAGNOSIS — I48.3 TYPICAL ATRIAL FLUTTER (HCC): ICD-10-CM

## 2020-03-05 DIAGNOSIS — S06.5XAA SUBDURAL HEMATOMA: ICD-10-CM

## 2020-03-05 DIAGNOSIS — I25.10 CORONARY ARTERY DISEASE INVOLVING NATIVE CORONARY ARTERY OF NATIVE HEART WITHOUT ANGINA PECTORIS: Chronic | ICD-10-CM

## 2020-03-05 DIAGNOSIS — I10 ESSENTIAL HYPERTENSION: ICD-10-CM

## 2020-03-05 DIAGNOSIS — E11.42 DIABETIC PERIPHERAL NEUROPATHY ASSOCIATED WITH TYPE 2 DIABETES MELLITUS (HCC): Primary | ICD-10-CM

## 2020-03-05 DIAGNOSIS — J44.9 CHRONIC OBSTRUCTIVE PULMONARY DISEASE, UNSPECIFIED COPD TYPE (HCC): Chronic | ICD-10-CM

## 2020-03-05 PROCEDURE — 3331090001 HH PPS REVENUE CREDIT

## 2020-03-05 PROCEDURE — 3331090002 HH PPS REVENUE DEBIT

## 2020-03-05 NOTE — PROGRESS NOTES
Orthostatic Vitals 3/5/2020   Patient Position 2 Sitting   BP 2 128/80   Pulse 2 90   BP 2 Location Left arm   BP Method 2 Automatic   Patient Position 3 Standing   BP 3 135/83   Pulse 3 89   BP 3 Location Left arm   BP Method 3 Automatic

## 2020-03-05 NOTE — PATIENT INSTRUCTIONS

## 2020-03-05 NOTE — PROGRESS NOTES
HPI:  Tod Carrel is a 80y.o. year old male who is here for a routine visit:    Presents for a transitional care visit after admission to [facility] February 25-29. Apparently he fell on February 21 losing his balance in the bathroom and striking the right side of his head. He went to the cardiologist on the 25th and told him that he had fallen. He sent him to the emergency room for a CAT scan. There a subdural hematoma was documented. He was admitted and his Plavix and Eliquis were both stopped. He was discharged home off aspirin as well. He was monitored for 4 days and had no progression and actual improvement of his subdural.  He has a follow-up visit with cardiology next week. Neurosurgery did not feel he needed any further follow-up. He continues to be unsteady on his feet. He has had one episode of nausea but no vomiting since he was home. His blood pressure medications were increased. His blood pressures have been between 356 and 436 systolic. Denies any additional falls. No change in bowel or bladder habits. His cough and COPD are at baseline. Home health is coming with physical therapy and occupational therapy this week.       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 Sybil Garcia repeat 10 years    HX COLONOSCOPY 2005    HX HEART CATHETERIZATION      2  stents     HX HEART CATHETERIZATION  08/20/2018    failed 100% block @ [facility] will plan to do laser next.  HX HERNIA REPAIR  9/2008    left inguinal hernia repair by Dr. Luis Kumar Right     inguinal hernai repair    HX HERNIA REPAIR      umbilical hernia repair    HX HIP REPLACEMENT Right 07/06/2016    HX PROSTATECTOMY  2003    HX TONSILLECTOMY      TOTAL HIP ARTHROPLASTY Left 2/9/11    Dr. Liz [facility]haliwal at [facility]       Prior to Admission medications    Medication Sig Start Date End Date Taking? Authorizing Provider   lisinopril (PRINIVIL, ZESTRIL) 20 mg tablet Take 1 Tab by mouth daily. 2/29/20  Yes Alcides Bob MD   ADVAIR DISKUS 250-50 mcg/dose diskus inhaler Take 1 Puff by inhalation every twelve (12) hours. 1/30/20  Yes Michael Laws MD   simvastatin (ZOCOR) 20 mg tablet TAKE 1 TABLET BY MOUTH EVERY DAY AT NIGHT 1/26/20  Yes Gavino Argueta III, MD   glipiZIDE (GLUCOTROL) 5 mg tablet TAKE 1 TABLET BY MOUTH EVERY DAY 1/7/20  Yes Gavino Argueta III, MD   metoprolol succinate (TOPROL-XL) 25 mg XL tablet TAKE 1/2 TABLET BY MOUTH EVERY DAY 12/9/19  Yes Padmini Jaime MD   metFORMIN ER (GLUCOPHAGE XR) 500 mg tablet TAKE 3 TABLETS BY MOUTH EVERY DAY WITH DINNER 11/18/19  Yes Gavino Argueta III, MD   isosorbide mononitrate ER (IMDUR) 30 mg tablet Take 0.5 Tabs by mouth every morning. Indications: Take 1/2 tab by mouth daily 7/24/19  Yes Sandi Kessler NP   albuterol (PROVENTIL VENTOLIN) 2.5 mg /3 mL (0.083 %) nebu 3 mL by Nebulization route three (3) times daily as needed (wheeze). 7/17/19  Yes Michael Laws MD   tiotropium bromide (SPIRIVA RESPIMAT) 2.5 mcg/actuation inhaler Take 2 Puffs by inhalation daily. 7/17/19  Yes Michael Laws MD   VENTOLIN HFA 90 mcg/actuation inhaler INHALE 2 PUFFS BY MOUTH EVERY 4 HOURS AS NEEDED FOR WHEEZING 6/23/19  Yes Gavino Argueta III, MD   coenzyme q10 10 mg cap Take 10 mg by mouth daily.    Yes Provider, Historical   cyanocobalamin (VITAMIN B-12) 500 mcg tablet Take 500 mcg by mouth daily. Yes Provider, Historical   folic acid 652 mcg tablet Take 400 mcg by mouth daily. Yes Provider, Historical   nitroglycerin (NITROSTAT) 0.4 mg SL tablet 1 Tab by SubLINGual route every five (5) minutes as needed for Chest Pain. 8/10/18  Yes Jocy Li NP   MAGNESIUM PO Take 500 mg by mouth daily. Yes Provider, Historical   acetaminophen (TYLENOL) 500 mg tablet Take 1 tablet by mouth every eight (8) hours as needed for Pain. 14  Yes Angel Dickey MD   CHOLECALCIFEROL, VITAMIN D3, (VITAMIN D3 PO) Take 1,000 Units by mouth daily. Yes Provider, Historical   multivitamins-minerals-lutein (CENTRUM SILVER) Tab Take 1 Tab by mouth daily. 5/14/10  Yes Provider, Historical   fexofenadine (ALLEGRA) 180 mg tablet Take 1 Tab by mouth daily. 1/25/10  Yes Rylee Billy MD   famotidine (PEPCID AC) 20 mg tablet Take 20 mg by mouth daily. 11  Yes Provider, Historical   VITAMIN B COMPLEX (B COMPLEX PO) Take 1 Tab by mouth daily. Yes Provider, Historical   ASCORBIC ACID (VITAMIN C PO) Take 1,000 mg by mouth daily.    Yes Provider, Historical       Social History     Socioeconomic History    Marital status:      Spouse name: Not on file    Number of children: Not on file    Years of education: Not on file    Highest education level: Not on file   Occupational History    Not on file   Social Needs    Financial resource strain: Not on file    Food insecurity:     Worry: Not on file     Inability: Not on file    Transportation needs:     Medical: Not on file     Non-medical: Not on file   Tobacco Use    Smoking status: Former Smoker     Packs/day: 3.00     Years: 10.00     Pack years: 30.00     Types: Cigarettes     Last attempt to quit: 1/15/1970     Years since quittin.1    Smokeless tobacco: Never Used   Substance and Sexual Activity    Alcohol use: No    Drug use: Yes     Types: Prescription, OTC    Sexual activity: Yes     Partners: Female     Birth control/protection: None   Lifestyle    Physical activity:     Days per week: Not on file     Minutes per session: Not on file    Stress: Not on file   Relationships    Social connections:     Talks on phone: Not on file     Gets together: Not on file     Attends Temple service: Not on file     Active member of club or organization: Not on file     Attends meetings of clubs or organizations: Not on file     Relationship status: Not on file    Intimate partner violence:     Fear of current or ex partner: Not on file     Emotionally abused: Not on file     Physically abused: Not on file     Forced sexual activity: Not on file   Other Topics Concern    Not on file   Social History Narrative    Not on file          ROS  Per HPI    Visit Vitals  /81   Pulse 91   Temp 98.6 °F (37 °C) (Oral)   Resp 14   Ht 5' 10\" (1.778 m)   Wt 184 lb (83.5 kg)   SpO2 94%   BMI 26.40 kg/m²         Physical Exam   Physical Examination: General appearance - alert, well appearing, and in no distress  Mouth - mucous membranes moist, pharynx normal without lesions  Neck - supple, no significant adenopathy  Chest -scattered rhonchi. No wheeze. Heart - irregularly irregular rhythm with rate 91. Abdomen - soft, nontender, nondistended, no masses or organomegaly  Neurological - alert, oriented, normal speech, no focal findings or movement disorder noted  Extremities - peripheral pulses normal, no pedal edema, no clubbing or cyanosis      Assessment/Plan:  Diagnoses and all orders for this visit:    1. Diabetic peripheral neuropathy associated with type 2 diabetes mellitus ([address] -we will continue physical therapy to manage his symptoms. 2. Coronary artery disease involving native coronary artery of native heart without angina pectoris -stable and will see cardiology. 3. Typical atrial flutter ([address] -remain off anticoagulants today.   He is aware that he is at higher risk for stroke/embolic due to being off these. I did discuss with him a watchman procedure and he will discuss that with cardiology. 4. Subdural hematoma (HCC)-remain off aspirin for the next 2 weeks. 5. Chronic obstructive pulmonary disease, unspecified COPD type ([facility] -appears stable. Continue inhalers. 6. Essential hypertension-orthostatic blood pressures were normal today. Will monitor these at home and let me know if his readings are below 651 for systolic readings. Follow-up and Dispositions    · Return in about 3 months (around 6/5/2020). Advised him to call back or return to office if symptoms worsen/change/persist.  Discussed expected course/resolution/complications of diagnosis in detail with patient. Medication risks/benefits/costs/interactions/alternatives discussed with patient. He was given an after visit summary which includes diagnoses, current medications, & vitals. He expressed understanding with the diagnosis and plan.

## 2020-03-06 ENCOUNTER — HOME CARE VISIT (OUTPATIENT)
Dept: SCHEDULING | Facility: HOME HEALTH | Age: 85
End: 2020-03-06
Payer: MEDICARE

## 2020-03-06 VITALS
HEART RATE: 87 BPM | DIASTOLIC BLOOD PRESSURE: 64 MMHG | TEMPERATURE: 97.8 F | SYSTOLIC BLOOD PRESSURE: 118 MMHG | OXYGEN SATURATION: 98 %

## 2020-03-06 PROCEDURE — 3331090002 HH PPS REVENUE DEBIT

## 2020-03-06 PROCEDURE — G0151 HHCP-SERV OF PT,EA 15 MIN: HCPCS

## 2020-03-06 PROCEDURE — 3331090001 HH PPS REVENUE CREDIT

## 2020-03-06 PROCEDURE — G0158 HHC OT ASSISTANT EA 15: HCPCS

## 2020-03-07 VITALS
TEMPERATURE: 98.6 F | DIASTOLIC BLOOD PRESSURE: 78 MMHG | HEART RATE: 89 BPM | SYSTOLIC BLOOD PRESSURE: 125 MMHG | OXYGEN SATURATION: 94 %

## 2020-03-07 PROCEDURE — 3331090002 HH PPS REVENUE DEBIT

## 2020-03-07 PROCEDURE — 3331090001 HH PPS REVENUE CREDIT

## 2020-03-08 PROCEDURE — 3331090001 HH PPS REVENUE CREDIT

## 2020-03-08 PROCEDURE — 3331090002 HH PPS REVENUE DEBIT

## 2020-03-09 ENCOUNTER — HOME CARE VISIT (OUTPATIENT)
Dept: SCHEDULING | Facility: HOME HEALTH | Age: 85
End: 2020-03-09
Payer: MEDICARE

## 2020-03-09 VITALS
HEART RATE: 92 BPM | TEMPERATURE: 98.3 F | DIASTOLIC BLOOD PRESSURE: 70 MMHG | SYSTOLIC BLOOD PRESSURE: 110 MMHG | OXYGEN SATURATION: 96 %

## 2020-03-09 PROCEDURE — G0158 HHC OT ASSISTANT EA 15: HCPCS

## 2020-03-09 PROCEDURE — 3331090001 HH PPS REVENUE CREDIT

## 2020-03-09 PROCEDURE — 3331090002 HH PPS REVENUE DEBIT

## 2020-03-10 ENCOUNTER — HOME CARE VISIT (OUTPATIENT)
Dept: SCHEDULING | Facility: HOME HEALTH | Age: 85
End: 2020-03-10
Payer: MEDICARE

## 2020-03-10 VITALS
HEART RATE: 90 BPM | SYSTOLIC BLOOD PRESSURE: 123 MMHG | DIASTOLIC BLOOD PRESSURE: 75 MMHG | OXYGEN SATURATION: 96 % | TEMPERATURE: 97.9 F

## 2020-03-10 PROCEDURE — 3331090001 HH PPS REVENUE CREDIT

## 2020-03-10 PROCEDURE — G0151 HHCP-SERV OF PT,EA 15 MIN: HCPCS

## 2020-03-10 PROCEDURE — 3331090002 HH PPS REVENUE DEBIT

## 2020-03-11 PROCEDURE — 3331090001 HH PPS REVENUE CREDIT

## 2020-03-11 PROCEDURE — 3331090002 HH PPS REVENUE DEBIT

## 2020-03-12 PROCEDURE — 3331090002 HH PPS REVENUE DEBIT

## 2020-03-12 PROCEDURE — 3331090001 HH PPS REVENUE CREDIT

## 2020-03-13 ENCOUNTER — HOME CARE VISIT (OUTPATIENT)
Dept: SCHEDULING | Facility: HOME HEALTH | Age: 85
End: 2020-03-13
Payer: MEDICARE

## 2020-03-13 VITALS
SYSTOLIC BLOOD PRESSURE: 122 MMHG | TEMPERATURE: 98.2 F | OXYGEN SATURATION: 96 % | HEART RATE: 89 BPM | DIASTOLIC BLOOD PRESSURE: 80 MMHG

## 2020-03-13 PROCEDURE — 3331090001 HH PPS REVENUE CREDIT

## 2020-03-13 PROCEDURE — G0158 HHC OT ASSISTANT EA 15: HCPCS

## 2020-03-13 PROCEDURE — 3331090002 HH PPS REVENUE DEBIT

## 2020-03-14 ENCOUNTER — HOME CARE VISIT (OUTPATIENT)
Dept: SCHEDULING | Facility: HOME HEALTH | Age: 85
End: 2020-03-14
Payer: MEDICARE

## 2020-03-14 ENCOUNTER — HOME CARE VISIT (OUTPATIENT)
Dept: HOME HEALTH SERVICES | Facility: HOME HEALTH | Age: 85
End: 2020-03-14
Payer: MEDICARE

## 2020-03-14 PROCEDURE — G0157 HHC PT ASSISTANT EA 15: HCPCS

## 2020-03-14 PROCEDURE — 3331090002 HH PPS REVENUE DEBIT

## 2020-03-14 PROCEDURE — 3331090001 HH PPS REVENUE CREDIT

## 2020-03-15 VITALS
DIASTOLIC BLOOD PRESSURE: 78 MMHG | SYSTOLIC BLOOD PRESSURE: 122 MMHG | HEART RATE: 64 BPM | OXYGEN SATURATION: 97 % | TEMPERATURE: 98.6 F

## 2020-03-15 PROCEDURE — 3331090001 HH PPS REVENUE CREDIT

## 2020-03-15 PROCEDURE — 3331090002 HH PPS REVENUE DEBIT

## 2020-03-16 ENCOUNTER — HOME CARE VISIT (OUTPATIENT)
Dept: SCHEDULING | Facility: HOME HEALTH | Age: 85
End: 2020-03-16
Payer: MEDICARE

## 2020-03-16 ENCOUNTER — TELEPHONE (OUTPATIENT)
Dept: CARDIOLOGY CLINIC | Age: 85
End: 2020-03-16

## 2020-03-16 VITALS
TEMPERATURE: 98.2 F | HEART RATE: 70 BPM | SYSTOLIC BLOOD PRESSURE: 114 MMHG | DIASTOLIC BLOOD PRESSURE: 80 MMHG | OXYGEN SATURATION: 94 %

## 2020-03-16 PROCEDURE — 3331090001 HH PPS REVENUE CREDIT

## 2020-03-16 PROCEDURE — 3331090002 HH PPS REVENUE DEBIT

## 2020-03-16 PROCEDURE — G0158 HHC OT ASSISTANT EA 15: HCPCS

## 2020-03-16 NOTE — TELEPHONE ENCOUNTER
Spoke to patient's wife, Armando Mayfield on HIPAA using 2 identifiers. Per Jose Min NP, she was made aware to wait one more week and start the aspirin. She verbalized understanding.

## 2020-03-16 NOTE — TELEPHONE ENCOUNTER
Spoke to patient's wife, Jany Rodriguez on HIPAA using 2 identifiers. She would like to know when patient can go back on ASA 81 mg. Patient was hospitalized on 2/25/20 for subdural hematoma. Please advise.

## 2020-03-16 NOTE — TELEPHONE ENCOUNTER
Spoke to patient's wife, Oz Garrett on HIPAA using 2 identifiers. She was made aware that per Cole Noonan NP, per d/c summary, recommended to hold ASA 2-4 weeks, restart after he sees neurosurgery for follow up. Patient's wife stated that when she called for an appt for neurosurgery, she was told patient did not need to be seen because he was stable. Please advise if ok to restart ASA 81 mg now since it does fall within 2-3 wks timeframe but did not see neurosurgeon.

## 2020-03-17 ENCOUNTER — HOME CARE VISIT (OUTPATIENT)
Dept: SCHEDULING | Facility: HOME HEALTH | Age: 85
End: 2020-03-17
Payer: MEDICARE

## 2020-03-17 PROCEDURE — 3331090001 HH PPS REVENUE CREDIT

## 2020-03-17 PROCEDURE — 3331090002 HH PPS REVENUE DEBIT

## 2020-03-17 PROCEDURE — G0157 HHC PT ASSISTANT EA 15: HCPCS

## 2020-03-18 ENCOUNTER — HOME CARE VISIT (OUTPATIENT)
Dept: HOME HEALTH SERVICES | Facility: HOME HEALTH | Age: 85
End: 2020-03-18
Payer: MEDICARE

## 2020-03-18 ENCOUNTER — HOME CARE VISIT (OUTPATIENT)
Dept: SCHEDULING | Facility: HOME HEALTH | Age: 85
End: 2020-03-18
Payer: MEDICARE

## 2020-03-18 VITALS
SYSTOLIC BLOOD PRESSURE: 120 MMHG | OXYGEN SATURATION: 99 % | HEART RATE: 90 BPM | DIASTOLIC BLOOD PRESSURE: 75 MMHG | TEMPERATURE: 98.2 F

## 2020-03-18 PROCEDURE — 3331090001 HH PPS REVENUE CREDIT

## 2020-03-18 PROCEDURE — G0152 HHCP-SERV OF OT,EA 15 MIN: HCPCS

## 2020-03-18 PROCEDURE — 3331090002 HH PPS REVENUE DEBIT

## 2020-03-19 ENCOUNTER — HOME CARE VISIT (OUTPATIENT)
Dept: SCHEDULING | Facility: HOME HEALTH | Age: 85
End: 2020-03-19
Payer: MEDICARE

## 2020-03-19 PROCEDURE — 3331090001 HH PPS REVENUE CREDIT

## 2020-03-19 PROCEDURE — 3331090002 HH PPS REVENUE DEBIT

## 2020-03-19 PROCEDURE — G0157 HHC PT ASSISTANT EA 15: HCPCS

## 2020-03-20 PROCEDURE — 3331090002 HH PPS REVENUE DEBIT

## 2020-03-20 PROCEDURE — 3331090001 HH PPS REVENUE CREDIT

## 2020-03-21 VITALS — TEMPERATURE: 97.9 F | OXYGEN SATURATION: 95 % | SYSTOLIC BLOOD PRESSURE: 121 MMHG | DIASTOLIC BLOOD PRESSURE: 78 MMHG

## 2020-03-21 PROCEDURE — 3331090001 HH PPS REVENUE CREDIT

## 2020-03-21 PROCEDURE — 3331090002 HH PPS REVENUE DEBIT

## 2020-03-22 PROCEDURE — 3331090002 HH PPS REVENUE DEBIT

## 2020-03-22 PROCEDURE — 3331090001 HH PPS REVENUE CREDIT

## 2020-03-23 PROCEDURE — 3331090001 HH PPS REVENUE CREDIT

## 2020-03-23 PROCEDURE — 3331090002 HH PPS REVENUE DEBIT

## 2020-03-24 ENCOUNTER — HOME CARE VISIT (OUTPATIENT)
Dept: SCHEDULING | Facility: HOME HEALTH | Age: 85
End: 2020-03-24
Payer: MEDICARE

## 2020-03-24 PROCEDURE — G0157 HHC PT ASSISTANT EA 15: HCPCS

## 2020-03-24 PROCEDURE — 3331090001 HH PPS REVENUE CREDIT

## 2020-03-24 PROCEDURE — 3331090002 HH PPS REVENUE DEBIT

## 2020-03-25 VITALS
SYSTOLIC BLOOD PRESSURE: 120 MMHG | TEMPERATURE: 97.9 F | DIASTOLIC BLOOD PRESSURE: 75 MMHG | OXYGEN SATURATION: 98 % | HEART RATE: 55 BPM

## 2020-03-25 PROCEDURE — 3331090002 HH PPS REVENUE DEBIT

## 2020-03-25 PROCEDURE — 3331090001 HH PPS REVENUE CREDIT

## 2020-03-26 PROCEDURE — 3331090001 HH PPS REVENUE CREDIT

## 2020-03-26 PROCEDURE — 3331090002 HH PPS REVENUE DEBIT

## 2020-03-27 ENCOUNTER — TELEPHONE (OUTPATIENT)
Dept: INTERNAL MEDICINE CLINIC | Age: 85
End: 2020-03-27

## 2020-03-27 ENCOUNTER — HOME CARE VISIT (OUTPATIENT)
Dept: SCHEDULING | Facility: HOME HEALTH | Age: 85
End: 2020-03-27
Payer: MEDICARE

## 2020-03-27 VITALS
OXYGEN SATURATION: 98 % | RESPIRATION RATE: 19 BRPM | DIASTOLIC BLOOD PRESSURE: 80 MMHG | TEMPERATURE: 98.1 F | SYSTOLIC BLOOD PRESSURE: 134 MMHG | HEART RATE: 78 BPM

## 2020-03-27 PROCEDURE — 3331090001 HH PPS REVENUE CREDIT

## 2020-03-27 PROCEDURE — G0151 HHCP-SERV OF PT,EA 15 MIN: HCPCS

## 2020-03-27 PROCEDURE — 3331090002 HH PPS REVENUE DEBIT

## 2020-03-27 NOTE — TELEPHONE ENCOUNTER
Spoke with Felicita Cheng from [facility] and gave vo per [facility] to extend home health as long as people need it.

## 2020-03-28 PROCEDURE — 3331090002 HH PPS REVENUE DEBIT

## 2020-03-28 PROCEDURE — 3331090001 HH PPS REVENUE CREDIT

## 2020-03-29 PROCEDURE — 3331090001 HH PPS REVENUE CREDIT

## 2020-03-29 PROCEDURE — 3331090002 HH PPS REVENUE DEBIT

## 2020-03-30 PROCEDURE — 3331090001 HH PPS REVENUE CREDIT

## 2020-03-30 PROCEDURE — 3331090002 HH PPS REVENUE DEBIT

## 2020-03-31 ENCOUNTER — HOME CARE VISIT (OUTPATIENT)
Dept: SCHEDULING | Facility: HOME HEALTH | Age: 85
End: 2020-03-31
Payer: MEDICARE

## 2020-03-31 PROCEDURE — G0157 HHC PT ASSISTANT EA 15: HCPCS

## 2020-03-31 PROCEDURE — 3331090002 HH PPS REVENUE DEBIT

## 2020-03-31 PROCEDURE — 3331090001 HH PPS REVENUE CREDIT

## 2020-04-01 PROCEDURE — 3331090001 HH PPS REVENUE CREDIT

## 2020-04-01 PROCEDURE — 3331090002 HH PPS REVENUE DEBIT

## 2020-04-02 ENCOUNTER — HOME CARE VISIT (OUTPATIENT)
Dept: SCHEDULING | Facility: HOME HEALTH | Age: 85
End: 2020-04-02
Payer: MEDICARE

## 2020-04-02 VITALS
TEMPERATURE: 98.5 F | SYSTOLIC BLOOD PRESSURE: 120 MMHG | HEART RATE: 75 BPM | DIASTOLIC BLOOD PRESSURE: 75 MMHG | OXYGEN SATURATION: 98 %

## 2020-04-02 PROCEDURE — 3331090001 HH PPS REVENUE CREDIT

## 2020-04-02 PROCEDURE — 400013 HH SOC

## 2020-04-02 PROCEDURE — G0157 HHC PT ASSISTANT EA 15: HCPCS

## 2020-04-02 PROCEDURE — 3331090002 HH PPS REVENUE DEBIT

## 2020-04-03 PROCEDURE — 3331090002 HH PPS REVENUE DEBIT

## 2020-04-03 PROCEDURE — 3331090001 HH PPS REVENUE CREDIT

## 2020-04-04 PROCEDURE — 3331090001 HH PPS REVENUE CREDIT

## 2020-04-04 PROCEDURE — 3331090002 HH PPS REVENUE DEBIT

## 2020-04-05 VITALS
DIASTOLIC BLOOD PRESSURE: 80 MMHG | SYSTOLIC BLOOD PRESSURE: 132 MMHG | TEMPERATURE: 98 F | OXYGEN SATURATION: 99 % | HEART RATE: 89 BPM

## 2020-04-05 PROCEDURE — 3331090002 HH PPS REVENUE DEBIT

## 2020-04-05 PROCEDURE — 3331090001 HH PPS REVENUE CREDIT

## 2020-04-06 PROCEDURE — 3331090001 HH PPS REVENUE CREDIT

## 2020-04-06 PROCEDURE — 3331090002 HH PPS REVENUE DEBIT

## 2020-04-07 ENCOUNTER — HOME CARE VISIT (OUTPATIENT)
Dept: HOME HEALTH SERVICES | Facility: HOME HEALTH | Age: 85
End: 2020-04-07
Payer: MEDICARE

## 2020-04-07 ENCOUNTER — HOME CARE VISIT (OUTPATIENT)
Dept: SCHEDULING | Facility: HOME HEALTH | Age: 85
End: 2020-04-07
Payer: MEDICARE

## 2020-04-07 PROCEDURE — 3331090001 HH PPS REVENUE CREDIT

## 2020-04-07 PROCEDURE — 3331090002 HH PPS REVENUE DEBIT

## 2020-04-07 PROCEDURE — G0157 HHC PT ASSISTANT EA 15: HCPCS

## 2020-04-08 PROCEDURE — 3331090002 HH PPS REVENUE DEBIT

## 2020-04-08 PROCEDURE — 3331090001 HH PPS REVENUE CREDIT

## 2020-04-09 ENCOUNTER — HOME CARE VISIT (OUTPATIENT)
Dept: HOME HEALTH SERVICES | Facility: HOME HEALTH | Age: 85
End: 2020-04-09
Payer: MEDICARE

## 2020-04-09 ENCOUNTER — HOME CARE VISIT (OUTPATIENT)
Dept: SCHEDULING | Facility: HOME HEALTH | Age: 85
End: 2020-04-09
Payer: MEDICARE

## 2020-04-09 VITALS
HEART RATE: 68 BPM | SYSTOLIC BLOOD PRESSURE: 120 MMHG | OXYGEN SATURATION: 98 % | DIASTOLIC BLOOD PRESSURE: 68 MMHG | DIASTOLIC BLOOD PRESSURE: 75 MMHG | TEMPERATURE: 98.4 F | SYSTOLIC BLOOD PRESSURE: 118 MMHG | TEMPERATURE: 98.5 F | OXYGEN SATURATION: 97 % | HEART RATE: 88 BPM

## 2020-04-09 PROCEDURE — 3331090001 HH PPS REVENUE CREDIT

## 2020-04-09 PROCEDURE — 3331090002 HH PPS REVENUE DEBIT

## 2020-04-09 PROCEDURE — G0157 HHC PT ASSISTANT EA 15: HCPCS

## 2020-04-10 PROCEDURE — 3331090002 HH PPS REVENUE DEBIT

## 2020-04-10 PROCEDURE — 3331090001 HH PPS REVENUE CREDIT

## 2020-04-11 PROCEDURE — 3331090002 HH PPS REVENUE DEBIT

## 2020-04-11 PROCEDURE — 3331090001 HH PPS REVENUE CREDIT

## 2020-04-12 PROCEDURE — 3331090002 HH PPS REVENUE DEBIT

## 2020-04-12 PROCEDURE — 3331090001 HH PPS REVENUE CREDIT

## 2020-04-13 ENCOUNTER — VIRTUAL VISIT (OUTPATIENT)
Dept: CARDIOLOGY CLINIC | Age: 85
End: 2020-04-13

## 2020-04-13 VITALS — DIASTOLIC BLOOD PRESSURE: 74 MMHG | SYSTOLIC BLOOD PRESSURE: 118 MMHG

## 2020-04-13 DIAGNOSIS — I10 ESSENTIAL HYPERTENSION: ICD-10-CM

## 2020-04-13 DIAGNOSIS — I25.10 CORONARY ARTERY DISEASE INVOLVING NATIVE CORONARY ARTERY OF NATIVE HEART WITHOUT ANGINA PECTORIS: ICD-10-CM

## 2020-04-13 DIAGNOSIS — E78.2 MIXED HYPERLIPIDEMIA: ICD-10-CM

## 2020-04-13 DIAGNOSIS — I48.3 TYPICAL ATRIAL FLUTTER (HCC): ICD-10-CM

## 2020-04-13 DIAGNOSIS — S06.5XAA SUBDURAL HEMATOMA: Primary | ICD-10-CM

## 2020-04-13 PROCEDURE — 3331090001 HH PPS REVENUE CREDIT

## 2020-04-13 PROCEDURE — 3331090002 HH PPS REVENUE DEBIT

## 2020-04-13 NOTE — PROGRESS NOTES
Subjective/HPI:     Miguelangel Botello is a 80 y.o. male who had his visit today conducted by telephone only on 4/13/2020. He is f/u after hospital admission in February for a SDH. Pt had fallen and was having AMS. He had a CT scan which showed a chronic subdural hematoma with a small acute left SDH. His Plavix, Eliquis, and ASA were d/c. His BP was trending higher in the 064L systolic so his Lisinopril was increased from 5mg to 20mg every day. He reports since d/c he has been receiving PT/OT therapy at home and has been a bit stronger. He is requiring use of a walker. He has restarted ASA 81mg daily without issues. He has had no falls since he returned home. The patient denies chest pain/ shortness of breath, orthopnea, PND, LE edema, palpitations, syncope, presyncope or fatigue. He has been monitoring his BP daily, then end of March his SBP was 100-106/DBP 60s. He decreased his Lisinopril back down to 5mg every day and it was 118/74 today. PCP Provider  Ulysses Cower, MD  Past Medical History:   Diagnosis Date    Arrhythmia     Asthma     CAD (coronary artery disease)     Chronic hip pain     COPD (chronic obstructive pulmonary disease) ([address] 9/10/2010    Diabetes ([address]     DJD (degenerative joint disease)     GERD (gastroesophageal reflux disease)     HNP (herniated nucleus pulposus) 6/2006    lumbar     Hypercholesterolemia     Hypertension     Nausea & vomiting     Prostate cancer ([address] 2003    Reversible ischemic neurologic deficit ([address] 1990    suspected with no residual effects and no recurrance    TIA (transient ischemic attack)     TIA- no deficiets      Past Surgical History:   Procedure Laterality Date    COLONOSCOPY  12/15/2004    Diverticulosis Dr. Ismael Miramontes repeat 10 years    HX COLONOSCOPY  2005    HX HEART CATHETERIZATION      2  stents     HX HEART CATHETERIZATION  08/20/2018    failed 100% block @ [facility] will plan to do laser next.       HX HERNIA REPAIR  9/2008    left inguinal hernia repair by Dr. Ryan Phelps Right     inguinal hernai repair    HX HERNIA REPAIR      umbilical hernia repair    HX HIP REPLACEMENT Right 07/06/2016    HX PROSTATECTOMY  2003    HX TONSILLECTOMY      TOTAL HIP ARTHROPLASTY Left 2/9/11    Dr. Herbert Hernandez at [facility]     No Known Allergies   Family History   Problem Relation Age of Onset    Cancer Mother         Breast cancer    Lung Disease Mother         Emphysema    Cancer Father         Bladder cancer    Lung Disease Father         Emphysema    Hypertension Sister         2015      Current Outpatient Medications   Medication Sig    aspirin 81 mg chewable tablet Take 1 Tab by mouth daily.  ADVAIR DISKUS 250-50 mcg/dose diskus inhaler Take 1 Puff by inhalation every twelve (12) hours.  simvastatin (ZOCOR) 20 mg tablet TAKE 1 TABLET BY MOUTH EVERY DAY AT NIGHT    glipiZIDE (GLUCOTROL) 5 mg tablet TAKE 1 TABLET BY MOUTH EVERY DAY    metoprolol succinate (TOPROL-XL) 25 mg XL tablet TAKE 1/2 TABLET BY MOUTH EVERY DAY    metFORMIN ER (GLUCOPHAGE XR) 500 mg tablet TAKE 3 TABLETS BY MOUTH EVERY DAY WITH DINNER    isosorbide mononitrate ER (IMDUR) 30 mg tablet Take 0.5 Tabs by mouth every morning. Indications: Take 1/2 tab by mouth daily    albuterol (PROVENTIL VENTOLIN) 2.5 mg /3 mL (0.083 %) nebu 3 mL by Nebulization route three (3) times daily as needed (wheeze).  tiotropium bromide (SPIRIVA RESPIMAT) 2.5 mcg/actuation inhaler Take 2 Puffs by inhalation daily.  VENTOLIN HFA 90 mcg/actuation inhaler INHALE 2 PUFFS BY MOUTH EVERY 4 HOURS AS NEEDED FOR WHEEZING    coenzyme q10 10 mg cap Take 10 mg by mouth daily.  cyanocobalamin (VITAMIN B-12) 500 mcg tablet Take 500 mcg by mouth daily.  folic acid 153 mcg tablet Take 400 mcg by mouth daily.  nitroglycerin (NITROSTAT) 0.4 mg SL tablet 1 Tab by SubLINGual route every five (5) minutes as needed for Chest Pain.     MAGNESIUM PO Take 500 mg by mouth daily.  acetaminophen (TYLENOL) 500 mg tablet Take 1 tablet by mouth every eight (8) hours as needed for Pain.  CHOLECALCIFEROL, VITAMIN D3, (VITAMIN D3 PO) Take 1,000 Units by mouth daily.  multivitamins-minerals-lutein (CENTRUM SILVER) Tab Take 1 Tab by mouth daily.  fexofenadine (ALLEGRA) 180 mg tablet Take 1 Tab by mouth daily.  famotidine (PEPCID AC) 20 mg tablet Take 20 mg by mouth daily.  VITAMIN B COMPLEX (B COMPLEX PO) Take 1 Tab by mouth daily.  ASCORBIC ACID (VITAMIN C PO) Take 1,000 mg by mouth daily.  lisinopril (PRINIVIL, ZESTRIL) 20 mg tablet Take 1 Tab by mouth daily. (Patient taking differently: Take 5 mg by mouth daily.)     No current facility-administered medications for this visit. There were no vitals filed for this visit.   Social History     Socioeconomic History    Marital status:      Spouse name: Not on file    Number of children: Not on file    Years of education: Not on file    Highest education level: Not on file   Occupational History    Not on file   Social Needs    Financial resource strain: Not on file    Food insecurity     Worry: Not on file     Inability: Not on file    Transportation needs     Medical: Not on file     Non-medical: Not on file   Tobacco Use    Smoking status: Former Smoker     Packs/day: 3.00     Years: 10.00     Pack years: 30.00     Types: Cigarettes     Last attempt to quit: 1/15/1970     Years since quittin.2    Smokeless tobacco: Never Used   Substance and Sexual Activity    Alcohol use: No    Drug use: Yes     Types: Prescription, OTC    Sexual activity: Yes     Partners: Female     Birth control/protection: None   Lifestyle    Physical activity     Days per week: Not on file     Minutes per session: Not on file    Stress: Not on file   Relationships    Social connections     Talks on phone: Not on file     Gets together: Not on file     Attends Yarsanism service: Not on file     Active member of club or organization: Not on file     Attends meetings of clubs or organizations: Not on file     Relationship status: Not on file    Intimate partner violence     Fear of current or ex partner: Not on file     Emotionally abused: Not on file     Physically abused: Not on file     Forced sexual activity: Not on file   Other Topics Concern    Not on file   Social History Narrative    Not on file       I have reviewed the nurses notes, vitals, problem list, allergy list, medical history, family, social history and medications. Review of Symptoms:    General: Pt denies excessive weight gain or loss. Pt is able to conduct ADL's  HEENT: Denies blurred vision, headaches, epistaxis and difficulty swallowing. Respiratory: Denies shortness of breath, MCGUIRE, wheezing or stridor. Cardiovascular: Denies precordial pain, palpitations, edema or PND  Gastrointestinal: Denies poor appetite, indigestion, abdominal pain or blood in stool  Urinary: Denies dysuria, pyuria  Musculoskeletal: Denies pain or swelling from muscles or joints  Neurologic: Denies tremor, paresthesias, or sensory motor disturbance  Skin: Denies rash, itching or texture change.   Psych: Denies depression        Physical Exam:    No physical exam, telephone only    Cardiographics      Results for orders placed or performed during the hospital encounter of 02/25/20   EKG, 12 LEAD, INITIAL   Result Value Ref Range    Ventricular Rate 87 BPM    Atrial Rate 261 BPM    QRS Duration 88 ms    Q-T Interval 372 ms    QTC Calculation (Bezet) 447 ms    Calculated P Axis 51 degrees    Calculated R Axis 43 degrees    Calculated T Axis 39 degrees    Diagnosis       Atrial flutter with 3:1 AV conduction  When compared with ECG of 05-OCT-2018 04:26,  Atrial flutter has replaced Sinus rhythm  Confirmed by Ana Glasgow ([id number]) on 2/25/2020 10:39:48 PM           Cardiology Labs:  Lab Results   Component Value Date/Time    Cholesterol, total 137 02/12/2020 09:13 AM HDL Cholesterol 48 02/12/2020 09:13 AM    LDL, calculated 68 02/12/2020 09:13 AM    LDL, calculated 73 08/08/2019 08:49 AM    LDL, calculated 73 02/08/2019 08:51 AM    VLDL, calculated 21 02/12/2020 09:13 AM    CHOL/HDL Ratio 3.0 05/05/2010 09:01 AM     Lab Results   Component Value Date/Time    Sodium 134 (L) 02/26/2020 05:33 AM    Potassium 4.2 02/26/2020 05:33 AM    Chloride 105 02/26/2020 05:33 AM    CO2 27 02/26/2020 05:33 AM    Glucose 171 (H) 02/26/2020 05:33 AM    BUN 15 02/26/2020 05:33 AM    Creatinine 1.14 02/26/2020 05:33 AM    BUN/Creatinine ratio 13 02/26/2020 05:33 AM    GFR est AA >60 02/26/2020 05:33 AM    GFR est non-AA >60 02/26/2020 05:33 AM    Calcium 8.7 02/26/2020 05:33 AM    Anion gap 2 (L) 02/26/2020 05:33 AM    Bilirubin, total 0.6 02/25/2020 03:26 PM    ALT (SGPT) 25 02/25/2020 03:26 PM    AST (SGOT) 16 02/25/2020 03:26 PM    Alk. phosphatase 75 02/25/2020 03:26 PM    Protein, total 6.8 02/25/2020 03:26 PM    Albumin 3.2 (L) 02/25/2020 03:26 PM    Globulin 3.6 02/25/2020 03:26 PM    A-G Ratio 0.9 (L) 02/25/2020 03:26 PM     Lab Results   Component Value Date/Time    Hemoglobin A1c 7.9 (H) 02/12/2020 09:13 AM    Hemoglobin A1c (POC) 6.7 02/08/2016 11:55 AM        Assessment:     Assessment:     Diagnoses and all orders for this visit:    1. Subdural hematoma ([address]    2. Typical atrial flutter ([address]    3. Coronary artery disease involving native coronary artery of native heart without angina pectoris    4. Essential hypertension    5. Mixed hyperlipidemia        ICD-10-CM ICD-9-CM    1. Subdural hematoma (HCC) S06.5X9A 432.1    2. Typical atrial flutter (HCC) I48.3 427.32    3. Coronary artery disease involving native coronary artery of native heart without angina pectoris I25.10 414.01    4. Essential hypertension I10 401.9    5.  Mixed hyperlipidemia E78.2 272.2           Plan:     1. Subdural hematoma- admitted in February with AMS, CT showed chronic subdural hematoma with a small left acute SDH. He was d/c off Plavix, ASA, and Eliquis. He has restarted ASA 81mg every day. He is receiving PT/OT and using a walker currently. 2. Typical atrial flutter- patient with new diagnosis of atrial flutter in January. He was still in rate controlled atrial flutter in February. He was here in our office to see Dr Anika Cedeno when he was sent to ED for his AMS. A discussion about an ablation occurred at that time and he does not wish to pursue this at this time. Discussed Watchman device, however he would have to be on an anticoag for a certain period of time. He is higher risk for CVA off Eliquis, however with his fall risk he will remain on ASA only at this time. He is not currently symptomatic with the atrial flutter. 3. CAD- asymptomatic currently. Continue ASA 81mg, BB, Imdur and statin. 4. HTN- BP was elevated in the hospital. Lisinopril was increased to 20mg every day. BP was trending too low, now back on 5mg with /74.     5. Hyperlipidemia- cont statin    F/U with Dr Abril Cherry in 1-2 months. Pursuant to the emergency declaration under the [facility] waiver authority and the [employer] Act, this telephone visit was conducted, with patient's previous consent to utilize this technology and understand the risks and benefits of proceeding, to reduce the patient's risk of exposure to COVID-19 and provide continuity of care for an established patient. Services were provided through telephone only to substitute for in-person clinic visit. I am speaking with this patient today from my office in [place], [place], and from their home located within [place].       Consent:  He and/or health care decision maker is aware that that he may receive a bill for this telephone service, depending on his insurance coverage, and has provided verbal consent to proceed: Yes      I affirm this is a Patient Initiated Episode with an Established Patient who has not had a related appointment within my department in the past 7 days or scheduled within the next 24 hours.     Total Time: minutes: 11-20 minutes

## 2020-04-14 ENCOUNTER — HOME CARE VISIT (OUTPATIENT)
Dept: HOME HEALTH SERVICES | Facility: HOME HEALTH | Age: 85
End: 2020-04-14
Payer: MEDICARE

## 2020-04-14 ENCOUNTER — HOME CARE VISIT (OUTPATIENT)
Dept: SCHEDULING | Facility: HOME HEALTH | Age: 85
End: 2020-04-14
Payer: MEDICARE

## 2020-04-14 PROCEDURE — 3331090001 HH PPS REVENUE CREDIT

## 2020-04-14 PROCEDURE — 3331090002 HH PPS REVENUE DEBIT

## 2020-04-14 PROCEDURE — G0157 HHC PT ASSISTANT EA 15: HCPCS

## 2020-04-15 ENCOUNTER — HOME CARE VISIT (OUTPATIENT)
Dept: HOME HEALTH SERVICES | Facility: HOME HEALTH | Age: 85
End: 2020-04-15
Payer: MEDICARE

## 2020-04-15 VITALS
HEART RATE: 83 BPM | OXYGEN SATURATION: 97 % | SYSTOLIC BLOOD PRESSURE: 125 MMHG | DIASTOLIC BLOOD PRESSURE: 75 MMHG | TEMPERATURE: 99 F

## 2020-04-15 PROCEDURE — 3331090001 HH PPS REVENUE CREDIT

## 2020-04-15 PROCEDURE — 3331090002 HH PPS REVENUE DEBIT

## 2020-04-16 ENCOUNTER — HOME CARE VISIT (OUTPATIENT)
Dept: HOME HEALTH SERVICES | Facility: HOME HEALTH | Age: 85
End: 2020-04-16
Payer: MEDICARE

## 2020-04-16 ENCOUNTER — HOME CARE VISIT (OUTPATIENT)
Dept: SCHEDULING | Facility: HOME HEALTH | Age: 85
End: 2020-04-16
Payer: MEDICARE

## 2020-04-16 PROCEDURE — G0151 HHCP-SERV OF PT,EA 15 MIN: HCPCS

## 2020-04-16 PROCEDURE — 3331090002 HH PPS REVENUE DEBIT

## 2020-04-16 PROCEDURE — 3331090001 HH PPS REVENUE CREDIT

## 2020-04-17 VITALS
TEMPERATURE: 98.2 F | OXYGEN SATURATION: 96 % | DIASTOLIC BLOOD PRESSURE: 64 MMHG | RESPIRATION RATE: 18 BRPM | SYSTOLIC BLOOD PRESSURE: 122 MMHG | HEART RATE: 87 BPM

## 2020-04-17 PROCEDURE — 3331090001 HH PPS REVENUE CREDIT

## 2020-04-17 PROCEDURE — 3331090002 HH PPS REVENUE DEBIT

## 2020-04-18 PROCEDURE — 3331090001 HH PPS REVENUE CREDIT

## 2020-04-18 PROCEDURE — 3331090002 HH PPS REVENUE DEBIT

## 2020-04-19 PROCEDURE — 3331090001 HH PPS REVENUE CREDIT

## 2020-04-19 PROCEDURE — 3331090002 HH PPS REVENUE DEBIT

## 2020-04-30 NOTE — PROGRESS NOTES
Colby Cuevas is a 80 y.o. male evaluated via telephone on 5/4/2020. Consent:  He and/or health care decision maker is aware that he may receive a bill for this telephone service, depending on his insurance coverage, and has provided verbal consent to proceed: yes    Documentation:  I communicated with the patient and/or health care decision maker about:    The patient is a pleasant 80year-old gentleman with him had been seen multiple prior neurologist and I first met on November 4, 2019. Please see my history of present illness, examination, and treatment based plan from that day. He had been given a diagnosis of spinocerebellar degeneration. 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 He also did have a significant peripheral neuropathy with was most likely attributed to his longstanding history of diabetes. Vascular disease and stroke risk factor modification were discussed and he was on Plavix. In January 2020 he developed atrial flutter. His Plavix was stopped and he was started on Eliquis 5 mg twice a day. He was then admitted to [facility] in February 25, 2020 after having a fall. He did not lose consciousness with the fall. He did develop a headache. He did have a small bilateral subdural hematoma. His Eliquis was stopped at that time. He was discharged on February 28, 2020. He was kept off of his Eliquis. Details of this discussion including any medical advice provided:    After being discharged from the hospital, he began to receive outpatient physical therapy at his house. His wife does feel that his walking and balance has improved with the therapy. He is also now using his rolling walker every day and this is also helped his stability. He has not had any more falls since that time. He denies any new numbness, tingling, or weakness. He continues with aspirin monotherapy for secondary stroke prevention.   He is closely following with his cardiologist and his primary care doctor. We did discuss that he should continue to use the rolling walker and once the coronavirus pandemic clears we will get him into the office for a formal more detailed examination. All their questions were answered fully today. I affirm  this is a Patient Initiated Episode with an Established Patient who has not had a related appointment within my department in the past 7 days or scheduled within the next 24 hours.     Total Time: 11 minutes  Note: not billable if this call serves to triage the patient into an appointment for the relevant [facility]

## 2020-05-04 ENCOUNTER — OFFICE VISIT (OUTPATIENT)
Dept: NEUROLOGY | Age: 85
End: 2020-05-04

## 2020-05-04 VITALS — HEIGHT: 70 IN | BODY MASS INDEX: 26.4 KG/M2

## 2020-05-04 DIAGNOSIS — R27.0 ATAXIA: ICD-10-CM

## 2020-05-04 NOTE — PATIENT INSTRUCTIONS

## 2020-06-04 ENCOUNTER — TELEPHONE (OUTPATIENT)
Dept: CARDIOLOGY CLINIC | Age: 85
End: 2020-06-04

## 2020-06-04 NOTE — TELEPHONE ENCOUNTER
Pt calling in and wanting to know if the medications that were prescribed today if he is able to start taking today and would like a call back so they can make sure they are right. [phone].

## 2020-06-05 NOTE — TELEPHONE ENCOUNTER
Spoke with wife Nicole Valera, on HPI. Verified patient with two patient identifiers. Advised not to take both together. Patient verbalized understanding.

## 2020-06-05 NOTE — TELEPHONE ENCOUNTER
Patient calling to check status of phone call.     Can patient take Prednisone pk and also   Celecoxib 100mg    Cell # [phone]    Thanks  Jonathan Pan

## 2020-06-08 ENCOUNTER — TELEPHONE (OUTPATIENT)
Dept: INTERNAL MEDICINE CLINIC | Age: 85
End: 2020-06-08

## 2020-06-11 ENCOUNTER — OFFICE VISIT (OUTPATIENT)
Dept: INTERNAL MEDICINE CLINIC | Age: 85
End: 2020-06-11

## 2020-06-11 ENCOUNTER — HOSPITAL ENCOUNTER (OUTPATIENT)
Dept: LAB | Age: 85
Discharge: HOME | End: 2020-06-11
Payer: MEDICARE

## 2020-06-11 VITALS
SYSTOLIC BLOOD PRESSURE: 136 MMHG | BODY MASS INDEX: 26.11 KG/M2 | HEIGHT: 70 IN | TEMPERATURE: 98 F | HEART RATE: 87 BPM | WEIGHT: 182.4 LBS | RESPIRATION RATE: 16 BRPM | DIASTOLIC BLOOD PRESSURE: 82 MMHG | OXYGEN SATURATION: 93 %

## 2020-06-11 DIAGNOSIS — E11.42 DIABETIC PERIPHERAL NEUROPATHY ASSOCIATED WITH TYPE 2 DIABETES MELLITUS (HCC): Primary | ICD-10-CM

## 2020-06-11 DIAGNOSIS — S06.5XAA SUBDURAL HEMATOMA: ICD-10-CM

## 2020-06-11 DIAGNOSIS — I48.3 TYPICAL ATRIAL FLUTTER (HCC): ICD-10-CM

## 2020-06-11 DIAGNOSIS — Z01.00 ENCOUNTER FOR DIABETES TYPE 2 EYE EXAM (HCC): ICD-10-CM

## 2020-06-11 DIAGNOSIS — E11.9 ENCOUNTER FOR DIABETES TYPE 2 EYE EXAM (HCC): ICD-10-CM

## 2020-06-11 DIAGNOSIS — I10 ESSENTIAL HYPERTENSION: ICD-10-CM

## 2020-06-11 DIAGNOSIS — J44.9 CHRONIC OBSTRUCTIVE PULMONARY DISEASE, UNSPECIFIED COPD TYPE (HCC): ICD-10-CM

## 2020-06-11 DIAGNOSIS — M54.16 LUMBAR BACK PAIN WITH RADICULOPATHY AFFECTING LEFT LOWER EXTREMITY: ICD-10-CM

## 2020-06-11 PROCEDURE — 36415 COLL VENOUS BLD VENIPUNCTURE: CPT

## 2020-06-11 PROCEDURE — 80053 COMPREHEN METABOLIC PANEL: CPT

## 2020-06-11 PROCEDURE — 83036 HEMOGLOBIN GLYCOSYLATED A1C: CPT

## 2020-06-11 RX ORDER — ACETYLCYSTEINE 200 MG/ML
SOLUTION ORAL; RESPIRATORY (INHALATION)
Qty: 60 ML | Refills: 4 | Status: SHIPPED | OUTPATIENT
Start: 2020-06-11 | End: 2021-10-25

## 2020-06-11 RX ORDER — ZOSTER VACCINE RECOMBINANT, ADJUVANTED 50 MCG/0.5
0.5 KIT INTRAMUSCULAR ONCE
Qty: 0.5 ML | Refills: 1 | Status: CANCELLED | OUTPATIENT
Start: 2020-06-11 | End: 2020-06-11

## 2020-06-11 NOTE — PATIENT INSTRUCTIONS

## 2020-06-11 NOTE — PROGRESS NOTES
HPI:  Amy Ann is a 80y.o. year old male who is here for a routine visit:    Presents for a follow up visit. Recent increased weakness in the legs. Has seen ortho and they gave him steroids and celebrex and he has not taken them due to concerns about side effects. Minimal lower back pain. No recent falls. Some ongoing issues with wheeze and SOB. Minimal sputum. No fever or chills. No chest pain or palpitations. No change in bowels or bladder. Sugars are well controlled. Past Medical History:   Diagnosis Date    Arrhythmia     Asthma     CAD (coronary artery disease)     Chronic hip pain     COPD (chronic obstructive pulmonary disease) ([address] 9/10/2010    Diabetes ([address]     DJD (degenerative joint disease)     GERD (gastroesophageal reflux disease)     HNP (herniated nucleus pulposus) 6/2006    lumbar     Hypercholesterolemia     Hypertension     Nausea & vomiting     Prostate cancer ([address] 2003    Reversible ischemic neurologic deficit ([facility] 1990    suspected with no residual effects and no recurrance    TIA (transient ischemic attack)     TIA- no deficiets       Past Surgical History:   Procedure Laterality Date    COLONOSCOPY  12/15/2004    Diverticulosis Dr. Dana Alston repeat 10 years    HX COLONOSCOPY  2005    HX HEART CATHETERIZATION      2  stents     HX HEART CATHETERIZATION  08/20/2018    failed 100% block @ [facility] will plan to do laser next.  HX HERNIA REPAIR  9/2008    left inguinal hernia repair by Dr. Marylene Pines Right     inguinal hernai repair    HX HERNIA REPAIR      umbilical hernia repair    HX HIP REPLACEMENT Right 07/06/2016    HX PROSTATECTOMY  2003    HX TONSILLECTOMY      TOTAL HIP ARTHROPLASTY Left 2/9/11    Dr. Rosy Wright at [facility]       Prior to Admission medications    Medication Sig Start Date End Date Taking? Authorizing Provider   aspirin 81 mg chewable tablet Take 1 Tab by mouth daily.    Yes Kathleen Goetz MD   lisinopril ([facility], ZESTRIL) 20 mg tablet Take 1 Tab by mouth daily. Patient taking differently: Take 5 mg by mouth daily. 2/29/20  Yes Kiarra Newton MD   ADVAIR DISKUS 250-50 mcg/dose diskus inhaler Take 1 Puff by inhalation every twelve (12) hours. 1/30/20  Yes Hattie Angeles MD   simvastatin (ZOCOR) 20 mg tablet TAKE 1 TABLET BY MOUTH EVERY DAY AT NIGHT 1/26/20  Yes Pillo Carrillo III, MD   glipiZIDE (GLUCOTROL) 5 mg tablet TAKE 1 TABLET BY MOUTH EVERY DAY 1/7/20  Yes Pillo Carrillo III, MD   metoprolol succinate (TOPROL-XL) 25 mg XL tablet TAKE 1/2 TABLET BY MOUTH EVERY DAY 12/9/19  Yes Glen Leach MD   metFORMIN ER (GLUCOPHAGE XR) 500 mg tablet TAKE 3 TABLETS BY MOUTH EVERY DAY WITH DINNER 11/18/19  Yes Pillo Carrillo III, MD   isosorbide mononitrate ER (IMDUR) 30 mg tablet Take 0.5 Tabs by mouth every morning. Indications: Take 1/2 tab by mouth daily 7/24/19  Yes Natalie Reed NP   albuterol (PROVENTIL VENTOLIN) 2.5 mg /3 mL (0.083 %) nebu 3 mL by Nebulization route three (3) times daily as needed (wheeze). 7/17/19  Yes Hattie Angeles MD   tiotropium bromide (SPIRIVA RESPIMAT) 2.5 mcg/actuation inhaler Take 2 Puffs by inhalation daily. 7/17/19  Yes Hattie Angeles MD   VENTOLIN HFA 90 mcg/actuation inhaler INHALE 2 PUFFS BY MOUTH EVERY 4 HOURS AS NEEDED FOR WHEEZING 6/23/19  Yes Pillo Carrillo III, MD   coenzyme q10 10 mg cap Take 10 mg by mouth daily. Yes Provider, Historical   cyanocobalamin (VITAMIN B-12) 500 mcg tablet Take 500 mcg by mouth daily. Yes Provider, Historical   folic acid 208 mcg tablet Take 400 mcg by mouth daily. Yes Provider, Historical   nitroglycerin (NITROSTAT) 0.4 mg SL tablet 1 Tab by SubLINGual route every five (5) minutes as needed for Chest Pain. 8/10/18  Yes Natalie Reed NP   MAGNESIUM PO Take 500 mg by mouth daily. Yes Provider, Historical   acetaminophen (TYLENOL) 500 mg tablet Take 1 tablet by mouth every eight (8) hours as needed for Pain.  8/28/14  Yes Angel Dickey MD   CHOLECALCIFEROL, VITAMIN D3, (VITAMIN D3 PO) Take 1,000 Units by mouth daily. Yes Provider, Historical   multivitamins-minerals-lutein (CENTRUM SILVER) Tab Take 1 Tab by mouth daily. 5/14/10  Yes Provider, Historical   fexofenadine (ALLEGRA) 180 mg tablet Take 1 Tab by mouth daily. 1/25/10  Yes Brennan Nuñez MD   famotidine (PEPCID AC) 20 mg tablet Take 20 mg by mouth daily. 11  Yes Provider, Historical   VITAMIN B COMPLEX (B COMPLEX PO) Take 1 Tab by mouth daily. Yes Provider, Historical   ASCORBIC ACID (VITAMIN C PO) Take 1,000 mg by mouth daily.    Yes Provider, Historical       Social History     Socioeconomic History    Marital status:      Spouse name: Not on file    Number of children: Not on file    Years of education: Not on file    Highest education level: Not on file   Occupational History    Not on file   Social Needs    Financial resource strain: Not on file    Food insecurity     Worry: Not on file     Inability: Not on file    Transportation needs     Medical: Not on file     Non-medical: Not on file   Tobacco Use    Smoking status: Former Smoker     Packs/day: 3.00     Years: 10.00     Pack years: 30.00     Types: Cigarettes     Last attempt to quit: 1/15/1970     Years since quittin.4    Smokeless tobacco: Never Used   Substance and Sexual Activity    Alcohol use: No    Drug use: Yes     Types: Prescription, OTC    Sexual activity: Yes     Partners: Female     Birth control/protection: None   Lifestyle    Physical activity     Days per week: Not on file     Minutes per session: Not on file    Stress: Not on file   Relationships    Social connections     Talks on phone: Not on file     Gets together: Not on file     Attends Anabaptist service: Not on file     Active member of club or organization: Not on file     Attends meetings of clubs or organizations: Not on file     Relationship status: Not on file    Intimate partner violence Fear of current or ex partner: Not on file     Emotionally abused: Not on file     Physically abused: Not on file     Forced sexual activity: Not on file   Other Topics Concern    Not on file   Social History Narrative    Not on file          ROS  Per HPI    Visit Vitals  /82   Pulse 87   Temp 98 °F (36.7 °C) (Temporal)   Resp 16   Ht 5' 10\" (1.778 m)   Wt 182 lb 6.4 oz (82.7 kg)   SpO2 93%   BMI 26.17 kg/m²         Physical Exam   Physical Examination: General appearance - alert, well appearing, and in no distress  Neck - supple, no significant adenopathy  Lymphatics - no palpable lymphadenopathy, no hepatosplenomegaly  Chest - wheezing noted in the bases, rhonchi noted in the bases  Heart - normal rate and regular rhythm  Abdomen - soft, nontender, nondistended, no masses or organomegaly  Extremities - peripheral pulses normal, no pedal edema, no clubbing or cyanosis      Assessment/Plan:  Diagnoses and all orders for this visit:    1. Diabetic peripheral neuropathy associated with type 2 diabetes mellitus ([facility] - follow up labs. A1C goal of 7.5%  -     HEMOGLOBIN A1C WITH EAG  -     REFERRAL TO OPHTHALMOLOGY  -     METABOLIC PANEL, COMPREHENSIVE    2. Encounter for diabetes type 2 eye exam ([facility]    3. Typical atrial flutter (HCC) - appears in NSR>     4. Subdural hematoma (HCC) - Stable    5. Chronic obstructive pulmonary disease, unspecified COPD type ([facility]- use nebulizer as needed. 6. Essential hypertension - BP well controlled    7. Lumbar back pain with radiculopathy affecting left lower extremity - will do the steroids first. Then use the celebrex only if needed. Aware of the risk with it. Follow-up and Dispositions    · Return in about 4 months (around 10/11/2020). Advised him to call back or return to office if symptoms worsen/change/persist.  Discussed expected course/resolution/complications of diagnosis in detail with patient.     Medication risks/benefits/costs/interactions/alternatives discussed with patient. He was given an after visit summary which includes diagnoses, current medications, & vitals. He expressed understanding with the diagnosis and plan.

## 2020-06-12 LAB
ALBUMIN SERPL-MCNC: 4.2 G/DL (ref 3.6–4.6)
ALBUMIN/GLOB SERPL: 1.7 {RATIO} (ref 1.2–2.2)
ALP SERPL-CCNC: 70 IU/L (ref 39–117)
ALT SERPL-CCNC: 16 IU/L (ref 0–44)
AST SERPL-CCNC: 19 IU/L (ref 0–40)
BILIRUB SERPL-MCNC: 0.4 MG/DL (ref 0–1.2)
BUN SERPL-MCNC: 25 MG/DL (ref 8–27)
BUN/CREAT SERPL: 21 (ref 10–24)
CALCIUM SERPL-MCNC: 10 MG/DL (ref 8.6–10.2)
CHLORIDE SERPL-SCNC: 99 MMOL/L (ref 96–106)
CO2 SERPL-SCNC: 24 MMOL/L (ref 20–29)
CREAT SERPL-MCNC: 1.19 MG/DL (ref 0.76–1.27)
EST. AVERAGE GLUCOSE BLD GHB EST-MCNC: 148 MG/DL
GLOBULIN SER CALC-MCNC: 2.5 G/DL (ref 1.5–4.5)
GLUCOSE SERPL-MCNC: 121 MG/DL (ref 65–99)
HBA1C MFR BLD: 6.8 % (ref 4.8–5.6)
POTASSIUM SERPL-SCNC: 5 MMOL/L (ref 3.5–5.2)
PROT SERPL-MCNC: 6.7 G/DL (ref 6–8.5)
SODIUM SERPL-SCNC: 137 MMOL/L (ref 134–144)

## 2020-06-13 DIAGNOSIS — R04.2 HEMOPTYSIS: ICD-10-CM

## 2020-06-15 RX ORDER — ALBUTEROL SULFATE 90 UG/1
2 AEROSOL, METERED RESPIRATORY (INHALATION)
Qty: 18 INHALER | Refills: 5 | Status: SHIPPED | OUTPATIENT
Start: 2020-06-15 | End: 2021-01-20 | Stop reason: SDUPTHER

## 2020-06-25 NOTE — PROGRESS NOTES
Subjective/HPI:     Darlene Bang is a 80 y.o. male is here today for a f/u appt. Дмитрий Phipps He is s/p a SDH in February. His Plavix, Eliquis, and ASA were d/c at that time. Since d/c he restarted ASA 81mg daily without issues. He has had no falls since he returned home. He reports that he is having MCGUIRE and productive cough. Thinks it is COPD. He has been having hip pain, he was given Celebrex and Prednisone to take. His swelling has been worse during the day, improves overnight. He is wearing his compression stockings. The patient denies chest pain/ orthopnea, PND, palpitations, syncope, presyncope or fatigue. PCP Provider  Luc Moreno MD  Past Medical History:   Diagnosis Date    Arrhythmia     Asthma     CAD (coronary artery disease)     Chronic hip pain     COPD (chronic obstructive pulmonary disease) ([address] 9/10/2010    Diabetes ([address]     DJD (degenerative joint disease)     GERD (gastroesophageal reflux disease)     HNP (herniated nucleus pulposus) 6/2006    lumbar     Hypercholesterolemia     Hypertension     Nausea & vomiting     Prostate cancer ([address] 2003    Reversible ischemic neurologic deficit ([address] 1990    suspected with no residual effects and no recurrance    TIA (transient ischemic attack)     TIA- no deficiets      Past Surgical History:   Procedure Laterality Date    COLONOSCOPY  12/15/2004    Diverticulosis Dr. Kamari Batista repeat 10 years    HX COLONOSCOPY  2005    HX HEART CATHETERIZATION      2  stents     HX HEART CATHETERIZATION  08/20/2018    failed 100% block @ [facility] will plan to do laser next.       HX HERNIA REPAIR  9/2008    left inguinal hernia repair by Dr. Jarad Chery Right     inguinal hernai repair    HX HERNIA REPAIR      umbilical hernia repair    HX HIP REPLACEMENT Right 07/06/2016    HX PROSTATECTOMY  2003    HX TONSILLECTOMY      TOTAL HIP ARTHROPLASTY Left 2/9/11    Dr. Kelsey Harvey at [facility]     No Known Allergies   Family History   Problem Relation Age of Onset    Cancer Mother         Breast cancer    Lung Disease Mother         Emphysema    Cancer Father         Bladder cancer    Lung Disease Father         Emphysema    Hypertension Sister         2015      Current Outpatient Medications   Medication Sig    OneTouch Ultra Blue Test Strip strip USE 1 STRIP IN VITRO TO TEST BLOOD SUGAR DAILY BEFORE BREAKFAST    albuterol (Ventolin HFA) 90 mcg/actuation inhaler Take 2 Puffs by inhalation every four (4) hours as needed for Wheezing.  acetylcysteine (MUCOMYST) 200 mg/mL (20 %) solution USE 2 ML IN NEBULIZER TID    aspirin 81 mg chewable tablet Take 1 Tab by mouth daily.  lisinopril (PRINIVIL, ZESTRIL) 20 mg tablet Take 1 Tab by mouth daily. (Patient taking differently: Take 5 mg by mouth daily.)    ADVAIR DISKUS 250-50 mcg/dose diskus inhaler Take 1 Puff by inhalation every twelve (12) hours.  simvastatin (ZOCOR) 20 mg tablet TAKE 1 TABLET BY MOUTH EVERY DAY AT NIGHT    glipiZIDE (GLUCOTROL) 5 mg tablet TAKE 1 TABLET BY MOUTH EVERY DAY    metoprolol succinate (TOPROL-XL) 25 mg XL tablet TAKE 1/2 TABLET BY MOUTH EVERY DAY    metFORMIN ER (GLUCOPHAGE XR) 500 mg tablet TAKE 3 TABLETS BY MOUTH EVERY DAY WITH DINNER    isosorbide mononitrate ER (IMDUR) 30 mg tablet Take 0.5 Tabs by mouth every morning. Indications: Take 1/2 tab by mouth daily    albuterol (PROVENTIL VENTOLIN) 2.5 mg /3 mL (0.083 %) nebu 3 mL by Nebulization route three (3) times daily as needed (wheeze).  tiotropium bromide (SPIRIVA RESPIMAT) 2.5 mcg/actuation inhaler Take 2 Puffs by inhalation daily.  coenzyme q10 10 mg cap Take 10 mg by mouth daily.  cyanocobalamin (VITAMIN B-12) 500 mcg tablet Take 500 mcg by mouth daily.  folic acid 266 mcg tablet Take 400 mcg by mouth daily.  nitroglycerin (NITROSTAT) 0.4 mg SL tablet 1 Tab by SubLINGual route every five (5) minutes as needed for Chest Pain.     MAGNESIUM PO Take 500 mg by mouth daily.    acetaminophen (TYLENOL) 500 mg tablet Take 1 tablet by mouth every eight (8) hours as needed for Pain.  CHOLECALCIFEROL, VITAMIN D3, (VITAMIN D3 PO) Take 1,000 Units by mouth daily.  multivitamins-minerals-lutein (CENTRUM SILVER) Tab Take 1 Tab by mouth daily.  fexofenadine (ALLEGRA) 180 mg tablet Take 1 Tab by mouth daily.  famotidine (PEPCID AC) 20 mg tablet Take 20 mg by mouth daily.  VITAMIN B COMPLEX (B COMPLEX PO) Take 1 Tab by mouth daily.  ASCORBIC ACID (VITAMIN C PO) Take 1,000 mg by mouth daily.  celecoxib (CELEBREX) 100 mg capsule Take 100 mg by mouth two (2) times a day.  methylPREDNISolone (MEDROL DOSEPACK) 4 mg tablet Take as directed on package instructions. No current facility-administered medications for this visit.        Vitals:    20 1103 20 1115   BP: 130/76 124/78   Pulse: 84    Resp: 18    SpO2: 97%    Weight: 186 lb (84.4 kg)    Height: 5' 10\" (1.778 m)      Social History     Socioeconomic History    Marital status:      Spouse name: Not on file    Number of children: Not on file    Years of education: Not on file    Highest education level: Not on file   Occupational History    Not on file   Social Needs    Financial resource strain: Not on file    Food insecurity     Worry: Not on file     Inability: Not on file    Transportation needs     Medical: Not on file     Non-medical: Not on file   Tobacco Use    Smoking status: Former Smoker     Packs/day: 3.00     Years: 10.00     Pack years: 30.00     Types: Cigarettes     Last attempt to quit: 1/15/1970     Years since quittin.4    Smokeless tobacco: Never Used   Substance and Sexual Activity    Alcohol use: No    Drug use: Yes     Types: Prescription, OTC    Sexual activity: Yes     Partners: Female     Birth control/protection: None   Lifestyle    Physical activity     Days per week: Not on file     Minutes per session: Not on file    Stress: Not on file Relationships    Social connections     Talks on phone: Not on file     Gets together: Not on file     Attends Spiritism service: Not on file     Active member of club or organization: Not on file     Attends meetings of clubs or organizations: Not on file     Relationship status: Not on file    Intimate partner violence     Fear of current or ex partner: Not on file     Emotionally abused: Not on file     Physically abused: Not on file     Forced sexual activity: Not on file   Other Topics Concern    Not on file   Social History Narrative    Not on file       I have reviewed the nurses notes, vitals, problem list, allergy list, medical history, family, social history and medications. Review of Symptoms:    General: Pt denies excessive weight gain or loss. Pt is able to conduct ADL's  HEENT: Denies blurred vision, headaches, epistaxis and difficulty swallowing.+productive cough  Respiratory: Denies shortness of breath, +MCGUIRE, denies wheezing or stridor. Cardiovascular: Denies precordial pain, palpitations,+ edema. Denies PND  Gastrointestinal: Denies poor appetite, indigestion, abdominal pain or blood in stool  Urinary: Denies dysuria, pyuria  Musculoskeletal: Denies pain or swelling from muscles or joints  Neurologic: Denies tremor, paresthesias, or sensory motor disturbance  Skin: Denies rash, itching or texture change. Psych: Denies depression        Physical Exam:      General: Well developed, in no acute distress, cooperative and alert  HEENT: No carotid bruits, no JVD, trach is midline. Neck Supple, PEERL, EOM intact. Heart:  Normal S1/S2 negative S3 or S4. Regular, no murmur, gallop or rub. Respiratory:  +restrictive lung sounds throughout  Abdomen:   Soft, non-tender, no masses, bowel sounds are active. Extremities:  No edema, compression stockings on. Neuro: A&Ox3, speech clear, gait stable. Skin: Skin color is normal. No rashes or lesions.  Non diaphoretic  Vascular: 2+ pulses symmetric in all extremities      Cardiographics  EKG: SR    Results for orders placed or performed during the hospital encounter of 02/25/20   EKG, 12 LEAD, INITIAL   Result Value Ref Range    Ventricular Rate 87 BPM    Atrial Rate 261 BPM    QRS Duration 88 ms    Q-T Interval 372 ms    QTC Calculation (Bezet) 447 ms    Calculated P Axis 51 degrees    Calculated R Axis 43 degrees    Calculated T Axis 39 degrees    Diagnosis       Atrial flutter with 3:1 AV conduction  When compared with ECG of 05-OCT-2018 04:26,  Atrial flutter has replaced Sinus rhythm  Confirmed by Tia Dee [id number]) on 2/25/2020 10:39:48 PM           Cardiology Labs:  Lab Results   Component Value Date/Time    Cholesterol, total 137 02/12/2020 09:13 AM    HDL Cholesterol 48 02/12/2020 09:13 AM    LDL, calculated 68 02/12/2020 09:13 AM    LDL, calculated 73 08/08/2019 08:49 AM    LDL, calculated 73 02/08/2019 08:51 AM    VLDL, calculated 21 02/12/2020 09:13 AM    CHOL/HDL Ratio 3.0 05/05/2010 09:01 AM     Lab Results   Component Value Date/Time    Sodium 137 06/11/2020 10:35 AM    Potassium 5.0 06/11/2020 10:35 AM    Chloride 99 06/11/2020 10:35 AM    CO2 24 06/11/2020 10:35 AM    Glucose 121 (H) 06/11/2020 10:35 AM    BUN 25 06/11/2020 10:35 AM    Creatinine 1.19 06/11/2020 10:35 AM    BUN/Creatinine ratio 21 06/11/2020 10:35 AM    GFR est AA 63 06/11/2020 10:35 AM    GFR est non-AA 54 (L) 06/11/2020 10:35 AM    Calcium 10.0 06/11/2020 10:35 AM    Anion gap 2 (L) 02/26/2020 05:33 AM    Bilirubin, total 0.4 06/11/2020 10:35 AM    ALT (SGPT) 16 06/11/2020 10:35 AM    Alk.  phosphatase 70 06/11/2020 10:35 AM    Protein, total 6.7 06/11/2020 10:35 AM    Albumin 4.2 06/11/2020 10:35 AM    Globulin 3.6 02/25/2020 03:26 PM    A-G Ratio 1.7 06/11/2020 10:35 AM     Lab Results   Component Value Date/Time    Hemoglobin A1c 6.8 (H) 06/11/2020 10:35 AM    Hemoglobin A1c (POC) 6.7 02/08/2016 11:55 AM        Assessment:     Assessment:     Diagnoses and all orders for this visit:    1. Subdural hematoma ([facility]    2. Typical atrial flutter ([facility]    3. Coronary artery disease involving native coronary artery of native heart without angina pectoris    4. Essential hypertension    5. Mixed hyperlipidemia  -     AMB POC EKG ROUTINE W/ 12 LEADS, INTER & REP  -     AMB POC EKG ROUTINE W/ 12 LEADS, INTER & REP        ICD-10-CM ICD-9-CM    1. Subdural hematoma (HCC) S06.5X9A 432.1    2. Typical atrial flutter (HCC) I48.3 427.32    3. Coronary artery disease involving native coronary artery of native heart without angina pectoris I25.10 414.01    4. Essential hypertension I10 401.9    5. Mixed hyperlipidemia E78.2 272.2 AMB POC EKG ROUTINE W/ 12 LEADS, INTER & REP      AMB POC EKG ROUTINE W/ 12 LEADS, INTER & REP          Plan:     1. Subdural hematoma- admitted in February with a small left acute SDH. He was d/c off Plavix, ASA, and Eliquis. He has restarted ASA 81mg every day. No further bleeding issues. 2. Typical atrial flutter- back in SR. Cont ASA, BB    3. CAD- asymptomatic currently. Continue ASA 81mg, BB, Imdur and statin. 4. HTN- BP trending normotensive.     5. Hyperlipidemia- cont statin      F/U in 6 months

## 2020-06-26 ENCOUNTER — OFFICE VISIT (OUTPATIENT)
Dept: CARDIOLOGY CLINIC | Age: 85
End: 2020-06-26

## 2020-06-26 VITALS
BODY MASS INDEX: 26.63 KG/M2 | RESPIRATION RATE: 18 BRPM | DIASTOLIC BLOOD PRESSURE: 78 MMHG | WEIGHT: 186 LBS | SYSTOLIC BLOOD PRESSURE: 124 MMHG | HEART RATE: 84 BPM | OXYGEN SATURATION: 97 % | HEIGHT: 70 IN

## 2020-06-26 DIAGNOSIS — I25.10 CORONARY ARTERY DISEASE INVOLVING NATIVE CORONARY ARTERY OF NATIVE HEART WITHOUT ANGINA PECTORIS: ICD-10-CM

## 2020-06-26 DIAGNOSIS — I10 ESSENTIAL HYPERTENSION: ICD-10-CM

## 2020-06-26 DIAGNOSIS — S06.5XAA SUBDURAL HEMATOMA: Primary | ICD-10-CM

## 2020-06-26 DIAGNOSIS — I48.3 TYPICAL ATRIAL FLUTTER (HCC): ICD-10-CM

## 2020-06-26 DIAGNOSIS — E78.2 MIXED HYPERLIPIDEMIA: ICD-10-CM

## 2020-06-26 RX ORDER — METHYLPREDNISOLONE 4 MG/1
TABLET ORAL
COMMUNITY
Start: 2020-06-04 | End: 2020-07-09 | Stop reason: ALTCHOICE

## 2020-06-26 RX ORDER — CELECOXIB 100 MG/1
100 CAPSULE ORAL 2 TIMES DAILY
COMMUNITY
Start: 2020-06-04 | End: 2020-07-04

## 2020-06-26 NOTE — PROGRESS NOTES
1. Have you been to the ER, urgent care clinic since your last visit? Hospitalized since your last visit? No    2. Have you seen or consulted any other health care providers outside of the [address] [place] [address] since your last visit? Include any pap smears or colon screening.  No    Chief Complaint   Patient presents with    Cholesterol Problem     2 mo f/u    Hypertension     2 mo f/u    Coronary Artery Disease     2 mo f/u    Shortness of Breath     at rest and on exertion; hx of COPD    Foot Swelling     reports mild swelling to feet, worse at  night

## 2020-07-08 DIAGNOSIS — I10 ESSENTIAL HYPERTENSION: ICD-10-CM

## 2020-07-08 RX ORDER — LISINOPRIL 5 MG/1
TABLET ORAL
Qty: 90 TAB | Refills: 2 | Status: SHIPPED | OUTPATIENT
Start: 2020-07-08 | End: 2021-03-22

## 2020-07-09 ENCOUNTER — TELEPHONE (OUTPATIENT)
Dept: INTERNAL MEDICINE CLINIC | Age: 85
End: 2020-07-09

## 2020-07-09 ENCOUNTER — OFFICE VISIT (OUTPATIENT)
Dept: INTERNAL MEDICINE CLINIC | Age: 85
End: 2020-07-09

## 2020-07-09 VITALS — TEMPERATURE: 97.7 F

## 2020-07-09 DIAGNOSIS — J41.1 MUCOPURULENT CHRONIC BRONCHITIS (HCC): Primary | ICD-10-CM

## 2020-07-09 DIAGNOSIS — E11.21 TYPE 2 DIABETES MELLITUS WITH NEPHROPATHY (HCC): ICD-10-CM

## 2020-07-09 RX ORDER — AZITHROMYCIN 250 MG/1
250 TABLET, FILM COATED ORAL SEE ADMIN INSTRUCTIONS
Qty: 6 TAB | Refills: 0 | Status: SHIPPED | OUTPATIENT
Start: 2020-07-09 | End: 2020-07-14

## 2020-07-09 RX ORDER — METHYLPREDNISOLONE 4 MG/1
TABLET ORAL
Qty: 1 DOSE PACK | COMMUNITY
Start: 2020-07-09 | End: 2020-12-11 | Stop reason: ALTCHOICE

## 2020-07-09 NOTE — TELEPHONE ENCOUNTER
David Parr ([facility]) [phone] (H)     Pt has some problems with congestion and wants to come in and be seen.

## 2020-07-09 NOTE — PROGRESS NOTES
Marcos Davis is a 80 y.o. male evaluated via telephone on 2020. Documentation:  I communicated with the patient and/or health care decision maker regardin to 5-day history of increasing upper respiratory symptoms. Has cough productive of brown sputum. Some increased shortness of breath and wheeze. No fevers or chills. No nausea or vomiting. No change in bowel or bladder habits. Blood sugars have been under bad control. No low blood sugar reactions. Details of this discussion including any medical advice provided is documented below. Diagnoses and all orders for this visit:    1. Mucopurulent chronic bronchitis (HCC) -with COPD exacerbation. Will add antibiotics. We will also have him use steroids that he has at home. Will monitor his blood sugar and make sure that this is not elevated. Will report how he is feeling in the next 3 to 4 days. -     azithromycin (ZITHROMAX) 250 mg tablet; Take 1 Tab by mouth See Admin Instructions for 5 days.            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 ([address] 2020    Subdural hematoma ([address] 2020    Atrial flutter ([address] 2020    Mixed hyperlipidemia 2020    Essential hypertension 2020    PVD (peripheral vascular disease) ([address] 2019    Varicose veins of both legs with edema 2019    Encounter for staple removal 2019    Angina, class III ([address] 10/04/2018    S/P cardiac cath 2018    Type 2 diabetes mellitus with nephropathy ([address] 2017    Vestibular vertigo 2017    Lumbar back pain with radiculopathy affecting left lower extremity 2017    Lumbar back pain with radiculopathy affecting right lower extremity 2017    Diabetic peripheral neuropathy associated with type 2 diabetes mellitus ([address] 10/24/2017    Idiopathic small and large fiber sensory neuropathy 10/24/2017    B12 deficiency 10/24/2017    Ataxia 10/24/2017    Sensory ataxic gait 10/24/2017    Cervical arthritis with myelopathy 10/24/2017    Degenerative cervical spinal stenosis 10/24/2017    Bilateral carotid artery stenosis 10/24/2017    Cerebral microvascular disease 10/24/2017    Vitamin D deficiency 10/24/2017    Degenerative joint disease of right hip 07/06/2016    Primary localized osteoarthrosis of right hip 07/06/2016    Advance directive discussed with patient 06/28/2016    SOBOE (shortness of breath on exertion) 05/04/2016    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    Strabismic amblyopia 04/02/2016    Retinal hemorrhage 01/27/2016    Coronary artery disease involving native coronary artery without angina pectoris 06/10/2015    Incidental pulmonary nodule, greater than or equal to 8mm 04/11/2015    S/P drug eluting coronary stent placement 10/27/2014    Status post hip replacement 02/09/2011    Osteoarthritis of hip 10/15/2010    COPD (chronic obstructive pulmonary disease) ([facility] 09/10/2010    Pure hypercholesterolemia 05/14/2010    GERD (gastroesophageal reflux disease) 09/24/2009    Low back pain 09/24/2009    Personal history of prostate cancer 08/11/2003     Current Outpatient Medications   Medication Sig Dispense Refill    azithromycin (ZITHROMAX) 250 mg tablet Take 1 Tab by mouth See Admin Instructions for 5 days. 6 Tab 0    methylPREDNISolone (MEDROL DOSEPACK) 4 mg tablet Take as directed on package instructions. 1 Dose Pack     lisinopriL (PRINIVIL, ZESTRIL) 5 mg tablet TAKE 1 TABLET BY MOUTH EVERY DAY 90 Tab 2    OneTouch Ultra Blue Test Strip strip USE 1 STRIP IN VITRO TO TEST BLOOD SUGAR DAILY BEFORE BREAKFAST      albuterol (Ventolin HFA) 90 mcg/actuation inhaler Take 2 Puffs by inhalation every four (4) hours as needed for Wheezing. 18 Inhaler 5    aspirin 81 mg chewable tablet Take 1 Tab by mouth daily.       ADVAIR DISKUS 250-50 mcg/dose diskus inhaler Take 1 Puff by inhalation every twelve (12) hours. 3 Inhaler 3    simvastatin (ZOCOR) 20 mg tablet TAKE 1 TABLET BY MOUTH EVERY DAY AT NIGHT 90 Tab 1    glipiZIDE (GLUCOTROL) 5 mg tablet TAKE 1 TABLET BY MOUTH EVERY DAY 90 Tab 1    metoprolol succinate (TOPROL-XL) 25 mg XL tablet TAKE 1/2 TABLET BY MOUTH EVERY DAY 30 Tab 6    metFORMIN ER (GLUCOPHAGE XR) 500 mg tablet TAKE 3 TABLETS BY MOUTH EVERY DAY WITH DINNER 270 Tab 1    isosorbide mononitrate ER (IMDUR) 30 mg tablet Take 0.5 Tabs by mouth every morning. Indications: Take 1/2 tab by mouth daily 45 Tab 3    albuterol (PROVENTIL VENTOLIN) 2.5 mg /3 mL (0.083 %) nebu 3 mL by Nebulization route three (3) times daily as needed (wheeze). 100 Each 3    tiotropium bromide (SPIRIVA RESPIMAT) 2.5 mcg/actuation inhaler Take 2 Puffs by inhalation daily. 3 Inhaler 2    coenzyme q10 10 mg cap Take 10 mg by mouth daily.  cyanocobalamin (VITAMIN B-12) 500 mcg tablet Take 500 mcg by mouth daily.  folic acid 935 mcg tablet Take 400 mcg by mouth daily.  nitroglycerin (NITROSTAT) 0.4 mg SL tablet 1 Tab by SubLINGual route every five (5) minutes as needed for Chest Pain. 1 Bottle 0    MAGNESIUM PO Take 500 mg by mouth daily.  acetaminophen (TYLENOL) 500 mg tablet Take 1 tablet by mouth every eight (8) hours as needed for Pain. 30 tablet 0    CHOLECALCIFEROL, VITAMIN D3, (VITAMIN D3 PO) Take 1,000 Units by mouth daily.  multivitamins-minerals-lutein (CENTRUM SILVER) Tab Take 1 Tab by mouth daily.  fexofenadine (ALLEGRA) 180 mg tablet Take 1 Tab by mouth daily. 30 Tab 11    famotidine (PEPCID AC) 20 mg tablet Take 20 mg by mouth daily.  VITAMIN B COMPLEX (B COMPLEX PO) Take 1 Tab by mouth daily.  ASCORBIC ACID (VITAMIN C PO) Take 1,000 mg by mouth daily.       acetylcysteine (MUCOMYST) 200 mg/mL (20 %) solution USE 2 ML IN NEBULIZER TID 60 mL 4     No Known Allergies  Past Medical History:   Diagnosis Date    Arrhythmia     Asthma     CAD (coronary artery disease)     Chronic hip pain     COPD (chronic obstructive pulmonary disease) ([facility] 9/10/2010    Diabetes ([facility]     DJD (degenerative joint disease)     GERD (gastroesophageal reflux disease)     HNP (herniated nucleus pulposus) 2006    lumbar     Hypercholesterolemia     Hypertension     Nausea & vomiting     Prostate cancer ([facility]     Reversible ischemic neurologic deficit ([facility]     suspected with no residual effects and no recurrance    TIA (transient ischemic attack)     TIA- no deficiets     Past Surgical History:   Procedure Laterality Date    COLONOSCOPY  12/15/2004    Diverticulosis Dr. Flora Martinez repeat 10 years    HX COLONOSCOPY      HX HEART CATHETERIZATION      2  stents     HX HEART CATHETERIZATION  2018    failed 100% block @ [facility] will plan to do laser next.       HX HERNIA REPAIR  2008    left inguinal hernia repair by Dr. Sharona Jimenez Right     inguinal hernai repair    HX HERNIA REPAIR      umbilical hernia repair    HX HIP REPLACEMENT Right 2016    HX PROSTATECTOMY      HX TONSILLECTOMY      TOTAL HIP ARTHROPLASTY Left 11    Dr. Neda Clifton at [facility]     Family History   Problem Relation Age of Onset   [facility] Cancer Mother         Breast cancer    Lung Disease Mother         Emphysema    Cancer Father         Bladder cancer    Lung Disease Father         Emphysema    Hypertension Sister         1     Social History     Tobacco Use    Smoking status: Former Smoker     Packs/day: 3.00     Years: 10.00     Pack years: 30.00     Types: Cigarettes     Last attempt to quit: 1/15/1970     Years since quittin.5    Smokeless tobacco: Never Used   Substance Use Topics    Alcohol use: No         Consent:  Jyoti Shay, who was seen by synchronous (real-time) audio only technology, and/or his healthcare decision maker, is aware that this patient-initiated, Telehealth encounter on 7/9/2020 is a billable service. He is aware that he may receive a bill for any such additional services and has provided verbal consent to proceed: Yes. Patient identification was verified prior to start of the visit. A caregiver was present when appropriate. Due to this being a TeleHealth encounter (during [facility] emergency), evaluation of the following organ systems was limited: VS/Constitutional/EENT/Resp/CV/GI//MS/Neuro/Skin/Heme-Lymph-Imm. Pursuant to the emergency declaration under the [facility], [facility] waiver authority and the [employer] Act, this Virtual Visit was conducted, with patient's (and/or legal guardian's) consent, to reduce the patient's risk of exposure to COVID-19 and provide necessary medical care. Services were provided through a synchronous discussion virtually to substitute for in-person clinic visit. I was in the office. The patient was at home. I affirm this is a Patient Initiated Episode with an Established Patient who has not had a related appointment within my department in the past 7 days or scheduled within the next 24 hours.     Total Time: minutes: 13 min 15 second    Note: not billable if this call serves to triage the patient into an appointment for the relevant concern    Ericka Ruiz MD

## 2020-07-17 RX ORDER — GLIPIZIDE 5 MG/1
TABLET ORAL
Qty: 90 TAB | Refills: 1 | Status: SHIPPED | OUTPATIENT
Start: 2020-07-17 | End: 2020-10-25

## 2020-07-23 RX ORDER — SIMVASTATIN 20 MG/1
TABLET, FILM COATED ORAL
Qty: 90 TAB | Refills: 1 | Status: SHIPPED | OUTPATIENT
Start: 2020-07-23 | End: 2021-01-03

## 2020-07-27 RX ORDER — ISOSORBIDE MONONITRATE 30 MG/1
TABLET, EXTENDED RELEASE ORAL
Qty: 45 TAB | Refills: 3 | Status: SHIPPED | OUTPATIENT
Start: 2020-07-27 | End: 2021-07-29

## 2020-08-05 RX ORDER — METFORMIN HYDROCHLORIDE 500 MG/1
TABLET, EXTENDED RELEASE ORAL
Qty: 270 TAB | Refills: 1 | Status: SHIPPED | OUTPATIENT
Start: 2020-08-05 | End: 2021-01-03

## 2020-08-07 RX ORDER — TIOTROPIUM BROMIDE INHALATION SPRAY 3.12 UG/1
SPRAY, METERED RESPIRATORY (INHALATION)
Qty: 3 INHALER | Refills: 2 | Status: SHIPPED | OUTPATIENT
Start: 2020-08-07 | End: 2021-05-18

## 2020-08-11 RX ORDER — ALBUTEROL SULFATE 0.83 MG/ML
SOLUTION RESPIRATORY (INHALATION)
Qty: 300 ML | Refills: 3 | Status: SHIPPED | OUTPATIENT
Start: 2020-08-11 | End: 2021-10-29 | Stop reason: SDUPTHER

## 2020-09-04 NOTE — PROGRESS NOTES
Neurology Note    Patient ID:  Adan Paniagua  [id number]  45 y.o.  12/18/1931      Date of Consultation:  September 8, 2020      Subjective: I have trouble with my walking. History of Present Illness:   Adan Paniagua is a 80 y.o. male who returns to the neurology clinic at [facility] for an evaluation. I did last talked to the patient as a telephone encounter on May 4, 2020. I initially met the patient in the office on November 4, 2019. Please see my history of present illness, examination documentation from those visits. 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 He also had a history of a peripheral neuropathy which was felt to be related to his diabetes. He was on anticoagulation due to his atrial flutter but then did have a fall and developed a subdural hematoma and his Eliquis was stopped. He was kept on aspirin monotherapy. Since that time, he has not had any falls. He states that he has been told that he has to continue on just aspirin therapy. He states that his diabetes has been doing pretty well. All glucose levels have been under 200. He did have to see Dr. Naif Ness due to worsening back pain. He and his wife (who provides additional information today) states that he did get xrays. It appears that he had worsening degenerative disease at the L3 and L4 area. He was given a prescription for short course of prednisone as well as Celebrex. He ultimately did not take the Celebrex due to concerns from his cardiologist.  His pain in his back is slightly better. He does put some ice on it occasionally but overall he feels he is doing better. He does take Tylenol occasionally for. He no longer drives which was similar to last visit. His wife is concerned about his legs moving on him while he is sleeping. The patient does not recognize her.     They also do notice that occasionally he is developing a mild tremor in his hands or this does not impact his day-to-day life    Past Medical History:   Diagnosis Date    Arrhythmia     Asthma     CAD (coronary artery disease)     Chronic hip pain     COPD (chronic obstructive pulmonary disease) ([facility] [facility] 9/10/2010    Diabetes ([facility] [facility]     DJD (degenerative joint disease)     GERD (gastroesophageal reflux disease)     HNP (herniated nucleus pulposus) 2006    lumbar     Hypercholesterolemia     Hypertension     Nausea & vomiting     Prostate cancer ([facility] [address] [facility]     Reversible ischemic neurologic deficit ([facility] [address]     suspected with no residual effects and no recurrance    TIA (transient ischemic attack)     TIA- no deficiets        Past Surgical History:   Procedure Laterality Date    COLONOSCOPY  12/15/2004    Diverticulosis Dr. Carmen Delaney repeat 10 years    HX COLONOSCOPY      HX HEART CATHETERIZATION      2  stents     HX HEART CATHETERIZATION  2018    failed 100% block @ [facility] will plan to do laser next.       HX HERNIA REPAIR  2008    left inguinal hernia repair by Dr. Sue Arboleda Right     inguinal hernai repair    HX HERNIA REPAIR      umbilical hernia repair    HX HIP REPLACEMENT Right 2016    HX PROSTATECTOMY      HX TONSILLECTOMY      TOTAL HIP ARTHROPLASTY Left 11    Dr. Sayra Fry at [facility]        Family History   Problem Relation Age of Onset    Cancer Mother         Breast cancer    Lung Disease Mother         Emphysema    Cancer Father         Bladder cancer    Lung Disease Father         Emphysema    Hypertension Sister         1        Social History     Tobacco Use    Smoking status: Former Smoker     Packs/day: 3.00     Years: 10.00     Pack years: 30.00     Types: Cigarettes     Last attempt to quit: 1/15/1970     Years since quittin.6    Smokeless tobacco: Never Used   Substance Use Topics    Alcohol use: No        No Known Allergies     Prior to Admission medications Medication Sig Start Date End Date Taking? Authorizing Provider   albuterol (PROVENTIL VENTOLIN) 2.5 mg /3 mL (0.083 %) nebu TAKE 3ML BY NEBULIZATION ROUTE THREE TIMES A DAY AS NEEDED FOR WHEEZING 8/11/20  Yes Sofiya Loco MD   Spiriva Respimat 2.5 mcg/actuation inhaler USE 2 INHALATIONS ORALLY   DAILY 8/7/20  Yes Gato Fermin III, MD   metFORMIN ER (GLUCOPHAGE XR) 500 mg tablet TAKE 3 TABLETS BY MOUTH EVERY DAY WITH DINNER 8/5/20  Yes Gato Fermin III, MD   isosorbide mononitrate ER (IMDUR) 30 mg tablet TAKE 1/2 TABLET BY MOUTH EVERY MORNING 7/27/20  Yes Alisha Diaz MD   simvastatin (ZOCOR) 20 mg tablet TAKE 1 TABLET BY MOUTH EVERY DAY EVERY NIGHT 7/23/20  Yes Gato Fermin III, MD   glipiZIDE (GLUCOTROL) 5 mg tablet TAKE 1 TABLET BY MOUTH EVERY DAY 7/17/20  Yes Gato Fermin III, MD   lisinopriL (PRINIVIL, ZESTRIL) 5 mg tablet TAKE 1 TABLET BY MOUTH EVERY DAY 7/8/20  Yes Yanely Givens NP   OneTouch Ultra Blue Test Strip strip USE 1 STRIP IN VITRO TO TEST BLOOD SUGAR DAILY BEFORE BREAKFAST 6/14/20  Yes Provider, Historical   albuterol (Ventolin HFA) 90 mcg/actuation inhaler Take 2 Puffs by inhalation every four (4) hours as needed for Wheezing. 6/15/20  Yes Sofiya Loco MD   acetylcysteine (MUCOMYST) 200 mg/mL (20 %) solution USE 2 ML IN NEBULIZER TID 6/11/20  Yes Sofiya Loco MD   aspirin 81 mg chewable tablet Take 1 Tab by mouth daily. Yes Alisha Diaz MD   ADVAIR DISKUS 250-50 mcg/dose diskus inhaler Take 1 Puff by inhalation every twelve (12) hours. 1/30/20  Yes Sofiya Loco MD   metoprolol succinate (TOPROL-XL) 25 mg XL tablet TAKE 1/2 TABLET BY MOUTH EVERY DAY 12/9/19  Yes Alisha Diaz MD   coenzyme q10 10 mg cap Take 10 mg by mouth daily. Yes Provider, Historical   cyanocobalamin (VITAMIN B-12) 500 mcg tablet Take 500 mcg by mouth daily. Yes Provider, Historical   folic acid 720 mcg tablet Take 400 mcg by mouth daily.    Yes Provider, Historical   nitroglycerin (NITROSTAT) 0.4 mg SL tablet 1 Tab by SubLINGual route every five (5) minutes as needed for Chest Pain. 8/10/18  Yes Crista Cosme NP   MAGNESIUM PO Take 500 mg by mouth daily. Yes Provider, Historical   acetaminophen (TYLENOL) 500 mg tablet Take 1 tablet by mouth every eight (8) hours as needed for Pain. 8/28/14  Yes Angel Dickey MD   CHOLECALCIFEROL, VITAMIN D3, (VITAMIN D3 PO) Take 1,000 Units by mouth daily. Yes Provider, Historical   multivitamins-minerals-lutein (CENTRUM SILVER) Tab Take 1 Tab by mouth daily. 5/14/10  Yes Provider, Historical   fexofenadine (ALLEGRA) 180 mg tablet Take 1 Tab by mouth daily. 1/25/10  Yes Perez Benjamin MD   famotidine (PEPCID AC) 20 mg tablet Take 20 mg by mouth daily. 6/27/11  Yes Provider, Historical   VITAMIN B COMPLEX (B COMPLEX PO) Take 1 Tab by mouth daily. Yes Provider, Historical   ASCORBIC ACID (VITAMIN C PO) Take 1,000 mg by mouth daily. Yes Provider, Historical   methylPREDNISolone (MEDROL DOSEPACK) 4 mg tablet Take as directed on package instructions. 7/9/20   Diamante Flanagan MD       Review of Systems:    General, constitutional: balance difficulties  Eyes, vision: negative  Ears, nose, throat: negative  Cardiovascular, heart: negative  Respiratory: negative  Gastrointestinal: negative  Genitourinary: negative  Musculoskeletal: hip pain  Skin and integumentary: compression stockings. Psychiatric: negative  Endocrine: negative  Neurological: negative, except for HPI  Hematologic/lymphatic: negative  Allergy/immunology: negative      Objective:     Visit Vitals  /70   Pulse 74   Wt 186 lb (84.4 kg)   SpO2 97%   BMI 26.69 kg/m²       Physical Exam:    General:  appears well nourished in no acute distress  Neck: no carotid bruits  Lungs: clear to auscultation  Heart:  no murmurs, regular rate  Lower extremity: peripheral pulses palpable. Has compression stockings in place.   Skin: intact    Neurological exam:    Awake, alert, oriented to person, place and time. Slow to respond, but appropriate. Recent and remote memory were normal  Attention and concentration were intact  Language was intact. There was no aphasia  Speech: no dysarthria. No true scanning property. Fund of knowledge was preserved    Cranial nerves:   II-XII were tested    [employer] fields were full  Eomi, he does have notable horizontal nystagmus with endgaze (R >L). Also with vertical nystagmus to vertical gaze. Facial sensation:  normal and symmetric  Facial motor: normal and symmetric  Hearing intact  SCM strength intact  Tongue: midline without fasciculations    Motor: Tone normal    No evidence of fasciculations    Strength testing:   deltoid triceps biceps Wrist ext. Wrist flex. intrinsics Hip flex. Hip ext. Knee ext. Knee flex Dorsi flex Plantar flex   Right 5 5 5 5 5 5 5 5 5 5 5 5   Left 5 5 5 5 5 5 5 5 5 5 5 5         Sensory:  Upper extremity: intact to pp, light touch, and vibration > 10 seconds  Lower extremity: decrease to pp up to mid shin. Vibration was absent in his toes, 4 seconds in ankles, 8 seconds at the knees. Reflexes:    Right Left  Biceps  3 3  Triceps 3 3  Brachiorad. 3 3  Patella  3 3  Achilles 1 1    Cerebellar testing:  no tremor apparent, finger/nose and adebayo were intact. Slow, but intact. Romberg: present    Gait: unsteady. Has his rolling walker with him today.      Labs:     Lab Results   Component Value Date/Time    Hemoglobin A1c 6.8 (H) 06/11/2020 10:35 AM    Sodium 137 06/11/2020 10:35 AM    Potassium 5.0 06/11/2020 10:35 AM    Chloride 99 06/11/2020 10:35 AM    Glucose 121 (H) 06/11/2020 10:35 AM    BUN 25 06/11/2020 10:35 AM    Creatinine 1.19 06/11/2020 10:35 AM    Calcium 10.0 06/11/2020 10:35 AM    WBC 6.7 02/26/2020 05:33 AM    HCT 37.6 02/26/2020 05:33 AM    HGB 12.9 02/26/2020 05:33 AM    PLATELET 042 52/31/8803 05:33 AM       Imaging:    Results from Hospital Encounter encounter on 02/12/19   MRI [facility] SPINE WO CONT    Narrative EXAM: MRI THORAC SPINE WO CONT    INDICATION: myelopathy. Cerebrovascular disease, unspecified    COMPARISON: 3/3/2015    TECHNIQUE: MR imaging of the thoracic spine was performed using the following  sequences: sagittal T1, T2, stir; axial T1, T2.     CONTRAST: None. FINDINGS:    There is normal alignment of the thoracic spine. Vertebral body heights are  maintained. Marrow signal is normal. Severe disc space narrowing is noted at  T9-T10. Moderate/severe disc space narrowing is present at T8-T9. Moderate disc  space narrowing is present at T6-T7. Osteophytic endplate changes are noted at  multiple levels. The course, caliber, and signal intensity of the spinal cord are normal.     Patchy, indeterminant signal changes are noted in the lungs. Pulmonary nodules  are not excluded. .     Minimal disc bulges, protrusions, or disc osteophyte complexes are noted at  multiple levels in the and thoracic spine. The largest is a right paracentral  disc protrusion at T7-T8 partially effacing the right lateral recess. Moderate/severe left neuroforaminal narrowing at T9-T10. Multilevel mild to  mild/moderate neuroforaminal narrowing is seen at multiple other levels  bilaterally. No spinal canal stenosis. Impression IMPRESSION:  1. Multilevel degenerative disc disease and degenerative changes. Moderate/severe neuroforaminal narrowing at T9-T10. No spinal canal stenosis. Please see above report. 2. Nonspecific patchy signal changes in the lower lobes. Pulmonary nodules are  not excluded. CT chest could be obtained for further assessment, as clinically  indicated. Results from [place] encounter on 02/25/20   CT HEAD WO CONT    Narrative EXAM: CT HEAD WO CONT    INDICATION: follow up    COMPARISON: None. CONTRAST: None. TECHNIQUE: Unenhanced CT of the head was performed using 5 mm images. Brain and  bone windows were generated.   CT dose reduction was achieved through use of a  standardized protocol tailored for this examination and automatic exposure  control for dose modulation. FINDINGS:  No calvarial abnormalities are detected. Abnormal soft tissue density is noted  within the right maxillary sinus. Specifically, the dome-shaped soft tissue  density is suggestive of a mucous retention cyst/polyp and there is evidence of  mucosal thickening as well. The previously described bilateral extracerebral  fluid collections are again identified. These appear to represent subdural  hematomata. The previously described small areas of acute hemorrhage on the left  are again identified. As seen on axial image 21, there has been a slight  decrease in the size/density of the small collection of blood in the left  frontal region. No new collections of acute hemorrhage are identified. The  previously described focal area of decreased attenuation in the left parietal  region is again noted and is compatible with an old infarct. There continues to  be periventricular low density which is compatible with white matter disease. There is evidence of moderate cerebral atrophy. Impression IMPRESSION:  1. Presence of bilateral subdural hematomata. Presence of small, acute  hemorrhagic elements on the left as described above. 2. 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 3. Presence of periventricular low density compatible with white matter disease. 4. Evidence of moderate cerebral atrophy. I did independently review the MRI of his brain from February 2019. I do think there is notable atrophy and this is quite prominent in the bilateral cerebellum. Assessment and Plan: This is a pleasant 72-year-old gentleman with multiple medical problems as listed below which impact his overall neurological health who returns to clinic today with continued slow progressive decline in his neurological function.   His examination is notable for an gaze nystagmus in multiple spheres, a length dependent sensory neuropathy, and hyperreflexia throughout. He has been given the diagnosis of a spinocerebellar degeneration. 1. 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 I spent a significant amount of time with the patient and his wife today reviewing the results of the imaging and the impact this has. Balance and safety are the most important aspects of preventing harm. We had discussed this last time as well. They did get a rolling walker which has made a significant improvement in his stability. He continues to not drive. 2. Peripheral neuropathy:  This is most likely due to his long-standing history of diabetes. I stressed to him the importance of tight glycemic control. Neuropathy:  we reviewed the causes contributing to the neuropathy. We discussed the importance of exercise and activity. I also reviewed the importance of safety with ambulation and ways to prevents falls. 3. Vascular disease: We did talk about his increased risk of stroke due to his history of hypertension, diabetes, atrial flutter, and high cholesterol. He will continue on aspirin monotherapy as he cannot take anticoagulation due to recent hemorrhage    He will continue with aggressive blood pressure, glucose, and cholesterol control. 4.  Recent subdural hematoma: This occurred in February 2020. I do not see any new neurological impairment from this. He will continue off of anticoagulation    5. Restless leg syndrome:  By clinical history. This is not impact the patient at this time. Is most likely due to his peripheral neuropathy. I did discuss with them different medications that can be used for restless leg syndrome. At this time the patient would like to avoid additional medication      6. Essential tremor:  Intermittent in nature by the patient and his wife.   I do not see any evidence of a tremor on today's examination. This is something we will closely watch. If it does worsen or impact his ADLs, a medication can be is considered. 7. Coronavirus pandemic:  I did discuss with the patient at length the importance of social distancing and proper hygiene especially during these times. The patient is at a higher risk of having a more severe course of the disease and needs to follow all CDC recommendations. The patient acknowledges.         Patient Active Problem List   Diagnosis Code    GERD (gastroesophageal reflux disease) K21.9    Low back pain M54.5    Pure hypercholesterolemia E78.00    COPD (chronic obstructive pulmonary disease) ([facility]) J44.9    Osteoarthritis of hip M16.9    Status post hip replacement Z96.649    Personal history of prostate cancer Z85.46    S/P drug eluting coronary stent placement Z95.5    Incidental pulmonary nodule, greater than or equal to 8mm R91.1    Coronary artery disease involving native coronary artery without angina pectoris I25.10    Retinal hemorrhage H35.60    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    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    SOBOE (shortness of breath on exertion) R06.02    Advance directive discussed with patient Z71.89    Degenerative joint disease of right hip M16.11    Primary localized osteoarthrosis of right hip M16.11    Diabetic peripheral neuropathy associated with type 2 diabetes mellitus ([facility]) E11.42    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    B12 deficiency E53.8    Ataxia R27.0    Sensory ataxic gait R26.0    Cervical arthritis with myelopathy M47.12    Degenerative cervical spinal stenosis M48.02    Bilateral carotid artery stenosis I65.23    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    Vitamin D deficiency E55.9    Vestibular vertigo H81.399    Lumbar back pain with radiculopathy affecting left lower extremity M54.16    Lumbar back pain with radiculopathy affecting right lower extremity M54.16    Type 2 diabetes mellitus with nephropathy (HCC) E11.21    S/P cardiac cath Z98.890    Angina, class III (HCC) I20.9    Encounter for staple removal Z48.02    PVD (peripheral vascular disease) ([facility]) I73.9    Varicose veins of both legs with edema I83.893    Atrial flutter ([facility]) I48.92    Mixed hyperlipidemia E78.2    Essential hypertension I10    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

## 2020-09-08 ENCOUNTER — OFFICE VISIT (OUTPATIENT)
Dept: NEUROLOGY | Age: 85
End: 2020-09-08
Payer: MEDICARE

## 2020-09-08 VITALS
OXYGEN SATURATION: 97 % | BODY MASS INDEX: 26.69 KG/M2 | HEART RATE: 74 BPM | WEIGHT: 186 LBS | SYSTOLIC BLOOD PRESSURE: 130 MMHG | DIASTOLIC BLOOD PRESSURE: 70 MMHG

## 2020-09-08 DIAGNOSIS — G25.0 ESSENTIAL TREMOR: ICD-10-CM

## 2020-09-08 DIAGNOSIS — G25.81 RESTLESS LEG SYNDROME: ICD-10-CM

## 2020-09-08 DIAGNOSIS — R27.0 ATAXIA: Primary | ICD-10-CM

## 2020-09-08 DIAGNOSIS — E11.42 DIABETIC PERIPHERAL NEUROPATHY ASSOCIATED WITH TYPE 2 DIABETES MELLITUS (HCC): ICD-10-CM

## 2020-09-08 PROCEDURE — 99215 OFFICE O/P EST HI 40 MIN: CPT | Performed by: PSYCHIATRY & NEUROLOGY

## 2020-09-08 NOTE — PATIENT INSTRUCTIONS

## 2020-10-25 RX ORDER — GLIPIZIDE 5 MG/1
TABLET ORAL
Qty: 90 TAB | Refills: 1 | Status: SHIPPED | OUTPATIENT
Start: 2020-10-25 | End: 2021-06-23 | Stop reason: SDUPTHER

## 2020-11-05 RX ORDER — FLUTICASONE PROPIONATE AND SALMETEROL 250; 50 UG/1; UG/1
POWDER RESPIRATORY (INHALATION)
Qty: 3 INHALER | Refills: 2 | Status: SHIPPED | OUTPATIENT
Start: 2020-11-05 | End: 2021-04-05 | Stop reason: SDUPTHER

## 2020-12-11 ENCOUNTER — OFFICE VISIT (OUTPATIENT)
Dept: INTERNAL MEDICINE CLINIC | Age: 85
End: 2020-12-11
Payer: MEDICARE

## 2020-12-11 VITALS
RESPIRATION RATE: 18 BRPM | HEIGHT: 70 IN | WEIGHT: 188 LBS | HEART RATE: 85 BPM | BODY MASS INDEX: 26.92 KG/M2 | SYSTOLIC BLOOD PRESSURE: 153 MMHG | OXYGEN SATURATION: 94 % | TEMPERATURE: 97.6 F | DIASTOLIC BLOOD PRESSURE: 86 MMHG

## 2020-12-11 DIAGNOSIS — I65.23 BILATERAL CAROTID ARTERY STENOSIS: ICD-10-CM

## 2020-12-11 DIAGNOSIS — E78.2 MIXED HYPERLIPIDEMIA: ICD-10-CM

## 2020-12-11 DIAGNOSIS — Z12.5 PROSTATE CANCER SCREENING: ICD-10-CM

## 2020-12-11 DIAGNOSIS — I48.3 TYPICAL ATRIAL FLUTTER (HCC): ICD-10-CM

## 2020-12-11 DIAGNOSIS — I25.10 CORONARY ARTERY DISEASE INVOLVING NATIVE CORONARY ARTERY OF NATIVE HEART WITHOUT ANGINA PECTORIS: Chronic | ICD-10-CM

## 2020-12-11 DIAGNOSIS — I67.89 CEREBRAL MICROVASCULAR DISEASE: ICD-10-CM

## 2020-12-11 DIAGNOSIS — E11.3593 TYPE 2 DIABETES MELLITUS WITH PROLIFERATIVE RETINOPATHY OF BOTH EYES, WITHOUT LONG-TERM CURRENT USE OF INSULIN, MACULAR EDEMA PRESENCE UNSPECIFIED, UNSPECIFIED PROLIFERATIVE RETINOPATHY TYPE (HCC): ICD-10-CM

## 2020-12-11 DIAGNOSIS — Z00.00 MEDICARE ANNUAL WELLNESS VISIT, SUBSEQUENT: Primary | ICD-10-CM

## 2020-12-11 DIAGNOSIS — I10 ESSENTIAL HYPERTENSION: ICD-10-CM

## 2020-12-11 DIAGNOSIS — E53.8 B12 DEFICIENCY: ICD-10-CM

## 2020-12-11 DIAGNOSIS — K21.9 GASTROESOPHAGEAL REFLUX DISEASE, UNSPECIFIED WHETHER ESOPHAGITIS PRESENT: ICD-10-CM

## 2020-12-11 DIAGNOSIS — I20.9 ANGINA, CLASS III (HCC): ICD-10-CM

## 2020-12-11 PROCEDURE — G0463 HOSPITAL OUTPT CLINIC VISIT: HCPCS | Performed by: INTERNAL MEDICINE

## 2020-12-11 PROCEDURE — 3288F FALL RISK ASSESSMENT DOCD: CPT | Performed by: INTERNAL MEDICINE

## 2020-12-11 PROCEDURE — 1100F PTFALLS ASSESS-DOCD GE2>/YR: CPT | Performed by: INTERNAL MEDICINE

## 2020-12-11 PROCEDURE — G8427 DOCREV CUR MEDS BY ELIG CLIN: HCPCS | Performed by: INTERNAL MEDICINE

## 2020-12-11 PROCEDURE — 99214 OFFICE O/P EST MOD 30 MIN: CPT | Performed by: INTERNAL MEDICINE

## 2020-12-11 PROCEDURE — G8536 NO DOC ELDER MAL SCRN: HCPCS | Performed by: INTERNAL MEDICINE

## 2020-12-11 PROCEDURE — G0439 PPPS, SUBSEQ VISIT: HCPCS | Performed by: INTERNAL MEDICINE

## 2020-12-11 PROCEDURE — G8419 CALC BMI OUT NRM PARAM NOF/U: HCPCS | Performed by: INTERNAL MEDICINE

## 2020-12-11 PROCEDURE — G8510 SCR DEP NEG, NO PLAN REQD: HCPCS | Performed by: INTERNAL MEDICINE

## 2020-12-11 NOTE — PROGRESS NOTES
This is the Subsequent Medicare Annual Wellness Exam, performed 12 months or more after the Initial AWV or the last Subsequent AWV    I have reviewed the patient's medical history in detail and updated the computerized patient record. Also for a follow-up of health issues. He has been generally doing fairly well. His wife thinks he is coughing more but he thinks he is coughing less. His wheeze is improved. Minimal sputum production. No shortness of breath. No fevers or chills. No nausea or vomiting. No chest pains. Some lower back pain intermittently. He is up-to-date on visits with cardiology, dermatology, and ophthalmology. He sees neurology for his neuropathy and podiatry on a regular basis.     ROS - Per HPI  Physical Examination: General appearance - alert, well appearing, and in no distress  Ears - bilateral TM's and external ear canals normal  Nose - normal and patent, no erythema, discharge or polyps  Mouth - mucous membranes moist, pharynx normal without lesions  Neck - supple, no significant adenopathy  Lymphatics - no palpable lymphadenopathy, no hepatosplenomegaly  Chest - clear to auscultation, no wheezes, rales or rhonchi, symmetric air entry  Heart - normal rate and regular rhythm  Abdomen - soft, nontender, nondistended, no masses or organomegaly  Neurological - alert, oriented, normal speech, no focal findings or movement disorder noted  Musculoskeletal - no joint tenderness, deformity or swelling  Extremities - peripheral pulses normal, no pedal edema, no clubbing or cyanosis      Depression Risk Factor Screening:     3 most recent PHQ Screens 12/11/2020   Little interest or pleasure in doing things Not at all   Feeling down, depressed, irritable, or hopeless Not at all   Total Score PHQ 2 0   Trouble falling or staying asleep, or sleeping too much -   Feeling tired or having little energy -   Poor appetite, weight loss, or overeating -   Feeling bad about yourself - or that you are a failure or have let yourself or your family down -   Trouble concentrating on things such as school, work, reading, or watching TV -   Moving or speaking so slowly that other people could have noticed; or the opposite being so fidgety that others notice -   Thoughts of being better off dead, or hurting yourself in some way -   PHQ 9 Score -   How difficult have these problems made it for you to do your work, take care of your home and get along with others -       Alcohol Risk Screen   Do you average more than 1 drink per night or more than 7 drinks a week: No    In the past three months have you have had more than 4 drinks containing alcohol on one occasion: No        Functional Ability and Level of Safety:   Hearing: Hearing is good. Activities of Daily Living: The home contains: no safety equipment. Patient does total self care     Ambulation: with difficulty, uses a walker     Fall Risk:  Fall Risk Assessment, last 12 mths 12/11/2020   Able to walk? Yes   Fall in past 12 months? Yes   Fall with injury? Yes   Number of falls in past 12 months 1   Fall Risk Score 2     Abuse Screen:  Patient is not abused       Cognitive Screening   Has your family/caregiver stated any concerns about your memory: no         Assessment/Plan   Education and counseling provided:  Are appropriate based on today's review and evaluation  End-of-Life planning (with patient's consent)  Prostate cancer screening tests (PSA, covered annually)  Diabetes screening test    Diagnoses and all orders for this visit:    1. B12 deficiencyrepleted on previous labs. Will not recheck today. 2. 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 He has had one blood sugar in the 80s that was asymptomatic for him. Will check labs for an A1c goal of less than 7.5.    3.  Angina, class III (HCC)stable on current meds    4. Coronary artery disease involving native coronary artery of native heart without angina pectorisfollow-up with cardiology as planned. 5. Bilateral carotid artery stenosisrepeat carotid Dopplers yearly. 6. Cerebral microvascular diseasecontinue statin and aspirin. 7. Typical atrial flutter (HCC)appears in sinus rhythm currently. 8. Essential hypertensionblood pressure was elevated initially here. At home his blood pressures have all been between 111-751 systolic. He has had no symptoms of low blood pressure. We will continue lisinopril but use 2.5 mg every day. 9. Gastroesophageal reflux disease, unspecified whether esophagitis presentstable on current meds. 10. Mixed hyperlipidemiaLDL goal of 100. Check labs to be sure that that is met.     11. Medicare annual wellness visit, subsequent        Health Maintenance Due     Health Maintenance Due   Topic Date Due    Shingrix Vaccine Age 49> (1 of 2) 12/18/1981    Foot Exam Q1  05/22/2020    Medicare Yearly Exam  08/08/2020    Flu Vaccine (1) 09/01/2020       Patient Care Team   Patient Care Team:  Ari Sawyer MD as PCP - General (Internal Medicine)  Ari Sawyer MD as PCP - [facility]  Lina Christine MD (Ophthalmology)  Susan Olivares MD (Pulmonary Disease)  Lissette Patton MD (Dermatology)  Kimberly Cash MD (Urology)  Reema Solorzano MD (Orthopedic Surgery)  Mila Vaughn MD (Cardiology)  Mariza Hester NP (Nurse Practitioner)  Madeline Garcia MD as Physician (Cardiology)    History     Patient Active Problem List   Diagnosis Code    GERD (gastroesophageal reflux disease) K21.9    Low back pain M54.5    Pure hypercholesterolemia E78.00    COPD (chronic obstructive pulmonary disease) ([facility] [address] J44.9    Osteoarthritis of hip M16.9    Status post hip replacement Z96.649    Personal history of prostate cancer Z85.46    S/P drug eluting coronary stent placement Z95.5    Incidental pulmonary nodule, greater than or equal to 8mm R91.1    Coronary artery disease involving native coronary artery without angina pectoris I25.10    Retinal hemorrhage H35.60    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    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23    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    Vitamin D deficiency E55.9    Vestibular vertigo H81.399    Lumbar back pain with radiculopathy affecting left lower extremity M54.16    Lumbar back pain with radiculopathy affecting right lower extremity M54.16    Type 2 diabetes mellitus with nephropathy ([facility]) E11.21    S/P cardiac cath Z98.890    Angina, class III ([facility]) I20.9    Encounter for staple removal Z48.02    PVD (peripheral vascular disease) ([facility]) I73.9    Varicose veins of both legs with edema I83.893    Atrial flutter ([facility]) I48.92    Mixed hyperlipidemia E78.2    Essential hypertension I10    Type 2 diabetes mellitus with proliferative retinopathy ([address] E11.3599    Subdural hematoma ([address] Q84.7X9D     Past Medical History:   Diagnosis Date    Arrhythmia     Asthma     CAD (coronary artery disease)     Chronic hip pain     COPD (chronic obstructive pulmonary disease) ([address] 9/10/2010    Diabetes ([address]     DJD (degenerative joint disease)     GERD (gastroesophageal reflux disease)     HNP (herniated nucleus pulposus) 6/2006    lumbar  Hypercholesterolemia     Hypertension     Nausea & vomiting     Prostate cancer ([facility] [address] 2003    Reversible ischemic neurologic deficit ([facility] [address] 1990    suspected with no residual effects and no recurrance    TIA (transient ischemic attack)     TIA- no deficiets      Past Surgical History:   Procedure Laterality Date    COLONOSCOPY  12/15/2004    Diverticulosis Dr. Stan Homans repeat 10 years    HX COLONOSCOPY  2005    HX HEART CATHETERIZATION      2  stents     HX HEART CATHETERIZATION  08/20/2018    failed 100% block @ [facility] will plan to do laser next.       HX HERNIA REPAIR  9/2008    left inguinal hernia repair by Dr. Erwin Marquez Right     inguinal hernai repair    HX HERNIA REPAIR      umbilical hernia repair    HX HIP REPLACEMENT Right 07/06/2016    HX PROSTATECTOMY  2003    HX TONSILLECTOMY      TOTAL HIP ARTHROPLASTY Left 2/9/11    Dr. Adrien Garcia at [facility]     Current Outpatient Medications   Medication Sig Dispense Refill    Wixela Inhub 250-50 mcg/dose diskus inhaler USE 1 INHALATION ORALLY    EVERY 12 HOURS 3 Inhaler 2    glipiZIDE (GLUCOTROL) 5 mg tablet TAKE 1 TABLET BY MOUTH EVERY DAY 90 Tab 1    OneTouch Ultra Blue Test Strip strip USE 1 STRIP IN VITRO TO TEST BLOOD SUGAR DAILY BEFORE BREAKFAST 50 Strip 7    albuterol (PROVENTIL VENTOLIN) 2.5 mg /3 mL (0.083 %) nebu TAKE 3ML BY NEBULIZATION ROUTE THREE TIMES A DAY AS NEEDED FOR WHEEZING 300 mL 3    Spiriva Respimat 2.5 mcg/actuation inhaler USE 2 INHALATIONS ORALLY   DAILY 3 Inhaler 2    metFORMIN ER (GLUCOPHAGE XR) 500 mg tablet TAKE 3 TABLETS BY MOUTH EVERY DAY WITH DINNER 270 Tab 1    isosorbide mononitrate ER (IMDUR) 30 mg tablet TAKE 1/2 TABLET BY MOUTH EVERY MORNING 45 Tab 3    simvastatin (ZOCOR) 20 mg tablet TAKE 1 TABLET BY MOUTH EVERY DAY EVERY NIGHT 90 Tab 1    lisinopriL (PRINIVIL, ZESTRIL) 5 mg tablet TAKE 1 TABLET BY MOUTH EVERY DAY (Patient taking differently: Take 2.5 mg by mouth every other day.) 90 Tab 2    albuterol (Ventolin HFA) 90 mcg/actuation inhaler Take 2 Puffs by inhalation every four (4) hours as needed for Wheezing. 18 Inhaler 5    acetylcysteine (MUCOMYST) 200 mg/mL (20 %) solution USE 2 ML IN NEBULIZER TID 60 mL 4    aspirin 81 mg chewable tablet Take 1 Tab by mouth daily.  metoprolol succinate (TOPROL-XL) 25 mg XL tablet TAKE 1/2 TABLET BY MOUTH EVERY DAY 30 Tab 6    coenzyme q10 10 mg cap Take 10 mg by mouth daily.  cyanocobalamin (VITAMIN B-12) 500 mcg tablet Take 500 mcg by mouth daily.  folic acid 286 mcg tablet Take 400 mcg by mouth daily.  nitroglycerin (NITROSTAT) 0.4 mg SL tablet 1 Tab by SubLINGual route every five (5) minutes as needed for Chest Pain. 1 Bottle 0    MAGNESIUM PO Take 500 mg by mouth daily.  acetaminophen (TYLENOL) 500 mg tablet Take 1 tablet by mouth every eight (8) hours as needed for Pain. 30 tablet 0    CHOLECALCIFEROL, VITAMIN D3, (VITAMIN D3 PO) Take 1,000 Units by mouth daily.  multivitamins-minerals-lutein (CENTRUM SILVER) Tab Take 1 Tab by mouth daily.  fexofenadine (ALLEGRA) 180 mg tablet Take 1 Tab by mouth daily. 30 Tab 11    famotidine (PEPCID AC) 20 mg tablet Take 20 mg by mouth daily.  VITAMIN B COMPLEX (B COMPLEX PO) Take 1 Tab by mouth daily.  ASCORBIC ACID (VITAMIN C PO) Take 1,000 mg by mouth daily.        Allergies   Allergen Reactions    Eliquis [Apixaban] Other (comments)     Caused bleed in brain       Family History   Problem Relation Age of Onset    Cancer Mother         Breast cancer    Lung Disease Mother         Emphysema    Cancer Father         Bladder cancer    Lung Disease Father         Emphysema    Hypertension Sister         1    Breast Cancer Sister      Social History     Tobacco Use    Smoking status: Former Smoker     Packs/day: 3.00     Years: 10.00     Pack years: 30.00     Types: Cigarettes     Last attempt to quit: 1/15/1970     Years since quittin.9    Smokeless tobacco: Never Used   Substance Use Topics    Alcohol use:  No

## 2020-12-11 NOTE — PATIENT INSTRUCTIONS
Medicare Wellness Visit, Male The best way to live healthy is to have a lifestyle where you eat a well-balanced diet, exercise regularly, limit alcohol use, and quit all forms of tobacco/nicotine, if applicable. Regular preventive services are another way to keep healthy. Preventive services (vaccines, screening tests, monitoring & exams) can help personalize your care plan, which helps you manage your own care. Screening tests can find health problems at the earliest stages, when they are easiest to treat. [facility] follows the current, evidence-based guidelines published by the [facility] ([facility]) when recommending preventive services for our patients. Because we follow these guidelines, sometimes recommendations change over time as research supports it. (For example, a prostate screening blood test is no longer routinely recommended for men with no symptoms). Of course, you and your doctor may decide to screen more often for some diseases, based on your risk and co-morbidities (chronic disease you are already diagnosed with). Preventive services for you include: - Medicare offers their members a free annual wellness visit, which is time for you and your primary care provider to discuss and plan for your preventive service needs. Take advantage of this benefit every year! 
-All adults over age 72 should receive the recommended pneumonia vaccines. Current USPSTF guidelines recommend a series of two vaccines for the best pneumonia protection.  
-All adults should have a flu vaccine yearly and tetanus vaccine every 10 years. 
-All adults age 48 and older should receive the shingles vaccines (series of two vaccines).       
-All adults age 38-68 who are overweight should have a diabetes screening test once every three years.  
-Other screening tests & preventive services for persons with diabetes include: an eye exam to screen for diabetic retinopathy, a kidney function test, a foot exam, and stricter control over your cholesterol.  
-Cardiovascular screening for adults with routine risk involves an electrocardiogram (ECG) at intervals determined by the provider.  
-Colorectal cancer screening should be done for adults age 54-65 with no increased risk factors for colorectal cancer. There are a number of acceptable methods of screening for this type of cancer. Each test has its own benefits and drawbacks. Discuss with your provider what is most appropriate for you during your annual wellness visit. The different tests include: colonoscopy (considered the best screening method), a fecal occult blood test, a fecal DNA test, and sigmoidoscopy. 
-All adults born between [facility] should be screened once for Hepatitis C. 
-An Abdominal Aortic Aneurysm (AAA) Screening is recommended for men age 73-68 who has ever smoked in their lifetime. Here is a list of your current Health Maintenance items (your personalized list of preventive services) with a due date: 
Health Maintenance Due Topic Date Due  Shingles Vaccine (1 of 2) 12/18/1981  Diabetic Foot Care  05/22/2020 [address] [place] [address] Annual Well Visit  08/08/2020  Yearly Flu Vaccine (1) 09/01/2020

## 2020-12-18 ENCOUNTER — HOSPITAL ENCOUNTER (OUTPATIENT)
Dept: LAB | Age: 85
Discharge: HOME | End: 2020-12-18
Payer: MEDICARE

## 2020-12-18 PROCEDURE — 84153 ASSAY OF PSA TOTAL: CPT

## 2020-12-18 PROCEDURE — 80061 LIPID PANEL: CPT

## 2020-12-18 PROCEDURE — 83036 HEMOGLOBIN GLYCOSYLATED A1C: CPT

## 2020-12-18 PROCEDURE — 80053 COMPREHEN METABOLIC PANEL: CPT

## 2020-12-18 PROCEDURE — 85025 COMPLETE CBC W/AUTO DIFF WBC: CPT

## 2020-12-18 PROCEDURE — 36415 COLL VENOUS BLD VENIPUNCTURE: CPT

## 2020-12-19 LAB
ALBUMIN SERPL-MCNC: 4.1 G/DL (ref 3.6–4.6)
ALBUMIN/GLOB SERPL: 1.6 {RATIO} (ref 1.2–2.2)
ALP SERPL-CCNC: 76 IU/L (ref 39–117)
ALT SERPL-CCNC: 19 IU/L (ref 0–44)
AST SERPL-CCNC: 21 IU/L (ref 0–40)
BASOPHILS # BLD AUTO: 0.1 X10E3/UL (ref 0–0.2)
BASOPHILS NFR BLD AUTO: 1 %
BILIRUB SERPL-MCNC: 0.6 MG/DL (ref 0–1.2)
BUN SERPL-MCNC: 24 MG/DL (ref 8–27)
BUN/CREAT SERPL: 20 (ref 10–24)
CALCIUM SERPL-MCNC: 9.5 MG/DL (ref 8.6–10.2)
CHLORIDE SERPL-SCNC: 102 MMOL/L (ref 96–106)
CHOLEST SERPL-MCNC: 128 MG/DL (ref 100–199)
CO2 SERPL-SCNC: 21 MMOL/L (ref 20–29)
CREAT SERPL-MCNC: 1.2 MG/DL (ref 0.76–1.27)
EOSINOPHIL # BLD AUTO: 1 X10E3/UL (ref 0–0.4)
EOSINOPHIL NFR BLD AUTO: 9 %
ERYTHROCYTE [DISTWIDTH] IN BLOOD BY AUTOMATED COUNT: 12.9 % (ref 11.6–15.4)
EST. AVERAGE GLUCOSE BLD GHB EST-MCNC: 157 MG/DL
GLOBULIN SER CALC-MCNC: 2.6 G/DL (ref 1.5–4.5)
GLUCOSE SERPL-MCNC: 146 MG/DL (ref 65–99)
HBA1C MFR BLD: 7.1 % (ref 4.8–5.6)
HCT VFR BLD AUTO: 42.6 % (ref 37.5–51)
HDLC SERPL-MCNC: 41 MG/DL
HGB BLD-MCNC: 14.2 G/DL (ref 13–17.7)
IMM GRANULOCYTES # BLD AUTO: 0 X10E3/UL (ref 0–0.1)
IMM GRANULOCYTES NFR BLD AUTO: 0 %
LDLC SERPL CALC-MCNC: 67 MG/DL (ref 0–99)
LYMPHOCYTES # BLD AUTO: 2.2 X10E3/UL (ref 0.7–3.1)
LYMPHOCYTES NFR BLD AUTO: 21 %
MCH RBC QN AUTO: 30.8 PG (ref 26.6–33)
MCHC RBC AUTO-ENTMCNC: 33.3 G/DL (ref 31.5–35.7)
MCV RBC AUTO: 92 FL (ref 79–97)
MONOCYTES # BLD AUTO: 1 X10E3/UL (ref 0.1–0.9)
MONOCYTES NFR BLD AUTO: 10 %
NEUTROPHILS # BLD AUTO: 6.1 X10E3/UL (ref 1.4–7)
NEUTROPHILS NFR BLD AUTO: 59 %
PLATELET # BLD AUTO: 289 X10E3/UL (ref 150–450)
POTASSIUM SERPL-SCNC: 4.9 MMOL/L (ref 3.5–5.2)
PROT SERPL-MCNC: 6.7 G/DL (ref 6–8.5)
PSA SERPL-MCNC: <0.1 NG/ML (ref 0–4)
RBC # BLD AUTO: 4.61 X10E6/UL (ref 4.14–5.8)
SODIUM SERPL-SCNC: 138 MMOL/L (ref 134–144)
TRIGL SERPL-MCNC: 108 MG/DL (ref 0–149)
VLDLC SERPL CALC-MCNC: 20 MG/DL (ref 5–40)
WBC # BLD AUTO: 10.3 X10E3/UL (ref 3.4–10.8)

## 2021-01-03 RX ORDER — METFORMIN HYDROCHLORIDE 500 MG/1
TABLET, EXTENDED RELEASE ORAL
Qty: 270 TAB | Refills: 1 | Status: SHIPPED | OUTPATIENT
Start: 2021-01-03 | End: 2021-07-08

## 2021-01-03 RX ORDER — SIMVASTATIN 20 MG/1
TABLET, FILM COATED ORAL
Qty: 90 TAB | Refills: 1 | Status: SHIPPED | OUTPATIENT
Start: 2021-01-03 | End: 2021-07-29

## 2021-01-06 ENCOUNTER — OFFICE VISIT (OUTPATIENT)
Dept: CARDIOLOGY CLINIC | Age: 86
End: 2021-01-06
Payer: MEDICARE

## 2021-01-06 VITALS
DIASTOLIC BLOOD PRESSURE: 84 MMHG | OXYGEN SATURATION: 96 % | WEIGHT: 188.5 LBS | SYSTOLIC BLOOD PRESSURE: 134 MMHG | BODY MASS INDEX: 26.99 KG/M2 | HEIGHT: 70 IN | RESPIRATION RATE: 18 BRPM

## 2021-01-06 DIAGNOSIS — E78.2 MIXED HYPERLIPIDEMIA: ICD-10-CM

## 2021-01-06 DIAGNOSIS — I10 ESSENTIAL HYPERTENSION: ICD-10-CM

## 2021-01-06 DIAGNOSIS — S06.5XAA SUBDURAL HEMATOMA: ICD-10-CM

## 2021-01-06 DIAGNOSIS — I48.3 TYPICAL ATRIAL FLUTTER (HCC): ICD-10-CM

## 2021-01-06 DIAGNOSIS — I25.10 CORONARY ARTERY DISEASE INVOLVING NATIVE CORONARY ARTERY OF NATIVE HEART WITHOUT ANGINA PECTORIS: Primary | ICD-10-CM

## 2021-01-06 PROCEDURE — G8427 DOCREV CUR MEDS BY ELIG CLIN: HCPCS | Performed by: INTERNAL MEDICINE

## 2021-01-06 PROCEDURE — G8419 CALC BMI OUT NRM PARAM NOF/U: HCPCS | Performed by: INTERNAL MEDICINE

## 2021-01-06 PROCEDURE — 93005 ELECTROCARDIOGRAM TRACING: CPT | Performed by: INTERNAL MEDICINE

## 2021-01-06 PROCEDURE — 93010 ELECTROCARDIOGRAM REPORT: CPT | Performed by: INTERNAL MEDICINE

## 2021-01-06 PROCEDURE — G8432 DEP SCR NOT DOC, RNG: HCPCS | Performed by: INTERNAL MEDICINE

## 2021-01-06 PROCEDURE — G8536 NO DOC ELDER MAL SCRN: HCPCS | Performed by: INTERNAL MEDICINE

## 2021-01-06 PROCEDURE — G0463 HOSPITAL OUTPT CLINIC VISIT: HCPCS | Performed by: INTERNAL MEDICINE

## 2021-01-06 PROCEDURE — 1100F PTFALLS ASSESS-DOCD GE2>/YR: CPT | Performed by: INTERNAL MEDICINE

## 2021-01-06 PROCEDURE — 3288F FALL RISK ASSESSMENT DOCD: CPT | Performed by: INTERNAL MEDICINE

## 2021-01-06 PROCEDURE — 99214 OFFICE O/P EST MOD 30 MIN: CPT | Performed by: INTERNAL MEDICINE

## 2021-01-06 NOTE — PROGRESS NOTES
Subjective/HPI:     Parth Reed is a 80 y.o. male is here today for a f/u appt. He has a past med hx significant for SDH in February 2020, aflutter, CAD, COPD, DM, HTN, Hyperlipidemia, and TIA. The patient denies chest pain/ orthopnea, PND, SOB/MCGUIRE, palpitations, syncope, presyncope or fatigue. PCP Provider  Adeline Bueno MD  Past Medical History:   Diagnosis Date    Arrhythmia     Asthma     CAD (coronary artery disease)     Carotid artery disease (HCC)     Chronic hip pain     COPD (chronic obstructive pulmonary disease) ([address] 9/10/2010    Diabetes ([address]     DJD (degenerative joint disease)     GERD (gastroesophageal reflux disease)     HNP (herniated nucleus pulposus) 6/2006    lumbar     Hypercholesterolemia     Hypertension     Nausea & vomiting     Prostate cancer ([address] 2003    Reversible ischemic neurologic deficit ([address] 1990    suspected with no residual effects and no recurrance    TIA (transient ischemic attack)     TIA- no deficiets      Past Surgical History:   Procedure Laterality Date    COLONOSCOPY  12/15/2004    Diverticulosis Dr. John Hurd repeat 10 years    HX COLONOSCOPY  2005    HX HEART CATHETERIZATION      2  stents     HX HEART CATHETERIZATION  08/20/2018    failed 100% block @ [facility] will plan to do laser next.       HX HERNIA REPAIR  9/2008    left inguinal hernia repair by Dr. Sheri Quesada Right     inguinal hernai repair    HX HERNIA REPAIR      umbilical hernia repair    HX HIP REPLACEMENT Right 07/06/2016    HX PROSTATECTOMY  2003    HX TONSILLECTOMY      [place] TOTAL HIP ARTHROPLASTY Left 2/9/11    [facility] Tiffany [facility] at [facility] Other (comments)     Caused bleed in brain      Family History   Problem Relation Age of Onset    Cancer Mother         Breast cancer    Lung Disease Mother         Emphysema    Cancer Father         Bladder cancer    Lung Disease Father Emphysema    Hypertension Sister         1    Breast Cancer Sister       Current Outpatient Medications   Medication Sig    metFORMIN ER (GLUCOPHAGE XR) 500 mg tablet TAKE 3 TABLETS BY MOUTH EVERY DAY WITH DINNER    simvastatin (ZOCOR) 20 mg tablet TAKE 1 TABLET BY MOUTH EVERY DAY EVERY NIGHT    Wixela Inhub 250-50 mcg/dose diskus inhaler USE 1 INHALATION ORALLY    EVERY 12 HOURS    glipiZIDE (GLUCOTROL) 5 mg tablet TAKE 1 TABLET BY MOUTH EVERY DAY    OneTouch Ultra Blue Test Strip strip USE 1 STRIP IN VITRO TO TEST BLOOD SUGAR DAILY BEFORE BREAKFAST    albuterol (PROVENTIL VENTOLIN) 2.5 mg /3 mL (0.083 %) nebu TAKE 3ML BY NEBULIZATION ROUTE THREE TIMES A DAY AS NEEDED FOR WHEEZING    Spiriva Respimat 2.5 mcg/actuation inhaler USE 2 INHALATIONS ORALLY   DAILY    isosorbide mononitrate ER (IMDUR) 30 mg tablet TAKE 1/2 TABLET BY MOUTH EVERY MORNING    lisinopriL (PRINIVIL, ZESTRIL) 5 mg tablet TAKE 1 TABLET BY MOUTH EVERY DAY (Patient taking differently: Take 2.5 mg by mouth every other day.)    albuterol (Ventolin HFA) 90 mcg/actuation inhaler Take 2 Puffs by inhalation every four (4) hours as needed for Wheezing.  acetylcysteine (MUCOMYST) 200 mg/mL (20 %) solution USE 2 ML IN NEBULIZER TID    aspirin 81 mg chewable tablet Take 1 Tab by mouth daily.  metoprolol succinate (TOPROL-XL) 25 mg XL tablet TAKE 1/2 TABLET BY MOUTH EVERY DAY    coenzyme q10 10 mg cap Take 10 mg by mouth daily.  cyanocobalamin (VITAMIN B-12) 500 mcg tablet Take 500 mcg by mouth daily.  folic acid 888 mcg tablet Take 400 mcg by mouth daily.  nitroglycerin (NITROSTAT) 0.4 mg SL tablet 1 Tab by SubLINGual route every five (5) minutes as needed for Chest Pain.  MAGNESIUM PO Take 500 mg by mouth daily.  acetaminophen (TYLENOL) 500 mg tablet Take 1 tablet by mouth every eight (8) hours as needed for Pain.  CHOLECALCIFEROL, VITAMIN D3, (VITAMIN D3 PO) Take 1,000 Units by mouth daily.     multivitamins-minerals-lutein (CENTRUM SILVER) Tab Take 1 Tab by mouth daily.  fexofenadine (ALLEGRA) 180 mg tablet Take 1 Tab by mouth daily.  famotidine (PEPCID AC) 20 mg tablet Take 20 mg by mouth daily.  VITAMIN B COMPLEX (B COMPLEX PO) Take 1 Tab by mouth daily.  ASCORBIC ACID (VITAMIN C PO) Take 1,000 mg by mouth daily. No current facility-administered medications for this visit.        Vitals:    21 1059 21 1110   BP: 138/86 134/84   Resp: 18    SpO2: 96%    Weight: 188 lb 8 oz (85.5 kg)    Height: 5' 10\" (1.778 m)      Social History     Socioeconomic History    Marital status:      Spouse name: Not on file    Number of children: Not on file    Years of education: Not on file    Highest education level: Not on file   Occupational History    Not on file   Social Needs    Financial resource strain: Not on file    Food insecurity     Worry: Not on file     Inability: Not on file    Transportation needs     Medical: Not on file     Non-medical: Not on file   Tobacco Use    Smoking status: Former Smoker     Packs/day: 3.00     Years: 10.00     Pack years: 30.00     Types: Cigarettes     Quit date: 1/15/1970     Years since quittin.0    Smokeless tobacco: Never Used   Substance and Sexual Activity    Alcohol use: No    Drug use: Yes     Types: Prescription, OTC    Sexual activity: Yes     Partners: Female     Birth control/protection: None   Lifestyle    Physical activity     Days per week: Not on file     Minutes per session: Not on file    Stress: Not on file   Relationships    Social connections     Talks on phone: Not on file     Gets together: Not on file     Attends Rastafarian service: Not on file     Active member of club or organization: Not on file     Attends meetings of clubs or organizations: Not on file     Relationship status: Not on file    Intimate partner violence     Fear of current or ex partner: Not on file     Emotionally abused: Not on file     Physically abused: Not on file     Forced sexual activity: Not on file   Other Topics Concern    Not on file   Social History Narrative    Not on file       I have reviewed the nurses notes, vitals, problem list, allergy list, medical history, family, social history and medications. Review of Symptoms:    General: Pt denies excessive weight gain or loss. Pt is able to conduct ADL's  HEENT: Denies blurred vision, headaches, epistaxis and difficulty swallowing.+productive cough  Respiratory: Denies shortness of breath, MCGUIRE, denies wheezing or stridor. Cardiovascular: Denies precordial pain, palpitations, edema. Denies PND  Gastrointestinal: Denies poor appetite, indigestion, abdominal pain or blood in stool  Urinary: Denies dysuria, pyuria  Musculoskeletal: Denies pain or swelling from muscles or joints  Neurologic: Denies tremor, paresthesias, or sensory motor disturbance  Skin: Denies rash, itching or texture change. Psych: Denies depression        Physical Exam:      General: Well developed, in no acute distress, cooperative and alert  HEENT: No carotid bruits, no JVD, trach is midline. Neck Supple, PEERL, EOM intact. Heart:  Normal S1/S2 negative S3 or S4. Regular,+FIORDALIZA,no gallop or rub. Respiratory:  +restrictive lung sounds throughout  Abdomen:   Soft, non-tender, no masses, bowel sounds are active. Extremities:  No edema. Neuro: A&Ox3, speech clear, gait stable. Skin: Skin color is normal. No rashes or lesions.  Non diaphoretic  Vascular: 2+ pulses symmetric in all extremities      Cardiographics  EKG: SR    Results for orders placed or performed during the hospital encounter of 02/25/20   EKG, 12 LEAD, INITIAL   Result Value Ref Range    Ventricular Rate 87 BPM    Atrial Rate 261 BPM    QRS Duration 88 ms    Q-T Interval 372 ms    QTC Calculation (Bezet) 447 ms    Calculated P Axis 51 degrees    Calculated R Axis 43 degrees    Calculated T Axis 39 degrees    Diagnosis       Atrial flutter with 3:1 AV conduction  When compared with ECG of 05-OCT-2018 04:26,  Atrial flutter has replaced Sinus rhythm  Confirmed by Johnie Snider [id number]) on 2/25/2020 10:39:48 PM           Cardiology Labs:  Lab Results   Component Value Date/Time    Cholesterol, total 128 12/18/2020 09:01 AM    HDL Cholesterol 41 12/18/2020 09:01 AM    LDL, calculated 67 12/18/2020 09:01 AM    LDL, calculated 68 02/12/2020 09:13 AM    LDL, calculated 73 08/08/2019 08:49 AM    LDL, calculated 73 02/08/2019 08:51 AM    VLDL, calculated 20 12/18/2020 09:01 AM    VLDL, calculated 21 02/12/2020 09:13 AM    CHOL/HDL Ratio 3.0 05/05/2010 09:01 AM     Lab Results   Component Value Date/Time    Sodium 138 12/18/2020 09:01 AM    Potassium 4.9 12/18/2020 09:01 AM    Chloride 102 12/18/2020 09:01 AM    CO2 21 12/18/2020 09:01 AM    Glucose 146 (H) 12/18/2020 09:01 AM    BUN 24 12/18/2020 09:01 AM    Creatinine 1.20 12/18/2020 09:01 AM    BUN/Creatinine ratio 20 12/18/2020 09:01 AM    GFR est AA 62 12/18/2020 09:01 AM    GFR est non-AA 53 (L) 12/18/2020 09:01 AM    Calcium 9.5 12/18/2020 09:01 AM    Anion gap 2 (L) 02/26/2020 05:33 AM    Bilirubin, total 0.6 12/18/2020 09:01 AM    ALT (SGPT) 19 12/18/2020 09:01 AM    Alk. phosphatase 76 12/18/2020 09:01 AM    Protein, total 6.7 12/18/2020 09:01 AM    Albumin 4.1 12/18/2020 09:01 AM    Globulin 3.6 02/25/2020 03:26 PM    A-G Ratio 1.6 12/18/2020 09:01 AM     Lab Results   Component Value Date/Time    Hemoglobin A1c 7.1 (H) 12/18/2020 09:01 AM    Hemoglobin A1c (POC) 6.7 02/08/2016 11:55 AM        Assessment:     Assessment:     Diagnoses and all orders for this visit:    1. Coronary artery disease involving native coronary artery of native heart without angina pectoris  -     AMB POC EKG ROUTINE W/ 12 LEADS, INTER & REP    2. Essential hypertension    3. Mixed hyperlipidemia    4. Subdural hematoma ([address]    5. Typical atrial flutter ([address]        ICD-10-CM ICD-9-CM    1.  Coronary artery disease involving native coronary artery of native heart without angina pectoris  I25.10 414.01 AMB POC EKG ROUTINE W/ 12 LEADS, INTER & REP   2. Essential hypertension  I10 401.9    3. Mixed hyperlipidemia  E78.2 272.2    4. Subdural hematoma (HCC)  S06.5X9A 432.1    5. Typical atrial flutter (HCC)  I48.3 427.32           Plan:     1. CAD- denies angina or anginal equivalent symptoms. Continue ASA 81mg, BB, Imdur and statin. 2. HTN- BP trending normotensive. 3. Hyperlipidemia- LDL 67 in 12/2020. cont statin    4. Typical atrial flutter- back in rate controlled flutter today. Higher risk to be on DOAC with hx of SDH. Will consult with EP MD, Dr Bassam Jarvis, and determine further management. For now cont ASA, BB    5. Subdural hematoma- admitted in February 2020 with a small left acute SDH after a fall hitting his head. He was d/c off Plavix, ASA, and Eliquis. He is tolerating ASA 81mg every day. No further bleeding issues.     F/U dependent on decision for management of aflutter

## 2021-01-06 NOTE — PROGRESS NOTES
1. Have you been to the ER, urgent care clinic since your last visit? Hospitalized since your last visit? No    2. Have you seen or consulted any other health care providers outside of the [address] [place] [address] since your last visit? Include any pap smears or colon screening. No    Chief Complaint   Patient presents with    Coronary Artery Disease     6 mo appt. Denied cardiac symptoms.

## 2021-01-06 NOTE — LETTER
1/6/2021 Patient: Elba Blanca YOB: 1931 Date of Visit: 1/6/2021 Patricia Bhatia MD 
[address] [facility] [address] [place] [address] [phone] [address] [employer] Dear Patricia Bhatia MD, Thank you for referring Mr. Elba Blanca to [facility] for evaluation. My notes for this consultation are attached. If you have questions, please do not hesitate to call me. I look forward to following your patient along with you.  
 
 
Sincerely, 
 
Jennifer Loza MD

## 2021-01-07 RX ORDER — METOPROLOL SUCCINATE 25 MG/1
TABLET, EXTENDED RELEASE ORAL
Qty: 45 TAB | Refills: 4 | Status: SHIPPED | OUTPATIENT
Start: 2021-01-07 | End: 2022-03-17

## 2021-01-20 DIAGNOSIS — R04.2 HEMOPTYSIS: ICD-10-CM

## 2021-01-21 RX ORDER — ALBUTEROL SULFATE 90 UG/1
2 AEROSOL, METERED RESPIRATORY (INHALATION)
Qty: 18 INHALER | Refills: 5 | Status: SHIPPED | OUTPATIENT
Start: 2021-01-21 | End: 2022-03-23

## 2021-03-05 ENCOUNTER — TELEPHONE (OUTPATIENT)
Dept: NEUROLOGY | Age: 86
End: 2021-03-05

## 2021-03-05 NOTE — TELEPHONE ENCOUNTER
----- Message from Sol Niño sent at 3/5/2021 11:00 AM EST -----  Regarding: Dr. Maxwell Thrasher telephone  General Message/Vendor Calls    Caller's first and last name: Beck Carter ( Wife)       Reason for call: Handicap sticker       Callback required yes/no and why: yes       Best contact number(s): [phone]       Details to clarify the request: Pt's wife is calling to speak with nurse regarding the patients  handicap sticker and what they can do to get another one       Sol Niño

## 2021-03-19 DIAGNOSIS — I10 ESSENTIAL HYPERTENSION: ICD-10-CM

## 2021-03-22 RX ORDER — LISINOPRIL 5 MG/1
2.5 TABLET ORAL DAILY
Qty: 90 TAB | Refills: 1 | Status: SHIPPED | OUTPATIENT
Start: 2021-03-22 | End: 2021-06-23 | Stop reason: ALTCHOICE

## 2021-03-22 NOTE — PROGRESS NOTES
Neurology Note    Patient ID:  Miguelangel Botello  [id number]  12 y.o.  12/18/1931      Date of Consultation:  March 23, 2021      Subjective: I have trouble with my walking. History of Present Illness:   Miguelangel Botello is a 80 y.o. male who returns to the neurology clinic at [facility] for an evaluation. The patient was last seen on September 8, 2020. Please see my history of present illness, examination, and treatment base plan from that day. 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 He also had a history of a peripheral neuropathy which was felt to be related to his diabetes. He was on anticoagulation due to his atrial flutter but then did have a fall and developed a subdural hematoma and his Eliquis was stopped. He has continued to be kept on aspirin monotherapy. Since his last visit, he has not had any falls. He does use an upright walker in his house and a rolling walker outside the house. He does feel relatively safe while using it. He does feel that his rolling walker sometimes does move too fast for him. He does continue to watch his sugar level and states that it has been doing pretty well. He is not in a dedicated exercise program but does try to do his stretches and activities daily. Due to his underlying diseases, he is interested in getting a [employer] handicap parking placard. He does continue to have back pain but it is not bothering him as much today as it was previously. He does state that he continues to have a mild tremor in his hands but it is no worse than last visit and does not impact his day-to-day functioning.   Past Medical History:   Diagnosis Date    Arrhythmia     Asthma     CAD (coronary artery disease)     Carotid artery disease (HCC)     Chronic hip pain     COPD (chronic obstructive pulmonary disease) ([facility] 9/10/2010    Diabetes ([facility]     DJD (degenerative joint disease)     GERD (gastroesophageal reflux disease)     HNP (herniated nucleus pulposus) 2006    lumbar     Hypercholesterolemia     Hypertension     Nausea & vomiting     Prostate cancer ([facility]     Reversible ischemic neurologic deficit ([facility]     suspected with no residual effects and no recurrance    TIA (transient ischemic attack)     TIA- no deficiets        Past Surgical History:   Procedure Laterality Date    COLONOSCOPY  12/15/2004    Diverticulosis Dr. Sang Macias repeat 10 years    HX COLONOSCOPY      HX HEART CATHETERIZATION      2  stents     HX HEART CATHETERIZATION  2018    failed 100% block @ [facility] will plan to do laser next.  HX HERNIA REPAIR  2008    left inguinal hernia repair by Dr. Gutierrez Coon Right     inguinal hernai repair    HX HERNIA REPAIR      umbilical hernia repair    HX HIP REPLACEMENT Right 2016    HX PROSTATECTOMY      HX TONSILLECTOMY      SC TOTAL HIP ARTHROPLASTY Left 11    Dr. Jasvir Francois at [facility]        Family History   Problem Relation Age of Onset    Cancer Mother         Breast cancer    Lung Disease Mother         Emphysema    Cancer Father         Bladder cancer    Lung Disease Father         Emphysema    Hypertension Sister         1    Breast Cancer Sister         Social History     Tobacco Use    Smoking status: Former Smoker     Packs/day: 3.00     Years: 10.00     Pack years: 30.00     Types: Cigarettes     Quit date: 1/15/1970     Years since quittin.2    Smokeless tobacco: Never Used   Substance Use Topics    Alcohol use: No        Allergies   Allergen Reactions    Eliquis [Apixaban] Other (comments)     Caused bleed in brain        Prior to Admission medications    Medication Sig Start Date End Date Taking? Authorizing Provider   lisinopriL (PRINIVIL, ZESTRIL) 5 mg tablet Take 0.5 Tabs by mouth daily.  3/22/21  Yes Thelma SPENCER NP   albuterol (Ventolin HFA) 90 mcg/actuation inhaler Take 2 Puffs by inhalation every four (4) hours as needed for Wheezing. 1/21/21  Yes Blas Landeros MD   metoprolol succinate (TOPROL-XL) 25 mg XL tablet TAKE 1/2 TABLET BY MOUTH EVERY DAY 1/7/21  Yes Mauro SPENCER NP   metFORMIN ER (GLUCOPHAGE XR) 500 mg tablet TAKE 3 TABLETS BY MOUTH EVERY DAY WITH DINNER 1/3/21  Yes Blas Landeros MD   simvastatin (ZOCOR) 20 mg tablet TAKE 1 TABLET BY MOUTH EVERY DAY EVERY NIGHT 1/3/21  Yes MD Wagner Chavez Murrshubham 250-50 mcg/dose diskus inhaler USE 1 INHALATION ORALLY    EVERY 12 HOURS 11/5/20  Yes Blas Landeros MD   glipiZIDE (GLUCOTROL) 5 mg tablet TAKE 1 TABLET BY MOUTH EVERY DAY 10/25/20  Yes Blas Landeros MD   OneTouch Ultra Blue Test Strip strip USE 1 STRIP IN VITRO TO TEST BLOOD SUGAR DAILY BEFORE BREAKFAST 9/20/20  Yes Lei Whitley III, MD   albuterol (PROVENTIL VENTOLIN) 2.5 mg /3 mL (0.083 %) nebu TAKE 3ML BY NEBULIZATION ROUTE THREE TIMES A DAY AS NEEDED FOR WHEEZING 8/11/20  Yes Blas Landeros MD   Spiriva Respimat 2.5 mcg/actuation inhaler USE 2 INHALATIONS ORALLY   DAILY 8/7/20  Yes Blas Landeros MD   isosorbide mononitrate ER (IMDUR) 30 mg tablet TAKE 1/2 TABLET BY MOUTH EVERY MORNING 7/27/20  Yes Travis Ceja MD   acetylcysteine (MUCOMYST) 200 mg/mL (20 %) solution USE 2 ML IN NEBULIZER TID 6/11/20  Yes Blas Landeros MD   aspirin 81 mg chewable tablet Take 1 Tab by mouth daily. Yes Travis Ceja MD   coenzyme q10 10 mg cap Take 10 mg by mouth daily. Yes Provider, Historical   cyanocobalamin (VITAMIN B-12) 500 mcg tablet Take 500 mcg by mouth daily. Yes Provider, Historical   folic acid 300 mcg tablet Take 400 mcg by mouth daily. Yes Provider, Historical   nitroglycerin (NITROSTAT) 0.4 mg SL tablet 1 Tab by SubLINGual route every five (5) minutes as needed for Chest Pain. 8/10/18  Yes Luana العراقي NP   MAGNESIUM PO Take 500 mg by mouth daily.    Yes Provider, Historical   acetaminophen (TYLENOL) 500 mg tablet Take 1 tablet by mouth every eight (8) hours as needed for Pain. 8/28/14  Yes Angel Dickey MD   CHOLECALCIFEROL, VITAMIN D3, (VITAMIN D3 PO) Take 1,000 Units by mouth daily. Yes Provider, Historical   multivitamins-minerals-lutein (CENTRUM SILVER) Tab Take 1 Tab by mouth daily. 5/14/10  Yes Provider, Historical   fexofenadine (ALLEGRA) 180 mg tablet Take 1 Tab by mouth daily. 1/25/10  Yes Natalya Seth MD   famotidine (PEPCID AC) 20 mg tablet Take 20 mg by mouth daily. 6/27/11  Yes Provider, Historical   VITAMIN B COMPLEX (B COMPLEX PO) Take 1 Tab by mouth daily. Yes Provider, Historical   ASCORBIC ACID (VITAMIN C PO) Take 1,000 mg by mouth daily. Yes Provider, Historical       Review of Systems:    General, constitutional: balance difficulties  Eyes, vision: negative  Ears, nose, throat: negative  Cardiovascular, heart: negative  Respiratory: negative  Gastrointestinal: negative  Genitourinary: negative  Musculoskeletal: hip pain  Skin and integumentary: compression stockings are not on today but he does typically wear them  Psychiatric: negative  Endocrine: negative  Neurological: negative, except for HPI  Hematologic/lymphatic: negative  Allergy/immunology: negative      Objective:     Visit Vitals  BP 98/62 (BP 1 Location: Right arm, BP Patient Position: Sitting, BP Cuff Size: Adult)   Pulse 81   Temp 97.7 °F (36.5 °C)   Resp 18   Wt 186 lb (84.4 kg)   SpO2 95%   BMI 26.69 kg/m²       Physical Exam:    General:  appears well nourished in no acute distress  Neck: no carotid bruits  Lungs: clear to auscultation  Heart:  no murmurs, regular rate  Lower extremity: peripheral pulses palpable. Mild edema. No compression stockings today  Skin: intact    Neurological exam:    Awake, alert, oriented to person, place and time. Slow to respond, but appropriate. Recent and remote memory were normal  Attention and concentration were intact  Language was intact.   There was no aphasia  Speech: no dysarthria. No true scanning property. Fund of knowledge was preserved    Cranial nerves:   II-XII were tested    [employer] fields were full  Eomi, he does have notable horizontal nystagmus with endgaze (R >L). Also with vertical nystagmus to vertical gaze. This is unchanged from last visit  Facial sensation:  normal and symmetric  Facial motor: normal and symmetric  Hearing intact  SCM strength intact  Tongue: midline without fasciculations    Motor: Tone normal    No evidence of fasciculations    Strength testing:   deltoid triceps biceps Wrist ext. Wrist flex. intrinsics Hip flex. Hip ext. Knee ext. Knee flex Dorsi flex Plantar flex   Right 5 5 5 5 5 5 5 5 5 5 5 5   Left 5 5 5 5 5 5 5 5 5 5 5 5         Sensory:  Upper extremity: intact to pp, light touch, and vibration > 10 seconds  Lower extremity: decrease to pp up to mid shin. Vibration was absent in his toes, 4 seconds in ankles, 8 seconds at the knees. Sensory examination is unchanged  Reflexes:    Right Left  Biceps  3 3  Triceps 3 3  Brachiorad. 3 3  Patella  3 3  Achilles 1 1    Cerebellar testing:  no tremor apparent, finger/nose and adebayo were intact. Slow, but intact. I did not appreciate a tremor today    Gait: unsteady. Has his rolling walker with him today.   He is much safer with his rolling walker  Labs:     Lab Results   Component Value Date/Time    Hemoglobin A1c 7.1 (H) 12/18/2020 09:01 AM    Sodium 138 12/18/2020 09:01 AM    Potassium 4.9 12/18/2020 09:01 AM    Chloride 102 12/18/2020 09:01 AM    Glucose 146 (H) 12/18/2020 09:01 AM    BUN 24 12/18/2020 09:01 AM    Creatinine 1.20 12/18/2020 09:01 AM    Calcium 9.5 12/18/2020 09:01 AM    WBC 10.3 12/18/2020 09:01 AM    HCT 42.6 12/18/2020 09:01 AM    HGB 14.2 12/18/2020 09:01 AM    PLATELET 854 22/98/1444 09:01 AM       Imaging:    Results from [place] encounter on 02/12/19   MRI [facility] SPINE WO CONT    Narrative EXAM:  [facility] CONT    INDICATION: myelopathy. Cerebrovascular disease, unspecified    COMPARISON: 3/3/2015    TECHNIQUE: MR imaging of the thoracic spine was performed using the following  sequences: sagittal T1, T2, stir; axial T1, T2.     CONTRAST: None. FINDINGS:    There is normal alignment of the thoracic spine. Vertebral body heights are  maintained. Marrow signal is normal. Severe disc space narrowing is noted at  T9-T10. Moderate/severe disc space narrowing is present at T8-T9. Moderate disc  space narrowing is present at T6-T7. Osteophytic endplate changes are noted at  multiple levels. The course, caliber, and signal intensity of the spinal cord are normal.     Patchy, indeterminant signal changes are noted in the lungs. Pulmonary nodules  are not excluded. .     Minimal disc bulges, protrusions, or disc osteophyte complexes are noted at  multiple levels in the and thoracic spine. The largest is a right paracentral  disc protrusion at T7-T8 partially effacing the right lateral recess. Moderate/severe left neuroforaminal narrowing at T9-T10. Multilevel mild to  mild/moderate neuroforaminal narrowing is seen at multiple other levels  bilaterally. No spinal canal stenosis. Impression IMPRESSION:  1. Multilevel degenerative disc disease and degenerative changes. Moderate/severe neuroforaminal narrowing at T9-T10. No spinal canal stenosis. Please see above report. 2. Nonspecific patchy signal changes in the lower lobes. Pulmonary nodules are  not excluded. CT chest could be obtained for further assessment, as clinically  indicated. Results from [place] encounter on 02/25/20   CT HEAD WO CONT    Narrative EXAM: CT HEAD WO CONT    INDICATION: follow up    COMPARISON: None. CONTRAST: None. TECHNIQUE: Unenhanced CT of the head was performed using 5 mm images. Brain and  bone windows were generated.   CT dose reduction was achieved through use of a  standardized protocol tailored for this examination and automatic exposure  control for dose modulation. FINDINGS:  No calvarial abnormalities are detected. Abnormal soft tissue density is noted  within the right maxillary sinus. Specifically, the dome-shaped soft tissue  density is suggestive of a mucous retention cyst/polyp and there is evidence of  mucosal thickening as well. The previously described bilateral extracerebral  fluid collections are again identified. These appear to represent subdural  hematomata. The previously described small areas of acute hemorrhage on the left  are again identified. As seen on axial image 21, there has been a slight  decrease in the size/density of the small collection of blood in the left  frontal region. No new collections of acute hemorrhage are identified. The  previously described focal area of decreased attenuation in the left parietal  region is again noted and is compatible with an old infarct. There continues to  be periventricular low density which is compatible with white matter disease. There is evidence of moderate cerebral atrophy. Impression IMPRESSION:  1. Presence of bilateral subdural hematomata. Presence of small, acute  hemorrhagic elements on the left as described above. 2. 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 3. Presence of periventricular low density compatible with white matter disease. 4. Evidence of moderate cerebral atrophy. MRI of his brain from February 2019. I do think there is notable atrophy and this is quite prominent in the bilateral cerebellum. Assessment and Plan: This is a pleasant 63-year-old gentleman with multiple medical problems as listed below which impact his overall neurological health who returns to clinic today with continued slow progressive decline in his neurological function. His examination is notable for an gaze nystagmus in multiple spheres, a length dependent sensory neuropathy, and hyperreflexia throughout.   He has been given the diagnosis of a spinocerebellar degeneration. 1. 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 Been only minimal slow progression in his disease from last visit. Balance and safety are the most important aspects of preventing harm. We had discussed this previously. He is now using his rolling walker all the time which has made a significant change in his stability and he has not had any falls. He continues to not drive. Given his challenges with ambulation, I did complete form today for him to get a DMV placard    2. Peripheral neuropathy:  This is most likely due to his long-standing history of diabetes. I stressed to him the importance of tight glycemic control. His neuropathy examination is unchanged from prior. Neuropathy:  we reviewed the causes contributing to the neuropathy. We discussed the importance of exercise and activity. I also reviewed the importance of safety with ambulation and ways to prevents falls. I did stress to him the importance of increasing his exercise and activity. 3. Vascular disease: We did talk about his increased risk of stroke due to his history of hypertension, diabetes, atrial flutter, and high cholesterol. He will continue on aspirin monotherapy as he cannot take anticoagulation due to recent hemorrhage    He will continue with aggressive blood pressure, glucose, and cholesterol control. 4. subdural hematoma 1 year ago: This occurred in February 2020. I do not see any new neurological impairment from this. He will continue off of anticoagulation    5. Restless leg syndrome:  By clinical history. He did not complain of this today. This is something we will continue to monitor. 6. Essential tremor:  Intermittent in nature by the patient and his wife. I do not see any evidence of a tremor on today's examination.   This is something we will closely watch. If it does worsen or impact his ADLs, a medication can be is considered.     He has received his coronavirus vaccine        Patient Active Problem List   Diagnosis Code    GERD (gastroesophageal reflux disease) K21.9    Low back pain M54.5    Pure hypercholesterolemia E78.00    COPD (chronic obstructive pulmonary disease) ([facility] J44.9    Osteoarthritis of hip M16.9    Status post hip replacement Z96.649    Personal history of prostate cancer Z85.46    S/P drug eluting coronary stent placement Z95.5    Incidental pulmonary nodule, greater than or equal to 8mm R91.1    Coronary artery disease involving native coronary artery without angina pectoris I25.10    Retinal hemorrhage H35.60    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    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    SOBOE (shortness of breath on exertion) R06.02    Advance directive discussed with patient Z71.89    Degenerative joint disease of right hip M16.11    Primary localized osteoarthrosis of right hip M16.11    Diabetic peripheral neuropathy associated with type 2 diabetes mellitus ([facility]) E11.42    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    B12 deficiency E53.8    Ataxia R27.0    Sensory ataxic gait R26.0    Cervical arthritis with myelopathy M47.12    Degenerative cervical spinal stenosis M48.02    Bilateral carotid artery stenosis I65.23    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    Vitamin D deficiency E55.9    Vestibular vertigo H81.399    Lumbar back pain with radiculopathy affecting left lower extremity M54.16    Lumbar back pain with radiculopathy affecting right lower extremity M54.16    Type 2 diabetes mellitus with nephropathy ([facility]) E11.21    S/P cardiac cath Z98.890    Angina, class III ([facility]) I20.9    Encounter for staple removal Z48.02    PVD (peripheral vascular disease) ([facility]) I73.9    Varicose veins of both legs with edema I83.893    Atrial flutter ([facility]) I48.92    Mixed hyperlipidemia E78.2    Essential hypertension I10    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 The patient had the ability to ask questions and all questions were answered.                  Signed By:  Eddy Prader, DO FAAN    March 23, 2021

## 2021-03-23 ENCOUNTER — OFFICE VISIT (OUTPATIENT)
Dept: NEUROLOGY | Age: 86
End: 2021-03-23
Payer: MEDICARE

## 2021-03-23 VITALS
TEMPERATURE: 97.7 F | RESPIRATION RATE: 18 BRPM | OXYGEN SATURATION: 95 % | SYSTOLIC BLOOD PRESSURE: 98 MMHG | DIASTOLIC BLOOD PRESSURE: 62 MMHG | WEIGHT: 186 LBS | HEART RATE: 81 BPM | BODY MASS INDEX: 26.69 KG/M2

## 2021-03-23 DIAGNOSIS — G25.0 ESSENTIAL TREMOR: ICD-10-CM

## 2021-03-23 DIAGNOSIS — R27.0 ATAXIA: Primary | ICD-10-CM

## 2021-03-23 DIAGNOSIS — E11.42 DIABETIC PERIPHERAL NEUROPATHY ASSOCIATED WITH TYPE 2 DIABETES MELLITUS (HCC): ICD-10-CM

## 2021-03-23 PROCEDURE — G8536 NO DOC ELDER MAL SCRN: HCPCS | Performed by: PSYCHIATRY & NEUROLOGY

## 2021-03-23 PROCEDURE — G8419 CALC BMI OUT NRM PARAM NOF/U: HCPCS | Performed by: PSYCHIATRY & NEUROLOGY

## 2021-03-23 PROCEDURE — 1100F PTFALLS ASSESS-DOCD GE2>/YR: CPT | Performed by: PSYCHIATRY & NEUROLOGY

## 2021-03-23 PROCEDURE — 99214 OFFICE O/P EST MOD 30 MIN: CPT | Performed by: PSYCHIATRY & NEUROLOGY

## 2021-03-23 PROCEDURE — G8432 DEP SCR NOT DOC, RNG: HCPCS | Performed by: PSYCHIATRY & NEUROLOGY

## 2021-03-23 PROCEDURE — G8427 DOCREV CUR MEDS BY ELIG CLIN: HCPCS | Performed by: PSYCHIATRY & NEUROLOGY

## 2021-03-23 PROCEDURE — 3288F FALL RISK ASSESSMENT DOCD: CPT | Performed by: PSYCHIATRY & NEUROLOGY

## 2021-04-05 NOTE — TELEPHONE ENCOUNTER
Requested Prescriptions     Pending Prescriptions Disp Refills    fluticasone propion-salmeteroL (Wixela Inhub) 250-50 mcg/dose diskus inhaler 3 Inhaler 2                  Jacobo Bal, [address] [facility] DQA  [phone]

## 2021-04-06 RX ORDER — FLUTICASONE PROPIONATE AND SALMETEROL 250; 50 UG/1; UG/1
POWDER RESPIRATORY (INHALATION)
Qty: 3 INHALER | Refills: 2 | Status: SHIPPED | OUTPATIENT
Start: 2021-04-06 | End: 2022-01-31

## 2021-05-03 ENCOUNTER — OFFICE VISIT (OUTPATIENT)
Dept: INTERNAL MEDICINE CLINIC | Age: 86
End: 2021-05-03
Payer: MEDICARE

## 2021-05-03 VITALS
WEIGHT: 187 LBS | BODY MASS INDEX: 26.77 KG/M2 | SYSTOLIC BLOOD PRESSURE: 158 MMHG | HEART RATE: 82 BPM | TEMPERATURE: 98.6 F | HEIGHT: 70 IN | RESPIRATION RATE: 16 BRPM | OXYGEN SATURATION: 100 % | DIASTOLIC BLOOD PRESSURE: 80 MMHG

## 2021-05-03 DIAGNOSIS — R06.02 SOBOE (SHORTNESS OF BREATH ON EXERTION): ICD-10-CM

## 2021-05-03 DIAGNOSIS — M54.16 LUMBAR BACK PAIN WITH RADICULOPATHY AFFECTING LEFT LOWER EXTREMITY: Primary | ICD-10-CM

## 2021-05-03 DIAGNOSIS — E11.3593 TYPE 2 DIABETES MELLITUS WITH PROLIFERATIVE RETINOPATHY OF BOTH EYES, WITHOUT LONG-TERM CURRENT USE OF INSULIN, MACULAR EDEMA PRESENCE UNSPECIFIED, UNSPECIFIED PROLIFERATIVE RETINOPATHY TYPE (HCC): ICD-10-CM

## 2021-05-03 DIAGNOSIS — M10.9 GOUT OF LEFT FOOT, UNSPECIFIED CAUSE, UNSPECIFIED CHRONICITY: ICD-10-CM

## 2021-05-03 DIAGNOSIS — I73.9 PVD (PERIPHERAL VASCULAR DISEASE) (HCC): ICD-10-CM

## 2021-05-03 DIAGNOSIS — R29.898 WEAKNESS OF BOTH LEGS: ICD-10-CM

## 2021-05-03 DIAGNOSIS — J41.1 MUCOPURULENT CHRONIC BRONCHITIS (HCC): ICD-10-CM

## 2021-05-03 PROCEDURE — G0463 HOSPITAL OUTPT CLINIC VISIT: HCPCS | Performed by: INTERNAL MEDICINE

## 2021-05-03 PROCEDURE — G8419 CALC BMI OUT NRM PARAM NOF/U: HCPCS | Performed by: INTERNAL MEDICINE

## 2021-05-03 PROCEDURE — G8510 SCR DEP NEG, NO PLAN REQD: HCPCS | Performed by: INTERNAL MEDICINE

## 2021-05-03 PROCEDURE — 3288F FALL RISK ASSESSMENT DOCD: CPT | Performed by: INTERNAL MEDICINE

## 2021-05-03 PROCEDURE — G8427 DOCREV CUR MEDS BY ELIG CLIN: HCPCS | Performed by: INTERNAL MEDICINE

## 2021-05-03 PROCEDURE — G8536 NO DOC ELDER MAL SCRN: HCPCS | Performed by: INTERNAL MEDICINE

## 2021-05-03 PROCEDURE — 99214 OFFICE O/P EST MOD 30 MIN: CPT | Performed by: INTERNAL MEDICINE

## 2021-05-03 PROCEDURE — 1100F PTFALLS ASSESS-DOCD GE2>/YR: CPT | Performed by: INTERNAL MEDICINE

## 2021-05-03 RX ORDER — PREDNISONE 10 MG/1
TABLET ORAL
Qty: 30 TAB | Refills: 0 | Status: SHIPPED | OUTPATIENT
Start: 2021-05-03 | End: 2021-06-23 | Stop reason: ALTCHOICE

## 2021-05-03 NOTE — PROGRESS NOTES
Verified name and birth date for privacy precautions. Chart reviewed in preparation for today's visit. Chief Complaint   Patient presents with    Foot Swelling     left foot swollen x 1 week.  causing issues with gait           Health Maintenance Due   Topic    COVID-19 Vaccine (1)    Shingrix Vaccine Age 50> (1 of 2)    Foot Exam Q1     MICROALBUMIN Q1          Wt Readings from Last 3 Encounters:   05/03/21 187 lb (84.8 kg)   03/23/21 186 lb (84.4 kg)   01/06/21 188 lb 8 oz (85.5 kg)     Temp Readings from Last 3 Encounters:   05/03/21 98.6 °F (37 °C) (Temporal)   03/23/21 97.7 °F (36.5 °C)   12/11/20 97.6 °F (36.4 °C) (Temporal)     BP Readings from Last 3 Encounters:   05/03/21 (!) 158/80   03/23/21 98/62   01/06/21 134/84     Pulse Readings from Last 3 Encounters:   05/03/21 82   03/23/21 81   12/11/20 85         Learning Assessment:  :     Learning Assessment 1/28/2019 1/23/2018 10/24/2017 6/25/2014   PRIMARY LEARNER Patient Patient Patient Patient   HIGHEST LEVEL OF EDUCATION - PRIMARY LEARNER  - - - SOME COLLEGE   BARRIERS PRIMARY LEARNER - - - NONE   CO-LEARNER CAREGIVER - - - No   PRIMARY LANGUAGE ENGLISH ENGLISH ENGLISH ENGLISH   LEARNER PREFERENCE PRIMARY DEMONSTRATION DEMONSTRATION DEMONSTRATION LISTENING   ANSWERED BY patient patient patient self   RELATIONSHIP SELF SELF SELF SELF       Depression Screening:  :     3 most recent PHQ Screens 5/3/2021   Little interest or pleasure in doing things Not at all   Feeling down, depressed, irritable, or hopeless Not at all   Total Score PHQ 2 0   Trouble falling or staying asleep, or sleeping too much -   Feeling tired or having little energy -   Poor appetite, weight loss, or overeating -   Feeling bad about yourself - or that you are a failure or have let yourself or your family down -   Trouble concentrating on things such as school, work, reading, or watching TV -   Moving or speaking so slowly that other people could have noticed; or the opposite being so fidgety that others notice -   Thoughts of being better off dead, or hurting yourself in some way -   PHQ 9 Score -   How difficult have these problems made it for you to do your work, take care of your home and get along with others -       Fall Risk Assessment:  :     Fall Risk Assessment, last 12 mths 5/3/2021   Able to walk? Yes   Fall in past 12 months? 0   Do you feel unsteady? 1   Are you worried about falling 1   Is the gait abnormal? 1   Number of falls in past 12 months 0   Fall with injury? -       Abuse Screening:  :     Abuse Screening Questionnaire 5/3/2021 1/6/2021 2/25/2020 2/7/2019 7/20/2018 6/10/2015   Do you ever feel afraid of your partner? N N N N N N   Are you in a relationship with someone who physically or mentally threatens you? N N N N N N   Is it safe for you to go home?  David Denis

## 2021-05-03 NOTE — PROGRESS NOTES
HPI:  Aniceto Yu is a 80y.o. year old male who is here for multiple issues. Over the last week he has had pain in his left foot. It was red and swollen over the proximal fifth metatarsal head. Over the last couple days it is actually somewhat improved. His wife has noted some increasing issues with dyspnea on exertion and COPD. He has cough productive of tan sputum. No fevers or chills. No sweats. No nausea or vomiting. No change in bowel or bladder habits. He is noted some increased difficulty with walking and somewhat weakness in both legs. No falls. Past Medical History:   Diagnosis Date    Arrhythmia     Asthma     CAD (coronary artery disease)     Carotid artery disease (HCC)     Chronic hip pain     COPD (chronic obstructive pulmonary disease) ([address] 9/10/2010    Diabetes ([address]     DJD (degenerative joint disease)     GERD (gastroesophageal reflux disease)     HNP (herniated nucleus pulposus) 6/2006    lumbar     Hypercholesterolemia     Hypertension     Nausea & vomiting     Prostate cancer ([address] 2003    Reversible ischemic neurologic deficit ([facility] 1990    suspected with no residual effects and no recurrance    TIA (transient ischemic attack)     TIA- no deficiets       Past Surgical History:   Procedure Laterality Date    COLONOSCOPY  12/15/2004    Diverticulosis Dr. Erendira Higgins repeat 10 years    HX COLONOSCOPY  2005    HX HEART CATHETERIZATION      2  stents     HX HEART CATHETERIZATION  08/20/2018    failed 100% block @ [facility] will plan to do laser next.  HX HERNIA REPAIR  9/2008    left inguinal hernia repair by Dr. Jonathan Hassan Right     inguinal hernai repair    HX HERNIA REPAIR      umbilical hernia repair    HX HIP REPLACEMENT Right 07/06/2016    HX PROSTATECTOMY  2003    HX TONSILLECTOMY      IN TOTAL HIP ARTHROPLASTY Left 2/9/11    Dr. Zarina España at [facility]       Prior to Admission medications    Medication Sig Start Date End Date Taking? Authorizing Provider   predniSONE (DELTASONE) 10 mg tablet 4 for 3 days, 3 for 3 days, 2 for 3 days, and 1 for 3 days. 5/3/21  Yes Singh Amador MD   fluticasone propion-salmeteroL (Wixela Inhub) 250-50 mcg/dose diskus inhaler USE 1 INHALATION ORALLY    EVERY 12 HOURS 4/6/21  Yes Singh Amador MD   lisinopriL (PRINIVIL, ZESTRIL) 5 mg tablet Take 0.5 Tabs by mouth daily. 3/22/21  Yes Rubi Hernandez NP   albuterol (Ventolin HFA) 90 mcg/actuation inhaler Take 2 Puffs by inhalation every four (4) hours as needed for Wheezing. 1/21/21  Yes Singh Amador MD   metoprolol succinate (TOPROL-XL) 25 mg XL tablet TAKE 1/2 TABLET BY MOUTH EVERY DAY 1/7/21  Yes Nghia SPENCER NP   metFORMIN ER (GLUCOPHAGE XR) 500 mg tablet TAKE 3 TABLETS BY MOUTH EVERY DAY WITH DINNER 1/3/21  Yes Singh Amador MD   simvastatin (ZOCOR) 20 mg tablet TAKE 1 TABLET BY MOUTH EVERY DAY EVERY NIGHT 1/3/21  Yes Singh Amador MD   glipiZIDE (GLUCOTROL) 5 mg tablet TAKE 1 TABLET BY MOUTH EVERY DAY 10/25/20  Yes Singh Amador MD   albuterol (PROVENTIL VENTOLIN) 2.5 mg /3 mL (0.083 %) nebu TAKE 3ML BY NEBULIZATION ROUTE THREE TIMES A DAY AS NEEDED FOR WHEEZING 8/11/20  Yes Singh Amador MD   Spiriva Respimat 2.5 mcg/actuation inhaler USE 2 INHALATIONS ORALLY   DAILY 8/7/20  Yes Singh Amador MD   isosorbide mononitrate ER (IMDUR) 30 mg tablet TAKE 1/2 TABLET BY MOUTH EVERY MORNING 7/27/20  Yes Ousmane Munoz MD   acetylcysteine (MUCOMYST) 200 mg/mL (20 %) solution USE 2 ML IN NEBULIZER TID 6/11/20  Yes Singh Amador MD   aspirin 81 mg chewable tablet Take 1 Tab by mouth daily. Yes Ousmane Munoz MD   coenzyme q10 10 mg cap Take 10 mg by mouth daily. Yes Provider, Historical   cyanocobalamin (VITAMIN B-12) 500 mcg tablet Take 500 mcg by mouth daily. Yes Provider, Historical   folic acid 964 mcg tablet Take 400 mcg by mouth daily.    Yes Provider, Historical   nitroglycerin (NITROSTAT) 0.4 mg SL tablet 1 Tab by SubLINGual route every five (5) minutes as needed for Chest Pain. 8/10/18  Yes Margurite Meth, NP   MAGNESIUM PO Take 500 mg by mouth daily. Yes Provider, Historical   acetaminophen (TYLENOL) 500 mg tablet Take 1 tablet by mouth every eight (8) hours as needed for Pain. 14  Yes Angel Dickey MD   CHOLECALCIFEROL, VITAMIN D3, (VITAMIN D3 PO) Take 1,000 Units by mouth daily. Yes Provider, Historical   multivitamins-minerals-lutein (CENTRUM SILVER) Tab Take 1 Tab by mouth daily. 5/14/10  Yes Provider, Historical   fexofenadine (ALLEGRA) 180 mg tablet Take 1 Tab by mouth daily. 1/25/10  Yes Marck Staples MD   famotidine (PEPCID AC) 20 mg tablet Take 20 mg by mouth daily. 11  Yes Provider, Historical   VITAMIN B COMPLEX (B COMPLEX PO) Take 1 Tab by mouth daily. Yes Provider, Historical   ASCORBIC ACID (VITAMIN C PO) Take 1,000 mg by mouth daily.    Yes Provider, Historical   OneTouch Ultra Blue Test Strip strip USE 1 STRIP IN VITRO TO TEST BLOOD SUGAR DAILY BEFORE BREAKFAST 20   Madison Quinn MD       Social History     Socioeconomic History    Marital status:      Spouse name: Not on file    Number of children: Not on file    Years of education: Not on file    Highest education level: Not on file   Occupational History    Not on file   Social Needs    Financial resource strain: Not on file    Food insecurity     Worry: Not on file     Inability: Not on file    Transportation needs     Medical: Not on file     Non-medical: Not on file   Tobacco Use    Smoking status: Former Smoker     Packs/day: 3.00     Years: 10.00     Pack years: 30.00     Types: Cigarettes     Quit date: 1/15/1970     Years since quittin.3    Smokeless tobacco: Never Used   Substance and Sexual Activity    Alcohol use: No    Drug use: Yes     Types: Prescription, OTC    Sexual activity: Yes     Partners: Female     Birth control/protection: None   Lifestyle  Physical activity     Days per week: Not on file     Minutes per session: Not on file    Stress: Not on file   Relationships    Social connections     Talks on phone: Not on file     Gets together: Not on file     Attends [employer] service: Not on file     Active member of club or organization: Not on file     Attends meetings of clubs or organizations: Not on file     Relationship status: Not on file    Intimate partner violence     Fear of current or ex partner: Not on file     Emotionally abused: Not on file     Physically abused: Not on file     Forced sexual activity: Not on file   Other Topics Concern    Not on file   Social History Narrative    Not on file          ROS  Per HPI    Visit Vitals  BP (!) 158/80 (BP 1 Location: Right arm, BP Patient Position: Sitting, BP Cuff Size: Large adult)   Pulse 82   Temp 98.6 °F (37 °C) (Temporal)   Resp 16   Ht 5' 10\" (1.778 m) Comment: obtained at previous visit   Wt 187 lb (84.8 kg)   SpO2 100%   BMI 26.83 kg/m²         Physical Exam   Physical Examination: General appearance - alert, well appearing, and in no distress  Mouth - mucous membranes moist, pharynx normal without lesions  Neck - supple, no significant adenopathy  Chest -diffuse rhonchi and scattered wheezes throughout. Heart - normal rate and regular rhythm  Abdomen - soft, nontender, nondistended, no masses or organomegaly  Neurological - alert, oriented, normal speech, no focal findings or movement disorder noted, motor and sensory grossly normal bilaterally  Musculoskeletal - abnormal exam of left fifth toe with some mild tenderness and swelling over the proximal fifth metatarsal head. No significant redness or warmth. Normal range of motion with minimal discomfort. Extremities - peripheral pulses normal, no pedal edema, no clubbing or cyanosis      Assessment/Plan:  Diagnoses and all orders for this visit:    1.  Lumbar back pain with radiculopathy affecting left lower extremitylikely the cause of the gait issues. Will refer to physical therapy if he is willing.  -     REFERRAL TO PHYSICAL THERAPY    2. Mucopurulent chronic bronchitis (HCC)with some exacerbation of COPD. Will add prednisone and see if that is helpful. 3. PVD (peripheral vascular disease) (HCC)pulses were actually adequate in the foot today. 4. 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 Otherwise reasonably controlled and will follow-up next month for his 6-month labs. 5. SOBOE (shortness of breath on exertion)  -     REFERRAL TO PHYSICAL THERAPY    6. Gout of left foot, unspecified cause, unspecified chronicitysteroids to see if it helps with this. If not improving, consider imaging. 7. Weakness of both legs  -     REFERRAL TO PHYSICAL THERAPY    Other orders  -     predniSONE (DELTASONE) 10 mg tablet; 4 for 3 days, 3 for 3 days, 2 for 3 days, and 1 for 3 days. Advised him to call back or return to office if symptoms worsen/change/persist.  Discussed expected course/resolution/complications of diagnosis in detail with patient. Medication risks/benefits/costs/interactions/alternatives discussed with patient. He was given an after visit summary which includes diagnoses, current medications, & vitals. He expressed understanding with the diagnosis and plan.

## 2021-05-17 ENCOUNTER — TELEPHONE (OUTPATIENT)
Dept: CARDIOLOGY CLINIC | Age: 86
End: 2021-05-17

## 2021-05-17 NOTE — TELEPHONE ENCOUNTER
Spoke with patient and wife, verified on release form. Verified patient with two patient identifiers. Stated 2days ago patient was feeling weak, difficulty walking with walker and looking a little pale when waking up. Currently taking IMDUR 30mg-0.5tab, Metoprolol succinate 25mg-0.5tab, and Lisinopril 5mg tab all at night. Stated 2 days ago took 1 tab of lisinopril, the next night took . 5tab  last night didn't take at all. BP and HR reading from 2 days ago 103/64 and HR 85, Yesterday 95/66 and HR 81, Today 111/73 and HR 77. Takes BP's in the morning and adequate fluid intake per wife. Stated feeling better today and experiencing some dizziness, denied other cardiac symptoms. Wife questioning if patient should stop one blood pressure med. Pls advise.

## 2021-05-17 NOTE — TELEPHONE ENCOUNTER
Tried to reach patient via home number, no answer. Left voicemail to call office back. Attempted cell number, went straight to voicemail.

## 2021-05-17 NOTE — TELEPHONE ENCOUNTER
He was just seen in his PCP office 5/3 with productive cough, placed on steroid. When we saw him last in January he was in atrial flutter, he had been recommended to see Dr Frank Ozuna, which I dont see has occurred. He has multiple reasons therefore to feel weak and have a lower BP. He really should go to ED if having these symptoms.  If refusing yes can hold Lisinopril

## 2021-05-17 NOTE — TELEPHONE ENCOUNTER
Please call patient regarding medication      Isosorbide and Metoprolol    [phone]    Thanks  Tai Kiser

## 2021-05-18 RX ORDER — TIOTROPIUM BROMIDE INHALATION SPRAY 3.12 UG/1
SPRAY, METERED RESPIRATORY (INHALATION)
Qty: 3 INHALER | Refills: 3 | Status: SHIPPED | OUTPATIENT
Start: 2021-05-18 | End: 2022-06-10

## 2021-05-18 NOTE — TELEPHONE ENCOUNTER
Spoke with patient and wife, verified on form. Verified patient with two patient identifiers. Discussed with patient and wife note below from NP. Asked if patient was feeling better today, stated \"I guess\". Just walked down stairs per wife and asked to take BP, 136/90 and HR 83. Stated that if still having symptoms as discussed yesterday, it's recommended he got to the ED. Wife stated he doesn't want to go. Also made aware of not making an appt with Dr. Juan Carlos Browning and stated he didn't want to go through with procedure. Stated that if he was refusing to go to ED, NP said it was okay to hold lisinopril. Reinforced the importance of going to the ED to get evaluated, but still does not want to go. Made aware that if patient started having signs or symptoms or feeling worse to go to ED. Patient and wife verbalized understanding.

## 2021-05-24 ENCOUNTER — TELEPHONE (OUTPATIENT)
Dept: NEUROLOGY | Age: 86
End: 2021-05-24

## 2021-06-07 NOTE — PROGRESS NOTES
Neurology Note    Patient ID:  Claudia Gaspar  [id number]  76 y.o.  12/18/1931      Date of Consultation:  June 8, 2021      Subjective: I still have trouble with my walking. History of Present Illness:   Claudia Gaspar is a 80 y.o. male who returns to the neurology clinic at [facility] for an evaluation. The patient was last seen on September 8, 2020. Please see my history of present illness, examination, and treatment base plan from that day. 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 He also had a history of a peripheral neuropathy which was felt to be related to his diabetes. He was on anticoagulation due to his atrial flutter but then did have a fall and developed a subdural hematoma and his Eliquis was stopped. He has continued to be kept on aspirin monotherapy. Since his last visit, he has not had any falls. He does use his upright walker around the house. His wife does feel that he is slower but still able to ambulate. Outside of the house her longer distances are becoming more challenging for him. He is doing less exercise than he used to. He did get a chair lift installed in the house and this has been helpful in regards to going up and down steps. They are interested in obtaining a light weight manual wheelchair      He does continue to have back pain but it is not bothering him as much today as it was previously. He does state that he continues to have a mild tremor in his hands but it is no worse than last visit and does not impact his day-to-day functioning.       Past Medical History:   Diagnosis Date    Arrhythmia     Asthma     CAD (coronary artery disease)     Carotid artery disease (HCC)     Chronic hip pain     COPD [facility]ch[facility] obstructive [facility] disease) ([facility] 9/10/2010    Diabetes [facility]     DJD (degenerative joint disease)     GERD (gastroesophageal reflux disease)  HNP (herniated nucleus pulposus) 2006    lumbar     Hypercholesterolemia     Hypertension     Nausea & vomiting     Prostate cancer ([facility]     Reversible ischemic neurologic deficit ([facility]     suspected with no residual effects and no recurrance    TIA (transient ischemic attack)     TIA- no deficiets        Past Surgical History:   Procedure Laterality Date    COLONOSCOPY  12/15/2004    Diverticulosis Dr. Gamaliel Alford repeat 10 years    HX COLONOSCOPY      HX HEART CATHETERIZATION      2  stents     HX HEART CATHETERIZATION  2018    failed 100% block @ [facility] will plan to do laser next.  HX HERNIA REPAIR  2008    left inguinal hernia repair by Dr. dE Garner Right     inguinal hernai repair    HX HERNIA REPAIR      umbilical hernia repair    HX HIP REPLACEMENT Right 2016    HX PROSTATECTOMY      HX TONSILLECTOMY      NC TOTAL HIP ARTHROPLASTY Left 11    Dr. Hernandez Backbone at [address] [place] [address]        Family History   Problem Relation Age of Onset    Cancer Mother         Breast cancer    Lung Disease Mother         Emphysema    Cancer Father         Bladder cancer    Lung Disease Father         Emphysema    Hypertension Sister         1    Breast Cancer Sister         Social History     Tobacco Use    Smoking status: Former Smoker     Packs/day: 3.00     Years: 10.00     Pack years: 30.00     Types: Cigarettes     Quit date: 1/15/1970     Years since quittin.4    Smokeless tobacco: Never Used   Substance Use Topics    Alcohol use: No        Allergies   Allergen Reactions    Eliquis [Apixaban] Other (comments)     Caused bleed in brain        Prior to Admission medications    Medication Sig Start Date End Date Taking?  Authorizing Provider   Spiriva Respimat 2.5 mcg/actuation inhaler USE 2 INHALATIONS ORALLY   DAILY 21  Yes Andrew Clarke III, MD   fluticasone propion-salmeteroL (Wixela Inhub) 250-50 mcg/dose diskus inhaler USE 1 INHALATION ORALLY EVERY 12 HOURS 4/6/21  Yes David Collins MD   lisinopriL (PRINIVIL, ZESTRIL) 5 mg tablet Take 0.5 Tabs by mouth daily. 3/22/21  Yes Brooks Escobar NP   albuterol (Ventolin HFA) 90 mcg/actuation inhaler Take 2 Puffs by inhalation every four (4) hours as needed for Wheezing. 1/21/21  Yes David Collins MD   metoprolol succinate (TOPROL-XL) 25 mg XL tablet TAKE 1/2 TABLET BY MOUTH EVERY DAY 1/7/21  Yes Trung SPENCER NP   metFORMIN ER (GLUCOPHAGE XR) 500 mg tablet TAKE 3 TABLETS BY MOUTH EVERY DAY WITH DINNER 1/3/21  Yes David Collins MD   simvastatin (ZOCOR) 20 mg tablet TAKE 1 TABLET BY MOUTH EVERY DAY EVERY NIGHT 1/3/21  Yes David Collins MD   glipiZIDE (GLUCOTROL) 5 mg tablet TAKE 1 TABLET BY MOUTH EVERY DAY 10/25/20  Yes David Collins MD   OneTouch Ultra Blue Test Strip strip USE 1 STRIP IN VITRO TO TEST BLOOD SUGAR DAILY BEFORE BREAKFAST 9/20/20  Yes Juno Smith III, MD   albuterol (PROVENTIL VENTOLIN) 2.5 mg /3 mL (0.083 %) nebu TAKE 3ML BY NEBULIZATION ROUTE THREE TIMES A DAY AS NEEDED FOR WHEEZING 8/11/20  Yes David Collins MD   isosorbide mononitrate ER (IMDUR) 30 mg tablet TAKE 1/2 TABLET BY MOUTH EVERY MORNING 7/27/20  Yes Dorota Travis MD   acetylcysteine (MUCOMYST) 200 mg/mL (20 %) solution USE 2 ML IN NEBULIZER TID 6/11/20  Yes David Collins MD   aspirin 81 mg chewable tablet Take 1 Tab by mouth daily. Yes Dorota Travis MD   coenzyme q10 10 mg cap Take 10 mg by mouth daily. Yes Provider, Historical   cyanocobalamin (VITAMIN B-12) 500 mcg tablet Take 500 mcg by mouth daily. Yes Provider, Historical   folic acid 450 mcg tablet Take 400 mcg by mouth daily. Yes Provider, Historical   nitroglycerin (NITROSTAT) 0.4 mg SL tablet 1 Tab by SubLINGual route every five (5) minutes as needed for Chest Pain. 8/10/18  Yes Janny Dean NP   MAGNESIUM PO Take 500 mg by mouth daily.    Yes Provider, Historical   acetaminophen (TYLENOL) 500 mg tablet Take 1 tablet by mouth every eight (8) hours as needed for Pain. 8/28/14  Yes Angel Dickey MD   CHOLECALCIFEROL, VITAMIN D3, (VITAMIN D3 PO) Take 1,000 Units by mouth daily. Yes Provider, Historical   multivitamins-minerals-lutein (CENTRUM SILVER) Tab Take 1 Tab by mouth daily. 5/14/10  Yes Provider, Historical   fexofenadine (ALLEGRA) 180 mg tablet Take 1 Tab by mouth daily. 1/25/10  Yes Mariza Cruz MD   famotidine (PEPCID AC) 20 mg tablet Take 20 mg by mouth daily. 6/27/11  Yes Provider, Historical   VITAMIN B COMPLEX (B COMPLEX PO) Take 1 Tab by mouth daily. Yes Provider, Historical   ASCORBIC ACID (VITAMIN C PO) Take 1,000 mg by mouth daily. Yes Provider, Historical   predniSONE (DELTASONE) 10 mg tablet 4 for 3 days, 3 for 3 days, 2 for 3 days, and 1 for 3 days. Patient not taking: Reported on 6/8/2021 5/3/21   Merari Reyes MD       Review of Systems:    General, constitutional: balance difficulties  Eyes, vision: negative  Ears, nose, throat: negative  Cardiovascular, heart: negative  Respiratory: negative  Gastrointestinal: negative  Genitourinary: negative  Musculoskeletal: hip pain  Skin and integumentary: He does have his compression stockings on today. Psychiatric: negative  Endocrine: negative  Neurological: negative, except for HPI  Hematologic/lymphatic: negative  Allergy/immunology: negative      Objective:     Visit Vitals  /68 (BP 1 Location: Right arm, BP Patient Position: Sitting, BP Cuff Size: Adult)   Pulse 84   Resp 14   SpO2 93%       Physical Exam:    General:  appears well nourished in no acute distress  Neck: no carotid bruits  Lungs: clear to auscultation  Heart:  no murmurs, regular rate  Lower extremity: peripheral pulses palpable. Mild edema. No compression stockings today  Skin: intact    Neurological exam:    Awake, alert, oriented to person, place and time. Slow to respond, but appropriate.    Recent and remote memory were normal  Attention and concentration were intact  Language was intact. There was no aphasia  Speech: no dysarthria. No true scanning property. Fund of knowledge was preserved    Cranial nerves:   II-XII were tested    [employer] fields were full  Eomi, he does have notable horizontal nystagmus with endgaze (R >L). Also with vertical nystagmus to vertical gaze. This is unchanged   Facial sensation:  normal and symmetric  Facial motor: normal and symmetric  Hearing intact  SCM strength intact  Tongue: midline without fasciculations    Motor: Tone normal    No evidence of fasciculations    Strength testing:   deltoid triceps biceps Wrist ext. Wrist flex. intrinsics Hip flex. Hip ext. Knee ext. Knee flex Dorsi flex Plantar flex   Right 5 5 5 5 5 5 5 5 5 5 5 5   Left 5 5 5 5 5 5 5 5 5 5 5 5         Sensory:  Upper extremity: intact to pp, light touch, and vibration > 10 seconds  Lower extremity: Pinprick was not checked today as the patient had his compression stockings on and did not want to take them off. Vibration was absent in his toes, 4 seconds in ankles,   Reflexes:    Right Left  Biceps  3 3  Triceps 3 3  Brachiorad. 3 3  Patella  3 3  Achilles 1 1    Cerebellar testing:  no tremor apparent, finger/nose and adebayo were intact. Slow, but intact. I did not appreciate a tremor today    Gait: unsteady. I did assist him in ambulating a few steps. He is slow and unsteady.   Labs:     Lab Results   Component Value Date/Time    Hemoglobin A1c 7.1 (H) 12/18/2020 09:01 AM    Sodium 138 12/18/2020 09:01 AM    Potassium 4.9 12/18/2020 09:01 AM    Chloride 102 12/18/2020 09:01 AM    Glucose 146 (H) 12/18/2020 09:01 AM    BUN 24 12/18/2020 09:01 AM    Creatinine 1.20 12/18/2020 09:01 AM    Calcium 9.5 12/18/2020 09:01 AM    WBC 10.3 12/18/2020 09:01 AM    HCT 42.6 12/18/2020 09:01 AM    HGB 14.2 12/18/2020 09:01 AM    PLATELET 611 06/09/7821 09:01 AM       Imaging:    Results from Hospital Encounter encounter on 02/12/19    MRI [facility] SPINE WO CONT    Narrative  EXAM: MRI THORAC SPINE WO CONT    INDICATION: myelopathy. Cerebrovascular disease, unspecified    COMPARISON: 3/3/2015    TECHNIQUE: MR imaging of the thoracic spine was performed using the following  sequences: sagittal T1, T2, stir; axial T1, T2.    CONTRAST: None. FINDINGS:    There is normal alignment of the thoracic spine. Vertebral body heights are  maintained. Marrow signal is normal. Severe disc space narrowing is noted at  T9-T10. Moderate/severe disc space narrowing is present at T8-T9. Moderate disc  space narrowing is present at T6-T7. Osteophytic endplate changes are noted at  multiple levels. The course, caliber, and signal intensity of the spinal cord are normal.    Patchy, indeterminant signal changes are noted in the lungs. Pulmonary nodules  are not excluded. .    Minimal disc bulges, protrusions, or disc osteophyte complexes are noted at  multiple levels in the and thoracic spine. The largest is a right paracentral  disc protrusion at T7-T8 partially effacing the right lateral recess. Moderate/severe left neuroforaminal narrowing at T9-T10. Multilevel mild to  mild/moderate neuroforaminal narrowing is seen at multiple other levels  bilaterally. No spinal canal stenosis. Impression  IMPRESSION:  1. Multilevel degenerative disc disease and degenerative changes. Moderate/severe neuroforaminal narrowing at T9-T10. No spinal canal stenosis. Please see above report. 2. Nonspecific patchy signal changes in the lower lobes. Pulmonary nodules are  not excluded. CT chest could be obtained for further assessment, as clinically  indicated. Results from [place] encounter on 02/25/20    CT HEAD WO CONT    Narrative  EXAM: CT HEAD WO CONT    INDICATION: follow up    COMPARISON: None. CONTRAST: None. TECHNIQUE: Unenhanced CT of the head was performed using 5 mm images. Brain and  bone windows were generated.   CT dose reduction was achieved through use of a  standardized protocol tailored for this examination and automatic exposure  control for dose modulation. FINDINGS:  No calvarial abnormalities are detected. Abnormal soft tissue density is noted  within the right maxillary sinus. Specifically, the dome-shaped soft tissue  density is suggestive of a mucous retention cyst/polyp and there is evidence of  mucosal thickening as well. The previously described bilateral extracerebral  fluid collections are again identified. These appear to represent subdural  hematomata. The previously described small areas of acute hemorrhage on the left  are again identified. As seen on axial image 21, there has been a slight  decrease in the size/density of the small collection of blood in the left  frontal region. No new collections of acute hemorrhage are identified. The  previously described focal area of decreased attenuation in the left parietal  region is again noted and is compatible with an old infarct. There continues to  be periventricular low density which is compatible with white matter disease. There is evidence of moderate cerebral atrophy. Impression  IMPRESSION:  1. Presence of bilateral subdural hematomata. Presence of small, acute  hemorrhagic elements on the left as described above. 2. 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 3. Presence of periventricular low density compatible with white matter disease. 4. Evidence of moderate cerebral atrophy. MRI of his brain from February 2019. I do think there is notable atrophy and this is quite prominent in the bilateral cerebellum. Updated hemoglobin A1c 7.1, LDL 67. Assessment and Plan: This is a pleasant 80-year-old gentleman with multiple medical problems as listed below which impact his overall neurological health who returns to clinic today with continued slow progressive decline in his neurological function.   His examination is notable for an gaze nystagmus in multiple spheres, a length dependent sensory neuropathy, and hyperreflexia throughout. He has been given the diagnosis of a spinocerebellar degeneration. 1. 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 Been only minimal slow progression in his disease from last visit. Balance and safety are the most important aspects of preventing harm. We had discussed this previously. He is now using his rolling walker all the time which has made a significant change in his stability and he has not had any falls. He continues to not drive. I did write a prescription for lightweight manual wheelchair. 2. Peripheral neuropathy:  This is most likely due to his long-standing history of diabetes. His diabetes has improved but still hemoglobin A1c of 7.1. I stressed to him the importance of tight glycemic control. His neuropathy examination is unchanged from prior. Neuropathy:  we reviewed the causes contributing to the neuropathy. We discussed the importance of exercise and activity. I also reviewed the importance of safety with ambulation and ways to prevents falls. I did stress to him the importance of increasing his exercise and activity. His wife is going to start working with him more in regards to trying to be more active. 3. Vascular disease: We did talk about his increased risk of stroke due to his history of hypertension, diabetes, atrial flutter, and high cholesterol. He will continue on aspirin monotherapy as he cannot take anticoagulation due to recent hemorrhage    He will continue with aggressive blood pressure, glucose, and cholesterol control. 4. subdural hematoma in 2020: This occurred in February 2020. I do not see any new neurological impairment from this. He will continue off of anticoagulation    5. Restless leg syndrome:  By clinical history. He did not complain of this today.   This is something we will continue to monitor. 6. Essential tremor:  Intermittent in nature by the patient and his wife. I do not see any evidence of a tremor on today's examination. This is something we will closely watch. If it does worsen or impact his ADLs, a medication can be is considered.     He has received his coronavirus vaccine        Patient Active Problem List   Diagnosis Code    GERD (gastroesophageal reflux disease) K21.9    Low back pain M54.5    Pure hypercholesterolemia E78.00    COPD (chronic obstructive pulmonary disease) ([facility] J44.9    Osteoarthritis of hip M16.9    Status post hip replacement Z96.649    Personal history of prostate cancer Z85.46    S/P drug eluting coronary stent placement Z95.5    Incidental pulmonary nodule, greater than or equal to 8mm R91.1    Coronary artery disease involving native coronary artery without angina pectoris I25.10    Retinal hemorrhage H35.60    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    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    SOBOE (shortness of breath on exertion) R06.02    Advance directive discussed with patient Z71.89    Degenerative joint disease of right hip M16.11    Primary localized osteoarthrosis of right hip M16.11    Diabetic peripheral neuropathy associated with type 2 diabetes mellitus (HCC) E11.42    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    B12 deficiency E53.8    Ataxia R27.0    Sensory ataxic gait R26.0    Cervical arthritis with myelopathy M47.12    Degenerative cervical spinal stenosis M48.02    Bilateral carotid artery stenosis I65.23    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    Vitamin D deficiency E55.9    Vestibular vertigo H81.399    Lumbar back pain with radiculopathy affecting left lower extremity M54.16    Lumbar back pain with radiculopathy affecting right lower extremity M54.16    Type 2 diabetes mellitus with nephropathy (HCC) E11.21    S/P cardiac cath Z98.890    Angina, class III ([facility] I20.9    Encounter for staple removal Z48.02    PVD (peripheral vascular disease) ([facility]) I73.9    Varicose veins of both legs with edema I83.893    Atrial flutter (HCC) I48.92    Mixed hyperlipidemia E78.2    Essential hypertension I10    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 The patient had the ability to ask questions and all questions were answered.                    Signed By:  Natalia Segura DO FAAN    June 8, 2021

## 2021-06-08 ENCOUNTER — OFFICE VISIT (OUTPATIENT)
Dept: NEUROLOGY | Age: 86
End: 2021-06-08
Payer: MEDICARE

## 2021-06-08 VITALS
SYSTOLIC BLOOD PRESSURE: 122 MMHG | RESPIRATION RATE: 14 BRPM | OXYGEN SATURATION: 93 % | DIASTOLIC BLOOD PRESSURE: 68 MMHG | HEART RATE: 84 BPM

## 2021-06-08 DIAGNOSIS — E11.42 DIABETIC PERIPHERAL NEUROPATHY ASSOCIATED WITH TYPE 2 DIABETES MELLITUS (HCC): ICD-10-CM

## 2021-06-08 DIAGNOSIS — G25.0 ESSENTIAL TREMOR: ICD-10-CM

## 2021-06-08 DIAGNOSIS — R27.0 ATAXIA: Primary | ICD-10-CM

## 2021-06-08 PROCEDURE — G8432 DEP SCR NOT DOC, RNG: HCPCS | Performed by: PSYCHIATRY & NEUROLOGY

## 2021-06-08 PROCEDURE — G8427 DOCREV CUR MEDS BY ELIG CLIN: HCPCS | Performed by: PSYCHIATRY & NEUROLOGY

## 2021-06-08 PROCEDURE — 99214 OFFICE O/P EST MOD 30 MIN: CPT | Performed by: PSYCHIATRY & NEUROLOGY

## 2021-06-08 PROCEDURE — G8419 CALC BMI OUT NRM PARAM NOF/U: HCPCS | Performed by: PSYCHIATRY & NEUROLOGY

## 2021-06-08 PROCEDURE — 1100F PTFALLS ASSESS-DOCD GE2>/YR: CPT | Performed by: PSYCHIATRY & NEUROLOGY

## 2021-06-08 PROCEDURE — G8536 NO DOC ELDER MAL SCRN: HCPCS | Performed by: PSYCHIATRY & NEUROLOGY

## 2021-06-08 PROCEDURE — 3288F FALL RISK ASSESSMENT DOCD: CPT | Performed by: PSYCHIATRY & NEUROLOGY

## 2021-06-08 NOTE — LETTER
6/8/2021    Patient: Phan Moreira   YOB: 1931   Date of Visit: 6/8/2021     Jennifer Madera MD  [address] [place] [address]  [address] [place] [address]  Via In Basket    Dear Jennifer Madera MD,      Thank you for referring Mr. Phan Moreira to [address] [place] [address] for evaluation. My notes for this consultation are attached. If you have questions, please do not hesitate to call me. I look forward to following your patient along with you.       Sincerely,    Nam Acharya, DO

## 2021-06-23 ENCOUNTER — OFFICE VISIT (OUTPATIENT)
Dept: INTERNAL MEDICINE CLINIC | Age: 86
End: 2021-06-23
Payer: MEDICARE

## 2021-06-23 VITALS
OXYGEN SATURATION: 95 % | BODY MASS INDEX: 26.05 KG/M2 | HEART RATE: 80 BPM | WEIGHT: 182 LBS | SYSTOLIC BLOOD PRESSURE: 136 MMHG | DIASTOLIC BLOOD PRESSURE: 74 MMHG | RESPIRATION RATE: 14 BRPM | HEIGHT: 70 IN | TEMPERATURE: 97.8 F

## 2021-06-23 DIAGNOSIS — I10 ESSENTIAL HYPERTENSION: ICD-10-CM

## 2021-06-23 DIAGNOSIS — M54.16 LUMBAR BACK PAIN WITH RADICULOPATHY AFFECTING LEFT LOWER EXTREMITY: ICD-10-CM

## 2021-06-23 DIAGNOSIS — E78.2 MIXED HYPERLIPIDEMIA: ICD-10-CM

## 2021-06-23 DIAGNOSIS — E11.3593 TYPE 2 DIABETES MELLITUS WITH PROLIFERATIVE RETINOPATHY OF BOTH EYES, WITHOUT LONG-TERM CURRENT USE OF INSULIN, MACULAR EDEMA PRESENCE UNSPECIFIED, UNSPECIFIED PROLIFERATIVE RETINOPATHY TYPE (HCC): Primary | ICD-10-CM

## 2021-06-23 DIAGNOSIS — I48.3 TYPICAL ATRIAL FLUTTER (HCC): ICD-10-CM

## 2021-06-23 DIAGNOSIS — E11.21 TYPE 2 DIABETES MELLITUS WITH NEPHROPATHY (HCC): ICD-10-CM

## 2021-06-23 DIAGNOSIS — I20.9 ANGINA, CLASS III (HCC): ICD-10-CM

## 2021-06-23 DIAGNOSIS — I67.89 CEREBRAL MICROVASCULAR DISEASE: ICD-10-CM

## 2021-06-23 DIAGNOSIS — E53.8 B12 DEFICIENCY: ICD-10-CM

## 2021-06-23 PROCEDURE — G8427 DOCREV CUR MEDS BY ELIG CLIN: HCPCS | Performed by: INTERNAL MEDICINE

## 2021-06-23 PROCEDURE — 99214 OFFICE O/P EST MOD 30 MIN: CPT | Performed by: INTERNAL MEDICINE

## 2021-06-23 PROCEDURE — 1100F PTFALLS ASSESS-DOCD GE2>/YR: CPT | Performed by: INTERNAL MEDICINE

## 2021-06-23 PROCEDURE — G8536 NO DOC ELDER MAL SCRN: HCPCS | Performed by: INTERNAL MEDICINE

## 2021-06-23 PROCEDURE — G8419 CALC BMI OUT NRM PARAM NOF/U: HCPCS | Performed by: INTERNAL MEDICINE

## 2021-06-23 PROCEDURE — 3288F FALL RISK ASSESSMENT DOCD: CPT | Performed by: INTERNAL MEDICINE

## 2021-06-23 PROCEDURE — G0463 HOSPITAL OUTPT CLINIC VISIT: HCPCS | Performed by: INTERNAL MEDICINE

## 2021-06-23 PROCEDURE — G8510 SCR DEP NEG, NO PLAN REQD: HCPCS | Performed by: INTERNAL MEDICINE

## 2021-06-23 RX ORDER — GLIPIZIDE 5 MG/1
TABLET ORAL
Qty: 90 TABLET | Refills: 2 | Status: SHIPPED | OUTPATIENT
Start: 2021-06-23 | End: 2022-03-17

## 2021-06-23 RX ORDER — ZOSTER VACCINE RECOMBINANT, ADJUVANTED 50 MCG/0.5
0.5 KIT INTRAMUSCULAR ONCE
Qty: 0.5 ML | Refills: 1 | Status: SHIPPED | OUTPATIENT
Start: 2021-06-23 | End: 2021-06-23

## 2021-06-23 NOTE — PROGRESS NOTES
HPI:  Valeria Greco is a 80y.o. year old male who is here for a routine visit:    Presents for a follow-up visit. He continues to have some issues with walking. He recently saw at the neurologist who felt it was related to his ataxia. He has not been doing his exercises from physical therapy. He has not had any recent falls. His blood sugars have been under good control as he is checking them at home. His blood pressures at home have been in the 883-684 systolic range. He does have some cough with some tan to white sputum production. No fevers or chills. No PND or orthopnea. Past Medical History:   Diagnosis Date    Arrhythmia     Asthma     CAD (coronary artery disease)     Carotid artery disease (HCC)     Chronic hip pain     COPD (chronic obstructive pulmonary disease) ([address] 9/10/2010    Diabetes ([address]     DJD (degenerative joint disease)     GERD (gastroesophageal reflux disease)     HNP (herniated nucleus pulposus) 6/2006    lumbar     Hypercholesterolemia     Hypertension     Nausea & vomiting     Prostate cancer ([address] 2003    Reversible ischemic neurologic deficit ([address] 1990    suspected with no residual effects and no recurrance    TIA (transient ischemic attack)     TIA- no deficiets       Past Surgical History:   Procedure Laterality Date    COLONOSCOPY  12/15/2004    Diverticulosis Dr. Cory Beard repeat 10 years    HX COLONOSCOPY  2005    HX HEART CATHETERIZATION      2  stents     HX HEART CATHETERIZATION  08/20/2018    failed 100% block @ [facility] will plan to do laser next.       HX HERNIA REPAIR  9/2008    left inguinal hernia repair by Dr. Papo Wilburn Right     inguinal hernai repair    HX HERNIA REPAIR      umbilical hernia repair    HX HIP REPLACEMENT Right 07/06/2016    HX PROSTATECTOMY  2003    HX TONSILLECTOMY      [place] TOTAL HIP ARTHROPLASTY Left 2/9/11    Dr. Brittany Kee at [facility]       Prior to Admission medications    Medication Sig Start Date End Date Taking? Authorizing Provider   varicella-zoster recombinant, PF, (Shingrix, PF,) 50 mcg/0.5 mL susr injection 0.5 mL by IntraMUSCular route once for 1 dose. 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 1/21/21  Yes Horacio Bundy MD   metoprolol succinate (TOPROL-XL) 25 mg XL tablet TAKE 1/2 TABLET BY MOUTH EVERY DAY 1/7/21  Yes Zuleyka SPENCER, BECKY   metFORMIN ER (GLUCOPHAGE XR) 500 mg tablet TAKE 3 TABLETS BY MOUTH EVERY DAY WITH DINNER 1/3/21  Yes Horacio Bundy MD   simvastatin (ZOCOR) 20 mg tablet TAKE 1 TABLET BY MOUTH EVERY DAY EVERY NIGHT 1/3/21  Yes Horacio Bundy MD   albuterol (PROVENTIL VENTOLIN) 2.5 mg /3 mL (0.083 %) nebu TAKE 3ML BY NEBULIZATION ROUTE THREE TIMES A DAY AS NEEDED FOR WHEEZING 8/11/20  Yes Horacio Bundy MD   isosorbide mononitrate ER (IMDUR) 30 mg tablet TAKE 1/2 TABLET BY MOUTH EVERY MORNING 7/27/20  Yes Wilfredo Gonzalez MD   acetylcysteine (MUCOMYST) 200 mg/mL (20 %) solution USE 2 ML IN NEBULIZER TID 6/11/20  Yes Horacio Bundy MD   aspirin 81 mg chewable tablet Take 1 Tab by mouth daily.    Yes Wilfredo Gonzalez MD   coenzyme q10 10 mg cap Take 10 mg by mouth daily. Yes Provider, Historical   cyanocobalamin (VITAMIN B-12) 500 mcg tablet Take 500 mcg by mouth daily. Yes Provider, Historical   folic acid 098 mcg tablet Take 400 mcg by mouth daily. Yes Provider, Historical   nitroglycerin (NITROSTAT) 0.4 mg SL tablet 1 Tab by SubLINGual route every five (5) minutes as needed for Chest Pain. 8/10/18  Yes Jade Sandhu NP   MAGNESIUM PO Take 500 mg by mouth daily. Yes Provider, Historical   acetaminophen (TYLENOL) 500 mg tablet Take 1 tablet by mouth every eight (8) hours as needed for Pain. 8/28/14  Yes Angel Dickey MD   CHOLECALCIFEROL, VITAMIN D3, (VITAMIN D3 PO) Take 1,000 Units by mouth daily. Yes Provider, Historical   multivitamins-minerals-lutein (CENTRUM SILVER) Tab Take 1 Tab by mouth daily. 5/14/10  Yes Provider, Historical   fexofenadine (ALLEGRA) 180 mg tablet Take 1 Tab by mouth daily. 1/25/10  Yes Julia Palma MD   famotidine (PEPCID AC) 20 mg tablet Take 20 mg by mouth daily. 6/27/11  Yes Provider, Historical   VITAMIN B COMPLEX (B COMPLEX PO) Take 1 Tab by mouth daily. Yes Provider, Historical   ASCORBIC ACID (VITAMIN C PO) Take 1,000 mg by mouth daily. Yes Provider, Historical   predniSONE (DELTASONE) 10 mg tablet 4 for 3 days, 3 for 3 days, 2 for 3 days, and 1 for 3 days. Patient not taking: Reported on 6/8/2021 5/3/21 6/23/21  Richard Pinedo MD   lisinopriL (PRINIVIL, ZESTRIL) 5 mg tablet Take 0.5 Tabs by mouth daily.   Patient not taking: Reported on 6/23/2021 3/22/21 6/23/21  Mark Walton NP   glipiZIDE (GLUCOTROL) 5 mg tablet TAKE 1 TABLET BY MOUTH EVERY DAY 10/25/20 6/23/21  Richard Pinedo MD   OneTouch Ultra Blue Test Strip strip USE 1 STRIP IN VITRO TO TEST BLOOD SUGAR DAILY BEFORE BREAKFAST 9/20/20 6/23/21  Richard Pinedo MD       Social History     Socioeconomic History    Marital status:      Spouse name: Not on file    Number of children: Not on file    Years of education: Not on file    Highest education level: Not on file   Occupational History    Not on file   Tobacco Use    Smoking status: Former Smoker     Packs/day: 3.00     Years: 10.00     Pack years: 30.00     Types: Cigarettes     Quit date: 1/15/1970     Years since quittin.4    Smokeless tobacco: Never Used   Substance and Sexual Activity    Alcohol use: No    Drug use: Yes     Types: Prescription, OTC    Sexual activity: Yes     Partners: Female     Birth control/protection: None   Other Topics Concern    Not on file   Social History Narrative    Not on file     Social Determinants of Health     Financial Resource Strain:     Difficulty of Paying Living Expenses:    Food Insecurity:     Worried About Running Out of Food in the Last Year:     [address] in the Last Year:    Transportation Needs:     Lack of Transportation (Medical):  Lack of Transportation (Non-Medical):    Physical Activity:     Days of Exercise per Week:     Minutes of Exercise per Session:    Stress:     Feeling of Stress :    Social Connections:     Frequency of Communication with Friends and Family:     Frequency of Social Gatherings with Friends and Family:     Attends Church Services:     Active Member of Clubs or Organizations:     Attends Club or Organization Meetings:     Marital Status:    Intimate Partner Violence:     Fear of Current or Ex-Partner:     Emotionally Abused:     Physically Abused:     Sexually Abused:           ROS  Per HPI    Visit Vitals  /74   Pulse 80   Temp 97.8 °F (36.6 °C) (Temporal)   Resp 14   Ht 5' 10\" (1.778 m)   Wt 182 lb (82.6 kg)   SpO2 95%   BMI 26.11 kg/m²         Physical Exam   Physical Examination: General appearance - alert, well appearing, and in no distress  Mouth - mucous membranes moist, pharynx normal without lesions  Neck - supple, no significant adenopathy  Lymphatics - no palpable lymphadenopathy, no hepatosplenomegaly  Chest -had rhonchi throughout. No wheeze.   Heart - normal rate and regular rhythm  Abdomen - soft, nontender, nondistended, no masses or organomegaly  Neurological - alert, oriented, normal speech, no focal findings or movement disorder noted, motor and sensory grossly normal bilaterally  Musculoskeletal - no joint tenderness, deformity or swelling  Extremities - peripheral pulses normal, no pedal edema, no clubbing or cyanosis       Diabetic foot exam performed by Joanie Giron MD     Measurement  Response Nurse Comment Physician Comment   Monofilament  R - 6/6  L - 6/6     Pulse DP R - present  L - present     Pulse TP R - present  L - present     Structural deformity R - None  L - None     Skin Integrity / Deformity R - None  L - None        Reviewed by:               Assessment/Plan:  Diagnoses and all orders for this visit:    1. 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 Controlled. A1c goal of less than 7.5%. Check labs to be sure that is met. Tolerating medications well. 2. Lumbar back pain with radiculopathy affecting left lower extremitysuggested physical therapy even at home and he wishes to defer that for now. Encouraged him to work on his exercise program that was given to him by PT. 3. B12 deficiencyrepleted on oral therapy. 4. Type 2 diabetes mellitus with nephropathy ([facility] [address]  -     HEMOGLOBIN A1C WITH EAG; Future  -     MICROALBUMIN, UR, RAND W/ MICROALB/CREAT RATIO; Future  -      DIABETES FOOT EXAM  -     varicella-zoster recombinant, PF, (Shingrix, PF,) 50 mcg/0.5 mL susr injection; 0.5 mL by IntraMUSCular route once for 1 dose.  -     glipiZIDE (GLUCOTROL) 5 mg tablet; TAKE 1 TABLET BY MOUTH EVERY DAY  -     METABOLIC PANEL, COMPREHENSIVE; Future  -     TSH 3RD GENERATION; Future  -     CBC WITH AUTOMATED DIFF;  Future  -     glucose blood VI test strips (OneTouch Ultra Blue Test Strip) strip; USE 1 STRIP IN VITRO TO TEST BLOOD SUGAR DAILY BEFORE BREAKFAST, DX E11.21    5. Mixed hyperlipidemiaLDL at goal of less than 70 when checked in December. Continue statin drugs. 6. Essential hypertensionblood pressure well controlled off of lisinopril. We will continue to monitor. 7. Typical atrial flutter (HCC)stable. 8. Cerebral microvascular diseasestable. 9. Angina, class III (HCC)stable. 10.  COPDstable on current meds and will follow-up with pulmonary. Advised him to call back or return to office if symptoms worsen/change/persist.  Discussed expected course/resolution/complications of diagnosis in detail with patient. Medication risks/benefits/costs/interactions/alternatives discussed with patient. He was given an after visit summary which includes diagnoses, current medications, & vitals. He expressed understanding with the diagnosis and plan.

## 2021-07-08 RX ORDER — METFORMIN HYDROCHLORIDE 500 MG/1
TABLET, EXTENDED RELEASE ORAL
Qty: 270 TABLET | Refills: 1 | Status: SHIPPED | OUTPATIENT
Start: 2021-07-08 | End: 2021-12-23

## 2021-07-27 NOTE — DISCHARGE INSTRUCTIONS
Cuts Closed With Staples: Care Instructions  Your Care Instructions  A cut can happen anywhere on your body. The doctor used staples to close the cut. Staples easily and quickly close a cut, which helps the cut heal.  Sometimes a cut can injure tendons, blood vessels, or nerves. If the cut went deep and through the skin, the doctor may have put in a layer of stitches below the staples. The deeper layer of stitches brings the deep part of the cut together. These stitches will dissolve and don't need to be removed. The staples in the upper layer are what you see on the cut. You may have a bandage. You will need to have the staples removed, usually in 7 to 14 days. The doctor has checked you carefully, but problems can develop later. If you notice any problems or new symptoms, get medical treatment right away. Follow-up care is a key part of your treatment and safety. Be sure to make and go to all appointments, and call your doctor if you are having problems. It's also a good idea to know your test results and keep a list of the medicines you take. How can you care for yourself at home? · Keep the cut dry for the first 24 to 48 hours. After this, you can shower if your doctor okays it. Pat the cut dry. · Don't soak the cut, such as in a bathtub. Your doctor will tell you when it's safe to get the cut wet. · If your doctor told you how to care for your cut, follow your doctor's instructions. If you did not get instructions, follow this general advice:  ? After the first 24 to 48 hours, wash around the cut with clean water 2 times a day. Don't use hydrogen peroxide or alcohol, which can slow healing. ? You may cover the cut with a thin layer of petroleum jelly, such as Vaseline, and a nonstick bandage. ? Apply more petroleum jelly and replace the bandage as needed. · Avoid any activity that could cause your cut to reopen. · Do not remove the staples on your own.  Your doctor will tell you when to come HR=63 bpm, NUXC=960/69 mmhg, SpO2=99.0 %, Resp=10 B/min, EtCO2=44 mmHg, Apnea=5 Seconds, Pain=0, Shey=10, Carrasco=2 back to have the staples removed. · Take pain medicines exactly as directed. ? If the doctor gave you a prescription medicine for pain, take it as prescribed. ? If you are not taking a prescription pain medicine, ask your doctor if you can take an over-the-counter medicine. When should you call for help? Call your doctor now or seek immediate medical care if:    · You have new pain, or your pain gets worse.     · The skin near the cut is cold or pale or changes color.     · You have tingling, weakness, or numbness near the cut.     · The cut starts to bleed, and blood soaks through the bandage. Oozing small amounts of blood is normal.     · You have trouble moving the area near the cut.     · You have symptoms of infection, such as:  ? Increased pain, swelling, warmth, or redness around the cut.  ? Red streaks leading from the cut.  ? Pus draining from the cut.  ? A fever.    Watch closely for changes in your health, and be sure to contact your doctor if:    · You do not get better as expected. Where can you learn more? Go to http://jasvir-yaron.info/. Enter L467 in the search box to learn more about \"Cuts Closed With Staples: Care Instructions. \"  Current as of: November 20, 2017  Content Version: 11.8  © 0226-7093 [employer]. Care instructions adapted under license by [employer] (which disclaims liability or warranty for this information). If you have questions about a medical condition or this instruction, always ask your healthcare professional. [place] [address] any warranty or liability for your use of this information. Patient Education        Learning About a Closed Head Injury  What is a closed head injury? A closed head injury happens when your head gets hit hard. The strong force of the blow causes your brain to shake in your skull. This movement can cause the brain to bruise, swell, or tear.  Sometimes nerves or blood vessels also get damaged. This can cause bleeding in or around the brain. A concussion is a type of closed head injury. What are the symptoms? If you have a mild concussion, you may have a mild headache or feel \"not quite right. \" These symptoms are common. They usually go away over a few days to 4 weeks. But sometimes after a concussion, you feel like you can't function as well as before the injury. And you have new symptoms. This is called postconcussive syndrome. You may:  · Find it harder to solve problems, think, concentrate, or remember. · Have headaches. · Have changes in your sleep patterns, such as not being able to sleep or sleeping all the time. · Have changes in your personality. · Not be interested in your usual activities. · Feel angry or anxious without a clear reason. · Lose your sense of taste or smell. · Be dizzy, lightheaded, or unsteady. It may be hard to stand or walk. How is a closed head injury treated? Any person who may have a concussion needs to see a doctor. Some people have to stay in the hospital to be watched. Others can go home safely. If you go home, follow your doctor's instructions. He or she will tell you if you need someone to watch you closely for the next 24 hours or longer. Rest is the best treatment. Get plenty of sleep at night. And try to rest during the day. · Avoid activities that are physically or mentally demanding. These include housework, exercise, and schoolwork. And don't play video games, send text messages, or use the computer. You may need to change your school or work schedule to be able to avoid these activities. · Ask your doctor when it's okay to drive, ride a bike, or operate machinery. · Take an over-the-counter pain medicine, such as acetaminophen (Tylenol), ibuprofen (Advil, Motrin), or naproxen (Aleve). Be safe with medicines. Read and follow all instructions on the label. · Check with your doctor before you use any other medicines for pain.   · Do not drink alcohol or use illegal drugs. They can slow recovery. They can also increase your risk of getting a second head injury. Follow-up care is a key part of your treatment and safety. Be sure to make and go to all appointments, and call your doctor if you are having problems. It's also a good idea to know your test results and keep a list of the medicines you take. Where can you learn more? Go to http://jasvir-yaron.info/. Enter E235 in the search box to learn more about \"Learning About a Closed Head Injury. \"  Current as of: June 4, 2018  Content Version: 11.8  © 9523-3285 [employer], [facility]. Care instructions adapted under license by [employer] (which disclaims liability or warranty for this information). If you have questions about a medical condition or this instruction, always ask your healthcare professional. [address] any warranty or liability for your use of this information.

## 2021-07-29 RX ORDER — SIMVASTATIN 20 MG/1
TABLET, FILM COATED ORAL
Qty: 90 TABLET | Refills: 1 | Status: SHIPPED | OUTPATIENT
Start: 2021-07-29 | End: 2022-01-27

## 2021-07-29 RX ORDER — ISOSORBIDE MONONITRATE 30 MG/1
TABLET, EXTENDED RELEASE ORAL
Qty: 45 TABLET | Refills: 4 | Status: SHIPPED | OUTPATIENT
Start: 2021-07-29

## 2021-07-30 ENCOUNTER — OFFICE VISIT (OUTPATIENT)
Dept: CARDIOLOGY CLINIC | Age: 86
End: 2021-07-30
Payer: MEDICARE

## 2021-07-30 VITALS
BODY MASS INDEX: 26.48 KG/M2 | HEART RATE: 82 BPM | WEIGHT: 185 LBS | HEIGHT: 70 IN | OXYGEN SATURATION: 93 % | RESPIRATION RATE: 18 BRPM | DIASTOLIC BLOOD PRESSURE: 80 MMHG | SYSTOLIC BLOOD PRESSURE: 130 MMHG

## 2021-07-30 DIAGNOSIS — I10 ESSENTIAL HYPERTENSION: ICD-10-CM

## 2021-07-30 DIAGNOSIS — Z95.5 S/P DRUG ELUTING CORONARY STENT PLACEMENT: ICD-10-CM

## 2021-07-30 DIAGNOSIS — S06.5XAA SUBDURAL HEMATOMA: ICD-10-CM

## 2021-07-30 DIAGNOSIS — I48.3 TYPICAL ATRIAL FLUTTER (HCC): ICD-10-CM

## 2021-07-30 DIAGNOSIS — I25.10 CORONARY ARTERY DISEASE INVOLVING NATIVE CORONARY ARTERY OF NATIVE HEART WITHOUT ANGINA PECTORIS: Primary | ICD-10-CM

## 2021-07-30 PROCEDURE — 1100F PTFALLS ASSESS-DOCD GE2>/YR: CPT | Performed by: INTERNAL MEDICINE

## 2021-07-30 PROCEDURE — G8536 NO DOC ELDER MAL SCRN: HCPCS | Performed by: INTERNAL MEDICINE

## 2021-07-30 PROCEDURE — 99214 OFFICE O/P EST MOD 30 MIN: CPT | Performed by: INTERNAL MEDICINE

## 2021-07-30 PROCEDURE — G8510 SCR DEP NEG, NO PLAN REQD: HCPCS | Performed by: INTERNAL MEDICINE

## 2021-07-30 PROCEDURE — G8427 DOCREV CUR MEDS BY ELIG CLIN: HCPCS | Performed by: INTERNAL MEDICINE

## 2021-07-30 PROCEDURE — 3288F FALL RISK ASSESSMENT DOCD: CPT | Performed by: INTERNAL MEDICINE

## 2021-07-30 PROCEDURE — G0463 HOSPITAL OUTPT CLINIC VISIT: HCPCS | Performed by: INTERNAL MEDICINE

## 2021-07-30 PROCEDURE — G8419 CALC BMI OUT NRM PARAM NOF/U: HCPCS | Performed by: INTERNAL MEDICINE

## 2021-07-30 PROCEDURE — 93010 ELECTROCARDIOGRAM REPORT: CPT | Performed by: INTERNAL MEDICINE

## 2021-07-30 PROCEDURE — 93005 ELECTROCARDIOGRAM TRACING: CPT | Performed by: INTERNAL MEDICINE

## 2021-07-30 NOTE — LETTER
7/30/2021    Patient: Esdras Xavier   YOB: 1931   Date of Visit: 7/30/2021     Eric Mitchell MD  [address] [place] [address]   [id number] [phone]  Via In Basket    Dear Eric Mitchell MD,      Thank you for referring Mr. Esdras Xavier to [facility] for evaluation. My notes for this consultation are attached. If you have questions, please do not hesitate to call me. I look forward to following your patient along with you.       Sincerely,    Charito Velasquez MD

## 2021-07-30 NOTE — PROGRESS NOTES
Pippa Jay MD          NAME:  Alie Clark   :   1931   MRN:   [id number]   PCP:  Marty Petty MD           Subjective: The patient is a 80y.o. year old male  who returns for a routine follow-up. Since the last visit, patient reports no change in exercise tolerance, chest pain, edema, medication intolerance, palpitations, shortness of breath, PND/orthopnea wheezing, sputum, syncope, dizziness or light headedness. Doing well. Past Medical History:   Diagnosis Date    Arrhythmia     Asthma     Atrial fibrillation ([address]     CAD (coronary artery disease)     Carotid artery disease (HCC)     Chronic hip pain     COPD (chronic obstructive pulmonary disease) ([facility] 9/10/2010    Diabetes ([facility]     DJD (degenerative joint disease)     GERD (gastroesophageal reflux disease)     HNP (herniated nucleus pulposus) 2006    lumbar     Hypercholesterolemia     Hypertension     Nausea & vomiting     Prostate cancer ([facility]     Reversible ischemic neurologic deficit ([facility]     suspected with no residual effects and no recurrance    TIA (transient ischemic attack)     TIA- no deficiets        ICD-10-CM ICD-9-CM    1. Coronary artery disease involving native coronary artery of native heart without angina pectoris  I25.10 414.01 AMB POC EKG ROUTINE W/ 12 LEADS, INTER & REP   2. Essential hypertension  I10 401.9    3. S/P drug eluting coronary stent placement  Z95.5 V45.82    4. Subdural hematoma (HCC)  S06.5X9A 432.1    5.  Typical atrial flutter (HCC)  I48.3 427.32       Social History     Tobacco Use    Smoking status: Former Smoker     Packs/day: 3.00     Years: 10.00     Pack years: 30.00     Types: Cigarettes     Quit date: 1/15/1970     Years since quittin.5    Smokeless tobacco: Never Used   Substance Use Topics    Alcohol use: No      Family History   Problem Relation Age of Onset    Cancer Mother         Breast cancer    Lung Disease Mother         Emphysema  Cancer Father         Bladder cancer    Lung Disease Father         Emphysema    Hypertension Sister         1    Breast Cancer Sister         Review of Systems  Cardiovascular: Negative except as noted in HPI      Objective:       Vitals:    07/30/21 1118   BP: 130/80   Pulse: 82   Resp: 18   SpO2: 93%   Weight: 185 lb (83.9 kg)   Height: 5' 10\" (1.778 m)    Body mass index is 26.54 kg/m². General PE  Mental Status - Alert. General Appearance - Not in acute distress. Chest and Lung Exam   Inspection: Accessory muscles - No use of accessory muscles in breathing. Auscultation:   Breath sounds: - Normal.    Cardiovascular   Inspection: Jugular vein - Bilateral - Inspection Normal.  Palpation/Percussion:   Apical Impulse: - Normal.  Auscultation: Rhythm - Regular. Heart Sounds - S1 WNL and S2 WNL. No S3 or S4. Murmurs & Other Heart Sounds: Auscultation of the heart reveals - No Murmurs. Peripheral Vascular   Upper Extremity: Inspection - Bilateral - No Cyanotic nailbeds or Digital clubbing. Lower Extremity:   Palpation: Edema - Bilateral - No edema. Data Review:     EKG -  EKG: atrial flutter.     LABS- @brieflabs@      Allergies reviewed  Allergies   Allergen Reactions    Eliquis [Apixaban] Other (comments)     Caused bleed in brain       Medications reviewed  Current Outpatient Medications   Medication Sig    simvastatin (ZOCOR) 20 mg tablet TAKE 1 TABLET BY MOUTH EVERY DAY EVERY NIGHT    isosorbide mononitrate ER (IMDUR) 30 mg tablet TAKE 1/2 TABLET BY MOUTH EVERY DAY IN THE MORNING    metFORMIN ER (GLUCOPHAGE XR) 500 mg tablet TAKE 3 TABLETS BY MOUTH EVERY DAY WITH DINNER    glipiZIDE (GLUCOTROL) 5 mg tablet TAKE 1 TABLET BY MOUTH EVERY DAY    glucose blood VI test strips (OneTouch Ultra Blue Test Strip) strip USE 1 STRIP IN VITRO TO TEST BLOOD SUGAR DAILY BEFORE BREAKFAST, DX E11.21    OTHER Manual lightweight wheelchair  Diagnosis:  Spinocerebellar ataxia degeneration    Spiriva Respimat 2.5 mcg/actuation inhaler USE 2 INHALATIONS ORALLY   DAILY    fluticasone propion-salmeteroL (Wixela Inhub) 250-50 mcg/dose diskus inhaler USE 1 INHALATION ORALLY    EVERY 12 HOURS    albuterol (Ventolin HFA) 90 mcg/actuation inhaler Take 2 Puffs by inhalation every four (4) hours as needed for Wheezing.  metoprolol succinate (TOPROL-XL) 25 mg XL tablet TAKE 1/2 TABLET BY MOUTH EVERY DAY    albuterol (PROVENTIL VENTOLIN) 2.5 mg /3 mL (0.083 %) nebu TAKE 3ML BY NEBULIZATION ROUTE THREE TIMES A DAY AS NEEDED FOR WHEEZING    acetylcysteine (MUCOMYST) 200 mg/mL (20 %) solution USE 2 ML IN NEBULIZER TID    aspirin 81 mg chewable tablet Take 1 Tab by mouth daily.  coenzyme q10 10 mg cap Take 10 mg by mouth daily.  cyanocobalamin (VITAMIN B-12) 500 mcg tablet Take 500 mcg by mouth daily.  folic acid 784 mcg tablet Take 400 mcg by mouth daily.  nitroglycerin (NITROSTAT) 0.4 mg SL tablet 1 Tab by SubLINGual route every five (5) minutes as needed for Chest Pain.  MAGNESIUM PO Take 500 mg by mouth daily.  acetaminophen (TYLENOL) 500 mg tablet Take 1 tablet by mouth every eight (8) hours as needed for Pain.  CHOLECALCIFEROL, VITAMIN D3, (VITAMIN D3 PO) Take 1,000 Units by mouth daily.  multivitamins-minerals-lutein (CENTRUM SILVER) Tab Take 1 Tab by mouth daily.  fexofenadine (ALLEGRA) 180 mg tablet Take 1 Tab by mouth daily.  famotidine (PEPCID AC) 20 mg tablet Take 20 mg by mouth daily.  VITAMIN B COMPLEX (B COMPLEX PO) Take 1 Tab by mouth daily.  ASCORBIC ACID (VITAMIN C PO) Take 1,000 mg by mouth daily. No current facility-administered medications for this visit. Assessment:       ICD-10-CM ICD-9-CM    1. Coronary artery disease involving native coronary artery of native heart without angina pectoris  I25.10 414.01 AMB POC EKG ROUTINE W/ 12 LEADS, INTER & REP   2. Essential hypertension  I10 401.9    3.  S/P drug eluting coronary stent placement  Z95.5 V45.82    4. Subdural hematoma (HCC)  S06.5X9A 432.1    5. Typical atrial flutter (HCC)  I48.3 427.32         Orders Placed This Encounter    AMB POC EKG ROUTINE W/ 12 LEADS, INTER & REP     Order Specific Question:   Reason for Exam:     Answer:   routine       Plan:     Refer to Dr Derrick Thurman for consideration of flutter ablation. Off [facility] due to fall related SDH one year ago. Would have to be back on [facility] for one month afterwards.   Will weigh risk/benefit with EP    Maxwell Gibbs MD

## 2021-07-30 NOTE — PROGRESS NOTES
1. Have you been to the ER, urgent care clinic since your last visit? Hospitalized since your last visit? No    2. Have you seen or consulted any other health care providers outside of the [address] [place] [address] since your last visit? Include any pap smears or colon screening. No    Chief Complaint   Patient presents with    Coronary Artery Disease     6 mo appt. No concerns.

## 2021-10-25 RX ORDER — ACETYLCYSTEINE 200 MG/ML
SOLUTION ORAL; RESPIRATORY (INHALATION)
Qty: 60 ML | Refills: 4 | Status: SHIPPED | OUTPATIENT
Start: 2021-10-25 | End: 2022-09-30

## 2021-10-29 RX ORDER — ALBUTEROL SULFATE 0.83 MG/ML
SOLUTION RESPIRATORY (INHALATION)
Qty: 300 ML | Refills: 3 | Status: SHIPPED | OUTPATIENT
Start: 2021-10-29 | End: 2022-04-28

## 2021-10-29 NOTE — TELEPHONE ENCOUNTER
Rebeka Angulo, a pharm tech with [facility] called on behalf of patient and states he cannot tolerate generic for inhaler and would need the brand name      Requested Prescriptions     Pending Prescriptions Disp Refills    albuterol (PROVENTIL VENTOLIN) 2.5 mg /3 mL (0.083 %) nebu 300 mL 3               [facility]/pharmacy #[id number]- [place], [place] - [address]

## 2021-12-22 ENCOUNTER — OFFICE VISIT (OUTPATIENT)
Dept: INTERNAL MEDICINE CLINIC | Age: 86
End: 2021-12-22
Payer: MEDICARE

## 2021-12-22 VITALS
DIASTOLIC BLOOD PRESSURE: 84 MMHG | WEIGHT: 179.6 LBS | HEART RATE: 81 BPM | RESPIRATION RATE: 18 BRPM | HEIGHT: 70 IN | TEMPERATURE: 97.5 F | BODY MASS INDEX: 25.71 KG/M2 | SYSTOLIC BLOOD PRESSURE: 135 MMHG | OXYGEN SATURATION: 100 %

## 2021-12-22 DIAGNOSIS — R27.0 ATAXIA: ICD-10-CM

## 2021-12-22 DIAGNOSIS — E78.2 MIXED HYPERLIPIDEMIA: ICD-10-CM

## 2021-12-22 DIAGNOSIS — Z95.5 S/P DRUG ELUTING CORONARY STENT PLACEMENT: ICD-10-CM

## 2021-12-22 DIAGNOSIS — I25.10 CORONARY ARTERY DISEASE INVOLVING NATIVE CORONARY ARTERY OF NATIVE HEART WITHOUT ANGINA PECTORIS: Chronic | ICD-10-CM

## 2021-12-22 DIAGNOSIS — E53.8 B12 DEFICIENCY: ICD-10-CM

## 2021-12-22 DIAGNOSIS — R32 URINARY INCONTINENCE, UNSPECIFIED TYPE: ICD-10-CM

## 2021-12-22 DIAGNOSIS — E78.00 PURE HYPERCHOLESTEROLEMIA: Chronic | ICD-10-CM

## 2021-12-22 DIAGNOSIS — H53.039 STRABISMIC AMBLYOPIA, UNSPECIFIED LATERALITY: ICD-10-CM

## 2021-12-22 DIAGNOSIS — I10 ESSENTIAL HYPERTENSION: ICD-10-CM

## 2021-12-22 DIAGNOSIS — Z00.00 MEDICARE ANNUAL WELLNESS VISIT, SUBSEQUENT: Primary | ICD-10-CM

## 2021-12-22 DIAGNOSIS — E11.42 DIABETIC PERIPHERAL NEUROPATHY ASSOCIATED WITH TYPE 2 DIABETES MELLITUS (HCC): ICD-10-CM

## 2021-12-22 DIAGNOSIS — J44.9 CHRONIC OBSTRUCTIVE PULMONARY DISEASE, UNSPECIFIED COPD TYPE (HCC): Chronic | ICD-10-CM

## 2021-12-22 DIAGNOSIS — K21.9 GASTROESOPHAGEAL REFLUX DISEASE, UNSPECIFIED WHETHER ESOPHAGITIS PRESENT: ICD-10-CM

## 2021-12-22 PROCEDURE — 99214 OFFICE O/P EST MOD 30 MIN: CPT | Performed by: INTERNAL MEDICINE

## 2021-12-22 PROCEDURE — G8419 CALC BMI OUT NRM PARAM NOF/U: HCPCS | Performed by: INTERNAL MEDICINE

## 2021-12-22 PROCEDURE — G8536 NO DOC ELDER MAL SCRN: HCPCS | Performed by: INTERNAL MEDICINE

## 2021-12-22 PROCEDURE — G8427 DOCREV CUR MEDS BY ELIG CLIN: HCPCS | Performed by: INTERNAL MEDICINE

## 2021-12-22 PROCEDURE — G0463 HOSPITAL OUTPT CLINIC VISIT: HCPCS | Performed by: INTERNAL MEDICINE

## 2021-12-22 PROCEDURE — 1101F PT FALLS ASSESS-DOCD LE1/YR: CPT | Performed by: INTERNAL MEDICINE

## 2021-12-22 PROCEDURE — G8510 SCR DEP NEG, NO PLAN REQD: HCPCS | Performed by: INTERNAL MEDICINE

## 2021-12-22 NOTE — PROGRESS NOTES
Chief Complaint   Patient presents with   t Annual Wellness Visit         1. Have you been to the ER, urgent care clinic since your last visit? Hospitalized since your last visit? No    2. Have you seen or consulted any other health care providers outside of the [address] [place] [address] since your last visit? Include any pap smears or colon screening.  No

## 2021-12-22 NOTE — PROGRESS NOTES
This is the Subsequent Medicare Annual Wellness Exam, performed 12 months or more after the Initial AWV or the last Subsequent AWV    I have reviewed the patient's medical history in detail and updated the computerized patient record. Also for follow-up of his health issues. He continues to have some cough with sputum production. Some dyspnea on exertion. He is not using his nebulizer regularly. He denies headaches or dizziness. No nosebleeds. No chest pains or shortness of breath. No cough or wheeze. No change in bowel habits. He does note some increasing issues with incontinence of urine. No melena or hematochezia. ROS - Per HPI    Physical Examination: General appearance - alert, well appearing, and in no distress  Ears - bilateral TM's and external ear canals normal  Mouth - mucous membranes moist, pharynx normal without lesions  Neck - supple, no significant adenopathy  Lymphatics - no palpable lymphadenopathy, no hepatosplenomegaly  Chest -Rhonchi scattered in both bases. Some scant expiratory wheeze. Heart - normal rate and regular rhythm  Abdomen - soft, nontender, nondistended, no masses or organomegaly  Neurological - alert, oriented, normal speech, no focal findings or movement disorder noted  Musculoskeletal - no joint tenderness, deformity or swelling  Extremities - peripheral pulses normal, no pedal edema, no clubbing or cyanosis        Assessment/Plan   Education and counseling provided:  Are appropriate based on today's review and evaluation  End-of-Life planning (with patient's consent)  Diabetes screening test    1. Medicare annual wellness visit, subsequent  2. B12 deficiencyrepleted on recent labs. 3. Ataxiacontinue to work on his exercise at home to improve this. 4. Diabetic peripheral neuropathy associated with type 2 diabetes mellitus (HCC)appears controlled. A1c goal of less than 7.5.  -     CBC WITH AUTOMATED DIFF;  Future  -     METABOLIC PANEL, COMPREHENSIVE; Future  -     HEMOGLOBIN A1C WITH EAG; Future  -     LIPID PANEL; Future  5. Strabismic amblyopia, unspecified lateralityup-to-date with eye doctor appointments. 6. S/P drug eluting coronary stent placement  7. Gastroesophageal reflux disease, unspecified whether esophagitis presentstable on meds. 8. Coronary artery disease involving native coronary artery of native heart without angina pectorisstable on meds and has seen the cardiologist for follow-up. Will check lipids. 9. Chronic obstructive pulmonary disease, unspecified COPD type (HCC)encouraged him to use his nebulizer at least twice per day and let me know if not improving. 10. Pure hypercholesterolemiaLDL goal of 100. Check labs to be sure that is met. -     CBC WITH AUTOMATED DIFF; Future  -     METABOLIC PANEL, COMPREHENSIVE; Future  -     HEMOGLOBIN A1C WITH EAG; Future  -     LIPID PANEL; Future  11. Mixed hyperlipidemia  -     CBC WITH AUTOMATED DIFF; Future  -     METABOLIC PANEL, COMPREHENSIVE; Future  -     HEMOGLOBIN A1C WITH EAG; Future  -     LIPID PANEL; Future  12. Essential hypertensionappears well controlled on current meds.   13. Urinary incontinence, unspecified typerefer to urology for evaluation.  -     REFERRAL TO UROLOGY       Depression Risk Factor Screening     3 most recent PHQ Screens 12/22/2021   Little interest or pleasure in doing things Not at all   Feeling down, depressed, irritable, or hopeless Not at all   Total Score PHQ 2 0   Trouble falling or staying asleep, or sleeping too much -   Feeling tired or having little energy -   Poor appetite, weight loss, or overeating -   Feeling bad about yourself - or that you are a failure or have let yourself or your family down -   Trouble concentrating on things such as school, work, reading, or watching TV -   Moving or speaking so slowly that other people could have noticed; or the opposite being so fidgety that others notice -   Thoughts of being better off dead, or hurting yourself in some way -   PHQ 9 Score -   How difficult have these problems made it for you to do your work, take care of your home and get along with others -       Alcohol & Drug Abuse Risk Screen    Do you average more than 1 drink per night or more than 7 drinks a week: No    In the past three months have you have had more than 4 drinks containing alcohol on one occasion: No          Functional Ability and Level of Safety    Hearing: Hearing is good. Activities of Daily Living: The home contains: handrails and grab bars  Patient needs help with:  transportation, shopping, preparing meals, laundry, housework, bathing, hygiene and walking      Ambulation: with difficulty, uses a walker     Fall Risk:  Fall Risk Assessment, last 12 mths 12/22/2021   Able to walk? Yes   Fall in past 12 months? 1   Do you feel unsteady? 0   Are you worried about falling 0   Is the gait abnormal? -   Number of falls in past 12 months 1   Fall with injury?  1      Abuse Screen:  Patient is not abused       Cognitive Screening    Has your family/caregiver stated any concerns about your memory: no         Health Maintenance Due     Health Maintenance Due   Topic Date Due    Shingrix Vaccine Age 49> (1 of 2) Never done    Lipid Screen  12/18/2021       Patient Care Team   Patient Care Team:  Colleen Rabago MD as PCP - General (Internal Medicine)  Colleen Rabago MD as PCP - [facility]  Marie Blackmon MD (Ophthalmology)  Martin De Leon MD (Pulmonary Disease)  Oj Sandhu MD (Dermatology)  Kell Womack MD (Urology)  Aspen Peters MD (Orthopedic Surgery)  Wing Cho MD (Cardiology)  Maximino Fuller NP (Nurse Practitioner)  Stevenson Rosenberg MD as Physician (Cardiology)    History     Patient Active Problem List   Diagnosis Code    GERD (gastroesophageal reflux disease) K21.9    Low back pain M54.50    Pure hypercholesterolemia E78.00    COPD (chronic obstructive pulmonary disease) (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    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    SOBOE (shortness of breath on exertion) R06.02    Advance directive discussed with patient Z71.89    Degenerative joint disease of right hip M16.11    Primary localized osteoarthrosis of right hip M16.11    Diabetic peripheral neuropathy associated with type 2 diabetes mellitus ([facility]) E11.42    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    B12 deficiency E53.8    Ataxia R27.0    Sensory ataxic gait R26.0    Cervical arthritis with myelopathy M47.12    Degenerative cervical spinal stenosis M48.02    Bilateral carotid artery stenosis I65.23    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    Vitamin D deficiency E55.9    Vestibular vertigo H81.399    Lumbar back pain with radiculopathy affecting left lower extremity M54.16    Lumbar back pain with radiculopathy affecting right lower extremity M54.16    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 K27.3064    Subdural hematoma ([facility] S86.4N6Y     Past Medical History:   Diagnosis Date    Arrhythmia     Asthma     Atrial fibrillation ([facility]     CAD (coronary artery disease)     Carotid artery disease ([facility]     Chronic hip pain     COPD (chronic obstructive pulmonary disease) ([facility] 9/10/2010    Diabetes (HCC)     DJD (degenerative joint disease)     GERD (gastroesophageal reflux disease)     HNP (herniated nucleus pulposus) 6/2006    lumbar     Hypercholesterolemia     Hypertension     Nausea & vomiting     Prostate cancer ([facility] 2003    Reversible ischemic neurologic deficit ([facility] [facility] 1990    suspected with no residual effects and no recurrance    TIA (transient ischemic attack)     TIA- no deficiets      Past Surgical History:   Procedure Laterality Date    COLONOSCOPY  12/15/2004    Diverticulosis Dr. John Hurd repeat 10 years    HX COLONOSCOPY  2005    HX CORONARY STENT PLACEMENT      HX HEART CATHETERIZATION      2  stents     HX HEART CATHETERIZATION  08/20/2018    failed 100% block @ [facility] will plan to do laser next.       HX HERNIA REPAIR  9/2008    left inguinal hernia repair by Dr. Sheri Quesada Right     inguinal hernai repair    HX HERNIA REPAIR      umbilical hernia repair    HX HIP REPLACEMENT Right 07/06/2016    HX PROSTATECTOMY  2003    HX PTCA      HX TONSILLECTOMY      PA TOTAL HIP ARTHROPLASTY Left 2/9/11    [facility] T[facility]     Current Outpatient Medications   Medication Sig Dispense Refill    albuterol (PROVENTIL VENTOLIN) 2.5 mg /3 mL (0.083 %) nebu TAKE 3ML BY NEBULIZATION ROUTE THREE TIMES A DAY AS NEEDED FOR WHEEZING 300 mL 3    acetylcysteine (MUCOMYST) 200 mg/mL (20 %) solution USE 2 ML IN NEBULIZER THREE TIMES DAILY 60 mL 4    simvastatin (ZOCOR) 20 mg tablet TAKE 1 TABLET BY MOUTH EVERY DAY EVERY NIGHT 90 Tablet 1    isosorbide mononitrate ER (IMDUR) 30 mg tablet TAKE 1/2 TABLET BY MOUTH EVERY DAY IN THE MORNING 45 Tablet 4    metFORMIN ER (GLUCOPHAGE XR) 500 mg tablet TAKE 3 TABLETS BY MOUTH EVERY DAY WITH DINNER 270 Tablet 1    glipiZIDE (GLUCOTROL) 5 mg tablet TAKE 1 TABLET BY MOUTH EVERY DAY 90 Tablet 2    glucose blood VI test strips (OneTouch Ultra Blue Test Strip) strip USE 1 STRIP IN VITRO TO TEST BLOOD SUGAR DAILY BEFORE BREAKFAST, DX E11.21 100 Strip 3    OTHER Manual lightweight wheelchair  Diagnosis:  Spinocerebellar ataxia degeneration 1 Each 0    Spiriva Respimat 2.5 mcg/actuation inhaler USE 2 INHALATIONS ORALLY   DAILY 3 Inhaler 3    fluticasone propion-salmeteroL (Wixela Inhub) 250-50 mcg/dose diskus inhaler USE 1 INHALATION ORALLY    EVERY 12 HOURS 3 Inhaler 2    albuterol (Ventolin HFA) 90 mcg/actuation inhaler Take 2 Puffs by inhalation every four (4) hours as needed for Wheezing. 18 Inhaler 5    metoprolol succinate (TOPROL-XL) 25 mg XL tablet TAKE 1/2 TABLET BY MOUTH EVERY DAY 45 Tab 4    aspirin 81 mg chewable tablet Take 1 Tab by mouth daily.  coenzyme q10 10 mg cap Take 10 mg by mouth daily.  cyanocobalamin (VITAMIN B-12) 500 mcg tablet Take 500 mcg by mouth daily.  folic acid 576 mcg tablet Take 400 mcg by mouth daily.  MAGNESIUM PO Take 500 mg by mouth daily.  acetaminophen (TYLENOL) 500 mg tablet Take 1 tablet by mouth every eight (8) hours as needed for Pain. 30 tablet 0    CHOLECALCIFEROL, VITAMIN D3, (VITAMIN D3 PO) Take 1,000 Units by mouth daily.  multivitamins-minerals-lutein (CENTRUM SILVER) Tab Take 1 Tab by mouth daily.  fexofenadine (ALLEGRA) 180 mg tablet Take 1 Tab by mouth daily. 30 Tab 11    famotidine (PEPCID AC) 20 mg tablet Take 20 mg by mouth daily.  VITAMIN B COMPLEX (B COMPLEX PO) Take 1 Tab by mouth daily.  ASCORBIC ACID (VITAMIN C PO) Take 1,000 mg by mouth daily.  nitroglycerin (NITROSTAT) 0.4 mg SL tablet 1 Tab by SubLINGual route every five (5) minutes as needed for Chest Pain.  (Patient not taking: Reported on 12/22/2021) 1 Bottle 0     Allergies   Allergen Reactions    Eliquis [Apixaban] Other (comments)     Caused bleed in brain       Family History   Problem Relation Age of Onset    Cancer Mother         Breast cancer    Lung Disease Mother Emphysema    Cancer Father         Bladder cancer    Lung Disease Father         Emphysema    Hypertension Sister         1    Breast Cancer Sister      Social History     Tobacco Use    Smoking status: Former Smoker     Packs/day: 3.00     Years: 10.00     Pack years: 30.00     Types: Cigarettes     Quit date: 1/15/1970     Years since quittin.9    Smokeless tobacco: Never Used   Substance Use Topics    Alcohol use: No         Erica Torres MD

## 2021-12-22 NOTE — PATIENT INSTRUCTIONS
Medicare Wellness Visit, Male    The best way to live healthy is to have a lifestyle where you eat a well-balanced diet, exercise regularly, limit alcohol use, and quit all forms of tobacco/nicotine, if applicable. Regular preventive services are another way to keep healthy. Preventive services (vaccines, screening tests, monitoring & exams) can help personalize your care plan, which helps you manage your own care. Screening tests can find health problems at the earliest stages, when they are easiest to treat. [facility] follows the current, evidence-based guidelines published by the [facility] ([facility]) when recommending preventive services for our patients. Because we follow these guidelines, sometimes recommendations change over time as research supports it. (For example, a prostate screening blood test is no longer routinely recommended for men with no symptoms). Of course, you and your doctor may decide to screen more often for some diseases, based on your risk and co-morbidities (chronic disease you are already diagnosed with). Preventive services for you include:  - Medicare offers their members a free annual wellness visit, which is time for you and your primary care provider to discuss and plan for your preventive service needs. Take advantage of this benefit every year!  -All adults over age 72 should receive the recommended pneumonia vaccines. Current USPSTF guidelines recommend a series of two vaccines for the best pneumonia protection.   -All adults should have a flu vaccine yearly and tetanus vaccine every 10 years.  -All adults age 48 and older should receive the shingles vaccines (series of two vaccines).        -All adults age 38-68 who are overweight should have a diabetes screening test once every three years.   -Other screening tests & preventive services for persons with diabetes include: an eye exam to screen for diabetic retinopathy, a kidney function test, a foot exam, and stricter control over your cholesterol.   -Cardiovascular screening for adults with routine risk involves an electrocardiogram (ECG) at intervals determined by the provider.   -Colorectal cancer screening should be done for adults age 54-65 with no increased risk factors for colorectal cancer. There are a number of acceptable methods of screening for this type of cancer. Each test has its own benefits and drawbacks. Discuss with your provider what is most appropriate for you during your annual wellness visit. The different tests include: colonoscopy (considered the best screening method), a fecal occult blood test, a fecal DNA test, and sigmoidoscopy.  -All adults born between [facility] should be screened once for Hepatitis C.  -An Abdominal Aortic Aneurysm (AAA) Screening is recommended for men age 73-68 who has ever smoked in their lifetime.      Here is a list of your current Health Maintenance items (your personalized list of preventive services) with a due date:  Health Maintenance Due   Topic Date Due    Shingles Vaccine (1 of 2) Never done    Cholesterol Test   12/18/2021

## 2021-12-23 LAB
ALBUMIN SERPL-MCNC: 4.1 G/DL (ref 3.5–4.6)
ALBUMIN/GLOB SERPL: 1.7 {RATIO} (ref 1.2–2.2)
ALP SERPL-CCNC: 89 IU/L (ref 44–121)
ALT SERPL-CCNC: 17 IU/L (ref 0–44)
AST SERPL-CCNC: 21 IU/L (ref 0–40)
BASOPHILS # BLD AUTO: 0.1 X10E3/UL (ref 0–0.2)
BASOPHILS NFR BLD AUTO: 1 %
BILIRUB SERPL-MCNC: 0.3 MG/DL (ref 0–1.2)
BUN SERPL-MCNC: 23 MG/DL (ref 10–36)
BUN/CREAT SERPL: 23 (ref 10–24)
CALCIUM SERPL-MCNC: 9.4 MG/DL (ref 8.6–10.2)
CHLORIDE SERPL-SCNC: 101 MMOL/L (ref 96–106)
CHOLEST SERPL-MCNC: 111 MG/DL (ref 100–199)
CO2 SERPL-SCNC: 24 MMOL/L (ref 20–29)
CREAT SERPL-MCNC: 1.01 MG/DL (ref 0.76–1.27)
EOSINOPHIL # BLD AUTO: 0.6 X10E3/UL (ref 0–0.4)
EOSINOPHIL NFR BLD AUTO: 7 %
ERYTHROCYTE [DISTWIDTH] IN BLOOD BY AUTOMATED COUNT: 13.2 % (ref 11.6–15.4)
EST. AVERAGE GLUCOSE BLD GHB EST-MCNC: 160 MG/DL
GLOBULIN SER CALC-MCNC: 2.4 G/DL (ref 1.5–4.5)
GLUCOSE SERPL-MCNC: 136 MG/DL (ref 65–99)
HBA1C MFR BLD: 7.2 % (ref 4.8–5.6)
HCT VFR BLD AUTO: 39.8 % (ref 37.5–51)
HDLC SERPL-MCNC: 41 MG/DL
HGB BLD-MCNC: 12.7 G/DL (ref 13–17.7)
IMM GRANULOCYTES # BLD AUTO: 0 X10E3/UL (ref 0–0.1)
IMM GRANULOCYTES NFR BLD AUTO: 0 %
LDLC SERPL CALC-MCNC: 58 MG/DL (ref 0–99)
LYMPHOCYTES # BLD AUTO: 1.5 X10E3/UL (ref 0.7–3.1)
LYMPHOCYTES NFR BLD AUTO: 19 %
MCH RBC QN AUTO: 29.7 PG (ref 26.6–33)
MCHC RBC AUTO-ENTMCNC: 31.9 G/DL (ref 31.5–35.7)
MCV RBC AUTO: 93 FL (ref 79–97)
MONOCYTES # BLD AUTO: 0.8 X10E3/UL (ref 0.1–0.9)
MONOCYTES NFR BLD AUTO: 10 %
NEUTROPHILS # BLD AUTO: 5.2 X10E3/UL (ref 1.4–7)
NEUTROPHILS NFR BLD AUTO: 63 %
PLATELET # BLD AUTO: 292 X10E3/UL (ref 150–450)
POTASSIUM SERPL-SCNC: 4.8 MMOL/L (ref 3.5–5.2)
PROT SERPL-MCNC: 6.5 G/DL (ref 6–8.5)
RBC # BLD AUTO: 4.28 X10E6/UL (ref 4.14–5.8)
SODIUM SERPL-SCNC: 138 MMOL/L (ref 134–144)
TRIGL SERPL-MCNC: 54 MG/DL (ref 0–149)
VLDLC SERPL CALC-MCNC: 12 MG/DL (ref 5–40)
WBC # BLD AUTO: 8.2 X10E3/UL (ref 3.4–10.8)

## 2021-12-23 RX ORDER — METFORMIN HYDROCHLORIDE 500 MG/1
TABLET, EXTENDED RELEASE ORAL
Qty: 270 TABLET | Refills: 1 | Status: SHIPPED | OUTPATIENT
Start: 2021-12-23 | End: 2022-06-29

## 2022-01-27 RX ORDER — SIMVASTATIN 20 MG/1
TABLET, FILM COATED ORAL
Qty: 90 TABLET | Refills: 1 | Status: SHIPPED | OUTPATIENT
Start: 2022-01-27 | End: 2022-07-26

## 2022-01-31 RX ORDER — FLUTICASONE PROPIONATE AND SALMETEROL 250; 50 UG/1; UG/1
POWDER RESPIRATORY (INHALATION)
Qty: 180 G | Refills: 0 | Status: SHIPPED | OUTPATIENT
Start: 2022-01-31 | End: 2022-02-03 | Stop reason: SDUPTHER

## 2022-02-03 ENCOUNTER — TELEPHONE (OUTPATIENT)
Dept: INTERNAL MEDICINE CLINIC | Age: 87
End: 2022-02-03

## 2022-02-03 RX ORDER — FLUTICASONE PROPIONATE AND SALMETEROL 250; 50 UG/1; UG/1
POWDER RESPIRATORY (INHALATION)
Qty: 180 G | Refills: 0 | Status: SHIPPED | OUTPATIENT
Start: 2022-02-03 | End: 2022-02-11 | Stop reason: SDUPTHER

## 2022-02-03 NOTE — TELEPHONE ENCOUNTER
----- Message from Pavel Macdonald sent at 2/3/2022 12:53 PM EST -----  Subject: Medication Problem    QUESTIONS  Name of Medication? Keenan Swenson 250-50 mcg/dose diskus inhaler  Patient-reported dosage and instructions? Na  What question or problem do you have with the medication? Pharmacy would   like to know if pt can get a different brand   Preferred Pharmacy? [facility] [address], [address] phone number (if available)? [phone]  Additional Information for Provider? Pt Ref Number [id number]  ---------------------------------------------------------------------------  --------------  Elaina Fearing INFO  What is the best way for the office to contact you? Do not leave any   message, patient will call back for answer  Preferred Call Back Phone Number? [phone]  ---------------------------------------------------------------------------  --------------  SCRIPT ANSWERS  Relationship to Patient? Third Party  Representative Name?  [facility]

## 2022-02-03 NOTE — TELEPHONE ENCOUNTER
Orders Placed This Encounter    fluticasone propion-salmeteroL (Wixela Inhub) 250-50 mcg/dose diskus inhaler     Sig: USE 1 INHALATION ORALLY    EVERY 12 HOURS     Dispense:  180 g     Refill:  0     The above orders were approved via [facility] per Dr. Octavio Pimentel, III.

## 2022-02-11 ENCOUNTER — OFFICE VISIT (OUTPATIENT)
Dept: INTERNAL MEDICINE CLINIC | Age: 87
End: 2022-02-11
Payer: MEDICARE

## 2022-02-11 VITALS
HEART RATE: 478 BPM | TEMPERATURE: 97.3 F | DIASTOLIC BLOOD PRESSURE: 86 MMHG | OXYGEN SATURATION: 97 % | SYSTOLIC BLOOD PRESSURE: 162 MMHG | BODY MASS INDEX: 25.71 KG/M2 | WEIGHT: 179.6 LBS | HEIGHT: 70 IN | RESPIRATION RATE: 16 BRPM

## 2022-02-11 DIAGNOSIS — I20.9 ANGINA, CLASS III (HCC): ICD-10-CM

## 2022-02-11 DIAGNOSIS — M1A.9XX0 CHRONIC GOUT INVOLVING TOE OF LEFT FOOT WITHOUT TOPHUS, UNSPECIFIED CAUSE: Primary | ICD-10-CM

## 2022-02-11 DIAGNOSIS — J44.9 CHRONIC OBSTRUCTIVE PULMONARY DISEASE, UNSPECIFIED COPD TYPE (HCC): ICD-10-CM

## 2022-02-11 DIAGNOSIS — E11.42 DIABETIC PERIPHERAL NEUROPATHY ASSOCIATED WITH TYPE 2 DIABETES MELLITUS (HCC): ICD-10-CM

## 2022-02-11 DIAGNOSIS — I73.9 PVD (PERIPHERAL VASCULAR DISEASE) (HCC): ICD-10-CM

## 2022-02-11 DIAGNOSIS — E11.3593 TYPE 2 DIABETES MELLITUS WITH PROLIFERATIVE RETINOPATHY OF BOTH EYES, WITHOUT LONG-TERM CURRENT USE OF INSULIN, MACULAR EDEMA PRESENCE UNSPECIFIED, UNSPECIFIED PROLIFERATIVE RETINOPATHY TYPE (HCC): ICD-10-CM

## 2022-02-11 DIAGNOSIS — I48.3 TYPICAL ATRIAL FLUTTER (HCC): ICD-10-CM

## 2022-02-11 PROCEDURE — 99213 OFFICE O/P EST LOW 20 MIN: CPT | Performed by: INTERNAL MEDICINE

## 2022-02-11 PROCEDURE — 1101F PT FALLS ASSESS-DOCD LE1/YR: CPT | Performed by: INTERNAL MEDICINE

## 2022-02-11 PROCEDURE — G8536 NO DOC ELDER MAL SCRN: HCPCS | Performed by: INTERNAL MEDICINE

## 2022-02-11 PROCEDURE — G0463 HOSPITAL OUTPT CLINIC VISIT: HCPCS | Performed by: INTERNAL MEDICINE

## 2022-02-11 PROCEDURE — G8427 DOCREV CUR MEDS BY ELIG CLIN: HCPCS | Performed by: INTERNAL MEDICINE

## 2022-02-11 PROCEDURE — G8419 CALC BMI OUT NRM PARAM NOF/U: HCPCS | Performed by: INTERNAL MEDICINE

## 2022-02-11 PROCEDURE — G8510 SCR DEP NEG, NO PLAN REQD: HCPCS | Performed by: INTERNAL MEDICINE

## 2022-02-11 RX ORDER — COLCHICINE 0.6 MG/1
0.6 CAPSULE ORAL
Qty: 20 CAPSULE | Refills: 1 | Status: SHIPPED | OUTPATIENT
Start: 2022-02-11

## 2022-02-11 RX ORDER — FLUTICASONE PROPIONATE AND SALMETEROL 250; 50 UG/1; UG/1
POWDER RESPIRATORY (INHALATION)
Qty: 180 EACH | Refills: 2 | Status: SHIPPED | OUTPATIENT
Start: 2022-02-11 | End: 2022-03-17

## 2022-02-11 NOTE — PROGRESS NOTES
Chief Complaint   Patient presents with    Toe Pain     left foot pain        1. Have you been to the ER, urgent care clinic since your last visit? Hospitalized since your last visit? No    2. Have you seen or consulted any other health care providers outside of the [address] [place] [address] since your last visit? Include any pap smears or colon screening.  No

## 2022-02-11 NOTE — PATIENT INSTRUCTIONS
Gout: Care Instructions  Overview     Gout is a form of arthritis caused by a buildup of uric acid crystals in a joint. It causes sudden attacks of pain, swelling, redness, and stiffness, usually in one joint, especially the big toe. Gout usually comes on without a cause. But it can be brought on by drinking alcohol (especially beer), eating or drinking things made with high-fructose corn syrup, or eating seafood or red meat. Taking certain medicines, such as diuretics, can also trigger an attack of gout. Taking your medicines as prescribed and following up with your doctor regularly can help you avoid gout attacks in the future. Follow-up care is a key part of your treatment and safety. Be sure to make and go to all appointments, and call your doctor if you are having problems. It's also a good idea to know your test results and keep a list of the medicines you take. How can you care for yourself at home? · If the joint is swollen, put ice or a cold pack on the area for 10 to 20 minutes at a time. Put a thin cloth between the ice and your skin. · Prop up the sore limb on a pillow when you ice it or anytime you sit or lie down during the next 3 days. Try to keep it above the level of your heart. This can help reduce swelling. · Rest sore joints. Avoid activities that put weight or strain on the joints for a few days. Take short rest breaks from your regular activities during the day. · Take your medicines exactly as prescribed. Call your doctor if you think you are having a problem with your medicine. · Take pain medicines exactly as directed. ? If the doctor gave you a prescription medicine for pain, take it as prescribed. ? If you are not taking a prescription pain medicine, ask your doctor if you can take an over-the-counter medicine. · Eat less seafood and red meat. · Avoid foods or drinks that are made with high-fructose corn syrup. · Check with your doctor before drinking alcohol.   · Losing weight, if you are overweight, may help reduce attacks of gout. But do not go on a diet that causes rapid weight loss. Losing a lot of weight in a short amount of time can cause a gout attack. When should you call for help? Call your doctor now or seek immediate medical care if:    · You have a fever.     · The joint is so painful you cannot use it.     · You have sudden, unexplained swelling, redness, warmth, or severe pain in one or more joints. Watch closely for changes in your health, and be sure to contact your doctor if:    · You have joint pain.     · Your symptoms get worse or are not improving after 2 or 3 days. Where can you learn more? Go to http://www.gray.com/  Enter E531 in the search box to learn more about \"Gout: Care Instructions. \"  Current as of: April 30, 2021               Content Version: 13.0  © 4617-3340 [employer]. Care instructions adapted under license by [employer] (which disclaims liability or warranty for this information). If you have questions about a medical condition or this instruction, always ask your healthcare professional. [address] any warranty or liability for your use of this information.

## 2022-02-12 NOTE — PROGRESS NOTES
HPI:  Leonard Rowe is a 80y.o. year old male who is here for pain in the left foot that is been present for more than a week. He did have some erythema and swelling that has slightly improved. No fevers or chills. No sweats. No injury. He continues to have issues with COPD. He has cough with little sputum production. He is having a difficult time getting a refill on his Wixela inhaler. Past Medical History:   Diagnosis Date    Arrhythmia     Asthma     Atrial fibrillation ([address]     CAD (coronary artery disease)     Carotid artery disease (HCC)     Chronic hip pain     COPD (chronic obstructive pulmonary disease) ([address] 9/10/2010    Diabetes ([address]     DJD (degenerative joint disease)     GERD (gastroesophageal reflux disease)     HNP (herniated nucleus pulposus) 6/2006    lumbar     Hypercholesterolemia     Hypertension     Nausea & vomiting     Prostate cancer ([address] 2003    Reversible ischemic neurologic deficit ([facility] 1990    suspected with no residual effects and no recurrance    TIA (transient ischemic attack)     TIA- no deficiets       Past Surgical History:   Procedure Laterality Date    COLONOSCOPY  12/15/2004    Diverticulosis Dr. Kasia Dhillon repeat 10 years    HX COLONOSCOPY  2005    HX CORONARY STENT PLACEMENT      HX HEART CATHETERIZATION      2  stents     HX HEART CATHETERIZATION  08/20/2018    failed 100% block @ [facility] will plan to do laser next.  HX HERNIA REPAIR  9/2008    left inguinal hernia repair by Dr. Isidro Akhtar Right     inguinal hernai repair    HX HERNIA REPAIR      umbilical hernia repair    HX HIP REPLACEMENT Right 07/06/2016    HX PROSTATECTOMY  2003    HX PTCA      HX TONSILLECTOMY      [place] TOTAL HIP ARTHROPLASTY Left 2/9/11    Dr. Britton Choi at [address] [facility]       Prior to Admission medications    Medication Sig Start Date End Date Taking?  Authorizing Provider   fluticasone propion-salmeteroL (Wixela Inhub) 250-50 mcg/dose diskus inhaler USE 1 INHALATION ORALLY    EVERY 12 HOURS 2/11/22  Yes Samuel Olea III, MD   colchicine (MITIGARE) 0.6 mg capsule Take 1 Capsule by mouth daily as needed (gout). 2/11/22  Yes Laura Guzman MD   simvastatin (ZOCOR) 20 mg tablet TAKE 1 TABLET BY MOUTH EVERY DAY EVERY NIGHT 1/27/22  Yes Samuel Olea III, MD   metFORMIN ER (GLUCOPHAGE XR) 500 mg tablet TAKE 3 TABLETS BY MOUTH EVERY DAY WITH DINNER 12/23/21  Yes Samuel Olea III, MD   albuterol (PROVENTIL VENTOLIN) 2.5 mg /3 mL (0.083 %) nebu TAKE 3ML BY NEBULIZATION ROUTE THREE TIMES A DAY AS NEEDED FOR WHEEZING 10/29/21  Yes Samuel Olea III, MD   acetylcysteine (MUCOMYST) 200 mg/mL (20 %) solution USE 2 ML IN NEBULIZER THREE TIMES DAILY 10/25/21  Yes Samuel Olea III, MD   isosorbide mononitrate ER (IMDUR) 30 mg tablet TAKE 1/2 TABLET BY MOUTH EVERY DAY IN THE MORNING 7/29/21  Yes Silver Adames MD   glipiZIDE (GLUCOTROL) 5 mg tablet TAKE 1 TABLET BY MOUTH EVERY DAY 6/23/21  Yes Laura Guzman MD   glucose blood VI test strips (OneTouch Ultra Blue Test Strip) strip USE 1 STRIP IN VITRO TO TEST BLOOD SUGAR DAILY BEFORE [place], DX E11.21 6/23/21  Yes Samuel Olea III, MD   OTHER Manual lightweight wheelchair  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 1/21/21  Yes Laura Gzuman MD   metoprolol succinate (TOPROL-XL) 25 mg XL tablet TAKE 1/2 TABLET BY MOUTH EVERY DAY 1/7/21  Yes Taz SPENCER NP   aspirin 81 mg chewable tablet Take 1 Tab by mouth daily. Yes Silver Adames MD   coenzyme q10 10 mg cap Take 10 mg by mouth daily. Yes Provider, Historical   cyanocobalamin (VITAMIN B-12) 500 mcg tablet Take 500 mcg by mouth daily.    Yes Provider, Historical   folic acid 414 mcg tablet Take 400 mcg by mouth daily. Yes Provider, Historical   MAGNESIUM PO Take 500 mg by mouth daily. Yes Provider, Historical   acetaminophen (TYLENOL) 500 mg tablet Take 1 tablet by mouth every eight (8) hours as needed for Pain. 14  Yes Angel Dickey MD   CHOLECALCIFEROL, VITAMIN D3, (VITAMIN D3 PO) Take 1,000 Units by mouth daily. Yes Provider, Historical   multivitamins-minerals-lutein (CENTRUM SILVER) Tab Take 1 Tab by mouth daily. 5/14/10  Yes Provider, Historical   fexofenadine (ALLEGRA) 180 mg tablet Take 1 Tab by mouth daily. 1/25/10  Yes Esthela Armstrong MD   famotidine (PEPCID AC) 20 mg tablet Take 20 mg by mouth daily. 11  Yes Provider, Historical   VITAMIN B COMPLEX (B COMPLEX PO) Take 1 Tab by mouth daily. Yes Provider, Historical   ASCORBIC ACID (VITAMIN C PO) Take 1,000 mg by mouth daily. Yes Provider, Historical   fluticasone propion-salmeteroL (Wixela Inhub) 250-50 mcg/dose diskus inhaler USE 1 INHALATION ORALLY    EVERY 12 HOURS 2/3/22 2/11/22  Aaliyah Smith III, MD   nitroglycerin (NITROSTAT) 0.4 mg SL tablet 1 Tab by SubLINGual route every five (5) minutes as needed for Chest Pain.   Patient not taking: Reported on 2022 8/10/18   Carlena Goodpasture, NP       Social History     Socioeconomic History    Marital status:      Spouse name: Not on file    Number of children: Not on file    Years of education: Not on file    Highest education level: Not on file   Occupational History    Not on file   Tobacco Use    Smoking status: Former Smoker     Packs/day: 3.00     Years: 10.00     Pack years: 30.00     Types: Cigarettes     Quit date: 1/15/1970     Years since quittin.1    Smokeless tobacco: Never Used   Vaping Use    Vaping Use: Never used   Substance and Sexual Activity    Alcohol use: No    Drug use: Never     Types: Prescription, OTC    Sexual activity: Yes     Partners: Female     Birth control/protection: None Other Topics Concern    Not on file   Social History Narrative    Not on file     Social Determinants of Health     Financial Resource Strain:     Difficulty of Paying Living Expenses: Not on file   Food Insecurity:     Worried About Running Out of Food in the Last Year: Not on file    Mirtha of Food in the Last Year: Not on file   Transportation Needs:     Lack of Transportation (Medical): Not on file    Lack of Transportation (Non-Medical): Not on file   Physical Activity:     Days of Exercise per Week: Not on file    Minutes of Exercise per Session: Not on file   Stress:     Feeling of Stress : Not on file   Social Connections:     Frequency of Communication with Friends and Family: Not on file    Frequency of Social Gatherings with Friends and Family: Not on file    Attends [employer] Services: Not on file    Active Member of [address] [place] [address] or Organizations: Not on file    Attends Club or Organization Meetings: Not on file    Marital Status: Not on file   Intimate Partner Violence:     Fear of Current or Ex-Partner: Not on file    Emotionally Abused: Not on file    Physically Abused: Not on file    Sexually Abused: Not on file   Housing Stability:     Unable to Pay for Housing in the Last Year: Not on file    Number of [place] in the Last Year: Not on file    Unstable Housing in the Last Year: Not on file          ROS  Per HPI    Visit Vitals  BP (!) 162/86   Pulse (!) 478   Temp 97.3 °F (36.3 °C) (Temporal)   Resp 16   Ht 5' 10\" (1.778 m)   Wt 179 lb 9.6 oz (81.5 kg)   SpO2 97%   BMI 25.77 kg/m²         Physical Exam   Physical Examination: General appearance - alert, well appearing, and in no distress  Chest - clear to auscultation, no wheezes, rales or rhonchi, symmetric air entry  Heart - normal rate and regular rhythm  Extremities -left foot with some mild tenderness across the second metatarsophalangeal joint and third metatarsophalangeal joint. No fluctuance.   Pulses are 2+ and equal bilaterally. Assessment/Plan:  Diagnoses and all orders for this visit:    1. Chronic gout involving toe of left foot without tophus, unspecified cause-we will treat with colchicine as needed. Continue to work on increased hydration. 2. Chronic obstructive pulmonary disease, unspecified COPD type ([facility])-continue current medications. Will let me know if he can get his refill next week. 3. Diabetic peripheral neuropathy associated with type 2 diabetes mellitus ([facility])-stable. 4. PVD (peripheral vascular disease) ([facility])-no evidence of symptoms from this. 5. Typical atrial flutter ([facility])-stable. 6. 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 Angina, class III ([address]    Other orders  -     fluticasone propion-salmeteroL (Wixela Inhub) 250-50 mcg/dose diskus inhaler; USE 1 INHALATION ORALLY    EVERY 12 HOURS  -     colchicine (MITIGARE) 0.6 mg capsule; Take 1 Capsule by mouth daily as needed (gout). Follow-up and Dispositions    Routing History            Advised him to call back or return to office if symptoms worsen/change/persist.  Discussed expected course/resolution/complications of diagnosis in detail with patient. Medication risks/benefits/costs/interactions/alternatives discussed with patient. He was given an after visit summary which includes diagnoses, current medications, & vitals. He expressed understanding with the diagnosis and plan.

## 2022-03-02 ENCOUNTER — OFFICE VISIT (OUTPATIENT)
Dept: CARDIOLOGY CLINIC | Age: 87
End: 2022-03-02
Payer: MEDICARE

## 2022-03-02 VITALS
OXYGEN SATURATION: 94 % | HEIGHT: 70 IN | WEIGHT: 178.4 LBS | BODY MASS INDEX: 25.54 KG/M2 | DIASTOLIC BLOOD PRESSURE: 70 MMHG | SYSTOLIC BLOOD PRESSURE: 138 MMHG | RESPIRATION RATE: 16 BRPM

## 2022-03-02 DIAGNOSIS — E78.2 MIXED HYPERLIPIDEMIA: ICD-10-CM

## 2022-03-02 DIAGNOSIS — I48.3 TYPICAL ATRIAL FLUTTER (HCC): ICD-10-CM

## 2022-03-02 DIAGNOSIS — I65.23 BILATERAL CAROTID ARTERY STENOSIS: ICD-10-CM

## 2022-03-02 DIAGNOSIS — I25.10 CORONARY ARTERY DISEASE INVOLVING NATIVE CORONARY ARTERY OF NATIVE HEART WITHOUT ANGINA PECTORIS: Primary | ICD-10-CM

## 2022-03-02 DIAGNOSIS — I10 ESSENTIAL HYPERTENSION: ICD-10-CM

## 2022-03-02 PROCEDURE — G8419 CALC BMI OUT NRM PARAM NOF/U: HCPCS | Performed by: INTERNAL MEDICINE

## 2022-03-02 PROCEDURE — G0463 HOSPITAL OUTPT CLINIC VISIT: HCPCS | Performed by: INTERNAL MEDICINE

## 2022-03-02 PROCEDURE — G8427 DOCREV CUR MEDS BY ELIG CLIN: HCPCS | Performed by: INTERNAL MEDICINE

## 2022-03-02 PROCEDURE — 1101F PT FALLS ASSESS-DOCD LE1/YR: CPT | Performed by: INTERNAL MEDICINE

## 2022-03-02 PROCEDURE — G8536 NO DOC ELDER MAL SCRN: HCPCS | Performed by: INTERNAL MEDICINE

## 2022-03-02 PROCEDURE — 93010 ELECTROCARDIOGRAM REPORT: CPT | Performed by: INTERNAL MEDICINE

## 2022-03-02 PROCEDURE — 99214 OFFICE O/P EST MOD 30 MIN: CPT | Performed by: INTERNAL MEDICINE

## 2022-03-02 PROCEDURE — G8510 SCR DEP NEG, NO PLAN REQD: HCPCS | Performed by: INTERNAL MEDICINE

## 2022-03-02 PROCEDURE — 93005 ELECTROCARDIOGRAM TRACING: CPT | Performed by: INTERNAL MEDICINE

## 2022-03-02 NOTE — PROGRESS NOTES
Jacque Rockwell MD          NAME:  Marine Biggs   :   1931   MRN:   [id number]   PCP:  Lea Marino MD           Subjective: The patient is a 80y.o. year old male  who returns for a routine follow-up. Since the last visit, patient reports no change in exercise tolerance, chest pain, edema, medication intolerance, palpitations, shortness of breath, PND/orthopnea wheezing, sputum, syncope, dizziness or light headedness. Doing well. Past Medical History:   Diagnosis Date    Arrhythmia     Asthma     [facility]     CAD (coronary artery disease)     Carotid artery disease (HCC)     Chronic hip pain     COPD (chronic obstructive pulmonary disease) ([facility] 9/10/2010    Diabetes ([facility]     DJD (degenerative joint disease)     GERD (gastroesophageal reflux disease)     HNP (herniated nucleus pulposus) 2006    lumbar     Hypercholesterolemia     Hypertension     Nausea & vomiting     Prostate cancer ([facility]     Re[facility]     suspected with no residual effects and no recurrance    TIA (transient ischemic attack)     TIA- no deficiets        ICD-10-CM ICD-9-CM    1. Coronary artery disease involving native coronary artery of native heart without angina pectoris  I25.10 414.01    2. Essential hypertension  I10 401.9 AMB POC EKG ROUTINE W/ 12 LEADS, INTER & REP   3. Bilateral carotid artery stenosis  I65.23 433.10 AMB POC EKG ROUTINE W/ 12 LEADS, INTER & REP     433.30    4. Mixed hyperlipidemia  E78.2 272.2 AMB POC EKG ROUTINE W/ 12 LEADS, INTER & REP   5.  Typical atrial flutter (HCC)  I48.3 427.32       Social History     Tobacco Use    Smoking status: Former Smoker     Packs/day: 3.00     Years: 10.00     Pack years: 30.00     Types: Cigarettes     Quit date: 1/15/1970     Years since quittin.1    Smokeless tobacco: Never Used   Substance Use Topics    Alcohol use: No      Family History   Problem Relation Age of Onset    Cancer Mother         Breast cancer    Lung Disease Mother         Emphysema    Cancer Father         Bladder cancer    Lung Disease Father         Emphysema    Hypertension Sister         1    Breast Cancer Sister         Review of Systems  Cardiovascular: Negative except as noted in HPI      Objective:       Vitals:    03/02/22 1133   BP: 138/70   Resp: 16   SpO2: 94%   Weight: 178 lb 6.4 oz (80.9 kg)   Height: 5' 10\" (1.778 m)    Body mass index is 25.6 kg/m². General PE  Mental Status - Alert. General Appearance - Not in acute distress. Chest and Lung Exam   Inspection: Accessory muscles - No use of accessory muscles in breathing. Auscultation:   Breath sounds: - Normal.    Cardiovascular   Inspection: Jugular vein - Bilateral - Inspection Normal.  Palpation/Percussion:   Apical Impulse: - Normal.  Auscultation: Rhythm - Regular. Heart Sounds - S1 WNL and S2 WNL. No S3 or S4. Murmurs & Other Heart Sounds: Auscultation of the heart reveals - No Murmurs. Peripheral Vascular   Upper Extremity: Inspection - Bilateral - No Cyanotic nailbeds or Digital clubbing. Lower Extremity:   Palpation: Edema - Bilateral - No edema. Data Review:     EKG -  EKG: rate controlled atrial flutter. LABS- @brieflabs@      Allergies reviewed  Allergies   Allergen Reactions    Eliquis [Apixaban] Other (comments)     Caused bleed in brain       Medications reviewed  Current Outpatient Medications   Medication Sig    fluticasone propion-salmeteroL (Wixela Inhub) 250-50 mcg/dose diskus inhaler USE 1 INHALATION ORALLY    EVERY 12 HOURS    colchicine (MITIGARE) 0.6 mg capsule Take 1 Capsule by mouth daily as needed (gout).     simvastatin (ZOCOR) 20 mg tablet TAKE 1 TABLET BY MOUTH EVERY DAY EVERY NIGHT    metFORMIN ER (GLUCOPHAGE XR) 500 mg tablet TAKE 3 TABLETS BY MOUTH EVERY DAY WITH DINNER    albuterol (PROVENTIL VENTOLIN) 2.5 mg /3 mL (0.083 %) nebu TAKE 3ML BY NEBULIZATION ROUTE THREE TIMES A DAY AS NEEDED FOR WHEEZING    acetylcysteine (MUCOMYST) 200 mg/mL (20 %) solution USE 2 ML IN NEBULIZER THREE TIMES DAILY    isosorbide mononitrate ER (IMDUR) 30 mg tablet TAKE 1/2 TABLET BY MOUTH EVERY DAY IN THE MORNING    glipiZIDE (GLUCOTROL) 5 mg tablet TAKE 1 TABLET BY MOUTH EVERY DAY    glucose blood VI test strips (OneTouch Ultra Blue Test Strip) strip USE 1 STRIP IN VITRO TO TEST BLOOD SUGAR DAILY BEFORE BREAKFAST, DX E11.21    OTHER Manual lightweight wheelchair  Diagnosis:  Spinocerebellar ataxia degeneration    Spiriva Respimat 2.5 mcg/actuation inhaler USE 2 INHALATIONS ORALLY   DAILY    metoprolol succinate (TOPROL-XL) 25 mg XL tablet TAKE 1/2 TABLET BY MOUTH EVERY DAY    aspirin 81 mg chewable tablet Take 1 Tab by mouth daily.  coenzyme q10 10 mg cap Take 10 mg by mouth daily.  cyanocobalamin (VITAMIN B-12) 500 mcg tablet Take 500 mcg by mouth daily.  folic acid 540 mcg tablet Take 400 mcg by mouth daily.  nitroglycerin (NITROSTAT) 0.4 mg SL tablet 1 Tab by SubLINGual route every five (5) minutes as needed for Chest Pain.  MAGNESIUM PO Take 500 mg by mouth daily.  acetaminophen (TYLENOL) 500 mg tablet Take 1 tablet by mouth every eight (8) hours as needed for Pain.  CHOLECALCIFEROL, VITAMIN D3, (VITAMIN D3 PO) Take 1,000 Units by mouth daily.  multivitamins-minerals-lutein (CENTRUM SILVER) Tab Take 1 Tab by mouth daily.  fexofenadine (ALLEGRA) 180 mg tablet Take 1 Tab by mouth daily.  famotidine (PEPCID AC) 20 mg tablet Take 20 mg by mouth daily.  VITAMIN B COMPLEX (B COMPLEX PO) Take 1 Tab by mouth daily.  ASCORBIC ACID (VITAMIN C PO) Take 1,000 mg by mouth daily.  albuterol (Ventolin HFA) 90 mcg/actuation inhaler Take 2 Puffs by inhalation every four (4) hours as needed for Wheezing. No current facility-administered medications for this visit. Assessment:       ICD-10-CM ICD-9-CM    1.  Coronary artery disease involving native coronary artery of native heart without angina pectoris  I25.10 414.01    2. Essential hypertension  I10 401.9 AMB POC EKG ROUTINE W/ 12 LEADS, INTER & REP   3. Bilateral carotid artery stenosis  I65.23 433.10 AMB POC EKG ROUTINE W/ 12 LEADS, INTER & REP     433.30    4. Mixed hyperlipidemia  E78.2 272.2 AMB POC EKG ROUTINE W/ 12 LEADS, INTER & REP   5. Typical atrial flutter (HCC)  I48.3 427.32         Orders Placed This Encounter    AMB POC EKG ROUTINE W/ 12 LEADS, INTER & REP     Order Specific Question:   Reason for Exam:     Answer:   routine       Plan:     No angina. CHF compensated. Rate controlled flutter. No OAC due to cerebral hemorrhage on eliquis.   F/U 6 mo    Annabel Lake MD

## 2022-03-02 NOTE — LETTER
3/2/2022    Patient: Liliane Armendariz   YOB: 1931   Date of Visit: 3/2/2022     Brii Fishman MD  [address] [place] [address]  Via In Basket    Dear Brii Fishman MD,      Thank you for referring Mr. Liliane Armendariz to [facility] for evaluation. My notes for this consultation are attached. If you have questions, please do not hesitate to call me. I look forward to following your patient along with you.       Sincerely,    Onalee Cranker, MD

## 2022-03-02 NOTE — PROGRESS NOTES
1. Have you been to the ER, urgent care clinic since your last visit? Hospitalized since your last visit? No.    2. Have you seen or consulted any other health care providers outside of the [address] [place] [address] since your last visit? Include any pap smears or colon screening.    No.      Chief Complaint   Patient presents with    Coronary Artery Disease     6 month- pt denies any cardiac symptoms    Hypertension

## 2022-03-16 DIAGNOSIS — E11.21 TYPE 2 DIABETES MELLITUS WITH NEPHROPATHY (HCC): ICD-10-CM

## 2022-03-17 RX ORDER — METOPROLOL SUCCINATE 25 MG/1
TABLET, EXTENDED RELEASE ORAL
Qty: 45 TABLET | Refills: 4 | Status: SHIPPED | OUTPATIENT
Start: 2022-03-17

## 2022-03-17 RX ORDER — FLUTICASONE PROPIONATE AND SALMETEROL 250; 50 UG/1; UG/1
POWDER RESPIRATORY (INHALATION)
Qty: 180 G | Refills: 0 | Status: SHIPPED | OUTPATIENT
Start: 2022-03-17 | End: 2022-06-10

## 2022-03-17 RX ORDER — GLIPIZIDE 5 MG/1
TABLET ORAL
Qty: 90 TABLET | Refills: 2 | Status: SHIPPED | OUTPATIENT
Start: 2022-03-17

## 2022-03-18 PROBLEM — E55.9 VITAMIN D DEFICIENCY: Status: ACTIVE | Noted: 2017-10-24

## 2022-03-18 PROBLEM — R26.0 SENSORY ATAXIC GAIT: Status: ACTIVE | Noted: 2017-10-24

## 2022-03-18 PROBLEM — H81.399 VESTIBULAR VERTIGO: Status: ACTIVE | Noted: 2017-12-13

## 2022-03-18 PROBLEM — E11.3599 TYPE 2 DIABETES MELLITUS WITH PROLIFERATIVE RETINOPATHY (HCC): Status: ACTIVE | Noted: 2020-02-25

## 2022-03-18 PROBLEM — M48.02 DEGENERATIVE CERVICAL SPINAL STENOSIS: Status: ACTIVE | Noted: 2017-10-24

## 2022-03-18 PROBLEM — R27.0 ATAXIA: Status: ACTIVE | Noted: 2017-10-24

## 2022-03-19 PROBLEM — I83.893 VARICOSE VEINS OF BOTH LEGS WITH EDEMA: Status: ACTIVE | Noted: 2019-02-12

## 2022-03-19 PROBLEM — I20.9 ANGINA, CLASS III (HCC): Status: ACTIVE | Noted: 2018-10-04

## 2022-03-19 PROBLEM — G60.8 IDIOPATHIC SMALL AND LARGE FIBER SENSORY NEUROPATHY: Status: ACTIVE | Noted: 2017-10-24

## 2022-03-19 PROBLEM — I67.89 CEREBRAL MICROVASCULAR DISEASE: Status: ACTIVE | Noted: 2017-10-24

## 2022-03-19 PROBLEM — E53.8 B12 DEFICIENCY: Status: ACTIVE | Noted: 2017-10-24

## 2022-03-19 PROBLEM — I73.9 PVD (PERIPHERAL VASCULAR DISEASE) (HCC): Status: ACTIVE | Noted: 2019-02-12

## 2022-03-19 PROBLEM — Z48.02 ENCOUNTER FOR STAPLE REMOVAL: Status: ACTIVE | Noted: 2019-01-09

## 2022-03-19 PROBLEM — S06.5XAA SUBDURAL HEMATOMA (HCC): Status: ACTIVE | Noted: 2020-02-25

## 2022-03-19 PROBLEM — I10 ESSENTIAL HYPERTENSION: Status: ACTIVE | Noted: 2020-01-29

## 2022-03-19 PROBLEM — I48.92 ATRIAL FLUTTER (HCC): Status: ACTIVE | Noted: 2020-01-29

## 2022-03-19 PROBLEM — Z98.890 S/P CARDIAC CATH: Status: ACTIVE | Noted: 2018-08-20

## 2022-03-19 PROBLEM — E11.42 DIABETIC PERIPHERAL NEUROPATHY ASSOCIATED WITH TYPE 2 DIABETES MELLITUS (HCC): Status: ACTIVE | Noted: 2017-10-24

## 2022-03-19 PROBLEM — E11.21 TYPE 2 DIABETES MELLITUS WITH NEPHROPATHY (HCC): Status: ACTIVE | Noted: 2017-12-22

## 2022-03-19 PROBLEM — M54.16 LUMBAR BACK PAIN WITH RADICULOPATHY AFFECTING RIGHT LOWER EXTREMITY: Status: ACTIVE | Noted: 2017-12-13

## 2022-03-19 PROBLEM — E78.2 MIXED HYPERLIPIDEMIA: Status: ACTIVE | Noted: 2020-01-29

## 2022-03-20 PROBLEM — I65.23 BILATERAL CAROTID ARTERY STENOSIS: Status: ACTIVE | Noted: 2017-10-24

## 2022-03-20 PROBLEM — M54.16 LUMBAR BACK PAIN WITH RADICULOPATHY AFFECTING LEFT LOWER EXTREMITY: Status: ACTIVE | Noted: 2017-12-13

## 2022-03-20 PROBLEM — M47.12 CERVICAL ARTHRITIS WITH MYELOPATHY: Status: ACTIVE | Noted: 2017-10-24

## 2022-03-22 NOTE — PROGRESS NOTES
Neurology Note    Patient ID:  Maria Alicia  [id number]  12 y.o.  12/18/1931      Date of Consultation:  March 23, 2022      Subjective: I still have trouble with my walking. History of Present Illness:   Maria Alicia is a 80 y.o. male who returns to the neurology clinic at [facility] for an evaluation. The patient was last seen on June 8, 2021 please see my history of present illness, examination, and treatment plan from that day. 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 He also had a history of a peripheral neuropathy which was felt to be related to his diabetes. He was on anticoagulation due to his atrial flutter but then did have a fall and developed a subdural hematoma and his Eliquis was stopped. He has continued to be kept on aspirin monotherapy. Since his last visit, he did have 1 fall. He lost his balance while walking on the hallway. He does use a rolling walker in the house. He does have his light weight manual wheelchair for outside the house which is quite helpful for them. The chairlift within the house is also beneficial.  His balance has gotten worse and he is more unsteady per his wife. He does need assistance with more more activities of daily living such as dressing, bathing. They are interested in seeing if there is anything that can be done to help with his balance and coordination. They do have a niece that checks on them once a week but no other additional support at this time. He does not complain of pain today. His tremor is still mild with no worsening.     Past Medical History:   Diagnosis Date    Arrhythmia     Asthma     [facility]rial [facility]     CAD (coronary artery disease)     Carotid artery disease (HCC)     Chronic hip pain     COPD (chronic obstructive pulmonary disease) ([facility] 9/10/2010    [facility]     DJD (degenerative joint disease)  GERD (gastroesophageal reflux disease)     HNP (herniated nucleus pulposus) 2006    lumbar     Hypercholesterolemia     Hypertension     Nausea & vomiting     Prostate cancer ([facility]     Reversible ischemic neurologic deficit ([facility]     suspected with no residual effects and no recurrance    TIA (transient ischemic attack)     TIA- no deficiets        Past Surgical History:   Procedure Laterality Date    COLONOSCOPY  12/15/2004    Diverticulosis Dr. Dorota Cope repeat 10 years    HX COLONOSCOPY      HX CORONARY STENT PLACEMENT      HX HEART CATHETERIZATION      2  stents     HX HEART CATHETERIZATION  2018    failed 100% block @ [facility] will plan to do laser next.  HX HERNIA REPAIR  2008    left inguinal hernia repair by Dr. Nishi Hopson Right     inguinal hernai repair    HX HERNIA REPAIR      umbilical hernia repair    HX HIP REPLACEMENT Right 2016    HX PROSTATECTOMY      HX PTCA      HX TONSILLECTOMY      GA TOTAL HIP ARTHROPLASTY Left 11    Dr[facility] Jose De Jesus Hidalgo at [facility]        Family History   Problem Relation Age of Onset    Cancer Mother         Breast cancer    Lung Disease Mother         Emphysema    Cancer Father         Bladder cancer    Lung Disease Father         Emphysema    Hypertension Sister         1    Breast Cancer Sister         Social History     Tobacco Use    Smoking status: Former Smoker     Packs/day: 3.00     Years: 10.00     Pack years: 30.00     Types: Cigarettes     Quit date: 1/15/1970     Years since quittin.2    Smokeless tobacco: Never Used   Substance Use Topics    Alcohol use: No        Allergies   Allergen Reactions    Eliquis [Apixaban] Other (comments)     Caused bleed in brain        Prior to Admission medications    Medication Sig Start Date End Date Taking?  Authorizing Provider   amoxicillin (AMOXIL) 500 mg capsule TAKE 4 CAPSULES 1 HOUR PRIOR TO DENTAL PROCEDURES 3/9/22  Yes Provider, Historical   OTHER home pt, ot, nursing therapy. Diagnosis: spinocerebellar degeneration, peripheral neuropathy 3/23/22  Yes Nam Acharya DO   metoprolol succinate (TOPROL-XL) 25 mg XL tablet TAKE 1/2 TABLET BY MOUTH EVERY DAY 3/17/22  Yes Pradeep Acosta MD   glipiZIDE (GLUCOTROL) 5 mg tablet TAKE 1 TABLET BY MOUTH EVERY DAY 3/17/22  Yes Gato Xiong MD   Wixela Inhub 250-50 mcg/dose diskus inhaler USE 1 INHALATION ORALLY    EVERY 12 HOURS 3/17/22  Yes Gato Xiong MD   colchicine (MITIGARE) 0.6 mg capsule Take 1 Capsule by mouth daily as needed (gout). 2/11/22  Yes Gato Xiong MD   simvastatin (ZOCOR) 20 mg tablet TAKE 1 TABLET BY MOUTH EVERY DAY EVERY NIGHT 1/27/22  Yes Jesus Cevallos III, MD   metFORMIN ER (GLUCOPHAGE XR) 500 mg tablet TAKE 3 TABLETS BY MOUTH EVERY DAY WITH DINNER 12/23/21  Yes Jesus Cevallos III, MD   albuterol (PROVENTIL VENTOLIN) 2.5 mg /3 mL (0.083 %) nebu TAKE 3ML BY NEBULIZATION ROUTE THREE TIMES A DAY AS NEEDED FOR WHEEZING 10/29/21  Yes Jesus Cevallos III, MD   acetylcysteine (MUCOMYST) 200 mg/mL (20 %) solution USE 2 ML IN NEBULIZER THREE TIMES DAILY 10/25/21  Yes Jesus Cevallos III, MD   isosorbide mononitrate ER (IMDUR) 30 mg tablet TAKE 1/2 TABLET BY MOUTH EVERY DAY IN THE MORNING 7/29/21  Yes Pradeep Acosta MD   glucose blood VI test strips (OneTouch Ultra Blue Test Strip) strip USE 1 STRIP IN VITRO TO TEST BLOOD SUGAR DAILY BEFORE [place], DX E11.21 6/23/21  Yes Jesus Cevallos III, MD   OTHER Manual lightweight wheelchair  Diagnosis:  Spinocerebellar ataxia degeneration 6/8/21  Yes Nam Acharya DO   Spiriva Respimat 2.5 mcg/actuation inhaler USE 2 INHALATIONS ORALLY   DAILY 5/18/21  Yes Gato Xiong MD   aspirin 81 mg chewable tablet Take 1 Tab by mouth daily. Yes Pradeep Acosta MD   coenzyme q10 10 mg cap Take 10 mg by mouth daily. Yes Provider, Historical   cyanocobalamin (VITAMIN B-12) 500 mcg tablet Take 500 mcg by mouth daily.    Yes Provider, Historical   folic acid 486 mcg tablet Take 400 mcg by mouth daily. Yes Provider, Historical   nitroglycerin (NITROSTAT) 0.4 mg SL tablet 1 Tab by SubLINGual route every five (5) minutes as needed for Chest Pain. 8/10/18  Yes Larry Lizarraga NP   MAGNESIUM PO Take 500 mg by mouth daily. Yes Provider, Historical   acetaminophen (TYLENOL) 500 mg tablet Take 1 tablet by mouth every eight (8) hours as needed for Pain. 8/28/14  Yes Angel Dickey MD   CHOLECALCIFEROL, VITAMIN D3, (VITAMIN D3 PO) Take 1,000 Units by mouth daily. Yes Provider, Historical   multivitamins-minerals-lutein (CENTRUM SILVER) Tab Take 1 Tab by mouth daily. 5/14/10  Yes Provider, Historical   fexofenadine (ALLEGRA) 180 mg tablet Take 1 Tab by mouth daily. 1/25/10  Yes Mami Gambino MD   famotidine (PEPCID AC) 20 mg tablet Take 20 mg by mouth daily. 6/27/11  Yes Provider, Historical   VITAMIN B COMPLEX (B COMPLEX PO) Take 1 Tab by mouth daily. Yes Provider, Historical   ASCORBIC ACID (VITAMIN C PO) Take 1,000 mg by mouth daily. Yes Provider, Historical   albuterol (Ventolin HFA) 90 mcg/actuation inhaler Take 2 Puffs by inhalation every four (4) hours as needed for Wheezing. 1/21/21   Mohini Hernandez MD       Review of Systems:    General, constitutional: balance difficulties  Eyes, vision: negative  Ears, nose, throat: negative  Cardiovascular, heart: negative  Respiratory: negative  Gastrointestinal: negative  Genitourinary: negative  Musculoskeletal: hip pain  Skin and integumentary: He does have his compression stockings on today.   Psychiatric: negative  Endocrine: negative  Neurological: negative, except for HPI  Hematologic/lymphatic: negative  Allergy/immunology: negative      Objective:     Visit Vitals  BP (!) 170/90   Pulse 79   Ht 5' 9\" (1.753 m)   Wt 180 lb (81.6 kg)   SpO2 95%   BMI 26.58 kg/m²       Physical Exam:    General:  appears well nourished in no acute distress  Neck: no carotid bruits  Lungs: clear to auscultation  Heart:  no murmurs, regular rate  Lower extremity: peripheral pulses palpable. Mild edema. No compression stockings today  Skin: intact    Neurological exam:    Awake, alert, oriented to person, place and time. Slow to respond, but appropriate. Recent and remote memory were normal  Attention and concentration were intact  Language was intact. There was no aphasia  Speech: no dysarthria. No true scanning property. Fund of knowledge was preserved    Cranial nerves:   II-XII were tested    [employer] fields were full  Eomi, he does have notable horizontal nystagmus with endgaze (R >L). Also with vertical nystagmus to vertical gaze. unchanged   Facial sensation:  normal and symmetric  Facial motor: normal and symmetric  Hearing intact  SCM strength intact  Tongue: midline without fasciculations    Motor: Tone normal    No evidence of fasciculations    Strength testing:   deltoid triceps biceps Wrist ext. Wrist flex. intrinsics Hip flex. Hip ext. Knee ext. Knee flex Dorsi flex Plantar flex   Right 5 5 5 5 5 5 5 5 5 5 5 5   Left 5 5 5 5 5 5 5 5 5 5 5 5         Sensory:  Upper extremity: intact to pp, light touch, and vibration > 10 seconds  Lower extremity: Pinprick was not checked today as the patient had his compression stockings on and did not want to take them off. Similar to last visit. He was able to feel the pinprick through his stockings at the knee vibration was absent in his toes and ankles and 5 seconds at his knees. Slightly worse  Reflexes:    Right Left  Biceps  3 3  Triceps 3 3  Brachiorad. 3 3  Patella  3 3  Achilles 1 1    Cerebellar testing:  no tremor apparent, finger/nose and adebayo were intact. Slow, but intact. I did not appreciate a tremor today    Gait: He was in his lightweight manual wheelchair.   I did not have him attempt to ambulate as he did not have his rolling walker with him today for safe  Labs:     Lab Results   Component Value Date/Time Hemoglobin A1c 7.2 (H) 12/22/2021 12:19 PM    Sodium 138 12/22/2021 12:19 PM    Potassium 4.8 12/22/2021 12:19 PM    Chloride 101 12/22/2021 12:19 PM    Glucose 136 (H) 12/22/2021 12:19 PM    BUN 23 12/22/2021 12:19 PM    Creatinine 1.01 12/22/2021 12:19 PM    Calcium 9.4 12/22/2021 12:19 PM    WBC 8.2 12/22/2021 12:19 PM    HCT 39.8 12/22/2021 12:19 PM    HGB 12.7 (L) 12/22/2021 12:19 PM    PLATELET 026 09/99/7683 12:19 PM       Imaging:    [place]/19    MRI [facility] SPINE WO CONT    Narrative  EXAM: MRI [facility] SPINE WO CONT    INDICATION: myelopathy. Cerebrovascular disease, unspecified    COMPARISON: 3/3/2015    TECHNIQUE: MR imaging of the thoracic spine was performed using the following  sequences: sagittal T1, T2, stir; axial T1, T2.    CONTRAST: None. FINDINGS:    There is normal alignment of the thoracic spine. Vertebral body heights are  maintained. Marrow signal is normal. Severe disc space narrowing is noted at  T9-T10. Moderate/severe disc space narrowing is present at T8-T9. Moderate disc  space narrowing is present at T6-T7. Osteophytic endplate changes are noted at  multiple levels. The course, caliber, and signal intensity of the spinal cord are normal.    Patchy, indeterminant signal changes are noted in the lungs. Pulmonary nodules  are not excluded. .    Minimal disc bulges, protrusions, or disc osteophyte complexes are noted at  multiple levels in the and thoracic spine. The largest is a right paracentral  disc protrusion at T7-T8 partially effacing the right lateral recess. Moderate/severe left neuroforaminal narrowing at T9-T10. Multilevel mild to  mild/moderate neuroforaminal narrowing is seen at multiple other levels  bilaterally. No spinal canal stenosis. Impression  IMPRESSION:  1. Multilevel degenerative disc disease and degenerative changes. Moderate/severe neuroforaminal narrowing at T9-T10. No spinal canal stenosis.   Please see above report. 2. Nonspecific patchy signal changes in the lower lobes. Pulmonary nodules are  not excluded. CT chest could be obtained for further assessment, as clinically  indicated. Results from [place] encounter on 02/25/20    CT HEAD WO CONT    Narrative  EXAM: CT HEAD WO CONT    INDICATION: follow up    COMPARISON: None. CONTRAST: None. TECHNIQUE: Unenhanced CT of the head was performed using 5 mm images. Brain and  bone windows were generated. CT dose reduction was achieved through use of a  standardized protocol tailored for this examination and automatic exposure  control for dose modulation. FINDINGS:  No calvarial abnormalities are detected. Abnormal soft tissue density is noted  within the right maxillary sinus. Specifically, the dome-shaped soft tissue  density is suggestive of a mucous retention cyst/polyp and there is evidence of  mucosal thickening as well. The previously described bilateral extracerebral  fluid collections are again identified. These appear to represent subdural  hematomata. The previously described small areas of acute hemorrhage on the left  are again identified. As seen on axial image 21, there has been a slight  decrease in the size/density of the small collection of blood in the left  frontal region. No new collections of acute hemorrhage are identified. The  previously described focal area of decreased attenuation in the left parietal  region is again noted and is compatible with an old infarct. There continues to  be periventricular low density which is compatible with white matter disease. There is evidence of moderate cerebral atrophy. Impression  IMPRESSION:  1. Presence of bilateral subdural hematomata. Presence of small, acute  hemorrhagic elements on the left as described above. 2. 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 3. Presence of periventricular low density compatible with white matter disease.   4. Evidence of moderate cerebral atrophy. MRI of his brain from February 2019. I do think there is notable atrophy and this is quite prominent in the bilateral cerebellum. Updated hemoglobin A1c 7.2, LDL 58, LFTs normal.  These were performed on December 22, 2021      Assessment and Plan: This is a pleasant 51-year-old gentleman with multiple medical problems as listed below which impact his overall neurological health who returns to clinic today with continued slow progressive decline in his neurological function. His examination is notable for an gaze nystagmus in multiple spheres, a length dependent sensory neuropathy, and hyperreflexia throughout. He has been given the diagnosis of a spinocerebellar degeneration. 1. 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 There has been slow continued progression of his disaese  Balance and safety are the most important aspects of preventing harm. We had discussed this previously. He is now using his rolling walker all the time which has made a significant change in his stability and he has not had any falls. I will arrange for home ot and pt. He continues to not drive. Continue with lightweight manual wheelchair. 2. Peripheral neuropathy:  This is most likely due to his long-standing history of diabetes. His diabetes has improved but still hemoglobin A1c of 7.2. I stressed to him the importance of tight glycemic control. This does impact his balance. Neuropathy:  we reviewed the causes contributing to the neuropathy. We discussed the importance of exercise and activity. I also reviewed the importance of safety with ambulation and ways to prevents falls. I did stress to him the importance of increasing his exercise and activity. His wife is going to start working with him more in regards to trying to be more active. 3. Vascular disease:   We did talk about his increased risk of stroke due to his history of hypertension, diabetes, atrial flutter, and high cholesterol. He will continue on aspirin monotherapy as he cannot take anticoagulation due to recent hemorrhage    He will continue with aggressive blood pressure, glucose, and cholesterol control. His blood pressure was notably elevated today. He will follow up with his pcp. 4. subdural hematoma in 2020: This occurred in February 2020. I do not see any new neurological impairment from this. He will continue off of anticoagulation    5. Restless leg syndrome:  By clinical history. He did not complain of this today. This is something we will continue to monitor. 6. Essential tremor:  Intermittent in nature by the patient and his wife. I do not see any evidence of a tremor on today's examination. This is something we will closely watch. If it does worsen or impact his ADLs, a medication can be is considered.     Patient Active Problem List   Diagnosis Code    GERD (gastroesophageal reflux disease) K21.9    Low back pain M54.50    Pure hypercholesterolemia E78.00    COPD (chronic obstructive pulmonary disease) ([facility] J44.9    Osteoarthritis of hip M16.9    Status post hip replacement Z96.649    Personal history of prostate cancer Z85.46    S/P drug eluting coronary stent placement Z95.5    Incidental pulmonary nodule, greater than or equal to 8mm R91.1    Coronary artery disease involving native coronary artery without angina pectoris I25.10    Retinal hemorrhage H35.60    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    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    SOBOE (shortness of breath on exertion) R06.02    Advance directive discussed with patient Z71.89    Degenerative joint disease of right hip M16.11    Primary localized osteoarthrosis of right hip M16.11    Diabetic peripheral neuropathy associated with type 2 diabetes mellitus (HCC) E11.42    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    B12 deficiency E53.8    Ataxia R27.0    Sensory ataxic gait R26.0    Cervical arthritis with myelopathy M47.12    Degenerative cervical spinal stenosis M48.02    Bilateral carotid artery stenosis I65.23    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    Vitamin D deficiency E55.9    Vestibular vertigo H81.399    Lumbar back pain with radiculopathy affecting left lower extremity M54.16    Lumbar back pain with radiculopathy affecting right lower extremity M54.16    Type 2 diabetes mellitus with nephropathy [facility] E11.21    S/P cardiac cath Z98.890    Angina, class III ([facility]) I20.9    Encounter for staple removal Z48.02    PVD (peripheral vascular disease) ([facility]) I73.9    Varicose veins of both legs with edema I83.893    Atrial flutter ([facility]) I48.92    Mixed hyperlipidemia E78.2    Essential hypertension I10    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 The patient had the ability to ask questions and all questions were answered.                    Signed By:  Rufina Henriquez DO FAAN    March 23, 2022

## 2022-03-23 ENCOUNTER — OFFICE VISIT (OUTPATIENT)
Dept: NEUROLOGY | Age: 87
End: 2022-03-23
Payer: MEDICARE

## 2022-03-23 VITALS
SYSTOLIC BLOOD PRESSURE: 170 MMHG | BODY MASS INDEX: 26.66 KG/M2 | WEIGHT: 180 LBS | HEART RATE: 79 BPM | HEIGHT: 69 IN | DIASTOLIC BLOOD PRESSURE: 90 MMHG | OXYGEN SATURATION: 95 %

## 2022-03-23 DIAGNOSIS — E11.42 DIABETIC PERIPHERAL NEUROPATHY ASSOCIATED WITH TYPE 2 DIABETES MELLITUS (HCC): ICD-10-CM

## 2022-03-23 DIAGNOSIS — R27.0 ATAXIA: Primary | ICD-10-CM

## 2022-03-23 DIAGNOSIS — S06.5XAA SUBDURAL HEMATOMA: ICD-10-CM

## 2022-03-23 DIAGNOSIS — G25.81 RESTLESS LEG SYNDROME: ICD-10-CM

## 2022-03-23 PROCEDURE — G8419 CALC BMI OUT NRM PARAM NOF/U: HCPCS | Performed by: PSYCHIATRY & NEUROLOGY

## 2022-03-23 PROCEDURE — G8432 DEP SCR NOT DOC, RNG: HCPCS | Performed by: PSYCHIATRY & NEUROLOGY

## 2022-03-23 PROCEDURE — G8427 DOCREV CUR MEDS BY ELIG CLIN: HCPCS | Performed by: PSYCHIATRY & NEUROLOGY

## 2022-03-23 PROCEDURE — 1101F PT FALLS ASSESS-DOCD LE1/YR: CPT | Performed by: PSYCHIATRY & NEUROLOGY

## 2022-03-23 PROCEDURE — G8536 NO DOC ELDER MAL SCRN: HCPCS | Performed by: PSYCHIATRY & NEUROLOGY

## 2022-03-23 PROCEDURE — 99214 OFFICE O/P EST MOD 30 MIN: CPT | Performed by: PSYCHIATRY & NEUROLOGY

## 2022-03-23 RX ORDER — AMOXICILLIN 500 MG/1
CAPSULE ORAL
COMMUNITY
Start: 2022-03-09

## 2022-03-31 ENCOUNTER — TELEPHONE (OUTPATIENT)
Dept: NEUROLOGY | Age: 87
End: 2022-03-31

## 2022-04-01 ENCOUNTER — TELEPHONE (OUTPATIENT)
Dept: NEUROLOGY | Age: 87
End: 2022-04-01

## 2022-04-05 NOTE — TELEPHONE ENCOUNTER
Left message that I have sent this to Dr. Abdoul Joiner and will send in as soon as he orders when back in the office

## 2022-04-08 ENCOUNTER — TELEPHONE (OUTPATIENT)
Dept: NEUROLOGY | Age: 87
End: 2022-04-08

## 2022-04-08 NOTE — TELEPHONE ENCOUNTER
Spoke with pt's wife, I asked her if she wanted us to send the home health order to a specific location or did she want us to mail it to the home and she could call the insurance and they recommend someone that accepts their insurance. She was very confused and did not know. Please call back.

## 2022-04-08 NOTE — TELEPHONE ENCOUNTER
Spoke with wife and advised to call insurance find which home health facility is in the patient's insurance network and to call that facility and I am mailing the order for the home health to the patient.

## 2022-04-10 NOTE — TELEPHONE ENCOUNTER
Please see prior messages. This was just ordered on 3/23/2022. I wrote another order today, but this should have been arranged already. Thanks.

## 2022-04-27 ENCOUNTER — PATIENT OUTREACH (OUTPATIENT)
Dept: CASE MANAGEMENT | Age: 87
End: 2022-04-27

## 2022-04-27 NOTE — PROGRESS NOTES
Ambulatory Care Management Note    Date/Time:  4/27/2022 10:18 AM    This patient was received as a referral from Provider. Ambulatory Care Manager outreached to patient today to offer care management services. Introduction to self and role of care manager provided. Patient accepted care management services at this time. Follow up call scheduled at this time. Patient has Ambulatory Care Manager's contact number for for any questions or concerns. Monitor for COPD, HTN, DM   At request of [facility] - contacted neurology for update on [facility]  Goals      Check BP daily      04/27/22  Patient takes metoprolol 25 mg - 1/2 tab at lunch time. Has been checking BP in the morning and BP has been elevated. Discussed mechanism of BB and patient will continue to take metoprolol at lunch time, then check BP two hours later and keep a log. ACM will follow up in 5-7 days for readings. KG       Reduce risk of COPD Exacerbations (ie. managing triggers, health maintenance with COPD). 04/27/22  Patient has COPD and is using two inhalers (Wixela, Spiriva) nebulizer daily (mucomist) patient does not like to use more than once a day. Confirmed he has albuterol rescue inhaler to use PRN. Patient does have a productive cough. Reports phlegm is tan in color. Denies fever, chills, sweats or CP. ACM will discuss red flags during next call with patient. ACM will follow up in 7-10 days. KG       Supportive resources in place to maintain patient in the community (ie. Home Health, DME equipment, refer to, medication assistant plan, etc.)      04/27/22   Patient and his wife live alone. One niece in area who checks on them daily. Patient very debilitated and Mrs. Sunshine Trejo assists with ADL's. Neurology is ordering [facility] SN/PT/OT. Patient could use an aid to assist with bathing. Awaiting update on status of HH. They do not have a life alert system. Pt has suffered a fall recently. Ambulates with a walker. Has become weaker per patient.  Will provide additional information by mail for their review. Discussed [employer] system and provided phone number so they can register patient as someone needing assistance getting out of the home during an emergency. ACM will investigate additional resources and follow up in 7-10 days. KG          Remote Patient Monitoring Enrollment Note    Date/Time:  4/27/2022 10:27 AM    Offered patient enrollment in the [address] [place] [address] Remote Patient Monitoring (RPM) program for in home monitoring for COPD, Diabetes and HTN. Patient declined RPM services.

## 2022-04-28 RX ORDER — ALBUTEROL SULFATE 0.83 MG/ML
SOLUTION RESPIRATORY (INHALATION)
Qty: 180 ML | Refills: 0 | Status: SHIPPED | OUTPATIENT
Start: 2022-04-28 | End: 2022-09-04

## 2022-05-05 ENCOUNTER — TELEPHONE (OUTPATIENT)
Dept: NEUROLOGY | Age: 87
End: 2022-05-05

## 2022-05-05 ENCOUNTER — PATIENT OUTREACH (OUTPATIENT)
Dept: CASE MANAGEMENT | Age: 87
End: 2022-05-05

## 2022-05-05 NOTE — PROGRESS NOTES
Called patient and spoke with Mrs. Quentin Carcamo (on HIPAA). She stated they have not been contacted about HH. Trying to schedule appt so HH can be ordered asap. \    Patient has rollator and wheelchair. Notes indicate chair lift in the home. Patient in need of [facility] SN/PT/OT (SN-diabetes education - last A1C 7.2)  Goals      Check BP daily      22  Follow up call to patient and spoke with Mrs. Quentin Carcamo, on HIPAA, who provided the followin/27:  146/87 (79)     158/95 (79)  :  130/74 (80)  :  146/84 (80)  :   123/72  (81)  :   140/78  (78)  5/3:   139/86  (80)  :   139/83  (78)  Pt will continue to monitor BP and keep a log. BP is looking good. ACM will follow up in 7-10 days. KG    22  Patient takes metoprolol 25 mg - 1/2 tab at lunch time. Has been checking BP in the morning and BP has been elevated. Discussed mechanism of BB and patient will continue to take metoprolol at lunch time, then check BP two hours later and keep a log. ACM will follow up in 5-7 days for readings. KG       Reduce risk of COPD Exacerbations (ie. managing triggers, health maintenance with COPD). 22  Patient has COPD and is using two inhalers (Wixela, Spiriva) nebulizer daily (mucomist) patient does not like to use more than once a day. Confirmed he has albuterol rescue inhaler to use PRN. Patient does have a productive cough. Reports phlegm is tan in color. Denies fever, chills, sweats or CP. ACM will discuss red flags during next call with patient. ACM will follow up in 7-10 days. KG       Supportive resources in place to maintain patient in the community (ie. Home Health, DME equipment, refer to, medication assistant plan, etc.)      22  HH is not in place. Pt states she received a rx in mail but did not know what to do with it. HH will need progress note to accompany. Discussed at great length with patient and what his needs are including SN/PT/OT.   VV scheduled for 5/6/22.    04/27/22   Patient and his wife live alone. One niece in area who checks on them daily. Patient very debilitated and Mrs. Timothy oMralez assists with ADL's. Neurology is ordering [facility] SN/PT/OT. Patient could use an aid to assist with bathing. Awaiting update on status of HH. They do not have a life alert system. Pt has suffered a fall recently. Ambulates with a walker. Has become weaker per patient. Will provide additional information by mail for their review. Discussed [employer] system and provided phone number so they can register patient as someone needing assistance getting out of the home during an emergency. ACM will investigate additional resources and follow up in 7-10 days.  KG

## 2022-05-05 NOTE — TELEPHONE ENCOUNTER
RAFFI Epstein end int med is inquiring about status of home health orders. States pt has not been able to start yet.  Please call [phone]

## 2022-05-06 ENCOUNTER — VIRTUAL VISIT (OUTPATIENT)
Dept: INTERNAL MEDICINE CLINIC | Age: 87
End: 2022-05-06
Payer: MEDICARE

## 2022-05-06 DIAGNOSIS — I73.9 PVD (PERIPHERAL VASCULAR DISEASE) (HCC): ICD-10-CM

## 2022-05-06 DIAGNOSIS — R27.0 ATAXIA: ICD-10-CM

## 2022-05-06 DIAGNOSIS — J44.9 CHRONIC OBSTRUCTIVE PULMONARY DISEASE, UNSPECIFIED COPD TYPE (HCC): Primary | ICD-10-CM

## 2022-05-06 DIAGNOSIS — M54.16 LUMBAR BACK PAIN WITH RADICULOPATHY AFFECTING LEFT LOWER EXTREMITY: ICD-10-CM

## 2022-05-06 DIAGNOSIS — E11.21 TYPE 2 DIABETES MELLITUS WITH NEPHROPATHY (HCC): ICD-10-CM

## 2022-05-06 PROCEDURE — G8432 DEP SCR NOT DOC, RNG: HCPCS | Performed by: INTERNAL MEDICINE

## 2022-05-06 PROCEDURE — G8428 CUR MEDS NOT DOCUMENT: HCPCS | Performed by: INTERNAL MEDICINE

## 2022-05-06 PROCEDURE — G8419 CALC BMI OUT NRM PARAM NOF/U: HCPCS | Performed by: INTERNAL MEDICINE

## 2022-05-06 PROCEDURE — 99214 OFFICE O/P EST MOD 30 MIN: CPT | Performed by: INTERNAL MEDICINE

## 2022-05-06 PROCEDURE — 1101F PT FALLS ASSESS-DOCD LE1/YR: CPT | Performed by: INTERNAL MEDICINE

## 2022-05-06 PROCEDURE — G8536 NO DOC ELDER MAL SCRN: HCPCS | Performed by: INTERNAL MEDICINE

## 2022-05-06 PROCEDURE — G0463 HOSPITAL OUTPT CLINIC VISIT: HCPCS | Performed by: INTERNAL MEDICINE

## 2022-05-06 NOTE — PROGRESS NOTES
Esdras Xavier is a 80 y.o. male who was seen by synchronous (real-time) audio-video technology on 5/6/2022. Assessment & Plan:   Below is the assessment and plan developed based on review of pertinent history, physical exam (if applicable), labs, studies, and medications. 1. Chronic obstructive pulmonary disease, unspecified COPD type ([facility])appears stable on current meds. 2. PVD (peripheral vascular disease) ([facility])appears stable. 3. Ataxiaprogressive. Agree with plans for physical therapy and Occupational Therapy. 4. Lumbar back pain with radiculopathy affecting left lower extremitystable. We will do physical therapy to see if that is helpful for that. 5.  Skin tearssoap and water and Polysporin. We will also have a nursing assessment for evaluation as well. Subjective:   Esdras Xavier was seen for Home Health [facility] C[facility] AT [facility] SN/PT/OT (SN-diabetes education - last A1C 7.2))      Since last visit: Neurology for follow-up. He has had several falls. Neurology suggested home health for physical therapy, Occupational Therapy, and nursing. They placed an order but it never was followed through with. This serves as a face-to-face for those services. He continues to be unsteady on his feet. He has dizziness on standing. He suffered a skin tear on his left and right arm with a recent fall. No true vertigo. No headache. No chest pains. His shortness of breath is at baseline. He is eating and drinking without difficulty. Per HPI      Prior to Admission medications    Medication Sig Start Date End Date Taking?  Authorizing Provider   albuterol (PROVENTIL VENTOLIN) 2.5 mg /3 mL (0.083 %) nebu INHALE CONTENTS OF 1 AMPULE EVERY SIX HOURS IF NEEDED FOR SHORTNESS OF BREATH OR WHEEZING J44.9 COPD 4/28/22  Yes Homero Chowdhury III, MD   amoxicillin (AMOXIL) 500 mg capsule TAKE 4 CAPSULES 1 HOUR PRIOR TO DENTAL PROCEDURES 3/9/22  Yes Provider, Historical   metoprolol succinate (TOPROL-XL) 25 mg XL tablet TAKE 1/2 TABLET BY MOUTH EVERY DAY 3/17/22  Yes Armando Richardson MD   glipiZIDE (GLUCOTROL) 5 mg tablet TAKE 1 TABLET BY MOUTH EVERY DAY 3/17/22  Yes Daxa Gonzalez MD   Wixela Inhub 250-50 mcg/dose diskus inhaler USE 1 INHALATION ORALLY    EVERY 12 HOURS 3/17/22  Yes Nuha Woodward III, MD   colchicine (MITIGARE) 0.6 mg capsule Take 1 Capsule by mouth daily as needed (gout). 2/11/22  Yes Daxa Gonzalez MD   simvastatin (ZOCOR) 20 mg tablet TAKE 1 TABLET BY MOUTH EVERY DAY EVERY NIGHT 1/27/22  Yes Nuha Woodward III, MD   metFORMIN ER (GLUCOPHAGE XR) 500 mg tablet TAKE 3 TABLETS BY MOUTH EVERY DAY WITH DINNER 12/23/21  Yes Nuha Woodward III, MD   acetylcysteine (MUCOMYST) 200 mg/mL (20 %) solution USE 2 ML IN NEBULIZER THREE TIMES DAILY 10/25/21  Yes Nuha Woodward III, MD   isosorbide mononitrate ER (IMDUR) 30 mg tablet TAKE 1/2 TABLET BY MOUTH EVERY DAY IN THE MORNING 7/29/21  Yes Armando Richardson MD   glucose blood VI test strips (OneTouch Ultra Blue Test Strip) strip USE 1 STRIP IN VITRO TO TEST BLOOD SUGAR DAILY BEFORE [place], DX E11.21 6/23/21  Yes Nuha Woodward III, MD   OTHER Manual lightweight wheelchair  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 Yes Armando Richardson MD   coenzyme q10 10 mg cap Take 10 mg by mouth daily. Yes Provider, Historical   cyanocobalamin (VITAMIN B-12) 500 mcg tablet Take 500 mcg by mouth daily. Yes Provider, Historical   folic acid 375 mcg tablet Take 400 mcg by mouth daily. Yes Provider, Historical   nitroglycerin (NITROSTAT) 0.4 mg SL tablet 1 Tab by SubLINGual route every five (5) minutes as needed for Chest Pain. 8/10/18  Yes Héctor Deal, NP   MAGNESIUM PO Take 500 mg by mouth daily.    Yes Provider, Historical   acetaminophen (TYLENOL) 500 mg tablet Take 1 tablet by mouth every eight (8) hours as needed for Pain. 8/28/14  Yes Angel Dickey MD   CHOLECALCIFEROL, VITAMIN D3, (VITAMIN D3 PO) Take 1,000 Units by mouth daily. Yes Provider, Historical   multivitamins-minerals-lutein (CENTRUM SILVER) Tab Take 1 Tab by mouth daily. 5/14/10  Yes Provider, Historical   fexofenadine (ALLEGRA) 180 mg tablet Take 1 Tab by mouth daily. 1/25/10  Yes Dora Prieto MD   famotidine (PEPCID AC) 20 mg tablet Take 20 mg by mouth daily. 6/27/11  Yes Provider, Historical   VITAMIN B COMPLEX (B COMPLEX PO) Take 1 Tab by mouth daily. Yes Provider, Historical   ASCORBIC ACID (VITAMIN C PO) Take 1,000 mg by mouth daily. Yes Provider, Historical   OTHER home pt, ot, nursing therapy.   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 ([address] 02/25/2020    Subdural hematoma ([address] 02/25/2020    Atrial flutter ([address] 01/29/2020    Mixed hyperlipidemia 01/29/2020    Essential hypertension 01/29/2020    PVD (peripheral vascular disease) ([address] 02/12/2019    Varicose veins of both legs with edema 02/12/2019    Encounter for staple removal 01/09/2019    Angina, class III ([address] 10/04/2018    S/P cardiac cath 08/20/2018    Type 2 diabetes mellitus with nephropathy ([address] 12/22/2017    Vestibular vertigo 12/13/2017    Lumbar back pain with radiculopathy affecting left lower extremity 12/13/2017    Lumbar back pain with radiculopathy affecting right lower extremity 12/13/2017    Diabetic peripheral neuropathy associated with type 2 diabetes mellitus ([address] 10/24/2017    Idiopathic small and large fiber sensory neuropathy 10/24/2017    B12 deficiency 10/24/2017    Ataxia 10/24/2017    Sensory ataxic gait 10/24/2017    Cervical arthritis with myelopathy 10/24/2017    Degenerative cervical spinal stenosis 10/24/2017    Bilateral carotid artery stenosis 10/24/2017    Cerebral microvascular disease 10/24/2017    Vitamin D deficiency 10/24/2017    Degenerative joint disease of right hip 07/06/2016    Primary localized osteoarthrosis of right hip 07/06/2016    Advance directive discussed with patient 06/28/2016    SOBOE (shortness of breath on exertion) 05/04/2016    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    Strabismic amblyopia 04/02/2016    Retinal hemorrhage 01/27/2016    Coronary artery disease involving native coronary artery without angina pectoris 06/10/2015    Incidental pulmonary nodule, greater than or equal to 8mm 04/11/2015    S/P drug eluting coronary stent placement 10/27/2014    Status post hip replacement 02/09/2011    Osteoarthritis of hip 10/15/2010    COPD (chronic obstructive pulmonary disease) ([facility] 09/10/2010    Pure hypercholesterolemia 05/14/2010    GERD (gastroesophageal reflux disease) 09/24/2009    Low back pain 09/24/2009    Personal history of prostate cancer 08/11/2003     Current Outpatient Medications   Medication Sig Dispense Refill    albuterol (PROVENTIL VENTOLIN) 2.5 mg /3 mL (0.083 %) nebu INHALE CONTENTS OF 1 AMPULE EVERY SIX HOURS IF NEEDED FOR SHORTNESS OF BREATH OR WHEEZING J44.9 COPD 180 mL 0    amoxicillin (AMOXIL) 500 mg capsule TAKE 4 CAPSULES 1 HOUR PRIOR TO DENTAL PROCEDURES      metoprolol succinate (TOPROL-XL) 25 mg XL tablet TAKE 1/2 TABLET BY MOUTH EVERY DAY 45 Tablet 4    glipiZIDE (GLUCOTROL) 5 mg tablet TAKE 1 TABLET BY MOUTH EVERY DAY 90 Tablet 2    Wixela Inhub 250-50 mcg/dose diskus inhaler USE 1 INHALATION ORALLY    EVERY 12 HOURS 180 g 0    colchicine (MITIGARE) 0.6 mg capsule Take 1 Capsule by mouth daily as needed (gout).  20 Capsule 1    simvastatin (ZOCOR) 20 mg tablet TAKE 1 TABLET BY MOUTH EVERY DAY EVERY NIGHT 90 Tablet 1    metFORMIN ER (GLUCOPHAGE XR) 500 mg tablet TAKE 3 TABLETS BY MOUTH EVERY DAY WITH DINNER 270 Tablet 1    acetylcysteine (MUCOMYST) 200 mg/mL (20 %) solution USE 2 ML IN NEBULIZER THREE TIMES DAILY 60 mL 4    isosorbide mononitrate ER (IMDUR) 30 mg tablet TAKE 1/2 TABLET BY MOUTH EVERY DAY IN THE MORNING 45 Tablet 4    glucose blood VI test strips (OneTouch Ultra Blue Test Strip) strip USE 1 STRIP IN VITRO TO TEST BLOOD SUGAR DAILY BEFORE BREAKFAST, DX E11.21 100 Strip 3    OTHER Manual lightweight wheelchair  Diagnosis:  Spinocerebellar ataxia degeneration 1 Each 0    Spiriva Respimat 2.5 mcg/actuation inhaler USE 2 INHALATIONS ORALLY   DAILY 3 Inhaler 3    aspirin 81 mg chewable tablet Take 1 Tab by mouth daily.  coenzyme q10 10 mg cap Take 10 mg by mouth daily.  cyanocobalamin (VITAMIN B-12) 500 mcg tablet Take 500 mcg by mouth daily.  folic acid 057 mcg tablet Take 400 mcg by mouth daily.  nitroglycerin (NITROSTAT) 0.4 mg SL tablet 1 Tab by SubLINGual route every five (5) minutes as needed for Chest Pain. 1 Bottle 0    MAGNESIUM PO Take 500 mg by mouth daily.  acetaminophen (TYLENOL) 500 mg tablet Take 1 tablet by mouth every eight (8) hours as needed for Pain. 30 tablet 0    CHOLECALCIFEROL, VITAMIN D3, (VITAMIN D3 PO) Take 1,000 Units by mouth daily.  multivitamins-minerals-lutein (CENTRUM SILVER) Tab Take 1 Tab by mouth daily.  fexofenadine (ALLEGRA) 180 mg tablet Take 1 Tab by mouth daily. 30 Tab 11    famotidine (PEPCID AC) 20 mg tablet Take 20 mg by mouth daily.  VITAMIN B COMPLEX (B COMPLEX PO) Take 1 Tab by mouth daily.  ASCORBIC ACID (VITAMIN C PO) Take 1,000 mg by mouth daily.  OTHER home pt, ot, nursing therapy.   Diagnosis: spinocerebellar degeneration, peripheral neuropathy (Patient not taking: Reported on 5/6/2022) 1 Each 0     Allergies   Allergen Reactions    Eliquis [Apixaban] Other (comments)     Caused bleed in brain     Past Medical History:   Diagnosis Date    Arrhythmia     Asthma  Atrial fibrillation (HCC)     CAD (coronary artery disease)     Carotid artery disease (HCC)     Chronic hip pain     COPD (chronic obstructive pulmonary disease) ([facility] 9/10/2010    Diabetes ([facility]     DJD (degenerative joint disease)     GERD (gastroesophageal reflux disease)     HNP (herniated nucleus pulposus) 2006    lumbar     Hypercholesterolemia     Hypertension     Nausea & vomiting     Prostate cancer ([facility]     Reversible ischemic neurologic deficit ([facility]     suspected with no residual effects and no recurrance    TIA (transient ischemic attack)     TIA- no deficiets     Past Surgical History:   Procedure Laterality Date    COLONOSCOPY  12/15/2004    Diverticulosis Dr. Komal Addison repeat 10 years    HX COLONOSCOPY      HX CORONARY STENT PLACEMENT      HX HEART CATHETERIZATION      2  stents     HX HEART CATHETERIZATION  2018    failed 100% block @ [facility] will plan to do laser next.       HX HERNIA REPAIR  2008    left inguinal hernia repair by Dr. Willi Combs Right     inguinal hernai repair    HX HERNIA REPAIR      umbilical hernia repair    HX HIP REPLACEMENT Right 2016    HX PROSTATECTOMY      HX PTCA      HX TONSILLECTOMY      MO TOTAL HIP ARTHROPLASTY Left 11    Dr. Rose Weinberg at [facility]     Family History   Problem Relation Age of Onset    Cancer Mother         Breast cancer    Lung Disease Mother         Emphysema    Cancer Father         Bladder cancer    Lung Disease Father         Emphysema    Hypertension Sister         1    Breast Cancer Sister      Social History     Tobacco Use    Smoking status: Former Smoker     Packs/day: 3.00     Years: 10.00     Pack years: 30.00     Types: Cigarettes     Quit date: 1/15/1970     Years since quittin.3    Smokeless tobacco: Never Used   Substance Use Topics    Alcohol use: No       ROS - per HPI      Objective:     Patient-Reported Vitals 2022   Patient-Reported Systolic 128   Patient-Reported Diastolic 82     General: alert, cooperative, no distress   Mental  status: normal mood, behavior, speech, dress, motor activity, and thought processes, able to follow commands   Eyes: EOM intact, normal sclera   Mouth: mucous membranes moist   Neck: no visualized mass   Resp: normal effort and no respiratory distress   Neuro: no gross deficits   Musculoskeletal: normal ROM of neck   Skin: no discoloration or lesions of concern on visible areas   Psychiatric: normal affect, no hallucinations     Jourdan Self, was evaluated through a synchronous (real-time) audio-video encounter. The patient (or guardian if applicable) is aware that this is a billable service. Verbal consent to proceed has been obtained within the past 12 months. The visit was conducted pursuant to the emergency declaration under the [facility] [address] authority and the [employer] General Act. Patient identification was verified, and a caregiver was present when appropriate. The patient was located in a state where the provider was credentialed to provide care.      Leisa Read MD

## 2022-05-06 NOTE — PROGRESS NOTES
Chief Complaint   Patient presents with   [facility] SN/PT/OT (SN-diabetes education - last A1C 7.2)     Orders Placed This Encounter    [address] [place] [address]     Referral Priority:   Routine     Referral Type:   Home Health Evaluation     Referral Reason:   Continuity of Care     Referral Location:   AT [address] [place] [address]     Requested Specialty:   [facility]     Number of Visits Requested:   1     The above orders were approved via [employer] per Dr. Ally Gould, III.

## 2022-05-12 ENCOUNTER — TELEPHONE (OUTPATIENT)
Dept: INTERNAL MEDICINE CLINIC | Age: 87
End: 2022-05-12

## 2022-05-12 NOTE — TELEPHONE ENCOUNTER
Reason for call: FYI    The pt started OT PT and Nursing today   Is this a new problem no  Date of last appointment:  5/6/2022     Can we respond via [employer]: no  Best call back number: Alexis [phone]  [employer]

## 2022-05-16 ENCOUNTER — PATIENT OUTREACH (OUTPATIENT)
Dept: CASE MANAGEMENT | Age: 87
End: 2022-05-16

## 2022-05-16 NOTE — PROGRESS NOTES
Ambulatory Care Management Note      Date/Time:  5/16/2022 2:46 PM    This patient was received as a referral from Provider. Top Challenges reviewed with the provider   HTN   Debility from COPD and deconditioning       Ambulatory  contacted patient for discussion and case management of COPD, HTN and DM   Summary of patients top problems:   1. Education needed on low sodium diet   2. Education needed on good food choices for diabetes  3. COPD- management - does not like to use nebulizer but once a day. Pt is producing more phlegm. Pt will start using nebulizer BID  Patient's challenges to self management identified:   functional physical ability and lack of knowledge about disease      Medication Management:  good adherence    Advance Care Planning:   Does patient have an Advance Directive:  not discussed during this call    [employer], Referrals, and Durable Medical Equipment: [facility] SN/PT/OT    PCP/Specialist follow up:   Future Appointments   Date Time Provider Kari Jiménez   6/8/2022  2:00 PM Aury Epps MD [facility] BS AMB   9/6/2022  1:15 PM Viktoriya Crain MD [facility] BS AMB   12/27/2022 11:40 AM Nam Acharya, DO [facility] BS AMB          Goals      Check BP daily      05/16/22  Discussed HH that is now in place. Therapist came to home today and was not able to work with patient due to HTN (195/101). Prior to checking BP pt had been up walking and went to the bathroom. Pt had not taken BP medication at the time of the appt. Pt rechecked BP while on phone with this writer: 133/73. Discussed low sodium diet. Yesterday patient ate a small quicke (1400 mg of Na). Discussed how Na affects BP and LE edema. Pt reports LE edema. Educated patient on elevation. He does use compression stockings as well. Pt will continue to monitor BP and keep a log. ACM will mail information on low Na diet. Will follow up in 10-14 days.  KG    05/05/22  Follow up call to patient and spoke with  Karen Tobi, on HIPAA, who provided the followin/27:  146/87 (79)     158/95 (79)  :  130/74 (80)  :  146/84 (80)  :   123/72  (81)  :   140/78  (78)  5/3:   139/86  (80)  :   139/83  (78)  Pt will continue to monitor BP and keep a log. BP is looking good. ACM will follow up in 7-10 days. KG    22  Patient takes metoprolol 25 mg - 1/2 tab at lunch time. Has been checking BP in the morning and BP has been elevated. Discussed mechanism of BB and patient will continue to take metoprolol at lunch time, then check BP two hours later and keep a log. ACM will follow up in 5-7 days for readings. KG       Reduce risk of COPD Exacerbations (ie. managing triggers, health maintenance with COPD). 22  Therapist at home today and listened to pt and told family pt sounded rattly. Inquired if patient is using mucomyst BID and he is not, does not like it. Productive cough with tan phlegm, no change in color and is not more viscous. Encouraged water. Patient drinks 16 oz of water daily with ginger ale and 2 cups of coffee. Pt will start drinking an exra 8 oz of water daily. ACM will follow up in 10- 14 days. KG    22  Patient has COPD and is using two inhalers (Wixela, Spiriva) nebulizer daily (mucomist) patient does not like to use more than once a day. Confirmed he has albuterol rescue inhaler to use PRN. Patient does have a productive cough. Reports phlegm is tan in color. Denies fever, chills, sweats or CP. ACM will discuss red flags during next call with patient. ACM will follow up in 7-10 days. KG       Supportive resources in place to maintain patient in the community (ie. Home Health, DME equipment, refer to, medication assistant plan, etc.)      22  HH in place for SN/PT/OT. Confirmed patient is receiving services and quite happy with staff and services. ACM will continue to monitor and follow up in 10-14 days. KG    05/05/22  HH is not in place.  Pt states she received a rx in mail but did not know what to do with it. HH will need progress note to accompany. Discussed at great length with patient and what his needs are including SN/PT/OT. VV scheduled for 5/6/22.    04/27/22   Patient and his wife live alone. One niece in area who checks on them daily. Patient very debilitated and Mrs. Jermaine Spencer assists with ADL's. Neurology is ordering [place] SN/PT/OT. Patient could use an aid to assist with bathing. Awaiting update on status of HH. They do not have a life alert system. Pt has suffered a fall recently. Ambulates with a walker. Has become weaker per patient. Will provide additional information by mail for their review. Discussed [employer] system and provided phone number so they can register patient as someone needing assistance getting out of the home during an emergency. ACM will investigate additional resources and follow up in 7-10 days. KG          Patient verbalized understanding of all information discussed. Patient has this Ambulatory Care Manager's contact information for any further questions, concerns, or needs.

## 2022-05-19 ENCOUNTER — TELEPHONE (OUTPATIENT)
Dept: INTERNAL MEDICINE CLINIC | Age: 87
End: 2022-05-19

## 2022-05-19 NOTE — TELEPHONE ENCOUNTER
Reason for call:  Says that he saw pt today for the third time and has seen a significant decline in balance and walking.   Vitals are normal    Is this a new problem: yes     Date of last appointment:  5/6/2022     Can we respond via [employer]: no    Best call back number:    korey physical therapist with at home care [phone]

## 2022-05-20 ENCOUNTER — PATIENT OUTREACH (OUTPATIENT)
Dept: CASE MANAGEMENT | Age: 87
End: 2022-05-20

## 2022-05-20 NOTE — TELEPHONE ENCOUNTER
Spoke with wife Sergei Dominguez). Informed per Md. Patient has been in contact with Nubia Torres (JULIETTE). HH coming to home today and was advised to have orthostatics. Will call neurology office Monday to schedule appt.

## 2022-05-20 NOTE — PROGRESS NOTES
Returned call to [facility] PT, Jenn Alvarez, who advised he saw patient yesterday. VSS, urinating but patient's lack of balance has worsened. Pt is unable to correct from positional changes. Jenn Alvarez said he plans on calling patient today for an update. Per SRJ, pt needs to follow up with neurology. Called and spoke with patient's wife (on HIPAA )who states OT raised walker height and therpy had patient wear tennis shoes. Usually patient walks in socks. Mrs. Jeramine Spencer stated pt did have worse balance issues when therapy was working with him but it seemed to improve later. Confirmed patient is taking metoprolol at lunch time before therapy arrives and he is drinking water. 4 oz at breakfast, 16 oz at lunch and another 4-8 oz in the afternoon. Reports urine is slightly orange but she believes its because both of them are taking tumeric/curcumin 500 mg daily. Denies cloudiness, odor, increase in frequency or pain. Reviewed red flags and when to call 911  [facility]. Confirmed they have phone number. Also made her aware of on call physician if needed. [facility] Nurse scheduled to see patient today. Asked family to have nurse check BP sitting and standing to rule out orthostatic hypotension. Mrs. Jermaine Spencer also instructed to call neurology and request an appointment. 22Barbie Butts [facility] RN called and provided BP readings:  Sittin/80 and standin/70.   Pt asymptomatic

## 2022-05-20 NOTE — TELEPHONE ENCOUNTER
Tried to reach patient. Left Message for a return phone call. Spoke with Sosa julien and informed per MD note.  Understanding verbalized

## 2022-05-31 ENCOUNTER — TELEPHONE (OUTPATIENT)
Dept: INTERNAL MEDICINE CLINIC | Age: 87
End: 2022-05-31

## 2022-05-31 NOTE — TELEPHONE ENCOUNTER
Received incoming [facility] from [facility] on [facility] for Fall over the weekend. Spoke with patient wife Carlyle Gifford on HIPAA verified name and . States she feels patient is declining, he fell on Saturday while using walker, Carlyle Gifford was able to try to break fall and he only scraped his arm, he did not hit his head. Since the Fall he has been using WC only and is weaker. He is alert and speaking, Carlyle Balta states he said \"I am ready for the Lord to come and take me\". Carlyle Gifford states HHPT is coming today @ 1 and is requesting a nurse visit also. Carlyle Gifford states a friend mentioned hospice, advised that she can request a hospice consult from At [facility].

## 2022-05-31 NOTE — TELEPHONE ENCOUNTER
Spoke with Searcy Seip with At [facility]. She will have PT evaluate when out today and has placed an order for nursing today. Wife notified and voiced understanding.

## 2022-05-31 NOTE — TELEPHONE ENCOUNTER
Reviewed with PCP and per Dr. Randell Thomas call [facility] and ask for nurse visit today. If they cannot get out to patient Dr. Randell Thomas states they should contact [facility].

## 2022-05-31 NOTE — TELEPHONE ENCOUNTER
Attempted to contact At [facility], unsuccessful.    -Left message to return call.

## 2022-05-31 NOTE — TELEPHONE ENCOUNTER
Reason for call:  Physical therapist called and wanted to inform the doctor that he is noticing some signs after his fall. He is noticing loss of balance, depression, and lack of bladder control. He is requesting a call back.     Is this a new problem: yes     Date of last appointment:  5/6/2022     Can we respond via [employer]: no    Best call back number: [phone]

## 2022-06-01 ENCOUNTER — PATIENT OUTREACH (OUTPATIENT)
Dept: CASE MANAGEMENT | Age: 87
End: 2022-06-01

## 2022-06-01 NOTE — TELEPHONE ENCOUNTER
Attempted to contact Sosa Alcala with At [address], unsuccessful.    -Left message advising should check with patients neurologist on next steps - Dr. Sabrina Ramos. Asked Sosa Alcala to return call if further questions.

## 2022-06-01 NOTE — PROGRESS NOTES
Per patient's wife, Kaden Salcedo (on HIPAA) patient has suffered 2-3 falls in the last month. She has noticed he has become much weaker and he is using wheelchair much more. Appt with Dr. Hodan Diez (neurology) is in July. Appt should be moved up. Goals      Check BP daily      22  Had [facility] RN check for orthostatics. Sittin/80 and standin/70. Pt asymptomatic  Spoke with Cely Timothy Moralez who advises pt has not been checking regularly. HH SN/PT/OT have been checking. She is quite busy helping the patient during the day that it is difficult to do daily. Plan: write down [place] readings during each visit and keep a lot. ACM will follow up in 7-10 days. KG    22  Discussed HH that is now in place. Therapist came to home today and was not able to work with patient due to HTN (195/101). Prior to checking BP pt had been up walking and went to the bathroom. Pt had not taken BP medication at the time of the appt. Pt rechecked BP while on phone with this writer: 133/73. Discussed low sodium diet. Yesterday patient ate a small quicke (1400 mg of Na). Discussed how Na affects BP and LE edema. Pt reports LE edema. Educated patient on elevation. He does use compression stockings as well. Pt will continue to monitor BP and keep a log. ACM will mail information on low Na diet. Will follow up in 10-14 days. KG    22  Follow up call to patient and spoke with Cely Timothy Moralez, on HIPAA, who provided the followin/27:  146/87 (79)     158/95 (79)  :  130/74 (80)  :  146/84 (80)  :   123/72  (81)  :   140/78  (78)  5/3:   139/86  (80)  :   139/83  (78)  Pt will continue to monitor BP and keep a log. BP is looking good. ACM will follow up in 7-10 days. KG    22  Patient takes metoprolol 25 mg - 1/2 tab at lunch time. Has been checking BP in the morning and BP has been elevated.  Discussed mechanism of BB and patient will continue to take metoprolol at lunch time, then check BP two hours later and keep a log. ACM will follow up in 5-7 days for readings. KG       Reduce risk of COPD Exacerbations (ie. managing triggers, health maintenance with COPD). 06/01/22  [facility] and pt was [facility] on and has since finished prednisone and abx. Pt does not like using nebulizer. Not sure why. Encouraged Mrs. Livingston Severe to remind patient to use 2-3x daily. ACM will follow up in 7-10 days. KG    05/16/22  Therapist at home today and listened to pt and told family pt sounded rattly. Inquired if patient is using mucomyst BID and he is not, does not like it. Productive cough with tan phlegm, no change in color and is not more viscous. Encouraged water. Patient drinks 16 oz of water daily with ginger ale and 2 cups of coffee. Pt will start drinking an exra 8 oz of water daily. ACM will follow up in 10- 14 days. KG    04/27/22  Patient has COPD and is using two inhalers (Wixela, Spiriva) nebulizer daily (mucomist) patient does not like to use more than once a day. Confirmed he has albuterol rescue inhaler to use PRN. Patient does have a productive cough. Reports phlegm is tan in color. Denies fever, chills, sweats or CP. ACM will discuss red flags during next call with patient. ACM will follow up in 7-10 days. KG       Supportive resources in place to maintain patient in the community (ie. Home Health, DME equipment, refer to, medication assistant plan, etc.)      06/01/22  Per Mrs. Livingston Severe, [facility] OT was able to secure a hospital bed for patient. It is in the living room. Pt using a urinal at night which spouse must empty every 2-3 hours at night. Suggested a 2nd urinal to be placed on bed. Pt able to use without assistance. They have a paid caregiver 3 days a week for 5 hours. A very attentive niece checks on them daily. [facility] RN placed referral for hospice. Representative of At [facility] will be coming to the home 6/2. Discussed how hospice operates.  Will follow up in 3-5 days.  KG    05/16/22  HH in place for SN/PT/OT. Confirmed patient is receiving services and quite happy with staff and services. ACM will continue to monitor and follow up in 10-14 days. KG    05/05/22  HH is not in place. Pt states she received a rx in mail but did not know what to do with it. HH will need progress note to accompany. Discussed at great length with patient and what his needs are including SN/PT/OT. VV scheduled for 5/6/22.    04/27/22   Patient and his wife live alone. One niece in area who checks on them daily. Patient very debilitated and Mrs. Jessica Pink assists with ADL's. Neurology is ordering [facility] [place] SN/PT/OT. Patient could use an aid to assist with bathing. Awaiting update on status of HH. They do not have a life alert system. Pt has suffered a fall recently. Ambulates with a walker. Has become weaker per patient. Will provide additional information by mail for their review. Discussed [employer] system and provided phone number so they can register patient as someone needing assistance getting out of the home during an emergency. ACM will investigate additional resources and follow up in 7-10 days.  KG

## 2022-06-02 ENCOUNTER — PATIENT OUTREACH (OUTPATIENT)
Dept: CASE MANAGEMENT | Age: 87
End: 2022-06-02

## 2022-06-02 ENCOUNTER — TELEPHONE (OUTPATIENT)
Dept: INTERNAL MEDICINE CLINIC | Age: 87
End: 2022-06-02

## 2022-06-02 DIAGNOSIS — J44.9 CHRONIC OBSTRUCTIVE PULMONARY DISEASE, UNSPECIFIED COPD TYPE (HCC): Primary | ICD-10-CM

## 2022-06-02 DIAGNOSIS — R27.0 ATAXIA: ICD-10-CM

## 2022-06-02 DIAGNOSIS — R62.7 ADULT FAILURE TO THRIVE: ICD-10-CM

## 2022-06-02 NOTE — TELEPHONE ENCOUNTER
Reason for call:  Requesting an order for Hospice fax [phone]    Is this a new problem: yes     Date of last appointment:  5/6/2022     Can we respond via [employer]: no    Best call back number:  Kjshubham Salazar with At [address] [employer]

## 2022-06-02 NOTE — TELEPHONE ENCOUNTER
Orders Placed This Encounter    REFERRAL TO HOSPICE     Referral Priority:   Routine     Referral Type:   Hospice     Referral Reason:   Continuity of Care     Requested Specialty:   Hospice and Palliative Care     Number of Visits Requested:   1     The above orders were approved via [facility] per Dr. Noé Tse, III.

## 2022-06-03 ENCOUNTER — TELEPHONE (OUTPATIENT)
Dept: INTERNAL MEDICINE CLINIC | Age: 87
End: 2022-06-03

## 2022-06-03 NOTE — TELEPHONE ENCOUNTER
Reason for call:   Please call with a verbal order for speech     Is this a new problem: yes     Date of last appointment:  5/6/2022     Can we respond via [employer]: no    Best call back number:  At [facility] [phone]

## 2022-06-03 NOTE — TELEPHONE ENCOUNTER
Attempted to contact Sanjana Guajardo with At [employer], unsuccessful.    -Left message to return call. Is family requesting this? Hospice was ordered yesterday.

## 2022-06-06 ENCOUNTER — TELEPHONE (OUTPATIENT)
Dept: INTERNAL MEDICINE CLINIC | Age: 87
End: 2022-06-06

## 2022-06-06 NOTE — PROGRESS NOTES
Follow up call to patient to inquire status of hospice meeting. Will attempt to reach patient again.

## 2022-06-07 ENCOUNTER — TELEPHONE (OUTPATIENT)
Dept: INTERNAL MEDICINE CLINIC | Age: 87
End: 2022-06-07

## 2022-06-07 NOTE — TELEPHONE ENCOUNTER
Reason for call:  Says that they would like to hold off on hospice, but would like verbal order for speech therapy.     Is this a new problem: yes     Date of last appointment:  5/6/2022     Can we respond via [employer]: no    Best call back number:    fariha with at home care [phone]

## 2022-06-08 NOTE — TELEPHONE ENCOUNTER
Attempted to contact Frederic, unsuccessful.    -Left message on Frederic's vmail that order for speech was approved via [employer] by Dr. Kev Jones. Requested Merly Gamez return a call if further questions.

## 2022-06-09 ENCOUNTER — PATIENT MESSAGE (OUTPATIENT)
Dept: INTERNAL MEDICINE CLINIC | Age: 87
End: 2022-06-09

## 2022-06-10 RX ORDER — TIOTROPIUM BROMIDE INHALATION SPRAY 3.12 UG/1
SPRAY, METERED RESPIRATORY (INHALATION)
Qty: 12 G | Refills: 3 | Status: SHIPPED | OUTPATIENT
Start: 2022-06-10

## 2022-06-10 RX ORDER — FLUTICASONE PROPIONATE AND SALMETEROL 250; 50 UG/1; UG/1
POWDER RESPIRATORY (INHALATION)
Qty: 180 G | Refills: 0 | Status: SHIPPED | OUTPATIENT
Start: 2022-06-10

## 2022-06-13 ENCOUNTER — PATIENT OUTREACH (OUTPATIENT)
Dept: CASE MANAGEMENT | Age: 87
End: 2022-06-13

## 2022-06-13 NOTE — PROGRESS NOTES
Patient is checking BG. Today 136. Discussed need for good BG control. Patient suffers from neuropathy. Goals      Check BP daily      22   Patient checks his own BP. Has not been keeping a log but will start. Working with [facility] PT/OT who also check his BP. ACM will follow up in 10-14 days. KG      22  Had [facility] RN check for orthostatics. Sittin/80 and standin/70. Pt asymptomatic  Spoke with Mrs. Orlando Harding who advises pt has not been checking regularly. HH SN/PT/OT have been checking. She is quite busy helping the patient during the day that it is difficult to do daily. Plan: write down [facility] readings during each visit and keep a lot. ACM will follow up in 7-10 days. KG    22  Discussed HH that is now in place. Therapist came to home today and was not able to work with patient due to HTN (195/101). Prior to checking BP pt had been up walking and went to the bathroom. Pt had not taken BP medication at the time of the appt. Pt rechecked BP while on phone with this writer: 133/73. Discussed low sodium diet. Yesterday patient ate a small quicke (1400 mg of Na). Discussed how Na affects BP and LE edema. Pt reports LE edema. Educated patient on elevation. He does use compression stockings as well. Pt will continue to monitor BP and keep a log. ACM will mail information on low Na diet. Will follow up in 10-14 days. KG    22  Follow up call to patient and spoke with Mrs. Orlando Harding, on HIPAA, who provided the followin/27:  146/87 (79)     158/95 (79)  :  130/74 (80)  :  146/84 (80)  :   123/72  (81)  :   140/78  (78)  5/3:   139/86  (80)  :   139/83  (78)  Pt will continue to monitor BP and keep a log. BP is looking good. ACM will follow up in 7-10 days. KG    22  Patient takes metoprolol 25 mg - 1/2 tab at lunch time. Has been checking BP in the morning and BP has been elevated.  Discussed mechanism of BB and patient will continue to take metoprolol at lunch time, then check BP two hours later and keep a log. ACM will follow up in 5-7 days for readings. KG       Reduce risk of COPD Exacerbations (ie. managing triggers, health maintenance with COPD). 06/13/22  Patient has been using mucomyst BID. Nebulizer is done after breakfast and lunch. Some phlegm, tan in color, one day caregiver reported brown color. Today phlegm is not too difficulty to cough up and is tan in color. Discussed changes in viscosity and color need to be reported to MD.  Pt drinking and extra 1/2 bottle (8 oz) in the afternoon. Encouraged increasing to 3/4 bottle. Explained how water can help with thinning secretions. Pt will increase water intake to an additional 3/4 bottle in the afternoons. Fatigue is a major issue for patient but it most likely multifactorial. He is using w/c  more than walker these days. PT/OT will get the patient up to use walker. Discussed managing fatigue with pacing activity and frequent rest periods. ACM will follow up in 10-14 days. KG    06/01/22  Count includes the [facility] saw pt recently and pt was placed on and has since finished prednisone and abx. Pt does not like using nebulizer. Not sure why. Encouraged Mrs. Koko Blackmon to remind patient to use 2-3x daily. ACM will follow up in 7-10 days. KG    05/16/22  Therapist at home today and listened to pt and told family pt sounded rattly. Inquired if patient is using mucomyst BID and he is not, does not like it. Productive cough with tan phlegm, no change in color and is not more viscous. Encouraged water. Patient drinks 16 oz of water daily with ginger ale and 2 cups of coffee. Pt will start drinking an exra 8 oz of water daily. ACM will follow up in 10- 14 days. KG    04/27/22  Patient has COPD and is using two inhalers (Wixela, Spiriva) nebulizer daily (mucomist) patient does not like to use more than once a day. Confirmed he has albuterol rescue inhaler to use PRN. Patient does have a productive cough.  Reports phlegm is tan in color. Denies fever, chills, sweats or CP. ACM will discuss red flags during next call with patient. ACM will follow up in 7-10 days. KG       Supportive resources in place to maintain patient in the community (ie. Home Health, DME equipment, refer to, medication assistant plan, etc.)      06/13/22  Neurology appt 7/6/22 was cancelled. Family would like this writer to have appt rescheduled to assessed decreased balance/gait difficulty/ and increased fatigue. Fatigue was more pronounced 6/10/22. He continues to work with PT but they have seen a change. Hospice evaluation was done and family has decided to continue to work with [facility] at this time. Discussed benefits of hospice and advised available to order should they change their minds. OT has ordered a wheelchair but it has not arrived yet. ACM will follow up in 10-14 days on status of the above. KG      06/01/22  Per Mrs. Drake Jones, [facility] OT was able to secure a hospital bed for patient. It is in the living room. Pt using a urinal at night which spouse must empty every 2-3 hours at night. Suggested a 2nd urinal to be placed on bed. Pt able to use without assistance. They have a paid caregiver 3 days a week for 5 hours. A very attentive niece checks on them daily. [facility] [place] RN placed referral for hospice. Representative of At [facility] will be coming to the home 6/2. Discussed how hospice operates. Will follow up in 3-5 days. KG    05/16/22  HH in place for SN/PT/OT. Confirmed patient is receiving services and quite happy with staff and services. ACM will continue to monitor and follow up in 10-14 days. KG    05/05/22  HH is not in place. Pt states she received a rx in mail but did not know what to do with it. HH will need progress note to accompany. Discussed at great length with patient and what his needs are including SN/PT/OT. VV scheduled for 5/6/22.    04/27/22   Patient and his wife live alone.  One niece in area who checks on them daily. Patient very debilitated and Mrs. Jessica Pink assists with ADL's. Neurology is ordering [facility] SN/PT/OT. Patient could use an aid to assist with bathing. Awaiting update on status of HH. They do not have a life alert system. Pt has suffered a fall recently. Ambulates with a walker. Has become weaker per patient. Will provide additional information by mail for their review. Discussed [employer] system and provided phone number so they can register patient as someone needing assistance getting out of the home during an emergency. ACM will investigate additional resources and follow up in 7-10 days.  KG

## 2022-06-15 ENCOUNTER — TELEPHONE (OUTPATIENT)
Dept: INTERNAL MEDICINE CLINIC | Age: 87
End: 2022-06-15

## 2022-06-15 ENCOUNTER — PATIENT OUTREACH (OUTPATIENT)
Dept: CASE MANAGEMENT | Age: 87
End: 2022-06-15

## 2022-06-15 NOTE — PROGRESS NOTES
Called and scheduled appt with neurology for 7/21/22 @10:30. Attempted tp reach patient, called home number and LM to return call. Called mobile number and LM. Lakshmi Everett

## 2022-06-29 RX ORDER — METFORMIN HYDROCHLORIDE 500 MG/1
TABLET, EXTENDED RELEASE ORAL
Qty: 270 TABLET | Refills: 1 | Status: SHIPPED | OUTPATIENT
Start: 2022-06-29

## 2022-07-12 ENCOUNTER — PATIENT OUTREACH (OUTPATIENT)
Dept: CASE MANAGEMENT | Age: 87
End: 2022-07-12

## 2022-07-14 ENCOUNTER — TELEPHONE (OUTPATIENT)
Dept: INTERNAL MEDICINE CLINIC | Age: 87
End: 2022-07-14

## 2022-07-14 NOTE — TELEPHONE ENCOUNTER
Reason for call: Danii Lay calling--pt BP is elevated this afternoon readings were as follows: 181/99, 162/80, 154/80 requesting a call back said she called last week    Is this a new problem: yes     Date of last appointment:  5/6/2022     Can we respond via [employer]: no    Best call back number: [phone]

## 2022-07-15 NOTE — TELEPHONE ENCOUNTER
Returned call to [employer] [facility]. Notified SRJ reviewed BP readings and advised no changes, continue to monitor. Kerline voiced understanding.

## 2022-07-15 NOTE — PROGRESS NOTES
Goals      Check BP daily      22  Pt not checking BP daily. HH PT does     22   Patient checks his own BP. Has not been keeping a log but will start. Working with [facility] PT/OT who also check his BP. ACM will follow up in 10-14 days. KG      22  Had [facility] RN check for orthostatics. Sittin/80 and standin/70. Pt asymptomatic  Spoke with Mrs. Rosales Lyric who advises pt has not been checking regularly. HH SN/PT/OT have been checking. She is quite busy helping the patient during the day that it is difficult to do daily. Plan: write down [facility] readings during each visit and keep a lot. ACM will follow up in 7-10 days. KG    22  Discussed HH that is now in place. Therapist came to home today and was not able to work with patient due to HTN (195/101). Prior to checking BP pt had been up walking and went to the bathroom. Pt had not taken BP medication at the time of the appt. Pt rechecked BP while on phone with this writer: 133/73. Discussed low sodium diet. Yesterday patient ate a small quicke (1400 mg of Na). Discussed how Na affects BP and LE edema. Pt reports LE edema. Educated patient on elevation. He does use compression stockings as well. Pt will continue to monitor BP and keep a log. ACM will mail information on low Na diet. Will follow up in 10-14 days. KG    22  Follow up call to patient and spoke with  Bobbymi Gunter, on HIPAA, who provided the followin/27:  146/87 (79)     158/95 (79)  :  130/74 (80)  :  146/84 (80)  :   123/72  (81)  :   140/78  (78)  5/3:   139/86  (80)  :   139/83  (78)  Pt will continue to monitor BP and keep a log. BP is looking good. ACM will follow up in 7-10 days. KG    22  Patient takes metoprolol 25 mg - 1/2 tab at lunch time. Has been checking BP in the morning and BP has been elevated.  Discussed mechanism of BB and patient will continue to take metoprolol at lunch time, then check BP two hours later and keep a log. ACM will follow up in 5-7 days for readings. KG       Reduce risk of COPD Exacerbations (ie. managing triggers, health maintenance with COPD). 07/12/22  Mrs. Moncada Home stated [facility] S[facility]ey saw patient 7/7/22. Increased sputum, provider suggested robitussin DM. He takes 2-3x/day with improvement. Denies fever or increased fatigue but positive for phlegm, clear or white in in color. Using mucomyst BID. Reviewed red flags with Mrs. Moncada Home who verbalized understanding. ACM will follow up  7-10 days. KG    06/13/22  Patient has been using mucomyst BID. Nebulizer is done after breakfast and lunch. Some phlegm, tan in color, one day caregiver reported brown color. Today phlegm is not too difficulty to cough up and is tan in color. Discussed changes in viscosity and color need to be reported to MD.  Pt drinking and extra 1/2 bottle (8 oz) in the afternoon. Encouraged increasing to 3/4 bottle. Explained how water can help with thinning secretions. Pt will increase water intake to an additional 3/4 bottle in the afternoons. Fatigue is a major issue for patient but it most likely multifactorial. He is using w/c  more than walker these days. PT/OT will get the patient up to use walker. Discussed managing fatigue with pacing activity and frequent rest periods. ACM will follow up in 10-14 days. KG    06/01/22  [facility] and has since finished prednisone and abx. Pt does not like using nebulizer. Not sure why. Encouraged Mrs. Moncada Home to remind patient to use 2-3x daily. ACM will follow up in 7-10 days. KG    05/16/22  Therapist at home today and listened to pt and told family pt sounded rattly. Inquired if patient is using mucomyst BID and he is not, does not like it. Productive cough with tan phlegm, no change in color and is not more viscous. Encouraged water. Patient drinks 16 oz of water daily with ginger ale and 2 cups of coffee.  Pt will start drinking an exra 8 oz of water daily. ACM will follow up in 10- 14 days. KG    04/27/22  Patient has COPD and is using two inhalers (Wixela, Spiriva) nebulizer daily (mucomist) patient does not like to use more than once a day. Confirmed he has albuterol rescue inhaler to use PRN. Patient does have a productive cough. Reports phlegm is tan in color. Denies fever, chills, sweats or CP. ACM will discuss red flags during next call with patient. ACM will follow up in 7-10 days. KG       Supportive resources in place to maintain patient in the community (ie. Home Health, DME equipment, refer to, medication assistant plan, etc.)      07/15/22  [facility] to [facility] who confirmed if more than 4 steps will not be able to transport by wheelchair (stretcher). They do have portable ramps but the max amount of steps is four. Called patient and LM to confirm number of steps for both doors. Will await return call. (need transport for 7/20 neuro appt). KG    07/12/22  Patient is primarily in a wheelchair due to weakness/balance deficits. There are steps into the home. Patient would benefit from a ramp. Offered information but Mrs. Moncada Home states her niece provided information but she has not called yet. Encouraged her to call the [address] to start the process. Pt has an appt with neurology 7/21/22 and will need transportation. ACM will investigate wheel chair transportation services. ACM will follow up in 3-5 days. KG    06/13/22  Neurology appt 7/6/22 was cancelled. Family would like this writer to have appt rescheduled to assessed decreased balance/gait difficulty/ and increased fatigue. Fatigue was more pronounced 6/10/22. He continues to work with PT but they have seen a change. Hospice evaluation was done and family has decided to continue to work with [facility] at this time. Discussed benefits of hospice and advised available to order should they change their minds. OT has ordered a wheelchair but it has not arrived yet. ACM will follow up in 10-14 days on status of the above. KG      06/01/22  Per Mrs. Jared Dee, [facility] OT was able to secure a hospital bed for patient. It is in the living room. Pt using a urinal at night which spouse must empty every 2-3 hours at night. Suggested a 2nd urinal to be placed on bed. Pt able to use without assistance. They have a paid caregiver 3 days a week for 5 hours. A very attentive niece checks on them daily. [facility] RN placed referral for hospice. Representative of [facility] will be coming to the home 6/2. Discussed how hospice operates. Will follow up in 3-5 days. KG    05/16/22  HH in place for SN/PT/OT. Confirmed patient is receiving services and quite happy with staff and services. ACM will continue to monitor and follow up in 10-14 days. KG    05/05/22  HH is not in place. Pt states she received a rx in mail but did not know what to do with it. HH will need progress note to accompany. Discussed at great length with patient and what his needs are including SN/PT/OT. VV scheduled for 5/6/22.    04/27/22   Patient and his wife live alone. One niece in area who checks on them daily. Patient very debilitated and Mrs. Jared Dee assists with ADL's. Neurology is ordering [facility] SN/PT/OT. Patient could use an aid to assist with bathing. Awaiting update on status of HH. They do not have a life alert system. Pt has suffered a fall recently. Ambulates with a walker. Has become weaker per patient. Will provide additional information by mail for their review. Discussed [employer] system and provided phone number so they can register patient as someone needing assistance getting out of the home during an emergency. ACM will investigate additional resources and follow up in 7-10 days.  KG

## 2022-07-18 ENCOUNTER — PATIENT OUTREACH (OUTPATIENT)
Dept: CASE MANAGEMENT | Age: 87
End: 2022-07-18

## 2022-07-19 ENCOUNTER — PATIENT OUTREACH (OUTPATIENT)
Dept: CASE MANAGEMENT | Age: 87
End: 2022-07-19

## 2022-07-20 NOTE — PROGRESS NOTES
Challenges to be reviewed by the provider   Additional needs identified to be addressed with provider: yes  Patient's wife and caregiver report patient experiencing a rash on the inner thighs and genitalia. Pt wearing briefs and along with voiding and sweating, he now has a rash. They have used a barrier cream at times with little improvement. SRJ ordered nystatin powder. Goals Addressed                   This Visit's Progress     Check BP daily        22  Discussed possibly starting RPM with Mrs Anne-Marie Darling. She would like to discuss further at next call. KG    22  Pt not checking BP daily. HH PT does     22   Patient checks his own BP. Has not been keeping a log but will start. Working with [facility] PT/OT who also check his BP. ACM will follow up in 10-14 days. KG      22  Had [facility] RN check for orthostatics. Sittin/80 and standin/70. Pt asymptomatic  Spoke with Mrs. Anne-Marie Darling who advises pt has not been checking regularly. HH SN/PT/OT have been checking. She is quite busy helping the patient during the day that it is difficult to do daily. Plan: write down [place] readings during each visit and keep a lot. ACM will follow up in 7-10 days. KG    22  Discussed HH that is now in place. Therapist came to home today and was not able to work with patient due to HTN (195/101). Prior to checking BP pt had been up walking and went to the bathroom. Pt had not taken BP medication at the time of the appt. Pt rechecked BP while on phone with this writer: 133/73. Discussed low sodium diet. Yesterday patient ate a small quicke (1400 mg of Na). Discussed how Na affects BP and LE edema. Pt reports LE edema. Educated patient on elevation. He does use compression stockings as well. Pt will continue to monitor BP and keep a log. ACM will mail information on low Na diet. Will follow up in 10-14 days.  KG    22  Follow up call to patient and spoke with  Anne-Marie Darling, on HIPAA, who provided the followin/27:  146/87 (79)     158/95 (79)  :  130/74 (80)  :  146/84 (80)  :   123/72  (81)  :   140/78  (78)  5/3:   139/86  (80)  :   139/83  (78)  Pt will continue to monitor BP and keep a log. BP is looking good. ACM will follow up in 7-10 days. KG    22  Patient takes metoprolol 25 mg - 1/2 tab at lunch time. Has been checking BP in the morning and BP has been elevated. Discussed mechanism of BB and patient will continue to take metoprolol at lunch time, then check BP two hours later and keep a log. ACM will follow up in 5-7 days for readings. KG       Reduce risk of COPD Exacerbations (ie. managing triggers, health maintenance with COPD). 22  Productive cough with phlegm but has improved since starting Robitussi. Also using Acapella respiratory device (pickle). No fevers, phlegm white and at times thick. Encouraged water. Drinking 16 oz at breakfast and lunch and then 8oz in the afternoon. Will review productive cough steps during next call in 10-14 days. KG    22  Mrs. Vipul Ricks stated [facility] saw patient 22. Increased sputum, provider suggested robitussin DM. He takes 2-3x/day with improvement. Denies fever or increased fatigue but positive for phlegm, clear or white in in color. Using mucomyst BID. Reviewed red flags with Mrs. Vipul Ricks who verbalized understanding. ACM will follow up  7-10 days. KG    22  Patient has been using mucomyst BID. Nebulizer is done after breakfast and lunch. Some phlegm, tan in color, one day caregiver reported brown color. Today phlegm is not too difficulty to cough up and is tan in color. Discussed changes in viscosity and color need to be reported to MD.  Pt drinking and extra 1/2 bottle (8 oz) in the afternoon. Encouraged increasing to 3/4 bottle. Explained how water can help with thinning secretions. Pt will increase water intake to an additional 3/4 bottle in the afternoons.   Fatigue is a major issue for patient but it most likely multifactorial. He is using w/c  more than walker these days. PT/OT will get the patient up to use walker. Discussed managing fatigue with pacing activity and frequent rest periods. ACM will follow up in 10-14 days. KG    06/01/22  [facility] pt recently and pt was placed on and has since finished prednisone and abx. Pt does not like using nebulizer. Not sure why. Encouraged Mrs. Ila Lincoln to remind patient to use 2-3x daily. ACM will follow up in 7-10 days. KG    05/16/22  Therapist at home today and listened to pt and told family pt sounded rattly. Inquired if patient is using mucomyst BID and he is not, does not like it. Productive cough with tan phlegm, no change in color and is not more viscous. Encouraged water. Patient drinks 16 oz of water daily with ginger ale and 2 cups of coffee. Pt will start drinking an exra 8 oz of water daily. ACM will follow up in 10- 14 days. KG    04/27/22  Patient has COPD and is using two inhalers (Wixela, Spiriva) nebulizer daily (mucomist) patient does not like to use more than once a day. Confirmed he has albuterol rescue inhaler to use PRN. Patient does have a productive cough. Reports phlegm is tan in color. Denies fever, chills, sweats or CP. ACM will discuss red flags during next call with patient. ACM will follow up in 7-10 days. KG       Supportive resources in place to maintain patient in the community (ie. Home Health, DME equipment, refer to, medication assistant plan, etc.)        07/20/22  Mrs. Ila Lincoln called and advised neuro appt changed to VV. She is concerned she will not be able to get patient out of bed in time for appt. Caregiver is not at the home on Thursdays and PT/OT are scheduled for the afternoon. Caregiver to changes hours to 8:30 starting today. M/W/F  Instructed Mrs. Ila Lincoln not to attempt to get patient up without assistance, especially if he is having more difficulty getting the EOB or reports dizziness. Dev Vasquez will be at the home at 10:30 for the VV visit. Encouraged wife to make notes of changes (decline) in patient to discuss. Requested PT/OT notes from [facility].    07/19/22  Called Mrs. Manny Mckenzie and confirmed number of steps into the home is 6. [facility] will not be able to transport unless by stretcher. [employer] is scheduled to come to the home 7/20/22 for an estimate. Family agreeable to changed neuro appt to VV. ACM offered to call for patient as Mrs. Manny Mckenzie was a bit overwhelmed. Pt slid to the floor yesterday and it took some time for him to get up. Has rollator but unable to use. Using w/c to get from room to room. Hospital bed in place. Urinal used during the night. Chair lift in the home but pt is not able to use at this time. Safety bars in the bathroom. 07/15/22  Called [facility] who confirmed if more than 4 steps will not be able to transport by wheelchair (stretcher). They do have portable ramps but the max amount of steps is four. Called patient and LM to confirm number of steps for both doors. Will await return call. (need transport for 7/20 neuro appt). KG    07/12/22  Patient is primarily in a wheelchair due to weakness/balance deficits. There are steps into the home. Patient would benefit from a ramp. Offered information but Mrs. Manny Mckenzie states her niece provided information but she has not called yet. Encouraged her to call the companies to start the process. Pt has an appt with neurology 7/21/22 and will need transportation. ACM will investigate wheel chair transportation services. ACM will follow up in 3-5 days. KG    06/13/22  Neurology appt 7/6/22 was cancelled. Family would like this writer to have appt rescheduled to assessed decreased balance/gait difficulty/ and increased fatigue. Fatigue was more pronounced 6/10/22. He continues to work with PT but they have seen a change.      Hospice evaluation was done and family has decided to continue to work with [facility] at this time. Discussed benefits of hospice and advised available to order should they change their minds. OT has ordered a wheelchair but it has not arrived yet. ACM will follow up in 10-14 days on status of the above. KG      06/01/22  Per Mrs. Chelita Anthony, [facility] OT was able to secure a hospital bed for patient. It is in the living room. Pt using a urinal at night which spouse must empty every 2-3 hours at night. Suggested a 2nd urinal to be placed on bed. Pt able to use without assistance. They have a paid caregiver 3 days a week for 5 hours. A very attentive niece checks on them daily. [facility] RN placed referral for hospice. Representative of [facility] will be coming to the home 6/2. Discussed how hospice operates. Will follow up in 3-5 days. KG    05/16/22  HH in place for SN/PT/OT. Confirmed patient is receiving services and quite happy with staff and services. ACM will continue to monitor and follow up in 10-14 days. KG    05/05/22  HH is not in place. Pt states she received a rx in mail but did not know what to do with it. HH will need progress note to accompany. Discussed at great length with patient and what his needs are including SN/PT/OT. VV scheduled for 5/6/22.    04/27/22   Patient and his wife live alone. One niece in area who checks on them daily. Patient very debilitated and Mrs. Chelita Anthony assists with ADL's. Neurology is ordering [facility] SN/PT/OT. Patient could use an aid to assist with bathing. Awaiting update on status of HH. They do not have a life alert system. Pt has suffered a fall recently. Ambulates with a walker. Has become weaker per patient. Will provide additional information by mail for their review. Discussed [employer] system and provided phone number so they can register patient as someone needing assistance getting out of the home during an emergency.   ACM will investigate additional resources and follow up in 7-10 days.  KG

## 2022-07-21 ENCOUNTER — VIRTUAL VISIT (OUTPATIENT)
Dept: NEUROLOGY | Age: 87
End: 2022-07-21
Payer: MEDICARE

## 2022-07-21 DIAGNOSIS — R29.6 FALLS FREQUENTLY: Primary | ICD-10-CM

## 2022-07-21 DIAGNOSIS — R26.0 SENSORY ATAXIC GAIT: ICD-10-CM

## 2022-07-21 DIAGNOSIS — Z86.79 HISTORY OF SUBDURAL HEMATOMA: ICD-10-CM

## 2022-07-21 DIAGNOSIS — E11.42 DIABETIC PERIPHERAL NEUROPATHY ASSOCIATED WITH TYPE 2 DIABETES MELLITUS (HCC): ICD-10-CM

## 2022-07-21 DIAGNOSIS — R25.9 PARKINSONIAN FEATURES: ICD-10-CM

## 2022-07-21 PROCEDURE — 99214 OFFICE O/P EST MOD 30 MIN: CPT | Performed by: NURSE PRACTITIONER

## 2022-07-21 PROCEDURE — 1123F ACP DISCUSS/DSCN MKR DOCD: CPT | Performed by: NURSE PRACTITIONER

## 2022-07-21 NOTE — PROGRESS NOTES
Chief Complaint   Patient presents with    Follow-up     Virtual visit peripheral neuropathy ,essential tremors & subdural hemtoma     1. Have you been to the ER, urgent care clinic since your last visit? No  Hospitalized since your last visit? No    2. Have you seen or consulted any other health care providers outside of the [address] [place] [address] since your last visit? NO  Include any pap smears or colon screening. NO    Spouse states patient very weak and his Ataxia is more noticeable. Has fallen getting out of bed to wheelchair very SOB .

## 2022-07-21 NOTE — PROGRESS NOTES
Neurology Note    Patient ID:  Kj Menard  [id number]  89 y.o.  12/18/1931      Date of Consultation:  July 21, 2022  Chief complaint: Decline in overall health. This is a telemedicine visit that was performed with in the originating site at patient's home and the distance site at [facility] outpatient clinic at [facility].  This telemedicine visit utilized synchronous (real-time) audio-video technology. Verbal consent to participate in the video visit was obtained. This visit occurred during the corona (COVID -19) public health emergency. I discussed with the patient the nature of our telemedicine visit, that:  - I would evaluate the patient and recommend diagnostic and treatment based on my assessment  - Our sessions are not being recorded and that personal health information is protected  - Our team will provide follow-up care in person if and when the patient needs it. Consent:  The patient is aware that this patient-initiated Telehealth encounter is a billable service, with coverage as determined by the patient's insurance carrier. The patient is aware that they may receive a bill and has provided verbal consent to proceed:     Subjective:       History of Present Illness:   Kj Menard is a 80 y.o. male  , his wife feels like he has declined since he is last been seen. He is having a hard time getting around the house, he has had several falls. He was last seen by Dr Kris Winters 3/2022. Homehealth: started in May,   PT: 2 x week trouble getting up from bed to the wheelchair. They are working on his strength  OT:1-2 x week: Working on transfers. ST: 2 x week: They just started, therapist is noting he has low speech, hard to hear him. Rollator or wheelchair: getting a ramp put in the house therefore they are limited in getting out of the house. DM: tries to eat healthy, monitor blood sugars once a day. BP: He is trying keep an eye on his blood pressure.   Falls: fell upstairs with the walker, so now he is in a wheelchair most of the time, hospital bed. They have been living downstairs. One of his falls he did hit his head, no loss of consciousness but he did hit his head on the fan, no major injuries, wife notes this happened 1-1/2 months ago, she did not take him anywhere to get evaluated. Activities/exercise: Just with the therapist as with him  No driving. RLS not an issue:  Tremor: bad in the morning, then gets better. Past Medical History:   Diagnosis Date    Arrhythmia     Asthma     Atrial fibrillation ([address]     CAD (coronary artery disease)     Carotid artery disease (HCC)     Chronic hip pain     COPD (chronic obstructive pulmonary disease) ([address] 9/10/2010    Diabetes (HCC)     DJD (degenerative joint disease)     GERD (gastroesophageal reflux disease)     HNP (herniated nucleus pulposus) 6/2006    lumbar     Hypercholesterolemia     Hypertension     Nausea & vomiting     Prostate cancer ([address] 2003    Reversible ischemic neurologic deficit ([facility] 1990    suspected with no residual effects and no recurrance    TIA (transient ischemic attack)     TIA- no deficiets        Past Surgical History:   Procedure Laterality Date    COLONOSCOPY  12/15/2004    Diverticulosis Dr. Corinne Raja repeat 10 years    HX COLONOSCOPY  2005    HX CORONARY STENT PLACEMENT      HX HEART CATHETERIZATION      2  stents     HX HEART CATHETERIZATION  08/20/2018    failed 100% block @ [facility] will plan to do laser next.       HX HERNIA REPAIR  9/2008    left inguinal hernia repair by Dr. Neeta Dunbar Right     inguinal hernai repair    HX HERNIA REPAIR      umbilical hernia repair    HX HIP REPLACEMENT Right 07/06/2016    HX PROSTATECTOMY  2003    HX PTCA      HX TONSILLECTOMY      SD TOTAL HIP ARTHROPLASTY Left 2/9/11    Dr. Guzman Mason at [facility]        Family History   Problem Relation Age of Onset    Cancer Mother         Breast cancer    Lung Disease Mother         Emphysema    Cancer Father         Bladder cancer    Lung Disease Father         Emphysema    Hypertension Sister         1    Breast Cancer Sister         Social History     Tobacco Use    Smoking status: Former     Packs/day: 3.00     Years: 10.00     Pack years: 30.00     Types: Cigarettes     Quit date: 1/15/1970     Years since quittin.5    Smokeless tobacco: Never   Substance Use Topics    Alcohol use: No        Allergies   Allergen Reactions    Eliquis [Apixaban] Other (comments)     Caused bleed in brain        Prior to Admission medications    Medication Sig Start Date End Date Taking? Authorizing Provider   metFORMIN ER (GLUCOPHAGE XR) 500 mg tablet TAKE 3 TABLETS BY MOUTH EVERY DAY WITH DINNER  Patient taking differently: 2 tabs at breakfast and one tab with dinner 22  Yes Wesley Lazar MD   Wixela Inhub 250-50 mcg/dose diskus inhaler USE 1 INHALATION ORALLY    EVERY 12 HOURS 6/10/22  Yes Wesley Lazar MD   Spiriva Respimat 2.5 mcg/actuation inhaler USE 2 INHALATIONS ORALLY   DAILY 6/10/22  Yes Timothy Dee III, MD   glucose blood VI test strips (ASCENSIA AUTODISC VI, ONE TOUCH ULTRA TEST VI) strip Use one strip daily. 6/10/22  Yes Timothy Dee III, MD   albuterol (PROVENTIL VENTOLIN) 2.5 mg /3 mL (0.083 %) nebu INHALE CONTENTS OF 1 AMPULE EVERY SIX HOURS IF NEEDED FOR SHORTNESS OF BREATH OR WHEEZING J44.9 COPD  Patient taking differently: two (2) times a day. INHALE CONTENTS OF 1 AMPULE EVERY SIX HOURS IF NEEDED FOR SHORTNESS OF BREATH OR WHEEZING J44.9 COPD 22  Yes Wesley Lazar MD   OTHER home pt, ot, nursing therapy.   Diagnosis: spinocerebellar degeneration, peripheral neuropathy 4/10/22  Yes Nam Acharya DO   amoxicillin (AMOXIL) 500 mg capsule TAKE 4 CAPSULES 1 HOUR PRIOR TO DENTAL PROCEDURES 3/9/22  Yes Provider, Historical   metoprolol succinate (TOPROL-XL) 25 mg XL tablet TAKE 1/2 TABLET BY MOUTH EVERY DAY 3/17/22  Yes Ac Ga MD   glipiZIDE (GLUCOTROL) 5 mg tablet TAKE 1 TABLET BY MOUTH EVERY DAY 3/17/22  Yes José Miguel Gerard III, MD   colchicine (MITIGARE) 0.6 mg capsule Take 1 Capsule by mouth daily as needed (gout). 2/11/22  Yes Pravin Andre MD   simvastatin (ZOCOR) 20 mg tablet TAKE 1 TABLET BY MOUTH EVERY DAY EVERY NIGHT 1/27/22  Yes José Miguel Gerard III, MD   acetylcysteine (MUCOMYST) 200 mg/mL (20 %) solution USE 2 ML IN NEBULIZER THREE TIMES DAILY  Patient taking differently: Using BID 10/25/21  Yes Pravin Andre MD   isosorbide mononitrate ER (IMDUR) 30 mg tablet TAKE 1/2 TABLET BY MOUTH EVERY DAY IN THE MORNING  Patient taking differently: Take 15 mg by mouth. 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 Yes Cinda Contreras MD   coenzyme q10 10 mg cap Take 10 mg by mouth daily. Yes Provider, Historical   cyanocobalamin (VITAMIN B12) 500 mcg tablet Take 500 mcg by mouth daily. Yes Provider, Historical   folic acid 771 mcg tablet Take 400 mcg by mouth daily. Yes Provider, Historical   nitroglycerin (NITROSTAT) 0.4 mg SL tablet 1 Tab by SubLINGual route every five (5) minutes as needed for Chest Pain. 8/10/18  Yes Jevon Michaud NP   MAGNESIUM PO Take 500 mg by mouth daily. Yes Provider, Historical   acetaminophen (TYLENOL) 500 mg tablet Take 1 tablet by mouth every eight (8) hours as needed for Pain. 8/28/14  Yes Angel Dickey MD   CHOLECALCIFEROL, VITAMIN D3, (VITAMIN D3 PO) Take 1,000 Units by mouth daily. Yes Provider, Historical   multivitamins-minerals-lutein (MEN'S CENTRUM SILVER WITH LUTEIN) tab tablet Take 1 Tab by mouth daily. 5/14/10  Yes Provider, Historical   fexofenadine (ALLEGRA) 180 mg tablet Take 1 Tab by mouth daily. 1/25/10  Yes Elmer Barrera MD   famotidine (PEPCID) 20 mg tablet Take 20 mg by mouth daily.  6/27/11  Yes Provider, Historical   VITAMIN B COMPLEX (B COMPLEX PO) Take 1 Tab by mouth daily. Yes Provider, Historical   ASCORBIC ACID (VITAMIN C PO) Take 500 mg by mouth daily. Yes Provider, Historical       Review of Systems:    General, constitutional: negative  Eyes, vision: dancing eyes, double vision frequently,has prisms in his glasses. Sees Ophthalmology. Ears, nose, throat: negative, no trouble swallowing. Cardiovascular, heart: negative  Respiratory: negative  Gastrointestinal: wife assist with feeding  Genitourinary: wakes to void alot  Musculoskeletal: negative  Skin and integumentary: negative  Psychiatric: doing ok. Some trouble sleeping  Endocrine: negative  Neurological: c/o dizziness, no headaches. Hematologic/lymphatic: negative  Allergy/immunology: negative    []Unable to obtain  ROS due to  []mental status change  []sedated   []intubated    Objective: There were no vitals taken for this visit. No vital signs were obtained via telemedicine today. There are limitations to the neurological examination due to the technological features of telemedicine  Physical Exam:      General:  appears well nourished in no acute distress  Respiratory:  good respiratory effort. No labored breathing  Skin: intact. No obvious erythematous rashes    Neurological exam:    Awake, alert, oriented to person, place and time  Recent and remote memory were normal  Attention and concentration were intact  Language was intact. There was no aphasia,  Speech: no dysarthria, patient was low talking. Fund of knowledge appeared to be preserved    Cranial nerves: Grossly intact    Reflexes:  Unable to obtain via telemedicine    Motor: Negative pronator drift    Cerebellar testing:  no tremor apparent, finger/nose and adebayo were intact but slowed    Gait: Not visualized.        Labs:     Lab Results   Component Value Date/Time    Hemoglobin A1c 7.2 (H) 12/22/2021 12:19 PM    Sodium 138 12/22/2021 12:19 PM    Potassium 4.8 12/22/2021 12:19 PM    Chloride 101 12/22/2021 12:19 PM    Glucose 136 (H) 12/22/2021 12:19 PM    BUN 23 12/22/2021 12:19 PM    Creatinine 1.01 12/22/2021 12:19 PM    Calcium 9.4 12/22/2021 12:19 PM    WBC 8.2 12/22/2021 12:19 PM    HCT 39.8 12/22/2021 12:19 PM    HGB 12.7 (L) 12/22/2021 12:19 PM    PLATELET 840 83/42/2582 12:19 PM       Imaging:    Results from [place] encounter on 02/12/19    MRI [facility] SPINE WO CONT    Narrative  EXAM: MRI [facility] SPINE WO CONT    INDICATION: myelopathy. Cerebrovascular disease, unspecified    COMPARISON: 3/3/2015    TECHNIQUE: MR imaging of the thoracic spine was performed using the following  sequences: sagittal T1, T2, stir; axial T1, T2.    CONTRAST: None. FINDINGS:    There is normal alignment of the thoracic spine. Vertebral body heights are  maintained. Marrow signal is normal. Severe disc space narrowing is noted at  T9-T10. Moderate/severe disc space narrowing is present at T8-T9. Moderate disc  space narrowing is present at T6-T7. Osteophytic endplate changes are noted at  multiple levels. The course, caliber, and signal intensity of the spinal cord are normal.    Patchy, indeterminant signal changes are noted in the lungs. Pulmonary nodules  are not excluded. .    Minimal disc bulges, protrusions, or disc osteophyte complexes are noted at  multiple levels in the and thoracic spine. The largest is a right paracentral  disc protrusion at T7-T8 partially effacing the right lateral recess. Moderate/severe left neuroforaminal narrowing at T9-T10. Multilevel mild to  mild/moderate neuroforaminal narrowing is seen at multiple other levels  bilaterally. No spinal canal stenosis. Impression  IMPRESSION:  1. Multilevel degenerative disc disease and degenerative changes. Moderate/severe neuroforaminal narrowing at T9-T10. No spinal canal stenosis. Please see above report. 2. Nonspecific patchy signal changes in the lower lobes.  Pulmonary nodules are  not excluded. CT chest could be obtained for further assessment, as clinically  indicated. Results from [place] encounter on 02/25/20    CT HEAD WO CONT    Narrative  EXAM: CT HEAD WO CONT    INDICATION: follow up    COMPARISON: None. CONTRAST: None. TECHNIQUE: Unenhanced CT of the head was performed using 5 mm images. Brain and  bone windows were generated. CT dose reduction was achieved through use of a  standardized protocol tailored for this examination and automatic exposure  control for dose modulation. FINDINGS:  No calvarial abnormalities are detected. Abnormal soft tissue density is noted  within the right maxillary sinus. Specifically, the dome-shaped soft tissue  density is suggestive of a mucous retention cyst/polyp and there is evidence of  mucosal thickening as well. The previously described bilateral extracerebral  fluid collections are again identified. These appear to represent subdural  hematomata. The previously described small areas of acute hemorrhage on the left  are again identified. As seen on axial image 21, there has been a slight  decrease in the size/density of the small collection of blood in the left  frontal region. No new collections of acute hemorrhage are identified. The  previously described focal area of decreased attenuation in the left parietal  region is again noted and is compatible with an old infarct. There continues to  be periventricular low density which is compatible with white matter disease. There is evidence of moderate cerebral atrophy. Impression  IMPRESSION:  1. Presence of bilateral subdural hematomata. Presence of small, acute  hemorrhagic elements on the left as described above. 2. 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 3. Presence of periventricular low density compatible with white matter disease. 4. Evidence of moderate cerebral atrophy.             Assessment and Plan:         Patient Active Problem List   Diagnosis Code GERD (gastroesophageal reflux disease) K21.9    Low back pain M54.50    Pure hypercholesterolemia E78.00    COPD (chronic obstructive pulmonary disease) ([facility]) J44.9    Osteoarthritis of hip M16.9    Status post hip replacement Z96.649    Personal history of prostate cancer Z85.46    S/P drug eluting coronary stent placement Z95.5    Incidental pulmonary nodule, greater than or equal to 8mm R91.1    Coronary artery disease involving native coronary artery without angina pectoris I25.10    Retinal hemorrhage H35.60    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    SOBOE (shortness of breath on exertion) R06.02    Advance directive discussed with patient Z71.89    Degenerative joint disease of right hip M16.11    Primary localized osteoarthrosis of right hip M16.11    Diabetic peripheral neuropathy associated with type 2 diabetes mellitus ([facility]) E11.42    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    B12 deficiency E53.8    Ataxia R27.0    Sensory ataxic gait R26.0    Cervical arthritis with myelopathy M47.12    Degenerative cervical spinal stenosis M48.02    Bilateral carotid artery stenosis I65.23    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    Vitamin D deficiency E55.9    Vestibular vertigo H81.399    Lumbar back pain with radiculopathy affecting left lower extremity M54.16    Lumbar back pain with radiculopathy affecting right lower extremity M54.16    Type 2 diabetes mellitus with nephropathy ([facility]) E11.21    S/P cardiac cath Z98.890    Angina, class III ([facility]) I20.9    Encounter for staple removal Z48.02    PVD (peripheral vascular disease) ([facility]) I73.9    Varicose veins of both legs with edema I83.893    Atrial flutter ([facility]) I48.92    Mixed hyperlipidemia E78.2    Essential hypertension I10    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   Frequent falls: Patient is to continue working with home health PT and OT, fall precautions recommended, continue with Rollator walker and/or wheelchair. 2.  Diabetic polyneuropathy: Continue with diabetes management, closely followed by primary care Dr. Mirza Castillo, he is to continue to monitor his blood sugars as well as improve his diet. 3.  Sensory ataxia gait: Continue with home health PT and OT, fall precautions recommended, continue with recommended assistive device. 4.  Parkinson's features: Patient notes speech therapy has been working with him and has noted his low speech, patient is very slow and appears very rigid with his motions, discussed briefly with patient and his wife about these symptoms, can discuss further with Dr Zac Kaur at December appointment on whether we would want to maybe initiate medication such as Sinemet, to help with his function. 5.  History of subdural hematoma: Patient's wife notes he has fallen several times, one of the falls he did hit his head, he has had no recent imaging, they are limited in getting out of the house but I will go ahead and proceed with ordering diagnostic imaging. Follow-up: Patient has a follow-up appointment with Dr Zac Kaur in December, he is to hold onto that appointment return to clinic sooner if needed. They are to notify us at any time he has any worsening signs or symptoms, advised to patient's wife that if he has a rapid decline they need to call 911 so they can assist with further evaluation and transfer to hospital if needed. A total of 17 minutes was spent on this A/V appointment.     Signed By:  Gay Acharya NP     July 21, 2022

## 2022-07-25 ENCOUNTER — TELEPHONE (OUTPATIENT)
Dept: INTERNAL MEDICINE CLINIC | Age: 87
End: 2022-07-25

## 2022-07-25 NOTE — TELEPHONE ENCOUNTER
Reason for call:  patient wife tested positive for covid yesterday on call physician ordered her paxlovid. She wants to know if the paxlovid can be ordered for her  incase he gets symptoms.     Is this a new problem: yes     Date of last appointment:  5/6/2022     Can we respond via [employer]: no    Best call back number:    Dianne Billy ([place] [id number]) [phone] (Home)

## 2022-07-26 RX ORDER — NYSTATIN 100000 [USP'U]/G
POWDER TOPICAL 3 TIMES DAILY
Qty: 15 G | Refills: 0 | Status: SHIPPED | OUTPATIENT
Start: 2022-07-26 | End: 2022-09-16

## 2022-07-26 RX ORDER — SIMVASTATIN 20 MG/1
TABLET, FILM COATED ORAL
Qty: 90 TABLET | Refills: 1 | Status: SHIPPED | OUTPATIENT
Start: 2022-07-26

## 2022-07-26 NOTE — TELEPHONE ENCOUNTER
Orders Placed This Encounter    nystatin (MYCOSTATIN) powder     Sig: Apply  to affected area three (3) times daily for 7 days. Dispense:  15 g     Refill:  0     The above orders were approved via [facility] per Dr. Catalan Body, III.

## 2022-08-15 ENCOUNTER — PATIENT OUTREACH (OUTPATIENT)
Dept: CASE MANAGEMENT | Age: 87
End: 2022-08-15

## 2022-08-15 NOTE — PROGRESS NOTES
Called and LM with this writer's contact information and request for return call. Called patient and spoke with Mrs. Anne-Marie Darling (on HIPAA). Verified ID with two identifiers. Goals        Check BP daily      08/15/22  OT saw pt today and reports /72.    22  Discussed possibly starting RPM with Mrs Anne-Marie Darling. She would like to discuss further at next call. KG    22  Pt not checking BP daily. HH PT does     22   Patient checks his own BP. Has not been keeping a log but will start. Working with [facility] PT/OT who also check his BP. ACM will follow up in 10-14 days. KG      22  Had [facility] RN check for orthostatics. Sittin/80 and standin/70. Pt asymptomatic  Spoke with Mrs. Anne-Marie Darling who advises pt has not been checking regularly. HH SN/PT/OT have been checking. She is quite busy helping the patient during the day that it is difficult to do daily. Plan: write down [facility] readings during each visit and keep a lot. ACM will follow up in 7-10 days. KG    22  Discussed HH that is now in place. Therapist came to home today and was not able to work with patient due to HTN (195/101). Prior to checking BP pt had been up walking and went to the bathroom. Pt had not taken BP medication at the time of the appt. Pt rechecked BP while on phone with this writer: 133/73. Discussed low sodium diet. Yesterday patient ate a small quicke (1400 mg of Na). Discussed how Na affects BP and LE edema. Pt reports LE edema. Educated patient on elevation. He does use compression stockings as well. Pt will continue to monitor BP and keep a log. ACM will mail information on low Na diet. Will follow up in 10-14 days.  KG    22  Follow up call to patient and spoke with Mrs. Anne-Marie Darling, on HIPAA, who provided the followin/27:  146/87 (79)     158/95 (79)  :  130/74 (80)  :  146/84 (80)  :   123/72  (81)  :   140/78  (78)  5/3:   139/86  (80)  :   139/83  (78)  Pt will continue to monitor BP and keep a log. BP is looking good. ACM will follow up in 7-10 days. KG    04/27/22  Patient takes metoprolol 25 mg - 1/2 tab at lunch time. Has been checking BP in the morning and BP has been elevated. Discussed mechanism of BB and patient will continue to take metoprolol at lunch time, then check BP two hours later and keep a log. ACM will follow up in 5-7 days for readings. KG       Reduce risk of COPD Exacerbations (ie. managing triggers, health maintenance with COPD). 08/15/22  Patient is coughing when drinking water. ST suggested Simply Thick be added to water. Mrs Bartolome Reed said it helped. They were told to stop and last time used was 8/11/22. Coughing has returned and not only when eating and drinking. Positive for phlegm that is creamy in color and thick. Family denies fever. S  T is scheduled to see patient tomorrow. ACM will call during ST visit to discuss her findings. ACM will call in one day. KG    07/19/22  Productive cough with phlegm but has improved since starting Robitussi. Also using Acapella respiratory device (pickle). No fevers, phlegm white and at times thick. Encouraged water. Drinking 16 oz at breakfast and lunch and then 8oz in the afternoon. Will review productive cough steps during next call in 10-14 days. KG    07/12/22  Mrs. Bartolome Reed stated [facility] saw patient 7/7/22. Increased sputum, provider suggested robitussin DM. He takes 2-3x/day with improvement. Denies fever or increased fatigue but positive for phlegm, clear or white in in color. Using mucomyst BID. Reviewed red flags with Mrs. Bartolome Reed who verbalized understanding. ACM will follow up  7-10 days. KG    06/13/22  Patient has been using mucomyst BID. Nebulizer is done after breakfast and lunch. Some phlegm, tan in color, one day caregiver reported brown color. Today phlegm is not too difficulty to cough up and is tan in color.  Discussed changes in viscosity and color need to be reported to MD. Pt drinking and extra 1/2 bottle (8 oz) in the afternoon. Encouraged increasing to 3/4 bottle. Explained how water can help with thinning secretions. Pt will increase water intake to an additional 3/4 bottle in the afternoons. Fatigue is a major issue for patient but it most likely multifactorial. He is using w/c  more than walker these days. PT/OT will get the patient up to use walker. Discussed managing fatigue with pacing activity and frequent rest periods. ACM will follow up in 10-14 days. KG    06/01/22  [facility] saw pt recently and pt was placed on and has since finished prednisone and abx. Pt does not like using nebulizer. Not sure why. Encouraged Mrs. Belkys Kruger to remind patient to use 2-3x daily. ACM will follow up in 7-10 days. KG    05/16/22  Therapist at home today and listened to pt and told family pt sounded rattly. Inquired if patient is using mucomyst BID and he is not, does not like it. Productive cough with tan phlegm, no change in color and is not more viscous. Encouraged water. Patient drinks 16 oz of water daily with ginger ale and 2 cups of coffee. Pt will start drinking an exra 8 oz of water daily. ACM will follow up in 10- 14 days. KG    04/27/22  Patient has COPD and is using two inhalers (Wixela, Spiriva) nebulizer daily (mucomist) patient does not like to use more than once a day. Confirmed he has albuterol rescue inhaler to use PRN. Patient does have a productive cough. Reports phlegm is tan in color. Denies fever, chills, sweats or CP. ACM will discuss red flags during next call with patient. ACM will follow up in 7-10 days. KG       Supportive resources in place to maintain patient in the community (ie. Home Health, DME equipment, refer to, medication assistant plan, etc.)      08/15/22 Called and LM with this writer's contact information and request for return call. Called patient back and spoke with Mrs. Belkys Kruger (on HIPAA). Ramp has not been placed yet.  They hope to schedule it soon. 07/20/22  Mrs. Phan Petersen called and advised neuro appt changed to VV. She is concerned she will not be able to get patient out of bed in time for appt. Caregiver is not at the home on Thursdays and PT/OT are scheduled for the afternoon. Caregiver to changes hours to 8:30 starting today. M/W/F  Instructed Mrs. Phan Petersen not to attempt to get patient up without assistance, especially if he is having more difficulty getting the EOB or reports dizziness. Josue Clarke will be at the home at 10:30 for the VV visit. Encouraged wife to make notes of changes (decline) in patient to discuss. Requested PT/OT notes from [facility].    07/19/22  Called Mrs. Phan Petersen and confirmed number of steps into the home is 6. [facility] will not be able to transport unless by stretcher. [employer] is scheduled to come to the home 7/20/22 for an estimate. Family agreeable to changed neuro appt to VV. ACM offered to call for patient as Mrs. Phan Petersen was a bit overwhelmed. Pt slid to the floor yesterday and it took some time for him to get up. Has rollator but unable to use. Using w/c to get from room to room. Hospital bed in place. Urinal used during the night. Chair lift in the home but pt is not able to use at this time. Safety bars in the bathroom. 07/15/22  Called [facility] who confirmed if more than 4 steps will not be able to transport by wheelchair (stretcher). They do have portable ramps but the max amount of steps is four. Called patient and LM to confirm number of steps for both doors. Will await return call. (need transport for 7/20 neuro appt). KG    07/12/22  Patient is primarily in a wheelchair due to weakness/balance deficits. There are steps into the home. Patient would benefit from a ramp. Offered information but Mrs. Phan Petersen states her niece provided information but she has not called yet.  Encouraged her to call the [employer] to start the process. Pt has an appt with neurology 7/21/22 and will need transportation. ACM will investigate wheel chair transportation services. ACM will follow up in 3-5 days. KG    06/13/22  Neurology appt 7/6/22 was cancelled. Family would like this writer to have appt rescheduled to assessed decreased balance/gait difficulty/ and increased fatigue. Fatigue was more pronounced 6/10/22. He continues to work with PT but they have seen a change. Hospice evaluation was done and family has decided to continue to work with [facility] at this time. Discussed benefits of hospice and advised available to order should they change their minds. OT has ordered a wheelchair but it has not arrived yet. ACM will follow up in 10-14 days on status of the above. KG      06/01/22  Per Mrs. Belkys Kruger, [facility] OT was able to secure a hospital bed for patient. It is in the living room. Pt using a urinal at night which spouse must empty every 2-3 hours at night. Suggested a 2nd urinal to be placed on bed. Pt able to use without assistance. They have a paid caregiver 3 days a week for 5 hours. A very attentive niece checks on them daily. [facility] RN placed referral for hospice. Representative of [facility] will be coming to the home 6/2. Discussed how hospice operates. Will follow up in 3-5 days. KG    05/16/22  HH in place for SN/PT/OT. Confirmed patient is receiving services and quite happy with staff and services. ACM will continue to monitor and follow up in 10-14 days. KG    05/05/22  HH is not in place. Pt states she received a rx in mail but did not know what to do with it. HH will need progress note to accompany. Discussed at great length with patient and what his needs are including SN/PT/OT. VV scheduled for 5/6/22.    04/27/22   Patient and his wife live alone. One niece in area who checks on them daily. Patient very debilitated and Mrs. Belkys Kruger assists with ADL's. Neurology is ordering [facility] SN/PT/OT.  Patient could use an aid to assist with bathing. Awaiting update on status of HH. They do not have a life alert system. Pt has suffered a fall recently. Ambulates with a walker. Has become weaker per patient. Will provide additional information by mail for their review. Discussed [employer] system and provided phone number so they can register patient as someone needing assistance getting out of the home during an emergency. ACM will investigate additional resources and follow up in 7-10 days.  KG

## 2022-08-16 ENCOUNTER — PATIENT OUTREACH (OUTPATIENT)
Dept: CASE MANAGEMENT | Age: 87
End: 2022-08-16

## 2022-08-16 NOTE — PROGRESS NOTES
Goals Addressed                   This Visit's Progress     Reduce risk of COPD Exacerbations (ie. managing triggers, health maintenance with COPD). 08/16/22  Returned  call to [facility] speech therapist, who was at the patient's home during our call. VS:  T 97.9, P 81, R 20-22, /64, O2  91%. Ascultation with fine crackles. Productive cough. Discussed [facility] to assess and CXR. ST recommended nectar thickened liquids for now. Patient's wife will call [facility] today. ACM will follow up 1-2 days. KG    08/15/22  Patient is coughing when drinking water. ST suggested Simply Thick be added to water. Mrs Elvis Mensah said it helped. They were told to stop and last time used was 8/11/22. Coughing has returned and not only when eating and drinking. Positive for phlegm that is creamy in color and thick. Family denies fever. S  T is scheduled to see patient tomorrow. ACM will call during ST visit to discuss her findings. ACM will call in one day. KG    07/19/22  Productive cough with phlegm but has improved since starting Robitussi. Also using Acapella respiratory device (pickle). No fevers, phlegm white and at times thick. Encouraged water. Drinking 16 oz at breakfast and lunch and then 8oz in the afternoon. Will review productive cough steps during next call in 10-14 days. KG    07/12/22  Mrs. Elvis Mensah stated [facility] saw patient 7/7/22. Increased sputum, provider suggested robitussin DM. He takes 2-3x/day with improvement. Denies fever or increased fatigue but positive for phlegm, clear or white in in color. Using mucomyst BID. Reviewed red flags with Mrs. Elvis Mensah who verbalized understanding. ACM will follow up  7-10 days. KG    06/13/22  Patient has been using mucomyst BID. Nebulizer is done after breakfast and lunch. Some phlegm, tan in color, one day caregiver reported brown color. Today phlegm is not too difficulty to cough up and is tan in color.  Discussed changes in viscosity and color need to be reported to MD.  Pt drinking and extra 1/2 bottle (8 oz) in the afternoon. Encouraged increasing to 3/4 bottle. Explained how water can help with thinning secretions. Pt will increase water intake to an additional 3/4 bottle in the afternoons. Fatigue is a major issue for patient but it most likely multifactorial. He is using w/c  more than walker these days. PT/OT will get the patient up to use walker. Discussed managing fatigue with pacing activity and frequent rest periods. ACM will follow up in 10-14 days. KG    06/01/22  [facility] saw pt recently and pt was placed on and has since finished prednisone and abx. Pt does not like using nebulizer. Not sure why. Encouraged Mrs. Lazarus Frederickson to remind patient to use 2-3x daily. ACM will follow up in 7-10 days. KG    05/16/22  Therapist at home today and listened to pt and told family pt sounded rattly. Inquired if patient is using mucomyst BID and he is not, does not like it. Productive cough with tan phlegm, no change in color and is not more viscous. Encouraged water. Patient drinks 16 oz of water daily with ginger ale and 2 cups of coffee. Pt will start drinking an exra 8 oz of water daily. ACM will follow up in 10- 14 days. KG    04/27/22  Patient has COPD and is using two inhalers (Wixela, Spiriva) nebulizer daily (mucomist) patient does not like to use more than once a day. Confirmed he has albuterol rescue inhaler to use PRN. Patient does have a productive cough. Reports phlegm is tan in color. Denies fever, chills, sweats or CP. ACM will discuss red flags during next call with patient. ACM will follow up in 7-10 days. KG

## 2022-08-17 ENCOUNTER — TELEPHONE (OUTPATIENT)
Dept: INTERNAL MEDICINE CLINIC | Age: 87
End: 2022-08-17

## 2022-08-17 ENCOUNTER — PATIENT OUTREACH (OUTPATIENT)
Dept: CASE MANAGEMENT | Age: 87
End: 2022-08-17

## 2022-08-17 NOTE — PROGRESS NOTES
Goals        Check BP daily      08/15/22  OT saw pt today and reports /72.    22  Discussed possibly starting RPM with Mrs Adonay Wilburn. She would like to discuss further at next call. KG    22  Pt not checking BP daily. HH PT does     22   Patient checks his own BP. Has not been keeping a log but will start. Working with [facility] PT/OT who also check his BP. ACM will follow up in 10-14 days. KG      22  Had [facility] RN check for orthostatics. Sittin/80 and standin/70. Pt asymptomatic  Spoke with Mrs. Adonay Wilburn who advises pt has not been checking regularly. HH SN/PT/OT have been checking. She is quite busy helping the patient during the day that it is difficult to do daily. Plan: write down [facility] readings during each visit and keep a lot. ACM will follow up in 7-10 days. KG    22  Discussed HH that is now in place. Therapist came to home today and was not able to work with patient due to HTN (195/101). Prior to checking BP pt had been up walking and went to the bathroom. Pt had not taken BP medication at the time of the appt. Pt rechecked BP while on phone with this writer: 133/73. Discussed low sodium diet. Yesterday patient ate a small quicke (1400 mg of Na). Discussed how Na affects BP and LE edema. Pt reports LE edema. Educated patient on elevation. He does use compression stockings as well. Pt will continue to monitor BP and keep a log. ACM will mail information on low Na diet. Will follow up in 10-14 days. KG    22  Follow up call to patient and spoke with Mrs. Adonay Wilburn, on HIPAA, who provided the followin/27:  146/87 (79)     158/95 (79)  :  130/74 (80)  :  146/84 (80)  :   123/72  (81)  :   140/78  (78)  5/3:   139/86  (80)  :   139/83  (78)  Pt will continue to monitor BP and keep a log. BP is looking good. ACM will follow up in 7-10 days. KG    22  Patient takes metoprolol 25 mg - 1/2 tab at lunch time.  Has been checking BP in the morning and BP has been elevated. Discussed mechanism of BB and patient will continue to take metoprolol at lunch time, then check BP two hours later and keep a log. ACM will follow up in 5-7 days for readings. KG       Reduce risk of COPD Exacerbations (ie. managing triggers, health maintenance with COPD). 08/17/22  Received call from Ashok Sweeney NP, Brandi Jimenez, who was at patient's home and recommended ER evaluation but patient was declining. This writer spoke with patient's wife, Corona Brown (on HIPAA) and explained the need for ER visit and the trajectory if he declines. Inquired if patient wants comfort care. Hospice referral was ordered and eval done June '22 but patient and wife declined and opted to continue to work with PT/OT. Spoke with patient and discussed need for ER assessment vs. Hospice. Patient does not want to go to hospital and deferred hospice decision to his wife. Corona Brown wants to speak with their niece regarding ER vs hospice and asked this writer to call niece as well. She and patient provided verbal permission to call niece. Called niece and advised DH  at home but pt is declining ER. She said she would call patient. Asked Corona Brown to call this writer for an update. 1700: called Mrs. Anne-Marie Darling who advised [facility] NP, Brandi Jimenez recommended palliative care which patient and family are agreeable. Mrs Anne-Marie Darling thought NP was going to make a referral and she is expecting a call from them.  has also ordered an x-ray. Pt is still wanting to keep [facility] PT/ST. Advised I would call back once note from [facility] is received. KG    08/16/22  Returned  call to [facility] speech therapist, who was at the patient's home during our call. VS:  T 97.9, P 81, R 20-22, /64, O2  91%. Ascultation with fine crackles. Productive cough. Discussed [facility] to assess and CXR. ST recommended nectar thickened liquids for now. Patient's wife will call [facility] today. ACM will follow up 1-2 days. KG    08/15/22  Patient is coughing when drinking water. ST suggested Simply Thick be added to water. Mrs Og Weinberg said it helped. They were told to stop and last time used was 8/11/22. Coughing has returned and not only when eating and drinking. Positive for phlegm that is creamy in color and thick. Family denies fever. S  T is scheduled to see patient tomorrow. ACM will call during ST visit to discuss her findings. ACM will call in one day. KG    07/19/22  Productive cough with phlegm but has improved since starting Robitussi. Also using Acapella respiratory device (pickle). No fevers, phlegm white and at times thick. Encouraged water. Drinking 16 oz at breakfast and lunch and then 8oz in the afternoon. Will review productive cough steps during next call in 10-14 days. KG    07/12/22  Mrs. Og Weinberg stated [address] [place] saw patient 7/7/22. Increased sputum, provider suggested robitussin DM. He takes 2-3x/day with improvement. Denies fever or increased fatigue but positive for phlegm, clear or white in in color. Using mucomyst BID. Reviewed red flags with Mrs. Og Weinberg who verbalized understanding. ACM will follow up  7-10 days. KG    06/13/22  Patient has been using mucomyst BID. Nebulizer is done after breakfast and lunch. Some phlegm, tan in color, one day caregiver reported brown color. Today phlegm is not too difficulty to cough up and is tan in color. Discussed changes in viscosity and color need to be reported to MD.  Pt drinking and extra 1/2 bottle (8 oz) in the afternoon. Encouraged increasing to 3/4 bottle. Explained how water can help with thinning secretions. Pt will increase water intake to an additional 3/4 bottle in the afternoons. Fatigue is a major issue for patient but it most likely multifactorial. He is using w/c  more than walker these days. PT/OT will get the patient up to use walker. Discussed managing fatigue with pacing activity and frequent rest periods.   ACM will follow up in 10-14 days. KG    06/01/22  [facility] and [facility] was [facility] on and has since finished prednisone and abx. Pt does not like using nebulizer. Not sure why. Encouraged Mrs. Belkys Kruger to remind patient to use 2-3x daily. ACM will follow up in 7-10 days. KG    05/16/22  Therapist at home today and listened to pt and told family pt sounded rattly. Inquired if patient is using mucomyst BID and he is not, does not like it. Productive cough with tan phlegm, no change in color and is not more viscous. Encouraged water. Patient drinks 16 oz of water daily with ginger ale and 2 cups of coffee. Pt will start drinking an exra 8 oz of water daily. ACM will follow up in 10- 14 days. KG    04/27/22  Patient has COPD and is using two inhalers (Wixela, Spiriva) nebulizer daily (mucomist) patient does not like to use more than once a day. Confirmed he has albuterol rescue inhaler to use PRN. Patient does have a productive cough. Reports phlegm is tan in color. Denies fever, chills, sweats or CP. ACM will discuss red flags during next call with patient. ACM will follow up in 7-10 days. KG       Supportive resources in place to maintain patient in the community (ie. Home Health, DME equipment, refer to, medication assistant plan, etc.)      08/17/22  See below regarding current situation. Patient is declining. Productive cough, difficulty coughing, now on nectar thick liquids. Discussed comfort care measures or treatment. Will await return call once family has spoken with their niece. KG    08/15/22 Called and LM with this writer's contact information and request for return call. Called patient back and spoke with Mrs. Belkys Kruger (on HIPAA). Ramp has not been placed yet. They hope to schedule it soon. 07/20/22  Mrs. Belkys Kruger called and advised neuro appt changed to VV. She is concerned she will not be able to get patient out of bed in time for appt.  Caregiver is not at the home on Thursdays and PT/OT are scheduled for the afternoon. Caregiver to changes hours to 8:30 starting today. M/W/F  Instructed Mrs. Bartolome Reed not to attempt to get patient up without assistance, especially if he is having more difficulty getting the EOB or reports dizziness. Kiera Hazel will be at the home at 10:30 for the VV visit. Encouraged wife to make notes of changes (decline) in patient to discuss. Requested PT/OT notes from [facility].    07/19/22  Called Mrs. Bartolome Reed and confirmed number of steps into the home is 6. [facility] will not be able to transport unless by stretcher. [employer] is scheduled to come to the home 7/20/22 for an estimate. Family agreeable to changed neuro appt to VV. ACM offered to call for patient as Mrs. Bartolome Reed was a bit overwhelmed. Pt slid to the floor yesterday and it took some time for him to get up. Has rollator but unable to use. Using w/c to get from room to room. Hospital bed in place. Urinal used during the night. Chair lift in the home but pt is not able to use at this time. Safety bars in the bathroom. 07/15/22  Called [facility] who confirmed if more than 4 steps will not be able to transport by wheelchair (stretcher). They do have portable ramps but the max amount of steps is four. Called patient and LM to confirm number of steps for both doors. Will await return call. (need transport for 7/20 neuro appt). KG    07/12/22  Patient is primarily in a wheelchair due to weakness/balance deficits. There are steps into the home. Patient would benefit from a ramp. Offered information but Mrs. Bartolome Reed states her niece provided information but she has not called yet. Encouraged her to call the [employer] to start the process. Pt has an appt with neurology 7/21/22 and will need transportation. ACM will investigate wheel chair transportation services. ACM will follow up in 3-5 days. KG    06/13/22  Neurology appt 7/6/22 was cancelled.  Family would like this writer to have appt rescheduled to assessed decreased balance/gait difficulty/ and increased fatigue. Fatigue was more pronounced 6/10/22. He continues to work with PT but they have seen a change. Hospice evaluation was done and family has decided to continue to work with [facility] at this time. Discussed benefits of hospice and advised available to order should they change their minds. OT has ordered a wheelchair but it has not arrived yet. ACM will follow up in 10-14 days on status of the above. KG      06/01/22  Per Mrs. Deisy Mcintosh, [facility] OT was able to secure a hospital bed for patient. It is in the living room. Pt using a urinal at night which spouse must empty every 2-3 hours at night. Suggested a 2nd urinal to be placed on bed. Pt able to use without assistance. They have a paid caregiver 3 days a week for 5 hours. A very attentive niece checks on them daily. [facility] RN placed referral for hospice. Representative of [facility] will be coming to the home 6/2. Discussed how hospice operates. Will follow up in 3-5 days. KG    05/16/22  HH in place for SN/PT/OT. Confirmed patient is receiving services and quite happy with staff and services. ACM will continue to monitor and follow up in 10-14 days. KG    05/05/22  HH is not in place. Pt states she received a rx in mail but did not know what to do with it. HH will need progress note to accompany. Discussed at great length with patient and what his needs are including SN/PT/OT. VV scheduled for 5/6/22.    04/27/22   Patient and his wife live alone. One niece in area who checks on them daily. Patient very debilitated and Mrs. Deisy Mcintosh assists with ADL's. Neurology is ordering [facility] SN/PT/OT. Patient could use an aid to assist with bathing. Awaiting update on status of HH. They do not have a life alert system. Pt has suffered a fall recently. Ambulates with a walker. Has become weaker per patient.  Will provide additional information by mail for their review. Discussed [email or web address] CAD system and provided phone number so they can register patient as someone needing assistance getting out of the home during an emergency. ACM will investigate additional resources and follow up in 7-10 days.  KG

## 2022-08-18 ENCOUNTER — PATIENT OUTREACH (OUTPATIENT)
Dept: CASE MANAGEMENT | Age: 87
End: 2022-08-18

## 2022-08-18 ENCOUNTER — HOSPICE ADMISSION (OUTPATIENT)
Dept: HOSPICE | Facility: HOSPICE | Age: 87
End: 2022-08-18

## 2022-08-18 DIAGNOSIS — R62.7 ADULT FAILURE TO THRIVE: ICD-10-CM

## 2022-08-18 DIAGNOSIS — J44.9 CHRONIC OBSTRUCTIVE PULMONARY DISEASE, UNSPECIFIED COPD TYPE (HCC): Primary | ICD-10-CM

## 2022-08-18 DIAGNOSIS — E11.21 TYPE 2 DIABETES MELLITUS WITH NEPHROPATHY (HCC): ICD-10-CM

## 2022-08-18 DIAGNOSIS — R27.0 ATAXIA: ICD-10-CM

## 2022-08-18 DIAGNOSIS — I67.89 CEREBRAL MICROVASCULAR DISEASE: ICD-10-CM

## 2022-08-18 DIAGNOSIS — I25.10 CORONARY ARTERY DISEASE INVOLVING NATIVE CORONARY ARTERY OF NATIVE HEART WITHOUT ANGINA PECTORIS: ICD-10-CM

## 2022-08-18 NOTE — PROGRESS NOTES
Orders Placed This Encounter    REFERRAL TO [facility] #  [phone] Fax# [phone]     Referral Priority:   Routine     Referral Type:   Consultation     Referral Reason:   Specialty Services Required     Number of Visits Requested:   1     The above orders were approved via [facility] per Dr. Stephie Collins III.

## 2022-08-18 NOTE — PROGRESS NOTES
Challenges to be reviewed by the provider   Additional needs identified to be addressed with provider: yes  [facility], Melvi Singletary sent referral for palliative are to [facility]. Per my conversation with rep from [facility], they are not currently accepting patients. [facility] and [facility] and confirmed they ARE accepting new patients. Discuss referral with SRJ. Order placed and faxed to [facility]. Attempted to contact patient and advise, no answer. ACM will call again at a later time. 22: Called patient and provided update. Palliative care order placed and faxed to [facility]. Received call from [employer] and provided upate. Goals Addressed                   This Visit's Progress     Check BP daily   No change     08/15/22  OT saw pt today and reports /72.    22  Discussed possibly starting RPM with Mrs Belkys Kruger. She would like to discuss further at next call. KG    22  Pt not checking BP daily. HH PT does     22   Patient checks his own BP. Has not been keeping a log but will start. Working with [facility] PT/OT who also check his BP. ACM will follow up in 10-14 days. KG      22  Had [facility] RN check for orthostatics. Sittin/80 and standin/70. Pt asymptomatic  Spoke with Mrs. Belkys Kruger who advises pt has not been checking regularly. HH SN/PT/OT have been checking. She is quite busy helping the patient during the day that it is difficult to do daily. Plan: write down [facility] readings during each visit and keep a lot. ACM will follow up in 7-10 days. KG    22  Discussed HH that is now in place. Therapist came to home today and was not able to work with patient due to HTN (195/101). Prior to checking BP pt had been up walking and went to the bathroom. Pt had not taken BP medication at the time of the appt. Pt rechecked BP while on phone with this writer: 133/73. Discussed low sodium diet.  Yesterday patient ate a small quicke (1400 mg of Na). Discussed how Na affects BP and LE edema. Pt reports LE edema. Educated patient on elevation. He does use compression stockings as well. Pt will continue to monitor BP and keep a log. ACM will mail information on low Na diet. Will follow up in 10-14 days. KG    22  Follow up call to patient and spoke with Mrs. Jared Dee, on HIPAA, who provided the followin/27:  146/87 (79)     158/95 (79)  :  130/74 (80)  :  146/84 (80)  :   123/72  (81)  :   140/78  (78)  5/3:   139/86  (80)  :   139/83  (78)  Pt will continue to monitor BP and keep a log. BP is looking good. ACM will follow up in 7-10 days. KG    22  Patient takes metoprolol 25 mg - 1/2 tab at lunch time. Has been checking BP in the morning and BP has been elevated. Discussed mechanism of BB and patient will continue to take metoprolol at lunch time, then check BP two hours later and keep a log. ACM will follow up in 5-7 days for readings. KG       Reduce risk of COPD Exacerbations (ie. managing triggers, health maintenance with COPD). 22  Received call from Ashok Sweeney NP, Angela Leger, who was at patient's home and recommended ER evaluation but patient was declining. This writer spoke with patient's wife, Srinath Marques (on HIPAA) and explained the need for ER visit and the trajectory if he declines. Inquired if patient wants comfort care. Hospice referral was ordered and eval done  but patient and wife declined and opted to continue to work with PT/OT. Spoke with patient and discussed need for ER assessment vs. Hospice. Patient's speech is interrupted by taking breaths. Patient does not want to go to hospital and deferred hospice decision to his wife. Srinath Marques wants to speak with their niece regarding ER vs hospice and asked this writer to call niece as well. She and patient provided verbal permission to call niece. Called niece and advised DH  at home but pt is declining ER.  She said she would call patient. Asked Nicole Valera to call this writer for an update. 1700: called Mrs. Luke Chavez who advised [facility] NP, Layne Hogue recommended palliative care which patient and family are agreeable. Mrs Luke Chavez thought NP was going to make a referral and she is expecting a call from them.  has also ordered an x-ray. Pt is still wanting to keep [facility] PT/ST. Advised I would call back once note from [facility] is received. KG    08/16/22  Returned  call to Kerline speech therapist, who was at the patient's home during our call. VS:  T 97.9, P 81, R 20-22, /64, O2  91%. Ascultation with fine crackles. Productive cough. Discussed [facility] to assess and CXR. ST recommended nectar thickened liquids for now. Patient's wife will call [facility] today. ACM will follow up 1-2 days. KG    08/15/22  Patient is coughing when drinking water. ST suggested Simply Thick be added to water. Mrs Luke Chavez said it helped. They were told to stop and last time used was 8/11/22. Coughing has returned and not only when eating and drinking. Positive for phlegm that is creamy in color and thick. Family denies fever. S  T is scheduled to see patient tomorrow. ACM will call during ST visit to discuss her findings. ACM will call in one day. KG    07/19/22  Productive cough with phlegm but has improved since starting Robitussi. Also using Acapella respiratory device (pickle). No fevers, phlegm white and at times thick. Encouraged water. Drinking 16 oz at breakfast and lunch and then 8oz in the afternoon. Will review productive cough steps during next call in 10-14 days. KG    07/12/22  Mrs. Luke Chavez stated Ashok Sweeney saw patient 7/7/22. Increased sputum, provider suggested robitussin DM. He takes 2-3x/day with improvement. Denies fever or increased fatigue but positive for phlegm, clear or white in in color. Using mucomyst BID. Reviewed red flags with Mrs. Luke Chavez who verbalized understanding. ACM will follow up  7-10 days. KG    06/13/22  Patient has been using mucomyst BID. Nebulizer is done after breakfast and lunch. Some phlegm, tan in color, one day caregiver reported brown color. Today phlegm is not too difficulty to cough up and is tan in color. Discussed changes in viscosity and color need to be reported to MD.  Pt drinking and extra 1/2 bottle (8 oz) in the afternoon. Encouraged increasing to 3/4 bottle. Explained how water can help with thinning secretions. Pt will increase water intake to an additional 3/4 bottle in the afternoons. Fatigue is a major issue for patient but it most likely multifactorial. He is using w/c  more than walker these days. PT/OT will get the patient up to use walker. Discussed managing fatigue with pacing activity and frequent rest periods. ACM will follow up in 10-14 days. KG    06/01/22  [facility] saw pt recently and pt was placed on and has since finished prednisone and abx. Pt does not like using nebulizer. Not sure why. Encouraged Mrs. Luna Suárez to remind patient to use 2-3x daily. ACM will follow up in 7-10 days. KG    05/16/22  Therapist at home today and listened to pt and told family pt sounded rattly. Inquired if patient is using mucomyst BID and he is not, does not like it. Productive cough with tan phlegm, no change in color and is not more viscous. Encouraged water. Patient drinks 16 oz of water daily with ginger ale and 2 cups of coffee. Pt will start drinking an exra 8 oz of water daily. ACM will follow up in 10- 14 days. KG    04/27/22  Patient has COPD and is using two inhalers (Wixela, Spiriva) nebulizer daily (mucomist) patient does not like to use more than once a day. Confirmed he has albuterol rescue inhaler to use PRN. Patient does have a productive cough. Reports phlegm is tan in color. Denies fever, chills, sweats or CP. ACM will discuss red flags during next call with patient. ACM will follow up in 7-10 days. KG       Supportive resources in place to maintain patient in the community (ie. Home Health, DME equipment, refer to, medication assistant plan, etc.)        08/18/22  Per patient they agreed to let [facility] NP make referral to [facility] for in home palliative care. Reviewed notes from [facility] visit. Called [facility] and was advised they are not currently accepting new patients. Rep could not say when they may resume. [facility] and [facility] and was advised they are accepting new patients. Will discuss with SRJ.     08/17/22  See below regarding current situation. Patient is declining. Productive cough, difficulty coughing, now on nectar thick liquids. Discussed comfort care measures or treatment. Will await return call once family has spoken with their niece. KG    08/15/22 Called and LM with this writer's contact information and request for return call. Called patient back and spoke with Mrs. Jus Mcdonald (on HIPAA). Ramp has not been placed yet. They hope to schedule it soon. 07/20/22  Mrs. Jus Mcdonald called and advised neuro appt changed to VV. She is concerned she will not be able to get patient out of bed in time for appt. Caregiver is not at the home on Thursdays and PT/OT are scheduled for the afternoon. Caregiver to changes hours to 8:30 starting today. M/W/F  Instructed Mrs. Jus Mcdonald not to attempt to get patient up without assistance, especially if he is having more difficulty getting the EOB or reports dizziness. Jorge Resendez will be at the home at 10:30 for the VV visit. Encouraged wife to make notes of changes (decline) in patient to discuss. Requested PT/OT notes from [facility] [employer].    07/19/22  Called Mrs. Jus Mcdonald and confirmed number of steps into the home is 6. [facility] will not be able to transport unless by stretcher. [employer] is scheduled to come to the home 7/20/22 for an estimate. Family agreeable to changed neuro appt to VV. AC offered to call for patient as Mrs. Jus Mcdonald was a bit overwhelmed.  Pt slid to the floor yesterday and it took some time for him to get up. Has rollator but unable to use. Using w/c to get from room to room. Hospital bed in place. Urinal used during the night. Chair lift in the home but pt is not able to use at this time. Safety bars in the bathroom. 07/15/22  Called [facility] who confirmed if more than 4 steps will not be able to transport by wheelchair (stretcher). They do have portable ramps but the max amount of steps is four. Called patient and LM to confirm number of steps for both doors. Will await return call. (need transport for 7/20 neuro appt). KG    07/12/22  Patient is primarily in a wheelchair due to weakness/balance deficits. There are steps into the home. Patient would benefit from a ramp. Offered information but Mrs. Manny Mckenzie states her niece provided information but she has not called yet. Encouraged her to call the [employer] to start the process. Pt has an appt with neurology 7/21/22 and will need transportation. ACM will investigate wheel chair transportation services. ACM will follow up in 3-5 days. KG    06/13/22  Neurology appt 7/6/22 was cancelled. Family would like this writer to have appt rescheduled to assessed decreased balance/gait difficulty/ and increased fatigue. Fatigue was more pronounced 6/10/22. He continues to work with PT but they have seen a change. Hospice evaluation was done and family has decided to continue to work with [facility] at this time. Discussed benefits of hospice and advised available to order should they change their minds. OT has ordered a wheelchair but it has not arrived yet. ACM will follow up in 10-14 days on status of the above. KG      06/01/22  Per Mrs. Manny Mckenzie, [facility] OT was able to secure a hospital bed for patient. It is in the living room. Pt using a urinal at night which spouse must empty every 2-3 hours at night. Suggested a 2nd urinal to be placed on bed. Pt able to use without assistance.    They have a paid caregiver 3 days a week for 5 hours. A very attentive niece checks on them daily. [facility] RN placed referral for hospice. Representative of [facility] will be coming to the home 6/2. Discussed how hospice operates. Will follow up in 3-5 days. KG    05/16/22  HH in place for SN/PT/OT. Confirmed patient is receiving services and quite happy with staff and services. ACM will continue to monitor and follow up in 10-14 days. KG    05/05/22  HH is not in place. Pt states she received a rx in mail but did not know what to do with it. HH will need progress note to accompany. Discussed at great length with patient and what his needs are including SN/PT/OT. VV scheduled for 5/6/22.    04/27/22   Patient and his wife live alone. One niece in area who checks on them daily. Patient very debilitated and Mrs. Guerra Brochure assists with ADL's. Neurology is ordering [facility] [place] SN/PT/OT. Patient could use an aid to assist with bathing. Awaiting update on status of HH. They do not have a life alert system. Pt has suffered a fall recently. Ambulates with a walker. Has become weaker per patient. Will provide additional information by mail for their review. Discussed [employer] system and provided phone number so they can register patient as someone needing assistance getting out of the home during an emergency. ACM will investigate additional resources and follow up in 7-10 days.  KG

## 2022-08-18 NOTE — PROGRESS NOTES
Faxed referral for palliative to [facility] @ Fax # [phone]. Confirmation fax received.

## 2022-08-23 NOTE — PROGRESS NOTES
Received a call from [address] [place] [address] in reference to pts culture done on 12/27/2018. Pt was M. Avium Complex- Positive Abnormal. Fax received with abn results on 1/11/2019 @1745. [place] text sent to physician on call Dr. Crow Eckert with patient results. Results scanned into pts chart for review. Continue Regimen: triamcinolone acetonide 0.1 % topical cream \\nApply to affected area twice daily as needed for itching. Detail Level: Zone

## 2022-08-26 ENCOUNTER — TELEPHONE (OUTPATIENT)
Dept: INTERNAL MEDICINE CLINIC | Age: 87
End: 2022-08-26

## 2022-08-26 NOTE — TELEPHONE ENCOUNTER
Reason for call:  pt's wife calling about dr Rebeka Page getting in touch with trev and has not heard anything back yet regarding this.       Is this a new problem: yes     Date of last appointment:  5/6/2022     Can we respond via [employer]: no    Best call back number:    Marily Aponte [address]) [phone] (Home)

## 2022-08-29 ENCOUNTER — PATIENT OUTREACH (OUTPATIENT)
Dept: CASE MANAGEMENT | Age: 87
End: 2022-08-29

## 2022-08-29 NOTE — PROGRESS NOTES
LM stating [facility] does not work with [facility]. This writer called [facility] and confirmed the same. Called patient's wife, Blair Aragon (on HIPAA) and discussed home based primary care vs home based palliative. She is concerned that [facility] will be discontinued if they switch to either options. She would like to discuss with her niece. Agreed to speak later in the week to address additional questions she may have. Discussed with SRJ. Offered to send referral to [facility]. Family agreeable. [employer] is not working presently,  Use cell phone number until notified otherwise: [phone]    Challenges to be reviewed by the provider   Additional needs identified to be addressed with provider: yes  home health care- Possible UTI. Patient lethargic today, fall the night before (no injuries sustained). Patient able to follow commands. Two days ago, a small amount of blood seen in patient's depends. None today. Discussed with [facility] - VO to add SN to assess and lab- U/A and cx. Goals Addressed                   This Visit's Progress     Supportive resources in place to maintain patient in the community (ie. Home Health, DME equipment, refer to, medication assistant plan, etc.)        08/30/22  Per Mrs. Vera [place] does not accept their insurance. Per discussion with SRJ will order [facility]. 8/30/22  Received call from [facility] who called to advise patient had a fall. Pt slid out of bed and a neighbor was called to assist Mrs. Arlen Lanes to return patient to bed. Joshua Ashraf assessed and reports no visible injuries. Patient lethargic but is able to follow commands, weaker today than last week, stating patient is having more difficulty with coordination. PT was told pt had a small amount of blood in his depends possibly from a small scab on the penis. Called and spoke with Mrs. Arlen Lanes; verified ID with two identifiers.  Inquired about blood in depends. Mrs. Bartolome Reed stated a few days ago there was a small amount of blood in the depends. She saw what looked like a scab on the penis. She cleaned the area and has not seen any blood since. Plan:  1) offer appt with NP- unable to transport patient to the office. 2) can call [facility]- family not comfortable due to last visit  3) discussed with on call MD- [address] who provided VO- adding SN to [facility] and request U/A with culture. Spoke with Kerline, speech therapist, who was at the home. Discussed SN and she will place the order. HH will fax order to [facility]SE for signature. Kerline will assess patient today due to UTI's ability to affect a patient's swallow. Patent is currently on nectar thick liquids. ACM will follow up in 1-2 days. KG    08/18/22  Per patient they agreed to let [facility] NP make referral to [facility] for in home palliative care. Reviewed notes from [facility] visit. Called [facility] and was advised they are not currently accepting new patients. Rep could not say when they may resume. [facility] and was advised they are accepting new patients. Will discuss with [facility].     08/17/22  See below regarding current situation. Patient is declining. Productive cough, difficulty coughing, now on nectar thick liquids. Discussed comfort care measures or treatment. Will await return call once family has spoken with their niece. KG    08/15/22 Called and LM with this writer's contact information and request for return call. Called patient back and spoke with Mrs. Bartolome Reed (on HIPAA). Ramp has not been placed yet. They hope to schedule it soon. 07/20/22  Mrs. Bartolome Reed called and advised neuro appt changed to VV. She is concerned she will not be able to get patient out of bed in time for appt. Caregiver is not at the home on Thursdays and PT/OT are scheduled for the afternoon. Caregiver to changes hours to 8:30 starting today. M/W/F  Instructed Mrs. Bartolome Reed not to attempt to get patient up without assistance, especially if he is having more difficulty getting the EOB or reports dizziness. Kiera Hazel will be at the home at 10:30 for the VV visit. Encouraged wife to make notes of changes (decline) in patient to discuss. Requested PT/OT notes from [facility] [employer].    07/19/22  Called Mrs. Bartolome Reed and confirmed number of steps into the home is 6. [facility] will not be able to transport unless by stretcher. [employer] is scheduled to come to the home 7/20/22 for an estimate. Family agreeable to changed neuro appt to VV. ACM offered to call for patient as Mrs. Bartolome Reed was a bit overwhelmed. Pt slid to the floor yesterday and it took some time for him to get up. Has rollator but unable to use. Using w/c to get from room to room. Hospital bed in place. Urinal used during the night. Chair lift in the home but pt is not able to use at this time. Safety bars in the bathroom. 07/15/22  Called [facility] who confirmed if more than 4 steps will not be able to transport by wheelchair (stretcher). They do have portable ramps but the max amount of steps is four. Called patient and LM to confirm number of steps for both doors. Will await return call. (need transport for 7/20 neuro appt). KG    07/12/22  Patient is primarily in a wheelchair due to weakness/balance deficits. There are steps into the home. Patient would benefit from a ramp. Offered information but Mrs. Bartolome Reed states her niece provided information but she has not called yet. Encouraged her to call the [facility] to start the process. Pt has an appt with neurology 7/21/22 and will need transportation. ACM will investigate wheel chair transportation services. ACM will follow up in 3-5 days. KG    06/13/22  Neurology appt 7/6/22 was cancelled. Family would like this writer to have appt rescheduled to assessed decreased balance/gait difficulty/ and increased fatigue.   Fatigue was more pronounced 6/10/22. He continues to work with PT but they have seen a change. Hospice evaluation was done and family has decided to continue to work with [facility] at this time. Discussed benefits of hospice and advised available to order should they change their minds. OT has ordered a wheelchair but it has not arrived yet. ACM will follow up in 10-14 days on status of the above. KG      06/01/22  Per Mrs. Chelita Anthony, [facility] OT was able to secure a hospital bed for patient. It is in the living room. Pt using a urinal at night which spouse must empty every 2-3 hours at night. Suggested a 2nd urinal to be placed on bed. Pt able to use without assistance. They have a paid caregiver 3 days a week for 5 hours. A very attentive niece checks on them daily. [facility] RN placed referral for hospice. Representative of [facility] will be coming to the home 6/2. Discussed how hospice operates. Will follow up in 3-5 days. KG    05/16/22  HH in place for SN/PT/OT. Confirmed patient is receiving services and quite happy with staff and services. ACM will continue to monitor and follow up in 10-14 days. KG    05/05/22  HH is not in place. Pt states she received a rx in mail but did not know what to do with it. HH will need progress note to accompany. Discussed at great length with patient and what his needs are including SN/PT/OT. VV scheduled for 5/6/22.    04/27/22   Patient and his wife live alone. One niece in area who checks on them daily. Patient very debilitated and Mrs. Chelita Anthony assists with ADL's. Neurology is ordering [facility] SN/PT/OT. Patient could use an aid to assist with bathing. Awaiting update on status of HH. They do not have a life alert system. Pt has suffered a fall recently. Ambulates with a walker. Has become weaker per patient. Will provide additional information by mail for their review.   Discussed [employer] system and provided phone number so they can register patient as someone needing assistance getting out of the home during an emergency. ACM will investigate additional resources and follow up in 7-10 days.  KG

## 2022-08-31 DIAGNOSIS — R62.7 ADULT FAILURE TO THRIVE: ICD-10-CM

## 2022-08-31 DIAGNOSIS — R27.0 ATAXIA: ICD-10-CM

## 2022-08-31 DIAGNOSIS — I73.9 PVD (PERIPHERAL VASCULAR DISEASE) (HCC): ICD-10-CM

## 2022-08-31 DIAGNOSIS — M54.16 LUMBAR BACK PAIN WITH RADICULOPATHY AFFECTING LEFT LOWER EXTREMITY: ICD-10-CM

## 2022-08-31 DIAGNOSIS — J44.9 CHRONIC OBSTRUCTIVE PULMONARY DISEASE, UNSPECIFIED COPD TYPE (HCC): Primary | ICD-10-CM

## 2022-08-31 DIAGNOSIS — E11.21 TYPE 2 DIABETES MELLITUS WITH NEPHROPATHY (HCC): ICD-10-CM

## 2022-08-31 NOTE — PROGRESS NOTES
[facility] does not accept patients insurance. New palliative order placed with [employer]. Orders Placed This Encounter    REFERRAL TO PALLIATIVE MEDICINE     Referral Priority:   Routine     Referral Type:   Consultation     Referral Reason:   Specialty Services Required     Requested Specialty:   Palliative Medicine     Number of Visits Requested:   1     The above orders were approved via [place] [facility] per Dr. Ivonne Hernandez, III.

## 2022-09-02 ENCOUNTER — PATIENT OUTREACH (OUTPATIENT)
Dept: CASE MANAGEMENT | Age: 87
End: 2022-09-02

## 2022-09-02 NOTE — PROGRESS NOTES
Goals Addressed                   This Visit's Progress     Supportive resources in place to maintain patient in the community (ie. Home Health, DME equipment, refer to, medication assistant plan, etc.)        09/02/22  Called and spoke with Mrs. Nita Proctor who advise [facility]home based [facility] called them and they have a new provider and are able to accept the patient). Instructed Mrs. Nita Proctor to contact this writer once [facility] has admitted patient or schedules an appt to see pt. ACM will follow up in 7-10 days. KG    08/31/22  Patient called and LM for this writer asking to inquire with [facility] for palliative. [facility] and was advised they have no palliative program at this time. [place]     08/30/22  Per Mrs. Iram Vance does not accept their insurance. Per discussion with SRJ will order BS Palliative. 8/30/22  Received call from Jeana Batista with [facility] [address] who called to advise patient had a fall. Pt slid out of bed and a neighbor was called to assist Mrs. Nita Proctor to return patient to bed. Neris Rizvi assessed and reports no visible injuries. Patient lethargic but is able to follow commands, weaker today than last week, stating patient is having more difficulty with coordination. PT was told pt had a small amount of blood in his depends possibly from a small scab on the penis. Called and spoke with Mrs. Nita Proctor; verified ID with two identifiers. Inquired about blood in depends. Mrs. Nita Proctor stated a few days ago there was a small amount of blood in the depends. She saw what looked like a scab on the penis. She cleaned the area and has not seen any blood since. Plan:  1) offer appt with NP- unable to transport patient to the office. 2) can call [facility]- family not comfortable due to last visit  3) discussed with on call MD- [address] who provided VO- adding SN to [facility] and request U/A with culture.     Spoke with Kerline, speech therapist, who was at the home. Discussed SN and she will place the order. HH will fax order to [facility] for signature. Kerline will assess patient today due to UTI's ability to affect a patient's swallow. Patent is currently on nectar thick liquids. ACM will follow up in 1-2 days. KG    08/18/22  Per patient they agreed to let [facility] NP make referral to [facility] for in home palliative care. Reviewed notes from [facility] visit. Called [facility] and was advised they are not currently accepting new patients. Rep could not say when they may resume. [facility] and was advised they are accepting new patients. Will discuss with [facility].     08/17/22  See below regarding current situation. Patient is declining. Productive cough, difficulty coughing, now on nectar thick liquids. Discussed comfort care measures or treatment. Will await return call once family has spoken with their niece. KG    08/15/22 Called and LM with this writer's contact information and request for return call. Called patient back and spoke with Mrs. Libra Gonzales (on HIPAA). Ramp has not been placed yet. They hope to schedule it soon. 07/20/22  Mrs. Libra Gonzales called and advised neuro appt changed to VV. She is concerned she will not be able to get patient out of bed in time for appt. Caregiver is not at the home on Thursdays and PT/OT are scheduled for the afternoon. Caregiver to changes hours to 8:30 starting today. M/W/F  Instructed Mrs. Libra Gonzales not to attempt to get patient up without assistance, especially if he is having more difficulty getting the EOB or reports dizziness. Elvis Choudhury will be at the home at 10:30 for the VV visit. Encouraged wife to make notes of changes (decline) in patient to discuss. Requested PT/OT notes from [facility].    07/19/22  Called Mrs. Libra Gonzales and confirmed number of steps into the home is 6. [facility] will not be able to transport unless by stretcher.  [employer] is scheduled to come to the home 7/20/22 for an estimate. Family agreeable to changed neuro appt to VV. ACM offered to call for patient as Mrs. Radha Galvan was a bit overwhelmed. Pt slid to the floor yesterday and it took some time for him to get up. Has rollator but unable to use. Using w/c to get from room to room. Hospital bed in place. Urinal used during the night. Chair lift in the home but pt is not able to use at this time. Safety bars in the bathroom. 07/15/22  Called [facility] who confirmed if more than 4 steps will not be able to transport by wheelchair (stretcher). They do have portable ramps but the max amount of steps is four. Called patient and LM to confirm number of steps for both doors. Will await return call. (need transport for 7/20 neuro appt). KG    07/12/22  Patient is primarily in a wheelchair due to weakness/balance deficits. There are steps into the home. Patient would benefit from a ramp. Offered information but Mrs. Radha Galvan states her niece provided information but she has not called yet. Encouraged her to call the [employer] to start the process. Pt has an appt with neurology 7/21/22 and will need transportation. ACM will investigate wheel chair transportation services. ACM will follow up in 3-5 days. KG    06/13/22  Neurology appt 7/6/22 was cancelled. Family would like this writer to have appt rescheduled to assessed decreased balance/gait difficulty/ and increased fatigue. Fatigue was more pronounced 6/10/22. He continues to work with PT but they have seen a change. Hospice evaluation was done and family has decided to continue to work with [facility] at this time. Discussed benefits of hospice and advised available to order should they change their minds. OT has ordered a wheelchair but it has not arrived yet. ACM will follow up in 10-14 days on status of the above. KG      06/01/22  Per Mrs. Radha Galvan, [facility] OT was able to secure a hospital bed for patient.  It is in the living room. Pt using a urinal at night which spouse must empty every 2-3 hours at night. Suggested a 2nd urinal to be placed on bed. Pt able to use without assistance. They have a paid caregiver 3 days a week for 5 hours. A very attentive niece checks on them daily. [place] RN placed referral for hospice. [facility] will be coming to the home 6/2. Discussed how hospice operates. Will follow up in 3-5 days. KG    05/16/22  HH in place for SN/PT/OT. Confirmed patient is receiving services and quite happy with staff and services. ACM will continue to monitor and follow up in 10-14 days. KG    05/05/22  HH is not in place. Pt states she received a rx in mail but did not know what to do with it. HH will need progress note to accompany. Discussed at great length with patient and what his needs are including SN/PT/OT. VV scheduled for 5/6/22.    04/27/22   Patient and his wife live alone. One niece in area who checks on them daily. Patient very debilitated and Mrs. Aracely Delaney assists with ADL's. Neurology is ordering [place] SN/PT/OT. Patient could use an aid to assist with bathing. Awaiting update on status of HH. They do not have a life alert system. Pt has suffered a fall recently. Ambulates with a walker. Has become weaker per patient. Will provide additional information by mail for their review. Discussed [employer] system and provided phone number so they can register patient as someone needing assistance getting out of the home during an emergency. ACM will investigate additional resources and follow up in 7-10 days.  KG

## 2022-09-04 RX ORDER — ALBUTEROL SULFATE 0.83 MG/ML
SOLUTION RESPIRATORY (INHALATION)
Qty: 180 ML | Refills: 0 | Status: SHIPPED | OUTPATIENT
Start: 2022-09-04 | End: 2022-10-19

## 2022-09-06 ENCOUNTER — TELEPHONE (OUTPATIENT)
Dept: INTERNAL MEDICINE CLINIC | Age: 87
End: 2022-09-06

## 2022-09-06 NOTE — TELEPHONE ENCOUNTER
----- Message from [address] sent at 9/6/2022 11:59 AM EDT -----  Subject: Message to Provider    QUESTIONS  Information for Provider? Estrellita from [facility], wanted to let Dr. Ibis Reed know that [facility] has home health once a week for 3 weeks  ---------------------------------------------------------------------------  --------------  CALL BACK INFO  [phone]; OK to leave message on voicemail  ---------------------------------------------------------------------------  --------------  SCRIPT ANSWERS  Relationship to Patient? Third Party  Third Party Type? Home Health Care? Representative Name?  Lali Gray

## 2022-09-07 ENCOUNTER — PATIENT OUTREACH (OUTPATIENT)
Dept: CASE MANAGEMENT | Age: 87
End: 2022-09-07

## 2022-09-07 NOTE — PROGRESS NOTES
10:30 Mrs. Luna Suárez call this writer to advise [facility] has not called them yet. She did not have the phone number. Offered to call [facility] for an update and requesting update. 10:40 Called [facility] and spoke with Sanya who confirmed they have the referral and will have someone return this writer's call with an update.

## 2022-09-09 ENCOUNTER — TELEPHONE (OUTPATIENT)
Dept: NEUROLOGY | Age: 87
End: 2022-09-09

## 2022-09-09 NOTE — TELEPHONE ENCOUNTER
Called and spoke with Gillian Preston. She noted seeing patient today, first time, for palliative care. She was calling as she wasn't exactly sure what we are following him for. 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 She does know that she saw Reema Singletary NP on 7/21/2022 and imaging was ordered. Forwarded to Dr. Adia Mora to review and explain.

## 2022-09-09 NOTE — TELEPHONE ENCOUNTER
Flea Samuel, Nurse Practitioner, has has for a call back from Nurse or Dr Angela Tejada to further discuss Pt's diagnosis. Please advise.     [phone]

## 2022-09-12 NOTE — TELEPHONE ENCOUNTER
Attempted to contact [employer]. Unable to leave message as her mailbox is full. If she calls back please obtain a fax number from her so I can fax over patient office note.

## 2022-09-16 ENCOUNTER — PATIENT OUTREACH (OUTPATIENT)
Dept: CASE MANAGEMENT | Age: 87
End: 2022-09-16

## 2022-09-16 RX ORDER — NYSTATIN 100000 [USP'U]/G
POWDER TOPICAL 3 TIMES DAILY
Qty: 15 G | Refills: 0 | Status: SHIPPED | OUTPATIENT
Start: 2022-09-16 | End: 2022-09-23

## 2022-09-16 NOTE — PROGRESS NOTES
Goals Addressed                   This Visit's Progress     Supportive resources in place to maintain patient in the community (ie. Home Health, DME equipment, refer to, medication assistant plan, etc.)        09/16/22  Attempted to contact Erich Jaime NP with palliative. Unable to leave message as VMB was full. Need to confirm if they have accepted patient for services. Will attempt to make another outreach at a later time. KG    09/08/22  Called and unable to reach patient at home number. Will attempt outreach again. KG    09/07/02  Called [facility] for an update. Spoke with [facility] who confirmed they have referral from [facility] but because the order is more than 8days old, a new order will be needed. Faxed order and last progress note; confirmation received. Called received from [facility] who advised patient is scheduled for intake visit 9/8/22. ACM will follow up with family after 9/8 visit. KG    09/02/22  Called and spoke with Mrs. Shy Garcia who advised [facility] called them and they have a new provider and are able to accept the patient). Instructed Mrs. Shy Garcia to contact this writer once [facility] has admitted patient or schedules an appt to see pt. ACM will follow up in 7-10 days. KG    08/31/22  Patient called and LM for this writer asking to inquire with [facility] for palliative. Called  and spoke with [facility] and was advised they have no palliative program at this time. Enrrique Shafferor     08/30/22  Per Mrs. Leo Eli does not accept their insurance. Per discussion with SRJEROME will order BS Palliative. 8/30/22  Received call from Saurabh Harding with [facility] [address] who called to advise patient had a fall. Pt slid out of bed and a neighbor was called to assist Mrs. Shy Garcia to return patient to bed. Jodi Goldberg assessed and reports no visible injuries.   Patient lethargic but is able to follow commands, weaker today than last week, stating patient is having more difficulty with coordination. PT was told pt had a small amount of blood in his depends possibly from a small scab on the penis. Called and spoke with Mrs. Bobby Gunter; verified ID with two identifiers. Inquired about blood in depends. Mrs. Bobby Gunter stated a few days ago there was a small amount of blood in the depends. She saw what looked like a scab on the penis. She cleaned the area and has not seen any blood since. Plan:  1) offer appt with NP- unable to transport patient to the office. 2) can call [facility]- family not comfortable due to last visit  3) discussed with on call MD- [address] who provided VO- adding SN to [facility] and request U/A with culture. Spoke with Kerline, speech therapist, who was at the home. Discussed SN and she will place the order. HH will fax order to [facility] for signature. Kerline will assess patient today due to UTI's ability to affect a patient's swallow. Patent is currently on nectar thick liquids. ACM will follow up in 1-2 days. KG    08/18/22  Per patient they agreed to let [facility] NP make referral to [facility] for in home palliative care. Reviewed notes from [facility] visit. Called [facility] and was advised they are not currently accepting new patients. Rep could not say when they may resume. [facility] and was advised they are accepting new patients. Will discuss with SRJ.     08/17/22  See below regarding current situation. Patient is declining. Productive cough, difficulty coughing, now on nectar thick liquids. Discussed comfort care measures or treatment. Will await return call once family has spoken with their niece. KG    08/15/22 Called and LM with this writer's contact information and request for return call. Called patient back and spoke with Mrs. Bobby Gunter (on HIPAA). Ramp has not been placed yet. They hope to schedule it soon. 07/20/22  Mrs. Bobby Gunter called and advised neuro appt changed to VV.  She is concerned she will not be able to get patient out of bed in time for appt. Caregiver is not at the home on Thursdays and PT/OT are scheduled for the afternoon. Caregiver to changes hours to 8:30 starting today. M/W/F  Instructed Mrs. Shy Garcia not to attempt to get patient up without assistance, especially if he is having more difficulty getting the EOB or reports dizziness. Luz Waddell will be at the home at 10:30 for the VV visit. Encouraged wife to make notes of changes (decline) in patient to discuss. Requested PT/OT notes from [facility].    07/19/22  Called Mrs. Shy Garcia and confirmed number of steps into the home is 6. [facility] will not be able to transport unless by stretcher. [employer] is scheduled to come to the home 7/20/22 for an estimate. Family agreeable to changed neuro appt to VV. ACM offered to call for patient as Mrs. Shy Garcia was a bit overwhelmed. Pt slid to the floor yesterday and it took some time for him to get up. Has rollator but unable to use. Using w/c to get from room to room. Hospital bed in place. Urinal used during the night. Chair lift in the home but pt is not able to use at this time. Safety bars in the bathroom. 07/15/22  Called [facility] who confirmed if more than 4 steps will not be able to transport by wheelchair (stretcher). They do have portable ramps but the max amount of steps is four. Called patient and LM to confirm number of steps for both doors. Will await return call. (need transport for 7/20 neuro appt). KG    07/12/22  Patient is primarily in a wheelchair due to weakness/balance deficits. There are steps into the home. Patient would benefit from a ramp. Offered information but Mrs. Shy Garcia states her niece provided information but she has not called yet. Encouraged her to call the [employer] to start the process. Pt has an appt with neurology 7/21/22 and will need transportation. ACM will investigate wheel chair transportation services.   ACM will follow up in 3-5 days. KG    06/13/22  Neurology appt 7/6/22 was cancelled. Family would like this writer to have appt rescheduled to assessed decreased balance/gait difficulty/ and increased fatigue. Fatigue was more pronounced 6/10/22. He continues to work with PT but they have seen a change. Hospice evaluation was done and family has decided to continue to work with [facility] at this time. Discussed benefits of hospice and advised available to order should they change their minds. OT has ordered a wheelchair but it has not arrived yet. ACM will follow up in 10-14 days on status of the above. KG      06/01/22  Per Mrs. Lazarus Frederickson, [facility] OT was able to secure a hospital bed for patient. It is in the living room. Pt using a urinal at night which spouse must empty every 2-3 hours at night. Suggested a 2nd urinal to be placed on bed. Pt able to use without assistance. They have a paid caregiver 3 days a week for 5 hours. A very attentive niece checks on them daily. [facility] RN placed referral for hospice. [facility] will be coming to the home 6/2. Discussed how hospice operates. Will follow up in 3-5 days. KG    05/16/22  HH in place for SN/PT/OT. Confirmed patient is receiving services and quite happy with staff and services. ACM will continue to monitor and follow up in 10-14 days. KG    05/05/22  HH is not in place. Pt states she received a rx in mail but did not know what to do with it. HH will need progress note to accompany. Discussed at great length with patient and what his needs are including SN/PT/OT. VV scheduled for 5/6/22.    04/27/22   Patient and his wife live alone. One niece in area who checks on them daily. Patient very debilitated and Mrs. Lazarus Frederickson assists with ADL's. Neurology is ordering [facility] SN/PT/OT. Patient could use an aid to assist with bathing. Awaiting update on status of HH. They do not have a life alert system. Pt has suffered a fall recently. Ambulates with a walker.  Has become weaker per patient. Will provide additional information by mail for their review. Discussed [employer] system and provided phone number so they can register patient as someone needing assistance getting out of the home during an emergency. ACM will investigate additional resources and follow up in 7-10 days.  KG

## 2022-09-21 DIAGNOSIS — R04.2 HEMOPTYSIS: ICD-10-CM

## 2022-09-21 RX ORDER — ALBUTEROL SULFATE 90 UG/1
AEROSOL, METERED RESPIRATORY (INHALATION)
Qty: 18 EACH | Refills: 5 | Status: SHIPPED | OUTPATIENT
Start: 2022-09-21

## 2022-09-27 ENCOUNTER — TELEPHONE (OUTPATIENT)
Dept: INTERNAL MEDICINE CLINIC | Age: 87
End: 2022-09-27

## 2022-09-27 ENCOUNTER — PATIENT OUTREACH (OUTPATIENT)
Dept: CASE MANAGEMENT | Age: 87
End: 2022-09-27

## 2022-09-27 DIAGNOSIS — R27.0 ATAXIA: ICD-10-CM

## 2022-09-27 DIAGNOSIS — R62.7 ADULT FAILURE TO THRIVE: ICD-10-CM

## 2022-09-27 DIAGNOSIS — E11.21 TYPE 2 DIABETES MELLITUS WITH NEPHROPATHY (HCC): ICD-10-CM

## 2022-09-27 DIAGNOSIS — I73.9 PVD (PERIPHERAL VASCULAR DISEASE) (HCC): ICD-10-CM

## 2022-09-27 DIAGNOSIS — M54.16 LUMBAR BACK PAIN WITH RADICULOPATHY AFFECTING LEFT LOWER EXTREMITY: ICD-10-CM

## 2022-09-27 DIAGNOSIS — J44.9 CHRONIC OBSTRUCTIVE PULMONARY DISEASE, UNSPECIFIED COPD TYPE (HCC): Primary | ICD-10-CM

## 2022-09-27 LAB — SARS-COV-2: NOT DETECTED

## 2022-09-27 NOTE — PROGRESS NOTES
Hospice referral & last office note faxed to [facility] @ Fax # [phone] - confirmation fax received.

## 2022-09-27 NOTE — PROGRESS NOTES
Returned call to Gilma Braun with [facility]. She advised hospice was discussed at length with patient and his wife and they are in agreement to start hospice care. Gilma Braun advised patient has declined. Asking for an order to evaluate and treat. They will provide SN 2x/wk an aid 3x/wk. Family has a paid caregiver coming to the home during the week. Gilma Braun will advise once patient is admitted.

## 2022-09-27 NOTE — TELEPHONE ENCOUNTER
Reason for call:  Spoke with Anna Castañeda , she proved service to the pt. Pt is ready to be move to hospice.   Anna Castañeda requested a call back at [phone]        Is this a new problem: yes     Date of last appointment:  5/6/2022     Can we respond via atCollabt: no    Best call back number: Anna Castañeda [phone]

## 2022-09-27 NOTE — PROGRESS NOTES
Orders Placed This Encounter    REFERRAL TO HOSPICE     Referral Priority:   Routine     Referral Type:   Hospice     Referral Reason:   Continuity of Care     Requested Specialty:   Hospice and Palliative Care     Number of Visits Requested:   1     The above orders were approved via VORB per Dr. Nelly Bean, III.

## 2022-09-28 ENCOUNTER — TELEPHONE (OUTPATIENT)
Dept: INTERNAL MEDICINE CLINIC | Age: 87
End: 2022-09-28

## 2022-09-28 NOTE — TELEPHONE ENCOUNTER
Reason for call:  patient has been discharged from nursing agency and has moved into pallative care. Patient needs an order for nystatin and zinc barrier creme due to incontinence.   Is this a new problem: yes     Date of last appointment:  5/6/2022     Can we respond via [employer]: no    Best call back number:   [facility]

## 2022-09-29 ENCOUNTER — PATIENT OUTREACH (OUTPATIENT)
Dept: CASE MANAGEMENT | Age: 87
End: 2022-09-29

## 2022-09-29 RX ORDER — ZINC OXIDE 200 MG/G
OINTMENT TOPICAL AS NEEDED
Qty: 60 G | Refills: 4 | Status: SHIPPED | OUTPATIENT
Start: 2022-09-29 | End: 2022-09-30 | Stop reason: DRUGHIGH

## 2022-09-29 RX ORDER — NYSTATIN 100000 [USP'U]/G
POWDER TOPICAL 3 TIMES DAILY
Qty: 60 G | Refills: 4 | Status: SHIPPED | OUTPATIENT
Start: 2022-09-29

## 2022-09-29 NOTE — TELEPHONE ENCOUNTER
Orders Placed This Encounter    nystatin (MYCOSTATIN) powder     Sig: Apply  to affected area three (3) times daily. Dispense:  60 g     Refill:  4    liver oil-zinc oxide ointment     Sig: Apply  to affected area as needed for Skin Irritation. Dispense:  60 g     Refill:  4     The above orders were approved via VORB per Dr. Suhas Parker III.

## 2022-09-30 ENCOUNTER — TELEPHONE (OUTPATIENT)
Dept: NEUROLOGY | Age: 87
End: 2022-09-30

## 2022-09-30 RX ORDER — ZINC OXIDE 20 G/100G
OINTMENT TOPICAL AS NEEDED
Qty: 30 G | Refills: 0 | Status: SHIPPED | OUTPATIENT
Start: 2022-09-30

## 2022-09-30 RX ORDER — ACETYLCYSTEINE 200 MG/ML
SOLUTION ORAL; RESPIRATORY (INHALATION)
Qty: 90 ML | Refills: 3 | Status: SHIPPED | OUTPATIENT
Start: 2022-09-30

## 2022-09-30 NOTE — TELEPHONE ENCOUNTER
From: Srini Harden RN   Sent: 9/27/2022  12:16 PM EDT   To: BECKY Fisher,   I am the nurse care manager at [facility]. You and I have messaged before about this patient. Wanted to let you know the patient is being evaluated for hospice through [facility]. [facility] is requesting last office visit notes from PCP and neurology. Would you be able to have someone fax the last neuro note to them at [phone]? I appreciate any assistance!    Chucky     Faxed as requested

## 2022-10-06 ENCOUNTER — PATIENT OUTREACH (OUTPATIENT)
Dept: CASE MANAGEMENT | Age: 87
End: 2022-10-06

## 2022-10-06 NOTE — PROGRESS NOTES
Patient has graduated from the Complex Case Management  program on 10/6/22. Patient/family has been accepted into hospice care through [facility]. Care management goals have been completed. No further Ambulatory Care Manager follow up scheduled. Goals Addressed                   This Visit's Progress     COMPLETED: Check BP daily        08/15/22  OT saw pt today and reports /72.    22  Discussed possibly starting RPM with Mrs Everett Ware. She would like to discuss further at next call. KG    22  Pt not checking BP daily. HH PT does     22   Patient checks his own BP. Has not been keeping a log but will start. Working with [facility] PT/OT who also check his BP. ACM will follow up in 10-14 days. KG      22  Had [facility] RN check for orthostatics. Sittin/80 and standin/70. Pt asymptomatic  Spoke with Mrs. Everett Ware who advises pt has not been checking regularly. HH SN/PT/OT have been checking. She is quite busy helping the patient during the day that it is difficult to do daily. Plan: write down [facility] readings during each visit and keep a lot. ACM will follow up in 7-10 days. KG    22  Discussed HH that is now in place. Therapist came to home today and was not able to work with patient due to HTN (195/101). Prior to checking BP pt had been up walking and went to the bathroom. Pt had not taken BP medication at the time of the appt. Pt rechecked BP while on phone with this writer: 133/73. Discussed low sodium diet. Yesterday patient ate a small quicke (1400 mg of Na). Discussed how Na affects BP and LE edema. Pt reports LE edema. Educated patient on elevation. He does use compression stockings as well. Pt will continue to monitor BP and keep a log. ACM will mail information on low Na diet. Will follow up in 10-14 days.  KG    22  Follow up call to patient and spoke with Mrs. Everett Ware, on HIPAA, who provided the followin/27:  146/87 (79)     158/95 (79)  4/29:  130/74 (80)  4/30:  146/84 (80)  5/1:   123/72  (81)  5/2:   140/78  (78)  5/3:   139/86  (80)  5/4:   139/83  (78)  Pt will continue to monitor BP and keep a log. BP is looking good. ACM will follow up in 7-10 days. KG    04/27/22  Patient takes metoprolol 25 mg - 1/2 tab at lunch time. Has been checking BP in the morning and BP has been elevated. Discussed mechanism of BB and patient will continue to take metoprolol at lunch time, then check BP two hours later and keep a log. ACM will follow up in 5-7 days for readings. KG       COMPLETED: Reduce risk of COPD Exacerbations (ie. managing triggers, health maintenance with COPD). 08/17/22  Received call from Ashok Patel NPVenita, who was at patient's home and recommended ER evaluation but patient was declining. This writer spoke with patient's wife, Xander (on HIPAA) and explained the need for ER visit and the trajectory if he declines. Inquired if patient wants comfort care. Hospice referral was ordered and eval done June '22 but patient and wife declined and opted to continue to work with PT/OT. Spoke with patient and discussed need for ER assessment vs. Hospice. Patient's speech is interrupted by taking breaths. Patient does not want to go to hospital and deferred hospice decision to his wife. Xander wants to speak with their niece regarding ER vs hospice and asked this writer to call nimaria fernanda as well. She and patient provided verbal permission to call [facility]in. Called toyin and advised   at home but pt is declining ER. She said she would call patient. Asked Xander to call this writer for an update. 1700: called Mrs. Everett Ware who advised [facility] NPVenita recommended palliative care which patient and family are agreeable. Mrs Everett Ware thought NP was going to make a referral and she is expecting a call from them.  has also ordered an x-ray. Pt is still wanting to keep [facility] PT/ST.   Advised I would call back once note from [facility] is received. KG    08/16/22  Returned  call to Kerline speech therapist, who was at the patient's home during our call. VS:  T 97.9, P 81, R 20-22, /64, O2  91%. Ascultation with fine crackles. Productive cough. Discussed [facility] to assess and CXR. ST recommended nectar thickened liquids for now. Patient's wife will call [facility] today. ACM will follow up 1-2 days. KG    08/15/22  Patient is coughing when drinking water. ST suggested Simply Thick be added to water. Mrs Mindy Back said it helped. They were told to stop and last time used was 8/11/22. Coughing has returned and not only when eating and drinking. Positive for phlegm that is creamy in color and thick. Family denies fever. S  T is scheduled to see patient tomorrow. ACM will call during ST visit to discuss her findings. ACM will call in one day. KG    07/19/22  Productive cough with phlegm but has improved since starting Robitussi. Also using Acapella respiratory device (pickle). No fevers, phlegm white and at times thick. Encouraged water. Drinking 16 oz at breakfast and lunch and then 8oz in the afternoon. Will review productive cough steps during next call in 10-14 days. KG    07/12/22  Mrs. Mindy Back stated [address] saw patient 7/7/22. Increased sputum, provider suggested robitussin DM. He takes 2-3x/day with improvement. Denies fever or increased fatigue but positive for phlegm, clear or white in in color. Using mucomyst BID. Reviewed red flags with Mrs. Mindy Back who verbalized understanding. ACM will follow up  7-10 days. KG    06/13/22  Patient has been using mucomyst BID. Nebulizer is done after breakfast and lunch. Some phlegm, tan in color, one day caregiver reported brown color. Today phlegm is not too difficulty to cough up and is tan in color. Discussed changes in viscosity and color need to be reported to MD.  Pt drinking and extra 1/2 bottle (8 oz) in the afternoon. Encouraged increasing to 3/4 bottle.  Explained how water can help with thinning secretions. Pt will increase water intake to an additional 3/4 bottle in the afternoons. Fatigue is a major issue for patient but it most likely multifactorial. He is using w/c  more than walker these days. PT/OT will get the patient up to use walker. Discussed managing fatigue with pacing activity and frequent rest periods. ACM will follow up in 10-14 days. KG    06/01/22  [facility] saw pt recently and pt was placed on and has since finished prednisone and abx. Pt does not like using nebulizer. Not sure why. Encouraged Mrs. Carlitos Jaime to remind patient to use 2-3x daily. ACM will follow up in 7-10 days. KG    05/16/22  Therapist at home today and listened to pt and told family pt sounded rattly. Inquired if patient is using mucomyst BID and he is not, does not like it. Productive cough with tan phlegm, no change in color and is not more viscous. Encouraged water. Patient drinks 16 oz of water daily with ginger ale and 2 cups of coffee. Pt will start drinking an exra 8 oz of water daily. ACM will follow up in 10- 14 days. KG    04/27/22  Patient has COPD and is using two inhalers (Wixela, Spiriva) nebulizer daily (mucomist) patient does not like to use more than once a day. Confirmed he has albuterol rescue inhaler to use PRN. Patient does have a productive cough. Reports phlegm is tan in color. Denies fever, chills, sweats or CP. ACM will discuss red flags during next call with patient. ACM will follow up in 7-10 days. KG       COMPLETED: Supportive resources in place to maintain patient in the community (ie. Home Health, DME equipment, refer to, medication assistant plan, etc.)        10/06/22   Called [facility] and confirmed patient admitted to hospice 9/29/22. Medical director Dr. Teddy Patten. CCM episode resolved at this time. 09/16/22  Attempted to contact Naresh Marques NP with palliative. Unable to leave message as VMB was full.  Need to confirm if they have accepted patient for services. Will attempt to make another outreach at a later time. KG    09/08/22  Called and unable to reach patient at home number. Will attempt outreach again. KG    09/07/02  Called [facility] for an update. Spoke with Heriberto Schneider who confirmed they have referral from [facility] but because the order is more than 8days old, a new order will be needed. Faxed order and last progress note; confirmation received. Called received from Heriberto Schneider who advised patient is scheduled for intake visit 9/8/22. ACM will follow up with family after 9/8 visit. KG    09/02/22  Called and spoke with Mrs. Shaquille Pichardo who advised [facility] (home based palliative care called them and they have a new provider and are able to accept the patient). Instructed Mrs. Shaquille Pichardo to contact this writer once [facility] has admitted patient or schedules an appt to see pt. ACM will follow up in 7-10 days. KG    08/31/22  Patient called and LM for this writer asking to inquire with [facility] for palliative. Called  and spoke with [facility] and was advised they have no palliative program at this time. Mahendra Catalan     08/30/22  Per Mrs. Elinor Yañez does not accept their insurance. Per discussion with SRJ will order [facility] Pall[facility]. 8/30/22  Received call from Srini Gibson with [facility] who called to advise patient had a fall. Pt slid out of bed and a neighbor was called to assist Mrs. Shaquille Pichardo to return patient to bed. Mark Cerda assessed and reports no visible injuries. Patient lethargic but is able to follow commands, weaker today than last week, stating patient is having more difficulty with coordination. PT was told pt had a small amount of blood in his depends possibly from a small scab on the penis. Called and spoke with Mrs. Shaquille Pichardo; verified ID with two identifiers. Inquired about blood in depends. Mrs. Shaquille Pichardo stated a few days ago there was a small amount of blood in the depends.  She saw what looked like a scab on the penis. She cleaned the area and has not seen any blood since. Plan:  1) offer appt with NP- unable to transport patient to the office. 2) can call [facility]- family not comfortable due to last visit  3) discussed with on call MD- [address] who provided VO- adding SN to [facility] and request U/A with culture. Spoke with Kerline, speech therapist, who was at the home. Discussed SN and she will place the order. HH will fax order to [facility] for signature. Kerline will assess patient today due to UTI's ability to affect a patient's swallow. Patent is currently on nectar thick liquids. ACM will follow up in 1-2 days. KG    08/18/22  Per patient they agreed to let [facility] NP make referral to [facility] for in home palliative care. Reviewed notes from [facility] visit. Called [facility] and was advised they are not currently accepting new patients. Rep could not say when they may resume. [facility] and was advised they are accepting new patients. Will discuss with SRJ.     08/17/22  See below regarding current situation. Patient is declining. Productive cough, difficulty coughing, now on nectar thick liquids. Discussed comfort care measures or treatment. Will await return call once family has spoken with their niece. KG    08/15/22 Called and LM with this writer's contact information and request for return call. Called patient back and spoke with Mrs. Beryle Caro (on HIPAA). Ramp has not been placed yet. They hope to schedule it soon. 07/20/22  Mrs. Beryle Caro called and advised neuro appt changed to VV. She is concerned she will not be able to get patient out of bed in time for appt. Caregiver is not at the home on Thursdays and PT/OT are scheduled for the afternoon. Caregiver to changes hours to 8:30 starting today. M/W/F  Instructed Mrs. Beryle Caro not to attempt to get patient up without assistance, especially if he is having more difficulty getting the EOB or reports dizziness.    Neighbor will be at the home at 10:30 for the VV visit. Encouraged wife to make notes of changes (decline) in patient to discuss. Requested PT/OT notes from [facility].    07/19/22  Called Mrs. Merari Burton and confirmed number of steps into the home is 6. [facility] will not be able to transport unless by stretcher. [employer] is scheduled to come to the home 7/20/22 for an estimate. Family agreeable to changed neuro appt to VV. ACM offered to call for patient as Mrs. Merari Burton was a bit overwhelmed. Pt slid to the floor yesterday and it took some time for him to get up. Has rollator but unable to use. Using w/c to get from room to room. Hospital bed in place. Urinal used during the night. Chair lift in the home but pt is not able to use at this time. Safety bars in the bathroom. 07/15/22  Called [facility] who confirmed if more than 4 steps will not be able to transport by wheelchair (stretcher). They do have portable ramps but the max amount of steps is four. Called patient and LM to confirm number of steps for both doors. Will await return call. (need transport for 7/20 neuro appt). KG    07/12/22  Patient is primarily in a wheelchair due to weakness/balance deficits. There are steps into the home. Patient would benefit from a ramp. Offered information but Mrs. Merari Burton states her niece provided information but she has not called yet. Encouraged her to call the companies to start the process. Pt has an appt with neurology 7/21/22 and will need transportation. ACM will investigate wheel chair transportation services. ACM will follow up in 3-5 days. KG    06/13/22  Neurology appt 7/6/22 was cancelled. Family would like this writer to have appt rescheduled to assessed decreased balance/gait difficulty/ and increased fatigue. Fatigue was more pronounced 6/10/22. He continues to work with PT but they have seen a change.      Hospice evaluation was done and family has decided to continue to work with [facility] at this time. Discussed benefits of hospice and advised available to order should they change their minds. OT has ordered a wheelchair but it has not arrived yet. ACM will follow up in 10-14 days on status of the above. KG      06/01/22  Per Mrs. Haley Logan, [facility] OT was able to secure a hospital bed for patient. It is in the living room. Pt using a urinal at night which spouse must empty every 2-3 hours at night. Suggested a 2nd urinal to be placed on bed. Pt able to use without assistance. They have a paid caregiver 3 days a week for 5 hours. A very attentive niece checks on them daily. [facility] RN placed referral for hospice. Representative of [facility] will be coming to the home 6/2. Discussed how hospice operates. Will follow up in 3-5 days. KG    05/16/22  HH in place for SN/PT/OT. Confirmed patient is receiving services and quite happy with staff and services. ACM will continue to monitor and follow up in 10-14 days. KG    05/05/22  HH is not in place. Pt states she received a rx in mail but did not know what to do with it. HH will need progress note to accompany. Discussed at great length with patient and what his needs are including SN/PT/OT. VV scheduled for 5/6/22.    04/27/22   Patient and his wife live alone. One niece in area who checks on them daily. Patient very debilitated and Mrs. Haley Logan assists with ADL's. Neurology is ordering [facility] SN/PT/OT. Patient could use an aid to assist with bathing. Awaiting update on status of HH. They do not have a life alert system. Pt has suffered a fall recently. Ambulates with a walker. Has become weaker per patient. Will provide additional information by mail for their review. Discussed [employer] system and provided phone number so they can register patient as someone needing assistance getting out of the home during an emergency. ACM will investigate additional resources and follow up in 7-10 days.  KG Patient has Ambulatory Care Manager's contact information for any further questions, concerns, or needs.   Patients upcoming visits:    Future Appointments   Date Time Provider Kari Jiménez   12/27/2022 11:40 AM Nam Acharya DO [facility]

## 2022-10-19 RX ORDER — ALBUTEROL SULFATE 0.83 MG/ML
SOLUTION RESPIRATORY (INHALATION)
Qty: 180 ML | Refills: 0 | Status: SHIPPED | OUTPATIENT
Start: 2022-10-19

## 2022-10-19 NOTE — TELEPHONE ENCOUNTER
Chief Complaint   Patient presents with    Medication Refill     Last Appointment with Dr. Madsion Staff:  5/6/2022  Future Appointments   Date Time Provider Kari Jiménez   12/27/2022 11:40 AM Abbie Acharya DO [facility]

## 2022-11-11 RX ORDER — ALBUTEROL SULFATE 0.83 MG/ML
SOLUTION RESPIRATORY (INHALATION)
Qty: 180 ML | Refills: 0 | Status: SHIPPED | OUTPATIENT
Start: 2022-11-11

## 2022-11-12 DIAGNOSIS — E11.21 TYPE 2 DIABETES MELLITUS WITH NEPHROPATHY (HCC): ICD-10-CM

## 2022-11-14 RX ORDER — GLIPIZIDE 5 MG/1
TABLET ORAL
Qty: 90 TABLET | Refills: 2 | Status: SHIPPED | OUTPATIENT
Start: 2022-11-14

## 2022-11-17 ENCOUNTER — PATIENT OUTREACH (OUTPATIENT)
Dept: CASE MANAGEMENT | Age: 87
End: 2022-11-17

## 2022-11-17 NOTE — PROGRESS NOTES
Patient's wife called and requested refill for albuterol (proventil) nebulizer solution and mucomyst nebulizer solution. Advised mucomyst has refills and to call pharmacy. Mrs. Nat Camilo asked this writer to speak with caregiver who administers patient's medications for clarification. Per caregiver, patient uses mucomyst 2-3x/day scheduled. They have been giving proventil 3x/day now and the refill from 11/11/22 is almost gone. Patient is now on 3L O2. [facility] is providing services. This writer spoke with Sekou Stephens RN with [facility] who advised she did not have mucomyst or proventil nebulizer solutions on patient's med list. She was however, aware that patient had the albuterol inhaler. Will discuss with SRJ. Received call back from Oral Kat, hospice RN who contacted patient's wife to discuss nebulizer prescriptions. She counseled family on use of PRN meds. She confirmed patient has the new refill of proventil but has not started using it yet. Mrs Nat Camilo was looking at the previously prescribed bottle thinking it was the new one. Pt will not need a refill.

## 2023-01-05 RX ORDER — METFORMIN HYDROCHLORIDE 500 MG/1
TABLET, EXTENDED RELEASE ORAL
Qty: 270 TABLET | Refills: 1 | Status: SHIPPED | OUTPATIENT
Start: 2023-01-05

## 2023-01-20 RX ORDER — SIMVASTATIN 20 MG/1
TABLET, FILM COATED ORAL
Qty: 90 TABLET | Refills: 1 | Status: SHIPPED | OUTPATIENT
Start: 2023-01-20

## 2023-07-13 RX ORDER — METFORMIN HYDROCHLORIDE 500 MG/1
TABLET, EXTENDED RELEASE ORAL
Qty: 270 TABLET | Refills: 1 | Status: SHIPPED | OUTPATIENT
Start: 2023-07-13

## 2023-09-09 DIAGNOSIS — E11.21 TYPE 2 DIABETES MELLITUS WITH DIABETIC NEPHROPATHY (HCC): ICD-10-CM

## 2023-09-10 RX ORDER — GLIPIZIDE 5 MG/1
TABLET ORAL
Qty: 90 TABLET | Refills: 2 | Status: SHIPPED | OUTPATIENT
Start: 2023-09-10

## 2023-09-27 ENCOUNTER — NURSE ONLY (OUTPATIENT)
Age: 88
End: 2023-09-27

## 2023-10-02 RX ORDER — ACETYLCYSTEINE 200 MG/ML
SOLUTION ORAL; RESPIRATORY (INHALATION)
Qty: 180 ML | Refills: 0 | Status: SHIPPED | OUTPATIENT
Start: 2023-10-02

## 2023-10-21 RX ORDER — ACETYLCYSTEINE 200 MG/ML
SOLUTION ORAL; RESPIRATORY (INHALATION)
Qty: 180 ML | Refills: 0 | Status: SHIPPED | OUTPATIENT
Start: 2023-10-21

## 2023-11-03 RX ORDER — ACETYLCYSTEINE 200 MG/ML
SOLUTION ORAL; RESPIRATORY (INHALATION)
Qty: 180 ML | Refills: 0 | Status: SHIPPED | OUTPATIENT
Start: 2023-11-03

## 2024-01-09 RX ORDER — METFORMIN HYDROCHLORIDE 500 MG/1
TABLET, EXTENDED RELEASE ORAL
Qty: 270 TABLET | Refills: 1 | Status: SHIPPED | OUTPATIENT
Start: 2024-01-09

## 2024-01-09 NOTE — TELEPHONE ENCOUNTER
Attempted to contact patient, unsuccessful.    -Left message to return call for appt for [facility] - next available.

## 2024-01-09 NOTE — TELEPHONE ENCOUNTER
Chief Complaint   Patient presents with    Medication Refill     Last Appointment with Dr. Demario Chavez:  5/6/2022   No future appointments.

## 2024-04-26 ENCOUNTER — TELEPHONE (OUTPATIENT)
Age: 89
End: 2024-04-26

## 2024-04-26 NOTE — TELEPHONE ENCOUNTER
Reason for call:  TC from pt's wife, Nancy Zaldivar. Wife on PHI. Pt id verified by two identifiers. Ms. Zaldivar states that her  passed away a couple of weeks ago and she is in need of a copy of his Advance Directive to be mailed to her. PSR verified, twice, pt's mailing address in pt's chart and mailing address is correct.     Is this a new problem: Yes    Date of last appointment:  5/6/2022     Can we respond via [employer]: No    Best call back number: [phone]